# Patient Record
Sex: FEMALE | Race: BLACK OR AFRICAN AMERICAN | Employment: UNEMPLOYED | URBAN - METROPOLITAN AREA
[De-identification: names, ages, dates, MRNs, and addresses within clinical notes are randomized per-mention and may not be internally consistent; named-entity substitution may affect disease eponyms.]

---

## 2017-01-02 DIAGNOSIS — G43.809 OTHER MIGRAINE WITHOUT STATUS MIGRAINOSUS, NOT INTRACTABLE: ICD-10-CM

## 2017-01-03 RX ORDER — BUTALBITAL, ACETAMINOPHEN AND CAFFEINE 50; 325; 40 MG/1; MG/1; MG/1
TABLET ORAL
Qty: 30 TAB | Refills: 1 | OUTPATIENT
Start: 2017-01-03 | End: 2017-03-23 | Stop reason: SDUPTHER

## 2017-01-23 ENCOUNTER — OFFICE VISIT (OUTPATIENT)
Dept: FAMILY MEDICINE CLINIC | Age: 47
End: 2017-01-23

## 2017-01-23 VITALS
BODY MASS INDEX: 38.98 KG/M2 | HEART RATE: 93 BPM | WEIGHT: 220 LBS | DIASTOLIC BLOOD PRESSURE: 82 MMHG | HEIGHT: 63 IN | TEMPERATURE: 97.9 F | OXYGEN SATURATION: 98 % | RESPIRATION RATE: 16 BRPM | SYSTOLIC BLOOD PRESSURE: 110 MMHG

## 2017-01-23 DIAGNOSIS — B96.89 BACTERIAL VAGINAL INFECTION: ICD-10-CM

## 2017-01-23 DIAGNOSIS — R35.0 URINARY FREQUENCY: ICD-10-CM

## 2017-01-23 DIAGNOSIS — R30.9 PAIN WITH URINATION: Primary | ICD-10-CM

## 2017-01-23 DIAGNOSIS — Z87.440 HISTORY OF RECURRENT UTI (URINARY TRACT INFECTION): ICD-10-CM

## 2017-01-23 DIAGNOSIS — N76.0 BACTERIAL VAGINAL INFECTION: ICD-10-CM

## 2017-01-23 LAB
BILIRUB UR QL STRIP: NEGATIVE
GLUCOSE UR-MCNC: NEGATIVE MG/DL
KETONES P FAST UR STRIP-MCNC: NEGATIVE MG/DL
PH UR STRIP: 6 [PH] (ref 4.6–8)
PROT UR QL STRIP: NEGATIVE MG/DL
SP GR UR STRIP: 1.02 (ref 1–1.03)
UA UROBILINOGEN AMB POC: NORMAL (ref 0.2–1)
URINALYSIS CLARITY POC: CLEAR
URINALYSIS COLOR POC: YELLOW
URINE BLOOD POC: NORMAL
URINE LEUKOCYTES POC: NEGATIVE
URINE NITRITES POC: NEGATIVE

## 2017-01-23 RX ORDER — METRONIDAZOLE 500 MG/1
500 TABLET ORAL 2 TIMES DAILY
Qty: 14 TAB | Refills: 0 | Status: SHIPPED | OUTPATIENT
Start: 2017-01-23 | End: 2017-03-06 | Stop reason: SDUPTHER

## 2017-01-23 NOTE — PROGRESS NOTES
1. Have you been to the ER, urgent care clinic since your last visit? Hospitalized since your last visit? No    2. Have you seen or consulted any other health care providers outside of the [address] [place] [address] since your last visit? Include any pap smears or colon screening. No    Chief Complaint   Patient presents with    Vaginal Discharge     & odor x 2 wks    Urinary Pain     & urinary frequency, hx of recurrent uti x 1 wk     Pt states she has vaginal discharge & odor x 2 wks. Pt also reports burning with urination & urinary freqency x 1 wk. She also states she has a history of recurrent uti's and just stopped macrbid 3 wks ago.

## 2017-01-23 NOTE — PROGRESS NOTES
HISTORY OF PRESENT ILLNESS  Antonella Chris is a 42 y.o. female. HPI  Pt states she has vaginal discharge & odor x 2 wks. Pt also reports burning with urination & urinary freqency x 1 wk. She also states she has a history of recurrent uti's and just stopped macrbid 3 wks ago. ROS  A comprehensive review of system was obtained and negative except findings in the HPI    Visit Vitals    /82    Pulse 93    Temp 97.9 °F (36.6 °C) (Oral)    Resp 16    Ht 5' 3\" (1.6 m)    Wt 220 lb (99.8 kg)    SpO2 98%    BMI 38.97 kg/m2     Physical Exam   Constitutional: She is oriented to person, place, and time. She appears well-developed and well-nourished. Neck: No JVD present. Cardiovascular: Normal rate, regular rhythm and intact distal pulses. Exam reveals no gallop and no friction rub. No murmur heard. Pulmonary/Chest: Effort normal and breath sounds normal. No respiratory distress. She has no wheezes. Musculoskeletal: She exhibits no edema. Neurological: She is alert and oriented to person, place, and time. Skin: Skin is warm. Nursing note and vitals reviewed. ASSESSMENT and PLAN  Encounter Diagnoses   Name Primary?  Pain with urination Yes    Urinary frequency     History of recurrent UTI (urinary tract infection)     Bacterial vaginal infection      Orders Placed This Encounter    AMB POC URINALYSIS DIP STICK AUTO W/O MICRO    metroNIDAZOLE (FLAGYL) 500 mg tablet     Urine dip clean  Given flagyl for BV infection  Follow up 4-5 days if not improving    I have discussed the diagnosis with the patient and the intended plan as seen in the above orders. The patient has received an after-visit summary and questions were answered concerning future plans. Patient conveyed understanding of the plan at the time of the visit.     Shanta Rodriguez, JIMENA, ANP  1/23/2017

## 2017-01-23 NOTE — MR AVS SNAPSHOT
Visit Information Date & Time Provider Department Dept. Phone Encounter #  
 1/23/2017 11:30 AM Elvin Desai, TANNA [address] [facility] [phone] [id number] Upcoming Health Maintenance Date Due Pneumococcal 19-64 Medium Risk (1 of 1 - PPSV23) 1/30/1991 INFLUENZA AGE 9 TO ADULT 8/1/2016 PAP AKA CERVICAL CYTOLOGY 9/22/2017 DTaP/Tdap/Td series (2 - Td) 8/18/2021 Allergies as of 1/23/2017  Review Complete On: 1/23/2017 By: Paresh Worthington LPN Severity Noted Reaction Type Reactions Hydromorphone High 01/05/2012   Systemic Itching, Swelling  
 hydromorphone 1mg IV given immediately began itching and complained of scratchy swelling sensation in her throat Aspirin Medium 12/31/2011   Systemic Anaphylaxis Bactrim [Sulfamethoprim Ds] Medium 08/13/2012   Side Effect Hives Darvocet A500 [Propoxyphene N-acetaminophen] Medium 11/04/2010   Intolerance Hives Imitrex [Sumatriptan] Medium 12/31/2011   Systemic Hives Pcn [Penicillins] Medium 11/04/2010   Intolerance Anaphylaxis Percocet [Oxycodone-acetaminophen] Medium 11/04/2010   Intolerance Hives Pt can take lortab/norco  
 Toradol [Ketorolac Tromethamine] Medium 11/04/2010   Intolerance Hives Tramadol Medium 02/24/2012   Intolerance Hives Hydrocodone  04/04/2016    Hives Lisinopril  06/29/2016    Swelling Tongue and mouth felt weird, slight swelling Prednisone  04/16/2014    Rash And pt felt throat closing Normie Marinas [Milnacipran]  02/17/2012    Hives INSOMNIA Vesicare [Solifenacin]  12/31/2011    Other (comments) Nose bleeds Wellbutrin [Bupropion Hcl]  05/09/2014    Other (comments)  
 insomnia Current Immunizations  Reviewed on 6/21/2016 Name Date TDAP Vaccine 8/18/2011 Not reviewed this visit You Were Diagnosed With   
  
 Codes Comments Pain with urination    -  Primary ICD-10-CM: R30.9 ICD-9-CM: 788.1 Urinary frequency     ICD-10-CM: R35.0 ICD-9-CM: 788.41 History of recurrent UTI (urinary tract infection)     ICD-10-CM: Z87.440 ICD-9-CM: V13.02 Bacterial vaginal infection     ICD-10-CM: N76.0, B96.89 
ICD-9-CM: 616.10, 041.9 Vitals BP Pulse Temp Resp Height(growth percentile) Weight(growth percentile) 110/82 93 97.9 °F (36.6 °C) (Oral) 16 5' 3\" (1.6 m) 220 lb (99.8 kg) SpO2 BMI OB Status Smoking Status 98% 38.97 kg/m2 Ablation Current Some Day Smoker Vitals History BMI and BSA Data Body Mass Index Body Surface Area  
 38.97 kg/m 2 2.11 m 2 Preferred Pharmacy Pharmacy Name Phone [id number] [place], [place] - [address] [facility] [phone] Your Updated Medication List  
  
   
This list is accurate as of: 1/23/17 11:54 AM.  Always use your most recent med list.  
  
  
  
  
 ALPRAZolam 0.5 mg tablet Commonly known as:  XANAX  
TAKE 1 TABLET BY MOUTH TWICE A DAY AS NEEDED. Must last 30 days. amitriptyline 50 mg tablet Commonly known as:  ELAVIL TAKE 1 TABLET BY MOUTH EVERY EVENING  
  
 butalbital-acetaminophen-caffeine -40 mg per tablet Commonly known as:  FIORICET, ESGIC  
TAKE 1 TABLET BY MOUTH EVERY 12 HOURS (MAX 2 TABS DAILY) diclofenac EC 50 mg EC tablet Commonly known as:  VOLTAREN  
TAKE 1 TABLET BY MOUTH 3 TIMES A DAY DULoxetine 60 mg capsule Commonly known as:  CYMBALTA TAKE ONE CAPSULE BY MOUTH EVERY DAY  
  
 ergocalciferol 50,000 unit capsule Commonly known as:  ERGOCALCIFEROL  
TAKE ONE CAPSULE BY MOUTH EVERY WEEK  
  
 fluconazole 150 mg tablet Commonly known as:  DIFLUCAN  
TAKE 1 TAB BY MOUTH DAILY FOR 1 DAY. gabapentin 600 mg tablet Commonly known as:  NEURONTIN  
TAKE 1 TABLET BY MOUTH EVERY MORNING AND IN THE AFTERNOON AND TAKE 2 TABLETS AT NIGHT  
  
 hydroCHLOROthiazide 25 mg tablet Commonly known as:  HYDRODIURIL  
 TAKE 1 TABLET BY MOUTH EVERY DAY  
  
 losartan 50 mg tablet Commonly known as:  COZAAR  
TAKE 1 TABLET EVERY DAY  
  
 * methocarbamol 500 mg tablet Commonly known as:  ROBAXIN Take  by mouth four (4) times daily. * methocarbamol 500 mg tablet Commonly known as:  ROBAXIN  
TAKE 1 TABLET BY MOUTH 3 TIMES A DAY  
  
 metroNIDAZOLE 500 mg tablet Commonly known as:  FLAGYL Take 1 Tab by mouth two (2) times a day for 7 days. raNITIdine 75 mg tablet Commonly known as:  ZANTAC TAKE 1 TABLET BY MOUTH TWICE A DAY BEFORE MEALS  
  
 topiramate 100 mg tablet Commonly known as:  TOPAMAX Take 1.5 Tabs by mouth nightly. TAKE 1.5 TABLETs  BY MOUTH EVERY DAY  
  
 triamcinolone 0.5 % topical cream  
Commonly known as:  ARISTOCORT  
APPLY TO AFFECTED AREA TWO (2) TIMES A DAY. USE THIN LAYER  
  
 * Notice: This list has 2 medication(s) that are the same as other medications prescribed for you. Read the directions carefully, and ask your doctor or other care provider to review them with you. Prescriptions Sent to Pharmacy Refills  
 metroNIDAZOLE (FLAGYL) 500 mg tablet 0 Sig: Take 1 Tab by mouth two (2) times a day for 7 days. Class: Normal  
 Pharmacy: [facility]/pharmacy #[id number] #: [phone] Route: Oral  
  
We Performed the Following AMB POC URINALYSIS DIP STICK AUTO W/O MICRO [61519 CPT(R)] Introducing [facility]! Dear Dakota Barrera: 
Thank you for requesting a Amen. account. Our records indicate that you already have an active Amen. account. You can access your account anytime at [email or web address] Did you know that you can access your hospital and ER discharge instructions at any time in Amen.? You can also review all of your test results from your hospital stay or ER visit. Additional Information If you have questions, please visit the Frequently Asked Questions section of the [email or web address] website at [email or web address] Remember, [employer] is NOT to be used for urgent needs. For medical emergencies, dial 911. Now available from your iPhone and Android! Please provide this summary of care documentation to your next provider. Your primary care clinician is listed as NICKY YOU. If you have any questions after today's visit, please call [phone].

## 2017-01-25 RX ORDER — DICLOFENAC SODIUM 50 MG/1
TABLET, DELAYED RELEASE ORAL
Qty: 90 TAB | Refills: 1 | Status: SHIPPED | OUTPATIENT
Start: 2017-01-25 | End: 2017-04-24 | Stop reason: SDUPTHER

## 2017-03-06 RX ORDER — METRONIDAZOLE 500 MG/1
TABLET ORAL
Qty: 14 TAB | Refills: 0 | Status: SHIPPED | OUTPATIENT
Start: 2017-03-06 | End: 2017-03-23 | Stop reason: ALTCHOICE

## 2017-03-23 ENCOUNTER — OFFICE VISIT (OUTPATIENT)
Dept: FAMILY MEDICINE CLINIC | Age: 47
End: 2017-03-23

## 2017-03-23 VITALS
HEART RATE: 78 BPM | RESPIRATION RATE: 18 BRPM | BODY MASS INDEX: 38.88 KG/M2 | HEIGHT: 63 IN | DIASTOLIC BLOOD PRESSURE: 88 MMHG | WEIGHT: 219.4 LBS | OXYGEN SATURATION: 98 % | SYSTOLIC BLOOD PRESSURE: 134 MMHG | TEMPERATURE: 98.2 F

## 2017-03-23 DIAGNOSIS — M79.7 FIBROMYALGIA: ICD-10-CM

## 2017-03-23 DIAGNOSIS — R73.03 PRE-DIABETES: ICD-10-CM

## 2017-03-23 DIAGNOSIS — G43.809 OTHER MIGRAINE WITHOUT STATUS MIGRAINOSUS, NOT INTRACTABLE: Primary | ICD-10-CM

## 2017-03-23 DIAGNOSIS — E78.00 PURE HYPERCHOLESTEROLEMIA: ICD-10-CM

## 2017-03-23 DIAGNOSIS — I10 ESSENTIAL HYPERTENSION, BENIGN: ICD-10-CM

## 2017-03-23 DIAGNOSIS — M51.36 DDD (DEGENERATIVE DISC DISEASE), LUMBAR: ICD-10-CM

## 2017-03-23 RX ORDER — METHOCARBAMOL 500 MG/1
TABLET, FILM COATED ORAL
Qty: 90 TAB | Refills: 5 | Status: SHIPPED | OUTPATIENT
Start: 2017-03-23 | End: 2017-09-26 | Stop reason: ALTCHOICE

## 2017-03-23 RX ORDER — TRIAMCINOLONE ACETONIDE 1 MG/G
CREAM TOPICAL 2 TIMES DAILY
Qty: 60 G | Refills: 0 | Status: SHIPPED | OUTPATIENT
Start: 2017-03-23 | End: 2018-08-21 | Stop reason: SDUPTHER

## 2017-03-23 RX ORDER — BUTALBITAL, ACETAMINOPHEN AND CAFFEINE 50; 325; 40 MG/1; MG/1; MG/1
TABLET ORAL
Qty: 30 TAB | Refills: 1 | Status: SHIPPED | OUTPATIENT
Start: 2017-03-23 | End: 2017-06-14 | Stop reason: SDUPTHER

## 2017-03-23 RX ORDER — TOPIRAMATE 100 MG/1
100 TABLET, FILM COATED ORAL 2 TIMES DAILY
Qty: 60 TAB | Refills: 5 | Status: SHIPPED | OUTPATIENT
Start: 2017-03-23 | End: 2017-09-17 | Stop reason: SDUPTHER

## 2017-03-23 NOTE — MR AVS SNAPSHOT
Visit Information Date & Time Provider Department Dept. Phone Encounter #  
 3/23/2017  9:00 AM Maddi Adams MD [address] [facility] [phone] [id number] Follow-up Instructions Return in about 6 months (around 9/23/2017). Upcoming Health Maintenance Date Due Pneumococcal 19-64 Medium Risk (1 of 1 - PPSV23) 1/30/1991 PAP AKA CERVICAL CYTOLOGY 9/22/2017 DTaP/Tdap/Td series (2 - Td) 8/18/2021 Allergies as of 3/23/2017  Review Complete On: 3/23/2017 By: Maddi Adams MD  
  
 Severity Noted Reaction Type Reactions Hydromorphone High 01/05/2012   Systemic Itching, Swelling  
 hydromorphone 1mg IV given immediately began itching and complained of scratchy swelling sensation in her throat Aspirin Medium 12/31/2011   Systemic Anaphylaxis Bactrim [Sulfamethoprim Ds] Medium 08/13/2012   Side Effect Hives Darvocet A500 [Propoxyphene N-acetaminophen] Medium 11/04/2010   Intolerance Hives Imitrex [Sumatriptan] Medium 12/31/2011   Systemic Hives Pcn [Penicillins] Medium 11/04/2010   Intolerance Anaphylaxis Percocet [Oxycodone-acetaminophen] Medium 11/04/2010   Intolerance Hives Pt can take lortab/norco  
 Toradol [Ketorolac Tromethamine] Medium 11/04/2010   Intolerance Hives Tramadol Medium 02/24/2012   Intolerance Hives Hydrocodone  04/04/2016    Hives Lisinopril  06/29/2016    Swelling Tongue and mouth felt weird, slight swelling Prednisone  04/16/2014    Rash And pt felt throat closing Ferol Darner [Milnacipran]  02/17/2012    Hives INSOMNIA Vesicare [Solifenacin]  12/31/2011    Other (comments) Nose bleeds Wellbutrin [Bupropion Hcl]  05/09/2014    Other (comments)  
 insomnia Current Immunizations  Reviewed on 6/21/2016 Name Date TDAP Vaccine 8/18/2011 Not reviewed this visit You Were Diagnosed With   
  
 Codes Comments  Other migraine without status migrainosus, not intractable    -  Primary ICD-10-CM: I46.809 Essential hypertension, benign     ICD-10-CM: I10 
ICD-9-CM: 401.1 Pre-diabetes     ICD-10-CM: R73.03 
ICD-9-CM: 790.29 Pure hypercholesterolemia     ICD-10-CM: E78.00 ICD-9-CM: 272.0   
 DDD (degenerative disc disease), lumbar     ICD-10-CM: M51.36 
ICD-9-CM: 722.52 Fibromyalgia     ICD-10-CM: M79.7 ICD-9-CM: 729.1 Vitals BP Pulse Temp Resp Height(growth percentile) Weight(growth percentile) 134/88 78 98.2 °F (36.8 °C) (Oral) 18 5' 3\" (1.6 m) 219 lb 6.4 oz (99.5 kg) SpO2 BMI OB Status Smoking Status 98% 38.86 kg/m2 Ablation Current Some Day Smoker Vitals History BMI and BSA Data Body Mass Index Body Surface Area  
 38.86 kg/m 2 2.1 m 2 Preferred Pharmacy Pharmacy Name Phone [id number]- [place], [place] - [address] [facility] [phone] Your Updated Medication List  
  
   
This list is accurate as of: 3/23/17  9:44 AM.  Always use your most recent med list.  
  
  
  
  
 ALPRAZolam 0.5 mg tablet Commonly known as:  XANAX  
TAKE 1 TABLET BY MOUTH TWICE A DAY AS NEEDED. Must last 30 days. amitriptyline 50 mg tablet Commonly known as:  ELAVIL TAKE 1 TABLET BY MOUTH EVERY EVENING  
  
 butalbital-acetaminophen-caffeine -40 mg per tablet Commonly known as:  FIORICET, ESGIC  
TAKE 1 TABLET BY MOUTH EVERY 12 HOURS (MAX 2 TABS DAILY) diclofenac EC 50 mg EC tablet Commonly known as:  VOLTAREN  
TAKE 1 TABLET BY MOUTH 3 TIMES A DAY DULoxetine 60 mg capsule Commonly known as:  CYMBALTA TAKE ONE CAPSULE BY MOUTH EVERY DAY  
  
 ergocalciferol 50,000 unit capsule Commonly known as:  ERGOCALCIFEROL  
TAKE ONE CAPSULE BY MOUTH EVERY WEEK  
  
 fluconazole 150 mg tablet Commonly known as:  DIFLUCAN  
TAKE 1 TAB BY MOUTH DAILY FOR 1 DAY. gabapentin 600 mg tablet Commonly known as:  NEURONTIN  
 TAKE 1 TABLET BY MOUTH EVERY MORNING AND IN THE AFTERNOON AND TAKE 2 TABLETS AT NIGHT  
  
 hydroCHLOROthiazide 25 mg tablet Commonly known as:  HYDRODIURIL  
TAKE 1 TABLET BY MOUTH EVERY DAY  
  
 losartan 50 mg tablet Commonly known as:  COZAAR  
TAKE 1 TABLET EVERY DAY  
  
 * methocarbamol 500 mg tablet Commonly known as:  ROBAXIN Take  by mouth four (4) times daily. * methocarbamol 500 mg tablet Commonly known as:  ROBAXIN  
TAKE 1 TABLET BY MOUTH 3 TIMES A DAY  
  
 raNITIdine 75 mg tablet Commonly known as:  ZANTAC TAKE 1 TABLET BY MOUTH TWICE A DAY BEFORE MEALS  
  
 topiramate 100 mg tablet Commonly known as:  TOPAMAX Take 1 Tab by mouth two (2) times a day. TAKE 1.5 TABLETs  BY MOUTH EVERY DAY  
  
 * triamcinolone 0.5 % topical cream  
Commonly known as:  ARISTOCORT  
APPLY TO AFFECTED AREA TWO (2) TIMES A DAY. USE THIN LAYER  
  
 * triamcinolone acetonide 0.1 % topical cream  
Commonly known as:  KENALOG Apply  to affected area two (2) times a day. * Notice: This list has 4 medication(s) that are the same as other medications prescribed for you. Read the directions carefully, and ask your doctor or other care provider to review them with you. Prescriptions Printed Refills  
 butalbital-acetaminophen-caffeine (FIORICET, ESGIC) -40 mg per tablet 1 Sig: TAKE 1 TABLET BY MOUTH EVERY 12 HOURS (MAX 2 TABS DAILY) Class: Print Prescriptions Sent to Pharmacy Refills  
 methocarbamol (ROBAXIN) 500 mg tablet 5 Sig: TAKE 1 TABLET BY MOUTH 3 TIMES A DAY Class: Normal  
 Pharmacy: [facility]/pharmacy #[id number]- [place], [place] - [address] Ph #: [phone]  
 topiramate (TOPAMAX) 100 mg tablet 5 Sig: Take 1 Tab by mouth two (2) times a day. TAKE 1.5 TABLETs  BY MOUTH EVERY DAY  Class: Normal  
 Pharmacy: [facility]/pharmacy #[id number]- [place], [place] - [address] AT [facility] Ph #: [phone] Route: Oral  
 triamcinolone acetonide (KENALOG) 0.1 % topical cream 0 Sig: Apply  to affected area two (2) times a day. Class: Normal  
 [id number] [address] Ph #: [phone] Route: Topical  
  
We Performed the Following CBC WITH AUTOMATED DIFF [81673 CPT(R)] HEMOGLOBIN A1C WITH EAG [00728 CPT(R)] LIPID PANEL [04636 CPT(R)] METABOLIC PANEL, COMPREHENSIVE [16872 CPT(R)] TSH 3RD GENERATION [69494 CPT(R)] Follow-up Instructions Return in about 6 months (around 9/23/2017). Introducing [facility]! Dear Xiomara Hoffmann: 
Thank you for requesting a [employer] account. Our records indicate that you already have an active [employer] account. You can access your account anytime at [email or web address] Did you know that you can access your hospital and ER discharge instructions at any time in [employer]? You can also review all of your test results from your hospital stay or ER visit. Additional Information If you have questions, please visit the Frequently Asked Questions section of the [employer] website at [email or web address] Remember, [employer] is NOT to be used for urgent needs. For medical emergencies, dial 911. Now available from your iPhone and Android! Please provide this summary of care documentation to your next provider. Your primary care clinician is listed as NICKY YOU. If you have any questions after today's visit, please call [phone].

## 2017-03-23 NOTE — PROGRESS NOTES
Chief Complaint   Patient presents with    Complete Physical    Labs     Fasting    Medication Refill     Pending    Rash     Bilateral Arms and Legs     1. Have you been to the ER, urgent care clinic since your last visit? Hospitalized since your last visit? No    2. Have you seen or consulted any other health care providers outside of the [address] [place] [address] since your last visit? Include any pap smears or colon screening. No      Chief Complaint   Patient presents with    Complete Physical    Labs     Fasting    Medication Refill     Pending    Rash     Bilateral Arms and Legs     she is a 41y.o. year old female who presents for evalution. Reviewed PmHx, RxHx, FmHx, SocHx, AllgHx and updated and dated in the chart. Patient Active Problem List    Diagnosis    Pre-diabetes    Urinary frequency    Essential hypertension, benign    DDD (degenerative disc disease), lumbar    Left axillary pain    IBS (irritable bowel syndrome)    Migraine    Hyperlipidemia    Fibromyalgia    Nipple discharge    Depression       Review of Systems - negative except as listed above in the HPI    Objective:     Vitals:    03/23/17 0931   BP: 134/88   Pulse: 78   Resp: 18   Temp: 98.2 °F (36.8 °C)   TempSrc: Oral   SpO2: 98%   Weight: 219 lb 6.4 oz (99.5 kg)   Height: 5' 3\" (1.6 m)     Physical Examination: General appearance - alert, well appearing, and in no distress  Neck - supple, no significant adenopathy  Chest - clear to auscultation, no wheezes, rales or rhonchi, symmetric air entry  Heart - normal rate, regular rhythm, normal S1, S2, no murmurs, rubs, clicks or gallops  Abdomen - soft, nontender, nondistended, no masses or organomegaly    Assessment/ Plan:   Alla Mathur was seen today for complete physical, labs, medication refill and rash.     Diagnoses and all orders for this visit:    Other migraine without status migrainosus, not intractable  -     butalbital-acetaminophen-caffeine (FIORICET, ESGIC) -40 mg per tablet; TAKE 1 TABLET BY MOUTH EVERY 12 HOURS (MAX 2 TABS DAILY)  -     topiramate (TOPAMAX) 100 mg tablet; Take 1 Tab by mouth two (2) times a day. TAKE 1.5 TABLETs  BY MOUTH EVERY DAY  -inc topamax to 100 bid    Essential hypertension, benign  -     LIPID PANEL  -     METABOLIC PANEL, COMPREHENSIVE  -     CBC WITH AUTOMATED DIFF  -at goal    Pre-diabetes  -     LIPID PANEL  -     METABOLIC PANEL, COMPREHENSIVE  -     HEMOGLOBIN A1C WITH EAG  -     TSH 3RD GENERATION  -     CBC WITH AUTOMATED DIFF    Pure hypercholesterolemia  -     LIPID PANEL  -     METABOLIC PANEL, COMPREHENSIVE  -     TSH 3RD GENERATION    DDD (degenerative disc disease), lumbar  -     methocarbamol (ROBAXIN) 500 mg tablet; TAKE 1 TABLET BY MOUTH 3 TIMES A DAY  -seeing rheum as well    Fibromyalgia  -     methocarbamol (ROBAXIN) 500 mg tablet; TAKE 1 TABLET BY MOUTH 3 TIMES A DAY  -     CBC WITH AUTOMATED DIFF    Other orders  -     triamcinolone acetonide (KENALOG) 0.1 % topical cream; Apply  to affected area two (2) times a day. Follow-up Disposition:  Return in about 6 months (around 9/23/2017). I have discussed the diagnosis with the patient and the intended plan as seen in the above orders. The patient understands and agrees with the plan. The patient has received an after-visit summary and questions were answered concerning future plans. Medication Side Effects and Warnings were discussed with patient  Patient Labs were reviewed and or requested:  Patient Past Records were reviewed and or requested    Mary Gracia M.D. There are no Patient Instructions on file for this visit.

## 2017-03-24 LAB
ALBUMIN SERPL-MCNC: 3.9 G/DL (ref 3.5–5.5)
ALBUMIN/GLOB SERPL: 1.4 {RATIO} (ref 1.2–2.2)
ALP SERPL-CCNC: 59 IU/L (ref 39–117)
ALT SERPL-CCNC: 14 IU/L (ref 0–32)
AST SERPL-CCNC: 9 IU/L (ref 0–40)
BASOPHILS # BLD AUTO: 0 X10E3/UL (ref 0–0.2)
BASOPHILS NFR BLD AUTO: 0 %
BILIRUB SERPL-MCNC: <0.2 MG/DL (ref 0–1.2)
BUN SERPL-MCNC: 7 MG/DL (ref 6–24)
BUN/CREAT SERPL: 9 (ref 9–23)
CALCIUM SERPL-MCNC: 8.9 MG/DL (ref 8.7–10.2)
CHLORIDE SERPL-SCNC: 101 MMOL/L (ref 96–106)
CHOLEST SERPL-MCNC: 186 MG/DL (ref 100–199)
CO2 SERPL-SCNC: 21 MMOL/L (ref 18–29)
CREAT SERPL-MCNC: 0.74 MG/DL (ref 0.57–1)
EOSINOPHIL # BLD AUTO: 0.2 X10E3/UL (ref 0–0.4)
EOSINOPHIL NFR BLD AUTO: 2 %
ERYTHROCYTE [DISTWIDTH] IN BLOOD BY AUTOMATED COUNT: 14 % (ref 12.3–15.4)
EST. AVERAGE GLUCOSE BLD GHB EST-MCNC: 114 MG/DL
GLOBULIN SER CALC-MCNC: 2.7 G/DL (ref 1.5–4.5)
GLUCOSE SERPL-MCNC: 81 MG/DL (ref 65–99)
HBA1C MFR BLD: 5.6 % (ref 4.8–5.6)
HCT VFR BLD AUTO: 38.4 % (ref 34–46.6)
HDLC SERPL-MCNC: 51 MG/DL
HGB BLD-MCNC: 12.8 G/DL (ref 11.1–15.9)
IMM GRANULOCYTES # BLD: 0 X10E3/UL (ref 0–0.1)
IMM GRANULOCYTES NFR BLD: 0 %
INTERPRETATION, 910389: NORMAL
LDLC SERPL CALC-MCNC: 95 MG/DL (ref 0–99)
LYMPHOCYTES # BLD AUTO: 4.5 X10E3/UL (ref 0.7–3.1)
LYMPHOCYTES NFR BLD AUTO: 45 %
MCH RBC QN AUTO: 29.8 PG (ref 26.6–33)
MCHC RBC AUTO-ENTMCNC: 33.3 G/DL (ref 31.5–35.7)
MCV RBC AUTO: 90 FL (ref 79–97)
MONOCYTES # BLD AUTO: 0.6 X10E3/UL (ref 0.1–0.9)
MONOCYTES NFR BLD AUTO: 6 %
NEUTROPHILS # BLD AUTO: 4.6 X10E3/UL (ref 1.4–7)
NEUTROPHILS NFR BLD AUTO: 47 %
PLATELET # BLD AUTO: 312 X10E3/UL (ref 150–379)
POTASSIUM SERPL-SCNC: 4 MMOL/L (ref 3.5–5.2)
PROT SERPL-MCNC: 6.6 G/DL (ref 6–8.5)
RBC # BLD AUTO: 4.29 X10E6/UL (ref 3.77–5.28)
SODIUM SERPL-SCNC: 139 MMOL/L (ref 134–144)
TRIGL SERPL-MCNC: 201 MG/DL (ref 0–149)
TSH SERPL DL<=0.005 MIU/L-ACNC: 3.44 UIU/ML (ref 0.45–4.5)
VLDLC SERPL CALC-MCNC: 40 MG/DL (ref 5–40)
WBC # BLD AUTO: 9.9 X10E3/UL (ref 3.4–10.8)

## 2017-03-26 RX ORDER — AMITRIPTYLINE HYDROCHLORIDE 50 MG/1
TABLET, FILM COATED ORAL
Qty: 30 TAB | Refills: 3 | Status: SHIPPED | OUTPATIENT
Start: 2017-03-26 | End: 2017-08-15 | Stop reason: SDUPTHER

## 2017-04-03 RX ORDER — GABAPENTIN 600 MG/1
TABLET ORAL
Qty: 120 TAB | Refills: 4 | Status: SHIPPED | OUTPATIENT
Start: 2017-04-03 | End: 2017-11-22 | Stop reason: SDUPTHER

## 2017-04-13 ENCOUNTER — TELEPHONE (OUTPATIENT)
Dept: FAMILY MEDICINE CLINIC | Age: 47
End: 2017-04-13

## 2017-04-13 NOTE — TELEPHONE ENCOUNTER
George North is calling on behalf of pt to see if office received records for release of information form. Cb number [phone].

## 2017-04-22 DIAGNOSIS — N39.0 URINARY TRACT INFECTION WITHOUT HEMATURIA, SITE UNSPECIFIED: ICD-10-CM

## 2017-04-22 DIAGNOSIS — R30.9 PAINFUL URINATION: ICD-10-CM

## 2017-04-23 RX ORDER — FLUCONAZOLE 150 MG/1
TABLET ORAL
Qty: 1 TAB | Refills: 1 | Status: SHIPPED | OUTPATIENT
Start: 2017-04-23 | End: 2017-09-08 | Stop reason: SDUPTHER

## 2017-04-24 RX ORDER — DICLOFENAC SODIUM 50 MG/1
TABLET, DELAYED RELEASE ORAL
Qty: 90 TAB | Refills: 1 | Status: SHIPPED | OUTPATIENT
Start: 2017-04-24 | End: 2018-03-14

## 2017-05-17 ENCOUNTER — OFFICE VISIT (OUTPATIENT)
Dept: FAMILY MEDICINE CLINIC | Age: 47
End: 2017-05-17

## 2017-05-17 VITALS
TEMPERATURE: 98.4 F | RESPIRATION RATE: 20 BRPM | BODY MASS INDEX: 38.52 KG/M2 | HEIGHT: 63 IN | DIASTOLIC BLOOD PRESSURE: 87 MMHG | WEIGHT: 217.38 LBS | SYSTOLIC BLOOD PRESSURE: 116 MMHG | HEART RATE: 101 BPM

## 2017-05-17 DIAGNOSIS — N39.0 URINARY TRACT INFECTION WITH HEMATURIA, SITE UNSPECIFIED: Primary | ICD-10-CM

## 2017-05-17 DIAGNOSIS — R31.9 URINARY TRACT INFECTION WITH HEMATURIA, SITE UNSPECIFIED: Primary | ICD-10-CM

## 2017-05-17 DIAGNOSIS — I10 ESSENTIAL HYPERTENSION, BENIGN: ICD-10-CM

## 2017-05-17 LAB
BILIRUB UR QL STRIP: NEGATIVE
GLUCOSE UR-MCNC: NEGATIVE MG/DL
KETONES P FAST UR STRIP-MCNC: NEGATIVE MG/DL
PH UR STRIP: 5.5 [PH] (ref 4.6–8)
PROT UR QL STRIP: NEGATIVE MG/DL
SP GR UR STRIP: 1.02 (ref 1–1.03)
UA UROBILINOGEN AMB POC: NORMAL (ref 0.2–1)
URINALYSIS CLARITY POC: CLEAR
URINALYSIS COLOR POC: YELLOW
URINE BLOOD POC: NORMAL
URINE LEUKOCYTES POC: NORMAL
URINE NITRITES POC: NEGATIVE

## 2017-05-17 NOTE — MR AVS SNAPSHOT
Visit Information Date & Time Provider Department Dept. Phone Encounter #  
 5/17/2017  1:20 PM Brittanie Naranjo MD [facility] [phone] [id number] Follow-up Instructions Return in about 3 months (around 8/17/2017). Your Appointments 9/25/2017  9:30 AM  
ESTABLISHED PATIENT with Brittanie Naranjo MD  
[address] [facility] [address]) Appt Note: [facility] [address] [id number] [phone]  
  
   
 [address] [address] Upcoming Health Maintenance Date Due Pneumococcal 19-64 Medium Risk (1 of 1 - PPSV23) 1/30/1991 INFLUENZA AGE 9 TO ADULT 8/1/2017 PAP AKA CERVICAL CYTOLOGY 9/22/2017 DTaP/Tdap/Td series (2 - Td) 8/18/2021 Allergies as of 5/17/2017  Review Complete On: 5/17/2017 By: Brittanie Naranjo MD  
  
 Severity Noted Reaction Type Reactions Hydromorphone High 01/05/2012   Systemic Itching, Swelling  
 hydromorphone 1mg IV given immediately began itching and complained of scratchy swelling sensation in her throat Aspirin Medium 12/31/2011   Systemic Anaphylaxis Bactrim [Sulfamethoprim Ds] Medium 08/13/2012   Side Effect Hives Darvocet A500 [Propoxyphene N-acetaminophen] Medium 11/04/2010   Intolerance Hives Imitrex [Sumatriptan] Medium 12/31/2011   Systemic Hives Pcn [Penicillins] Medium 11/04/2010   Intolerance Anaphylaxis Percocet [Oxycodone-acetaminophen] Medium 11/04/2010   Intolerance Hives Pt can take lortab/norco  
 Toradol [Ketorolac Tromethamine] Medium 11/04/2010   Intolerance Hives Tramadol Medium 02/24/2012   Intolerance Hives Hydrocodone  04/04/2016    Hives Lisinopril  06/29/2016    Swelling Tongue and mouth felt weird, slight swelling Prednisone  04/16/2014    Rash And pt felt throat closing [employer] [Milnacipran]  02/17/2012    Hives INSOMNIA Vesicare [Solifenacin]  12/31/2011    Other (comments) Nose bleeds Wellbutrin [Bupropion Hcl]  05/09/2014    Other (comments)  
 insomnia Current Immunizations  Reviewed on 6/21/2016 Name Date TDAP Vaccine 8/18/2011 Not reviewed this visit You Were Diagnosed With   
  
 Codes Comments Urinary tract infection with hematuria, site unspecified    -  Primary ICD-10-CM: N39.0, R31.9 ICD-9-CM: 599.0 Essential hypertension, benign     ICD-10-CM: I10 
ICD-9-CM: 401.1 Vitals BP Pulse Temp Resp Height(growth percentile) Weight(growth percentile) 116/87 (BP 1 Location: Left arm, BP Patient Position: Sitting) (!) 101 98.4 °F (36.9 °C) (Oral) 20 5' 3\" (1.6 m) 217 lb 6 oz (98.6 kg) PF BMI OB Status Smoking Status 98 L/min 38.51 kg/m2 Ablation Current Some Day Smoker Vitals History BMI and BSA Data Body Mass Index Body Surface Area 38.51 kg/m 2 2.09 m 2 Preferred Pharmacy Pharmacy Name Phone [facility]Y #[id number]- [place], [place] - [address] [facility] [phone] Your Updated Medication List  
  
   
This list is accurate as of: 5/17/17  2:00 PM.  Always use your most recent med list.  
  
  
  
  
 ALPRAZolam 0.5 mg tablet Commonly known as:  XANAX  
TAKE 1 TABLET BY MOUTH TWICE A DAY AS NEEDED. Must last 30 days. amitriptyline 50 mg tablet Commonly known as:  ELAVIL TAKE 1 TABLET BY MOUTH EVERY EVENING  
  
 butalbital-acetaminophen-caffeine -40 mg per tablet Commonly known as:  FIORICET, ESGIC  
TAKE 1 TABLET BY MOUTH EVERY 12 HOURS (MAX 2 TABS DAILY) diclofenac EC 50 mg EC tablet Commonly known as:  VOLTAREN  
TAKE 1 TABLET BY MOUTH 3 TIMES A DAY DULoxetine 60 mg capsule Commonly known as:  CYMBALTA TAKE ONE CAPSULE BY MOUTH EVERY DAY  
  
 ergocalciferol 50,000 unit capsule Commonly known as:  ERGOCALCIFEROL  
TAKE ONE CAPSULE BY MOUTH EVERY WEEK  
  
 fluconazole 150 mg tablet Commonly known as:  DIFLUCAN  
 TAKE 1 TABLET BY MOUTH ONCE  
  
 gabapentin 600 mg tablet Commonly known as:  NEURONTIN  
TAKE 1 TABLET BY MOUTH EVERY MORNING AND IN THE AFTERNOON AND TAKE 2 TABLETS AT NIGHT  
  
 hydroCHLOROthiazide 25 mg tablet Commonly known as:  HYDRODIURIL  
TAKE 1 TABLET BY MOUTH EVERY DAY  
  
 losartan 50 mg tablet Commonly known as:  COZAAR  
TAKE 1 TABLET EVERY DAY  
  
 * methocarbamol 500 mg tablet Commonly known as:  ROBAXIN Take  by mouth four (4) times daily. * methocarbamol 500 mg tablet Commonly known as:  ROBAXIN  
TAKE 1 TABLET BY MOUTH 3 TIMES A DAY  
  
 raNITIdine 75 mg tablet Commonly known as:  ZANTAC TAKE 1 TABLET BY MOUTH TWICE A DAY BEFORE MEALS  
  
 topiramate 100 mg tablet Commonly known as:  TOPAMAX Take 1 Tab by mouth two (2) times a day. TAKE 1.5 TABLETs  BY MOUTH EVERY DAY  
  
 * triamcinolone 0.5 % topical cream  
Commonly known as:  ARISTOCORT  
APPLY TO AFFECTED AREA TWO (2) TIMES A DAY. USE THIN LAYER  
  
 * triamcinolone acetonide 0.1 % topical cream  
Commonly known as:  KENALOG Apply  to affected area two (2) times a day. * Notice: This list has 4 medication(s) that are the same as other medications prescribed for you. Read the directions carefully, and ask your doctor or other care provider to review them with you. We Performed the Following AMB POC URINALYSIS DIP STICK AUTO W/ MICRO [42350 CPT(R)] CULTURE, URINE [id number] CPT(R)] Follow-up Instructions Return in about 3 months (around 8/17/2017). Introducing [facility]! Dear Lissette Walden: 
Thank you for requesting a [employer] account. Our records indicate that you already have an active Fios account. You can access your account anytime at [email or web address] Did you know that you can access your hospital and ER discharge instructions at any time in Fios? You can also review all of your test results from your hospital stay or ER visit. Additional Information If you have questions, please visit the Frequently Asked Questions section of the [employer] website at [email or web address] Remember, Checkd[employer]In is NOT to be used for urgent needs. For medical emergencies, dial 911. Now available from your iPhone and Android! Please provide this summary of care documentation to your next provider. Your primary care clinician is listed as NICKY YOU. If you have any questions after today's visit, please call [phone].

## 2017-05-17 NOTE — PROGRESS NOTES
1. Have you been to the ER, urgent care clinic since your last visit? Hospitalized since your last visit? No    2. Have you seen or consulted any other health care providers outside of the [address] [place] [address] since your last visit? Include any pap smears or colon screening. No   Chief Complaint   Patient presents with    Form Completion     forms fill out    Urinary Pain     urinary pain         Chief Complaint   Patient presents with    Form Completion     forms fill out    Urinary Pain     urinary pain     she is a 41y.o. year old female who presents for evalution. Reviewed PmHx, RxHx, FmHx, SocHx, AllgHx and updated and dated in the chart. Patient Active Problem List    Diagnosis    Pre-diabetes    Urinary frequency    Essential hypertension, benign    DDD (degenerative disc disease), lumbar    Left axillary pain    IBS (irritable bowel syndrome)    Migraine    Hyperlipidemia    Fibromyalgia    Nipple discharge    Depression       Review of Systems - negative except as listed above in the HPI    Objective:     Vitals:    05/17/17 1324   BP: 116/87   Pulse: (!) 101   Resp: 20   Temp: 98.4 °F (36.9 °C)   TempSrc: Oral   Weight: 217 lb 6 oz (98.6 kg)   Height: 5' 3\" (1.6 m)   PF: 98 L/min     Physical Examination: General appearance - alert, well appearing, and in no distress  Abdomen - soft, nontender, nondistended, no masses or organomegaly    Assessment/ Plan:   Marlin Lin was seen today for form completion and urinary pain. Diagnoses and all orders for this visit:    Urinary tract infection with hematuria, site unspecified  -     AMB POC URINALYSIS DIP STICK AUTO W/ MICRO-trace  -     CULTURE, URINE  -treat based on Cx  -forms filled out for [facility] for DDD    Essential hypertension, benign  -at goal     Follow-up Disposition:  Return in about 3 months (around 8/17/2017). I have discussed the diagnosis with the patient and the intended plan as seen in the above orders.   The patient understands and agrees with the plan. The patient has received an after-visit summary and questions were answered concerning future plans. Medication Side Effects and Warnings were discussed with patient  Patient Labs were reviewed and or requested:  Patient Past Records were reviewed and or requested    Dearcarolina Mccray M.D. There are no Patient Instructions on file for this visit.

## 2017-05-18 LAB — BACTERIA UR CULT: NORMAL

## 2017-06-14 DIAGNOSIS — F41.9 ANXIETY: ICD-10-CM

## 2017-06-15 RX ORDER — BUTALBITAL, ACETAMINOPHEN AND CAFFEINE 50; 325; 40 MG/1; MG/1; MG/1
TABLET ORAL
Qty: 30 TAB | Refills: 1 | OUTPATIENT
Start: 2017-06-15 | End: 2017-08-15

## 2017-06-15 RX ORDER — ALPRAZOLAM 0.5 MG/1
TABLET ORAL
Qty: 60 TAB | Refills: 1 | OUTPATIENT
Start: 2017-06-15 | End: 2017-08-15 | Stop reason: SDUPTHER

## 2017-08-15 ENCOUNTER — OFFICE VISIT (OUTPATIENT)
Dept: FAMILY MEDICINE CLINIC | Age: 47
End: 2017-08-15

## 2017-08-15 VITALS
HEART RATE: 83 BPM | DIASTOLIC BLOOD PRESSURE: 82 MMHG | TEMPERATURE: 98.6 F | OXYGEN SATURATION: 98 % | HEIGHT: 63 IN | WEIGHT: 217 LBS | SYSTOLIC BLOOD PRESSURE: 125 MMHG | BODY MASS INDEX: 38.45 KG/M2 | RESPIRATION RATE: 18 BRPM

## 2017-08-15 DIAGNOSIS — G43.909 MIGRAINE WITHOUT STATUS MIGRAINOSUS, NOT INTRACTABLE, UNSPECIFIED MIGRAINE TYPE: Primary | ICD-10-CM

## 2017-08-15 DIAGNOSIS — I10 ESSENTIAL HYPERTENSION, BENIGN: ICD-10-CM

## 2017-08-15 DIAGNOSIS — F41.9 ANXIETY: ICD-10-CM

## 2017-08-15 RX ORDER — BUTALBITAL, ACETAMINOPHEN AND CAFFEINE 50; 325; 40 MG/1; MG/1; MG/1
TABLET ORAL
Qty: 30 TAB | Refills: 1 | Status: CANCELLED | OUTPATIENT
Start: 2017-08-15

## 2017-08-15 RX ORDER — TIZANIDINE HYDROCHLORIDE 4 MG/1
4 CAPSULE, GELATIN COATED ORAL 3 TIMES DAILY
COMMUNITY
End: 2018-09-26

## 2017-08-15 RX ORDER — ALPRAZOLAM 0.5 MG/1
TABLET ORAL
Qty: 60 TAB | Refills: 1 | Status: SHIPPED | OUTPATIENT
Start: 2017-08-15 | End: 2017-10-24 | Stop reason: SDUPTHER

## 2017-08-15 RX ORDER — AMITRIPTYLINE HYDROCHLORIDE 100 MG/1
TABLET, FILM COATED ORAL
Qty: 30 TAB | Refills: 5 | Status: SHIPPED | OUTPATIENT
Start: 2017-08-15 | End: 2018-03-14 | Stop reason: SDUPTHER

## 2017-08-15 NOTE — MR AVS SNAPSHOT
Visit Information Date & Time Provider Department Dept. Phone Encounter #  
 8/15/2017  2:20 PM Justice Kelly MD [address] [facility] [phone] [id number] Follow-up Instructions Return in about 3 months (around 11/15/2017) for DM. Your Appointments 9/25/2017  9:30 AM  
ESTABLISHED PATIENT with Justice Kelly MD  
[address] [facility] [address]) Appt Note: [facility] [address] [address] [phone]  
  
   
 [address] [address] [address] [address] Upcoming Health Maintenance Date Due Pneumococcal 19-64 Medium Risk (1 of 1 - PPSV23) 1/30/1991 INFLUENZA AGE 9 TO ADULT 8/1/2017 PAP AKA CERVICAL CYTOLOGY 9/22/2017 DTaP/Tdap/Td series (2 - Td) 8/18/2021 Allergies as of 8/15/2017  Review Complete On: 8/15/2017 By: Justice Kelly MD  
  
 Severity Noted Reaction Type Reactions Hydromorphone High 01/05/2012   Systemic Itching, Swelling  
 hydromorphone 1mg IV given immediately began itching and complained of scratchy swelling sensation in her throat Aspirin Medium 12/31/2011   Systemic Anaphylaxis Bactrim [Sulfamethoprim Ds] Medium 08/13/2012   Side Effect Hives Darvocet A500 [Propoxyphene N-acetaminophen] Medium 11/04/2010   Intolerance Hives Imitrex [Sumatriptan] Medium 12/31/2011   Systemic Hives Pcn [Penicillins] Medium 11/04/2010   Intolerance Anaphylaxis Percocet [Oxycodone-acetaminophen] Medium 11/04/2010   Intolerance Hives Pt can take lortab/norco  
 Toradol [Ketorolac Tromethamine] Medium 11/04/2010   Intolerance Hives Tramadol Medium 02/24/2012   Intolerance Hives Hydrocodone  04/04/2016    Hives Lisinopril  06/29/2016    Swelling Tongue and mouth felt weird, slight swelling Prednisone  04/16/2014    Rash And pt felt throat closing Jasbir Fu [Milnacipran]  02/17/2012    Hives INSOMNIA Vesicare [Solifenacin]  12/31/2011    Other (comments) Nose bleeds Wellbutrin [Bupropion Hcl]  05/09/2014    Other (comments)  
 insomnia Current Immunizations  Reviewed on 6/21/2016 Name Date TDAP Vaccine 8/18/2011 Not reviewed this visit You Were Diagnosed With   
  
 Codes Comments Migraine without status migrainosus, not intractable, unspecified migraine type    -  Primary ICD-10-CM: M46.855 ICD-9-CM: 346.90 Anxiety     ICD-10-CM: F41.9 ICD-9-CM: 300.00 Essential hypertension, benign     ICD-10-CM: I10 
ICD-9-CM: 401.1 Vitals BP Pulse Temp Resp Height(growth percentile) Weight(growth percentile) 125/82 83 98.6 °F (37 °C) (Oral) 18 5' 3\" (1.6 m) 217 lb (98.4 kg) SpO2 BMI OB Status Smoking Status 98% 38.44 kg/m2 Ablation Current Some Day Smoker Vitals History BMI and BSA Data Body Mass Index Body Surface Area  
 38.44 kg/m 2 2.09 m 2 Preferred Pharmacy Pharmacy Name Phone [id number]- [place], [place] - [address] [facility] [phone] Your Updated Medication List  
  
   
This list is accurate as of: 8/15/17  2:29 PM.  Always use your most recent med list.  
  
  
  
  
 ALPRAZolam 0.5 mg tablet Commonly known as:  XANAX  
TAKE 1 TABLET BY MOUTH TWICE A DAY AS NEEDED  
  
 amitriptyline 100 mg tablet Commonly known as:  ELAVIL TAKE 1 TABLET BY MOUTH EVERY EVENING  
  
 butalbital-acetaminophen-caffeine -40 mg per tablet Commonly known as:  FIORICET, ESGIC  
TAKE 1 TABLET BY MOUTH EVERY 12 HOURS (MAX 2 TABS DAILY) diclofenac EC 50 mg EC tablet Commonly known as:  VOLTAREN  
TAKE 1 TABLET BY MOUTH 3 TIMES A DAY DULoxetine 60 mg capsule Commonly known as:  CYMBALTA TAKE ONE CAPSULE BY MOUTH EVERY DAY  
  
 ergocalciferol 50,000 unit capsule Commonly known as:  ERGOCALCIFEROL  
TAKE ONE CAPSULE BY MOUTH EVERY WEEK  
  
 fluconazole 150 mg tablet Commonly known as:  DIFLUCAN  
 TAKE 1 TABLET BY MOUTH ONCE  
  
 gabapentin 600 mg tablet Commonly known as:  NEURONTIN  
TAKE 1 TABLET BY MOUTH EVERY MORNING AND IN THE AFTERNOON AND TAKE 2 TABLETS AT NIGHT  
  
 hydroCHLOROthiazide 25 mg tablet Commonly known as:  HYDRODIURIL  
TAKE 1 TABLET BY MOUTH EVERY DAY  
  
 eoonhmv-juoangtlv-ddubwtxswvkv -325 mg capsule Commonly known as:  Kate Dinning Take 1 Cap by mouth four (4) times daily as needed. Max Daily Amount: 4 Caps. Indications: MIGRAINE  
  
 losartan 50 mg tablet Commonly known as:  COZAAR  
TAKE 1 TABLET EVERY DAY  
  
 * methocarbamol 500 mg tablet Commonly known as:  ROBAXIN Take  by mouth four (4) times daily. * methocarbamol 500 mg tablet Commonly known as:  ROBAXIN  
TAKE 1 TABLET BY MOUTH 3 TIMES A DAY  
  
 methylPREDNISolone (PF) 125 mg/2 mL Solr Commonly known as:  SOLU-MEDROL 2 mL by IntraVENous route once for 1 dose. raNITIdine 75 mg tablet Commonly known as:  ZANTAC TAKE 1 TABLET BY MOUTH TWICE A DAY BEFORE MEALS  
  
 tiZANidine 4 mg capsule Commonly known as:  Currie Sayres Take 4 mg by mouth three (3) times daily. topiramate 100 mg tablet Commonly known as:  TOPAMAX Take 1 Tab by mouth two (2) times a day. TAKE 1.5 TABLETs  BY MOUTH EVERY DAY  
  
 * triamcinolone 0.5 % topical cream  
Commonly known as:  ARISTOCORT  
APPLY TO AFFECTED AREA TWO (2) TIMES A DAY. USE THIN LAYER  
  
 * triamcinolone acetonide 0.1 % topical cream  
Commonly known as:  KENALOG Apply  to affected area two (2) times a day. * Notice: This list has 4 medication(s) that are the same as other medications prescribed for you. Read the directions carefully, and ask your doctor or other care provider to review them with you. Prescriptions Printed Refills ALPRAZolam (XANAX) 0.5 mg tablet 1 Sig: TAKE 1 TABLET BY MOUTH TWICE A DAY AS NEEDED  Class: Print  
 sgytoug-orqxewivv-tkrgndcyirhx (MIDRIN) -325 mg capsule 0  
 Sig: Take 1 Cap by mouth four (4) times daily as needed. Max Daily Amount: 4 Caps. Indications: MIGRAINE Class: Print Route: Oral  
  
Prescriptions Sent to Pharmacy Refills  
 amitriptyline (ELAVIL) 100 mg tablet 5 Sig: TAKE 1 TABLET BY MOUTH EVERY EVENING Class: Normal  
 Pharmacy: [facility]/pharmacy #[id number]- [place], [address] [place], [place] [address]th [facility] #: [phone] We Performed the Following METHYLPREDNISOLONE INJECTION [ [facility]] ID THER/PROPH/DIAG INJECTION, SUBCUT/IM [id number] CPT(R)] Follow-up Instructions Return in about 3 months (around 11/15/2017) for DM. Introducing [facility]! Dear Brijesh Pruitt: 
Thank you for requesting a [employer] account. Our records indicate that you already have an active [employer] account. You can access your account anytime at [email or web address] Intact [email or web address]/[employer] Did you know that you can access your hospital and ER discharge instructions at any time in [employer]? You can also review all of your test results from your hospital stay or ER visit. Additional Information If you have questions, please visit the Frequently Asked Questions section of the [employer] website at [email or web address] Remember, [employer] Group is NOT to be used for urgent needs. For medical emergencies, dial 911. Now available from your iPhone and Android! Please provide this summary of care documentation to your next provider. Your primary care clinician is listed as NICKY YOU. If you have any questions after today's visit, please call [phone].

## 2017-08-15 NOTE — PROGRESS NOTES
Chief Complaint   Patient presents with    Migraine     x 5days    Nausea    Vomiting    Bladder Infection    Medication Refill    Diabetes    Chest Pain     Pt presents to the office migraine 5x days, n&v, bladder infection, med refill,DM, CHEST PAIN    1. Have you been to the ER, urgent care clinic since your last visit? Hospitalized since your last visit? No    2. Have you seen or consulted any other health care providers outside of the [facility] since your last visit? Include any pap smears or colon screening. No        Chief Complaint   Patient presents with    Migraine     x 5days    Nausea    Vomiting    Bladder Infection    Medication Refill    Diabetes    Chest Pain     she is a 41y.o. year old female who presents for evalution. Reviewed PmHx, RxHx, FmHx, SocHx, AllgHx and updated and dated in the chart. Patient Active Problem List    Diagnosis    Pre-diabetes    Urinary frequency    Essential hypertension, benign    DDD (degenerative disc disease), lumbar    Left axillary pain    IBS (irritable bowel syndrome)    Migraine    Hyperlipidemia    Fibromyalgia    Nipple discharge    Depression       Review of Systems - negative except as listed above in the HPI    Objective:     Vitals:    08/15/17 1407 08/15/17 1413   BP: (!) 168/120 125/82   Pulse: 78 83   Resp: 18    Temp: 98.6 °F (37 °C)    TempSrc: Oral    SpO2: 98%    Weight: 217 lb (98.4 kg)    Height: 5' 3\" (1.6 m)      Physical Examination: General appearance - laying in dark with HA  Chest - clear to auscultation, no wheezes, rales or rhonchi, symmetric air entry  Heart - normal rate, regular rhythm, normal S1, S2, no murmurs, rubs, clicks or gallops  Abdomen - soft, nontender, nondistended, no masses or organomegaly    Assessment/ Plan:   Diagnoses and all orders for this visit:    1.  Migraine without status migrainosus, not intractable, unspecified migraine type  -     amitriptyline (ELAVIL) 100 mg tablet; TAKE 1 TABLET BY MOUTH EVERY EVENING  -     methylPREDNISolone, PF, (SOLU-MEDROL) 125 mg/2 mL solr; 2 mL by IntraVENous route once for 1 dose. -     METHYLPREDNISOLONE INJECTION (Qty 2)  -     THER/PROPH/DIAG INJECTION, SUBCUT/IM  -     xxnqwdg-qnumlpvhl-bwyeafiskzdt (MIDRIN) -325 mg capsule; Take 1 Cap by mouth four (4) times daily as needed. Max Daily Amount: 4 Caps. Indications: MIGRAINE  -dc Fiorocet due to rebound and add midrin and increase elavil to 100mg    2. Anxiety  -     ALPRAZolam (XANAX) 0.5 mg tablet; TAKE 1 TABLET BY MOUTH TWICE A DAY AS NEEDED    3. Essential hypertension, benign  -at goal       Follow-up Disposition:  Return in about 3 months (around 11/15/2017) for DM. I have discussed the diagnosis with the patient and the intended plan as seen in the above orders. The patient understands and agrees with the plan. The patient has received an after-visit summary and questions were answered concerning future plans. Medication Side Effects and Warnings were discussed with patient  Patient Labs were reviewed and or requested:  Patient Past Records were reviewed and or requested    Merlinda Means, M.D. There are no Patient Instructions on file for this visit.

## 2017-08-28 RX ORDER — HYDROCHLOROTHIAZIDE 25 MG/1
TABLET ORAL
Qty: 90 TAB | Refills: 1 | Status: SHIPPED | OUTPATIENT
Start: 2017-08-28 | End: 2018-02-13 | Stop reason: SDUPTHER

## 2017-08-28 RX ORDER — RANITIDINE HCL 75 MG
TABLET ORAL
Qty: 60 TAB | Refills: 5 | Status: SHIPPED | OUTPATIENT
Start: 2017-08-28 | End: 2018-02-13 | Stop reason: SDUPTHER

## 2017-09-08 DIAGNOSIS — R30.9 PAINFUL URINATION: ICD-10-CM

## 2017-09-08 DIAGNOSIS — N39.0 URINARY TRACT INFECTION WITHOUT HEMATURIA, SITE UNSPECIFIED: ICD-10-CM

## 2017-09-10 RX ORDER — FLUCONAZOLE 150 MG/1
TABLET ORAL
Qty: 1 TAB | Refills: 1 | Status: SHIPPED | OUTPATIENT
Start: 2017-09-10 | End: 2018-02-19 | Stop reason: ALTCHOICE

## 2017-09-18 RX ORDER — TOPIRAMATE 100 MG/1
TABLET, FILM COATED ORAL
Qty: 60 TAB | Refills: 5 | Status: SHIPPED | OUTPATIENT
Start: 2017-09-18 | End: 2018-03-14 | Stop reason: SDUPTHER

## 2017-09-26 ENCOUNTER — OFFICE VISIT (OUTPATIENT)
Dept: FAMILY MEDICINE CLINIC | Age: 47
End: 2017-09-26

## 2017-09-26 VITALS
DIASTOLIC BLOOD PRESSURE: 87 MMHG | RESPIRATION RATE: 20 BRPM | HEIGHT: 63 IN | TEMPERATURE: 98.4 F | HEART RATE: 95 BPM | WEIGHT: 225 LBS | BODY MASS INDEX: 39.87 KG/M2 | OXYGEN SATURATION: 98 % | SYSTOLIC BLOOD PRESSURE: 130 MMHG

## 2017-09-26 DIAGNOSIS — R73.03 PRE-DIABETES: ICD-10-CM

## 2017-09-26 DIAGNOSIS — I10 ESSENTIAL HYPERTENSION, BENIGN: Primary | ICD-10-CM

## 2017-09-26 RX ORDER — FLUCONAZOLE 150 MG/1
150 TABLET ORAL DAILY
Qty: 1 TAB | Refills: 5 | Status: SHIPPED | OUTPATIENT
Start: 2017-09-26 | End: 2018-01-23 | Stop reason: SDUPTHER

## 2017-09-26 NOTE — PROGRESS NOTES
Patient here for 6 month f/u.     1. Have you been to the ER, urgent care clinic since your last visit? Hospitalized since your last visit? No    2. Have you seen or consulted any other health care providers outside of the [address] [place] [address] since your last visit? Include any pap smears or colon screening. No       Chief Complaint   Patient presents with    Follow-up     6 month f/u     She is a 41 y.o. female who presents for evalution. Reviewed PmHx, RxHx, FmHx, SocHx, AllgHx and updated and dated in the chart. Patient Active Problem List    Diagnosis    Pre-diabetes    Urinary frequency    Essential hypertension, benign    DDD (degenerative disc disease), lumbar    Left axillary pain    IBS (irritable bowel syndrome)    Migraine    Hyperlipidemia    Fibromyalgia    Nipple discharge    Depression       Review of Systems - negative except as listed above in the HPI    Objective:     Vitals:    09/26/17 1518   BP: 130/87   Pulse: 95   Resp: 20   Temp: 98.4 °F (36.9 °C)   SpO2: 98%   Weight: 225 lb (102.1 kg)   Height: 5' 3\" (1.6 m)     Physical Examination: General appearance - alert, well appearing, and in no distress  Chest - clear to auscultation, no wheezes, rales or rhonchi, symmetric air entry  Heart - normal rate, regular rhythm, normal S1, S2, no murmurs, rubs, clicks or gallops    Assessment/ Plan:   Diagnoses and all orders for this visit:    1. Essential hypertension, benign  -at goal    2. Pre-diabetes  -labs next OV    Other orders  -     fluconazole (DIFLUCAN) 150 mg tablet; Take 1 Tab by mouth daily for 1 day. Follow-up Disposition:  Return in about 6 months (around 3/26/2018) for labs. I have discussed the diagnosis with the patient and the intended plan as seen in the above orders. The patient understands and agrees with the plan. The patient has received an after-visit summary and questions were answered concerning future plans.      Medication Side Effects and Warnings were discussed with patient  Patient Labs were reviewed and or requested:  Patient Past Records were reviewed and or requested    Rocky Benton M.D. There are no Patient Instructions on file for this visit.

## 2017-09-26 NOTE — MR AVS SNAPSHOT
Visit Information Date & Time Provider Department Dept. Phone Encounter #  
 9/26/2017  2:50 PM Anitha Scruggs MD [address] [facility] [phone] [id number] Follow-up Instructions Return in about 6 months (around 3/26/2018) for labs. Upcoming Health Maintenance Date Due Pneumococcal 19-64 Medium Risk (1 of 1 - PPSV23) 1/30/1991 INFLUENZA AGE 9 TO ADULT 8/1/2017 PAP AKA CERVICAL CYTOLOGY 9/22/2017 DTaP/Tdap/Td series (2 - Td) 8/18/2021 Allergies as of 9/26/2017  Review Complete On: 9/26/2017 By: Anitha Scruggs MD  
  
 Severity Noted Reaction Type Reactions Hydromorphone High 01/05/2012   Systemic Itching, Swelling  
 hydromorphone 1mg IV given immediately began itching and complained of scratchy swelling sensation in her throat Aspirin Medium 12/31/2011   Systemic Anaphylaxis Bactrim [Sulfamethoprim Ds] Medium 08/13/2012   Side Effect Hives Darvocet A500 [Propoxyphene N-acetaminophen] Medium 11/04/2010   Intolerance Hives Imitrex [Sumatriptan] Medium 12/31/2011   Systemic Hives Pcn [Penicillins] Medium 11/04/2010   Intolerance Anaphylaxis Percocet [Oxycodone-acetaminophen] Medium 11/04/2010   Intolerance Hives Pt can take lortab/norco  
 Toradol [Ketorolac Tromethamine] Medium 11/04/2010   Intolerance Hives Tramadol Medium 02/24/2012   Intolerance Hives Hydrocodone  04/04/2016    Hives Lisinopril  06/29/2016    Swelling Tongue and mouth felt weird, slight swelling Prednisone  04/16/2014    Rash And pt felt throat closing Xander Cami [Milnacipran]  02/17/2012    Hives INSOMNIA Vesicare [Solifenacin]  12/31/2011    Other (comments) Nose bleeds Wellbutrin [Bupropion Hcl]  05/09/2014    Other (comments)  
 insomnia Current Immunizations  Reviewed on 6/21/2016 Name Date TDAP Vaccine 8/18/2011 Not reviewed this visit You Were Diagnosed With   
  
 Codes Comments Essential hypertension, benign    -  Primary ICD-10-CM: I10 
ICD-9-CM: 401.1 Pre-diabetes     ICD-10-CM: R73.03 
ICD-9-CM: 790.29 Vitals BP Pulse Temp Resp Height(growth percentile) Weight(growth percentile) 130/87 95 98.4 °F (36.9 °C) 20 5' 3\" (1.6 m) 225 lb (102.1 kg) SpO2 BMI OB Status Smoking Status 98% 39.86 kg/m2 Ablation Current Some Day Smoker Vitals History BMI and BSA Data Body Mass Index Body Surface Area  
 39.86 kg/m 2 2.13 m 2 Preferred Pharmacy Pharmacy Name Phone [id number], [address] [facility] [phone] Your Updated Medication List  
  
   
This list is accurate as of: 9/26/17  3:36 PM.  Always use your most recent med list.  
  
  
  
  
 ALPRAZolam 0.5 mg tablet Commonly known as:  XANAX  
TAKE 1 TABLET BY MOUTH TWICE A DAY AS NEEDED  
  
 amitriptyline 100 mg tablet Commonly known as:  ELAVIL TAKE 1 TABLET BY MOUTH EVERY EVENING  
  
 diclofenac EC 50 mg EC tablet Commonly known as:  VOLTAREN  
TAKE 1 TABLET BY MOUTH 3 TIMES A DAY DULoxetine 60 mg capsule Commonly known as:  CYMBALTA TAKE ONE CAPSULE BY MOUTH EVERY DAY  
  
 * fluconazole 150 mg tablet Commonly known as:  DIFLUCAN  
TAKE 1 TABLET BY MOUTH ONCE  
  
 * fluconazole 150 mg tablet Commonly known as:  DIFLUCAN Take 1 Tab by mouth daily for 1 day.  
  
 gabapentin 600 mg tablet Commonly known as:  NEURONTIN  
TAKE 1 TABLET BY MOUTH EVERY MORNING AND IN THE AFTERNOON AND TAKE 2 TABLETS AT NIGHT  
  
 hydroCHLOROthiazide 25 mg tablet Commonly known as:  HYDRODIURIL  
TAKE 1 TABLET BY MOUTH EVERY DAY  
  
 lekjwzf-dyqnhqezh-gixnbfkmqrin -325 mg capsule Commonly known as:  Luis Diss Take 1 Cap by mouth four (4) times daily as needed. Max Daily Amount: 4 Caps. Indications: MIGRAINE  
  
 losartan 50 mg tablet Commonly known as:  COZAAR  
TAKE 1 TABLET EVERY DAY  
  
 raNITIdine 75 mg tablet Commonly known as:  ZANTAC TAKE 1 TABLET BY MOUTH TWICE A DAY BEFORE MEALS  
  
 tiZANidine 4 mg capsule Commonly known as:  Molinda Delia Take 4 mg by mouth three (3) times daily. topiramate 100 mg tablet Commonly known as:  TOPAMAX TAKE 1 TABLET BY MOUTH 2 TIMES DAILY * triamcinolone 0.5 % topical cream  
Commonly known as:  ARISTOCORT  
APPLY TO AFFECTED AREA TWO (2) TIMES A DAY. USE THIN LAYER  
  
 * triamcinolone acetonide 0.1 % topical cream  
Commonly known as:  KENALOG Apply  to affected area two (2) times a day. * Notice: This list has 4 medication(s) that are the same as other medications prescribed for you. Read the directions carefully, and ask your doctor or other care provider to review them with you. Prescriptions Sent to Pharmacy Refills  
 fluconazole (DIFLUCAN) 150 mg tablet 5 Sig: Take 1 Tab by mouth daily for 1 day. Class: Normal  
 Pharmacy: [id number] - [place], [address] [facility] [address] Ph #: [phone] Route: Oral  
  
Follow-up Instructions Return in about 6 months (around 3/26/2018) for labs. Introducing [facility]! Dear Coco Mcelroy: 
Thank you for requesting a "[employer] account. Our records indicate that you already have an active "[employer] account. You can access your account anytime at [email or web address] Did you know that you can access your hospital and ER discharge instructions at any time in "[employer]? You can also review all of your test results from your hospital stay or ER visit. Additional Information If you have questions, please visit the Frequently Asked Questions section of the "[employer] website at [email or web address] Remember, "[employer] is NOT to be used for urgent needs. For medical emergencies, dial 911. Now available from your iPhone and Android! Please provide this summary of care documentation to your next provider. Your primary care clinician is listed as NICKY YOU. If you have any questions after today's visit, please call [phone].

## 2017-10-24 DIAGNOSIS — F41.9 ANXIETY: ICD-10-CM

## 2017-10-24 RX ORDER — ALPRAZOLAM 0.5 MG/1
TABLET ORAL
Qty: 60 TAB | Refills: 3 | OUTPATIENT
Start: 2017-10-24 | End: 2018-02-19 | Stop reason: SDUPTHER

## 2017-11-22 RX ORDER — GABAPENTIN 600 MG/1
TABLET ORAL
Qty: 120 TAB | Refills: 1 | Status: SHIPPED | OUTPATIENT
Start: 2017-11-22 | End: 2018-01-18 | Stop reason: SDUPTHER

## 2017-12-04 DIAGNOSIS — G43.909 MIGRAINE WITHOUT STATUS MIGRAINOSUS, NOT INTRACTABLE, UNSPECIFIED MIGRAINE TYPE: ICD-10-CM

## 2017-12-05 ENCOUNTER — DOCUMENTATION ONLY (OUTPATIENT)
Dept: FAMILY MEDICINE CLINIC | Age: 47
End: 2017-12-05

## 2017-12-05 NOTE — PROGRESS NOTES
Patient called at the number on file. A voicemail was left with instructions to call back at the patient's earliest convinence. Please have pt leave which pharmacy she would like to use.

## 2017-12-11 ENCOUNTER — DOCUMENTATION ONLY (OUTPATIENT)
Dept: FAMILY MEDICINE CLINIC | Age: 47
End: 2017-12-11

## 2017-12-12 ENCOUNTER — DOCUMENTATION ONLY (OUTPATIENT)
Dept: FAMILY MEDICINE CLINIC | Age: 47
End: 2017-12-12

## 2017-12-12 NOTE — PROGRESS NOTES
According to [facility] Midrin is on a MARYBEL list meaning its not an FDA approved drug so they can't process a PA- need to change med , but unable to give me alternative meds. [facility] @ [phone].

## 2017-12-18 ENCOUNTER — OFFICE VISIT (OUTPATIENT)
Dept: FAMILY MEDICINE CLINIC | Age: 47
End: 2017-12-18

## 2017-12-18 VITALS
DIASTOLIC BLOOD PRESSURE: 91 MMHG | WEIGHT: 230 LBS | BODY MASS INDEX: 40.75 KG/M2 | OXYGEN SATURATION: 98 % | HEIGHT: 63 IN | SYSTOLIC BLOOD PRESSURE: 128 MMHG | TEMPERATURE: 98.6 F | HEART RATE: 71 BPM | RESPIRATION RATE: 18 BRPM

## 2017-12-18 DIAGNOSIS — R73.03 PRE-DIABETES: ICD-10-CM

## 2017-12-18 DIAGNOSIS — G43.909 MIGRAINE WITHOUT STATUS MIGRAINOSUS, NOT INTRACTABLE, UNSPECIFIED MIGRAINE TYPE: Primary | ICD-10-CM

## 2017-12-18 DIAGNOSIS — E78.00 PURE HYPERCHOLESTEROLEMIA: ICD-10-CM

## 2017-12-18 DIAGNOSIS — I10 ESSENTIAL HYPERTENSION, BENIGN: ICD-10-CM

## 2017-12-18 NOTE — MR AVS SNAPSHOT
Visit Information Date & Time Provider Department Dept. Phone Encounter #  
 12/18/2017  9:45 AM Ra Bliss MD [facility] [phone] [id number] Follow-up Instructions Return in about 6 months (around 6/18/2018). Your Appointments 3/27/2018  7:45 AM  
ESTABLISHED PATIENT with Ra Bliss MD  
[facility] [address]) Appt Note: 6 months f/u with bloodwork- fasting  
 [address] [place] [address] [address] [address]  
[phone]  
  
   
 [address] [place] [address] [address] [address] Upcoming Health Maintenance Date Due Pneumococcal 19-64 Medium Risk (1 of 1 - PPSV23) 1/30/1991 PAP AKA CERVICAL CYTOLOGY 9/22/2017 DTaP/Tdap/Td series (2 - Td) 8/18/2021 Allergies as of 12/18/2017  Review Complete On: 12/18/2017 By: Ra Bliss MD  
  
 Severity Noted Reaction Type Reactions Hydromorphone High 01/05/2012   Systemic Itching, Swelling  
 hydromorphone 1mg IV given immediately began itching and complained of scratchy swelling sensation in her throat Aspirin Medium 12/31/2011   Systemic Anaphylaxis Bactrim [Sulfamethoprim Ds] Medium 08/13/2012   Side Effect Hives Darvocet A500 [Propoxyphene N-acetaminophen] Medium 11/04/2010   Intolerance Hives Imitrex [Sumatriptan] Medium 12/31/2011   Systemic Hives Pcn [Penicillins] Medium 11/04/2010   Intolerance Anaphylaxis Percocet [Oxycodone-acetaminophen] Medium 11/04/2010   Intolerance Hives Pt can take lortab/norco  
 Toradol [Ketorolac Tromethamine] Medium 11/04/2010   Intolerance Hives Tramadol Medium 02/24/2012   Intolerance Hives Hydrocodone  04/04/2016    Hives Lisinopril  06/29/2016    Swelling Tongue and mouth felt weird, slight swelling Prednisone  04/16/2014    Rash And pt felt throat closing Raynette Saltness [Milnacipran]  02/17/2012    Hives INSOMNIA Vesicare [Solifenacin]  12/31/2011    Other (comments) Nose bleeds Wellbutrin [Bupropion Hcl]  05/09/2014    Other (comments)  
 insomnia Current Immunizations  Reviewed on 6/21/2016 Name Date TDAP Vaccine 8/18/2011 Not reviewed this visit You Were Diagnosed With   
  
 Codes Comments Migraine without status migrainosus, not intractable, unspecified migraine type    -  Primary ICD-10-CM: E08.242 ICD-9-CM: 346.90 Essential hypertension, benign     ICD-10-CM: I10 
ICD-9-CM: 401.1 Pre-diabetes     ICD-10-CM: R73.03 
ICD-9-CM: 790.29 Pure hypercholesterolemia     ICD-10-CM: E78.00 ICD-9-CM: 272.0 Vitals BP Pulse Temp Resp Height(growth percentile) Weight(growth percentile) (!) 128/91 71 98.6 °F (37 °C) (Oral) 18 5' 3\" (1.6 m) 230 lb (104.3 kg) SpO2 BMI OB Status Smoking Status 98% 40.74 kg/m2 Ablation Current Some Day Smoker BMI and BSA Data Body Mass Index Body Surface Area 40.74 kg/m 2 2.15 m 2 Preferred Pharmacy [facility]acy Name Phone [id number] [place], [place] - [address] [facility] [phone] Your Updated Medication List  
  
   
This list is accurate as of: 12/18/17 10:39 AM.  Always use your most recent med list.  
  
  
  
  
 ALPRAZolam 0.5 mg tablet Commonly known as:  XANAX  
TAKE 1 TABLET BY MOUTH 2 TIMES DAILY AS NEEDED  
  
 amitriptyline 100 mg tablet Commonly known as:  ELAVIL TAKE 1 TABLET BY MOUTH EVERY EVENING  
  
 diclofenac EC 50 mg EC tablet Commonly known as:  VOLTAREN  
TAKE 1 TABLET BY MOUTH 3 TIMES A DAY DULoxetine 60 mg capsule Commonly known as:  CYMBALTA TAKE ONE CAPSULE BY MOUTH EVERY DAY  
  
 fluconazole 150 mg tablet Commonly known as:  DIFLUCAN  
TAKE 1 TABLET BY MOUTH ONCE  
  
 gabapentin 600 mg tablet Commonly known as:  NEURONTIN  
TAKE 1 TABLET BY MOUTH EVERY MORNING AND IN THE AFTERNOON AND TAKE 2 TABLETS AT NIGHT  
  
 hydroCHLOROthiazide 25 mg tablet Commonly known as:  HYDRODIURIL  
TAKE 1 TABLET BY MOUTH EVERY DAY  
  
 vmgdmuf-eniiuzyep-tfmjirvnudge -325 mg capsule Commonly known as:  MIDRIN  
TAKE 1 CAPSULE BY MOUTH 4 TIMES DAILY AS NEEDED FOR MIGRAINE  
  
 losartan 50 mg tablet Commonly known as:  COZAAR  
TAKE 1 TABLET EVERY DAY  
  
 raNITIdine 75 mg tablet Commonly known as:  ZANTAC TAKE 1 TABLET BY MOUTH TWICE A DAY BEFORE MEALS  
  
 tiZANidine 4 mg capsule Commonly known as:  Ashwini Crape Take 4 mg by mouth three (3) times daily. topiramate 100 mg tablet Commonly known as:  TOPAMAX TAKE 1 TABLET BY MOUTH 2 TIMES DAILY * triamcinolone 0.5 % topical cream  
Commonly known as:  ARISTOCORT  
APPLY TO AFFECTED AREA TWO (2) TIMES A DAY. USE THIN LAYER  
  
 * triamcinolone acetonide 0.1 % topical cream  
Commonly known as:  KENALOG Apply  to affected area two (2) times a day. * Notice: This list has 2 medication(s) that are the same as other medications prescribed for you. Read the directions carefully, and ask your doctor or other care provider to review them with you. We Performed the Following HEMOGLOBIN A1C WITH EAG [69487 CPT(R)] LIPID PANEL [82632 CPT(R)] METABOLIC PANEL, COMPREHENSIVE [85467 CPT(R)] REFERRAL TO NEUROLOGY [MVM74 Custom] Follow-up Instructions Return in about 6 months (around 6/18/2018). Referral Information Referral ID Referred By Referred To  
  
 [id number] Carmen YOU MD   
   [place] [address] [address] [facility], [address] [facility] Phone: [phone] Fax: [phone] Visits Status Start Date End Date 1 New Request 12/18/17 12/18/18 If your referral has a status of pending review or denied, additional information will be sent to support the outcome of this decision. Introducing [facility]! Dear Monster Louis: 
Thank you for requesting a [employer] account.   Our records indicate that you already have an active [employer] account. You can access your account anytime at [email or web address] Did you know that you can access your hospital and ER discharge instructions at any time in [employer]? You can also review all of your test results from your hospital stay or ER visit. Additional Information If you have questions, please visit the Frequently Asked Questions section of the [employer] website at [email or web address] Remember, [employer] is NOT to be used for urgent needs. For medical emergencies, dial 911. Now available from your iPhone and Android! Please provide this summary of care documentation to your next provider. Your primary care clinician is listed as NICKY YOU. If you have any questions after today's visit, please call [phone].

## 2017-12-19 LAB
ALBUMIN SERPL-MCNC: 3.6 G/DL (ref 3.5–5.5)
ALBUMIN/GLOB SERPL: 1.3 {RATIO} (ref 1.2–2.2)
ALP SERPL-CCNC: 63 IU/L (ref 39–117)
ALT SERPL-CCNC: 14 IU/L (ref 0–32)
AST SERPL-CCNC: 11 IU/L (ref 0–40)
BILIRUB SERPL-MCNC: <0.2 MG/DL (ref 0–1.2)
BUN SERPL-MCNC: 10 MG/DL (ref 6–24)
BUN/CREAT SERPL: 13 (ref 9–23)
CALCIUM SERPL-MCNC: 8.8 MG/DL (ref 8.7–10.2)
CHLORIDE SERPL-SCNC: 100 MMOL/L (ref 96–106)
CHOLEST SERPL-MCNC: 205 MG/DL (ref 100–199)
CO2 SERPL-SCNC: 25 MMOL/L (ref 18–29)
CREAT SERPL-MCNC: 0.78 MG/DL (ref 0.57–1)
EST. AVERAGE GLUCOSE BLD GHB EST-MCNC: 108 MG/DL
GFR SERPLBLD CREATININE-BSD FMLA CKD-EPI: 106 ML/MIN/1.73
GFR SERPLBLD CREATININE-BSD FMLA CKD-EPI: 92 ML/MIN/1.73
GLOBULIN SER CALC-MCNC: 2.8 G/DL (ref 1.5–4.5)
GLUCOSE SERPL-MCNC: 79 MG/DL (ref 65–99)
HBA1C MFR BLD: 5.4 % (ref 4.8–5.6)
HDLC SERPL-MCNC: 68 MG/DL
INTERPRETATION, 910389: NORMAL
LDLC SERPL CALC-MCNC: 106 MG/DL (ref 0–99)
POTASSIUM SERPL-SCNC: 4.5 MMOL/L (ref 3.5–5.2)
PROT SERPL-MCNC: 6.4 G/DL (ref 6–8.5)
SODIUM SERPL-SCNC: 138 MMOL/L (ref 134–144)
TRIGL SERPL-MCNC: 157 MG/DL (ref 0–149)
VLDLC SERPL CALC-MCNC: 31 MG/DL (ref 5–40)

## 2017-12-20 ENCOUNTER — OFFICE VISIT (OUTPATIENT)
Dept: NEUROLOGY | Age: 47
End: 2017-12-20

## 2017-12-20 VITALS
BODY MASS INDEX: 40.75 KG/M2 | HEIGHT: 63 IN | WEIGHT: 230 LBS | DIASTOLIC BLOOD PRESSURE: 70 MMHG | RESPIRATION RATE: 20 BRPM | SYSTOLIC BLOOD PRESSURE: 130 MMHG

## 2017-12-20 DIAGNOSIS — G43.111 INTRACTABLE MIGRAINE WITH AURA WITH STATUS MIGRAINOSUS: Primary | ICD-10-CM

## 2017-12-20 RX ORDER — IBUPROFEN 200 MG
TABLET ORAL
COMMUNITY
End: 2018-03-14

## 2017-12-20 RX ORDER — RIZATRIPTAN BENZOATE 10 MG/1
10 TABLET ORAL
Qty: 9 TAB | Refills: 3 | Status: SHIPPED | OUTPATIENT
Start: 2017-12-20 | End: 2018-03-26 | Stop reason: RX

## 2017-12-20 NOTE — LETTER
12/20/2017 4:24 PM 
 
Patient:  Leola Olvera YOB: 1970 Date of Visit: 12/20/2017 Dear Loulou Medina MD 
[address] [place] [address] VIA In Basket 
 : Thank you for referring Ms. Veronica Bee to me for evaluation/treatment. Below are the relevant portions of my assessment and plan of care. If you have questions, please do not hesitate to call me. I look forward to following Ms. Carranza along with you. Sincerely, John Cross MD

## 2017-12-20 NOTE — MR AVS SNAPSHOT
Visit Information Date & Time Provider Department Dept. Phone Encounter #  
 12/20/2017  3:40 PM Yair Freitas MD [address] [facility] [phone] [id number] Follow-up Instructions Return in about 2 months (around 2/20/2018). Your Appointments 3/27/2018  7:45 AM  
ESTABLISHED PATIENT with Damon Clark MD  
[address] [facility]) Appt Note: 6 months f/u with bloodwork- fasting  
 [address] [address] [address] [address]  
[phone]  
  
   
 [address] [address] [address] [address] Upcoming Health Maintenance Date Due Pneumococcal 19-64 Medium Risk (1 of 1 - PPSV23) 1/30/1991 PAP AKA CERVICAL CYTOLOGY 9/22/2017 DTaP/Tdap/Td series (2 - Td) 8/18/2021 Allergies as of 12/20/2017  Review Complete On: 12/18/2017 By: Damon Clark MD  
  
 Severity Noted Reaction Type Reactions Hydromorphone High 01/05/2012   Systemic Itching, Swelling  
 hydromorphone 1mg IV given immediately began itching and complained of scratchy swelling sensation in her throat Aspirin Medium 12/31/2011   Systemic Anaphylaxis Bactrim [Sulfamethoprim Ds] Medium 08/13/2012   Side Effect Hives Darvocet A500 [Propoxyphene N-acetaminophen] Medium 11/04/2010   Intolerance Hives Imitrex [Sumatriptan] Medium 12/31/2011   Systemic Hives Pcn [Penicillins] Medium 11/04/2010   Intolerance Anaphylaxis Percocet [Oxycodone-acetaminophen] Medium 11/04/2010   Intolerance Hives Pt can take lortab/norco  
 Toradol [Ketorolac Tromethamine] Medium 11/04/2010   Intolerance Hives Tramadol Medium 02/24/2012   Intolerance Hives Hydrocodone  04/04/2016    Hives Lisinopril  06/29/2016    Swelling Tongue and mouth felt weird, slight swelling Prednisone  04/16/2014    Rash And pt felt throat closing [place] Falling [Milnacipran]  02/17/2012    Hives INSOMNIA Vesicare [Solifenacin]  12/31/2011    Other (comments) Nose bleeds Wellbutrin [Bupropion Hcl]  05/09/2014    Other (comments)  
 insomnia Current Immunizations  Reviewed on 6/21/2016 Name Date TDAP Vaccine 8/18/2011 Not reviewed this visit You Were Diagnosed With   
  
 Codes Comments Intractable migraine with aura with status migrainosus    -  Primary ICD-10-CM: M57.010 ICD-9-CM: 346.03 Vitals BP Resp Height(growth percentile) Weight(growth percentile) BMI OB Status 130/70 20 5' 3\" (1.6 m) 230 lb (104.3 kg) 40.74 kg/m2 Ablation Smoking Status Current Some Day Smoker Vitals History BMI and BSA Data Body Mass Index Body Surface Area 40.74 kg/m 2 2.15 m 2 Preferred Pharmacy Pharmacy Name Phone [id number], [address] [facility] [phone] Your Updated Medication List  
  
   
This list is accurate as of: 12/20/17  4:23 PM.  Always use your most recent med list.  
  
  
  
  
 ALPRAZolam 0.5 mg tablet Commonly known as:  XANAX  
TAKE 1 TABLET BY MOUTH 2 TIMES DAILY AS NEEDED  
  
 amitriptyline 100 mg tablet Commonly known as:  ELAVIL TAKE 1 TABLET BY MOUTH EVERY EVENING  
  
 diclofenac EC 50 mg EC tablet Commonly known as:  VOLTAREN  
TAKE 1 TABLET BY MOUTH 3 TIMES A DAY Diclofenac Potassium 50 mg Pwpk Commonly known as:  CAMBIA As directed DULoxetine 60 mg capsule Commonly known as:  CYMBALTA TAKE ONE CAPSULE BY MOUTH EVERY DAY  
  
 fluconazole 150 mg tablet Commonly known as:  DIFLUCAN  
TAKE 1 TABLET BY MOUTH ONCE  
  
 gabapentin 600 mg tablet Commonly known as:  NEURONTIN  
TAKE 1 TABLET BY MOUTH EVERY MORNING AND IN THE AFTERNOON AND TAKE 2 TABLETS AT NIGHT  
  
 hydroCHLOROthiazide 25 mg tablet Commonly known as:  HYDRODIURIL  
TAKE 1 TABLET BY MOUTH EVERY DAY  
  
 ibuprofen 200 mg tablet Commonly known as:  MOTRIN Take  by mouth.  
  
 qixsxem-nqmzjysyp-vawiwslxupmn -325 mg capsule Commonly known as:  MIDRIN  
TAKE 1 CAPSULE BY MOUTH 4 TIMES DAILY AS NEEDED FOR MIGRAINE  
  
 losartan 50 mg tablet Commonly known as:  COZAAR  
TAKE 1 TABLET EVERY DAY  
  
 raNITIdine 75 mg tablet Commonly known as:  ZANTAC TAKE 1 TABLET BY MOUTH TWICE A DAY BEFORE MEALS  
  
 rizatriptan 10 mg tablet Commonly known as:  Liljose alfredo Vaibhav Take 1 Tab by mouth once as needed for Migraine for up to 1 dose. May repeat in 2 hours if needed  
  
 tiZANidine 4 mg capsule Commonly known as:  Jorge Mins Take 4 mg by mouth three (3) times daily. topiramate 100 mg tablet Commonly known as:  TOPAMAX TAKE 1 TABLET BY MOUTH 2 TIMES DAILY * triamcinolone 0.5 % topical cream  
Commonly known as:  ARISTOCORT  
APPLY TO AFFECTED AREA TWO (2) TIMES A DAY. USE THIN LAYER  
  
 * triamcinolone acetonide 0.1 % topical cream  
Commonly known as:  KENALOG Apply  to affected area two (2) times a day. * Notice: This list has 2 medication(s) that are the same as other medications prescribed for you. Read the directions carefully, and ask your doctor or other care provider to review them with you. Prescriptions Sent to Pharmacy Refills  
 rizatriptan (MAXALT) 10 mg tablet 3 Sig: Take 1 Tab by mouth once as needed for Migraine for up to 1 dose. May repeat in 2 hours if needed Class: Normal  
 Pharmacy: [id number] - [place], [address] [facility] Ph #: [phone] Route: Oral  
  
Follow-up Instructions Return in about 2 months (around 2/20/2018). Patient Instructions Nelda Barrera 1721 What is a living will? A living will is a legal form you use to write down the kind of care you want at the end of your life. It is used by the health professionals who will treat you if you aren't able to decide for yourself.  
If you put your wishes in writing, your loved ones and others will know what kind of care you want. They won't need to guess. This can ease your mind and be helpful to others. A living will is not the same as an estate or property will. An estate will explains what you want to happen with your money and property after you die. Is a living will a legal document? A living will is a legal document. Each state has its own laws about living pinon. If you move to another state, make sure that your living will is legal in the state where you now live. Or you might use a universal form that has been approved by many states. This kind of form can sometimes be completed and stored online. Your electronic copy will then be available wherever you have a connection to the Internet. In most cases, doctors will respect your wishes even if you have a form from a different state. · You don't need an  to complete a living will. But legal advice can be helpful if your state's laws are unclear, your health history is complicated, or your family can't agree on what should be in your living will. · You can change your living will at any time. Some people find that their wishes about end-of-life care change as their health changes. · In addition to making a living will, think about completing a medical power of  form. This form lets you name the person you want to make end-of-life treatment decisions for you (your \"health care agent\") if you're not able to. Many hospitals and nursing homes will give you the forms you need to complete a living will and a medical power of . · Your living will is used only if you can't make or communicate decisions for yourself anymore. If you become able to make decisions again, you can accept or refuse any treatment, no matter what you wrote in your living will. · Your state may offer an online registry. This is a place where you can store your living will online so the doctors and nurses who need to treat you can find it right away. What should you think about when creating a living will? Talk about your end-of-life wishes with your family members and your doctor. Let them know what you want. That way the people making decisions for you won't be surprised by your choices. Think about these questions as you make your living will: · Do you know enough about life support methods that might be used? If not, talk to your doctor so you know what might be done if you can't breathe on your own, your heart stops, or you're unable to swallow. · What things would you still want to be able to do after you receive life-support methods? Would you want to be able to walk? To speak? To eat on your own? To live without the help of machines? · If you have a choice, where do you want to be cared for? In your home? At a hospital or nursing home? · Do you want certain Voodoo practices performed if you become very ill? · If you have a choice at the end of your life, where would you prefer to die? At home? In a hospital or nursing home? Somewhere else? · Would you prefer to be buried or cremated? · Do you want your organs to be donated after you die? What should you do with your living will? · Make sure that your family members and your health care agent have copies of your living will. · Give your doctor a copy of your living will to keep in your medical record. If you have more than one doctor, make sure that each one has a copy. · You may want to put a copy of your living will where it can be easily found. Where can you learn more? Go to http://valdez-lucian.info/. Enter R588 in the search box to learn more about \"Learning About Living Bruce Links. \" Current as of: September 24, 2016 Content Version: 11.4 © 0247-5813 Healthwise, Incorporated. Care instructions adapted under license by [employer] (which disclaims liability or warranty for this information).  If you have questions about a medical condition or this instruction, always ask your healthcare professional. [address] any warranty or liability for your use of this information. Advance Directives: Care Instructions Your Care Instructions An advance directive is a legal way to state your wishes at the end of your life. It tells your family and your doctor what to do if you can no longer say what you want. There are two main types of advance directives. You can change them any time that your wishes change. · A living will tells your family and your doctor your wishes about life support and other treatment. · A durable power of  for health care lets you name a person to make treatment decisions for you when you can't speak for yourself. This person is called a health care agent. If you do not have an advance directive, decisions about your medical care may be made by a doctor or a  who doesn't know you. It may help to think of an advance directive as a gift to the people who care for you. If you have one, they won't have to make tough decisions by themselves. Follow-up care is a key part of your treatment and safety. Be sure to make and go to all appointments, and call your doctor if you are having problems. It's also a good idea to know your test results and keep a list of the medicines you take. How can you care for yourself at home? · Discuss your wishes with your loved ones and your doctor. This way, there are no surprises. · Many states have a unique form. Or you might use a universal form that has been approved by many states. This kind of form can sometimes be completed and stored online. Your electronic copy will then be available wherever you have a connection to the Internet. In most cases, doctors will respect your wishes even if you have a form from a different state. · You don't need a  to do an advance directive. But you may want to get legal advice. · Think about these questions when you prepare an advance directive: ¨ Who do you want to make decisions about your medical care if you are not able to? Many people choose a family member or close friend. ¨ Do you know enough about life support methods that might be used? If not, talk to your doctor so you understand. ¨ What are you most afraid of that might happen? You might be afraid of having pain, losing your independence, or being kept alive by machines. ¨ Where would you prefer to die? Choices include your home, a hospital, or a nursing home. ¨ Would you like to have information about hospice care to support you and your family? ¨ Do you want to donate organs when you die? ¨ Do you want certain Evangelical practices performed before you die? If so, put your wishes in the advance directive. · Read your advance directive every year, and make changes as needed. When should you call for help? Be sure to contact your doctor if you have any questions. Where can you learn more? Go to http://valdez-lucian.info/. Enter R264 in the search box to learn more about \"Advance Directives: Care Instructions. \" Current as of: September 24, 2016 Content Version: 11.4 © 7651-4825 [employer]. Care instructions adapted under license by [employer] (which disclaims liability or warranty for this information). If you have questions about a medical condition or this instruction, always ask your healthcare professional. [place] [address] any warranty or liability for your use of this information. PRESCRIPTION REFILL POLICY [facility] Statement to Patients April 1, 2014 In an effort to ensure the large volume of patient prescription refills is processed in the most efficient and expeditious manner, we are asking our patients to assist us by calling your Pharmacy for all prescription refills, this will include also your  Mail Order Pharmacy. The pharmacy will contact our office electronically to continue the refill process. Please do not wait until the last minute to call your pharmacy. We need at least 48 hours (2days) to fill prescriptions. We also encourage you to call your pharmacy before going to  your prescription to make sure it is ready. With regard to controlled substance prescription refill requests (narcotic refills) that need to be picked up at our office, we ask your cooperation by providing us with at least 72 hours (3days) notice that you will need a refill. We will not refill narcotic prescription refill requests after 4:00pm on any weekday, Monday through Thursday, or after 2:00pm on Fridays, or on the weekends. We encourage everyone to explore another way of getting your prescription refill request processed using Ping4, our patient web portal through our electronic medical record system. Ping4 is an efficient and effective way to communicate your medication request directly to the office and  downloadable as an lee on your smart phone . Ping4 also features a review functionality that allows you to view your medication list as well as leave messages for your physician. Are you ready to get connected? If so please review the attatched instructions or speak to any of our staff to get you set up right away! Thank you so much for your cooperation. Should you have any questions please contact our Practice Administrator. The Physicians and Staff,  [facility] Migraine Headache: Care Instructions Your Care Instructions Migraines are painful, throbbing headaches that often start on one side of the head. They may cause nausea and vomiting and make you sensitive to light, sound, or smell. Without treatment, migraines can last from 4 hours to a few days. Medicines can help prevent migraines or stop them after they have started. Your doctor can help you find which ones work best for you. Follow-up care is a key part of your treatment and safety. Be sure to make and go to all appointments, and call your doctor if you are having problems. It's also a good idea to know your test results and keep a list of the medicines you take. How can you care for yourself at home? · Do not drive if you have taken a prescription pain medicine. · Rest in a quiet, dark room until your headache is gone. Close your eyes, and try to relax or go to sleep. Don't watch TV or read. · Put a cold, moist cloth or cold pack on the painful area for 10 to 20 minutes at a time. Put a thin cloth between the cold pack and your skin. · Use a warm, moist towel or a heating pad set on low to relax tight shoulder and neck muscles. · Have someone gently massage your neck and shoulders. · Take your medicines exactly as prescribed. Call your doctor if you think you are having a problem with your medicine. You will get more details on the specific medicines your doctor prescribes. · Be careful not to take pain medicine more often than the instructions allow. You could get worse or more frequent headaches when the medicine wears off. To prevent migraines · Keep a headache diary so you can figure out what triggers your headaches. Avoiding triggers may help you prevent headaches. Record when each headache began, how long it lasted, and what the pain was like. (Was it throbbing, aching, stabbing, or dull?) Write down any other symptoms you had with the headache, such as nausea, flashing lights or dark spots, or sensitivity to bright light or loud noise. Note if the headache occurred near your period. List anything that might have triggered the headache. Triggers may include certain foods (chocolate, cheese, wine) or odors, smoke, bright light, stress, or lack of sleep.  
· If your doctor has prescribed medicine for your migraines, take it as directed. You may have medicine that you take only when you get a migraine and medicine that you take all the time to help prevent migraines. ¨ If your doctor has prescribed medicine for when you get a headache, take it at the first sign of a migraine, unless your doctor has given you other instructions. ¨ If your doctor has prescribed medicine to prevent migraines, take it exactly as prescribed. Call your doctor if you think you are having a problem with your medicine. · Find healthy ways to deal with stress. Migraines are most common during or right after stressful times. Take time to relax before and after you do something that has caused a migraine in the past. 
· Try to keep your muscles relaxed by keeping good posture. Check your jaw, face, neck, and shoulder muscles for tension. Try to relax them. When you sit at a desk, change positions often. And make sure to stretch for 30 seconds each hour. · Get plenty of sleep and exercise. · Eat meals on a regular schedule. Avoid foods and drinks that often trigger migraines. These include chocolate, alcohol (especially red wine and port), aspartame, monosodium glutamate (MSG), and some additives found in foods (such as hot dogs, rebolledo, cold cuts, aged cheeses, and pickled foods). · Limit caffeine. Don't drink too much coffee, tea, or soda. But don't quit caffeine suddenly. That can also give you migraines. · Do not smoke or allow others to smoke around you. If you need help quitting, talk to your doctor about stop-smoking programs and medicines. These can increase your chances of quitting for good. · If you are taking birth control pills or hormone therapy, talk to your doctor about whether they are triggering your migraines. When should you call for help? Call 911 anytime you think you may need emergency care. For example, call if: 
? · You have signs of a stroke.  These may include: 
¨ Sudden numbness, paralysis, or weakness in your face, arm, or leg, especially on only one side of your body. ¨ Sudden vision changes. ¨ Sudden trouble speaking. ¨ Sudden confusion or trouble understanding simple statements. ¨ Sudden problems with walking or balance. ¨ A sudden, severe headache that is different from past headaches. ?Call your doctor now or seek immediate medical care if: 
? · You have new or worse nausea and vomiting. ? · You have a new or higher fever. ? · Your headache gets much worse. ? Watch closely for changes in your health, and be sure to contact your doctor if: 
? · You are not getting better after 2 days (48 hours). Where can you learn more? Go to http://valdez-lucian.info/. Enter Q939 in the search box to learn more about \"Migraine Headache: Care Instructions. \" Current as of: October 14, 2016 Content Version: 11.4 © 9165-5550 [employer]tel. Care instructions adapted under license by [employer] (which disclaims liability or warranty for this information). If you have questions about a medical condition or this instruction, always ask your healthcare professional. [place] [address] any warranty or liability for your use of this information. Introducing [facility]! Dear Hayden Hawley: 
Thank you for requesting a NERITES account. Our records indicate that you already have an active NERITES account. You can access your account anytime at [email or web address] Did you know that you can access your hospital and ER discharge instructions at any time in NERITES? You can also review all of your test results from your hospital stay or ER visit. Additional Information If you have questions, please visit the Frequently Asked Questions section of the NERITES website at [email or web address] Remember, NERITES is NOT to be used for urgent needs. For medical emergencies, dial 911. Now available from your iPhone and Android! Please provide this summary of care documentation to your next provider. Your primary care clinician is listed as NICKY YOU. If you have any questions after today's visit, please call [phone].

## 2017-12-20 NOTE — PROGRESS NOTES
Neurology Progress Note    Patient ID:  Fadi Mera  [id number]  38 y.o.  1970      Subjective:   History:  Ms. Maryanne Altamirano with HTN, IBS, depression, fibromyalgia, hypercholesterol and prediabetes previously seen by Dr Aubree Canada for headache, treated with Topamax, Maxalt, Midrin and Zomig last seen in 2015. Since she was 17 yo, patient has been complaining of headache, bifrontal, occurring 4-5/ week, lasting the whole day, pounding 7/10, triggered by stress, lack of sleep(+) nausea (+) vomiting (+) photophobia (+) phonophobia (+) visual auras - blurriness and black spots     Currently onTopamax 100 mg BID and Amitriptyline 100 mg QHS with no improvement of headache. Ibuprofen did not help. Allergic to Imitrex. 1 month ago, patient was seen at [facility] where MRI brain was said to be okay.     ROS:  Per HPI-  Otherwise the remainder of ROS was negative  (-) fever  (-) rash    Social Hx  Social History     Social History    Marital status: LEGALLY      Spouse name: N/A    Number of children: N/A    Years of education: N/A     Social History Main Topics    Smoking status: Current Some Day Smoker     Packs/day: 0.50     Years: 20.00     Types: Cigarettes    Smokeless tobacco: Never Used    Alcohol use No    Drug use: No    Sexual activity: No     Other Topics Concern    Not on file     Social History Narrative       Meds:  Current Outpatient Prescriptions on File Prior to Visit   Medication Sig Dispense Refill    gabapentin (NEURONTIN) 600 mg tablet TAKE 1 TABLET BY MOUTH EVERY MORNING AND IN THE AFTERNOON AND TAKE 2 TABLETS AT NIGHT 120 Tab 1    ALPRAZolam (XANAX) 0.5 mg tablet TAKE 1 TABLET BY MOUTH 2 TIMES DAILY AS NEEDED 60 Tab 3    topiramate (TOPAMAX) 100 mg tablet TAKE 1 TABLET BY MOUTH 2 TIMES DAILY 60 Tab 5    fluconazole (DIFLUCAN) 150 mg tablet TAKE 1 TABLET BY MOUTH ONCE 1 Tab 1    raNITIdine (ZANTAC) 75 mg tablet TAKE 1 TABLET BY MOUTH TWICE A DAY BEFORE MEALS 60 Tab 5    tiZANidine (ZANAFLEX) 4 mg capsule Take 4 mg by mouth three (3) times daily.  amitriptyline (ELAVIL) 100 mg tablet TAKE 1 TABLET BY MOUTH EVERY EVENING 30 Tab 5    diclofenac EC (VOLTAREN) 50 mg EC tablet TAKE 1 TABLET BY MOUTH 3 TIMES A DAY 90 Tab 1    triamcinolone acetonide (KENALOG) 0.1 % topical cream Apply  to affected area two (2) times a day. 60 g 0    losartan (COZAAR) 50 mg tablet TAKE 1 TABLET EVERY DAY 90 Tab 1    DULoxetine (CYMBALTA) 60 mg capsule TAKE ONE CAPSULE BY MOUTH EVERY DAY 30 Cap 5    fxmcqna-vsurcjjna-szaabjmadusq (MIDRIN) -325 mg capsule TAKE 1 CAPSULE BY MOUTH 4 TIMES DAILY AS NEEDED FOR MIGRAINE 45 Cap 0    hydroCHLOROthiazide (HYDRODIURIL) 25 mg tablet TAKE 1 TABLET BY MOUTH EVERY DAY 90 Tab 1    triamcinolone (ARISTOCORT) 0.5 % topical cream APPLY TO AFFECTED AREA TWO (2) TIMES A DAY. USE THIN LAYER 15 g 1     No current facility-administered medications on file prior to visit. Imaging:    CT Results (recent):    Results from Hospital Encounter encounter on 07/17/12   CT HEAD WO CONT   Narrative **Final Report**      ICD Codes / Adm. Diagnosis: 52   / Headache    Examination:  CT HEAD WO CON  - [id number] - Jul 17 2012  2:35AM  Accession No:  [id number]  Reason:  headache      REPORT:  Cranial CT without contrast    INDICATION:  Headache. COMPARISON: None. TECHNIQUE: Unenhanced CT of the head was performed using 5 mm images. Brain   and bone windows were generated. FINDINGS:  The ventricles and sulci are normal in size, shape and configuration and   midline. There is no significant white matter disease. There is no   intracranial hemorrhage, extra-axial collection, mass, mass effect or   midline shift. The basilar cisterns are open. No acute infarct is   identified. The bone windows demonstrate no abnormalities. The visualized   portions of the paranasal sinuses and mastoid air cells are clear.        IMPRESSION: No acute process           Signing/Reading Doctor: Marshall Pettit ([id number])    Approved: Marshall Pettit ([id number])  07/17/2012                                      MRI Results (recent):    Results from Abstract encounter on 03/04/16   MRI KNEE RT W  WO CONT    IR Results (recent):  No results found for this or any previous visit. VAS/US Results (recent):  No results found for this or any previous visit. Reviewed records in [employer] and [employer] tab today    Lab Review     Office Visit on 12/18/2017   Component Date Value Ref Range Status    Cholesterol, total 12/18/2017 205* 100 - 199 mg/dL Final    Triglyceride 12/18/2017 157* 0 - 149 mg/dL Final    HDL Cholesterol 12/18/2017 68  >39 mg/dL Final    VLDL, calculated 12/18/2017 31  5 - 40 mg/dL Final    LDL, calculated 12/18/2017 106* 0 - 99 mg/dL Final    Glucose 12/18/2017 79  65 - 99 mg/dL Final    BUN 12/18/2017 10  6 - 24 mg/dL Final    Creatinine 12/18/2017 0.78  0.57 - 1.00 mg/dL Final    GFR est non-AA 12/18/2017 92  >59 mL/min/1.73 Final    GFR est AA 12/18/2017 106  >59 mL/min/1.73 Final    BUN/Creatinine ratio 12/18/2017 13  9 - 23 Final    Sodium 12/18/2017 138  134 - 144 mmol/L Final    Potassium 12/18/2017 4.5  3.5 - 5.2 mmol/L Final    Chloride 12/18/2017 100  96 - 106 mmol/L Final    CO2 12/18/2017 25  18 - 29 mmol/L Final    Calcium 12/18/2017 8.8  8.7 - 10.2 mg/dL Final    Protein, total 12/18/2017 6.4  6.0 - 8.5 g/dL Final    Albumin 12/18/2017 3.6  3.5 - 5.5 g/dL Final    GLOBULIN, TOTAL 12/18/2017 2.8  1.5 - 4.5 g/dL Final    A-G Ratio 12/18/2017 1.3  1.2 - 2.2 Final    Bilirubin, total 12/18/2017 <0.2  0.0 - 1.2 mg/dL Final    Alk.  phosphatase 12/18/2017 63  39 - 117 IU/L Final    AST (SGOT) 12/18/2017 11  0 - 40 IU/L Final    ALT (SGPT) 12/18/2017 14  0 - 32 IU/L Final    Hemoglobin A1c 12/18/2017 5.4  4.8 - 5.6 % Final    Comment:          Pre-diabetes: 5.7 - 6.4           Diabetes: >6.4           Glycemic control for adults with diabetes: <7.0      Estimated average glucose 12/18/2017 108  mg/dL Final    INTERPRETATION 12/18/2017 Note   Final    Supplemental report is available. Objective:       Exam:  Visit Vitals    /70    Resp 20    Ht 5' 3\" (1.6 m)    Wt 104.3 kg (230 lb)    BMI 40.74 kg/m2     Gen: Awake, alert, follows commands, obese  Appropriate appearance, normal speech. Oriented to all spheres. No visual field defect on confrontation exam.  Full eyes movement, with no nystagmus, no diplopia, no ptosis. Normal gag and swallow. All remaining cranial nerves were normal  Motor function: 5/5 in all extremities  Sensory: intact to LT, PP and JPS  DTRs ++ in all extremities, (-) Babinski  Good FTN and HTS   Gait: Normal    Assessment:       ICD-10-CM ICD-9-CM    1. Intractable migraine with aura with status migrainosus G43. 111 346.03 ibuprofen (MOTRIN) 200 mg tablet      Diclofenac Potassium (CAMBIA) 50 mg pwpk      rizatriptan (MAXALT) 10 mg tablet           Plan:   1. Continue Topamax 100 mg BID and Amitriptyline 50 mg QHS as migraine prophylaxis for now  2. Given samples of Cambia to try to abort headaches  3. Discussed the need for headache diary and went through the list that can trigger headache (caffeine, stress, lack of sleep, onion)  4. Will consider botox for chronic daily migraine if no improvement despite above    Follow-up Disposition:  Return in about 2 months (around 2/20/2018).           Ramona Castro MD  Diplomate, American Board of Psychiatry and Neurology  Diplomate, Neuromuscular Medicine  Diplomate, American Board of Electrodiagnostic Medicine

## 2017-12-20 NOTE — PATIENT INSTRUCTIONS
Learning About Living Benjamin Do  What is a living will? A living will is a legal form you use to write down the kind of care you want at the end of your life. It is used by the health professionals who will treat you if you aren't able to decide for yourself. If you put your wishes in writing, your loved ones and others will know what kind of care you want. They won't need to guess. This can ease your mind and be helpful to others. A living will is not the same as an estate or property will. An estate will explains what you want to happen with your money and property after you die. Is a living will a legal document? A living will is a legal document. Each state has its own laws about living pinon. If you move to another state, make sure that your living will is legal in the state where you now live. Or you might use a universal form that has been approved by many states. This kind of form can sometimes be completed and stored online. Your electronic copy will then be available wherever you have a connection to the Internet. In most cases, doctors will respect your wishes even if you have a form from a different state. · You don't need an  to complete a living will. But legal advice can be helpful if your state's laws are unclear, your health history is complicated, or your family can't agree on what should be in your living will. · You can change your living will at any time. Some people find that their wishes about end-of-life care change as their health changes. · In addition to making a living will, think about completing a medical power of  form. This form lets you name the person you want to make end-of-life treatment decisions for you (your \"health care agent\") if you're not able to. Many hospitals and nursing homes will give you the forms you need to complete a living will and a medical power of .   · Your living will is used only if you can't make or communicate decisions for yourself anymore. If you become able to make decisions again, you can accept or refuse any treatment, no matter what you wrote in your living will. · Your state may offer an online registry. This is a place where you can store your living will online so the doctors and nurses who need to treat you can find it right away. What should you think about when creating a living will? Talk about your end-of-life wishes with your family members and your doctor. Let them know what you want. That way the people making decisions for you won't be surprised by your choices. Think about these questions as you make your living will:  · Do you know enough about life support methods that might be used? If not, talk to your doctor so you know what might be done if you can't breathe on your own, your heart stops, or you're unable to swallow. · What things would you still want to be able to do after you receive life-support methods? Would you want to be able to walk? To speak? To eat on your own? To live without the help of machines? · If you have a choice, where do you want to be cared for? In your home? At a hospital or nursing home? · Do you want certain Mu-ism practices performed if you become very ill? · If you have a choice at the end of your life, where would you prefer to die? At home? In a hospital or nursing home? Somewhere else? · Would you prefer to be buried or cremated? · Do you want your organs to be donated after you die? What should you do with your living will? · Make sure that your family members and your health care agent have copies of your living will. · Give your doctor a copy of your living will to keep in your medical record. If you have more than one doctor, make sure that each one has a copy. · You may want to put a copy of your living will where it can be easily found. Where can you learn more? Go to http://valdez-lucian.info/.   Enter R797 in the search box to learn more about \"Learning About Living Carin Velasquez. \"  Current as of: September 24, 2016  Content Version: 11.4  © 5022-4156 [employer]. Care instructions adapted under license by [employer] (which disclaims liability or warranty for this information). If you have questions about a medical condition or this instruction, always ask your healthcare professional. [address] any warranty or liability for your use of this information. Advance Directives: Care Instructions  Your Care Instructions  An advance directive is a legal way to state your wishes at the end of your life. It tells your family and your doctor what to do if you can no longer say what you want. There are two main types of advance directives. You can change them any time that your wishes change. · A living will tells your family and your doctor your wishes about life support and other treatment. · A durable power of  for health care lets you name a person to make treatment decisions for you when you can't speak for yourself. This person is called a health care agent. If you do not have an advance directive, decisions about your medical care may be made by a doctor or a  who doesn't know you. It may help to think of an advance directive as a gift to the people who care for you. If you have one, they won't have to make tough decisions by themselves. Follow-up care is a key part of your treatment and safety. Be sure to make and go to all appointments, and call your doctor if you are having problems. It's also a good idea to know your test results and keep a list of the medicines you take. How can you care for yourself at home? · Discuss your wishes with your loved ones and your doctor. This way, there are no surprises. · Many states have a unique form. Or you might use a universal form that has been approved by many states. This kind of form can sometimes be completed and stored online.  Your electronic copy will then be available wherever you have a connection to the Internet. In most cases, doctors will respect your wishes even if you have a form from a different state. · You don't need a  to do an advance directive. But you may want to get legal advice. · Think about these questions when you prepare an advance directive:  ¨ Who do you want to make decisions about your medical care if you are not able to? Many people choose a family member or close friend. ¨ Do you know enough about life support methods that might be used? If not, talk to your doctor so you understand. ¨ What are you most afraid of that might happen? You might be afraid of having pain, losing your independence, or being kept alive by machines. ¨ Where would you prefer to die? Choices include your home, a hospital, or a nursing home. ¨ Would you like to have information about hospice care to support you and your family? ¨ Do you want to donate organs when you die? ¨ Do you want certain Sikh practices performed before you die? If so, put your wishes in the advance directive. · Read your advance directive every year, and make changes as needed. When should you call for help? Be sure to contact your doctor if you have any questions. Where can you learn more? Go to http://valdez-lucian.info/. Enter R264 in the search box to learn more about \"Advance Directives: Care Instructions. \"  Current as of: September 24, 2016  Content Version: 11.4  © 1471-5241 [employer]. Care instructions adapted under license by [employer] (which disclaims liability or warranty for this information). If you have questions about a medical condition or this instruction, always ask your healthcare professional. [place] [address] any warranty or liability for your use of this information.   [facility]note 3[facility]   Statement to Patients  April 1, 2014      In an effort to ensure the large volume of patient prescription refills is processed in the most efficient and expeditious manner, we are asking our patients to assist us by calling your Pharmacy for all prescription refills, this will include also your  Mail Order Pharmacy. The pharmacy will contact our office electronically to continue the refill process. Please do not wait until the last minute to call your pharmacy. We need at least 48 hours (2days) to fill prescriptions. We also encourage you to call your pharmacy before going to  your prescription to make sure it is ready. With regard to controlled substance prescription refill requests (narcotic refills) that need to be picked up at our office, we ask your cooperation by providing us with at least 72 hours (3days) notice that you will need a refill. We will not refill narcotic prescription refill requests after 4:00pm on any weekday, Monday through Thursday, or after 2:00pm on Fridays, or on the weekends. We encourage everyone to explore another way of getting your prescription refill request processed using Appear, our patient web portal through our electronic medical record system. Appear is an efficient and effective way to communicate your medication request directly to the office and  downloadable as an lee on your smart phone . Appear also features a review functionality that allows you to view your medication list as well as leave messages for your physician. Are you ready to get connected? If so please review the attatched instructions or speak to any of our staff to get you set up right away! Thank you so much for your cooperation. Should you have any questions please contact our Practice Administrator. The Physicians and Staff,  [facility]        Migraine Headache: Care Instructions  Your Care Instructions  Migraines are painful, throbbing headaches that often start on one side of the head. They may cause nausea and vomiting and make you sensitive to light, sound, or smell. Without treatment, migraines can last from 4 hours to a few days. Medicines can help prevent migraines or stop them after they have started. Your doctor can help you find which ones work best for you. Follow-up care is a key part of your treatment and safety. Be sure to make and go to all appointments, and call your doctor if you are having problems. It's also a good idea to know your test results and keep a list of the medicines you take. How can you care for yourself at home? · Do not drive if you have taken a prescription pain medicine. · Rest in a quiet, dark room until your headache is gone. Close your eyes, and try to relax or go to sleep. Don't watch TV or read. · Put a cold, moist cloth or cold pack on the painful area for 10 to 20 minutes at a time. Put a thin cloth between the cold pack and your skin. · Use a warm, moist towel or a heating pad set on low to relax tight shoulder and neck muscles. · Have someone gently massage your neck and shoulders. · Take your medicines exactly as prescribed. Call your doctor if you think you are having a problem with your medicine. You will get more details on the specific medicines your doctor prescribes. · Be careful not to take pain medicine more often than the instructions allow. You could get worse or more frequent headaches when the medicine wears off. To prevent migraines  · Keep a headache diary so you can figure out what triggers your headaches. Avoiding triggers may help you prevent headaches. Record when each headache began, how long it lasted, and what the pain was like. (Was it throbbing, aching, stabbing, or dull?) Write down any other symptoms you had with the headache, such as nausea, flashing lights or dark spots, or sensitivity to bright light or loud noise. Note if the headache occurred near your period. List anything that might have triggered the headache. Triggers may include certain foods (chocolate, cheese, wine) or odors, smoke, bright light, stress, or lack of sleep. · If your doctor has prescribed medicine for your migraines, take it as directed. You may have medicine that you take only when you get a migraine and medicine that you take all the time to help prevent migraines. ¨ If your doctor has prescribed medicine for when you get a headache, take it at the first sign of a migraine, unless your doctor has given you other instructions. ¨ If your doctor has prescribed medicine to prevent migraines, take it exactly as prescribed. Call your doctor if you think you are having a problem with your medicine. · Find healthy ways to deal with stress. Migraines are most common during or right after stressful times. Take time to relax before and after you do something that has caused a migraine in the past.  · Try to keep your muscles relaxed by keeping good posture. Check your jaw, face, neck, and shoulder muscles for tension. Try to relax them. When you sit at a desk, change positions often. And make sure to stretch for 30 seconds each hour. · Get plenty of sleep and exercise. · Eat meals on a regular schedule. Avoid foods and drinks that often trigger migraines. These include chocolate, alcohol (especially red wine and port), aspartame, monosodium glutamate (MSG), and some additives found in foods (such as hot dogs, rebolledo, cold cuts, aged cheeses, and pickled foods). · Limit caffeine. Don't drink too much coffee, tea, or soda. But don't quit caffeine suddenly. That can also give you migraines. · Do not smoke or allow others to smoke around you. If you need help quitting, talk to your doctor about stop-smoking programs and medicines. These can increase your chances of quitting for good. · If you are taking birth control pills or hormone therapy, talk to your doctor about whether they are triggering your migraines. When should you call for help?   Call [phone] anytime you think you may need emergency care. For example, call if:  ? · You have signs of a stroke. These may include:  ¨ Sudden numbness, paralysis, or weakness in your face, arm, or leg, especially on only one side of your body. ¨ Sudden vision changes. ¨ Sudden trouble speaking. ¨ Sudden confusion or trouble understanding simple statements. ¨ Sudden problems with walking or balance. ¨ A sudden, severe headache that is different from past headaches. ?Call your doctor now or seek immediate medical care if:  ? · You have new or worse nausea and vomiting. ? · You have a new or higher fever. ? · Your headache gets much worse. ? Watch closely for changes in your health, and be sure to contact your doctor if:  ? · You are not getting better after 2 days (48 hours). Where can you learn more? Go to http://valdez-lucian.info/. Enter G539 in the search box to learn more about \"Migraine Headache: Care Instructions. \"  Current as of: October 14, 2016  Content Version: 11.4  © 6986-1702 [employer]. Care instructions adapted under license by [employer] (which disclaims liability or warranty for this information). If you have questions about a medical condition or this instruction, always ask your healthcare professional. [address] any warranty or liability for your use of this information.

## 2017-12-24 DIAGNOSIS — I10 ESSENTIAL HYPERTENSION, BENIGN: ICD-10-CM

## 2017-12-26 RX ORDER — LOSARTAN POTASSIUM 50 MG/1
TABLET ORAL
Qty: 90 TAB | Refills: 1 | Status: SHIPPED | OUTPATIENT
Start: 2017-12-26 | End: 2019-01-24 | Stop reason: SDUPTHER

## 2018-01-18 RX ORDER — GABAPENTIN 600 MG/1
TABLET ORAL
Qty: 120 TAB | Refills: 1 | Status: SHIPPED | OUTPATIENT
Start: 2018-01-18 | End: 2018-03-14 | Stop reason: SDUPTHER

## 2018-01-23 RX ORDER — FLUCONAZOLE 150 MG/1
TABLET ORAL
Qty: 1 TAB | Refills: 5 | Status: SHIPPED | OUTPATIENT
Start: 2018-01-23 | End: 2018-02-19 | Stop reason: ALTCHOICE

## 2018-02-19 ENCOUNTER — OFFICE VISIT (OUTPATIENT)
Dept: FAMILY MEDICINE CLINIC | Age: 48
End: 2018-02-19

## 2018-02-19 ENCOUNTER — TELEPHONE (OUTPATIENT)
Dept: NEUROLOGY | Age: 48
End: 2018-02-19

## 2018-02-19 VITALS
TEMPERATURE: 99 F | HEIGHT: 63 IN | RESPIRATION RATE: 16 BRPM | WEIGHT: 239 LBS | HEART RATE: 95 BPM | DIASTOLIC BLOOD PRESSURE: 89 MMHG | SYSTOLIC BLOOD PRESSURE: 139 MMHG | BODY MASS INDEX: 42.35 KG/M2 | OXYGEN SATURATION: 99 %

## 2018-02-19 DIAGNOSIS — F41.9 ANXIETY: ICD-10-CM

## 2018-02-19 DIAGNOSIS — R30.0 DYSURIA: ICD-10-CM

## 2018-02-19 DIAGNOSIS — J40 BRONCHITIS: Primary | ICD-10-CM

## 2018-02-19 DIAGNOSIS — N89.8 VAGINAL DISCHARGE: ICD-10-CM

## 2018-02-19 DIAGNOSIS — R35.0 FREQUENCY OF URINATION: ICD-10-CM

## 2018-02-19 DIAGNOSIS — J45.21 MILD INTERMITTENT REACTIVE AIRWAY DISEASE WITH WHEEZING WITH ACUTE EXACERBATION: ICD-10-CM

## 2018-02-19 PROBLEM — E66.01 OBESITY, MORBID (HCC): Status: ACTIVE | Noted: 2018-02-19

## 2018-02-19 LAB
BILIRUB UR QL STRIP: NEGATIVE
GLUCOSE UR-MCNC: NEGATIVE MG/DL
KETONES P FAST UR STRIP-MCNC: NEGATIVE MG/DL
PH UR STRIP: 7 [PH] (ref 4.6–8)
PROT UR QL STRIP: NEGATIVE
SP GR UR STRIP: 1.01 (ref 1–1.03)
UA UROBILINOGEN AMB POC: NORMAL (ref 0.2–1)
URINALYSIS CLARITY POC: CLEAR
URINALYSIS COLOR POC: YELLOW
URINE BLOOD POC: NORMAL
URINE LEUKOCYTES POC: NORMAL
URINE NITRITES POC: NEGATIVE

## 2018-02-19 RX ORDER — ALPRAZOLAM 0.5 MG/1
TABLET ORAL
Qty: 60 TAB | Refills: 3 | Status: SHIPPED | OUTPATIENT
Start: 2018-02-19 | End: 2018-06-13 | Stop reason: SDUPTHER

## 2018-02-19 RX ORDER — FLUCONAZOLE 150 MG/1
150 TABLET ORAL DAILY
Qty: 2 TAB | Refills: 0 | Status: SHIPPED | OUTPATIENT
Start: 2018-02-19 | End: 2018-03-30 | Stop reason: SDUPTHER

## 2018-02-19 RX ORDER — ALBUTEROL SULFATE 90 UG/1
2 AEROSOL, METERED RESPIRATORY (INHALATION)
Qty: 1 INHALER | Refills: 2 | Status: SHIPPED | OUTPATIENT
Start: 2018-02-19 | End: 2018-04-16 | Stop reason: SDUPTHER

## 2018-02-19 RX ORDER — LEVOFLOXACIN 500 MG/1
500 TABLET, FILM COATED ORAL DAILY
Qty: 7 TAB | Refills: 0 | Status: SHIPPED | OUTPATIENT
Start: 2018-02-19 | End: 2018-02-21 | Stop reason: ALTCHOICE

## 2018-02-19 RX ORDER — CEFDINIR 300 MG/1
300 CAPSULE ORAL 2 TIMES DAILY
Qty: 20 CAP | Refills: 0 | Status: SHIPPED | OUTPATIENT
Start: 2018-02-19 | End: 2018-02-19 | Stop reason: CLARIF

## 2018-02-19 RX ORDER — ALBUTEROL SULFATE 0.83 MG/ML
2.5 SOLUTION RESPIRATORY (INHALATION) ONCE
Qty: 1 EACH | Refills: 0
Start: 2018-02-19 | End: 2018-02-27 | Stop reason: SDUPTHER

## 2018-02-19 NOTE — PROGRESS NOTES
1. Have you been to the ER, urgent care clinic since your last visit? Hospitalized since your last visit? No    2. Have you seen or consulted any other health care providers outside of the [address] [place] [address] since your last visit? Include any pap smears or colon screening.  No     Chief Complaint   Patient presents with    Cold Symptoms     Cough, Running Nose,x2 weeks    Medication Refill     Xanax

## 2018-02-19 NOTE — MR AVS SNAPSHOT
[address] [address] 
[phone] Patient: Ellen Scott MRN: [id number] Visit Information Date & Time Provider Department Dept. Phone Encounter #  
 2/19/2018  1:45 PM Lauren Fontanez NP 5[address] [facility] [phone] [id number] Your Appointments 2/21/2018  3:40 PM  
Follow Up with Vania Tapia MD  
66[facility]) Appt Note: 2 mo f/u  
 [address] [facility] [address] [address]  
[facility] 57 [address] [facility] [address]  
  
    
 3/27/2018  7:45 AM  
ESTABLISHED PATIENT with Jarrod Virgen MD  
5900 [facility]) Appt Note: 6 months f/u with bloodwork- fasting  
 [address]  [address] [address]  
[phone]  
  
   
 [address] [place] [address] Upcoming Health Maintenance Date Due Pneumococcal 19-64 Medium Risk (1 of 1 - PPSV23) 1/30/1991 PAP AKA CERVICAL CYTOLOGY 9/22/2017 DTaP/Tdap/Td series (2 - Td) 8/18/2021 Allergies as of 2/19/2018  Review Complete On: 2/19/2018 By: Samm Torres LPN Severity Noted Reaction Type Reactions Hydromorphone High 01/05/2012   Systemic Itching, Swelling  
 hydromorphone 1mg IV given immediately began itching and complained of scratchy swelling sensation in her throat Aspirin Medium 12/31/2011   Systemic Anaphylaxis Bactrim [Sulfamethoprim Ds] Medium 08/13/2012   Side Effect Hives Darvocet A500 [Propoxyphene N-acetaminophen] Medium 11/04/2010   Intolerance Hives Imitrex [Sumatriptan] Medium 12/31/2011   Systemic Hives Pcn [Penicillins] Medium 11/04/2010   Intolerance Anaphylaxis Percocet [Oxycodone-acetaminophen] Medium 11/04/2010   Intolerance Hives Pt can take lortab/norco  
 Toradol [Ketorolac Tromethamine] Medium 11/04/2010   Intolerance Hives Tramadol Medium 02/24/2012   Intolerance Hives Hydrocodone  04/04/2016    Hives Lisinopril  06/29/2016    Swelling Tongue and mouth felt weird, slight swelling Prednisone  04/16/2014    Rash And pt felt throat closing [facility] [Milnacipran]  02/17/2012    Hives INSOMNIA Vesicare [Solifenacin]  12/31/2011    Other (comments) Nose bleeds Wellbutrin [Bupropion Hcl]  05/09/2014    Other (comments)  
 insomnia Current Immunizations  Reviewed on 6/21/2016 Name Date TDAP Vaccine 8/18/2011 Not reviewed this visit You Were Diagnosed With   
  
 Codes Comments Dysuria    -  Primary ICD-10-CM: R30.0 ICD-9-CM: 788.1 Frequency of urination     ICD-10-CM: R35.0 ICD-9-CM: 788.41 Vaginal discharge     ICD-10-CM: N89.8 ICD-9-CM: 623.5 Bronchitis     ICD-10-CM: J40 ICD-9-CM: 304 Mild intermittent reactive airway disease with wheezing with acute exacerbation     ICD-10-CM: J45.21 ICD-9-CM: 264.67 Anxiety     ICD-10-CM: F41.9 ICD-9-CM: 300.00 Vitals BP Pulse Temp Resp Height(growth percentile) Weight(growth percentile) 139/89 95 99 °F (37.2 °C) (Oral) 16 5' 3\" (1.6 m) 239 lb (108.4 kg) SpO2 BMI OB Status Smoking Status 99% 42.34 kg/m2 Ablation Current Some Day Smoker BMI and BSA Data Body Mass Index Body Surface Area  
 42.34 kg/m 2 2.2 m 2 Preferred Pharmacy Pharmacy Name Phone [id number] [address] [facility] [phone] Your Updated Medication List  
  
   
This list is accurate as of: 2/19/18  2:11 PM.  Always use your most recent med list.  
  
  
  
  
 albuterol 2.5 mg /3 mL (0.083 %) nebulizer solution Commonly known as:  PROVENTIL VENTOLIN  
3 mL by Nebulization route once for 1 dose. ALPRAZolam 0.5 mg tablet Commonly known as:  XANAX  
TAKE 1 TABLET BY MOUTH 2 TIMES DAILY AS NEEDED  
  
 amitriptyline 100 mg tablet Commonly known as:  ELAVIL  
 TAKE 1 TABLET BY MOUTH EVERY EVENING  
  
 diclofenac EC 50 mg EC tablet Commonly known as:  VOLTAREN  
TAKE 1 TABLET BY MOUTH 3 TIMES A DAY Diclofenac Potassium 50 mg Pwpk Commonly known as:  CAMBIA As directed DULoxetine 60 mg capsule Commonly known as:  CYMBALTA TAKE ONE CAPSULE BY MOUTH EVERY DAY  
  
 fluconazole 150 mg tablet Commonly known as:  DIFLUCAN Take 1 Tab by mouth daily for 1 day. Repeat in 3 days if needed  
  
 gabapentin 600 mg tablet Commonly known as:  NEURONTIN  
TAKE 1 TABLET BY MOUTH EVERY MORNING AND IN THE AFTERNOON AND TAKE 2 TABLETS AT NIGHT  
  
 hydroCHLOROthiazide 25 mg tablet Commonly known as:  HYDRODIURIL  
TAKE 1 TABLET BY MOUTH EVERY DAY  
  
 ibuprofen 200 mg tablet Commonly known as:  MOTRIN Take  by mouth.  
  
 vqttrle-bzgelcjpl-pycrpxujfbey -325 mg capsule Commonly known as:  MIDRIN  
TAKE 1 CAPSULE BY MOUTH 4 TIMES DAILY AS NEEDED FOR MIGRAINE  
  
 levoFLOXacin 500 mg tablet Commonly known as:  Floy Gottron Take 1 Tab by mouth daily for 7 days. losartan 50 mg tablet Commonly known as:  COZAAR  
TAKE 1 TABLET BY MOUTH EVERY DAY  
  
 raNITIdine 75 mg tablet Commonly known as:  ZANTAC TAKE 1 TABLET BY MOUTH TWICE A DAY BEFORE MEALS  
  
 rizatriptan 10 mg tablet Commonly known as:  Gerline Ashland Take 1 Tab by mouth once as needed for Migraine for up to 1 dose. May repeat in 2 hours if needed  
  
 tiZANidine 4 mg capsule Commonly known as:  Loise Salen Take 4 mg by mouth three (3) times daily. topiramate 100 mg tablet Commonly known as:  TOPAMAX TAKE 1 TABLET BY MOUTH 2 TIMES DAILY * triamcinolone 0.5 % topical cream  
Commonly known as:  ARISTOCORT  
APPLY TO AFFECTED AREA TWO (2) TIMES A DAY. USE THIN LAYER  
  
 * triamcinolone acetonide 0.1 % topical cream  
Commonly known as:  KENALOG Apply  to affected area two (2) times a day. * Notice:   This list has 2 medication(s) that are the same as other medications prescribed for you. Read the directions carefully, and ask your doctor or other care provider to review them with you. Prescriptions Printed Refills ALPRAZolam (XANAX) 0.5 mg tablet 3 Sig: TAKE 1 TABLET BY MOUTH 2 TIMES DAILY AS NEEDED Class: Print Prescriptions Sent to Pharmacy Refills  
 fluconazole (DIFLUCAN) 150 mg tablet 0 Sig: Take 1 Tab by mouth daily for 1 day. Repeat in 3 days if needed Class: Normal  
 Pharmacy: [facility]/pharmacy #[id number] - [place], [address] [facility] Ph #: [phone] Route: Oral  
 levoFLOXacin (LEVAQUIN) 500 mg tablet 0 Sig: Take 1 Tab by mouth daily for 7 days. Class: Normal  
 Pharmacy: [facility]/pharmacy #[id number] - [place], [address] [facility] Ph #: [phone] Route: Oral  
  
We Performed the Following ALBUTEROL, INHAL. SOL., FDA-APPROVED FINAL, NON-COMPOUND UNIT DOSE, 1 MG [ HCPCS] CULTURE, URINE [id number] CPT(R)] INHAL RX, AIRWAY OBST/DX SPUTUM INDUCT [id number] CPT(R)] [address] [facility] [id number] Custom] Introducing [facility] & [facility]! Dear Edu Child: 
Thank you for requesting a Seanodes account. Our records indicate that you already have an active Seanodes account. You can access your account anytime at [email or web address] Did you know that you can access your hospital and ER discharge instructions at any time in Seanodes? You can also review all of your test results from your hospital stay or ER visit. Additional Information If you have questions, please visit the Frequently Asked Questions section of the Seanodes website at [email or web address]/"[email or web address]/. Remember, Seanodes is NOT to be used for urgent needs. For medical emergencies, dial 911. Now available from your iPhone and Android! Please provide this summary of care documentation to your next provider. Your primary care clinician is listed as NICKY YOU.  If you have any questions after today's visit, please call [phone].

## 2018-02-19 NOTE — PROGRESS NOTES
Chief Complaint   Patient presents with    Cold Symptoms     Cough, Running Nose,x2 weeks    Medication Refill     Xanax     she is a 57y.o. year old female who presents for evalution. Pt states has been having cold symptoms for past 2 weeks. Has tried numerous OTC but not helping per pt. Pt states coughing worse at night time, also has ear pain and pressure. Has been wheezing as well. Taking Mucinex but not helping. Pt states past few days has had 102 fevers, pain in ribs from coughing so much. Also here for Xanax refills. Doing well on current medication. Pt states having dysuria and frequency. Has been having recurrent UTI and yeast infections. Reviewed PmHx, RxHx, FmHx, SocHx, AllgHx and updated and dated in the chart. Review of Systems - negative except as listed above in the HPI    Objective:     Vitals:    02/19/18 1346   BP: 139/89   Pulse: 95   Resp: 16   Temp: 99 °F (37.2 °C)   TempSrc: Oral   SpO2: 99%   Weight: 239 lb (108.4 kg)   Height: 5' 3\" (1.6 m)     Physical Examination: General appearance - alert, well appearing, and in no distress  Ears - bilateral TM's and external ear canals normal  Nose - normal nontender sinuses, mucosal congestion and mucosal erythema  Mouth - mucous membranes moist, pharynx normal without lesions and erythematous  Neck - supple, no significant adenopathy  Chest - clear to auscultation, no wheezes, rales or rhonchi, symmetric air entry, wheezing noted bilateral bases, improved after neb, decreased air entry noted generalized, improved after neb   Heart - normal rate, regular rhythm, normal S1, S2, no murmurs, rubs, clicks or gallops    Assessment/ Plan:   Diagnoses and all orders for this visit:    1. Bronchitis  -     albuterol (PROVENTIL VENTOLIN) 2.5 mg /3 mL (0.083 %) nebulizer solution; 3 mL by Nebulization route once for 1 dose.   -     ALBUTEROL, INHAL. QCF.()  -     INHAL RX, AIRWAY OBST/DX SPUTUM INDUCT ([id number])  -     fluconazole (DIFLUCAN) 150 mg tablet; Take 1 Tab by mouth daily for 1 day. Repeat in 3 days if needed  -     levoFLOXacin (LEVAQUIN) 500 mg tablet; Take 1 Tab by mouth daily for 7 days. New rx. Discussed and encouraged rest and clear fluids, if congestion is thick should avoid dairy. Recommended hot showers with steam and elevating head of bed. May use Vitamin C or Echinacea in addition to current therapy. 2. Mild intermittent reactive airway disease with wheezing with acute exacerbation  -     albuterol (PROVENTIL VENTOLIN) 2.5 mg /3 mL (0.083 %) nebulizer solution; 3 mL by Nebulization route once for 1 dose. -     ALBUTEROL, INHAL. EFK.()  -     INHAL RX, AIRWAY OBST/DX SPUTUM INDUCT ([id number])  -     albuterol (PROVENTIL HFA, VENTOLIN HFA, PROAIR HFA) 90 mcg/actuation inhaler; Take 2 Puffs by inhalation every four (4) hours as needed for Wheezing. New rx.     3. Anxiety  -     ALPRAZolam (XANAX) 0.5 mg tablet; TAKE 1 TABLET BY MOUTH 2 TIMES DAILY AS NEEDED  Reviewed , pt taking appropriately. Refill provided. 4. Dysuria  -     NUSWAB VAGINITIS PLUS  -     CULTURE, URINE  -     levoFLOXacin (LEVAQUIN) 500 mg tablet; Take 1 Tab by mouth daily for 7 days. Pt instructed to take full course of antibiotics and increase water consumption. F/U if no improvement or develops fever/chills/nausea/vomiting. 5. Frequency of urination  -     CULTURE, URINE  -     levoFLOXacin (LEVAQUIN) 500 mg tablet; Take 1 Tab by mouth daily for 7 days.  -     AMB POC URINALYSIS DIP STICK AUTO W/O MICRO    6. Vaginal discharge  -     NUSWAB VAGINITIS PLUS    Pt voiced understanding regarding plan of care. Follow-up Disposition:  Return if symptoms worsen or fail to improve. I have discussed the diagnosis with the patient and the intended plan as seen in the above orders. The patient has received an after-visit summary and questions were answered concerning future plans.      Medication Side Effects and Warnings were discussed with patient    Justine Maradiaga NP

## 2018-02-19 NOTE — TELEPHONE ENCOUNTER
----- Message from Aleida Alert sent at 2/19/2018  2:31 PM EST -----  Regarding: Dr. Bhanu Martínez stated her insurance declined medication(\"Maxalt\" 10 mg tablet) and would like to know if a different Rx could be called to [address] [place] [address]). Best contact number [phone].

## 2018-02-21 ENCOUNTER — TELEPHONE (OUTPATIENT)
Dept: FAMILY MEDICINE CLINIC | Age: 48
End: 2018-02-21

## 2018-02-21 ENCOUNTER — TELEPHONE (OUTPATIENT)
Dept: NEUROLOGY | Age: 48
End: 2018-02-21

## 2018-02-21 LAB — BACTERIA UR CULT: NO GROWTH

## 2018-02-21 RX ORDER — ELETRIPTAN HYDROBROMIDE 40 MG/1
40 TABLET, FILM COATED ORAL
Qty: 12 TAB | Refills: 4 | Status: SHIPPED | OUTPATIENT
Start: 2018-02-21 | End: 2018-03-26 | Stop reason: ALTCHOICE

## 2018-02-21 RX ORDER — AZITHROMYCIN 250 MG/1
TABLET, FILM COATED ORAL
Qty: 6 TAB | Refills: 0 | Status: SHIPPED | OUTPATIENT
Start: 2018-02-21 | End: 2018-02-26

## 2018-02-21 NOTE — TELEPHONE ENCOUNTER
----- Message from Bakari Woodards sent at 2/21/2018  2:19 PM EST -----  Regarding: Dr. Reba Chase  Pt requesting for different Rx due to side affects, pt states face breaking out and tingling throat. [facility] (on file ). Best contact [phone].

## 2018-02-21 NOTE — TELEPHONE ENCOUNTER
Patient calling back, because she further states that  her prescription is not covered, she states it was just called today, but she does not know what is name of medication. Please call back this patient to verify as she do not know what is name of medication that was called in today.   Thank You

## 2018-02-21 NOTE — TELEPHONE ENCOUNTER
Order placed for Relpax 40 mg, # 12 tab, PO, per Verbal Order from Dr. Laurie Mar on 2/21/2018 due to headache.

## 2018-02-22 LAB
A VAGINAE DNA VAG QL NAA+PROBE: ABNORMAL SCORE
BVAB2 DNA VAG QL NAA+PROBE: ABNORMAL SCORE
C ALBICANS DNA VAG QL NAA+PROBE: POSITIVE
C GLABRATA DNA VAG QL NAA+PROBE: NEGATIVE
C TRACH RRNA SPEC QL NAA+PROBE: NEGATIVE
MEGA1 DNA VAG QL NAA+PROBE: ABNORMAL SCORE
N GONORRHOEA RRNA SPEC QL NAA+PROBE: NEGATIVE
T VAGINALIS RRNA SPEC QL NAA+PROBE: NEGATIVE

## 2018-02-27 ENCOUNTER — TELEPHONE (OUTPATIENT)
Dept: FAMILY MEDICINE CLINIC | Age: 48
End: 2018-02-27

## 2018-02-27 DIAGNOSIS — J45.21 MILD INTERMITTENT REACTIVE AIRWAY DISEASE WITH WHEEZING WITH ACUTE EXACERBATION: ICD-10-CM

## 2018-02-27 DIAGNOSIS — J40 BRONCHITIS: ICD-10-CM

## 2018-02-27 NOTE — TELEPHONE ENCOUNTER
----- Message from Nic Roa sent at 2/27/2018 10:15 AM EST -----  Regarding: TANNA Berumen. Phone  Nasrin Vieyra,  calling about pt. They need chart notes from 2/19/18 faxed to them. The order was for nebulizer and meds. They cannot fill the RX but but they can do the nebulizer. RX needs to go to [facility](?), should be on file he said. Oriana's fax [phone] and phone [phone].

## 2018-02-28 ENCOUNTER — PATIENT MESSAGE (OUTPATIENT)
Dept: FAMILY MEDICINE CLINIC | Age: 48
End: 2018-02-28

## 2018-02-28 DIAGNOSIS — J40 BRONCHITIS: ICD-10-CM

## 2018-02-28 DIAGNOSIS — N76.0 BV (BACTERIAL VAGINOSIS): Primary | ICD-10-CM

## 2018-02-28 DIAGNOSIS — B96.89 BV (BACTERIAL VAGINOSIS): Primary | ICD-10-CM

## 2018-02-28 DIAGNOSIS — J45.21 MILD INTERMITTENT REACTIVE AIRWAY DISEASE WITH WHEEZING WITH ACUTE EXACERBATION: ICD-10-CM

## 2018-02-28 RX ORDER — ALBUTEROL SULFATE 0.83 MG/ML
2.5 SOLUTION RESPIRATORY (INHALATION)
Qty: 1 PACKAGE | Refills: 2 | Status: SHIPPED | OUTPATIENT
Start: 2018-02-28 | End: 2018-02-28 | Stop reason: SDUPTHER

## 2018-02-28 RX ORDER — METRONIDAZOLE 500 MG/1
500 TABLET ORAL 2 TIMES DAILY
Qty: 14 TAB | Refills: 0 | Status: SHIPPED | OUTPATIENT
Start: 2018-02-28 | End: 2018-03-07

## 2018-02-28 RX ORDER — ALBUTEROL SULFATE 0.83 MG/ML
2.5 SOLUTION RESPIRATORY (INHALATION)
Qty: 1 PACKAGE | Refills: 2 | Status: SHIPPED | OUTPATIENT
Start: 2018-02-28 | End: 2018-03-01 | Stop reason: SDUPTHER

## 2018-02-28 NOTE — TELEPHONE ENCOUNTER
From: Radha Isiah  To: Sabi Gonzales NP  Sent: 2/27/2018 3:18 PM EST  Subject: Medication Renewal Request    Original authorizing provider: Sabi Gonzales NP    Luke Carranza would like a refill of the following medications:  albuterol (PROVENTIL VENTOLIN) 2.5 mg /3 mL (0.083 %) nebulizer solution Sabi Gonzales NP]    Preferred pharmacy: [facility]/PH[facility]Y #[id number] - [address]    Comment:

## 2018-03-01 RX ORDER — ALBUTEROL SULFATE 0.83 MG/ML
2.5 SOLUTION RESPIRATORY (INHALATION)
Qty: 24 EACH | Refills: 2 | OUTPATIENT
Start: 2018-03-01 | End: 2018-04-16 | Stop reason: SDUPTHER

## 2018-03-01 NOTE — TELEPHONE ENCOUNTER
From: Philip Mchugh  To: Mendoza Owusu NP  Sent: 2/28/2018 4:25 PM EST  Subject: Prescription Question    Bryan, Ms. Berumen, I received my nebulizer but have not gotten my solution to put in it. Can you please send a script to [facility] on [address] in [place] please and thank you.

## 2018-03-14 ENCOUNTER — OFFICE VISIT (OUTPATIENT)
Dept: NEUROLOGY | Age: 48
End: 2018-03-14

## 2018-03-14 VITALS — HEART RATE: 62 BPM | SYSTOLIC BLOOD PRESSURE: 140 MMHG | OXYGEN SATURATION: 99 % | DIASTOLIC BLOOD PRESSURE: 98 MMHG

## 2018-03-14 DIAGNOSIS — G43.111 INTRACTABLE MIGRAINE WITH AURA WITH STATUS MIGRAINOSUS: Primary | ICD-10-CM

## 2018-03-14 DIAGNOSIS — G43.909 MIGRAINE WITHOUT STATUS MIGRAINOSUS, NOT INTRACTABLE, UNSPECIFIED MIGRAINE TYPE: ICD-10-CM

## 2018-03-14 RX ORDER — TOPIRAMATE 100 MG/1
TABLET, FILM COATED ORAL
Qty: 60 TAB | Refills: 5 | Status: SHIPPED | OUTPATIENT
Start: 2018-03-14 | End: 2018-09-02 | Stop reason: SDUPTHER

## 2018-03-14 RX ORDER — AMITRIPTYLINE HYDROCHLORIDE 100 MG/1
TABLET, FILM COATED ORAL
Qty: 30 TAB | Refills: 3 | Status: SHIPPED | OUTPATIENT
Start: 2018-03-14 | End: 2018-07-09 | Stop reason: SDUPTHER

## 2018-03-14 RX ORDER — GABAPENTIN 600 MG/1
TABLET ORAL
Qty: 120 TAB | Refills: 1 | Status: SHIPPED | OUTPATIENT
Start: 2018-03-14 | End: 2018-05-13 | Stop reason: SDUPTHER

## 2018-03-14 RX ORDER — DICLOFENAC POTASSIUM 50 MG/1
TABLET, FILM COATED ORAL
Qty: 30 TAB | Refills: 3 | Status: SHIPPED | OUTPATIENT
Start: 2018-03-14 | End: 2018-09-26

## 2018-03-14 NOTE — MR AVS SNAPSHOT
[address] 207 [address] [address] [address] 
[phone] Patient: Mehul Webster MRN: [id number] HEJ:[birth year] Visit Information Date & Time Provider Department Dept. Phone Encounter #  
 3/14/2018  3:40 PM Diego Red MD 6600 [facility] [phone] [id number] Follow-up Instructions Return in about 3 months (around 6/14/2018). Your Appointments 3/26/2018  8:30 AM  
ESTABLISHED PATIENT with Carline Reid MD  
[address]) Appt Note: 6 months f/u with bloodwork- fasting; r/s same [address] [address] [address] [id number] [phone]  
  
   
 [address] [address] [address] [address] Upcoming Health Maintenance Date Due Pneumococcal 19-64 Medium Risk (1 of 1 - PPSV23) 1/30/1991 PAP AKA CERVICAL CYTOLOGY 9/22/2017 DTaP/Tdap/Td series (2 - Td) 8/18/2021 Allergies as of 3/14/2018  Review Complete On: 3/14/2018 By: Teresa Mendoza LPN Severity Noted Reaction Type Reactions Hydromorphone High 01/05/2012   Systemic Itching, Swelling  
 hydromorphone 1mg IV given immediately began itching and complained of scratchy swelling sensation in her throat Aspirin Medium 12/31/2011   Systemic Anaphylaxis Bactrim [Sulfamethoprim Ds] Medium 08/13/2012   Side Effect Hives Darvocet A500 [Propoxyphene N-acetaminophen] Medium 11/04/2010   Intolerance Hives Imitrex [Sumatriptan] Medium 12/31/2011   Systemic Hives Pcn [Penicillins] Medium 11/04/2010   Intolerance Anaphylaxis Percocet [Oxycodone-acetaminophen] Medium 11/04/2010   Intolerance Hives Pt can take lortab/norco  
 Toradol [Ketorolac Tromethamine] Medium 11/04/2010   Intolerance Hives Tramadol Medium 02/24/2012   Intolerance Hives Hydrocodone  04/04/2016    Hives Lisinopril  06/29/2016    Swelling Tongue and mouth felt weird, slight swelling Prednisone  04/16/2014    Rash And pt felt throat closing Nanci Certain [Milnacipran]  02/17/2012    Hives INSOMNIA Vesicare [Solifenacin]  12/31/2011    Other (comments) Nose bleeds Wellbutrin [Bupropion Hcl]  05/09/2014    Other (comments)  
 insomnia Current Immunizations  Reviewed on 6/21/2016 Name Date TDAP Vaccine 8/18/2011 Not reviewed this visit You Were Diagnosed With   
  
 Codes Comments Intractable migraine with aura with status migrainosus    -  Primary ICD-10-CM: V99.035 ICD-9-CM: 346.03 Vitals BP Pulse SpO2 OB Status Smoking Status (!) 140/98 62 99% Ablation Current Some Day Smoker Preferred Pharmacy [facility] Name Phone [id number] [place], [address] [facility] [phone] Your Updated Medication List  
  
   
This list is accurate as of 3/14/18  4:18 PM.  Always use your most recent med list.  
  
  
  
  
 * albuterol 90 mcg/actuation inhaler Commonly known as:  PROVENTIL HFA, VENTOLIN HFA, PROAIR HFA Take 2 Puffs by inhalation every four (4) hours as needed for Wheezing. * albuterol 2.5 mg /3 mL (0.083 %) nebulizer solution Commonly known as:  PROVENTIL VENTOLIN  
3 mL by Nebulization route every four (4) hours as needed for Wheezing. ALPRAZolam 0.5 mg tablet Commonly known as:  XANAX  
TAKE 1 TABLET BY MOUTH 2 TIMES DAILY AS NEEDED  
  
 amitriptyline 100 mg tablet Commonly known as:  ELAVIL TAKE 1 TABLET BY MOUTH EVERY EVENING  
  
 butalbital-acetaminophen-caff -40 mg per capsule Commonly known as:  [employer] Take 1 cap by mouth at onset of headache as needed. diclofenac potassium 50 mg tablet Commonly known as:  CATAFLAM  
Take 1 tab at onset of headache with meals. May take another 1 tab after 4 hrs if still has headache DULoxetine 60 mg capsule Commonly known as:  CYMBALTA TAKE ONE CAPSULE BY MOUTH EVERY DAY  
  
 eletriptan 40 mg tablet Commonly known as:  RELPAX Take 1 Tab by mouth once as needed for up to 1 dose. may repeat in 2 hours if necessary  
  
 gabapentin 600 mg tablet Commonly known as:  NEURONTIN  
TAKE 1 TABLET BY MOUTH EVERY MORNING AND IN THE AFTERNOON AND TAKE 2 TABLETS AT NIGHT  
  
 hydroCHLOROthiazide 25 mg tablet Commonly known as:  HYDRODIURIL  
TAKE 1 TABLET BY MOUTH EVERY DAY  
  
 lafhzhm-woupkydld-ozqclnoqiadc -325 mg capsule Commonly known as:  MIDRIN  
TAKE 1 CAPSULE BY MOUTH 4 TIMES DAILY AS NEEDED FOR MIGRAINE  
  
 losartan 50 mg tablet Commonly known as:  COZAAR  
TAKE 1 TABLET BY MOUTH EVERY DAY  
  
 raNITIdine 75 mg tablet Commonly known as:  ZANTAC TAKE 1 TABLET BY MOUTH TWICE A DAY BEFORE MEALS  
  
 rizatriptan 10 mg tablet Commonly known as:  Dirk May Take 1 Tab by mouth once as needed for Migraine for up to 1 dose. May repeat in 2 hours if needed  
  
 tiZANidine 4 mg capsule Commonly known as:  Nam Jaime Take 4 mg by mouth three (3) times daily. topiramate 100 mg tablet Commonly known as:  TOPAMAX TAKE 1 TABLET BY MOUTH 2 TIMES DAILY * triamcinolone 0.5 % topical cream  
Commonly known as:  ARISTOCORT  
APPLY TO AFFECTED AREA TWO (2) TIMES A DAY. USE THIN LAYER  
  
 * triamcinolone acetonide 0.1 % topical cream  
Commonly known as:  KENALOG Apply  to affected area two (2) times a day. * Notice: This list has 4 medication(s) that are the same as other medications prescribed for you. Read the directions carefully, and ask your doctor or other care provider to review them with you. Prescriptions Sent to Pharmacy Refills  
 diclofenac potassium (CATAFLAM) 50 mg tablet 3 Sig: Take 1 tab at onset of headache with meals. May take another 1 tab after 4 hrs if still has headache Class: Normal  
 [id number] [place], [address] Ph #: [phone] Follow-up Instructions Return in about 3 months (around 6/14/2018). Introducing [facility] & [facility]! Dear Eren Allen: 
Thank you for requesting a Popbasic account. Our records indicate that you already have an active Popbasic account. You can access your account anytime at [email or web address] Did you know that you can access your hospital and ER discharge instructions at any time in Popbasic? You can also review all of your test results from your hospital stay or ER visit. Additional Information If you have questions, please visit the Frequently Asked Questions section of the Popbasic website at [email or web address] Remember, Popbasic is NOT to be used for urgent needs. For medical emergencies, dial 911. Now available from your iPhone and Android! Please provide this summary of care documentation to your next provider. Your primary care clinician is listed as NICKY YOU. If you have any questions after today's visit, please call [phone].

## 2018-03-14 NOTE — PROGRESS NOTES
Neurology Progress Note    Patient ID:  Mehul Webster  [id number]  79 y.o.  1970      Subjective:   History:  Ms. Yaya Fox with HTN, IBS, depression, fibromyalgia, hypercholesterol and prediabetes previously seen by Dr Teodora Goncalves for headache, treated with Topamax, Maxalt, Midrin and Zomig last seen in 2015. Since she was 17 yo, patient has been complaining of headache, bifrontal, occurring 4-5/ week, lasting the whole day, pounding 7/10, triggered by stress, lack of sleep(+) nausea (+) vomiting (+) photophobia (+) phonophobia (+) visual auras - blurriness and black spots      Still on Topamax 100 mg BID and Amitriptyline 100 mg QHS with headaches occurring 3/ week. Ibuprofen did not help. Allergic to Imitrex. Fioricet helps. Bret Goldsteining works but insurance will not cover it. Maxalt and Relpax not covered by insurance       ROS:  Per HPI-  Otherwise the remainder of ROS was negative    Social Hx  Social History     Social History    Marital status: LEGALLY      Spouse name: N/A    Number of children: N/A    Years of education: N/A     Social History Main Topics    Smoking status: Current Some Day Smoker     Packs/day: 0.50     Years: 20.00     Types: Cigarettes    Smokeless tobacco: Never Used    Alcohol use No    Drug use: No    Sexual activity: No     Other Topics Concern    None     Social History Narrative       Meds:  Current Outpatient Prescriptions on File Prior to Visit   Medication Sig Dispense Refill    albuterol (PROVENTIL VENTOLIN) 2.5 mg /3 mL (0.083 %) nebulizer solution 3 mL by Nebulization route every four (4) hours as needed for Wheezing. 24 Each 2    butalbital-acetaminophen-caff (FIORICET) -40 mg per capsule Take 1 cap by mouth at onset of headache as needed.  30 Cap 0    ALPRAZolam (XANAX) 0.5 mg tablet TAKE 1 TABLET BY MOUTH 2 TIMES DAILY AS NEEDED 60 Tab 3    albuterol (PROVENTIL HFA, VENTOLIN HFA, PROAIR HFA) 90 mcg/actuation inhaler Take 2 Puffs by inhalation every four (4) hours as needed for Wheezing. 1 Inhaler 2    raNITIdine (ZANTAC) 75 mg tablet TAKE 1 TABLET BY MOUTH TWICE A DAY BEFORE MEALS 180 Tab 1    losartan (COZAAR) 50 mg tablet TAKE 1 TABLET BY MOUTH EVERY DAY 90 Tab 1    tiZANidine (ZANAFLEX) 4 mg capsule Take 4 mg by mouth three (3) times daily.  triamcinolone (ARISTOCORT) 0.5 % topical cream APPLY TO AFFECTED AREA TWO (2) TIMES A DAY. USE THIN LAYER 15 g 1    DULoxetine (CYMBALTA) 60 mg capsule TAKE ONE CAPSULE BY MOUTH EVERY DAY 30 Cap 5    eletriptan (RELPAX) 40 mg tablet Take 1 Tab by mouth once as needed for up to 1 dose. may repeat in 2 hours if necessary 12 Tab 4    hydroCHLOROthiazide (HYDRODIURIL) 25 mg tablet TAKE 1 TABLET BY MOUTH EVERY DAY 90 Tab 1    rizatriptan (MAXALT) 10 mg tablet Take 1 Tab by mouth once as needed for Migraine for up to 1 dose. May repeat in 2 hours if needed 9 Tab 3    agtkpyi-ncoosrish-gkqinbkbxciw (MIDRIN) -325 mg capsule TAKE 1 CAPSULE BY MOUTH 4 TIMES DAILY AS NEEDED FOR MIGRAINE 45 Cap 0    triamcinolone acetonide (KENALOG) 0.1 % topical cream Apply  to affected area two (2) times a day. 60 g 0     No current facility-administered medications on file prior to visit. Imaging:    CT Results (recent):    Results from Hospital Encounter encounter on 07/17/12   CT HEAD WO CONT   Narrative **Final Report**      ICD Codes / Adm. Diagnosis: 52   / Headache    Examination:  CT HEAD WO CON  - [id number] - Jul 17 2012  2:35AM  Accession No:  [id number]  Reason:  headache      REPORT:  Cranial CT without contrast    INDICATION:  Headache. COMPARISON: None. TECHNIQUE: Unenhanced CT of the head was performed using 5 mm images. Brain   and bone windows were generated. FINDINGS:  The ventricles and sulci are normal in size, shape and configuration and   midline. There is no significant white matter disease. There is no   intracranial hemorrhage, extra-axial collection, mass, mass effect or   midline shift.   The basilar cisterns are open. No acute infarct is   identified. The bone windows demonstrate no abnormalities. The visualized   portions of the paranasal sinuses and mastoid air cells are clear. IMPRESSION: No acute process           Signing/Reading Doctor: Lisy Dunlap ([id number])    Approved: Lisy Dunlap ([id number])  07/17/2012                                      MRI Results (recent):    Results from Abstract encounter on 03/04/16   MRI KNEE RT W  WO CONT    IR Results (recent):  No results found for this or any previous visit. VAS/US Results (recent):  No results found for this or any previous visit. Reviewed records in [facility] tab today    Lab Review     Office Visit on 02/19/2018   Component Date Value Ref Range Status    Atopobium vaginae 02/19/2018 High - 2* Score Final    BVAB 2 02/19/2018 High - 2* Score Final    Megasphaera 1 02/19/2018 Low - 0  Score Final    Comment: Calculate total score by adding the 3 individual bacterial  vaginosis (BV) marker scores together. Total score is  interpreted as follows: Total score 0-1: Indicates the absence of BV. Total score   2: Indeterminate for BV. Additional clinical                   data should be evaluated to establish a                   diagnosis. Total score 3-6: Indicates the presence of BV. This test was developed and its performance characteristics  determined by [facility].  It has not been cleared or approved  by the Food and Drug Administration. The FDA has determined  that such clearance or approval is not necessary.  C. albicans, JESIKA 02/19/2018 Positive* Negative Final    C. glabrata, JESIKA 02/19/2018 Negative  Negative Final    Comment: This test was developed and its performance characteristics determined  by [facility].  It has not been cleared or approved by the Food and Drug  Administration. The FDA has determined that such clearance or  approval is not necessary.       T. vaginalis, JESIKA 02/19/2018 Negative  Negative Final    C. trachomatis, JESIKA 02/19/2018 Negative  Negative Final    N. gonorrhoeae, JESIKA 02/19/2018 Negative  Negative Final    Urine Culture, Routine 02/19/2018 No growth   Final    Color (UA POC) 02/19/2018 Yellow   Final    Clarity (UA POC) 02/19/2018 Clear   Final    Glucose (UA POC) 02/19/2018 Negative  Negative Final    Bilirubin (UA POC) 02/19/2018 Negative  Negative Final    Ketones (UA POC) 02/19/2018 Negative  Negative Final    Specific gravity (UA POC) 02/19/2018 1.015  1.001 - 1.035 Final    Blood (UA POC) 02/19/2018 1+  Negative Final    pH (UA POC) 02/19/2018 7.0  4.6 - 8.0 Final    Protein (UA POC) 02/19/2018 Negative  Negative Final    Urobilinogen (UA POC) 02/19/2018 0.2 mg/dL  0.2 - 1 Final    Nitrites (UA POC) 02/19/2018 Negative  Negative Final    Leukocyte esterase (UA POC) 02/19/2018 2+  Negative Final   Office Visit on 12/18/2017   Component Date Value Ref Range Status    Cholesterol, total 12/18/2017 205* 100 - 199 mg/dL Final    Triglyceride 12/18/2017 157* 0 - 149 mg/dL Final    HDL Cholesterol 12/18/2017 68  >39 mg/dL Final    VLDL, calculated 12/18/2017 31  5 - 40 mg/dL Final    LDL, calculated 12/18/2017 106* 0 - 99 mg/dL Final    Glucose 12/18/2017 79  65 - 99 mg/dL Final    BUN 12/18/2017 10  6 - 24 mg/dL Final    Creatinine 12/18/2017 0.78  0.57 - 1.00 mg/dL Final    GFR est non-AA 12/18/2017 92  >59 mL/min/1.73 Final    GFR est AA 12/18/2017 106  >59 mL/min/1.73 Final    BUN/Creatinine ratio 12/18/2017 13  9 - 23 Final    Sodium 12/18/2017 138  134 - 144 mmol/L Final    Potassium 12/18/2017 4.5  3.5 - 5.2 mmol/L Final    Chloride 12/18/2017 100  96 - 106 mmol/L Final    CO2 12/18/2017 25  18 - 29 mmol/L Final    Calcium 12/18/2017 8.8  8.7 - 10.2 mg/dL Final    Protein, total 12/18/2017 6.4  6.0 - 8.5 g/dL Final    Albumin 12/18/2017 3.6  3.5 - 5.5 g/dL Final    GLOBULIN, TOTAL 12/18/2017 2.8  1.5 - 4.5 g/dL Final    A-G Ratio 12/18/2017 1.3  1.2 - 2.2 Final    Bilirubin, total 12/18/2017 <0.2  0.0 - 1.2 mg/dL Final    Alk. phosphatase 12/18/2017 63  39 - 117 IU/L Final    AST (SGOT) 12/18/2017 11  0 - 40 IU/L Final    ALT (SGPT) 12/18/2017 14  0 - 32 IU/L Final    Hemoglobin A1c 12/18/2017 5.4  4.8 - 5.6 % Final    Comment:          Pre-diabetes: 5.7 - 6.4           Diabetes: >6.4           Glycemic control for adults with diabetes: <7.0      Estimated average glucose 12/18/2017 108  mg/dL Final    INTERPRETATION 12/18/2017 Note   Final    Supplemental report is available. Objective:       Exam:  Visit Vitals    BP (!) 140/98    Pulse 62    SpO2 99%     Gen: Awake, alert, follows commands  Appropriate appearance, normal speech. Oriented to all spheres. No visual field defect on confrontation exam.  Full eyes movement, with no nystagmus, no diplopia, no ptosis. Normal gag and swallow. All remaining cranial nerves were normal  Motor function: 5/5 in all extremities  Sensory: intact to LT, PP and JPS  Good FTN and HTS   Gait: Normal    Assessment:       ICD-10-CM ICD-9-CM    1. Intractable migraine with aura with status migrainosus G43. 111 346.03 diclofenac potassium (CATAFLAM) 50 mg tablet           Plan:   1. Continue Topamax 100 mg BID and Amitriptyline 50 mg QHS as migraine prophylaxis   2. Since Leah Portal works with no side effects (but insurance will not approve), will try Diclofenac 50 mg instead to abort headache. Advise limit use of Fioricet  3. Continue headache diary and went through the list that can trigger headache (caffeine, stress, lack of sleep, onion)  4. Offered referring for botox for chronic daily migraine, but patient declined because she is afraid of needles    Follow-up Disposition:  Return in about 3 months (around 6/14/2018).           Roel Delvalle MD  Diplomate, American Board of Psychiatry and Neurology  Diplomate, Neuromuscular Medicine  Diplomate, American Board of Electrodiagnostic Medicine

## 2018-03-26 ENCOUNTER — OFFICE VISIT (OUTPATIENT)
Dept: FAMILY MEDICINE CLINIC | Age: 48
End: 2018-03-26

## 2018-03-26 VITALS
DIASTOLIC BLOOD PRESSURE: 97 MMHG | RESPIRATION RATE: 18 BRPM | HEART RATE: 86 BPM | HEIGHT: 63 IN | SYSTOLIC BLOOD PRESSURE: 146 MMHG | TEMPERATURE: 98.2 F | BODY MASS INDEX: 42.88 KG/M2 | WEIGHT: 242 LBS | OXYGEN SATURATION: 97 %

## 2018-03-26 DIAGNOSIS — R73.03 PRE-DIABETES: ICD-10-CM

## 2018-03-26 DIAGNOSIS — I10 ESSENTIAL HYPERTENSION, BENIGN: Primary | ICD-10-CM

## 2018-03-26 DIAGNOSIS — E66.01 OBESITY, MORBID (HCC): ICD-10-CM

## 2018-03-26 DIAGNOSIS — M79.7 FIBROMYALGIA: ICD-10-CM

## 2018-03-26 DIAGNOSIS — E78.00 PURE HYPERCHOLESTEROLEMIA: ICD-10-CM

## 2018-03-26 NOTE — MR AVS SNAPSHOT
[id number] [place] [id number] 
[phone] Patient: Ana Breen MRN: [id number] TDV:[id number]/72/[id number] Visit Information Date & Time Provider Department Dept. Phone Encounter #  
 3/26/2018  8:30 AM Abilio Gaviria MD [address] [facility] [phone] [id number] Follow-up Instructions Return in about 6 months (around 9/26/2018). Your Appointments 6/13/2018  8:40 AM  
New Patient with Abbie Grossman MD  
[address] [facility] [address] [facility]) Appt Note: 3 month f/u migraine; 3 month f/u migraine [address] [facility] [address] [address]  
[facility] [address] [address] [facility] [address] Upcoming Health Maintenance Date Due Pneumococcal 19-64 Medium Risk (1 of 1 - PPSV23) 1/30/1991 PAP AKA CERVICAL CYTOLOGY 9/22/2017 DTaP/Tdap/Td series (2 - Td) 8/18/2021 Allergies as of 3/26/2018  Review Complete On: 3/26/2018 By: Abilio Gaviria MD  
  
 Severity Noted Reaction Type Reactions Hydromorphone High 01/05/2012   Systemic Itching, Swelling  
 hydromorphone 1mg IV given immediately began itching and complained of scratchy swelling sensation in her throat Levofloxacin High 03/26/2018    Hives Aspirin Medium 12/31/2011   Systemic Anaphylaxis Bactrim [Sulfamethoprim Ds] Medium 08/13/2012   Side Effect Hives Darvocet A500 [Propoxyphene N-acetaminophen] Medium 11/04/2010   Intolerance Hives Imitrex [Sumatriptan] Medium 12/31/2011   Systemic Hives Pcn [Penicillins] Medium 11/04/2010   Intolerance Anaphylaxis Percocet [Oxycodone-acetaminophen] Medium 11/04/2010   Intolerance Hives Pt can take lortab/norco  
 Toradol [Ketorolac Tromethamine] Medium 11/04/2010   Intolerance Hives Tramadol Medium 02/24/2012   Intolerance Hives Hydrocodone  04/04/2016    Hives Lisinopril  06/29/2016    Swelling Tongue and mouth felt weird, slight swelling Prednisone  04/16/2014    Rash And pt felt throat closing Yana Lerner [Milnacipran]  02/17/2012    Hives INSOMNIA Vesicare [Solifenacin]  12/31/2011    Other (comments) Nose bleeds Wellbutrin [Bupropion Hcl]  05/09/2014    Other (comments)  
 insomnia Current Immunizations  Reviewed on 6/21/2016 Name Date TDAP Vaccine 8/18/2011 Not reviewed this visit You Were Diagnosed With   
  
 Codes Comments Essential hypertension, benign    -  Primary ICD-10-CM: I10 
ICD-9-CM: 401.1 Pre-diabetes     ICD-10-CM: R73.03 
ICD-9-CM: 790.29 Pure hypercholesterolemia     ICD-10-CM: E78.00 ICD-9-CM: 272.0 Fibromyalgia     ICD-10-CM: M79.7 ICD-9-CM: 729.1 Obesity, morbid ([facility]     ICD-10-CM: E66.01 
ICD-9-CM: 278.01 Vitals BP Pulse Temp Resp Height(growth percentile) Weight(growth percentile) (!) 146/97 86 98.2 °F (36.8 °C) (Oral) 18 5' 3\" (1.6 m) 242 lb (109.8 kg) SpO2 BMI OB Status Smoking Status 97% 42.87 kg/m2 Ablation Current Some Day Smoker Vitals History BMI and BSA Data Body Mass Index Body Surface Area  
 42.87 kg/m 2 2.21 m 2 Preferred Pharmacy Pharmacy Name Phone [id number], [address] [facility] [phone] Your Updated Medication List  
  
   
This list is accurate as of 3/26/18  9:07 AM.  Always use your most recent med list.  
  
  
  
  
 * albuterol 90 mcg/actuation inhaler Commonly known as:  PROVENTIL HFA, VENTOLIN HFA, PROAIR HFA Take 2 Puffs by inhalation every four (4) hours as needed for Wheezing. * albuterol 2.5 mg /3 mL (0.083 %) nebulizer solution Commonly known as:  PROVENTIL VENTOLIN  
3 mL by Nebulization route every four (4) hours as needed for Wheezing. ALPRAZolam 0.5 mg tablet Commonly known as:  XANAX  
TAKE 1 TABLET BY MOUTH 2 TIMES DAILY AS NEEDED  
  
 amitriptyline 100 mg tablet Commonly known as:  ELAVIL TAKE 1 TABLET BY MOUTH EVERY EVENING  
  
 butalbital-acetaminophen-caff -40 mg per capsule Commonly known as:  [employer] Take 1 cap by mouth at onset of headache as needed. diclofenac potassium 50 mg tablet Commonly known as:  CATAFLAM  
Take 1 tab at onset of headache with meals. May take another 1 tab after 4 hrs if still has headache DULoxetine 60 mg capsule Commonly known as:  CYMBALTA TAKE ONE CAPSULE BY MOUTH EVERY DAY  
  
 gabapentin 600 mg tablet Commonly known as:  NEURONTIN  
TAKE 1 TABLET BY MOUTH EVERY MORNING AND IN THE AFTERNOON AND TAKE 2 TABLETS AT NIGHT  
  
 hydroCHLOROthiazide 25 mg tablet Commonly known as:  HYDRODIURIL  
TAKE 1 TABLET BY MOUTH EVERY DAY  
  
 losartan 50 mg tablet Commonly known as:  COZAAR  
TAKE 1 TABLET BY MOUTH EVERY DAY  
  
 raNITIdine 75 mg tablet Commonly known as:  ZANTAC TAKE 1 TABLET BY MOUTH TWICE A DAY BEFORE MEALS  
  
 tiZANidine 4 mg capsule Commonly known as:  Loetta Will Take 4 mg by mouth three (3) times daily. topiramate 100 mg tablet Commonly known as:  TOPAMAX TAKE 1 TABLET BY MOUTH 2 TIMES DAILY * triamcinolone 0.5 % topical cream  
Commonly known as:  ARISTOCORT  
APPLY TO AFFECTED AREA TWO (2) TIMES A DAY. USE THIN LAYER  
  
 * triamcinolone acetonide 0.1 % topical cream  
Commonly known as:  KENALOG Apply  to affected area two (2) times a day. * Notice: This list has 4 medication(s) that are the same as other medications prescribed for you. Read the directions carefully, and ask your doctor or other care provider to review them with you. We Performed the Following HEMOGLOBIN A1C WITH EAG [39767 CPT(R)] LIPID PANEL [65010 CPT(R)] METABOLIC PANEL, COMPREHENSIVE [16406 CPT(R)] TSH 3RD GENERATION [66026 CPT(R)] Follow-up Instructions Return in about 6 months (around 9/26/2018). Patient Instructions Body Mass Index: Care Instructions Your Care Instructions Body mass index (BMI) can help you see if your weight is raising your risk for health problems. It uses a formula to compare how much you weigh with how tall you are. · A BMI lower than 18.5 is considered underweight. · A BMI between 18.5 and 24.9 is considered healthy. · A BMI between 25 and 29.9 is considered overweight. A BMI of 30 or higher is considered obese. If your BMI is in the normal range, it means that you have a lower risk for weight-related health problems. If your BMI is in the overweight or obese range, you may be at increased risk for weight-related health problems, such as high blood pressure, heart disease, stroke, arthritis or joint pain, and diabetes. If your BMI is in the underweight range, you may be at increased risk for health problems such as fatigue, lower protection (immunity) against illness, muscle loss, bone loss, hair loss, and hormone problems. BMI is just one measure of your risk for weight-related health problems. You may be at higher risk for health problems if you are not active, you eat an unhealthy diet, or you drink too much alcohol or use tobacco products. Follow-up care is a key part of your treatment and safety. Be sure to make and go to all appointments, and call your doctor if you are having problems. It's also a good idea to know your test results and keep a list of the medicines you take. How can you care for yourself at home? · Practice healthy eating habits. This includes eating plenty of fruits, vegetables, whole grains, lean protein, and low-fat dairy. · If your doctor recommends it, get more exercise. Walking is a good choice. Bit by bit, increase the amount you walk every day. Try for at least 30 minutes on most days of the week. · Do not smoke. Smoking can increase your risk for health problems.  If you need help quitting, talk to your doctor about stop-smoking programs and medicines. These can increase your chances of quitting for good. · Limit alcohol to 2 drinks a day for men and 1 drink a day for women. Too much alcohol can cause health problems. If you have a BMI higher than 25 · Your doctor may do other tests to check your risk for weight-related health problems. This may include measuring the distance around your waist. A waist measurement of more than 40 inches in men or 35 inches in women can increase the risk of weight-related health problems. · Talk with your doctor about steps you can take to stay healthy or improve your health. You may need to make lifestyle changes to lose weight and stay healthy, such as changing your diet and getting regular exercise. If you have a BMI lower than 18.5 · Your doctor may do other tests to check your risk for health problems. · Talk with your doctor about steps you can take to stay healthy or improve your health. You may need to make lifestyle changes to gain or maintain weight and stay healthy, such as getting more healthy foods in your diet and doing exercises to build muscle. Where can you learn more? Go to http://valdez-lucian.info/. Enter S176 in the search box to learn more about \"Body Mass Index: Care Instructions. \" Current as of: October 13, 2016 Content Version: 11.4 © 1434-0056 Healthwise, Incorporated. Care instructions adapted under license by [employer] (which disclaims liability or warranty for this information). If you have questions about a medical condition or this instruction, always ask your healthcare professional. [place] [address] any warranty or liability for your use of this information. Introducing [facility]! Dear Arturo Randhawa: 
Thank you for requesting a [employer] account. Our records indicate that you already have an active [employer] account. You can access your account anytime at [email or web address] Did you know that you can access your hospital and ER discharge instructions at any time in [employer]? You can also review all of your test results from your hospital stay or ER visit. Additional Information If you have questions, please visit the Frequently Asked Questions section of the [employer] website at [email or web address] Remember, [employer] is NOT to be used for urgent needs. For medical emergencies, dial 911. Now available from your iPhone and Android! Please provide this summary of care documentation to your next provider. Your primary care clinician is listed as NICKY YOU. If you have any questions after today's visit, please call [phone].

## 2018-03-26 NOTE — PROGRESS NOTES
Chief Complaint   Patient presents with    Hypertension    Fibromyalgia    Labs     Fasting today     1. Have you been to the ER, urgent care clinic since your last visit? Hospitalized since your last visit? Yes Where: [facility]: Foot pain/anxiety    2. Have you seen or consulted any other health care providers outside of the [facility] since your last visit? Include any pap smears or colon screening. Yes Where: Podietry : [facility]: [place]      Discussed the patient's BMI with her. The BMI follow up plan is as follows:     dietary management education, guidance, and counseling  encourage exercise  monitor weight  prescribed dietary intake    An After Visit Summary was printed and given to the patient. Chief Complaint   Patient presents with    Hypertension    Fibromyalgia    Labs     Fasting today     She is a 57 y.o. female who presents for evalution. Reviewed PmHx, RxHx, FmHx, SocHx, AllgHx and updated and dated in the chart.     Patient Active Problem List    Diagnosis    Obesity, morbid ([address]    Pre-diabetes    Urinary frequency    Essential hypertension, benign    DDD (degenerative disc disease), lumbar    Left axillary pain    IBS (irritable bowel syndrome)    Migraine    Hyperlipidemia    Fibromyalgia    Nipple discharge    Depression       Review of Systems - negative except as listed above in the HPI    Objective:     Vitals:    03/26/18 0835   BP: (!) 146/97   Pulse: 86   Resp: 18   Temp: 98.2 °F (36.8 °C)   TempSrc: Oral   SpO2: 97%   Weight: 242 lb (109.8 kg)   Height: 5' 3\" (1.6 m)     Physical Examination: General appearance - alert, well appearing, and in no distress  Neck - supple, no significant adenopathy  Chest - clear to auscultation, no wheezes, rales or rhonchi, symmetric air entry  Heart - normal rate, regular rhythm, normal S1, S2, no murmurs, rubs, clicks or gallops  Abdomen - soft, nontender, nondistended, no masses or organomegaly    Assessment/ Plan:   Diagnoses and all orders for this visit:    1. Essential hypertension, benign  -     LIPID PANEL  -     METABOLIC PANEL, COMPREHENSIVE  -     HEMOGLOBIN A1C WITH EAG  -     TSH 3RD GENERATION  -sl inc   -dwp dec salt in diet    2. Pre-diabetes  -     LIPID PANEL  -     METABOLIC PANEL, COMPREHENSIVE    3. Pure hypercholesterolemia  -     LIPID PANEL  -     METABOLIC PANEL, COMPREHENSIVE    4. Fibromyalgia  -stable    5. Obesity, morbid ([address]  -dwp wt loss and counting calories       Follow-up Disposition:  Return in about 6 months (around 9/26/2018). I have discussed the diagnosis with the patient and the intended plan as seen in the above orders. The patient understands and agrees with the plan. The patient has received an after-visit summary and questions were answered concerning future plans. Medication Side Effects and Warnings were discussed with patient  Patient Labs were reviewed and or requested:  Patient Past Records were reviewed and or requested    Wily Garcia M.D. Patient Instructions          Body Mass Index: Care Instructions  Your Care Instructions    Body mass index (BMI) can help you see if your weight is raising your risk for health problems. It uses a formula to compare how much you weigh with how tall you are. · A BMI lower than 18.5 is considered underweight. · A BMI between 18.5 and 24.9 is considered healthy. · A BMI between 25 and 29.9 is considered overweight. A BMI of 30 or higher is considered obese. If your BMI is in the normal range, it means that you have a lower risk for weight-related health problems. If your BMI is in the overweight or obese range, you may be at increased risk for weight-related health problems, such as high blood pressure, heart disease, stroke, arthritis or joint pain, and diabetes.  If your BMI is in the underweight range, you may be at increased risk for health problems such as fatigue, lower protection (immunity) against illness, muscle loss, bone loss, hair loss, and hormone problems. BMI is just one measure of your risk for weight-related health problems. You may be at higher risk for health problems if you are not active, you eat an unhealthy diet, or you drink too much alcohol or use tobacco products. Follow-up care is a key part of your treatment and safety. Be sure to make and go to all appointments, and call your doctor if you are having problems. It's also a good idea to know your test results and keep a list of the medicines you take. How can you care for yourself at home? · Practice healthy eating habits. This includes eating plenty of fruits, vegetables, whole grains, lean protein, and low-fat dairy. · If your doctor recommends it, get more exercise. Walking is a good choice. Bit by bit, increase the amount you walk every day. Try for at least 30 minutes on most days of the week. · Do not smoke. Smoking can increase your risk for health problems. If you need help quitting, talk to your doctor about stop-smoking programs and medicines. These can increase your chances of quitting for good. · Limit alcohol to 2 drinks a day for men and 1 drink a day for women. Too much alcohol can cause health problems. If you have a BMI higher than 25  · Your doctor may do other tests to check your risk for weight-related health problems. This may include measuring the distance around your waist. A waist measurement of more than 40 inches in men or 35 inches in women can increase the risk of weight-related health problems. · Talk with your doctor about steps you can take to stay healthy or improve your health. You may need to make lifestyle changes to lose weight and stay healthy, such as changing your diet and getting regular exercise. If you have a BMI lower than 18.5  · Your doctor may do other tests to check your risk for health problems.   · Talk with your doctor about steps you can take to stay healthy or improve your health. You may need to make lifestyle changes to gain or maintain weight and stay healthy, such as getting more healthy foods in your diet and doing exercises to build muscle. Where can you learn more? Go to http://valdez-lucian.info/. Enter S176 in the search box to learn more about \"Body Mass Index: Care Instructions. \"  Current as of: October 13, 2016  Content Version: 11.4  © 5104-2229 [employer]. Care instructions adapted under license by [employer] (which disclaims liability or warranty for this information). If you have questions about a medical condition or this instruction, always ask your healthcare professional. [address] any warranty or liability for your use of this information.

## 2018-03-26 NOTE — PATIENT INSTRUCTIONS
Body Mass Index: Care Instructions  Your Care Instructions    Body mass index (BMI) can help you see if your weight is raising your risk for health problems. It uses a formula to compare how much you weigh with how tall you are. · A BMI lower than 18.5 is considered underweight. · A BMI between 18.5 and 24.9 is considered healthy. · A BMI between 25 and 29.9 is considered overweight. A BMI of 30 or higher is considered obese. If your BMI is in the normal range, it means that you have a lower risk for weight-related health problems. If your BMI is in the overweight or obese range, you may be at increased risk for weight-related health problems, such as high blood pressure, heart disease, stroke, arthritis or joint pain, and diabetes. If your BMI is in the underweight range, you may be at increased risk for health problems such as fatigue, lower protection (immunity) against illness, muscle loss, bone loss, hair loss, and hormone problems. BMI is just one measure of your risk for weight-related health problems. You may be at higher risk for health problems if you are not active, you eat an unhealthy diet, or you drink too much alcohol or use tobacco products. Follow-up care is a key part of your treatment and safety. Be sure to make and go to all appointments, and call your doctor if you are having problems. It's also a good idea to know your test results and keep a list of the medicines you take. How can you care for yourself at home? · Practice healthy eating habits. This includes eating plenty of fruits, vegetables, whole grains, lean protein, and low-fat dairy. · If your doctor recommends it, get more exercise. Walking is a good choice. Bit by bit, increase the amount you walk every day. Try for at least 30 minutes on most days of the week. · Do not smoke. Smoking can increase your risk for health problems. If you need help quitting, talk to your doctor about stop-smoking programs and medicines. These can increase your chances of quitting for good. · Limit alcohol to 2 drinks a day for men and 1 drink a day for women. Too much alcohol can cause health problems. If you have a BMI higher than 25  · Your doctor may do other tests to check your risk for weight-related health problems. This may include measuring the distance around your waist. A waist measurement of more than 40 inches in men or 35 inches in women can increase the risk of weight-related health problems. · Talk with your doctor about steps you can take to stay healthy or improve your health. You may need to make lifestyle changes to lose weight and stay healthy, such as changing your diet and getting regular exercise. If you have a BMI lower than 18.5  · Your doctor may do other tests to check your risk for health problems. · Talk with your doctor about steps you can take to stay healthy or improve your health. You may need to make lifestyle changes to gain or maintain weight and stay healthy, such as getting more healthy foods in your diet and doing exercises to build muscle. Where can you learn more? Go to http://valdez-lucian.info/. Enter S176 in the search box to learn more about \"Body Mass Index: Care Instructions. \"  Current as of: October 13, 2016  Content Version: 11.4  © 6576-5626 [employer]. Care instructions adapted under license by [employer] (which disclaims liability or warranty for this information). If you have questions about a medical condition or this instruction, always ask your healthcare professional. [address] any warranty or liability for your use of this information.

## 2018-03-27 LAB
ALBUMIN SERPL-MCNC: 3.6 G/DL (ref 3.5–5.5)
ALBUMIN/GLOB SERPL: 1.3 {RATIO} (ref 1.2–2.2)
ALP SERPL-CCNC: 55 IU/L (ref 39–117)
ALT SERPL-CCNC: 9 IU/L (ref 0–32)
AST SERPL-CCNC: 8 IU/L (ref 0–40)
BILIRUB SERPL-MCNC: <0.2 MG/DL (ref 0–1.2)
BUN SERPL-MCNC: 11 MG/DL (ref 6–24)
BUN/CREAT SERPL: 14 (ref 9–23)
CALCIUM SERPL-MCNC: 8.9 MG/DL (ref 8.7–10.2)
CHLORIDE SERPL-SCNC: 102 MMOL/L (ref 96–106)
CHOLEST SERPL-MCNC: 188 MG/DL (ref 100–199)
CO2 SERPL-SCNC: 24 MMOL/L (ref 18–29)
CREAT SERPL-MCNC: 0.79 MG/DL (ref 0.57–1)
EST. AVERAGE GLUCOSE BLD GHB EST-MCNC: 114 MG/DL
GFR SERPLBLD CREATININE-BSD FMLA CKD-EPI: 104 ML/MIN/1.73
GFR SERPLBLD CREATININE-BSD FMLA CKD-EPI: 90 ML/MIN/1.73
GLOBULIN SER CALC-MCNC: 2.7 G/DL (ref 1.5–4.5)
GLUCOSE SERPL-MCNC: 92 MG/DL (ref 65–99)
HBA1C MFR BLD: 5.6 % (ref 4.8–5.6)
HDLC SERPL-MCNC: 56 MG/DL
INTERPRETATION, 910389: NORMAL
LDLC SERPL CALC-MCNC: 93 MG/DL (ref 0–99)
POTASSIUM SERPL-SCNC: 4.3 MMOL/L (ref 3.5–5.2)
PROT SERPL-MCNC: 6.3 G/DL (ref 6–8.5)
SODIUM SERPL-SCNC: 140 MMOL/L (ref 134–144)
TRIGL SERPL-MCNC: 195 MG/DL (ref 0–149)
TSH SERPL DL<=0.005 MIU/L-ACNC: 2.68 UIU/ML (ref 0.45–4.5)
VLDLC SERPL CALC-MCNC: 39 MG/DL (ref 5–40)

## 2018-03-30 DIAGNOSIS — J40 BRONCHITIS: ICD-10-CM

## 2018-03-30 RX ORDER — FLUCONAZOLE 150 MG/1
TABLET ORAL
Qty: 2 TAB | Refills: 0 | Status: SHIPPED | OUTPATIENT
Start: 2018-03-30 | End: 2018-04-02 | Stop reason: SDUPTHER

## 2018-04-02 DIAGNOSIS — J40 BRONCHITIS: ICD-10-CM

## 2018-04-02 RX ORDER — FLUCONAZOLE 150 MG/1
TABLET ORAL
Qty: 2 TAB | Refills: 0 | Status: SHIPPED | OUTPATIENT
Start: 2018-04-02 | End: 2018-04-16 | Stop reason: SDUPTHER

## 2018-04-10 ENCOUNTER — DOCUMENTATION ONLY (OUTPATIENT)
Dept: FAMILY MEDICINE CLINIC | Age: 48
End: 2018-04-10

## 2018-04-11 ENCOUNTER — DOCUMENTATION ONLY (OUTPATIENT)
Dept: FAMILY MEDICINE CLINIC | Age: 48
End: 2018-04-11

## 2018-04-11 NOTE — PROGRESS NOTES
Melony Jade order was signed & faxed to [phone], ok, Copy placed in scan folder to be scanned to chart.

## 2018-04-16 DIAGNOSIS — J45.21 MILD INTERMITTENT REACTIVE AIRWAY DISEASE WITH WHEEZING WITH ACUTE EXACERBATION: ICD-10-CM

## 2018-04-16 DIAGNOSIS — J40 BRONCHITIS: ICD-10-CM

## 2018-04-16 RX ORDER — ALBUTEROL SULFATE 90 UG/1
AEROSOL, METERED RESPIRATORY (INHALATION)
Qty: 18 INHALER | Refills: 2 | Status: SHIPPED | OUTPATIENT
Start: 2018-04-16 | End: 2018-07-21 | Stop reason: SDUPTHER

## 2018-04-16 RX ORDER — BUTALBITAL, ACETAMINOPHEN AND CAFFEINE 300; 40; 50 MG/1; MG/1; MG/1
CAPSULE ORAL
Qty: 30 CAP | Refills: 0 | Status: SHIPPED | OUTPATIENT
Start: 2018-04-16 | End: 2019-09-16

## 2018-04-16 RX ORDER — FLUCONAZOLE 150 MG/1
TABLET ORAL
Qty: 2 TAB | Refills: 0 | Status: SHIPPED | OUTPATIENT
Start: 2018-04-16 | End: 2018-06-23 | Stop reason: SDUPTHER

## 2018-04-16 RX ORDER — ALBUTEROL SULFATE 0.83 MG/ML
SOLUTION RESPIRATORY (INHALATION)
Qty: 25 EACH | Refills: 2 | Status: SHIPPED | OUTPATIENT
Start: 2018-04-16 | End: 2018-07-21 | Stop reason: SDUPTHER

## 2018-05-14 RX ORDER — GABAPENTIN 600 MG/1
TABLET ORAL
Qty: 120 TAB | Refills: 1 | Status: SHIPPED | OUTPATIENT
Start: 2018-05-14 | End: 2018-07-10 | Stop reason: SDUPTHER

## 2018-06-13 ENCOUNTER — OFFICE VISIT (OUTPATIENT)
Dept: FAMILY MEDICINE CLINIC | Age: 48
End: 2018-06-13

## 2018-06-13 VITALS
WEIGHT: 237 LBS | SYSTOLIC BLOOD PRESSURE: 143 MMHG | RESPIRATION RATE: 16 BRPM | DIASTOLIC BLOOD PRESSURE: 87 MMHG | TEMPERATURE: 98.3 F | OXYGEN SATURATION: 99 % | HEIGHT: 63 IN | BODY MASS INDEX: 41.99 KG/M2 | HEART RATE: 100 BPM

## 2018-06-13 DIAGNOSIS — Z12.39 SCREENING FOR BREAST CANCER: ICD-10-CM

## 2018-06-13 DIAGNOSIS — Z01.419 ENCOUNTER FOR GYNECOLOGICAL EXAMINATION: Primary | ICD-10-CM

## 2018-06-13 DIAGNOSIS — F41.9 ANXIETY: ICD-10-CM

## 2018-06-13 DIAGNOSIS — Z11.51 SCREENING FOR HPV (HUMAN PAPILLOMAVIRUS): ICD-10-CM

## 2018-06-13 RX ORDER — ALPRAZOLAM 0.5 MG/1
TABLET ORAL
Qty: 60 TAB | Refills: 3 | Status: SHIPPED | OUTPATIENT
Start: 2018-06-13 | End: 2018-09-27 | Stop reason: SDUPTHER

## 2018-06-13 NOTE — MR AVS SNAPSHOT
[address] [address] 
[phone] Patient: Helen Rojas MRN: [id number] Visit Information Date & Time Provider Department Dept. Phone Encounter #  
 6/13/2018  9:30 AM Annette Jones NP [facility] [phone] [id number] Follow-up Instructions Return if symptoms worsen or fail to improve. Your Appointments 6/13/2018  9:30 AM  
ESTABLISHED PATIENT with Annette Jones NP [facility] (Jael Salines) Appt Note: med refill  
 [facility] [address] [address] [phone]  
  
   
 [facility] [address] [address]  
  
    
 6/27/2018  8:00 AM  
New Patient with Nan Aragon MD  
[facility] Jael Salines) Appt Note: r/s from 6/13/18/ [facility] [address] [address]  
[phone]  
  
   
 [facility] [address] [address]  
  
    
 9/26/2018  8:50 AM  
ESTABLISHED PATIENT with Anthony Sow MD  
[facility] Ajel Salines) Appt Note: 6mo fuv  
 [facility] [address] [address]  
[phone]  
  
   
 [facility] [address] [address] Upcoming Health Maintenance Date Due Pneumococcal 19-64 Medium Risk (1 of 1 - PPSV23) 1/30/1991 PAP AKA CERVICAL CYTOLOGY 9/22/2017 Influenza Age 5 to Adult 8/1/2018 DTaP/Tdap/Td series (2 - Td) 8/18/2021 Allergies as of 6/13/2018  Review Complete On: 6/13/2018 By: Carlos A Cervantes LPN Severity Noted Reaction Type Reactions Hydromorphone High 01/05/2012   Systemic Itching, Swelling  
 hydromorphone 1mg IV given immediately began itching and complained of scratchy swelling sensation in her throat Levofloxacin High 03/26/2018    Hives Aspirin Medium 12/31/2011   Systemic Anaphylaxis Bactrim [Sulfamethoprim Ds] Medium 08/13/2012   Side Effect Hives Darvocet A500 [Propoxyphene N-acetaminophen] Medium 11/04/2010   Intolerance Hives Imitrex [Sumatriptan] Medium 12/31/2011   Systemic Hives Pcn [Penicillins] Medium 11/04/2010   Intolerance Anaphylaxis Percocet [Oxycodone-acetaminophen] Medium 11/04/2010   Intolerance Hives Pt can take lortab/norco  
 Toradol [Ketorolac Tromethamine] Medium 11/04/2010   Intolerance Hives Tramadol Medium 02/24/2012   Intolerance Hives Hydrocodone  04/04/2016    Hives Lisinopril  06/29/2016    Swelling Tongue and mouth felt weird, slight swelling Prednisone  04/16/2014    Rash And pt felt throat closing Martha Burk [Milnacipran]  02/17/2012    Hives INSOMNIA Vesicare [Solifenacin]  12/31/2011    Other (comments) Nose bleeds Wellbutrin [Bupropion Hcl]  05/09/2014    Other (comments)  
 insomnia Current Immunizations  Reviewed on 6/21/2016 Name Date TDAP Vaccine 8/18/2011 Not reviewed this visit You Were Diagnosed With   
  
 Codes Comments Encounter for gynecological examination    -  Primary ICD-10-CM: P09.009 ICD-9-CM: V72.31 Screening for HPV (human papillomavirus)     ICD-10-CM: Z11.51 
ICD-9-CM: V73.81 Screening for breast cancer     ICD-10-CM: Z12.31 
ICD-9-CM: V76.10 Anxiety     ICD-10-CM: F41.9 ICD-9-CM: 300.00 Vitals BP Pulse Temp Resp Height(growth percentile) Weight(growth percentile) 143/87 100 98.3 °F (36.8 °C) (Oral) 16 5' 3\" (1.6 m) 237 lb (107.5 kg) SpO2 BMI OB Status Smoking Status 99% 41.98 kg/m2 Ablation Current Some Day Smoker Vitals History BMI and BSA Data Body Mass Index Body Surface Area 41.98 kg/m 2 2.19 m 2 Preferred Pharmacy Pharm[facility] Name Phone [id number] [address] [phone] Your Updated Medication List  
  
   
This list is accurate as of 6/13/18  8:59 AM.  Always use your most recent med list.  
  
  
  
  
 * albuterol 2.5 mg /3 mL (0.083 %) nebulizer solution Commonly known as:  PROVENTIL VENTOLIN  
INHALE 3 ML BY NEBULIZATION ROUTE EVERY 4 HOURS AS NEEDED FOR WHEEZING  
  
 * VENTOLIN HFA 90 mcg/actuation inhaler Generic drug:  albuterol TAKE 2 PUFFS BY INHALATION EVERY FOUR (4) HOURS AS NEEDED FOR WHEEZING. ALPRAZolam 0.5 mg tablet Commonly known as:  XANAX  
TAKE 1 TABLET BY MOUTH 2 TIMES DAILY AS NEEDED  
  
 amitriptyline 100 mg tablet Commonly known as:  ELAVIL TAKE 1 TABLET BY MOUTH EVERY EVENING  
  
 butalbital-acetaminophen-caff -40 mg per capsule Commonly known as:  [employer] TAKE 1 CAP BY MOUTH AT ONSET OF HEADACHE AS NEEDED. diclofenac potassium 50 mg tablet Commonly known as:  CATAFLAM  
Take 1 tab at onset of headache with meals. May take another 1 tab after 4 hrs if still has headache DULoxetine 60 mg capsule Commonly known as:  CYMBALTA TAKE ONE CAPSULE BY MOUTH EVERY DAY  
  
 fluconazole 150 mg tablet Commonly known as:  DIFLUCAN  
TAKE 1 TABLET BY MOUTH DAILY FOR 1 DAY. REPEAT IN 3 DAYS IF NEEDED  
  
 gabapentin 600 mg tablet Commonly known as:  NEURONTIN  
TAKE 1 TABLET BY MOUTH EVERY MORNING AND IN THE AFTERNOON AND TAKE 2 TABLETS AT NIGHT  
  
 hydroCHLOROthiazide 25 mg tablet Commonly known as:  HYDRODIURIL  
TAKE 1 TABLET BY MOUTH EVERY DAY  
  
 losartan 50 mg tablet Commonly known as:  COZAAR  
TAKE 1 TABLET BY MOUTH EVERY DAY  
  
 raNITIdine 75 mg tablet Commonly known as:  ZANTAC TAKE 1 TABLET BY MOUTH TWICE A DAY BEFORE MEALS  
  
 tiZANidine 4 mg capsule Commonly known as:  Gerardo Shaffer Take 4 mg by mouth three (3) times daily. topiramate 100 mg tablet Commonly known as:  TOPAMAX TAKE 1 TABLET BY MOUTH 2 TIMES DAILY * triamcinolone 0.5 % topical cream  
Commonly known as:  ARISTOCORT  
APPLY TO AFFECTED AREA TWO (2) TIMES A DAY.  USE THIN LAYER  
  
 * triamcinolone acetonide 0.1 % topical cream  
 Commonly known as:  KENALOG Apply  to affected area two (2) times a day. * Notice: This list has 4 medication(s) that are the same as other medications prescribed for you. Read the directions carefully, and ask your doctor or other care provider to review them with you. Prescriptions Printed Refills ALPRAZolam (XANAX) 0.5 mg tablet 3 Sig: TAKE 1 TABLET BY MOUTH 2 TIMES DAILY AS NEEDED Class: Print We Performed the Following PAP (IMAGE GUIDED) + HPV HIGH RISK [[id number] Custom] Follow-up Instructions Return if symptoms worsen or fail to improve. To-Do List   
 06/13/2018 Imaging:  JOBY MAMMO BI SCREENING INCL CAD Introducing [facility]! Dear Lorel Duverney: 
Thank you for requesting a MycooN account. Our records indicate that you already have an active MycooN account. You can access your account anytime at [email or web address] TV Did you know that you can access your hospital and ER discharge instructions at any time in MycooN? You can also review all of your test results from your hospital stay or ER visit. Additional Information If you have questions, please visit the Frequently Asked Questions section of the MycooN website at [email or web address] Remember, MycooN is NOT to be used for urgent needs. For medical emergencies, dial 911. Now available from your iPhone and Android! Please provide this summary of care documentation to your next provider. Your primary care clinician is listed as NICKY YOU. If you have any questions after today's visit, please call [phone].

## 2018-06-13 NOTE — PROGRESS NOTES
1. Have you been to the ER, urgent care clinic since your last visit? Hospitalized since your last visit? No    2. Have you seen or consulted any other health care providers outside of the [address] [place] [address] since your last visit? Include any pap smears or colon screening.  No     Chief Complaint   Patient presents with    Medication Refill     Xanax

## 2018-06-13 NOTE — PROGRESS NOTES
Subjective:   57 y.o. female for annual routine Pap and checkup. No LMP recorded. Patient has had an ablation. Social History: single partner, contraception - tubal ligation. Pertinent past medical hstory: hypertension, current smoker. GYN ROS:  Feeling well. No dyspnea or chest pain on exertion. No abdominal pain, change in bowel habits, black or bloody stools. No urinary tract symptoms. GYN ROS: no breast pain or new or enlarging lumps on self exam, no discharge or pelvic pain, she complains of no periods since D&C back in 2003, every 3-6 months will have cramping and spotting, otherwise no issues. No neurological complaints. Objective:     Visit Vitals    /87    Pulse 100    Temp 98.3 °F (36.8 °C) (Oral)    Resp 16    Ht 5' 3\" (1.6 m)    Wt 237 lb (107.5 kg)    SpO2 99%    BMI 41.98 kg/m2     The patient appears well, alert, oriented x 3, in no distress. ENT normal.  Neck supple. No adenopathy or thyromegaly. DIGNA. Lungs are clear, good air entry, no wheezes, rhonchi or rales. S1 and S2 normal, no murmurs, regular rate and rhythm. Abdomen soft without tenderness, guarding, mass or organomegaly. Extremities show no edema, normal peripheral pulses. Neurological is normal, no focal findings. BREAST EXAM: patient declines to have breast exam    PELVIC EXAM: normal external genitalia, vulva, vagina, cervix, uterus and adnexa    Assessment/Plan:   well woman  mammogram  pap smear  return annually or prn    ICD-10-CM ICD-9-CM    1. Encounter for gynecological examination Z01.419 V72.31 PAP (IMAGE GUIDED) + HPV HIGH RISK   2. Screening for HPV (human papillomavirus) Z11.51 V73.81 PAP (IMAGE GUIDED) + HPV HIGH RISK   3. Screening for breast cancer Z12.31 V76.10 JOBY MAMMO BI SCREENING INCL CAD   4. Anxiety F41.9 300.00 ALPRAZolam (XANAX) 0.5 mg tablet  Stable, refills provided. Reviewed , pt taking appropriately.       Favio Abreu, TANNA

## 2018-06-15 LAB
CYTOLOGIST CVX/VAG CYTO: NORMAL
CYTOLOGY CVX/VAG DOC THIN PREP: NORMAL
DX ICD CODE: NORMAL
HPV I/H RISK 1 DNA CVX QL PROBE+SIG AMP: NEGATIVE
Lab: NORMAL
OTHER STN SPEC: NORMAL
PATH REPORT.FINAL DX SPEC: NORMAL
STAT OF ADQ CVX/VAG CYTO-IMP: NORMAL

## 2018-06-23 DIAGNOSIS — J40 BRONCHITIS: ICD-10-CM

## 2018-06-25 RX ORDER — FLUCONAZOLE 150 MG/1
TABLET ORAL
Qty: 2 TAB | Refills: 0 | Status: SHIPPED | OUTPATIENT
Start: 2018-06-25 | End: 2018-07-11 | Stop reason: SDUPTHER

## 2018-07-09 DIAGNOSIS — G43.909 MIGRAINE WITHOUT STATUS MIGRAINOSUS, NOT INTRACTABLE, UNSPECIFIED MIGRAINE TYPE: ICD-10-CM

## 2018-07-09 RX ORDER — AMITRIPTYLINE HYDROCHLORIDE 100 MG/1
TABLET, FILM COATED ORAL
Qty: 30 TAB | Refills: 3 | Status: SHIPPED | OUTPATIENT
Start: 2018-07-09 | End: 2020-01-14 | Stop reason: SDUPTHER

## 2018-07-10 RX ORDER — GABAPENTIN 600 MG/1
TABLET ORAL
Qty: 120 TAB | Refills: 0 | Status: SHIPPED | OUTPATIENT
Start: 2018-07-10 | End: 2018-08-06 | Stop reason: SDUPTHER

## 2018-07-11 DIAGNOSIS — J40 BRONCHITIS: ICD-10-CM

## 2018-07-11 RX ORDER — FLUCONAZOLE 150 MG/1
TABLET ORAL
Qty: 2 TAB | Refills: 0 | Status: SHIPPED | OUTPATIENT
Start: 2018-07-11 | End: 2018-07-25 | Stop reason: SDUPTHER

## 2018-07-21 DIAGNOSIS — J40 BRONCHITIS: ICD-10-CM

## 2018-07-21 DIAGNOSIS — J45.21 MILD INTERMITTENT REACTIVE AIRWAY DISEASE WITH WHEEZING WITH ACUTE EXACERBATION: ICD-10-CM

## 2018-07-23 RX ORDER — ALBUTEROL SULFATE 90 UG/1
AEROSOL, METERED RESPIRATORY (INHALATION)
Qty: 18 INHALER | Refills: 2 | Status: SHIPPED | OUTPATIENT
Start: 2018-07-23 | End: 2019-01-22 | Stop reason: SDUPTHER

## 2018-07-23 RX ORDER — ALBUTEROL SULFATE 0.83 MG/ML
SOLUTION RESPIRATORY (INHALATION)
Qty: 25 EACH | Refills: 2 | Status: SHIPPED | OUTPATIENT
Start: 2018-07-23 | End: 2019-01-22 | Stop reason: SDUPTHER

## 2018-07-25 ENCOUNTER — OFFICE VISIT (OUTPATIENT)
Dept: FAMILY MEDICINE CLINIC | Age: 48
End: 2018-07-25

## 2018-07-25 VITALS
TEMPERATURE: 98.2 F | DIASTOLIC BLOOD PRESSURE: 71 MMHG | WEIGHT: 243 LBS | HEART RATE: 97 BPM | BODY MASS INDEX: 43.05 KG/M2 | OXYGEN SATURATION: 99 % | SYSTOLIC BLOOD PRESSURE: 122 MMHG | RESPIRATION RATE: 16 BRPM | HEIGHT: 63 IN

## 2018-07-25 DIAGNOSIS — N89.8 VAGINAL ODOR: ICD-10-CM

## 2018-07-25 DIAGNOSIS — R10.2 PELVIC PAIN: ICD-10-CM

## 2018-07-25 DIAGNOSIS — N89.8 VAGINAL DISCHARGE: ICD-10-CM

## 2018-07-25 DIAGNOSIS — R30.0 DYSURIA: Primary | ICD-10-CM

## 2018-07-25 LAB
BILIRUB UR QL STRIP: NEGATIVE
GLUCOSE UR-MCNC: NEGATIVE MG/DL
KETONES P FAST UR STRIP-MCNC: NEGATIVE MG/DL
PH UR STRIP: 7 [PH] (ref 4.6–8)
PROT UR QL STRIP: NEGATIVE
SP GR UR STRIP: 1.01 (ref 1–1.03)
UA UROBILINOGEN AMB POC: NORMAL (ref 0.2–1)
URINALYSIS CLARITY POC: CLEAR
URINALYSIS COLOR POC: YELLOW
URINE BLOOD POC: NORMAL
URINE LEUKOCYTES POC: NORMAL
URINE NITRITES POC: POSITIVE

## 2018-07-25 RX ORDER — FLUCONAZOLE 150 MG/1
150 TABLET ORAL DAILY
Qty: 2 TAB | Refills: 0 | Status: SHIPPED | OUTPATIENT
Start: 2018-07-25 | End: 2018-09-27 | Stop reason: SDUPTHER

## 2018-07-25 RX ORDER — METRONIDAZOLE 500 MG/1
500 TABLET ORAL 2 TIMES DAILY
Qty: 14 TAB | Refills: 0 | Status: SHIPPED | OUTPATIENT
Start: 2018-07-25 | End: 2018-08-01

## 2018-07-25 RX ORDER — CEFDINIR 300 MG/1
300 CAPSULE ORAL 2 TIMES DAILY
Qty: 20 CAP | Refills: 0 | Status: SHIPPED | OUTPATIENT
Start: 2018-07-25 | End: 2018-08-04

## 2018-07-25 NOTE — PATIENT INSTRUCTIONS
Urinary Tract Infection in Women: Care Instructions  Your Care Instructions    A urinary tract infection, or UTI, is a general term for an infection anywhere between the kidneys and the urethra (where urine comes out). Most UTIs are bladder infections. They often cause pain or burning when you urinate. UTIs are caused by bacteria and can be cured with antibiotics. Be sure to complete your treatment so that the infection goes away. Follow-up care is a key part of your treatment and safety. Be sure to make and go to all appointments, and call your doctor if you are having problems. It's also a good idea to know your test results and keep a list of the medicines you take. How can you care for yourself at home? · Take your antibiotics as directed. Do not stop taking them just because you feel better. You need to take the full course of antibiotics. · Drink extra water and other fluids for the next day or two. This may help wash out the bacteria that are causing the infection. (If you have kidney, heart, or liver disease and have to limit fluids, talk with your doctor before you increase your fluid intake.)  · Avoid drinks that are carbonated or have caffeine. They can irritate the bladder. · Urinate often. Try to empty your bladder each time. · To relieve pain, take a hot bath or lay a heating pad set on low over your lower belly or genital area. Never go to sleep with a heating pad in place. To prevent UTIs  · Drink plenty of water each day. This helps you urinate often, which clears bacteria from your system. (If you have kidney, heart, or liver disease and have to limit fluids, talk with your doctor before you increase your fluid intake.)  · Urinate when you need to. · Urinate right after you have sex. · Change sanitary pads often. · Avoid douches, bubble baths, feminine hygiene sprays, and other feminine hygiene products that have deodorants.   · After going to the bathroom, wipe from front to back.  When should you call for help? Call your doctor now or seek immediate medical care if:    · Symptoms such as fever, chills, nausea, or vomiting get worse or appear for the first time.     · You have new pain in your back just below your rib cage. This is called flank pain.     · There is new blood or pus in your urine.     · You have any problems with your antibiotic medicine.    Watch closely for changes in your health, and be sure to contact your doctor if:    · You are not getting better after taking an antibiotic for 2 days.     · Your symptoms go away but then come back. Where can you learn more? Go to http://valdez-lucian.info/. Enter Z331 in the search box to learn more about \"Urinary Tract Infection in Women: Care Instructions. \"  Current as of: May 12, 2017  Content Version: 11.7  © 3389-9970 [employer]. Care instructions adapted under license by [employer] (which disclaims liability or warranty for this information). If you have questions about a medical condition or this instruction, always ask your healthcare professional. [address] any warranty or liability for your use of this information.

## 2018-07-25 NOTE — PROGRESS NOTES
Chief Complaint   Patient presents with    Bladder Infection     x1 week    Vaginal Pain     Smell, x1 week     she is a 57y.o. year old female who presents for evalution. Pt states has been having dysuria and frequency for past week. Also having low back pain. No fever or chills. Has also been having vaginal discharge and odor for past week. Yesterday started having terrible pelvic pain - lots of cramping, stabbing sensation. Has been using muscle relaxer and gabapentin to help with pain. Pt states pain was severe, almost felt like she was in labor. Pt states has been having pelvic pain for past 3 months intermittently, unsure what is causing. Reviewed PmHx, RxHx, FmHx, SocHx, AllgHx and updated and dated in the chart. Review of Systems - negative except as listed above in the HPI    Objective:     Vitals:    07/25/18 0722   BP: 122/71   Pulse: 97   Resp: 16   Temp: 98.2 °F (36.8 °C)   TempSrc: Oral   SpO2: 99%   Weight: 243 lb (110.2 kg)   Height: 5' 3\" (1.6 m)     Physical Examination: General appearance - alert, well appearing, and in no distress  Chest - clear to auscultation, no wheezes, rales or rhonchi, symmetric air entry  Heart - normal rate, regular rhythm, normal S1, S2, no murmurs, rubs, clicks or gallops  Abdomen - soft, nontender, nondistended, no masses or organomegaly  no CVA tenderness  Pelvic - pt declined exam     Assessment/ Plan:   Diagnoses and all orders for this visit:    1. Dysuria  -     CULTURE, URINE  -     cefdinir (OMNICEF) 300 mg capsule; Take 1 Cap by mouth two (2) times a day for 10 days.  -     AMB POC URINALYSIS DIP STICK AUTO W/O MICRO  New rx. Pt instructed to take full course of antibiotics and increase water consumption. F/U if no improvement or develops fever/chills/nausea/vomiting. 2. Pelvic pain  -     NUSWAB VAGINITIS PLUS  -     cefdinir (OMNICEF) 300 mg capsule; Take 1 Cap by mouth two (2) times a day for 10 days.   -     fluconazole (DIFLUCAN) 150 mg tablet; Take 1 Tab by mouth daily for 1 day. May repeat in 3 days if needed  Will decide on F/U after reviewing labs. 3. Vaginal discharge  -     NUSWAB VAGINITIS PLUS  -     fluconazole (DIFLUCAN) 150 mg tablet; Take 1 Tab by mouth daily for 1 day. May repeat in 3 days if needed    4. Vaginal odor  -     NUSWAB VAGINITIS PLUS  -     metroNIDAZOLE (FLAGYL) 500 mg tablet; Take 1 Tab by mouth two (2) times a day for 7 days. New rx. Pt voiced understanding regarding plan of care. Follow-up Disposition:  Return if symptoms worsen or fail to improve. I have discussed the diagnosis with the patient and the intended plan as seen in the above orders. The patient has received an after-visit summary and questions were answered concerning future plans.      Medication Side Effects and Warnings were discussed with patient    Tomás Beck NP

## 2018-07-25 NOTE — MR AVS SNAPSHOT
[address] [id number] [phone] Patient: Luz Marina Valenzuela MRN: [id number]:[id number] Visit Information Date & Time Provider Department Dept. Phone Encounter #  
 7/25/2018  7:15 AM Zoya Correa NP [facility] [phone] [id number] Follow-up Instructions Return if symptoms worsen or fail to improve. Your Appointments 9/26/2018  8:50 AM  
ESTABLISHED PATIENT with Rajesh Mcnair MD  
[address] [facility]) Appt Note: 6mo fuv  
 [address] [id number] [phone]  
  
   
 [address] [address] Upcoming Health Maintenance Date Due Pneumococcal 19-64 Medium Risk (1 of 1 - PPSV23) 1/30/1991 Influenza Age 5 to Adult 8/1/2018 PAP AKA CERVICAL CYTOLOGY 6/13/2021 DTaP/Tdap/Td series (2 - Td) 8/18/2021 Allergies as of 7/25/2018  Review Complete On: 6/13/2018 By: Zoya Correa NP Severity Noted Reaction Type Reactions Hydromorphone High 01/05/2012   Systemic Itching, Swelling  
 hydromorphone 1mg IV given immediately began itching and complained of scratchy swelling sensation in her throat Levofloxacin High 03/26/2018    Hives Aspirin Medium 12/31/2011   Systemic Anaphylaxis Bactrim [Sulfamethoprim Ds] Medium 08/13/2012   Side Effect Hives Darvocet A500 [Propoxyphene N-acetaminophen] Medium 11/04/2010   Intolerance Hives Imitrex [Sumatriptan] Medium 12/31/2011   Systemic Hives Pcn [Penicillins] Medium 11/04/2010   Intolerance Anaphylaxis Percocet [Oxycodone-acetaminophen] Medium 11/04/2010   Intolerance Hives Pt can take lortab/norco  
 Toradol [Ketorolac Tromethamine] Medium 11/04/2010   Intolerance Hives Tramadol Medium 02/24/2012   Intolerance Hives Hydrocodone  04/04/2016    Hives Lisinopril  06/29/2016    Swelling Tongue and mouth felt weird, slight swelling Prednisone  04/16/2014    Rash And pt felt throat closing Stephon Romero [Milnacipran]  02/17/2012    Hives INSOMNIA Vesicare [Solifenacin]  12/31/2011    Other (comments) Nose bleeds Wellbutrin [Bupropion Hcl]  05/09/2014    Other (comments)  
 insomnia Current Immunizations  Reviewed on 6/21/2016 Name Date TDAP Vaccine 8/18/2011 Not reviewed this visit You Were Diagnosed With   
  
 Codes Comments Dysuria    -  Primary ICD-10-CM: R30.0 ICD-9-CM: 316. 1 Pelvic pain     ICD-10-CM: R10.2 ICD-9-CM: [id number] Vaginal discharge     ICD-10-CM: N89.8 ICD-9-CM: 623.5 Vaginal odor     ICD-10-CM: N89.8 ICD-9-CM: 625.8 Vitals BP Pulse Temp Resp Height(growth percentile) Weight(growth percentile) 122/71 97 98.2 °F (36.8 °C) (Oral) 16 5' 3\" (1.6 m) 243 lb (110.2 kg) SpO2 BMI OB Status Smoking Status 99% 43.05 kg/m2 Ablation Current Some Day Smoker BMI and BSA Data Body Mass Index Body Surface Area 43.05 kg/m 2 2.21 m 2 Preferred Pharmacy Pharmacy Name Phone [id number] [address] [facility] [phone] Your Updated Medication List  
  
   
This list is accurate as of 7/25/18  7:43 AM.  Always use your most recent med list.  
  
  
  
  
 * albuterol 2.5 mg /3 mL (0.083 %) nebulizer solution Commonly known as:  PROVENTIL VENTOLIN  
INHALE 3 ML BY NEBULIZATION ROUTE EVERY 4 HOURS AS NEEDED FOR WHEEZING  
  
 * VENTOLIN HFA 90 mcg/actuation inhaler Generic drug:  albuterol TAKE 2 PUFFS BY INHALATION EVERY FOUR (4) HOURS AS NEEDED FOR WHEEZING. ALPRAZolam 0.5 mg tablet Commonly known as:  XANAX  
TAKE 1 TABLET BY MOUTH 2 TIMES DAILY AS NEEDED  
  
 amitriptyline 100 mg tablet Commonly known as:  ELAVIL TAKE 1 TABLET BY MOUTH EVERY EVENING  
  
 butalbital-acetaminophen-caff -40 mg per capsule Commonly known as:  [employer] TAKE 1 CAP BY MOUTH AT ONSET OF HEADACHE AS NEEDED. cefdinir 300 mg capsule Commonly known as:  OMNICEF Take 1 Cap by mouth two (2) times a day for 10 days. diclofenac potassium 50 mg tablet Commonly known as:  CATAFLAM  
Take 1 tab at onset of headache with meals. May take another 1 tab after 4 hrs if still has headache DULoxetine 60 mg capsule Commonly known as:  CYMBALTA TAKE ONE CAPSULE BY MOUTH EVERY DAY  
  
 fluconazole 150 mg tablet Commonly known as:  DIFLUCAN Take 1 Tab by mouth daily for 1 day. May repeat in 3 days if needed  
  
 gabapentin 600 mg tablet Commonly known as:  NEURONTIN  
TAKE 1 TABLET BY MOUTH EVERY MORNING AND IN THE AFTERNOON AND TAKE 2 TABLETS AT NIGHT  
  
 hydroCHLOROthiazide 25 mg tablet Commonly known as:  HYDRODIURIL  
TAKE 1 TABLET BY MOUTH EVERY DAY  
  
 losartan 50 mg tablet Commonly known as:  COZAAR  
TAKE 1 TABLET BY MOUTH EVERY DAY  
  
 metroNIDAZOLE 500 mg tablet Commonly known as:  FLAGYL Take 1 Tab by mouth two (2) times a day for 7 days. raNITIdine 75 mg tablet Commonly known as:  ZANTAC TAKE 1 TABLET BY MOUTH TWICE A DAY BEFORE MEALS  
  
 tiZANidine 4 mg capsule Commonly known as:  Fani Sleek Take 4 mg by mouth three (3) times daily. topiramate 100 mg tablet Commonly known as:  TOPAMAX TAKE 1 TABLET BY MOUTH 2 TIMES DAILY * triamcinolone 0.5 % topical cream  
Commonly known as:  ARISTOCORT  
APPLY TO AFFECTED AREA TWO (2) TIMES A DAY. USE THIN LAYER  
  
 * triamcinolone acetonide 0.1 % topical cream  
Commonly known as:  KENALOG Apply  to affected area two (2) times a day. * Notice: This list has 4 medication(s) that are the same as other medications prescribed for you. Read the directions carefully, and ask your doctor or other care provider to review them with you. Prescriptions Sent to Pharmacy Refills  
 cefdinir (OMNICEF) 300 mg capsule 0 Sig: Take 1 Cap by mouth two (2) times a day for 10 days.   
 Class: Normal  
 Pharmacy: [facility]/pharm[facility] [address], [address] [place] [address] Ph #: [phone] Route: Oral  
 fluconazole (DIFLUCAN) 150 mg tablet 0 Sig: Take 1 Tab by mouth daily for 1 day. May repeat in 3 days if needed Class: Normal  
 Pharmacy: [facility] #[id number] - [place], [address] [place] [address] Ph #: [phone] Route: Oral  
 metroNIDAZOLE (FLAGYL) 500 mg tablet 0 Sig: Take 1 Tab by mouth two (2) times a day for 7 days. Class: Normal  
 [id number] [address] [place] [address] Ph #: [phone] Route: Oral  
  
We Performed the Following CULTURE, URINE [id number] CPT(R)] [address] [id number] Custom] Follow-up Instructions Return if symptoms worsen or fail to improve. Patient Instructions Urinary Tract Infection in Women: Care Instructions Your Care Instructions A urinary tract infection, or UTI, is a general term for an infection anywhere between the kidneys and the urethra (where urine comes out). Most UTIs are bladder infections. They often cause pain or burning when you urinate. UTIs are caused by bacteria and can be cured with antibiotics. Be sure to complete your treatment so that the infection goes away. Follow-up care is a key part of your treatment and safety. Be sure to make and go to all appointments, and call your doctor if you are having problems. It's also a good idea to know your test results and keep a list of the medicines you take. How can you care for yourself at home? · Take your antibiotics as directed. Do not stop taking them just because you feel better. You need to take the full course of antibiotics. · Drink extra water and other fluids for the next day or two. This may help wash out the bacteria that are causing the infection. (If you have kidney, heart, or liver disease and have to limit fluids, talk with your doctor before you increase your fluid intake.) · Avoid drinks that are carbonated or have caffeine. They can irritate the bladder. · Urinate often. Try to empty your bladder each time. · To relieve pain, take a hot bath or lay a heating pad set on low over your lower belly or genital area. Never go to sleep with a heating pad in place. To prevent UTIs · Drink plenty of water each day. This helps you urinate often, which clears bacteria from your system. (If you have kidney, heart, or liver disease and have to limit fluids, talk with your doctor before you increase your fluid intake.) · Urinate when you need to. · Urinate right after you have sex. · Change sanitary pads often. · Avoid douches, bubble baths, feminine hygiene sprays, and other feminine hygiene products that have deodorants. · After going to the bathroom, wipe from front to back. When should you call for help? Call your doctor now or seek immediate medical care if: 
  · Symptoms such as fever, chills, nausea, or vomiting get worse or appear for the first time.  
  · You have new pain in your back just below your rib cage. This is called flank pain.  
  · There is new blood or pus in your urine.  
  · You have any problems with your antibiotic medicine.  
 Watch closely for changes in your health, and be sure to contact your doctor if: 
  · You are not getting better after taking an antibiotic for 2 days.  
  · Your symptoms go away but then come back. Where can you learn more? Go to http://valdez-lucian.info/. Enter Q350 in the search box to learn more about \"Urinary Tract Infection in Women: Care Instructions. \" Current as of: May 12, 2017 Content Version: 11.7 © 3732-9474 [employer]. Care instructions adapted under license by [employer] (which disclaims liability or warranty for this information).  If you have questions about a medical condition or this instruction, always ask your healthcare professional. [employer] disclaims any warranty or liability for your use of this information. Introducing [facility]! Dear Laci Daly: 
Thank you for requesting a ITM Power account. Our records indicate that you already have an active ITM Power account. You can access your account anytime at [email or web address] Did you know that you can access your hospital and ER discharge instructions at any time in ITM Power? You can also review all of your test results from your hospital stay or ER visit. Additional Information If you have questions, please visit the Frequently Asked Questions section of the ITM Power website at [email or web address] Remember, ITM Power is NOT to be used for urgent needs. For medical emergencies, dial 911. Now available from your iPhone and Android! Please provide this summary of care documentation to your next provider. Your primary care clinician is listed as NICKY YOU. If you have any questions after today's visit, please call [phone].

## 2018-07-25 NOTE — PROGRESS NOTES
1. Have you been to the ER, urgent care clinic since your last visit? Hospitalized since your last visit? No    2. Have you seen or consulted any other health care providers outside of the [address] [place] [address] since your last visit? Include any pap smears or colon screening.  No     Chief Complaint   Patient presents with    Bladder Infection     x1 week    Vaginal Pain     Smell, x1 week

## 2018-07-27 LAB — BACTERIA UR CULT: ABNORMAL

## 2018-08-01 LAB
A VAGINAE DNA VAG QL NAA+PROBE: NORMAL SCORE
BVAB2 DNA VAG QL NAA+PROBE: NORMAL SCORE
C ALBICANS DNA VAG QL NAA+PROBE: NEGATIVE
C GLABRATA DNA VAG QL NAA+PROBE: NEGATIVE
C TRACH RRNA SPEC QL NAA+PROBE: NEGATIVE
MEGA1 DNA VAG QL NAA+PROBE: NORMAL SCORE
N GONORRHOEA RRNA SPEC QL NAA+PROBE: NEGATIVE
T VAGINALIS RRNA SPEC QL NAA+PROBE: NEGATIVE

## 2018-08-06 RX ORDER — HYDROCHLOROTHIAZIDE 25 MG/1
TABLET ORAL
Qty: 90 TAB | Refills: 1 | Status: SHIPPED | OUTPATIENT
Start: 2018-08-06 | End: 2018-09-26

## 2018-08-06 RX ORDER — GABAPENTIN 600 MG/1
TABLET ORAL
Qty: 120 TAB | Refills: 0 | Status: SHIPPED | OUTPATIENT
Start: 2018-08-06 | End: 2018-09-02 | Stop reason: SDUPTHER

## 2018-08-06 RX ORDER — RANITIDINE HCL 75 MG
TABLET ORAL
Qty: 180 TAB | Refills: 1 | Status: SHIPPED | OUTPATIENT
Start: 2018-08-06 | End: 2019-01-07 | Stop reason: SDUPTHER

## 2018-08-21 RX ORDER — TRIAMCINOLONE ACETONIDE 1 MG/G
CREAM TOPICAL
Qty: 60 G | Refills: 0 | Status: SHIPPED | OUTPATIENT
Start: 2018-08-21 | End: 2018-12-31

## 2018-09-03 RX ORDER — GABAPENTIN 600 MG/1
TABLET ORAL
Qty: 120 TAB | Refills: 0 | Status: SHIPPED | OUTPATIENT
Start: 2018-09-03 | End: 2018-09-27 | Stop reason: SDUPTHER

## 2018-09-04 RX ORDER — TOPIRAMATE 100 MG/1
TABLET, FILM COATED ORAL
Qty: 60 TAB | Refills: 5 | Status: SHIPPED | OUTPATIENT
Start: 2018-09-04 | End: 2018-09-26 | Stop reason: SDUPTHER

## 2018-09-26 ENCOUNTER — OFFICE VISIT (OUTPATIENT)
Dept: FAMILY MEDICINE CLINIC | Age: 48
End: 2018-09-26

## 2018-09-26 VITALS
SYSTOLIC BLOOD PRESSURE: 139 MMHG | HEART RATE: 100 BPM | WEIGHT: 244 LBS | RESPIRATION RATE: 22 BRPM | DIASTOLIC BLOOD PRESSURE: 94 MMHG | TEMPERATURE: 98.3 F | OXYGEN SATURATION: 96 % | HEIGHT: 63 IN | BODY MASS INDEX: 43.23 KG/M2

## 2018-09-26 DIAGNOSIS — R30.0 BURNING WITH URINATION: Primary | ICD-10-CM

## 2018-09-26 DIAGNOSIS — G43.111 INTRACTABLE MIGRAINE WITH AURA WITH STATUS MIGRAINOSUS: ICD-10-CM

## 2018-09-26 LAB
BILIRUB UR QL STRIP: NEGATIVE
GLUCOSE UR-MCNC: NEGATIVE MG/DL
KETONES P FAST UR STRIP-MCNC: NEGATIVE MG/DL
PH UR STRIP: 6.5 [PH] (ref 4.6–8)
PROT UR QL STRIP: NEGATIVE
SP GR UR STRIP: 1.01 (ref 1–1.03)
UA UROBILINOGEN AMB POC: NORMAL (ref 0.2–1)
URINALYSIS CLARITY POC: NORMAL
URINALYSIS COLOR POC: YELLOW
URINE BLOOD POC: NORMAL
URINE LEUKOCYTES POC: NEGATIVE
URINE NITRITES POC: NEGATIVE

## 2018-09-26 RX ORDER — TOPIRAMATE 200 MG/1
TABLET ORAL
Qty: 60 TAB | Refills: 5 | Status: SHIPPED | OUTPATIENT
Start: 2018-09-26 | End: 2019-03-31 | Stop reason: SDUPTHER

## 2018-09-26 NOTE — MR AVS SNAPSHOT
[address] [address] 
[phone] Patient: Jude Paula MRN: [id number] DXN:[id number] Visit Information Date & Time Provider Department Dept. Phone Encounter #  
 9/26/2018  8:50 AM Reymundo Delgado MD [address] [facility] [phone] [id number] Follow-up Instructions Return if symptoms worsen or fail to improve. Upcoming Health Maintenance Date Due Pneumococcal 19-64 Medium Risk (1 of 1 - PPSV23) 1/30/1991 Influenza Age 5 to Adult 4/30/2019* PAP AKA CERVICAL CYTOLOGY 6/13/2021 DTaP/Tdap/Td series (2 - Td) 8/18/2021 *Topic was postponed. The date shown is not the original due date. Allergies as of 9/26/2018  Review Complete On: 9/26/2018 By: Reymundo Delgado MD  
  
 Severity Noted Reaction Type Reactions Hydromorphone High 01/05/2012   Systemic Itching, Swelling  
 hydromorphone 1mg IV given immediately began itching and complained of scratchy swelling sensation in her throat Levofloxacin High 03/26/2018    Hives Aspirin Medium 12/31/2011   Systemic Anaphylaxis Bactrim [Sulfamethoprim Ds] Medium 08/13/2012   Side Effect Hives Darvocet A500 [Propoxyphene N-acetaminophen] Medium 11/04/2010   Intolerance Hives Imitrex [Sumatriptan] Medium 12/31/2011   Systemic Hives Pcn [Penicillins] Medium 11/04/2010   Intolerance Anaphylaxis Percocet [Oxycodone-acetaminophen] Medium 11/04/2010   Intolerance Hives Pt can take lortab/norco  
 Toradol [Ketorolac Tromethamine] Medium 11/04/2010   Intolerance Hives Tramadol Medium 02/24/2012   Intolerance Hives Hydrocodone  04/04/2016    Hives Lisinopril  06/29/2016    Swelling Tongue and mouth felt weird, slight swelling Prednisone  04/16/2014    Rash And pt felt throat closing Geralynn Para [Milnacipran]  02/17/2012    Hives INSOMNIA Vesicare [Solifenacin]  12/31/2011    Other (comments) Nose bleeds Wellbutrin [Bupropion Hcl]  05/09/2014    Other (comments)  
 insomnia Current Immunizations  Reviewed on 6/21/2016 Name Date TDAP Vaccine 8/18/2011 Not reviewed this visit You Were Diagnosed With   
  
 Codes Comments Burning with urination    -  Primary ICD-10-CM: R30.0 ICD-9-CM: 788.1 Intractable migraine with aura with status migrainosus     ICD-10-CM: G43.111 ICD-9-CM: 346.03 Vitals BP Pulse Temp Resp Height(growth percentile) Weight(growth percentile) (!) 139/94 100 98.3 °F (36.8 °C) 22 5' 3\" (1.6 m) 244 lb (110.7 kg) SpO2 BMI OB Status Smoking Status 96% 43.22 kg/m2 Ablation Current Some Day Smoker Vitals History BMI and BSA Data Body Mass Index Body Surface Area  
 43.22 kg/m 2 2.22 m 2 Preferred Pharmacy Pharmacy Name Phone [id number], [address] [facility] [phone] Your Updated Medication List  
  
   
This list is accurate as of 9/26/18  9:32 AM.  Always use your most recent med list.  
  
  
  
  
 * albuterol 2.5 mg /3 mL (0.083 %) nebulizer solution Commonly known as:  PROVENTIL VENTOLIN  
INHALE 3 ML BY NEBULIZATION ROUTE EVERY 4 HOURS AS NEEDED FOR WHEEZING  
  
 * VENTOLIN HFA 90 mcg/actuation inhaler Generic drug:  albuterol TAKE 2 PUFFS BY INHALATION EVERY FOUR (4) HOURS AS NEEDED FOR WHEEZING. ALPRAZolam 0.5 mg tablet Commonly known as:  XANAX  
TAKE 1 TABLET BY MOUTH 2 TIMES DAILY AS NEEDED  
  
 amitriptyline 100 mg tablet Commonly known as:  ELAVIL TAKE 1 TABLET BY MOUTH EVERY EVENING  
  
 butalbital-acetaminophen-caff -40 mg per capsule Commonly known as:  [employer] TAKE 1 CAP BY MOUTH AT ONSET OF HEADACHE AS NEEDED. DULoxetine 60 mg capsule Commonly known as:  CYMBALTA TAKE ONE CAPSULE BY MOUTH EVERY DAY  
  
 gabapentin 600 mg tablet Commonly known as:  NEURONTIN  
TAKE 1 TABLET BY MOUTH EVERY MORNING AND IN THE AFTERNOON AND TAKE 2 TABLETS AT NIGHT  
  
 losartan 50 mg tablet Commonly known as:  COZAAR  
TAKE 1 TABLET BY MOUTH EVERY DAY  
  
 raNITIdine 75 mg Tab Commonly known as:  ZANTAC TAKE 1 TABLET BY MOUTH TWICE A DAY BEFORE MEALS  
  
 topiramate 200 mg tablet Commonly known as:  TOPAMAX TAKE 1 TABLET BY MOUTH 2 TIMES DAILY  Indications: MIGRAINE PREVENTION  
  
 triamcinolone acetonide 0.1 % topical cream  
Commonly known as:  KENALOG  
APPLY TO AFFECTED AREA TWO (2) TIMES A DAY. * Notice: This list has 2 medication(s) that are the same as other medications prescribed for you. Read the directions carefully, and ask your doctor or other care provider to review them with you. Prescriptions Sent to Pharmacy Refills  
 topiramate (TOPAMAX) 200 mg tablet 5 Sig: TAKE 1 TABLET BY MOUTH 2 TIMES DAILY  Indications: MIGRAINE PREVENTION Class: Normal  
 Pharmacy: [facility] #[id number] - [place], [address] [facility] Ph #: [phone] We Performed the Following AMB POC URINALYSIS DIP STICK AUTO W/O MICRO [02430 CPT(R)] Follow-up Instructions Return if symptoms worsen or fail to improve. Introducing [facility] [place] [facility]! Dear Elia Alexis: 
Thank you for requesting a Analiza account. Our records indicate that you already have an active Analiza account. You can access your account anytime at [email or web address] Did you know that you can access your hospital and ER discharge instructions at any time in Analiza? You can also review all of your test results from your hospital stay or ER visit. Additional Information If you have questions, please visit the Frequently Asked Questions section of the Analiza website at [email or web address] Remember, Analiza is NOT to be used for urgent needs. For medical emergencies, dial 911. Now available from your iPhone and Android! Please provide this summary of care documentation to your next provider. Your primary care clinician is listed as NICKY YOU. If you have any questions after today's visit, please call [phone].

## 2018-09-26 NOTE — PROGRESS NOTES
Patient here for 6 month f/u, fasting labs. She states she has had a terrible headache all weekend. She also has burning with urination since the weekend. Patient also noticed a white patch on her skin above left wrist.  
1. Have you been to the ER, urgent care clinic since your last visit? Hospitalized since your last visit? No 
 
2. Have you seen or consulted any other health care providers outside of the [address] [place] [address] since your last visit? Include any pap smears or colon screening. No  
 
 
 
Chief Complaint Patient presents with  Labs  
  fasting labs  Skin Problem  
  white patch on left wrist  
 Migraine 8/10 all week  Urinary Pain  
  since 2 weeks She is a 57 y.o. female who presents for evalution. Reviewed PmHx, RxHx, FmHx, SocHx, AllgHx and updated and dated in the chart. Patient Active Problem List  
 Diagnosis  Obesity, morbid ([address]  Pre-diabetes  Urinary frequency  Essential hypertension, benign  DDD (degenerative disc disease), lumbar  Left axillary pain  IBS (irritable bowel syndrome)  Migraine  Hyperlipidemia  Fibromyalgia  Nipple discharge  Depression Review of Systems - negative except as listed above in the HPI Objective:  
 
Vitals:  
 09/26/18 4589 BP: (!) 139/94 Pulse: 100 Resp: 22 Temp: 98.3 °F (36.8 °C) SpO2: 96% Weight: 244 lb (110.7 kg) Height: 5' 3\" (1.6 m) Physical Examination: General appearance - alert, well appearing, and in no distress Chest - clear to auscultation, no wheezes, rales or rhonchi, symmetric air entry Heart - normal rate, regular rhythm, normal S1, S2, no murmurs, rubs, clicks or gallops Abdomen - soft, nontender, nondistended, no masses or organomegaly Assessment/ Plan:  
Diagnoses and all orders for this visit: 1. Burning with urination -     AMB POC URINALYSIS DIP STICK AUTO W/O MICRO 
-neg UA 
 
 2. Intractable migraine with aura with status migrainosus -     topiramate (TOPAMAX) 200 mg tablet; TAKE 1 TABLET BY MOUTH 2 TIMES DAILY  Indications: MIGRAINE PREVENTION 
-inc dose to 200 bid  
-allgs to numerous rx Follow-up Disposition: 
Return if symptoms worsen or fail to improve. I have discussed the diagnosis with the patient and the intended plan as seen in the above orders. The patient understands and agrees with the plan. The patient has received an after-visit summary and questions were answered concerning future plans. Medication Side Effects and Warnings were discussed with patient Patient Labs were reviewed and or requested: 
Patient Past Records were reviewed and or requested Alexei Cervantes M.D. There are no Patient Instructions on file for this visit.

## 2018-09-27 DIAGNOSIS — R10.2 PELVIC PAIN: ICD-10-CM

## 2018-09-27 DIAGNOSIS — F41.9 ANXIETY: ICD-10-CM

## 2018-09-27 DIAGNOSIS — N89.8 VAGINAL DISCHARGE: ICD-10-CM

## 2018-09-27 RX ORDER — FLUCONAZOLE 150 MG/1
TABLET ORAL
Qty: 2 TAB | Refills: 0 | Status: SHIPPED | OUTPATIENT
Start: 2018-09-27 | End: 2018-12-13 | Stop reason: SDUPTHER

## 2018-09-27 RX ORDER — GABAPENTIN 600 MG/1
TABLET ORAL
Qty: 120 TAB | Refills: 0 | Status: SHIPPED | OUTPATIENT
Start: 2018-09-27 | End: 2018-12-31

## 2018-09-27 RX ORDER — ALPRAZOLAM 0.5 MG/1
TABLET ORAL
Qty: 60 TAB | Refills: 3 | Status: ON HOLD | OUTPATIENT
Start: 2018-09-27 | End: 2019-01-03 | Stop reason: SDUPTHER

## 2018-09-27 NOTE — TELEPHONE ENCOUNTER
From: Kena Chris  To: Bonita Gonzalez NP  Sent: 9/27/2018 2:20 PM EDT  Subject: Medication Renewal Request    Original authorizing provider: TANNA Marie  Tere would like a refill of the following medications:  ALPRAZolam (XANAX) 0.5 mg tablet Bonita Gonzalez NP]    Preferred pharmacy: [address] [place] [address]:

## 2018-09-28 ENCOUNTER — HOSPITAL ENCOUNTER (OUTPATIENT)
Dept: MAMMOGRAPHY | Age: 48
Discharge: HOME | End: 2018-09-28
Attending: NURSE PRACTITIONER
Payer: MEDICAID

## 2018-09-28 DIAGNOSIS — Z12.39 SCREENING FOR BREAST CANCER: ICD-10-CM

## 2018-09-28 PROCEDURE — 77067 SCR MAMMO BI INCL CAD: CPT

## 2018-12-13 DIAGNOSIS — N89.8 VAGINAL DISCHARGE: ICD-10-CM

## 2018-12-13 DIAGNOSIS — R10.2 PELVIC PAIN: ICD-10-CM

## 2018-12-13 RX ORDER — FLUCONAZOLE 150 MG/1
TABLET ORAL
Qty: 2 TAB | Refills: 0 | Status: SHIPPED | OUTPATIENT
Start: 2018-12-13 | End: 2018-12-31

## 2018-12-31 ENCOUNTER — APPOINTMENT (OUTPATIENT)
Dept: GENERAL RADIOLOGY | Age: 48
DRG: 871 | End: 2018-12-31
Attending: EMERGENCY MEDICINE

## 2018-12-31 ENCOUNTER — HOSPITAL ENCOUNTER (INPATIENT)
Age: 48
LOS: 3 days | Discharge: HOME HEALTH | DRG: 871 | End: 2019-01-03
Attending: EMERGENCY MEDICINE | Admitting: FAMILY MEDICINE

## 2018-12-31 DIAGNOSIS — N17.9 AKI (ACUTE KIDNEY INJURY) (HCC): ICD-10-CM

## 2018-12-31 DIAGNOSIS — F41.9 ANXIETY: ICD-10-CM

## 2018-12-31 DIAGNOSIS — A41.9 SEPSIS, DUE TO UNSPECIFIED ORGANISM: ICD-10-CM

## 2018-12-31 DIAGNOSIS — J18.9 COMMUNITY ACQUIRED PNEUMONIA, UNSPECIFIED LATERALITY: Primary | ICD-10-CM

## 2018-12-31 LAB
ALBUMIN SERPL-MCNC: 2.6 G/DL (ref 3.5–5)
ALBUMIN/GLOB SERPL: 0.5 {RATIO} (ref 1.1–2.2)
ALP SERPL-CCNC: 61 U/L (ref 45–117)
ALT SERPL-CCNC: 15 U/L (ref 12–78)
AMPHET UR QL SCN: NEGATIVE
ANION GAP SERPL CALC-SCNC: 12 MMOL/L (ref 5–15)
APPEARANCE UR: CLEAR
AST SERPL-CCNC: 11 U/L (ref 15–37)
BACTERIA URNS QL MICRO: NEGATIVE /HPF
BARBITURATES UR QL SCN: NEGATIVE
BASOPHILS # BLD: 0 K/UL (ref 0–0.1)
BASOPHILS NFR BLD: 0 % (ref 0–1)
BENZODIAZ UR QL: NEGATIVE
BILIRUB SERPL-MCNC: 0.4 MG/DL (ref 0.2–1)
BILIRUB UR QL CFM: NEGATIVE
BUN SERPL-MCNC: 10 MG/DL (ref 6–20)
BUN/CREAT SERPL: 9 (ref 12–20)
CALCIUM SERPL-MCNC: 8.3 MG/DL (ref 8.5–10.1)
CANNABINOIDS UR QL SCN: POSITIVE
CHLORIDE SERPL-SCNC: 101 MMOL/L (ref 97–108)
CO2 SERPL-SCNC: 20 MMOL/L (ref 21–32)
COCAINE UR QL SCN: NEGATIVE
COLOR UR: ABNORMAL
COMMENT, HOLDF: NORMAL
CREAT SERPL-MCNC: 1.16 MG/DL (ref 0.55–1.02)
DIFFERENTIAL METHOD BLD: ABNORMAL
DRUG SCRN COMMENT,DRGCM: ABNORMAL
EOSINOPHIL # BLD: 0 K/UL (ref 0–0.4)
EOSINOPHIL NFR BLD: 0 % (ref 0–7)
EPITH CASTS URNS QL MICRO: ABNORMAL /LPF
ERYTHROCYTE [DISTWIDTH] IN BLOOD BY AUTOMATED COUNT: 14.5 % (ref 11.5–14.5)
ETHANOL SERPL-MCNC: <10 MG/DL
FLUAV AG NPH QL IA: NEGATIVE
FLUBV AG NOSE QL IA: NEGATIVE
GLOBULIN SER CALC-MCNC: 4.8 G/DL (ref 2–4)
GLUCOSE SERPL-MCNC: 117 MG/DL (ref 65–100)
GLUCOSE UR STRIP.AUTO-MCNC: NEGATIVE MG/DL
HCT VFR BLD AUTO: 38.1 % (ref 35–47)
HGB BLD-MCNC: 12.4 G/DL (ref 11.5–16)
HGB UR QL STRIP: ABNORMAL
HYALINE CASTS URNS QL MICRO: ABNORMAL /LPF (ref 0–5)
IMM GRANULOCYTES # BLD: 0.1 K/UL (ref 0–0.04)
IMM GRANULOCYTES NFR BLD AUTO: 1 % (ref 0–0.5)
KETONES UR QL STRIP.AUTO: ABNORMAL MG/DL
LACTATE BLD-SCNC: 0.76 MMOL/L (ref 0.4–2)
LEUKOCYTE ESTERASE UR QL STRIP.AUTO: NEGATIVE
LYMPHOCYTES # BLD: 1.6 K/UL (ref 0.8–3.5)
LYMPHOCYTES NFR BLD: 15 % (ref 12–49)
MCH RBC QN AUTO: 29.8 PG (ref 26–34)
MCHC RBC AUTO-ENTMCNC: 32.5 G/DL (ref 30–36.5)
MCV RBC AUTO: 91.6 FL (ref 80–99)
METHADONE UR QL: NEGATIVE
MONOCYTES # BLD: 0.6 K/UL (ref 0–1)
MONOCYTES NFR BLD: 6 % (ref 5–13)
NEUTS SEG # BLD: 8.3 K/UL (ref 1.8–8)
NEUTS SEG NFR BLD: 78 % (ref 32–75)
NITRITE UR QL STRIP.AUTO: NEGATIVE
NRBC # BLD: 0 K/UL (ref 0–0.01)
NRBC BLD-RTO: 0 PER 100 WBC
OPIATES UR QL: NEGATIVE
PCP UR QL: NEGATIVE
PH UR STRIP: 6 [PH] (ref 5–8)
PLATELET # BLD AUTO: 248 K/UL (ref 150–400)
PMV BLD AUTO: 10.8 FL (ref 8.9–12.9)
POTASSIUM SERPL-SCNC: 3.5 MMOL/L (ref 3.5–5.1)
PROT SERPL-MCNC: 7.4 G/DL (ref 6.4–8.2)
PROT UR STRIP-MCNC: 100 MG/DL
RBC # BLD AUTO: 4.16 M/UL (ref 3.8–5.2)
RBC #/AREA URNS HPF: ABNORMAL /HPF (ref 0–5)
SAMPLES BEING HELD,HOLD: NORMAL
SODIUM SERPL-SCNC: 133 MMOL/L (ref 136–145)
SP GR UR REFRACTOMETRY: 1.02 (ref 1–1.03)
UA: UC IF INDICATED,UAUC: ABNORMAL
UROBILINOGEN UR QL STRIP.AUTO: 1 EU/DL (ref 0.2–1)
WBC # BLD AUTO: 10.7 K/UL (ref 3.6–11)
WBC URNS QL MICRO: ABNORMAL /HPF (ref 0–4)

## 2018-12-31 PROCEDURE — 71046 X-RAY EXAM CHEST 2 VIEWS: CPT

## 2018-12-31 PROCEDURE — 96365 THER/PROPH/DIAG IV INF INIT: CPT

## 2018-12-31 PROCEDURE — 74011250637 HC RX REV CODE- 250/637: Performed by: EMERGENCY MEDICINE

## 2018-12-31 PROCEDURE — 74011250636 HC RX REV CODE- 250/636: Performed by: STUDENT IN AN ORGANIZED HEALTH CARE EDUCATION/TRAINING PROGRAM

## 2018-12-31 PROCEDURE — 96375 TX/PRO/DX INJ NEW DRUG ADDON: CPT

## 2018-12-31 PROCEDURE — 51798 US URINE CAPACITY MEASURE: CPT

## 2018-12-31 PROCEDURE — 93005 ELECTROCARDIOGRAM TRACING: CPT

## 2018-12-31 PROCEDURE — 77030005563 HC CATH URETH INT MMGH -A

## 2018-12-31 PROCEDURE — 74011250636 HC RX REV CODE- 250/636: Performed by: EMERGENCY MEDICINE

## 2018-12-31 PROCEDURE — 87040 BLOOD CULTURE FOR BACTERIA: CPT

## 2018-12-31 PROCEDURE — 65660000000 HC RM CCU STEPDOWN

## 2018-12-31 PROCEDURE — 85025 COMPLETE CBC W/AUTO DIFF WBC: CPT

## 2018-12-31 PROCEDURE — 81001 URINALYSIS AUTO W/SCOPE: CPT

## 2018-12-31 PROCEDURE — 96367 TX/PROPH/DG ADDL SEQ IV INF: CPT

## 2018-12-31 PROCEDURE — 51701 INSERT BLADDER CATHETER: CPT

## 2018-12-31 PROCEDURE — 74011000258 HC RX REV CODE- 258: Performed by: EMERGENCY MEDICINE

## 2018-12-31 PROCEDURE — 99285 EMERGENCY DEPT VISIT HI MDM: CPT

## 2018-12-31 PROCEDURE — 83605 ASSAY OF LACTIC ACID: CPT

## 2018-12-31 PROCEDURE — 94762 N-INVAS EAR/PLS OXIMTRY CONT: CPT

## 2018-12-31 PROCEDURE — 36415 COLL VENOUS BLD VENIPUNCTURE: CPT

## 2018-12-31 PROCEDURE — 87804 INFLUENZA ASSAY W/OPTIC: CPT

## 2018-12-31 PROCEDURE — 74011250636 HC RX REV CODE- 250/636: Performed by: FAMILY MEDICINE

## 2018-12-31 PROCEDURE — 80053 COMPREHEN METABOLIC PANEL: CPT

## 2018-12-31 PROCEDURE — 80307 DRUG TEST PRSMV CHEM ANLYZR: CPT

## 2018-12-31 PROCEDURE — 87086 URINE CULTURE/COLONY COUNT: CPT

## 2018-12-31 PROCEDURE — 74011250637 HC RX REV CODE- 250/637: Performed by: STUDENT IN AN ORGANIZED HEALTH CARE EDUCATION/TRAINING PROGRAM

## 2018-12-31 PROCEDURE — 96361 HYDRATE IV INFUSION ADD-ON: CPT

## 2018-12-31 RX ORDER — PROCHLORPERAZINE EDISYLATE 5 MG/ML
10 INJECTION INTRAMUSCULAR; INTRAVENOUS
Status: DISCONTINUED | OUTPATIENT
Start: 2018-12-31 | End: 2019-01-03 | Stop reason: HOSPADM

## 2018-12-31 RX ORDER — SODIUM CHLORIDE 0.9 % (FLUSH) 0.9 %
5-10 SYRINGE (ML) INJECTION EVERY 8 HOURS
Status: DISCONTINUED | OUTPATIENT
Start: 2018-12-31 | End: 2019-01-03 | Stop reason: HOSPADM

## 2018-12-31 RX ORDER — SODIUM CHLORIDE 0.9 % (FLUSH) 0.9 %
5-10 SYRINGE (ML) INJECTION AS NEEDED
Status: DISCONTINUED | OUTPATIENT
Start: 2018-12-31 | End: 2019-01-03 | Stop reason: HOSPADM

## 2018-12-31 RX ORDER — GABAPENTIN 600 MG/1
600 TABLET ORAL 2 TIMES DAILY
COMMUNITY
End: 2020-06-04 | Stop reason: SDUPTHER

## 2018-12-31 RX ORDER — GABAPENTIN 300 MG/1
600 CAPSULE ORAL 2 TIMES DAILY
Status: DISCONTINUED | OUTPATIENT
Start: 2018-12-31 | End: 2019-01-03 | Stop reason: HOSPADM

## 2018-12-31 RX ORDER — TOPIRAMATE 100 MG/1
200 TABLET, FILM COATED ORAL 2 TIMES DAILY
Status: DISCONTINUED | OUTPATIENT
Start: 2018-12-31 | End: 2019-01-03 | Stop reason: HOSPADM

## 2018-12-31 RX ORDER — AZTREONAM 2 G/1
2 INJECTION, POWDER, LYOPHILIZED, FOR SOLUTION INTRAMUSCULAR; INTRAVENOUS ONCE
Status: DISCONTINUED | OUTPATIENT
Start: 2018-12-31 | End: 2018-12-31 | Stop reason: SDUPTHER

## 2018-12-31 RX ORDER — ENOXAPARIN SODIUM 100 MG/ML
40 INJECTION SUBCUTANEOUS EVERY 12 HOURS
Status: DISCONTINUED | OUTPATIENT
Start: 2018-12-31 | End: 2019-01-03 | Stop reason: HOSPADM

## 2018-12-31 RX ORDER — AMITRIPTYLINE HYDROCHLORIDE 50 MG/1
100 TABLET, FILM COATED ORAL
Status: DISCONTINUED | OUTPATIENT
Start: 2018-12-31 | End: 2019-01-03 | Stop reason: HOSPADM

## 2018-12-31 RX ORDER — PANTOPRAZOLE SODIUM 40 MG/1
40 TABLET, DELAYED RELEASE ORAL
Status: DISCONTINUED | OUTPATIENT
Start: 2019-01-01 | End: 2019-01-03 | Stop reason: HOSPADM

## 2018-12-31 RX ORDER — ONDANSETRON 2 MG/ML
4 INJECTION INTRAMUSCULAR; INTRAVENOUS
Status: DISCONTINUED | OUTPATIENT
Start: 2018-12-31 | End: 2019-01-03 | Stop reason: HOSPADM

## 2018-12-31 RX ORDER — LOSARTAN POTASSIUM 50 MG/1
50 TABLET ORAL DAILY
Status: DISCONTINUED | OUTPATIENT
Start: 2018-12-31 | End: 2019-01-03 | Stop reason: HOSPADM

## 2018-12-31 RX ORDER — ALBUTEROL SULFATE 0.83 MG/ML
2.5 SOLUTION RESPIRATORY (INHALATION)
Status: DISCONTINUED | OUTPATIENT
Start: 2018-12-31 | End: 2019-01-03 | Stop reason: HOSPADM

## 2018-12-31 RX ORDER — GABAPENTIN 300 MG/1
1200 CAPSULE ORAL
Status: DISCONTINUED | OUTPATIENT
Start: 2018-12-31 | End: 2019-01-03 | Stop reason: HOSPADM

## 2018-12-31 RX ORDER — GABAPENTIN 600 MG/1
1200 TABLET ORAL
COMMUNITY
End: 2019-09-16

## 2018-12-31 RX ORDER — IBUPROFEN 600 MG/1
600 TABLET ORAL
Status: COMPLETED | OUTPATIENT
Start: 2018-12-31 | End: 2018-12-31

## 2018-12-31 RX ORDER — SODIUM CHLORIDE 9 MG/ML
175 INJECTION, SOLUTION INTRAVENOUS CONTINUOUS
Status: DISCONTINUED | OUTPATIENT
Start: 2018-12-31 | End: 2019-01-01

## 2018-12-31 RX ORDER — ACETAMINOPHEN 325 MG/1
650 TABLET ORAL
Status: COMPLETED | OUTPATIENT
Start: 2018-12-31 | End: 2018-12-31

## 2018-12-31 RX ORDER — DULOXETIN HYDROCHLORIDE 30 MG/1
60 CAPSULE, DELAYED RELEASE ORAL DAILY
Status: DISCONTINUED | OUTPATIENT
Start: 2018-12-31 | End: 2019-01-03 | Stop reason: HOSPADM

## 2018-12-31 RX ORDER — ENOXAPARIN SODIUM 100 MG/ML
40 INJECTION SUBCUTANEOUS EVERY 24 HOURS
Status: DISCONTINUED | OUTPATIENT
Start: 2018-12-31 | End: 2018-12-31

## 2018-12-31 RX ORDER — ONDANSETRON 2 MG/ML
4 INJECTION INTRAMUSCULAR; INTRAVENOUS
Status: COMPLETED | OUTPATIENT
Start: 2018-12-31 | End: 2018-12-31

## 2018-12-31 RX ADMIN — DULOXETINE HYDROCHLORIDE 60 MG: 30 CAPSULE, DELAYED RELEASE ORAL at 19:06

## 2018-12-31 RX ADMIN — TOPIRAMATE 200 MG: 100 TABLET, FILM COATED ORAL at 19:08

## 2018-12-31 RX ADMIN — ACETAMINOPHEN 650 MG: 325 TABLET, FILM COATED ORAL at 11:03

## 2018-12-31 RX ADMIN — ENOXAPARIN SODIUM 40 MG: 40 INJECTION SUBCUTANEOUS at 19:06

## 2018-12-31 RX ADMIN — PROCHLORPERAZINE EDISYLATE 10 MG: 5 INJECTION INTRAMUSCULAR; INTRAVENOUS at 21:34

## 2018-12-31 RX ADMIN — Medication 10 ML: at 19:09

## 2018-12-31 RX ADMIN — SODIUM CHLORIDE 1000 ML: 900 INJECTION, SOLUTION INTRAVENOUS at 12:46

## 2018-12-31 RX ADMIN — GABAPENTIN 600 MG: 300 CAPSULE ORAL at 19:06

## 2018-12-31 RX ADMIN — IBUPROFEN 600 MG: 600 TABLET ORAL at 11:03

## 2018-12-31 RX ADMIN — ONDANSETRON 4 MG: 2 INJECTION INTRAMUSCULAR; INTRAVENOUS at 10:53

## 2018-12-31 RX ADMIN — Medication 10 ML: at 20:58

## 2018-12-31 RX ADMIN — AZITHROMYCIN MONOHYDRATE 500 MG: 500 INJECTION, POWDER, LYOPHILIZED, FOR SOLUTION INTRAVENOUS at 12:44

## 2018-12-31 RX ADMIN — AZTREONAM 2 G: 2 INJECTION, POWDER, LYOPHILIZED, FOR SOLUTION INTRAMUSCULAR; INTRAVENOUS at 14:02

## 2018-12-31 RX ADMIN — ONDANSETRON 4 MG: 2 INJECTION INTRAMUSCULAR; INTRAVENOUS at 20:56

## 2018-12-31 RX ADMIN — LOSARTAN POTASSIUM 50 MG: 50 TABLET, FILM COATED ORAL at 19:05

## 2018-12-31 RX ADMIN — SODIUM CHLORIDE 175 ML/HR: 900 INJECTION, SOLUTION INTRAVENOUS at 19:09

## 2018-12-31 RX ADMIN — SODIUM CHLORIDE 1000 ML: 900 INJECTION, SOLUTION INTRAVENOUS at 11:03

## 2018-12-31 NOTE — PROGRESS NOTES
[facility]: MED RECONCILIATION Comments/Recommendations:  
Patient is awake, alert, and knowledgeable about home medications Verifies allergies Medications added:  
 
· None Medications removed: · Fluconazole · Triamcinolone Medications adjusted: 
 
· None Allergies: Hydromorphone; Levofloxacin; Aspirin; Bactrim [sulfamethoprim ds]; Darvocet a500 [propoxyphene n-acetaminophen]; Imitrex [sumatriptan]; Pcn [penicillins]; Percocet [oxycodone-acetaminophen]; Toradol [ketorolac tromethamine]; Tramadol; Hydrocodone; Keflex [cephalexin]; Lisinopril; Prednisone; Helen Alessandra [milnacipran]; Vesicare [solifenacin]; and Wellbutrin [bupropion hcl] Prior to Admission Medications:  
Prior to Admission Medications Prescriptions Last Dose Informant Patient Reported? Taking? ALPRAZolam (XANAX) 0.5 mg tablet  Self No Yes Sig: TAKE 1 TABLET BY MOUTH 2 TIMES DAILY AS NEEDED DULoxetine (CYMBALTA) 60 mg capsule 12/30/2018 at Unknown time Self No Yes Sig: TAKE ONE CAPSULE BY MOUTH EVERY DAY  
VENTOLIN HFA 90 mcg/actuation inhaler  Self No Yes Sig: TAKE 2 PUFFS BY INHALATION EVERY FOUR (4) HOURS AS NEEDED FOR WHEEZING. albuterol (PROVENTIL VENTOLIN) 2.5 mg /3 mL (0.083 %) nebulizer solution  Self No Yes Sig: INHALE 3 ML BY NEBULIZATION ROUTE EVERY 4 HOURS AS NEEDED FOR WHEEZING  
amitriptyline (ELAVIL) 100 mg tablet 12/30/2018 at Unknown time Self No Yes Sig: TAKE 1 TABLET BY MOUTH EVERY EVENING  
butalbital-acetaminophen-caff (FIORICET) -40 mg per capsule 12/30/2018 at Unknown time Self No Yes Sig: TAKE 1 CAP BY MOUTH AT ONSET OF HEADACHE AS NEEDED.  
gabapentin (NEURONTIN) 600 mg tablet 12/30/2018 at Unknown time  Yes Yes Sig: Take 600 mg by mouth two (2) times a day. One pill in the morning and one pill in the afternoon  
gabapentin (NEURONTIN) 600 mg tablet 12/30/2018 at Unknown time  Yes Yes Sig: Take 1,200 mg by mouth nightly. losartan (COZAAR) 50 mg tablet 12/30/2018 at Unknown time Self No Yes Sig: TAKE 1 TABLET BY MOUTH EVERY DAY  
raNITIdine (ZANTAC) 75 mg tablet 12/30/2018 at Unknown time Self No Yes Sig: TAKE 1 TABLET BY MOUTH TWICE A DAY BEFORE MEALS  
topiramate (TOPAMAX) 200 mg tablet 12/30/2018 at Unknown time Self No Yes Sig: TAKE 1 TABLET BY MOUTH 2 TIMES DAILY  Indications: MIGRAINE PREVENTION Facility-Administered Medications: None Thank you, Danielle Platt, PharmD, BCPS

## 2018-12-31 NOTE — ED NOTES
Patient now states she has had diarrhea for two days. She is febrile and tachycardic in triage, denies any respiratory symptoms. States she had mid back pain and has a hx of UTI's.

## 2018-12-31 NOTE — PROGRESS NOTES
12/31/2018  
3:44 PM 
Case management note Met with patient to discuss discharge planning. Patient lives alone in apartment with about 10 steps to enter. She normally drives and performs ADL's independently Patient daughter Emerson Hayward [phone] can transport on discharge. [facility] [address] for [facility] needs Dr. Casandra Urrutia for medical management. NN notified No Advance directive Reason for Admission:   CAP 
                
RRAT Score:        4 Plan for utilizing home health: May benefit on discharge Likelihood of Readmission:  Low/green Transition of Care Plan:          Home with family assistance, out patient follow up and possible home health Care Management Interventions PCP Verified by CM: Yes(dr bj galan nn notified) Mode of Transport at Discharge: Self Transition of Care Consult (CM Consult): Discharge Planning Current Support Network: Lives Alone Confirm Follow Up Transport: Family Discharge Location Discharge Placement: Home with family assistance [place], [place]

## 2018-12-31 NOTE — H&P
[facility]  
Admission H&P Date of admission: 12/31/2018 Patient name: Lynn Brar MRN: [id number] YOB: 1970 Age: 57 y.o. Primary care provider:  Radhames Cash MD  
 
Source of Information: patient, medical records Chief complaint:  Neck/Back/Leg Pain History of Present Illness Lynn Brar is a 57 y.o. female with PMH of Fibromyalgia, Lumbar DDD, Depression, Migraine, HTN, Obesity, and Asthma who presents to the ER complaining of neck, back, and leg pain. Pt has chronic baseline back pain, but pain worsened 4 days ago, back pain is 8/10 intensity. Back pain radiates down R leg to the knee. Pt also with feeling \"warm\", chills, diarrhea \"every time I drink something\" and nausea for 4 days. Pt has had a cough for 1 day and pt reports being short of breath while laying down. Pt reports she is taking her home gabapentin as prescribed along with hydrocodone and a \"muscle relaxer\" and smoked marijuana 2 days ago for the pain which helped. Pt denies trauma, LOC, chest pain, dysuria, hematuria, constipation, or sick contacts. In the ER, vital signs were remarkable for T 102.4, . Labs were remarkable for Na 133, Cr 1.16, UA: Large blood and 10-20 RBCs. -CXR showed Probable lingular patchy airspace disease. -EKG showed  Sinus tachycardia, nonspecific T-wave abnormality in lateral leads. Pt was treated with Zithromax, Aztreonam, ibuprofen, tylenol, zofran, and 2L NS. Home Medications Prior to Admission medications Medication Sig Start Date End Date Taking? Authorizing Provider  
gabapentin (NEURONTIN) 600 mg tablet Take 600 mg by mouth two (2) times a day. One pill in the morning and one pill in the afternoon   Yes Provider, Historical  
gabapentin (NEURONTIN) 600 mg tablet Take 1,200 mg by mouth nightly.    Yes Provider, Historical  
ALPRAZolam (XANAX) 0.5 mg tablet TAKE 1 TABLET BY MOUTH 2 TIMES DAILY AS NEEDED 9/27/18  Yes Antonette Bernardo MD  
topiramate (TOPAMAX) 200 mg tablet TAKE 1 TABLET BY MOUTH 2 TIMES DAILY  Indications: MIGRAINE PREVENTION 9/26/18  Yes Antonette Bernardo MD  
raNITIdine (ZANTAC) 75 mg tablet TAKE 1 TABLET BY MOUTH TWICE A DAY BEFORE MEALS 8/6/18  Yes Darryl Pretty., NP  
albuterol (PROVENTIL VENTOLIN) 2.5 mg /3 mL (0.083 %) nebulizer solution INHALE 3 ML BY NEBULIZATION ROUTE EVERY 4 HOURS AS NEEDED FOR WHEEZING 7/23/18  Yes Darryl Pretty., NP  
VENTOLIN HFA 90 mcg/actuation inhaler TAKE 2 PUFFS BY INHALATION EVERY FOUR (4) HOURS AS NEEDED FOR WHEEZING. 7/23/18  Yes Darryl Pretty., NP  
amitriptyline (ELAVIL) 100 mg tablet TAKE 1 TABLET BY MOUTH EVERY EVENING 7/9/18  Yes Antonette Bernardo MD  
butalbital-acetaminophen-caff (FIORICET) -40 mg per capsule TAKE 1 CAP BY MOUTH AT ONSET OF HEADACHE AS NEEDED. 4/16/18  Yes Christine Jj MD  
losartan (COZAAR) 50 mg tablet TAKE 1 TABLET BY MOUTH EVERY DAY 12/26/17  Yes Gaston Martines MD  
DULoxetine (CYMBALTA) 60 mg capsule TAKE ONE CAPSULE BY MOUTH EVERY DAY 9/13/16  Yes Antonette Bernardo MD  
 
 
 
Allergies Allergies Allergen Reactions  Hydromorphone Itching and Swelling  
  hydromorphone 1mg IV given immediately began itching and complained of scratchy swelling sensation in her throat  Levofloxacin Hives  Aspirin Anaphylaxis  Bactrim [Sulfamethoprim Ds] Hives  Darvocet A500 [Propoxyphene N-Acetaminophen] Hives  Imitrex [Sumatriptan] Hives  Pcn [Penicillins] Anaphylaxis  Percocet [Oxycodone-Acetaminophen] Hives Pt can take lortab/norco  
 Toradol [Ketorolac Tromethamine] Hives  Tramadol Hives  Hydrocodone Hives  Keflex [Cephalexin] Hives  Lisinopril Swelling Tongue and mouth felt weird, slight swelling  Prednisone Rash and Angioedema And pt felt throat closing Pete Neida [Milnacipran] Hives INSOMNIA  Vesicare [Solifenacin] Other (comments) Nose bleeds  Wellbutrin [Bupropion Hcl] Other (comments)  
  insomnia Past Medical History:  
Diagnosis Date  Arthritis  Arthritis of low back ([facility] [address]  Asthma  Chronic pain   
 fibromyalgia  Colon spasm  Depression  Fibromyalgia  Gastrointestinal disorder IBS  GERD (gastroesophageal reflux disease)  High cholesterol  Hypertension  IBS (irritable bowel syndrome)  Left axillary pain 2/22/2013  Migraine LAST HEADACHE 2-16-12  Multiple sclerosis ([facility] [address]  Other ill-defined conditions(799.89)   
 fibromylgia  Other ill-defined conditions(799.89) MS Past Surgical History:  
Procedure Laterality Date  HX APPENDECTOMY  HX BLADDER REPAIR  04/2011  HX CHOLECYSTECTOMY  HX GYN For ectopic pregnancy, hx tubal ligation  HX GYN    
 vaginal ablation.  HX UROLOGICAL    
 bladder tuck, no mesh  HX UROLOGICAL    
 sling removed 1/10/11 Family History Adopted: Yes  
Problem Relation Age of Onset  Other Other   
     family hx is completely unknown  Diabetes Brother  Asthma Daughter  Asthma Son   
 
 
 
Social History Patient resides Independently X  With family care Assisted living SNF Ambulates Independently With cane X  Assisted walker Alcohol history X  None Social  
  Chronic Smoking history None Former smoker X  Current smoker, 1/2 pack per day Social History Tobacco Use Smoking Status Current Some Day Smoker  Packs/day: 0.50  Years: 20.00  Pack years: 10.00  Types: Cigarettes Smokeless Tobacco Never Used Drug history None Former drug user X  Current drug user, marijuana 2 days ago for the first time per pt Code status X  Full code DNR/DNI Partial   
Code status discussed with the patient/caregivers. Review of Systems Review of Systems Constitutional: Positive for chills, fever and malaise/fatigue. HENT: Negative for congestion and sore throat. Eyes: Negative for blurred vision, double vision and photophobia. Respiratory: Positive for cough and shortness of breath. Negative for sputum production. Cardiovascular: Positive for leg swelling. Negative for chest pain and palpitations. Gastrointestinal: Positive for diarrhea, nausea and vomiting. Negative for blood in stool and constipation. Genitourinary: Negative for dysuria and hematuria. Musculoskeletal: Positive for back pain, myalgias and neck pain. Negative for falls. Skin: Negative for rash. Neurological: Negative for dizziness, focal weakness, loss of consciousness and headaches. Psychiatric/Behavioral: Positive for depression. Physical Exam 
Visit Vitals BP 94/47 Pulse 99 Temp 99.4 °F (37.4 °C) Resp 21 Ht 5' 4\" (1.626 m) Wt 296 lb (134.3 kg) SpO2 95% BMI 50.81 kg/m² General: No acute distress. Alert. Cooperative. Head: Normocephalic. Atraumatic. Eyes:  Conjunctiva pink. Sclera white. PERRL. Nose:  Septum midline. Mucosa pink. No drainage. Throat: Dry mucous membranes. No tonsillar exudates or erythema. Palate movement equal bilaterally. Neck: Supple. Normal ROM. No stiffness. Respiratory: CTAB. No w/r/r/c.  
Cardiovascular: RRR. Normal S1,S2. No m/r/g. Pulses 2+ throughout. GI: + bowel sounds. Nontender. No rebound tenderness or guarding. Nondistended. Extremities: No edema. No palpable cord. No tenderness. Musculoskeletal: Full ROM in all extremities. R lateral thigh pain and no back pain with straight leg test. Paraspinal musculature tender to palpation. Neuro: CN II-XII grossly intact. Strength 5/5 in all extremities. Sensation intact in all extremities. Skin: Clear. No rashes. No ulcers. : Deferred Rectal: Deferred Laboratory Data Recent Results (from the past 24 hour(s)) CBC WITH AUTOMATED DIFF  
 Collection Time: 12/31/18 11:00 AM  
Result Value Ref Range WBC 10.7 3.6 - 11.0 K/uL  
 RBC 4.16 3.80 - 5.20 M/uL  
 HGB 12.4 11.5 - 16.0 g/dL HCT 38.1 35.0 - 47.0 % MCV 91.6 80.0 - 99.0 FL  
 MCH 29.8 26.0 - 34.0 PG  
 MCHC 32.5 30.0 - 36.5 g/dL  
 RDW 14.5 11.5 - 14.5 % PLATELET 821 202 - 448 K/uL MPV 10.8 8.9 - 12.9 FL  
 NRBC 0.0 0  WBC ABSOLUTE NRBC 0.00 0.00 - 0.01 K/uL NEUTROPHILS 78 (H) 32 - 75 % LYMPHOCYTES 15 12 - 49 % MONOCYTES 6 5 - 13 % EOSINOPHILS 0 0 - 7 % BASOPHILS 0 0 - 1 % IMMATURE GRANULOCYTES 1 (H) 0.0 - 0.5 % ABS. NEUTROPHILS 8.3 (H) 1.8 - 8.0 K/UL  
 ABS. LYMPHOCYTES 1.6 0.8 - 3.5 K/UL  
 ABS. MONOCYTES 0.6 0.0 - 1.0 K/UL  
 ABS. EOSINOPHILS 0.0 0.0 - 0.4 K/UL  
 ABS. BASOPHILS 0.0 0.0 - 0.1 K/UL  
 ABS. IMM. GRANS. 0.1 (H) 0.00 - 0.04 K/UL  
 DF AUTOMATED METABOLIC PANEL, COMPREHENSIVE Collection Time: 12/31/18 11:00 AM  
Result Value Ref Range Sodium 133 (L) 136 - 145 mmol/L Potassium 3.5 3.5 - 5.1 mmol/L Chloride 101 97 - 108 mmol/L  
 CO2 20 (L) 21 - 32 mmol/L Anion gap 12 5 - 15 mmol/L Glucose 117 (H) 65 - 100 mg/dL BUN 10 6 - 20 MG/DL Creatinine 1.16 (H) 0.55 - 1.02 MG/DL  
 BUN/Creatinine ratio 9 (L) 12 - 20 GFR est AA >60 >60 ml/min/1.73m2 GFR est non-AA 50 (L) >60 ml/min/1.73m2 Calcium 8.3 (L) 8.5 - 10.1 MG/DL Bilirubin, total 0.4 0.2 - 1.0 MG/DL  
 ALT (SGPT) 15 12 - 78 U/L  
 AST (SGOT) 11 (L) 15 - 37 U/L Alk. phosphatase 61 45 - 117 U/L Protein, total 7.4 6.4 - 8.2 g/dL Albumin 2.6 (L) 3.5 - 5.0 g/dL Globulin 4.8 (H) 2.0 - 4.0 g/dL A-G Ratio 0.5 (L) 1.1 - 2.2 POC LACTIC ACID Collection Time: 12/31/18 11:04 AM  
Result Value Ref Range Lactic Acid (POC) 0.76 0.40 - 2.00 mmol/L  
SAMPLES BEING HELD Collection Time: 12/31/18 11:05 AM  
Result Value Ref Range SAMPLES BEING HELD RED,BLUE,SST COMMENT Add-on orders for these samples will be processed based on acceptable specimen integrity and analyte stability, which may vary by analyte. ETHYL ALCOHOL Collection Time: 12/31/18 11:05 AM  
Result Value Ref Range ALCOHOL(ETHYL),SERUM <10 <10 MG/DL  
EKG, 12 LEAD, INITIAL Collection Time: 12/31/18 11:21 AM  
Result Value Ref Range Ventricular Rate 123 BPM  
 Atrial Rate 123 BPM  
 P-R Interval 122 ms QRS Duration 80 ms  
 Q-T Interval 314 ms QTC Calculation (Bezet) 449 ms Calculated P Axis 35 degrees Calculated R Axis 27 degrees Calculated T Axis 11 degrees Diagnosis Sinus tachycardia Nonspecific T wave abnormality Abnormal ECG When compared with ECG of 27-JUN-2012 13:40, 
Nonspecific T wave abnormality now evident in Lateral leads INFLUENZA A & B AG (RAPID TEST) Collection Time: 12/31/18 11:30 AM  
Result Value Ref Range Influenza A Antigen NEGATIVE  NEG Influenza B Antigen NEGATIVE  NEG    
URINALYSIS W/ REFLEX CULTURE Collection Time: 12/31/18 11:30 AM  
Result Value Ref Range Color DARK YELLOW Appearance CLEAR CLEAR Specific gravity 1.022 1.003 - 1.030    
 pH (UA) 6.0 5.0 - 8.0 Protein 100 (A) NEG mg/dL Glucose NEGATIVE  NEG mg/dL Ketone TRACE (A) NEG mg/dL Blood LARGE (A) NEG Urobilinogen 1.0 0.2 - 1.0 EU/dL Nitrites NEGATIVE  NEG Leukocyte Esterase NEGATIVE  NEG    
 WBC 0-4 0 - 4 /hpf  
 RBC 10-20 0 - 5 /hpf Epithelial cells MODERATE (A) FEW /lpf Bacteria NEGATIVE  NEG /hpf  
 UA:UC IF INDICATED CULTURE NOT INDICATED BY UA RESULT CNI Hyaline cast 2-5 0 - 5 /lpf  
BILIRUBIN, CONFIRM Collection Time: 12/31/18 11:30 AM  
Result Value Ref Range Bilirubin UA, confirm NEGATIVE  NEG    
DRUG SCREEN, URINE Collection Time: 12/31/18 11:30 AM  
Result Value Ref Range AMPHETAMINES NEGATIVE  NEG    
 BARBITURATES NEGATIVE  NEG  BENZODIAZEPINES NEGATIVE  NEG    
 COCAINE NEGATIVE  NEG    
 METHADONE NEGATIVE  NEG    
 OPIATES NEGATIVE  NEG    
 PCP(PHENCYCLIDINE) NEGATIVE  NEG    
 THC (TH-CANNABINOL) POSITIVE (A) NEG Drug screen comment (NOTE) Imaging CXR: Probable lingular patchy airspace disease. EKG:  Sinus tachycardia, nonspecific T-wave abnormality in lateral leads Assessment and Plan Tianna Peng is a 57 y.o. female who is admitted for sepsis secondary to community acquired pneumonia. Sepsis - likely 2/2 to CAP, POA 2/4 SIRS T 102.4, . LA 0.76.  
-Continue Zithromax (Abx started 12/31) 
-F/u blood cx, urine cx, rapid flu negative 
-s/p 2L NS in ED 
-MIVF 
-Daily CBC, CMP At Risk for JAYLIN- Bl Cr 0.7-0.8, POA Cr 1.16 
-s/p 2L NS in ED 
-MIVF 
-Caution with nephrotoxic agents 
-Daily CMP 
 
HTN - BP on admission 117/60 
- Continuing home medications of Cozaar 50mg daily. - Will continue to monitor at this time and readjust as BP's trend. Chronic Pain/Fibromyalgia/Lumbar DDD: UDS positive for THC 
-Continue home gabapentin 600mg BID in morning and afternoon 
-Continue home gabapentin 1200mg qHS 
-Holding home Xanex 0.5mg BID Migraine  
-Continue home topamax 200mg BID Obesity- Body mass index is 50.81 kg/m². -Encouraging lifestyle modifications and further follow up outpatient. Depression  
-Continue home Cymbalta 60mg daily and Elavil 100mg nightly Asthma  
-Continue home albuterol q4hr PRN 
 
 
FEN/GI - Regular diet. NS at 175 mL/hr. Activity - Ambulate with assistance DVT prophylaxis - Lovenox GI prophylaxis -  Protonix Disposition - Plan to d/c to home. PT/OT. CODE STATUS:  Full Patient discussed with Dr. Cooper Jaimes, on-call attending physician. Jean Trujillo MD 
Family Medicine Resident

## 2018-12-31 NOTE — PROGRESS NOTES
[facility] Dosing Services:  
 
Enoxaparin Indication:  DVT prophylaxis Previous Dose Serum Creatinine Lab Results Component Value Date/Time Creatinine 1.16 (H) 12/31/2018 11:00 AM  
 Creatinine (POC) 0.9 06/03/2012 11:02 PM  
  
Creatinine Clearance Estimated Creatinine Clearance: 82.8 mL/min (A) (based on SCr of 1.16 mg/dL (H)). Platelets Lab Results Component Value Date/Time PLATELET 126 15/01/5888 11:00 AM  
   
H/H Lab Results Component Value Date/Time HGB 12.4 12/31/2018 11:00 AM  
  
 
Previous dose: Enoxaparin 40mg SQ daily New dose: Enoxaparin 40mg SQ BID Adjustments made for per [facility] dosing protocol for BMI >40; Wt = 134.3kg;  BMI = 50.81 Krzysztof Conley, Pharm. D. [address]

## 2018-12-31 NOTE — ED TRIAGE NOTES
\"I am hurting all over and I feel this numbness that starts in my face and goes down both arms to my hands. \" Symptoms are bilateral. Pt also reports nausea.

## 2018-12-31 NOTE — ED PROVIDER NOTES
Dejah Prasad is a 57 y.o. female who presents ambulatory to the ED with a c/o fever, nausea, diarrhea, generalized body aches onset 2 days ago. Pt states that her sx began on Saturday and she notes a productive cough with clear sputum and states that she had 2 episodes of diarrhea yesterday. She also reports some dark stools, but denies seeing any blood. Pt denies any chest pain, shortness of breath, or sore throat. Pt denies any recent travel, known sick contacts, or recent illness. PCP: Tor Armstrong MD 
PMHx significant for: Asthma, IBS, Migraines, Chronic Pain, GERD, HTN, Arthritis, Fibromyalgia, MS, Depression, HLD, Colon Spasms, PSHx significant for: Appendectomy, Cholecystectomy, Bladder repair, Vaginal Ablation, Bladder Tuck, Tubal Ligation Social Hx: Tobacco: Current Some day smoker EtOH: none Illicit drug use: none There are no further complaints or symptoms at this time. Signed by: Monik Levy scribing for Kenna Raygoza MD  on December 31st, 2018 at 10:06am. 
 
 
 
 
  
 
Past Medical History:  
Diagnosis Date  Arthritis  Arthritis of low back ([address]  Asthma  Chronic pain   
 fibromyalgia  Colon spasm  Depression  Fibromyalgia  Gastrointestinal disorder IBS  GERD (gastroesophageal reflux disease)  High cholesterol  Hypertension  IBS (irritable bowel syndrome)  Left axillary pain 2/22/2013  Migraine LAST HEADACHE 2-16-12  Multiple sclerosis ([address]  Other ill-defined conditions(799.89)   
 fibromylgia  Other ill-defined conditions(799.89) MS Past Surgical History:  
Procedure Laterality Date  HX APPENDECTOMY  HX BLADDER REPAIR  04/2011  HX CHOLECYSTECTOMY  HX GYN For ectopic pregnancy, hx tubal ligation  HX GYN    
 vaginal ablation.  HX UROLOGICAL    
 bladder tuck, no mesh  HX UROLOGICAL    
 sling removed 1/10/11 Family History: Adopted:  Yes  
 Problem Relation Age of Onset  Other Other   
     family hx is completely unknown  Diabetes Brother  Asthma Daughter  Asthma Son   
 
 
Social History Socioeconomic History  Marital status: LEGALLY  Spouse name: Not on file  Number of children: Not on file  Years of education: Not on file  Highest education level: Not on file Social Needs  Financial resource strain: Not on file  Food insecurity - worry: Not on file  Food insecurity - inability: Not on file  Transportation needs - medical: Not on file  Transportation needs - non-medical: Not on file Occupational History  Not on file Tobacco Use  Smoking status: Current Some Day Smoker Packs/day: 0.50 Years: 20.00 Pack years: 10.00 Types: Cigarettes  Smokeless tobacco: Never Used Substance and Sexual Activity  Alcohol use: No  
  Alcohol/week: 0.0 oz  Drug use: No  
 Sexual activity: No  
  Partners: Male Birth control/protection: None Other Topics Concern  Not on file Social History Narrative  Not on file ALLERGIES: Hydromorphone; Levofloxacin; Aspirin; Bactrim [sulfamethoprim ds]; Darvocet a500 [propoxyphene n-acetaminophen]; Imitrex [sumatriptan]; Pcn [penicillins]; Percocet [oxycodone-acetaminophen]; Toradol [ketorolac tromethamine]; Tramadol; Hydrocodone; Lisinopril; Prednisone; Reena Pinna [milnacipran]; Vesicare [solifenacin]; and Wellbutrin [bupropion hcl] Review of Systems Constitutional: Positive for chills and fever. HENT: Negative for rhinorrhea and sore throat. Respiratory: Positive for cough. Negative for shortness of breath. Cardiovascular: Negative for chest pain. Gastrointestinal: Positive for abdominal pain, diarrhea and nausea. Negative for vomiting. Musculoskeletal: Positive for myalgias (diffuse). Neurological: Positive for weakness (generalized) and numbness. All other systems reviewed and are negative. Vitals:  
 12/31/18 0950 BP: 117/60 Pulse: (!) 130 Resp: 16 Temp: (!) 102.4 °F (39.1 °C) SpO2: 96% Weight: 134.3 kg (296 lb) Height: 5' 4\" (1.626 m) Physical Exam  
Constitutional: She is oriented to person, place, and time. She appears well-developed and well-nourished. Ill appearing HENT:  
Head: Normocephalic and atraumatic. Eyes: Conjunctivae and EOM are normal. Pupils are equal, round, and reactive to light. Neck: Normal range of motion. Cardiovascular: Regular rhythm, normal heart sounds and intact distal pulses. No murmur heard. tachcyardic Pulmonary/Chest: Effort normal and breath sounds normal. No stridor. No respiratory distress. She has no wheezes. Abdominal: Soft. Bowel sounds are normal. She exhibits no distension. There is no tenderness. Musculoskeletal: Normal range of motion. Neurological: She is alert and oriented to person, place, and time. No cranial nerve deficit. Coordination normal.  
Skin: Skin is warm. She is diaphoretic. Nursing note and vitals reviewed. MDM Number of Diagnoses or Management Options JAYLIN (acute kidney injury) ([facility] [address]):  
Community acquired pneumonia, unspecified laterality:  
Sepsis, due to unspecified organism [facility]):  
Diagnosis management comments: PNA vs flu vs uti vs viral syndrome Start with fluids, labs, xray and reassess Amount and/or Complexity of Data Reviewed Clinical lab tests: ordered and reviewed Tests in the radiology section of CPT®: ordered and reviewed Discuss the patient with other providers: yes (hospitalist) Independent visualization of images, tracings, or specimens: yes (ekg) Patient Progress Patient progress: stable Procedures 11:20 AM 
Spoke with pt, she states that she is allergic to Keflex. Consulted Pharmacy to find another Antibiotic that pt can tolerate. ED EKG interpretation: 
Rhythm: sinus tachycardia; and regular .  Rate (approx.): 120; Axis: normal; P wave: normal; QRS interval: normal ; ST/T wave: non-specific changes EKG documented by Miriam Steele MD, scribe, as interpreted by Sumit Zhou MD, ED MD. 
 
1:08 PM 
Pt remains tachycardic. Though does not meet severe sepsis, she does have JAYLIN. Also given multiple abx allergies there are not many options to treat PNA. Aztreonam per pharmacy Spoke with family practice who will admit

## 2018-12-31 NOTE — H&P
Nelda Mckee [address]  Office [phone] Fax [phone] Admission H&P Name: Ok Porter MRN: [id number]  Sex: Female YOB: 1970  Age: [address] y.o. PCP: Paul Briceño MD  
 
Source of Information: patient, medical records Chief complaint: Neck/Back/Leg pain History of Present Illness Ok Porter is a [address] y.o. female who presents to the ER complaining of diarrhea. Pt reports***. Pt denies ***. Past medical history significant for Asthma, IBS, Migraines, Chronic Pain, GERD, HTN, Arthritis, Fibromyalgia, MS, Depression, HLD, Colon Spasms. In the ED: - Vitals - T 102.4F, , /60, RR 16, 96% RA 
- Labs - ***  
- Imaging - ***  
- Treatment - *** 
 
EKG: *** Past Medical History:  
Diagnosis Date  Arthritis  Arthritis of low back ([facility] [address]  Asthma  Chronic pain   
 fibromyalgia  Colon spasm  Depression  Fibromyalgia  Gastrointestinal disorder IBS  GERD (gastroesophageal reflux disease)  High cholesterol  Hypertension  IBS (irritable bowel syndrome)  Left axillary pain 2/22/2013  Migraine LAST HEADACHE 2-16-12  Multiple sclerosis ([facility] [address]  Other ill-defined conditions(799.89)   
 fibromylgia  Other ill-defined conditions(799.89) MS Patient Vitals for the past 12 hrs: 
 Temp Pulse Resp BP SpO2  
12/31/18 1330  99 21 94/47 95 % 12/31/18 1315  (!) 103 25 107/76 95 % 12/31/18 1300  (!) 101 21 (!) 133/98 94 % 12/31/18 1245  (!) 108 25 128/77 94 % 12/31/18 1230  (!) 109 24 131/80 96 % 12/31/18 1215 99.4 °F (37.4 °C) (!) 115 28 131/84 94 % 12/31/18 1200  (!) 116 28 129/79 95 % 12/31/18 1145  (!) 119 28 119/78 94 % 12/31/18 1130  (!) 109 29 124/83 95 % 12/31/18 1115  (!) 123 26 112/59 95 % 12/31/18 0950 (!) 102.4 °F (39.1 °C) (!) 130 16 117/60 96 % Home Medications Prior to Admission medications Medication Sig Start Date End Date Taking? Authorizing Provider  
gabapentin (NEURONTIN) 600 mg tablet Take 600 mg by mouth two (2) times a day. One pill in the morning and one pill in the afternoon   Yes Provider, Historical  
gabapentin (NEURONTIN) 600 mg tablet Take 1,200 mg by mouth nightly. Yes Provider, Historical  
ALPRAZolam (XANAX) 0.5 mg tablet TAKE 1 TABLET BY MOUTH 2 TIMES DAILY AS NEEDED 9/27/18  Yes Daren Graves MD  
topiramate (TOPAMAX) 200 mg tablet TAKE 1 TABLET BY MOUTH 2 TIMES DAILY  Indications: MIGRAINE PREVENTION 9/26/18  Yes Daren Graves MD  
raNITIdine (ZANTAC) 75 mg tablet TAKE 1 TABLET BY MOUTH TWICE A DAY BEFORE MEALS 8/6/18  Yes Lisa Means., NP  
albuterol (PROVENTIL VENTOLIN) 2.5 mg /3 mL (0.083 %) nebulizer solution INHALE 3 ML BY NEBULIZATION ROUTE EVERY 4 HOURS AS NEEDED FOR WHEEZING 7/23/18  Yes Lisa Means., NP  
VENTOLIN HFA 90 mcg/actuation inhaler TAKE 2 PUFFS BY INHALATION EVERY FOUR (4) HOURS AS NEEDED FOR WHEEZING. 7/23/18  Yes Nottingham Means., NP  
amitriptyline (ELAVIL) 100 mg tablet TAKE 1 TABLET BY MOUTH EVERY EVENING 7/9/18  Yes Daren Graves MD  
butalbital-acetaminophen-caff (FIORICET) -40 mg per capsule TAKE 1 CAP BY MOUTH AT ONSET OF HEADACHE AS NEEDED. 4/16/18  Yes Nigel Olsen MD  
losartan (COZAAR) 50 mg tablet TAKE 1 TABLET BY MOUTH EVERY DAY 12/26/17  Yes Lovely Martines MD  
DULoxetine (CYMBALTA) 60 mg capsule TAKE ONE CAPSULE BY MOUTH EVERY DAY 9/13/16  Yes Daren Graves MD  
 
 
Allergies Allergies Allergen Reactions  Hydromorphone Itching and Swelling  
  hydromorphone 1mg IV given immediately began itching and complained of scratchy swelling sensation in her throat  Levofloxacin Hives  Aspirin Anaphylaxis  Bactrim [Sulfamethoprim Ds] Hives  Darvocet A500 [Propoxyphene N-Acetaminophen] Hives  Imitrex [Sumatriptan] Hives  Pcn [Penicillins] Anaphylaxis  Percocet [Oxycodone-Acetaminophen] Hives Pt can take lortab/norco  
 Toradol [Ketorolac Tromethamine] Hives  Tramadol Hives  Hydrocodone Hives  Keflex [Cephalexin] Hives  Lisinopril Swelling Tongue and mouth felt weird, slight swelling  Prednisone Rash and Angioedema And pt felt throat closing [place] Maker [Milnacipran] Hives INSOMNIA  Vesicare [Solifenacin] Other (comments) Nose bleeds  Wellbutrin [Bupropion Hcl] Other (comments)  
  insomnia Past Medical History:  
Diagnosis Date  Arthritis  Arthritis of low back ([facility] [address]  Asthma  Chronic pain   
 fibromyalgia  Colon spasm  Depression  Fibromyalgia  Gastrointestinal disorder IBS  GERD (gastroesophageal reflux disease)  High cholesterol  Hypertension  IBS (irritable bowel syndrome)  Left axillary pain 2/22/2013  Migraine LAST HEADACHE 2-16-12  Multiple sclerosis ([facility] [address]  Other ill-defined conditions(799.89)   
 fibromylgia  Other ill-defined conditions(799.89) MS  
 
 
Previous Hospitalization(s) Past Surgical History:  
Procedure Laterality Date  HX APPENDECTOMY  HX BLADDER REPAIR  04/2011  HX CHOLECYSTECTOMY  HX GYN For ectopic pregnancy, hx tubal ligation  HX GYN    
 vaginal ablation.  HX UROLOGICAL    
 bladder tuck, no mesh  HX UROLOGICAL    
 sling removed 1/10/11 Family History Adopted: Yes  
Problem Relation Age of Onset  Other Other   
     family hx is completely unknown  Diabetes Brother  Asthma Daughter  Asthma Son   
 
 
Social History Social History Socioeconomic History  Marital status: LEGALLY  Spouse name: Not on file  Number of children: Not on file  Years of education: Not on file  Highest education level: Not on file Social Needs  Financial resource strain: Not on file  Food insecurity - worry: Not on file  Food insecurity - inability: Not on file  Transportation needs - medical: Not on file  Transportation needs - non-medical: Not on file Occupational History  Not on file Tobacco Use  Smoking status: Current Some Day Smoker Packs/day: 0.50 Years: 20.00 Pack years: 10.00 Types: Cigarettes  Smokeless tobacco: Never Used Substance and Sexual Activity  Alcohol use: No  
  Alcohol/week: 0.0 oz  Drug use: No  
 Sexual activity: No  
  Partners: Male Birth control/protection: None Other Topics Concern  Not on file Social History Narrative  Not on file Alcohol history: {[employer] Template:20061::\"Not at all\",\"Social\",\"Yes, *** on weekdays, *** on weekends of ***\",\"Rarely\"} Smoking history: {[employer] Template:20061::\"Smokes *** ppd x *** years\",\"Former smoker, smoked *** ppd x *** years, quit *** years ago\",\"Non-smoker\"} Illicit drug history: {[employer] Template:20061::\"Distant history of marijuana\",\"Distant history of cocaine\",\"Not at all\"} Living arrangement: patient {lives with:580353::\"lives with their family\"}. Ambulates: {[employer] Template:20061::\"Assisted walker\",\"With cane\",\"Independently \"} Review of Systems Review of Systems Physical Exam 
Visit Vitals BP 94/47 Pulse 99 Temp 99.4 °F (37.4 °C) Resp 21 Ht 5' 4\" (1.626 m) Wt 296 lb (134.3 kg) SpO2 95% BMI 50.81 kg/m² General: *** acute distress. Alert. Cooperative. Head: Normocephalic. Atraumatic. Eyes:              Conjunctiva pink. Sclera white. PERRL. Nose:             Septum midline. Mucosa pink. No drainage. Neck: Supple. Normal ROM. No stiffness. Respiratory: CTAB. No w/r/r/c.  
Cardiovascular: RRR. Normal S1,S2. No m/r/g. GI: + bowel sounds. Nontender. No rebound tenderness or guarding. Nondistended. Extremities: *** Musculoskeletal: Full ROM in all extremities. *** LE edema. Distal pulses intact. ***  
Skin: Warm, dry. No rashes. Neuro: CN II-XII grossly intact. Strength 5/5 in all extremities. *** Laboratory Data Recent Results (from the past 8 hour(s)) CBC WITH AUTOMATED DIFF Collection Time: 12/31/18 11:00 AM  
Result Value Ref Range WBC 10.7 3.6 - 11.0 K/uL  
 RBC 4.16 3.80 - 5.20 M/uL  
 HGB 12.4 11.5 - 16.0 g/dL HCT 38.1 35.0 - 47.0 % MCV 91.6 80.0 - 99.0 FL  
 MCH 29.8 26.0 - 34.0 PG  
 MCHC 32.5 30.0 - 36.5 g/dL  
 RDW 14.5 11.5 - 14.5 % PLATELET 782 588 - 468 K/uL MPV 10.8 8.9 - 12.9 FL  
 NRBC 0.0 0  WBC ABSOLUTE NRBC 0.00 0.00 - 0.01 K/uL NEUTROPHILS 78 (H) 32 - 75 % LYMPHOCYTES 15 12 - 49 % MONOCYTES 6 5 - 13 % EOSINOPHILS 0 0 - 7 % BASOPHILS 0 0 - 1 % IMMATURE GRANULOCYTES 1 (H) 0.0 - 0.5 % ABS. NEUTROPHILS 8.3 (H) 1.8 - 8.0 K/UL  
 ABS. LYMPHOCYTES 1.6 0.8 - 3.5 K/UL  
 ABS. MONOCYTES 0.6 0.0 - 1.0 K/UL  
 ABS. EOSINOPHILS 0.0 0.0 - 0.4 K/UL  
 ABS. BASOPHILS 0.0 0.0 - 0.1 K/UL  
 ABS. IMM. GRANS. 0.1 (H) 0.00 - 0.04 K/UL  
 DF AUTOMATED METABOLIC PANEL, COMPREHENSIVE Collection Time: 12/31/18 11:00 AM  
Result Value Ref Range Sodium 133 (L) 136 - 145 mmol/L Potassium 3.5 3.5 - 5.1 mmol/L Chloride 101 97 - 108 mmol/L  
 CO2 20 (L) 21 - 32 mmol/L Anion gap 12 5 - 15 mmol/L Glucose 117 (H) 65 - 100 mg/dL BUN 10 6 - 20 MG/DL Creatinine 1.16 (H) 0.55 - 1.02 MG/DL  
 BUN/Creatinine ratio 9 (L) 12 - 20 GFR est AA >60 >60 ml/min/1.73m2 GFR est non-AA 50 (L) >60 ml/min/1.73m2 Calcium 8.3 (L) 8.5 - 10.1 MG/DL Bilirubin, total 0.4 0.2 - 1.0 MG/DL  
 ALT (SGPT) 15 12 - 78 U/L  
 AST (SGOT) 11 (L) 15 - 37 U/L Alk. phosphatase 61 45 - 117 U/L Protein, total 7.4 6.4 - 8.2 g/dL Albumin 2.6 (L) 3.5 - 5.0 g/dL Globulin 4.8 (H) 2.0 - 4.0 g/dL A-G Ratio 0.5 (L) 1.1 - 2.2 POC LACTIC ACID Collection Time: 12/31/18 11:04 AM  
Result Value Ref Range  Lactic Acid (POC) 0.76 0.40 - 2.00 mmol/L  
SAMPLES BEING HELD  
 Collection Time: 12/31/18 11:05 AM  
Result Value Ref Range SAMPLES BEING HELD RED,BLUE,SST COMMENT Add-on orders for these samples will be processed based on acceptable specimen integrity and analyte stability, which may vary by analyte. ETHYL ALCOHOL Collection Time: 12/31/18 11:05 AM  
Result Value Ref Range ALCOHOL(ETHYL),SERUM <10 <10 MG/DL INFLUENZA A & B AG (RAPID TEST) Collection Time: 12/31/18 11:30 AM  
Result Value Ref Range Influenza A Antigen NEGATIVE  NEG Influenza B Antigen NEGATIVE  NEG    
URINALYSIS W/ REFLEX CULTURE Collection Time: 12/31/18 11:30 AM  
Result Value Ref Range Color DARK YELLOW Appearance CLEAR CLEAR Specific gravity 1.022 1.003 - 1.030    
 pH (UA) 6.0 5.0 - 8.0 Protein 100 (A) NEG mg/dL Glucose NEGATIVE  NEG mg/dL Ketone TRACE (A) NEG mg/dL Blood LARGE (A) NEG Urobilinogen 1.0 0.2 - 1.0 EU/dL Nitrites NEGATIVE  NEG Leukocyte Esterase NEGATIVE  NEG    
 WBC 0-4 0 - 4 /hpf  
 RBC 10-20 0 - 5 /hpf Epithelial cells MODERATE (A) FEW /lpf Bacteria NEGATIVE  NEG /hpf  
 UA:UC IF INDICATED CULTURE NOT INDICATED BY UA RESULT CNI Hyaline cast 2-5 0 - 5 /lpf  
BILIRUBIN, CONFIRM Collection Time: 12/31/18 11:30 AM  
Result Value Ref Range Bilirubin UA, confirm NEGATIVE  NEG    
DRUG SCREEN, URINE Collection Time: 12/31/18 11:30 AM  
Result Value Ref Range AMPHETAMINES NEGATIVE  NEG    
 BARBITURATES NEGATIVE  NEG BENZODIAZEPINES NEGATIVE  NEG    
 COCAINE NEGATIVE  NEG METHADONE NEGATIVE  NEG    
 OPIATES NEGATIVE  NEG    
 PCP(PHENCYCLIDINE) NEGATIVE  NEG    
 THC (TH-CANNABINOL) POSITIVE (A) NEG Drug screen comment (NOTE) Imaging CXR Results  (Last 48 hours) 12/31/18 1047  XR CHEST PA LAT Final result Impression:  IMPRESSION: Probable lingular patchy airspace disease. Narrative: Indication:  sepsis Exam: AP upright and lateral views of the chest.  
   
Direct comparison is made to prior CXR dated June 2012. Findings: Cardiomediastinal silhouette is within normal limits. Lateral view  
demonstrates patchy airspace disease overlying the cardiac silhouette, likely  
lingular. No pleural fluid is visualized. There is no pneumothorax. CT Results  (Last 48 hours) None Assessment and Plan Terra Joseph is a 57 y.o. female who is admitted for Community Acquired Pneumonia. Community Acquired Pneumonia - At Risk for JAYLIN - Chronic Pain - Hypertension - BP on admission 117/60.  
- Continuing home medications of Cozaar 50mg daily. - Will continue to monitor at this time and readjust as BP's trend. Obesity 
- PT with BMI of Body mass index is 50.81 kg/m². - Encouraging lifestyle modifications and further follow up outpatient. FEN/GI - Regular diet. MIVF at 175 mL/hr. Activity - Ambulate with assistance DVT prophylaxis - Lovenox GI prophylaxis - Protonix Disposition - Admit to Medical. Plan to d/c to Home. Consulting PT/OT Code Status - Full, discussed with patient / caregivers. Patient Terra Joseph will be discussed Dr. Beatrice Ortiz. 1:43 PM, 12/31/18 Lzu Aldrich MD 
Family Medicine Resident For Billing Chief Complaint Patient presents with  Fever  Generalized Body Aches  Nausea  Numbness Hospital Problems  Date Reviewed: 9/26/2018 Codes Class Noted POA  
 CAP (community acquired pneumonia) ICD-10-CM: J18.9 ICD-9-CM: 592  12/31/2018 Unknown

## 2019-01-01 LAB
ALBUMIN SERPL-MCNC: 2.2 G/DL (ref 3.5–5)
ALBUMIN/GLOB SERPL: 0.5 {RATIO} (ref 1.1–2.2)
ALP SERPL-CCNC: 55 U/L (ref 45–117)
ALT SERPL-CCNC: 11 U/L (ref 12–78)
ANION GAP SERPL CALC-SCNC: 12 MMOL/L (ref 5–15)
AST SERPL-CCNC: 17 U/L (ref 15–37)
ATRIAL RATE: 123 BPM
BASOPHILS # BLD: 0 K/UL (ref 0–0.1)
BASOPHILS NFR BLD: 0 % (ref 0–1)
BILIRUB SERPL-MCNC: 0.2 MG/DL (ref 0.2–1)
BUN SERPL-MCNC: 5 MG/DL (ref 6–20)
BUN/CREAT SERPL: 5 (ref 12–20)
CALCIUM SERPL-MCNC: 7.4 MG/DL (ref 8.5–10.1)
CALCULATED P AXIS, ECG09: 35 DEGREES
CALCULATED R AXIS, ECG10: 27 DEGREES
CALCULATED T AXIS, ECG11: 11 DEGREES
CHLORIDE SERPL-SCNC: 103 MMOL/L (ref 97–108)
CO2 SERPL-SCNC: 20 MMOL/L (ref 21–32)
CREAT SERPL-MCNC: 0.92 MG/DL (ref 0.55–1.02)
DIAGNOSIS, 93000: NORMAL
DIFFERENTIAL METHOD BLD: ABNORMAL
EOSINOPHIL # BLD: 0 K/UL (ref 0–0.4)
EOSINOPHIL NFR BLD: 0 % (ref 0–7)
ERYTHROCYTE [DISTWIDTH] IN BLOOD BY AUTOMATED COUNT: 14.6 % (ref 11.5–14.5)
GLOBULIN SER CALC-MCNC: 4.1 G/DL (ref 2–4)
GLUCOSE SERPL-MCNC: 96 MG/DL (ref 65–100)
HCT VFR BLD AUTO: 33.5 % (ref 35–47)
HGB BLD-MCNC: 10.7 G/DL (ref 11.5–16)
IMM GRANULOCYTES # BLD: 0 K/UL (ref 0–0.04)
IMM GRANULOCYTES NFR BLD AUTO: 0 % (ref 0–0.5)
LYMPHOCYTES # BLD: 1.3 K/UL (ref 0.8–3.5)
LYMPHOCYTES NFR BLD: 18 % (ref 12–49)
MAGNESIUM SERPL-MCNC: 1.7 MG/DL (ref 1.6–2.4)
MCH RBC QN AUTO: 29.2 PG (ref 26–34)
MCHC RBC AUTO-ENTMCNC: 31.9 G/DL (ref 30–36.5)
MCV RBC AUTO: 91.3 FL (ref 80–99)
MONOCYTES # BLD: 0.4 K/UL (ref 0–1)
MONOCYTES NFR BLD: 5 % (ref 5–13)
NEUTS SEG # BLD: 5.5 K/UL (ref 1.8–8)
NEUTS SEG NFR BLD: 76 % (ref 32–75)
NRBC # BLD: 0 K/UL (ref 0–0.01)
NRBC BLD-RTO: 0 PER 100 WBC
P-R INTERVAL, ECG05: 122 MS
PHOSPHATE SERPL-MCNC: 1.5 MG/DL (ref 2.6–4.7)
PLATELET # BLD AUTO: 213 K/UL (ref 150–400)
PMV BLD AUTO: 11.1 FL (ref 8.9–12.9)
POTASSIUM SERPL-SCNC: 3.2 MMOL/L (ref 3.5–5.1)
PROT SERPL-MCNC: 6.3 G/DL (ref 6.4–8.2)
Q-T INTERVAL, ECG07: 314 MS
QRS DURATION, ECG06: 80 MS
QTC CALCULATION (BEZET), ECG08: 449 MS
RBC # BLD AUTO: 3.67 M/UL (ref 3.8–5.2)
SODIUM SERPL-SCNC: 135 MMOL/L (ref 136–145)
VENTRICULAR RATE, ECG03: 123 BPM
WBC # BLD AUTO: 7.2 K/UL (ref 3.6–11)

## 2019-01-01 PROCEDURE — 84100 ASSAY OF PHOSPHORUS: CPT

## 2019-01-01 PROCEDURE — 97165 OT EVAL LOW COMPLEX 30 MIN: CPT

## 2019-01-01 PROCEDURE — 89055 LEUKOCYTE ASSESSMENT FECAL: CPT

## 2019-01-01 PROCEDURE — 80053 COMPREHEN METABOLIC PANEL: CPT

## 2019-01-01 PROCEDURE — 65660000000 HC RM CCU STEPDOWN

## 2019-01-01 PROCEDURE — 74011250636 HC RX REV CODE- 250/636: Performed by: STUDENT IN AN ORGANIZED HEALTH CARE EDUCATION/TRAINING PROGRAM

## 2019-01-01 PROCEDURE — 36415 COLL VENOUS BLD VENIPUNCTURE: CPT

## 2019-01-01 PROCEDURE — 74011250637 HC RX REV CODE- 250/637: Performed by: FAMILY MEDICINE

## 2019-01-01 PROCEDURE — 74011250637 HC RX REV CODE- 250/637: Performed by: STUDENT IN AN ORGANIZED HEALTH CARE EDUCATION/TRAINING PROGRAM

## 2019-01-01 PROCEDURE — 87177 OVA AND PARASITES SMEARS: CPT

## 2019-01-01 PROCEDURE — 74011000258 HC RX REV CODE- 258: Performed by: FAMILY MEDICINE

## 2019-01-01 PROCEDURE — 77030027138 HC INCENT SPIROMETER -A

## 2019-01-01 PROCEDURE — 87045 FECES CULTURE AEROBIC BACT: CPT

## 2019-01-01 PROCEDURE — 87449 NOS EACH ORGANISM AG IA: CPT

## 2019-01-01 PROCEDURE — 97161 PT EVAL LOW COMPLEX 20 MIN: CPT

## 2019-01-01 PROCEDURE — 82705 FATS/LIPIDS FECES QUAL: CPT

## 2019-01-01 PROCEDURE — 85025 COMPLETE CBC W/AUTO DIFF WBC: CPT

## 2019-01-01 PROCEDURE — 74011000250 HC RX REV CODE- 250: Performed by: STUDENT IN AN ORGANIZED HEALTH CARE EDUCATION/TRAINING PROGRAM

## 2019-01-01 PROCEDURE — 74011250636 HC RX REV CODE- 250/636: Performed by: FAMILY MEDICINE

## 2019-01-01 PROCEDURE — 83735 ASSAY OF MAGNESIUM: CPT

## 2019-01-01 PROCEDURE — 97116 GAIT TRAINING THERAPY: CPT

## 2019-01-01 PROCEDURE — 94760 N-INVAS EAR/PLS OXIMETRY 1: CPT

## 2019-01-01 RX ORDER — ACETAMINOPHEN 500 MG
500 TABLET ORAL
Status: DISCONTINUED | OUTPATIENT
Start: 2019-01-01 | End: 2019-01-03 | Stop reason: HOSPADM

## 2019-01-01 RX ORDER — ALPRAZOLAM 0.5 MG/1
0.5 TABLET ORAL DAILY
Status: DISCONTINUED | OUTPATIENT
Start: 2019-01-02 | End: 2019-01-03 | Stop reason: HOSPADM

## 2019-01-01 RX ORDER — BUTALBITAL, ACETAMINOPHEN AND CAFFEINE 50; 325; 40 MG/1; MG/1; MG/1
1 TABLET ORAL
Status: DISCONTINUED | OUTPATIENT
Start: 2019-01-01 | End: 2019-01-03 | Stop reason: HOSPADM

## 2019-01-01 RX ORDER — VANCOMYCIN HYDROCHLORIDE 250 MG/5ML
125 POWDER, FOR SOLUTION ORAL EVERY 6 HOURS
Status: DISCONTINUED | OUTPATIENT
Start: 2019-01-01 | End: 2019-01-02

## 2019-01-01 RX ORDER — BUTALBITAL, ACETAMINOPHEN AND CAFFEINE 50; 325; 40 MG/1; MG/1; MG/1
1 TABLET ORAL ONCE
Status: COMPLETED | OUTPATIENT
Start: 2019-01-01 | End: 2019-01-01

## 2019-01-01 RX ORDER — POTASSIUM CHLORIDE 1.5 G/1.77G
40 POWDER, FOR SOLUTION ORAL
Status: COMPLETED | OUTPATIENT
Start: 2019-01-01 | End: 2019-01-01

## 2019-01-01 RX ORDER — DIPHENHYDRAMINE HYDROCHLORIDE 50 MG/ML
25 INJECTION, SOLUTION INTRAMUSCULAR; INTRAVENOUS ONCE
Status: DISCONTINUED | OUTPATIENT
Start: 2019-01-01 | End: 2019-01-01

## 2019-01-01 RX ORDER — PROCHLORPERAZINE EDISYLATE 5 MG/ML
10 INJECTION INTRAMUSCULAR; INTRAVENOUS
Status: COMPLETED | OUTPATIENT
Start: 2019-01-01 | End: 2019-01-01

## 2019-01-01 RX ORDER — LORAZEPAM 0.5 MG/1
0.5 TABLET ORAL DAILY
Status: DISCONTINUED | OUTPATIENT
Start: 2019-01-01 | End: 2019-01-01

## 2019-01-01 RX ADMIN — Medication 10 ML: at 20:43

## 2019-01-01 RX ADMIN — ACETAMINOPHEN 500 MG: 500 TABLET ORAL at 12:43

## 2019-01-01 RX ADMIN — BUTALBITAL, ACETAMINOPHEN AND CAFFEINE 1 TABLET: 50; 325; 40 TABLET ORAL at 12:43

## 2019-01-01 RX ADMIN — GABAPENTIN 600 MG: 300 CAPSULE ORAL at 13:17

## 2019-01-01 RX ADMIN — BUTALBITAL, ACETAMINOPHEN AND CAFFEINE 1 TABLET: 50; 325; 40 TABLET ORAL at 20:43

## 2019-01-01 RX ADMIN — TOPIRAMATE 200 MG: 100 TABLET, FILM COATED ORAL at 18:50

## 2019-01-01 RX ADMIN — DULOXETINE HYDROCHLORIDE 60 MG: 30 CAPSULE, DELAYED RELEASE ORAL at 08:23

## 2019-01-01 RX ADMIN — ENOXAPARIN SODIUM 40 MG: 40 INJECTION SUBCUTANEOUS at 18:50

## 2019-01-01 RX ADMIN — AZITHROMYCIN MONOHYDRATE 500 MG: 500 INJECTION, POWDER, LYOPHILIZED, FOR SOLUTION INTRAVENOUS at 14:29

## 2019-01-01 RX ADMIN — LOSARTAN POTASSIUM 50 MG: 50 TABLET, FILM COATED ORAL at 18:50

## 2019-01-01 RX ADMIN — ONDANSETRON 4 MG: 2 INJECTION INTRAMUSCULAR; INTRAVENOUS at 20:42

## 2019-01-01 RX ADMIN — TOPIRAMATE 200 MG: 100 TABLET, FILM COATED ORAL at 05:45

## 2019-01-01 RX ADMIN — GABAPENTIN 1200 MG: 300 CAPSULE ORAL at 20:43

## 2019-01-01 RX ADMIN — SODIUM CHLORIDE 1000 ML: 900 INJECTION, SOLUTION INTRAVENOUS at 12:57

## 2019-01-01 RX ADMIN — LORAZEPAM 0.5 MG: 0.5 TABLET ORAL at 12:42

## 2019-01-01 RX ADMIN — AMITRIPTYLINE HYDROCHLORIDE 100 MG: 50 TABLET, FILM COATED ORAL at 20:43

## 2019-01-01 RX ADMIN — VANCOMYCIN HYDROCHLORIDE 125 MG: KIT at 13:18

## 2019-01-01 RX ADMIN — PANTOPRAZOLE SODIUM 40 MG: 40 TABLET, DELAYED RELEASE ORAL at 05:45

## 2019-01-01 RX ADMIN — VANCOMYCIN HYDROCHLORIDE 125 MG: KIT at 18:55

## 2019-01-01 RX ADMIN — ENOXAPARIN SODIUM 40 MG: 40 INJECTION SUBCUTANEOUS at 05:45

## 2019-01-01 RX ADMIN — SODIUM CHLORIDE: 900 INJECTION, SOLUTION INTRAVENOUS at 10:18

## 2019-01-01 RX ADMIN — POTASSIUM CHLORIDE 40 MEQ: 1.5 POWDER, FOR SOLUTION ORAL at 08:22

## 2019-01-01 RX ADMIN — BUTALBITAL, ACETAMINOPHEN AND CAFFEINE 1 TABLET: 50; 325; 40 TABLET ORAL at 03:43

## 2019-01-01 RX ADMIN — GABAPENTIN 600 MG: 300 CAPSULE ORAL at 08:23

## 2019-01-01 RX ADMIN — PROCHLORPERAZINE EDISYLATE 10 MG: 5 INJECTION INTRAMUSCULAR; INTRAVENOUS at 12:50

## 2019-01-01 RX ADMIN — AZTREONAM 1 G: 1 INJECTION, POWDER, LYOPHILIZED, FOR SOLUTION INTRAMUSCULAR; INTRAVENOUS at 20:44

## 2019-01-01 RX ADMIN — AZTREONAM 1 G: 1 INJECTION, POWDER, LYOPHILIZED, FOR SOLUTION INTRAMUSCULAR; INTRAVENOUS at 12:58

## 2019-01-01 NOTE — PROGRESS NOTES
Problem: Mobility Impaired (Adult and Pediatric) Goal: *Acute Goals and Plan of Care (Insert Text) Physical Therapy Goals Initiated 1/1/2019 1. Patient will move from supine to sit and sit to supine  in bed with independence within 7 day(s). 2.  Patient will transfer from bed to chair and chair to bed with independence using the least restrictive device within 7 day(s). 3.  Patient will perform sit to stand with independence within 7 day(s). 4.  Patient will ambulate with modified independence for 350 feet with the least restrictive device within 7 day(s). 5.  Patient will ascend/descend 10 stairs with 1 handrail(s) with modified independence within 7 day(s). physical Therapy EVALUATION Patient: Kait Wing (5 y.o. female) Date: 1/1/2019 Primary Diagnosis: CAP (community acquired pneumonia) Precautions:   Fall ASSESSMENT : 
Based on the objective data described below, the patient presents with generalized weakness, joint pain, impaired standing balance and decreased activity tolerance following admission for CAP. Pt report HA at rest. PTA pt was ambulating with a rollator vs no AD and lives in an apartment with 10 steps to enter. Pt has support from her  and daughter. Pt required at most Min A for balance without AD, INDEP with bed mobility and CGA to transfer sit<>stand. Pt's balance improved with quad cane and pt requesting for one to use at home for ease getting up and down the stairs. Pt reports pain and limited mobility from RA in nearly all joints. Pt's RR >30 and HR 118BPM with minimal activity. Given pt's deconditioning would recommend discharge with HHPT follow up to ensure safety and energy conservation in the home environment. Patient will benefit from skilled intervention to address the above impairments. Patients rehabilitation potential is considered to be Fair Factors which may influence rehabilitation potential include:  
[]         None noted [x]         Mental ability/status [x]         Medical condition 
[]         Home/family situation and support systems 
[]         Safety awareness 
[]         Pain tolerance/management 
[]         Other: PLAN : 
Recommendations and Planned Interventions: 
[x]           Bed Mobility Training             [x]    Neuromuscular Re-Education 
[x]           Transfer Training                   []    Orthotic/Prosthetic Training 
[x]           Gait Training                         []    Modalities [x]           Therapeutic Exercises           []    Edema Management/Control 
[x]           Therapeutic Activities            [x]    Patient and Family Training/Education 
[]           Other (comment): Frequency/Duration: Patient will be followed by physical therapy  5 times a week to address goals. Discharge Recommendations: Home Health Further Equipment Recommendations for Discharge: quad cane SUBJECTIVE:  
Patient stated I got the rollator when I broke my ankles.  OBJECTIVE DATA SUMMARY:  
HISTORY:   
Past Medical History:  
Diagnosis Date  Arthritis  Arthritis of low back ([facility] [address]  Asthma  Chronic pain   
 fibromyalgia  Colon spasm  Depression  Fibromyalgia  Gastrointestinal disorder IBS  GERD (gastroesophageal reflux disease)  High cholesterol  Hypertension  IBS (irritable bowel syndrome)  Left axillary pain 2/22/2013  Migraine LAST HEADACHE 2-16-12  Multiple sclerosis ([facility] [address]  Other ill-defined conditions(799.89)   
 fibromylgia  Other ill-defined conditions(799.89) MS Past Surgical History:  
Procedure Laterality Date  HX APPENDECTOMY  HX BLADDER REPAIR  04/2011  HX CHOLECYSTECTOMY  HX GYN For ectopic pregnancy, hx tubal ligation  HX GYN    
 vaginal ablation.  HX UROLOGICAL    
 bladder tuck, no mesh  HX UROLOGICAL    
 sling removed 1/10/11 Prior Level of Function/Home Situation: Lives with family members in apartment. Pt reports use of rollator at most times Personal factors and/or comorbidities impacting plan of care: depression, fibromyalgia, MS Home Situation Home Environment: Private residence # Steps to Enter: 10 One/Two Story Residence: One story Living Alone: No 
Support Systems: Spouse/Significant Other/Partner, Child(lenin) EXAMINATION/PRESENTATION/DECISION MAKING: Critical Behavior: 
Neurologic State: Alert Orientation Level: Oriented X4 Cognition: Appropriate decision making, Appropriate for age attention/concentration, Appropriate safety awareness, Follows commands Hearing: Auditory Auditory Impairment: NoneSkin:   
Edema:  
Range Of Motion: 
AROM: Within functional limits Strength:   
Strength: Generally decreased, functional 
  
  
  
  
  
  
Tone & Sensation:  
Tone: Normal 
  
  
  
  
Sensation: Intact Coordination: 
Coordination: Within functional limits Vision:  
  
Functional Mobility: 
Bed Mobility: 
  
Supine to Sit: Independent Sit to Supine: Independent Scooting: Independent Transfers: 
Sit to Stand: Contact guard assistance Stand to Sit: Contact guard assistance Balance:  
Sitting: Intact Standing: Impaired Standing - Static: Good Standing - Dynamic : FairAmbulation/Gait Training:Distance (ft): 30 Feet (ft)(30ft x 2) Assistive Device: Gait belt;Cane, quad Ambulation - Level of Assistance: Contact guard assistance;Minimal assistance Gait Abnormalities: Decreased step clearance Base of Support: Widened Speed/Brittny: Pace decreased (<100 feet/min); Slow Step Length: Right shortened;Left shortened Stairs: Therapeutic Exercises:  
 
 
Functional Measure: 
Tinetti test: 
 
Sitting Balance: 1 Arises: 1 Attempts to Rise: 1 Immediate Standing Balance: 1 Standing Balance: 1 Nudged: 0 Eyes Closed: 0 
 Turn 360 Degrees - Continuous/Discontinuous: 0 Turn 360 Degrees - Steady/Unsteady: 0 Sitting Down: 1 Balance Score: 6 Indication of Gait: 1 
R Step Length/Height: 1 L Step Length/Height: 1 
R Foot Clearance: 1 L Foot Clearance: 1 Step Symmetry: 1 Step Continuity: 0 Path: 0 Trunk: 0 Walking Time: 0 Gait Score: 6 Total Score: 12 Tinetti Test and G-code impairment scale: 
Percentage of Impairment CH 
 
0% 
 CI 
 
1-19% CJ 
 
20-39% CK 
 
40-59% CL 
 
60-79% CM 
 
80-99% CN  
 
100% Tinetti Score 0-28 28 23-27 17-22 12-16 6-11 1-5 0 Tinetti Tool Score Risk of Falls 
<19 = High Fall Risk 19-24 = Moderate Fall Risk 25-28 = Low Fall Risk Tinetti ME. Performance-Oriented Assessment of Mobility Problems in Elderly Patients. Lozano 66; [id number]. (Scoring Description: PT Bulletin Feb. 10, 1993) Older adults: Sharron Elkins et al, 2009; n = 1601 [facility] elderly evaluated with ABC, HEBER, ADL, and IADL) · Mean HEBER score for males aged 69-68 years = 26.21(3.40) · Mean HEBER score for females age 69-68 years = 25.16(4.30) · Mean HEBER score for males over 80 years = 23.29(6.02) · Mean HEBER score for females over 80 years = 17.20(8.32) G codes: In compliance with CMSs Claims Based Outcome Reporting, the following G-code set was chosen for this patient based on their primary functional limitation being treated: The outcome measure chosen to determine the severity of the functional limitation was the Tinetti with a score of 12/28 which was correlated with the impairment scale. ? Mobility - Walking and Moving Around:  
  - CURRENT STATUS: CK - 40%-59% impaired, limited or restricted  - GOAL STATUS: CJ - 20%-39% impaired, limited or restricted  - D/C STATUS:  ---------------To be determined--------------- Physical Therapy Evaluation Charge Determination History Examination Presentation Decision-Making MEDIUM  Complexity : 1-2 comorbidities / personal factors will impact the outcome/ POC  MEDIUM Complexity : 3 Standardized tests and measures addressing body structure, function, activity limitation and / or participation in recreation  LOW Complexity : Stable, uncomplicated  Other outcome measures Tinetti  LOW Based on the above components, the patient evaluation is determined to be of the following complexity level: LOW Pain: 
Pain Scale 1: Numeric (0 - 10)(resident aware) Pain Intensity 1: 6 Pain Location 1: Head 
Pain Orientation 1: Anterior Pain Description 1: Aching Pain Intervention(s) 1: Medication (see MAR) Activity Tolerance:  
Good Please refer to the flowsheet for vital signs taken during this treatment. After treatment:  
[]         Patient left in no apparent distress sitting up in chair 
[x]         Patient left in no apparent distress in bed 
[x]         Call bell left within reach [x]         Nursing notified 
[]         Caregiver present 
[]         Bed alarm activated COMMUNICATION/EDUCATION:  
The patients plan of care was discussed with: Registered Nurse. [x]         Fall prevention education was provided and the patient/caregiver indicated understanding. [x]         Patient/family have participated as able in goal setting and plan of care. [x]         Patient/family agree to work toward stated goals and plan of care. []         Patient understands intent and goals of therapy, but is neutral about his/her participation. []         Patient is unable to participate in goal setting and plan of care. Thank you for this referral. 
Marimar Berman, PT Time Calculation: 25 mins

## 2019-01-01 NOTE — PROGRESS NOTES
Resident Note in Brief Responded to nurse call regarding T 102.7, patient c/o severe headache. Patient received lying in bed, on her phone when I entered the room. She c/o worsening headache, located on top of head. 10/10. Squeezing sensation. +Blurry vision only when she stands, +photophobia. Had to lower blinds in room. No dizziness at rest.  
 
+N, cannot drink or eat. No emesis since yesterday evening 8:30pm. Water makes her more nauseous. Has had several more episodes of diarrhea, c/o abdominal pain (lower). States she is very hungry but cannot keep it down. Throat is dry. 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 No congestion, has dry cough that started yesterday. Visit Vitals /88 (BP 1 Location: Left arm, BP Patient Position: Sitting) Pulse (!) 108 Temp (!) 102.7 °F (39.3 °C) Resp 20 Ht 5' 4\" (1.626 m) Wt 296 lb (134.3 kg) SpO2 96% BMI 50.81 kg/m² Gen: Obese, resting in bed, appears uncomfortable, closing eyes while speaking, avoiding bright lights HEENT: no palpable lymph nodes (occipital or cervical) Resp: CTAB Cardio: regular rate,  Normal rhythm Abd: +BS. Soft. TTP diffusely with mild intermittent guarding (but location of guarding changes frequently), no rebound or tenderness. Neuro: EOMI though patient c/o pain with extreme eye movements. No neck stiffness or meningeal signs. Scalp is tender to palpation. A/P:  
58 yo F admitted for sepsis due to CAP 1. CAP - received azithromax at noon 12/31 and aztreonam at 2pm on 12/31. Has continued to spike fevers since admission. Has multiple allergies limiting antibiotic selection. Has not been on antibiotics for 24 hours yet. - Will add on aztreonam to currently ordered zithromax - Also added tylenol PRN (also has fioricet PRN, but total tylenol allowance/day < 4g). One dose to be given now. 2. Migraine - IVF were dc'd earlier this morning as patient thought IVF were cause of migraine. - Discussed migraine cocktail but pt states toradol makes her break out in hives. Called [facility]) for further recommendations, only other IV NSAID in-house is indomethacin, not usually used for this indication.  
- Cannot try rectal NSAIDs 2/2 diarrhea - Compazine and fioricet now (both ordered PRN) - If no improvement consider neuro consult - Of note, oxycodone-acetaminophen listed as allergy, but patient received fioricet yesterday without problem, should be able to receive tylenol - Pt also states she takes benzo at home but has not taken in several days, HA may be withdrawal HA. Will add back at lower dose. 3. Diarrhea - C diff and stool culture pending - Will start empiric txt with PO vanc Discussed w Dr. Jose Antonio Diaz (PGY-3) and Dr. Deangelo Woodall (PGY-3) Patient to be discussed w Dr. Viviana Callas Nobie Goldberg, MD 
01/01/19 
11:45 AM

## 2019-01-01 NOTE — ED NOTES
TRANSFER - OUT REPORT: 
 
Verbal report given to Radha(name) on Rashmi Gutierrez  being transferred to 4th floor(unit) for routine progression of care Report consisted of patients Situation, Background, Assessment and  
Recommendations(SBAR). Information from the following report(s) SBAR, ED Summary, MAR, Recent Results and Cardiac Rhythm sinus tach was reviewed with the receiving nurse. Lines:  
Peripheral IV 12/31/18 Right Forearm (Active) Site Assessment Clean, dry, & intact 12/31/2018 11:07 AM  
Phlebitis Assessment 0 12/31/2018 11:07 AM  
Infiltration Assessment 0 12/31/2018 11:07 AM  
Dressing Status Clean, dry, & intact; New 12/31/2018 11:07 AM  
Dressing Type Tape;Transparent 12/31/2018 11:07 AM  
Hub Color/Line Status Pink;Capped;Flushed 12/31/2018 11:07 AM  
Action Taken Blood drawn 12/31/2018 11:07 AM  
   
Peripheral IV 12/31/18 Right Antecubital (Active) Site Assessment Clean, dry, & intact 12/31/2018 11:05 AM  
Phlebitis Assessment 0 12/31/2018 11:05 AM  
Infiltration Assessment 0 12/31/2018 11:05 AM  
Dressing Status Clean, dry, & intact; New 12/31/2018 11:05 AM  
Dressing Type Transparent 12/31/2018 11:05 AM  
Hub Color/Line Status Pink;Capped;Flushed;Patent 12/31/2018 11:05 AM  
Action Taken Blood drawn 12/31/2018 11:05 AM  
  
 
Opportunity for questions and clarification was provided. Patient transported with: 
 Monitor Registered Nurse

## 2019-01-01 NOTE — PROGRESS NOTES
Call placed to Candida Wills re; patient continues to complain of headache and does not have juan prn medications ordered for pain/elevated temp, and that current temp is 102.7 (o). Per Candida Wills -someone will be up to see patient

## 2019-01-01 NOTE — ED NOTES
Called nursing supervisor to notify of change in bed (medical to remote tele). Also notified Rajesh Sherwood of change to remote tele and low-grade fever.

## 2019-01-01 NOTE — PROGRESS NOTES
[facility] FAMILY MEDICINE RESIDENCY PROGRAM  
Daily Progress Note Date: 1/1/2019 PCP: Harriet Hickman MD  
 
Assessment/Plan:  
Roland Santa is a 57 y.o. female who has spent 1 night(s) in the hospital, who is admitted for sepsis 2/2 CAP. 24 hour Events: worsening migraine overnight. Pt spiked fever 101.1 this AM + tachycardia + x6 loose BM (per pt). Sepsis 2/2 CAP: 
2/4 SIRS. CXR suggestive of lingular patchy airspace disease. Rapid flu neg. S/p 1x IV aztreonam in ED 
- Continue Zithromax (Abx started 12/31) - Pending blood cx, urine cx 
- Daily CBC, CMP Diarrhea Pt reports loose BM for the past 2 days, but worse overnight; x6 BM, non-bloody. No hx C. Diff. 
- Pending C. diff toxin, stool culture - Enteric contact precautions - Consider modifying Abx regimen based on results Hypokalemia/ Hypophosphatemia: 
K+ 3.2 today. Phos 1.5. Mg 1.7 
- Repleted - Daily BMP Tachycardia:  
Likely 2/2 migraine - Monitor vitals; response to d/c fluids for migraine management Anemia Hgb 10.7 today (12.4 yesterday) likely dilutional 
- Daily CBC At Risk for JAYLIN: RESOLVED Cr 0.92 today. (POA 1.16; Bl Cr 0.7-0.8) - Caution w/ nephrotoxic agents 
- Daily CMP 
  
HTN - STABLE 
- Continue home Cozaar 50mg daily. - Will continue to monitor at this time and readjust as BP's trend.  
  
Chronic Pain/Fibromyalgia/Lumbar DDD:  
UDS + for THC 
-Continue home gabapentin 600mg BID in morning and afternoon 
-Continue home gabapentin 1200mg qHS 
-Holding home Xanax 0.5mg BID 
  
Migraine:  
- Continue home topamax 200mg BID 
- D/C IVF 
  
Depression:  
- Continue home Cymbalta 60mg daily and Elavil 100mg nightly 
  
Asthma:  
- Continue home albuterol q4hr PRN 
 
Obesity: BMI: 50.81 kg/m². - Encouraging lifestyle modifications and further follow up outpatient.  
  
FEN/GI - Regular diet. Activity - Ambulate with assistance DVT prophylaxis - Lovenox GI prophylaxis -  Protonix Disposition - Plan to d/c to home. PT/OT. 
  
CODE STATUS:  Full Pt was discussed with Dr. Moses Champion, [facility] Medicine Attending Yaw Griffin MD 
Family Medicine Resident CC: Neck/back/leg pain Subjective No acute events overnight. Patient reports severe migraines and loose BMx6 overnight associated w/ mild RLQ pain. Denies chills, chest pain, palpitations, shortness of breath, and LE edema. Inpatient Medications Current Facility-Administered Medications Medication Dose Route Frequency  sodium chloride (NS) flush 5-10 mL  5-10 mL IntraVENous Q8H  
 sodium chloride (NS) flush 5-10 mL  5-10 mL IntraVENous PRN  
 enoxaparin (LOVENOX) injection 40 mg  40 mg SubCUTAneous Q12H  
 0.9% sodium chloride infusion  175 mL/hr IntraVENous CONTINUOUS  
 albuterol (PROVENTIL VENTOLIN) nebulizer solution 2.5 mg  2.5 mg Nebulization Q4H PRN  
 amitriptyline (ELAVIL) tablet 100 mg  100 mg Oral QHS  DULoxetine (CYMBALTA) capsule 60 mg  60 mg Oral DAILY  gabapentin (NEURONTIN) capsule 600 mg  600 mg Oral BID  
 gabapentin (NEURONTIN) capsule 1,200 mg  1,200 mg Oral QHS  losartan (COZAAR) tablet 50 mg  50 mg Oral DAILY  topiramate (TOPAMAX) tablet 200 mg  200 mg Oral BID  pantoprazole (PROTONIX) tablet 40 mg  40 mg Oral ACB  azithromycin (ZITHROMAX) 500 mg in 0.9% sodium chloride (MBP/ADV) 250 mL  500 mg IntraVENous Q24H  
 ondansetron (ZOFRAN) injection 4 mg  4 mg IntraVENous Q4H PRN  prochlorperazine (COMPAZINE) injection 10 mg  10 mg IntraVENous Q6H PRN Allergies Allergies Allergen Reactions  Hydromorphone Itching and Swelling  
  hydromorphone 1mg IV given immediately began itching and complained of scratchy swelling sensation in her throat  Levofloxacin Hives  Aspirin Anaphylaxis  Bactrim [Sulfamethoprim Ds] Hives  Darvocet A500 [Propoxyphene N-Acetaminophen] Hives  Imitrex [Sumatriptan] Hives  Pcn [Penicillins] Anaphylaxis  Percocet [Oxycodone-Acetaminophen] Hives Pt can take lortab/norco  
 Toradol [Ketorolac Tromethamine] Hives  Tramadol Hives  Hydrocodone Hives  Keflex [Cephalexin] Hives  Lisinopril Swelling Tongue and mouth felt weird, slight swelling  Prednisone Rash and Angioedema And pt felt throat closing Judyann Guildarlene [Milnacipran] Hives INSOMNIA  Vesicare [Solifenacin] Other (comments) Nose bleeds  Wellbutrin [Bupropion Hcl] Other (comments)  
  insomnia Objective Vitals: 
Visit Vitals /81 (BP 1 Location: Right arm, BP Patient Position: At rest) Pulse (!) 113 Temp 98.8 °F (37.1 °C) Resp 20 Ht 5' 4\" (1.626 m) Wt 296 lb (134.3 kg) SpO2 96% BMI 50.81 kg/m² Temp (24hrs), Av.4 °F (37.4 °C), Min:98.4 °F (36.9 °C), Max:102.4 °F (39.1 °C) O2 Device: Room air I/O: 
 
Intake/Output Summary (Last 24 hours) at 2019 0225 Last data filed at 2018 1346 Gross per 24 hour Intake 2250 ml Output  Net 2250 ml Last 3 shifts: 
   0701 -  1900 In: 2250 [I.V.:2250] Out: - Physical Exam: 
General: No acute distress. Alert. Cooperative. Head: Normocephalic. Atraumatic. Respiratory: CTAB. No w/r/r/c.  
Cardiovascular: RRR. Normal S1,S2. No m/r/g. Pulses 2+ throughout. GI: + bowel sounds. Nontender. No rebound tenderness or guarding. Nondistended Extremities: Trace pedal edema b/l. No tenderness. Neuro:                          Oriented. No focal deficits Skin:                             Warm and dry. No rashes or lesions Labs and Data: 
 
Recent Labs 19 
04118 
1100 WBC 7.2 10.7 HGB 10.7* 12.4 HCT 33.5* 38.1  248 Recent Labs 19 
04118 
1100 * 133* K 3.2* 3.5  101 CO2 20* 20* GLU 96 117* BUN 5* 10  
CREA 0.92 1.16* CA 7.4* 8.3* ALB 2.2* 2.6* SGOT 17 11* ALT 11* 15 Signed by: Tyler Castelan MD 
 Resident, Family Medicine 01/01/19

## 2019-01-01 NOTE — PROGRESS NOTES
Resident at bedside, aware that patient has elevated temp and pulse rate that is triggering her MEWS score. Also that patient complained of migraine headache last pm that did not improve with one time dose of pain medication. No new order received. Assisted patient to bathroom

## 2019-01-01 NOTE — PROGRESS NOTES
Problem: Pressure Injury - Risk of 
Goal: *Prevention of pressure injury Document Jim Scale and appropriate interventions in the flowsheet. Outcome: Progressing Towards Goal 
Pressure Injury Interventions: 
  
 
Moisture Interventions: Absorbent underpads Activity Interventions: Increase time out of bed, Pressure redistribution bed/mattress(bed type) Mobility Interventions: HOB 30 degrees or less, Pressure redistribution bed/mattress (bed type) Nutrition Interventions: Document food/fluid/supplement intake, Offer support with meals,snacks and hydration Friction and Shear Interventions: Lift sheet

## 2019-01-01 NOTE — PROGRESS NOTES
Problem: Falls - Risk of 
Goal: *Absence of Falls Document Markel Rays Fall Risk and appropriate interventions in the flowsheet. Outcome: Progressing Towards Goal 
Fall Risk Interventions: 
Mobility Interventions: Bed/chair exit alarm, Communicate number of staff needed for ambulation/transfer, Patient to call before getting OOB, Utilize gait belt for transfers/ambulation Medication Interventions: Bed/chair exit alarm, Patient to call before getting OOB, Teach patient to arise slowly, Utilize gait belt for transfers/ambulation Elimination Interventions: Bed/chair exit alarm, Call light in reach, Patient to call for help with toileting needs, Toileting schedule/hourly rounds

## 2019-01-01 NOTE — PROGRESS NOTES
Bedside and Verbal shift change report given to [facility] AT [facility] ,[address] [facility] (oncoming nurse) by Yen Mast (offgoing nurse). Report included the following information SBAR, Kardex, ED Summary, Intake/Output, MAR, Accordion and Recent Results.

## 2019-01-01 NOTE — ED NOTES
Verbal shift change report given to Elvie Raygoza (oncoming nurse) by Theodora Ag (offgoing nurse). Report included the following information SBAR, Kardex, ED Summary, MAR, Recent Results and Cardiac Rhythm Sinus Tach.

## 2019-01-01 NOTE — PROGRESS NOTES
Per Dr. Radha Kelly, administer tylenol and fioricet at this time. -See [facility]

## 2019-01-01 NOTE — ED NOTES
Called FP to request a remote tele bed due to pt's continued tachycardia. Pt will be moved to remote tele per FP.

## 2019-01-01 NOTE — DISCHARGE SUMMARY
[address] [facility] [address] [place] [address]   Office [phone], Fax [phone]    Discharge Summary     Patient: Shawnee Gasca       MRN: [id number]       YOB: 1970       Age: 57 y.o. Date of admission:  12/31/2018    Date of discharge:  1/3/2019    Primary care provider:  Hilary Escamilla MD     Admitting provider:  Philip Virk MD    Discharging provider(s): Rosina Swan MD - Family Medicine Resident  Yaya Crews MD - Family Medicine Attending     Consultations  · None    Procedures  · None    Discharge destination: Home w/ home health. The patient is stable for discharge. Admission diagnosis  CAP (community acquired pneumonia)    MEDICATION CHANGES:  New meds:   - Doxycycline 100mg oral tab BID for 3 days to complete 5 day course  - Probiotic 1 oral tab daily  Changes:  - reduce xanax from BID to once daily dosing      Final discharge diagnoses and brief hospital course: As per admitting provider:     Medina Mcconnell is a 57 y.o. female with PMH of Fibromyalgia, Lumbar DDD, Depression, Migraine, HTN, Obesity, and Asthma who presents to the ER complaining of neck, back, and leg pain. Pt has chronic baseline back pain, but pain worsened 4 days ago, back pain is 8/10 intensity. Back pain radiates down R leg to the knee. Pt also with feeling \"warm\", chills, diarrhea \"every time I drink something\" and nausea for 4 days. Pt has had a cough for 1 day and pt reports being short of breath while laying down. Pt reports she is taking her home gabapentin as prescribed along with hydrocodone and a \"muscle relaxer\" and smoked marijuana 2 days ago for the pain which helped. Pt denies trauma, LOC, chest pain, dysuria, hematuria, constipation, or sick contacts.      In the ER, vital signs were remarkable for T 102.4, . Labs were remarkable for Na 133, Cr 1.16, UA: Large blood and 10-20 RBCs. -CXR showed Probable lingular patchy airspace disease.   -EKG showed  Sinus tachycardia, nonspecific T-wave abnormality in lateral leads. Pt was treated with Zithromax, Aztreonam, ibuprofen, tylenol, zofran, and 2L NS. \"      Conditions treated during this hospitalization:    Sepsis 2/2 CAP:  2/4 SIRS. CXR suggestive of lingular patchy airspace disease. Rapid flu neg. S/p 1x IV aztreonam in ED. Zithromax + aztreonam switched to PO doxycycline. Prelim blood cx NG x3 days. UCx: NG x 2 days. - Continue Doxycycline on discharge for 3 days to complete 5 day course  - Pending blood cx  - PCP follow-up    Sore Throat:  Worsened in last 2 days of hospital course. Managed on lozenges and nasal spray. Centor score 1. Pharyngeal exudate visualized. Doxycycline will cover for pharyngitis. For PCP follow-up.     Diarrhea:  Suspected C. diff after pt c/o several loose BM during hospital course. Received 2 days empiric treatment of C. diff w/ PO vanc. D/c after C. diff neg result. Probiotic given, diarrhea improved. Pending stool studies: fecal fat, O&P. Can be followed up as out-pt     Hypokalemia: RESOLVED  Repleted during hospital course. K+ 3.5 at discharge.     Tachycardia: RESOLVED  Likely 2/2 migraine     Anemia  Hgb 11.3 at discharge. Stable, within baseline.   - Follow-up w/ PCP. Consider further anemia work-up.     At Risk for JAYLIN: RESOLVED  Likely 2/2 sepsis; Cr 0.79 at discharge. (POA 1.16; BL Cr 0.7-0.8).    HTN:  Stable on home Cozaar 50mg daily.      Chronic Pain/Fibromyalgia/Lumbar DDD:   UDS + for THC. Pain managed on home gabapentin. Xanax dose was reduced to once daily dosing as opposed to home BID dosing.     Migraine:   Stable on home topamax 200mg BID. During hospital course, pt had required PRN compazine, tylenol, fioricet to control migraine     Depression:   Stable on home Cymbalta 60mg daily and Elavil 100mg nightly     Asthma:   Stable on home albuterol q4hr PRN     Obesity:   BMI: 50.81 kg/m².   - Encouraging lifestyle modifications and further follow up outpatient.     Labs/Imaging Needed on follow up: none    Pending test results: At the time of your discharge the following test results are still pending: stool culture, stool O&P, fecal fat. Please make sure you review these results with your outpatient follow-up provider(s). Specific symptoms to watch for: chest pain, shortness of breath, fever, chills, nausea, vomiting, diarrhea, change in mentation, falling, weakness, bleeding. DIET/what to eat:  Regular Diet    ACTIVITY:  Activity as tolerated    Wound care: None    Equipment needed:  None    Follow-up Care: Follow-up Information     Follow up With Specialties Details Why Contact Info    Danny Dorantes MD Family Practice Go on 1/7/2019 For post-hospital follow-up on Monday 7th January at [place] [address] [address] [place] [phone]          Physical examination at discharge  Visit Vitals  /64 (BP 1 Location: Right arm, BP Patient Position: At rest;Head of bed elevated (Comment degrees))   Pulse 91   Temp 98.5 °F (36.9 °C)   Resp 16   Ht 5' 4\" (1.626 m)   Wt 296 lb (134.3 kg)   SpO2 95%   BMI 50.81 kg/m²      Physical Examination:   General: No acute distress. Alert. Cooperative. HEENT: Normocephalic. Atraumatic. Pharyngeal exudate. Respiratory: CTAB. No w/r/r/c.   Cardiovascular: RRR. Normal S1,S2. No m/r/g. Pulses 2+ throughout. GI: + bowel sounds. Nontender. No rebound tenderness or guarding. Nondistended   Extremities: No edema. No tenderness. Neuro:                          Oriented. No focal deficits  Skin:                             Warm and dry.  No rashes or lesions    Recent Results (from the past 24 hour(s))   METABOLIC PANEL, COMPREHENSIVE    Collection Time: 01/03/19  4:32 AM   Result Value Ref Range    Sodium 138 136 - 145 mmol/L    Potassium 3.5 3.5 - 5.1 mmol/L Chloride 106 97 - 108 mmol/L    CO2 20 (L) 21 - 32 mmol/L    Anion gap 12 5 - 15 mmol/L    Glucose 86 65 - 100 mg/dL    BUN 6 6 - 20 MG/DL    Creatinine 0.79 0.55 - 1.02 MG/DL    BUN/Creatinine ratio 8 (L) 12 - 20      GFR est AA >60 >60 ml/min/1.73m2    GFR est non-AA >60 >60 ml/min/1.73m2    Calcium 8.4 (L) 8.5 - 10.1 MG/DL    Bilirubin, total 0.2 0.2 - 1.0 MG/DL    ALT (SGPT) 19 12 - 78 U/L    AST (SGOT) 15 15 - 37 U/L    Alk. phosphatase 63 45 - 117 U/L    Protein, total 6.8 6.4 - 8.2 g/dL    Albumin 2.2 (L) 3.5 - 5.0 g/dL    Globulin 4.6 (H) 2.0 - 4.0 g/dL    A-G Ratio 0.5 (L) 1.1 - 2.2     CBC WITH AUTOMATED DIFF    Collection Time: 01/03/19  4:32 AM   Result Value Ref Range    WBC 5.4 3.6 - 11.0 K/uL    RBC 3.91 3.80 - 5.20 M/uL    HGB 11.3 (L) 11.5 - 16.0 g/dL    HCT 36.1 35.0 - 47.0 %    MCV 92.3 80.0 - 99.0 FL    MCH 28.9 26.0 - 34.0 PG    MCHC 31.3 30.0 - 36.5 g/dL    RDW 15.1 (H) 11.5 - 14.5 %    PLATELET 754 202 - 941 K/uL    MPV 11.1 8.9 - 12.9 FL    NRBC 0.0 0  WBC    ABSOLUTE NRBC 0.00 0.00 - 0.01 K/uL    NEUTROPHILS 50 32 - 75 %    LYMPHOCYTES 39 12 - 49 %    MONOCYTES 7 5 - 13 %    EOSINOPHILS 3 0 - 7 %    BASOPHILS 0 0 - 1 %    IMMATURE GRANULOCYTES 0 0.0 - 0.5 %    ABS. NEUTROPHILS 2.7 1.8 - 8.0 K/UL    ABS. LYMPHOCYTES 2.1 0.8 - 3.5 K/UL    ABS. MONOCYTES 0.4 0.0 - 1.0 K/UL    ABS. EOSINOPHILS 0.2 0.0 - 0.4 K/UL    ABS. BASOPHILS 0.0 0.0 - 0.1 K/UL    ABS. IMM. GRANS. 0.0 0.00 - 0.04 K/UL    DF AUTOMATED           Discharge Medication List as of 1/3/2019 10:00 AM      CONTINUE these medications which have CHANGED    Details   L. acidoph & paracasei- S therm- Bifido (SAMUEL-Q/RISAQUAD) 8 billion cell cap cap Take 1 Cap by mouth daily. , Print, Disp-30 Cap, R-0      doxycycline (ADOXA) 100 mg tablet Take 1 Tab by mouth two (2) times a day.  Take first dose on 1/3/19 evening, Print, Disp-7 Tab, R-0      ALPRAZolam (XANAX) 0.5 mg tablet TAKE 1 TABLET BY MOUTH ONCE DAILY AS NEEDED, No Print, Disp-60 Tab, R-3         CONTINUE these medications which have NOT CHANGED    Details   !! gabapentin (NEURONTIN) 600 mg tablet Take 600 mg by mouth two (2) times a day. One pill in the morning and one pill in the afternoon, Historical Med      !! gabapentin (NEURONTIN) 600 mg tablet Take 1,200 mg by mouth nightly., Historical Med      topiramate (TOPAMAX) 200 mg tablet TAKE 1 TABLET BY MOUTH 2 TIMES DAILY  Indications: MIGRAINE PREVENTION, Normal, Disp-60 Tab, R-5      raNITIdine (ZANTAC) 75 mg tablet TAKE 1 TABLET BY MOUTH TWICE A DAY BEFORE MEALS, Normal, Disp-180 Tab, R-1      albuterol (PROVENTIL VENTOLIN) 2.5 mg /3 mL (0.083 %) nebulizer solution INHALE 3 ML BY NEBULIZATION ROUTE EVERY 4 HOURS AS NEEDED FOR WHEEZING, Normal, Disp-25 Each, R-2      VENTOLIN HFA 90 mcg/actuation inhaler TAKE 2 PUFFS BY INHALATION EVERY FOUR (4) HOURS AS NEEDED FOR WHEEZING., Normal, Disp-18 Inhaler, R-2      amitriptyline (ELAVIL) 100 mg tablet TAKE 1 TABLET BY MOUTH EVERY EVENING, Normal, Disp-30 Tab, R-3      butalbital-acetaminophen-caff (FIORICET) -40 mg per capsule TAKE 1 CAP BY MOUTH AT ONSET OF HEADACHE AS NEEDED., Normal, Disp-30 Cap, R-0      losartan (COZAAR) 50 mg tablet TAKE 1 TABLET BY MOUTH EVERY DAY, Normal, Disp-90 Tab, R-1      DULoxetine (CYMBALTA) 60 mg capsule TAKE ONE CAPSULE BY MOUTH EVERY DAY, Normal, Disp-30 Cap, R-5       !! - Potential duplicate medications found. Please discuss with provider. Admission imaging studies:    Results from Hospital Encounter encounter on 12/31/18   XR CHEST PA LAT    Narrative Indication:  sepsis     Exam: AP upright and lateral views of the chest.    Direct comparison is made to prior CXR dated June 2012. Findings: Cardiomediastinal silhouette is within normal limits. Lateral view  demonstrates patchy airspace disease overlying the cardiac silhouette, likely  lingular. No pleural fluid is visualized. There is no pneumothorax.       Impression IMPRESSION: Probable lingular patchy airspace disease. Results from Hospital Encounter encounter on 02/19/13   US AXILLA SOFT TISSUE LEFT    Narrative **Final Report**       ICD Codes / Adm. Diagnosis: 729.5  729.81 / Pain in limb  Swelling of limb  Examination:  US AXILLA SOFT TISSUE LT  - [id number] - Feb 19 2013 12:39PM  Accession No:  [id number]  Reason:  left axilla swelling and pain      REPORT:  Indication: Left axillary swelling    Findings: Ultrasound examination of the left axilla demonstrates a 9 mm   axillary lymph node which demonstrates normal a normal cortex and normal   central fatty hilum. No pathologically enlarged axillary lymph nodes are   seen. No mass lesion or fluid collection is evident. IMPRESSION: Normal appearing subcentimeter left axillary lymph node is seen   at the clinical site of swelling. No pathologically enlarged lymph node or   mass is evident. Clinical followup is recommended. Signing/Reading Doctor: Christi Mendez ([id number])    Approved: Christi Mendez ([id number])  Feb 19 2013  1:33PM                                            Results from Hospital Encounter encounter on 07/17/12   CT HEAD WO CONT    Narrative **Final Report**       ICD Codes / Adm. Diagnosis: 52   / Headache    Examination:  CT HEAD WO CON  - [id number] - Jul 17 2012  2:35AM  Accession No:  [id number]  Reason:  headache      REPORT:  Cranial CT without contrast    INDICATION:  Headache. COMPARISON: None. TECHNIQUE: Unenhanced CT of the head was performed using 5 mm images. Brain   and bone windows were generated. FINDINGS:  The ventricles and sulci are normal in size, shape and configuration and   midline. There is no significant white matter disease. There is no   intracranial hemorrhage, extra-axial collection, mass, mass effect or   midline shift. The basilar cisterns are open. No acute infarct is   identified. The bone windows demonstrate no abnormalities.  The visualized   portions of the paranasal sinuses and mastoid air cells are clear.         IMPRESSION: No acute process             Signing/Reading Doctor: Margarito Alfonso ([id number])    Approved: Margarito Alfonso ([id number])  07/17/2012                                              No procedure found.      -------------------------------------------------------------------------------------------------------------------    Chronic Diagnoses:    Problem List as of 1/3/2019 Date Reviewed: 9/26/2018          Codes Class Noted - Resolved    Diarrhea of presumed infectious origin ICD-10-CM: R19.7  ICD-9-CM: 009.3  1/2/2019 - Present        CAP (community acquired pneumonia) ICD-10-CM: J18.9  ICD-9-CM: 946  12/31/2018 - Present        * (Principal) Sepsis ([facility] ICD-10-CM: A41.9  ICD-9-CM: 038.9, 995.91  12/31/2018 - Present        Obesity, morbid ([facility] ICD-10-CM: E66.01  ICD-9-CM: 278.01  2/19/2018 - Present        Pre-diabetes ICD-10-CM: R73.03  ICD-9-CM: 790.29  8/18/2016 - Present        Urinary frequency ICD-10-CM: R35.0  ICD-9-CM: 788.41  10/21/2015 - Present        Essential hypertension, benign ICD-10-CM: I10  ICD-9-CM: 401.1  8/25/2014 - Present        DDD (degenerative disc disease), lumbar ICD-10-CM: M51.36  ICD-9-CM: 722.52  5/6/2013 - Present        Left axillary pain ICD-10-CM: B55.530  ICD-9-CM: 729.5  2/22/2013 - Present        IBS (irritable bowel syndrome) ICD-10-CM: K58.9  ICD-9-CM: 564.1  5/17/2012 - Present        Migraine ICD-10-CM: T03.426  ICD-9-CM: 346.90  5/17/2012 - Present        Hyperlipidemia ICD-10-CM: E78.5  ICD-9-CM: 272.4  11/10/2011 - Present        Fibromyalgia ICD-10-CM: M79.7  ICD-9-CM: 729.1  10/13/2011 - Present        Nipple discharge ICD-10-CM: N64.52  ICD-9-CM: 611.79  8/18/2011 - Present        Depression ICD-10-CM: F32.9  ICD-9-CM: 871  8/18/2011 - Present                Signed:      Cassandra Pate MD   Family Medicine Resident      1/3/2019

## 2019-01-01 NOTE — PROGRESS NOTES
Occupational Therapy EVALUATION/discharge Patient: Dahiana Courtney (78 y.o. female) Date: 1/1/2019 Primary Diagnosis: CAP (community acquired pneumonia) Precautions: Universal    
 
ASSESSMENT:  
Based on the objective data described below, the patient presents at baseline with self-care. Pt used a quad cane (given by PT for steadiness) to safely access bathroom at mod I. Overall, pt is limited by painful migraine and HR increased to 130 with activity. Feel that once pain is controlled,  pt will be safe to return home. Further skilled acute occupational therapy is not indicated at this time. Discharge Recommendations: None Further Equipment Recommendations for Discharge: none noted SUBJECTIVE:  
Patient stated It hurts so bad.  OBJECTIVE DATA SUMMARY:  
HISTORY:  
Past Medical History:  
Diagnosis Date  Arthritis  Arthritis of low back ([address]  Asthma  Chronic pain   
 fibromyalgia  Colon spasm  Depression  Fibromyalgia  Gastrointestinal disorder IBS  GERD (gastroesophageal reflux disease)  High cholesterol  Hypertension  IBS (irritable bowel syndrome)  Left axillary pain 2/22/2013  Migraine LAST HEADACHE 2-16-12  Multiple sclerosis ([address]  Other ill-defined conditions(799.89)   
 fibromylgia  Other ill-defined conditions(799.89) MS Past Surgical History:  
Procedure Laterality Date  HX APPENDECTOMY  HX BLADDER REPAIR  04/2011  HX CHOLECYSTECTOMY  HX GYN For ectopic pregnancy, hx tubal ligation  HX GYN    
 vaginal ablation.  HX UROLOGICAL    
 bladder tuck, no mesh  HX UROLOGICAL    
 sling removed 1/10/11 Prior Level of Function/Environment/Context: independent Occupations in which the patient is/was successful, what are the barriers preventing that success:  
Performance Patterns (routines, roles, habits, and rituals):  
Personal Interests and/or values: Expanded or extensive additional review of patient history:  
 
Home Situation Home Environment: Private residence Support Systems: Spouse/Significant Other/Partner EXAMINATION OF PERFORMANCE DEFICITS: 
Cognitive/Behavioral Status: 
Neurologic State: Alert Orientation Level: Oriented X4 Cognition: Appropriate decision making; Appropriate for age attention/concentration; Appropriate safety awareness; Follows commands Skin: intact Edema: none noted Hearing: Auditory Auditory Impairment: None Vision/Perceptual:   
    
    
    
  
    
    
  
Range of Motion: 
 
AROM: Within functional limits Strength: 
 
Strength: Within functional limits Coordination: 
Coordination: Within functional limits Fine Motor Skills-Upper: Left Intact; Right Intact Gross Motor Skills-Upper: Left Intact; Right Intact Tone & Sensation: 
 
Tone: Normal 
Sensation: Intact Balance: 
Sitting: Intact Standing: Intact; With support Functional Mobility and Transfers for ADLs:Bed Mobility: 
Supine to Sit: Independent Sit to Supine: Independent Scooting: Independent Transfers: 
Sit to Stand: Modified independent Stand to Sit: Modified independent Bathroom Mobility: Modified independent Toilet Transfer : Modified independent ADL Assessment: 
Feeding: Independent Oral Facial Hygiene/Grooming: Independent Bathing: Independent Upper Body Dressing: Independent Lower Body Dressing: Independent Toileting: Independent ADL Intervention and task modifications: 
  
 
  
 
  
 
  
  
Functional Measure: 
Barthel Index: 
 
Bathin Bladder: 10 Bowels: 10 
Groomin Dressing: 10 Feeding: 10 Mobility: 10 Stairs: 5 Toilet Use: 10 Transfer (Bed to Chair and Back): 15 Total: 90 Barthel and G-code impairment scale: 
Percentage of impairment CH 
0% CI 
1-19% CJ 
20-39% CK 
40-59% CL 
60-79% CM 
80-99% CN 
100% Barthel Score 0-100 100 99-80 79-60 59-40 20-39 1-19 
 0 Barthel Score 0-20 20 17-19 13-16 9-12 5-8 1-4 0 The Barthel ADL Index: Guidelines 1. The index should be used as a record of what a patient does, not as a record of what a patient could do. 2. The main aim is to establish degree of independence from any help, physical or verbal, however minor and for whatever reason. 3. The need for supervision renders the patient not independent. 4. A patient's performance should be established using the best available evidence. Asking the patient, friends/relatives and nurses are the usual sources, but direct observation and common sense are also important. However direct testing is not needed. 5. Usually the patient's performance over the preceding 24-48 hours is important, but occasionally longer periods will be relevant. 6. Middle categories imply that the patient supplies over 50 per cent of the effort. 7. Use of aids to be independent is allowed. [employer] Barthel, DJairW. (4216). Functional evaluation: the Barthel Index. [address] [facility] (14)2. Tess Calixto tyshawn OLIVA TracyF, Susana Sparrow., Dylan Vicente., Ez, [address] (1999). Measuring the change indisability after inpatient rehabilitation; comparison of the responsiveness of the Barthel Index and Functional [place] Measure. Journal of Neurology, Neurosurgery, and Psychiatry, 66(4), 675-757. Alysa Rivera, N.J.A, Kathy Rain  WJairJ.MAYNOR, & Vanessa Schaffer MJairA. (2004.) Assessment of post-stroke quality of life in cost-effectiveness studies: The usefulness of the Barthel Index and the EuroQoL-5D. [facility], 13, 276-80 G codes: In compliance with CMSs Claims Based Outcome Reporting, the following G-code set was chosen for this patient based on their primary functional limitation being treated:  
 
The outcome measure chosen to determine the severity of the functional limitation was the Barthel with a score of 90/100 which was correlated with the impairment scale. ? Self Care:  
  - CURRENT STATUS: CI - 1%-19% impaired, limited or restricted  - GOAL STATUS: CI - 1%-19% impaired, limited or restricted  - D/C STATUS:  CI - 1%-19% impaired, limited or restricted Occupational Therapy Evaluation Charge Determination History Examination Decision-Making LOW Complexity : Brief history review  LOW Complexity : 1-3 performance deficits relating to physical, cognitive , or psychosocial skils that result in activity limitations and / or participation restrictions  LOW Complexity : No comorbidities that affect functional and no verbal or physical assistance needed to complete eval tasks Based on the above components, the patient evaluation is determined to be of the following complexity level: LOW Pain: 
Pain Scale 1: Numeric (0 - 10)(resident aware) Pain Intensity 1: 6 Pain Location 1: Head 
Pain Orientation 1: Anterior Pain Description 1: Aching Pain Intervention(s) 1: Medication (see MAR) Activity Tolerance:  
Good Please refer to the flowsheet for vital signs taken during this treatment. After treatment:  
[]  Patient left in no apparent distress sitting up in chair 
[x]  Patient left in no apparent distress in bed 
[]  Call bell left within reach [x]  Nursing notified 
[x]  Caregiver present 
[]  Bed alarm activated COMMUNICATION/EDUCATION:  
Communication/Collaboration: 
[x]      Home safety education was provided and the patient/caregiver indicated understanding. [x]      Patient/family have participated as able and agree with findings and recommendations. []      Patient is unable to participate in plan of care at this time. Findings and recommendations were discussed with: Registered Nurse Caleb Pineda OTR/TR Time Calculation: 15 mins

## 2019-01-02 PROBLEM — R19.7 DIARRHEA OF PRESUMED INFECTIOUS ORIGIN: Status: ACTIVE | Noted: 2019-01-02

## 2019-01-02 LAB
ALBUMIN SERPL-MCNC: 2.3 G/DL (ref 3.5–5)
ALBUMIN/GLOB SERPL: 0.5 {RATIO} (ref 1.1–2.2)
ALP SERPL-CCNC: 64 U/L (ref 45–117)
ALT SERPL-CCNC: 18 U/L (ref 12–78)
ANION GAP SERPL CALC-SCNC: 12 MMOL/L (ref 5–15)
AST SERPL-CCNC: 22 U/L (ref 15–37)
BACTERIA SPEC CULT: NORMAL
BASOPHILS # BLD: 0 K/UL (ref 0–0.1)
BASOPHILS NFR BLD: 0 % (ref 0–1)
BILIRUB SERPL-MCNC: 0.1 MG/DL (ref 0.2–1)
BUN SERPL-MCNC: 4 MG/DL (ref 6–20)
BUN/CREAT SERPL: 5 (ref 12–20)
C DIFF GDH STL QL: NEGATIVE
C DIFF TOX A+B STL QL IA: NEGATIVE
CALCIUM SERPL-MCNC: 8.2 MG/DL (ref 8.5–10.1)
CC UR VC: NORMAL
CHLORIDE SERPL-SCNC: 107 MMOL/L (ref 97–108)
CO2 SERPL-SCNC: 19 MMOL/L (ref 21–32)
CREAT SERPL-MCNC: 0.84 MG/DL (ref 0.55–1.02)
DIFFERENTIAL METHOD BLD: ABNORMAL
EOSINOPHIL # BLD: 0.1 K/UL (ref 0–0.4)
EOSINOPHIL NFR BLD: 2 % (ref 0–7)
ERYTHROCYTE [DISTWIDTH] IN BLOOD BY AUTOMATED COUNT: 14.7 % (ref 11.5–14.5)
GLOBULIN SER CALC-MCNC: 4.6 G/DL (ref 2–4)
GLUCOSE SERPL-MCNC: 96 MG/DL (ref 65–100)
HCT VFR BLD AUTO: 36.2 % (ref 35–47)
HGB BLD-MCNC: 11.5 G/DL (ref 11.5–16)
IMM GRANULOCYTES # BLD: 0 K/UL (ref 0–0.04)
IMM GRANULOCYTES NFR BLD AUTO: 1 % (ref 0–0.5)
INTERPRETATION: NORMAL
LYMPHOCYTES # BLD: 1.5 K/UL (ref 0.8–3.5)
LYMPHOCYTES NFR BLD: 31 % (ref 12–49)
MCH RBC QN AUTO: 29.3 PG (ref 26–34)
MCHC RBC AUTO-ENTMCNC: 31.8 G/DL (ref 30–36.5)
MCV RBC AUTO: 92.1 FL (ref 80–99)
MONOCYTES # BLD: 0.3 K/UL (ref 0–1)
MONOCYTES NFR BLD: 6 % (ref 5–13)
NEUTS SEG # BLD: 3.1 K/UL (ref 1.8–8)
NEUTS SEG NFR BLD: 60 % (ref 32–75)
NRBC # BLD: 0 K/UL (ref 0–0.01)
NRBC BLD-RTO: 0 PER 100 WBC
PHOSPHATE SERPL-MCNC: 2.3 MG/DL (ref 2.6–4.7)
PLATELET # BLD AUTO: 219 K/UL (ref 150–400)
PMV BLD AUTO: 11 FL (ref 8.9–12.9)
POTASSIUM SERPL-SCNC: 3.6 MMOL/L (ref 3.5–5.1)
PROT SERPL-MCNC: 6.9 G/DL (ref 6.4–8.2)
RBC # BLD AUTO: 3.93 M/UL (ref 3.8–5.2)
SERVICE CMNT-IMP: NORMAL
SODIUM SERPL-SCNC: 138 MMOL/L (ref 136–145)
WBC # BLD AUTO: 5.1 K/UL (ref 3.6–11)
WBC #/AREA STL HPF: NORMAL /HPF (ref 0–4)

## 2019-01-02 PROCEDURE — 74011250637 HC RX REV CODE- 250/637: Performed by: FAMILY MEDICINE

## 2019-01-02 PROCEDURE — 65660000000 HC RM CCU STEPDOWN

## 2019-01-02 PROCEDURE — 74011250637 HC RX REV CODE- 250/637: Performed by: STUDENT IN AN ORGANIZED HEALTH CARE EDUCATION/TRAINING PROGRAM

## 2019-01-02 PROCEDURE — 97116 GAIT TRAINING THERAPY: CPT

## 2019-01-02 PROCEDURE — 84100 ASSAY OF PHOSPHORUS: CPT

## 2019-01-02 PROCEDURE — 74011250636 HC RX REV CODE- 250/636: Performed by: FAMILY MEDICINE

## 2019-01-02 PROCEDURE — 97530 THERAPEUTIC ACTIVITIES: CPT

## 2019-01-02 PROCEDURE — 74011250636 HC RX REV CODE- 250/636: Performed by: STUDENT IN AN ORGANIZED HEALTH CARE EDUCATION/TRAINING PROGRAM

## 2019-01-02 PROCEDURE — 85025 COMPLETE CBC W/AUTO DIFF WBC: CPT

## 2019-01-02 PROCEDURE — 36415 COLL VENOUS BLD VENIPUNCTURE: CPT

## 2019-01-02 PROCEDURE — 74011000250 HC RX REV CODE- 250: Performed by: STUDENT IN AN ORGANIZED HEALTH CARE EDUCATION/TRAINING PROGRAM

## 2019-01-02 PROCEDURE — 80053 COMPREHEN METABOLIC PANEL: CPT

## 2019-01-02 PROCEDURE — 74011000258 HC RX REV CODE- 258: Performed by: FAMILY MEDICINE

## 2019-01-02 RX ORDER — DOXYCYCLINE HYCLATE 100 MG
100 TABLET ORAL EVERY 12 HOURS
Status: DISCONTINUED | OUTPATIENT
Start: 2019-01-02 | End: 2019-01-03 | Stop reason: HOSPADM

## 2019-01-02 RX ADMIN — VANCOMYCIN HYDROCHLORIDE 125 MG: KIT at 11:43

## 2019-01-02 RX ADMIN — AZTREONAM 1 G: 1 INJECTION, POWDER, LYOPHILIZED, FOR SOLUTION INTRAMUSCULAR; INTRAVENOUS at 04:40

## 2019-01-02 RX ADMIN — TOPIRAMATE 200 MG: 100 TABLET, FILM COATED ORAL at 18:07

## 2019-01-02 RX ADMIN — TOPIRAMATE 200 MG: 100 TABLET, FILM COATED ORAL at 05:40

## 2019-01-02 RX ADMIN — GABAPENTIN 600 MG: 300 CAPSULE ORAL at 14:56

## 2019-01-02 RX ADMIN — DOXYCYCLINE HYCLATE 100 MG: 100 TABLET, COATED ORAL at 21:02

## 2019-01-02 RX ADMIN — AZTREONAM 1 G: 1 INJECTION, POWDER, LYOPHILIZED, FOR SOLUTION INTRAMUSCULAR; INTRAVENOUS at 12:12

## 2019-01-02 RX ADMIN — AMITRIPTYLINE HYDROCHLORIDE 100 MG: 50 TABLET, FILM COATED ORAL at 21:02

## 2019-01-02 RX ADMIN — GABAPENTIN 600 MG: 300 CAPSULE ORAL at 07:49

## 2019-01-02 RX ADMIN — Medication 1 CAPSULE: at 11:43

## 2019-01-02 RX ADMIN — ALPRAZOLAM 0.5 MG: 0.5 TABLET ORAL at 08:06

## 2019-01-02 RX ADMIN — AZITHROMYCIN MONOHYDRATE 500 MG: 500 INJECTION, POWDER, LYOPHILIZED, FOR SOLUTION INTRAVENOUS at 14:12

## 2019-01-02 RX ADMIN — LOSARTAN POTASSIUM 50 MG: 50 TABLET, FILM COATED ORAL at 18:07

## 2019-01-02 RX ADMIN — Medication 10 ML: at 21:04

## 2019-01-02 RX ADMIN — GABAPENTIN 1200 MG: 300 CAPSULE ORAL at 21:02

## 2019-01-02 RX ADMIN — VANCOMYCIN HYDROCHLORIDE 125 MG: KIT at 00:08

## 2019-01-02 RX ADMIN — BENZOCAINE, MENTHOL 1 LOZENGE: 15; 3.6 LOZENGE ORAL at 11:46

## 2019-01-02 RX ADMIN — PANTOPRAZOLE SODIUM 40 MG: 40 TABLET, DELAYED RELEASE ORAL at 05:40

## 2019-01-02 RX ADMIN — ENOXAPARIN SODIUM 40 MG: 40 INJECTION SUBCUTANEOUS at 18:07

## 2019-01-02 RX ADMIN — ENOXAPARIN SODIUM 40 MG: 40 INJECTION SUBCUTANEOUS at 05:40

## 2019-01-02 RX ADMIN — VANCOMYCIN HYDROCHLORIDE 125 MG: KIT at 05:41

## 2019-01-02 RX ADMIN — Medication 10 ML: at 14:15

## 2019-01-02 RX ADMIN — SODIUM CHLORIDE: 900 INJECTION, SOLUTION INTRAVENOUS at 07:51

## 2019-01-02 RX ADMIN — DULOXETINE HYDROCHLORIDE 60 MG: 30 CAPSULE, DELAYED RELEASE ORAL at 08:05

## 2019-01-02 NOTE — PROGRESS NOTES
[facility] FAMILY MEDICINE RESIDENCY PROGRAM  
Daily Progress Note Date: 1/2/2019 PCP: Rm Mccain MD  
 
Assessment/Plan:  
Addi Franks is a 57 y.o. female who has spent 2 night(s) in the hospital, who is admitted for sepsis 2/2 CAP. 24 hour Events: Pt reports only 2 soft BM since starting empiric C. diff treatment yesterday. Sepsis 2/2 CAP: 
2/4 SIRS. CXR suggestive of lingular patchy airspace disease. Rapid flu neg. S/p 1x IV aztreonam in ED 
- Continue Zithromax day 3 (Abx started 12/31). Added IV aztreonam day 2 
- Pending blood cx, urine cx 
- Daily CBC, CMP Diarrhea - IMPROVED Pt reports loose BM for the past 2 days, but worsening yesterday (>8 BM), non-bloody. No hx C. Diff. - Empiric C. diff treatment: PO vanc day 2 
- Pending stool studies: C. diff toxin, Cx, WBC, fecal fat, O&P 
- Enteric contact precautions Hypokalemia: RESOLVED 
K+ 3.9.  
- Daily BMP Hypophosphatemia: 
Phos 2.3.  
- Repleted - Recheck level tomorrow Tachycardia:  
Likely 2/2 migraine - Monitor vitals; response to d/c fluids for migraine management Anemia Hgb 10.7 today (12.4 yesterday) likely dilutional 
- Daily CBC At Risk for JAYLIN: RESOLVED Cr 0.84 today. (POA 1.16; BL Cr 0.7-0.8) - Caution w/ nephrotoxic agents 
- Daily CMP 
  
HTN - STABLE 
- Continue home Cozaar 50mg daily. - Will continue to monitor at this time and readjust as BP's trend.  
  
Chronic Pain/Fibromyalgia/Lumbar DDD:  
UDS + for THC 
-Continue home gabapentin 600mg BID in morning and afternoon 
-Continue home gabapentin 1200mg qHS 
-Reduce home Xanax dose to 0.5mg once daily 
  
Migraine: stable - Continue home topamax 200mg BID 
- PRN compazine, tylenol, fioricet 
  
Depression:  
- Continue home Cymbalta 60mg daily and Elavil 100mg nightly 
  
Asthma:  
- Continue home albuterol q4hr PRN 
 
Obesity: BMI: 50.81 kg/m².  
- Encouraging lifestyle modifications and further follow up outpatient.  
  
 FEN/GI - Regular diet. Activity - Ambulate with assistance DVT prophylaxis - Lovenox GI prophylaxis -  Protonix Disposition - Plan to d/c to home. PT/OT. 
  
CODE STATUS:  Full Pt was discussed with Dr. Sanchez Mayers, [facility] Medicine Attending Luz Sommer MD 
Family Medicine Resident CC: Neck/back/leg pain Subjective No acute events overnight. Patient reports improvement in migraines and loose BMx2 overnight. Denies abdominal pain, chills, chest pain, palpitations, shortness of breath, and LE edema. Inpatient Medications Current Facility-Administered Medications Medication Dose Route Frequency  vancomycin (FIRVANQ) 50 mg/mL oral solution 125 mg  125 mg Oral Q6H  
 aztreonam (AZACTAM) 1 g in 0.9% sodium chloride (MBP/ADV) 100 mL  1 g IntraVENous Q8H  
 butalbital-acetaminophen-caffeine (FIORICET, ESGIC) -40 mg per tablet 1 Tab  1 Tab Oral Q6H PRN  
 acetaminophen (TYLENOL) tablet 500 mg  500 mg Oral Q6H PRN  
 ALPRAZolam (XANAX) tablet 0.5 mg  0.5 mg Oral DAILY  sodium chloride (NS) flush 5-10 mL  5-10 mL IntraVENous Q8H  
 sodium chloride (NS) flush 5-10 mL  5-10 mL IntraVENous PRN  
 enoxaparin (LOVENOX) injection 40 mg  40 mg SubCUTAneous Q12H  
 albuterol (PROVENTIL VENTOLIN) nebulizer solution 2.5 mg  2.5 mg Nebulization Q4H PRN  
 amitriptyline (ELAVIL) tablet 100 mg  100 mg Oral QHS  DULoxetine (CYMBALTA) capsule 60 mg  60 mg Oral DAILY  gabapentin (NEURONTIN) capsule 600 mg  600 mg Oral BID  
 gabapentin (NEURONTIN) capsule 1,200 mg  1,200 mg Oral QHS  losartan (COZAAR) tablet 50 mg  50 mg Oral DAILY  topiramate (TOPAMAX) tablet 200 mg  200 mg Oral BID  pantoprazole (PROTONIX) tablet 40 mg  40 mg Oral ACB  azithromycin (ZITHROMAX) 500 mg in 0.9% sodium chloride (MBP/ADV) 250 mL  500 mg IntraVENous Q24H  
 ondansetron (ZOFRAN) injection 4 mg  4 mg IntraVENous Q4H PRN  
  prochlorperazine (COMPAZINE) injection 10 mg  10 mg IntraVENous Q6H PRN Allergies Allergies Allergen Reactions  Hydromorphone Itching and Swelling  
  hydromorphone 1mg IV given immediately began itching and complained of scratchy swelling sensation in her throat  Levofloxacin Hives  Aspirin Anaphylaxis  Bactrim [Sulfamethoprim Ds] Hives  Darvocet A500 [Propoxyphene N-Acetaminophen] Hives  Imitrex [Sumatriptan] Hives  Pcn [Penicillins] Anaphylaxis  Percocet [Oxycodone-Acetaminophen] Hives Pt can take lortab/norco  
 Toradol [Ketorolac Tromethamine] Hives  Tramadol Hives  Hydrocodone Hives  Keflex [Cephalexin] Hives  Lisinopril Swelling Tongue and mouth felt weird, slight swelling  Prednisone Rash and Angioedema And pt felt throat closing Fer Acre [Milnacipran] Hives INSOMNIA  Vesicare [Solifenacin] Other (comments) Nose bleeds  Wellbutrin [Bupropion Hcl] Other (comments)  
  insomnia Objective Vitals: 
Visit Vitals /83 (BP 1 Location: Right arm, BP Patient Position: At rest) Pulse (!) 107 Temp 98.4 °F (36.9 °C) Resp 18 Ht 5' 4\" (1.626 m) Wt 296 lb (134.3 kg) SpO2 99% BMI 50.81 kg/m² Temp (24hrs), Av.7 °F (37.6 °C), Min:98 °F (36.7 °C), Max:102.7 °F (39.3 °C) O2 Device: Room air I/O: 
 
Intake/Output Summary (Last 24 hours) at 2019 0401 Last data filed at 2019 2159 Gross per 24 hour Intake 350 ml Output 1250 ml Net -900 ml Last 3 shifts: 
   0701 -  1900 In: 2600 [I.V.:2600] Out: 8625 [[id number]] Physical Exam: 
General: No acute distress. Alert. Cooperative. Head: Normocephalic. Atraumatic. Respiratory: CTAB. No w/r/r/c.  
Cardiovascular: RRR. Normal S1,S2. No m/r/g. Pulses 2+ throughout. GI: + bowel sounds. Nontender. No rebound tenderness or guarding. Nondistended Extremities: No edema. No tenderness. Neuro: Oriented. No focal deficits Skin:                             Warm and dry. No rashes or lesions Labs and Data: 
 
Recent Labs 01/02/19 
0101 01/01/19 0419 12/31/18 
1100 WBC 5.1 7.2 10.7 HGB 11.5 10.7* 12.4 HCT 36.2 33.5* 38.1  213 248 Recent Labs 01/02/19 
0101 01/01/19 0419 12/31/18 
1100  135* 133* K 3.6 3.2* 3.5  103 101 CO2 19* 20* 20* GLU 96 96 117* BUN 4* 5* 10  
CREA 0.84 0.92 1.16* CA 8.2* 7.4* 8.3*  
MG  --  1.7  --   
PHOS  --  1.5*  --   
ALB 2.3* 2.2* 2.6* SGOT 22 17 11* ALT 18 11* 15 Signed by: Virgie Medina MD 
Resident, Family Medicine 01/02/19

## 2019-01-02 NOTE — PROGRESS NOTES
Bedside and Verbal shift change report given to SHAUN Young (oncoming nurse) by Iona Desir RN (offgoing nurse). Report included the following information SBAR, Kardex, ED Summary, Intake/Output, MAR, Accordion and Recent Results.

## 2019-01-02 NOTE — PROGRESS NOTES
Problem: Falls - Risk of 
Goal: *Absence of Falls Document Mauri Chou Fall Risk and appropriate interventions in the flowsheet. Outcome: Progressing Towards Goal 
Fall Risk Interventions: 
Mobility Interventions: Bed/chair exit alarm, Communicate number of staff needed for ambulation/transfer, Patient to call before getting OOB, Utilize walker, cane, or other assistive device, Utilize gait belt for transfers/ambulation Medication Interventions: Bed/chair exit alarm, Patient to call before getting OOB, Teach patient to arise slowly, Utilize gait belt for transfers/ambulation Elimination Interventions: Bed/chair exit alarm, Call light in reach, Patient to call for help with toileting needs, Toileting schedule/hourly rounds

## 2019-01-02 NOTE — PROGRESS NOTES
Problem: Patient Education: Go to Patient Education Activity Goal: Patient/Family Education 
physical Therapy TREATMENT Patient: Rashmi Gutierrez (89 y.o. female) Date: 1/2/2019 Diagnosis: CAP (community acquired pneumonia) [facility] Precautions: Fall Chart, physical therapy assessment, plan of care and goals were reviewed. ASSESSMENT: 
Pt moves slowly coming to sit with CGA. Pt CGA to stand. Pt ambulated 70ft with RW CGA. Pt with increased sway and is unsteady at times  but with no loss of balance. Pt left sitting Pt progressing slowly. Continue goals. Progression toward goals: 
[]    Improving appropriately and progressing toward goals [x]    Improving slowly and progressing toward goals 
[]    Not making progress toward goals and plan of care will be adjusted PLAN: 
Patient continues to benefit from skilled intervention to address the above impairments. Continue treatment per established plan of care. Discharge Recommendations:  Home Health Further Equipment Recommendations for Discharge:  TBD SUBJECTIVE:  
 
 
OBJECTIVE DATA SUMMARY:  
Critical Behavior: 
Neurologic State: Alert Orientation Level: Oriented X4 Cognition: Appropriate decision making, Appropriate for age attention/concentration, Appropriate safety awareness, Follows commands Functional Mobility Training: 
Bed Mobility: 
  
Supine to Sit: Contact guard assistance Transfers: 
Sit to Stand: Contact guard assistance Stand to Sit: Contact guard assistance Balance: 
Sitting: Intact Standing: With support Standing - Static: Fair Ambulation/Gait Training: 
Distance (ft): 70 Feet (ft) Assistive Device: Gait belt;Cane, quad Ambulation - Level of Assistance: Contact guard assistance Gait Abnormalities: Trunk sway increased;Decreased step clearance; Path deviations Speed/Brittny: Pace decreased (<100 feet/min) Pain: 
Pain Scale 1: Numeric (0 - 10) Pain Intensity 1: 0 Activity Tolerance:  
Pt tolerated treatment well. Please refer to the flowsheet for vital signs taken during this treatment. After treatment:  
[x]    Patient left in no apparent distress sitting up in chair 
[]    Patient left in no apparent distress in bed 
[]    Call bell left within reach 
[]    Nursing notified 
[]    Caregiver present 
[]    Bed alarm activated COMMUNICATION/COLLABORATION:  
The patients plan of care was discussed with: Physical Therapist 
 
Ca Woodall PTA Time Calculation: 23 mins

## 2019-01-02 NOTE — PROGRESS NOTES
Bedside shift change report given to Berna Clifford RN (oncoming nurse) by Jennyfer De Luna RN (offgoing nurse). Report included the following information SBAR, Kardex and MAR.

## 2019-01-02 NOTE — PROGRESS NOTES
1/2/2019 
3:56 PM 
Pt transfer to floor noted. CM reviewed EMR. CM attempted to meet with pt x2 to discuss [facility] consult. Pt not available. CM to reattempt.

## 2019-01-03 VITALS
TEMPERATURE: 98.5 F | OXYGEN SATURATION: 95 % | BODY MASS INDEX: 50.02 KG/M2 | HEART RATE: 91 BPM | HEIGHT: 64 IN | WEIGHT: 293 LBS | SYSTOLIC BLOOD PRESSURE: 107 MMHG | RESPIRATION RATE: 16 BRPM | DIASTOLIC BLOOD PRESSURE: 64 MMHG

## 2019-01-03 LAB
ALBUMIN SERPL-MCNC: 2.2 G/DL (ref 3.5–5)
ALBUMIN/GLOB SERPL: 0.5 {RATIO} (ref 1.1–2.2)
ALP SERPL-CCNC: 63 U/L (ref 45–117)
ALT SERPL-CCNC: 19 U/L (ref 12–78)
ANION GAP SERPL CALC-SCNC: 12 MMOL/L (ref 5–15)
AST SERPL-CCNC: 15 U/L (ref 15–37)
BACTERIA SPEC CULT: NORMAL
BASOPHILS # BLD: 0 K/UL (ref 0–0.1)
BASOPHILS NFR BLD: 0 % (ref 0–1)
BILIRUB SERPL-MCNC: 0.2 MG/DL (ref 0.2–1)
BUN SERPL-MCNC: 6 MG/DL (ref 6–20)
BUN/CREAT SERPL: 8 (ref 12–20)
C JEJUNI+C COLI AG STL QL: NEGATIVE
CALCIUM SERPL-MCNC: 8.4 MG/DL (ref 8.5–10.1)
CHLORIDE SERPL-SCNC: 106 MMOL/L (ref 97–108)
CO2 SERPL-SCNC: 20 MMOL/L (ref 21–32)
CREAT SERPL-MCNC: 0.79 MG/DL (ref 0.55–1.02)
DIFFERENTIAL METHOD BLD: ABNORMAL
E COLI SXT1+2 STL IA: NEGATIVE
EOSINOPHIL # BLD: 0.2 K/UL (ref 0–0.4)
EOSINOPHIL NFR BLD: 3 % (ref 0–7)
ERYTHROCYTE [DISTWIDTH] IN BLOOD BY AUTOMATED COUNT: 15.1 % (ref 11.5–14.5)
FAT STL QL: NORMAL
GLOBULIN SER CALC-MCNC: 4.6 G/DL (ref 2–4)
GLUCOSE SERPL-MCNC: 86 MG/DL (ref 65–100)
HCT VFR BLD AUTO: 36.1 % (ref 35–47)
HGB BLD-MCNC: 11.3 G/DL (ref 11.5–16)
IMM GRANULOCYTES # BLD: 0 K/UL (ref 0–0.04)
IMM GRANULOCYTES NFR BLD AUTO: 0 % (ref 0–0.5)
LYMPHOCYTES # BLD: 2.1 K/UL (ref 0.8–3.5)
LYMPHOCYTES NFR BLD: 39 % (ref 12–49)
MCH RBC QN AUTO: 28.9 PG (ref 26–34)
MCHC RBC AUTO-ENTMCNC: 31.3 G/DL (ref 30–36.5)
MCV RBC AUTO: 92.3 FL (ref 80–99)
MONOCYTES # BLD: 0.4 K/UL (ref 0–1)
MONOCYTES NFR BLD: 7 % (ref 5–13)
NEUTRAL FAT STL QL: NORMAL
NEUTS SEG # BLD: 2.7 K/UL (ref 1.8–8)
NEUTS SEG NFR BLD: 50 % (ref 32–75)
NRBC # BLD: 0 K/UL (ref 0–0.01)
NRBC BLD-RTO: 0 PER 100 WBC
PLATELET # BLD AUTO: 254 K/UL (ref 150–400)
PMV BLD AUTO: 11.1 FL (ref 8.9–12.9)
POTASSIUM SERPL-SCNC: 3.5 MMOL/L (ref 3.5–5.1)
PROT SERPL-MCNC: 6.8 G/DL (ref 6.4–8.2)
RBC # BLD AUTO: 3.91 M/UL (ref 3.8–5.2)
SERVICE CMNT-IMP: NORMAL
SODIUM SERPL-SCNC: 138 MMOL/L (ref 136–145)
WBC # BLD AUTO: 5.4 K/UL (ref 3.6–11)

## 2019-01-03 PROCEDURE — 74011250636 HC RX REV CODE- 250/636: Performed by: STUDENT IN AN ORGANIZED HEALTH CARE EDUCATION/TRAINING PROGRAM

## 2019-01-03 PROCEDURE — 74011250637 HC RX REV CODE- 250/637: Performed by: STUDENT IN AN ORGANIZED HEALTH CARE EDUCATION/TRAINING PROGRAM

## 2019-01-03 PROCEDURE — 74011250637 HC RX REV CODE- 250/637: Performed by: FAMILY MEDICINE

## 2019-01-03 PROCEDURE — 85025 COMPLETE CBC W/AUTO DIFF WBC: CPT

## 2019-01-03 PROCEDURE — 36415 COLL VENOUS BLD VENIPUNCTURE: CPT

## 2019-01-03 PROCEDURE — 80053 COMPREHEN METABOLIC PANEL: CPT

## 2019-01-03 RX ORDER — ALPRAZOLAM 0.5 MG/1
TABLET ORAL
Qty: 60 TAB | Refills: 3 | Status: SHIPPED
Start: 2019-01-03 | End: 2019-01-07 | Stop reason: SDUPTHER

## 2019-01-03 RX ORDER — DOXYCYCLINE 100 MG/1
100 TABLET ORAL 2 TIMES DAILY
Qty: 7 TAB | Refills: 0 | Status: SHIPPED | OUTPATIENT
Start: 2019-01-03 | End: 2019-01-03 | Stop reason: SDUPTHER

## 2019-01-03 RX ORDER — DOXYCYCLINE 100 MG/1
100 TABLET ORAL 2 TIMES DAILY
Qty: 7 TAB | Refills: 0 | Status: SHIPPED | OUTPATIENT
Start: 2019-01-03 | End: 2019-01-04 | Stop reason: SDUPTHER

## 2019-01-03 RX ADMIN — DOXYCYCLINE HYCLATE 100 MG: 100 TABLET, COATED ORAL at 09:23

## 2019-01-03 RX ADMIN — PANTOPRAZOLE SODIUM 40 MG: 40 TABLET, DELAYED RELEASE ORAL at 06:33

## 2019-01-03 RX ADMIN — TOPIRAMATE 200 MG: 100 TABLET, FILM COATED ORAL at 06:33

## 2019-01-03 RX ADMIN — Medication 1 CAPSULE: at 09:22

## 2019-01-03 RX ADMIN — BENZOCAINE, MENTHOL 1 LOZENGE: 15; 3.6 LOZENGE ORAL at 06:58

## 2019-01-03 RX ADMIN — ENOXAPARIN SODIUM 40 MG: 40 INJECTION SUBCUTANEOUS at 06:33

## 2019-01-03 RX ADMIN — GABAPENTIN 600 MG: 300 CAPSULE ORAL at 09:23

## 2019-01-03 RX ADMIN — Medication 10 ML: at 06:33

## 2019-01-03 RX ADMIN — DULOXETINE HYDROCHLORIDE 60 MG: 30 CAPSULE, DELAYED RELEASE ORAL at 09:22

## 2019-01-03 NOTE — PROGRESS NOTES
Problem: Pressure Injury - Risk of 
Goal: *Prevention of pressure injury Document Jim Scale and appropriate interventions in the flowsheet. Outcome: Progressing Towards Goal 
Pressure Injury Interventions: 
  
 
Moisture Interventions: Absorbent underpads Activity Interventions: Increase time out of bed Mobility Interventions: HOB 30 degrees or less, Pressure redistribution bed/mattress (bed type) Nutrition Interventions: Document food/fluid/supplement intake, Offer support with meals,snacks and hydration Friction and Shear Interventions: Lift sheet

## 2019-01-03 NOTE — PROGRESS NOTES
[facility] RESIDENCY PROGRAM  
Daily Progress Note Date: 1/3/2019 PCP: Thad Sanchez MD  
 
Assessment/Plan:  
Rd Valenzuela is a 57 y.o. female who has spent 3 night(s) in the hospital, who is admitted for sepsis 2/2 CAP. 24 hour Events: No acute events overnight Sepsis 2/2 CAP: 
2/4 SIRS. CXR suggestive of lingular patchy airspace disease. Rapid flu neg. S/p 1x IV aztreonam in ED. Zithromax + aztreonam switched to PO doxycycline yesterday. - Continue Doxycycline day 2  
- Pending blood cx, urine cx 
- Daily CBC, CMP Diarrhea - IMPROVED Pt reports loose BM for the past 2 days, but worsening yesterday (>8 BM), non-bloody. No hx C. Diff. C diff neg. PO vanc d/c yesterday. - Pending stool studies: C. diff toxin, Cx, WBC, fecal fat, O&P. Can be followed up as out-pt 
- Probiotic Hypokalemia: RESOLVED 
K+ 3.5.  
- Daily BMP Tachycardia: RESOLVED Likely 2/2 migraine Anemia Hgb 11.3 today 
- Daily CBC At Risk for JAYLIN: RESOLVED Cr 0.79 today. (POA 1.16; BL Cr 0.7-0.8) - Caution w/ nephrotoxic agents 
- Daily CMP 
  
HTN - STABLE 
- Continue home Cozaar 50mg daily. - Will continue to monitor at this time and readjust as BP's trend.  
  
Chronic Pain/Fibromyalgia/Lumbar DDD:  
UDS + for THC 
-Continue home gabapentin 600mg BID in morning and afternoon 
-Continue home gabapentin 1200mg qHS 
-Reduce home Xanax dose to 0.5mg once daily 
  
Migraine: stable - Continue home topamax 200mg BID 
- PRN compazine, tylenol, fioricet 
  
Depression:  
- Continue home Cymbalta 60mg daily and Elavil 100mg nightly 
  
Asthma:  
- Continue home albuterol q4hr PRN 
 
Obesity: BMI: 50.81 kg/m². - Encouraging lifestyle modifications and further follow up outpatient.  
  
FEN/GI - Regular diet. Activity - Ambulate with assistance DVT prophylaxis - Lovenox GI prophylaxis -  Protonix Disposition - Plan to d/c to home. PT/OT. 
  
CODE STATUS:  Full Pt was discussed with Dr. Nickie Humphries, [facility] Medicine Attending Jarrod Coelho MD 
Family Medicine Resident CC: Neck/back/leg pain Subjective No acute events overnight. Patient reports worsening sore throat. No loose BM overnight. Denies abdominal pain, chills, chest pain, palpitations, shortness of breath, and LE edema. Inpatient Medications Current Facility-Administered Medications Medication Dose Route Frequency  sodium chloride (OCEAN) 0.65 % nasal squeeze bottle 2 Spray  2 Spray Both Nostrils PRN  
 benzocaine-menthol (CEPACOL) lozenge 1 Lozenge  1 Lozenge Mucous Membrane PRN  
 lactobac ac& pc-s.therm-b.anim (SAMUEL Q/RISAQUAD)  1 Cap Oral DAILY  doxycycline (VIBRA-TABS) tablet 100 mg  100 mg Oral Q12H  
 butalbital-acetaminophen-caffeine (FIORICET, ESGIC) -40 mg per tablet 1 Tab  1 Tab Oral Q6H PRN  
 acetaminophen (TYLENOL) tablet 500 mg  500 mg Oral Q6H PRN  
 ALPRAZolam (XANAX) tablet 0.5 mg  0.5 mg Oral DAILY  sodium chloride (NS) flush 5-10 mL  5-10 mL IntraVENous Q8H  
 sodium chloride (NS) flush 5-10 mL  5-10 mL IntraVENous PRN  
 enoxaparin (LOVENOX) injection 40 mg  40 mg SubCUTAneous Q12H  
 albuterol (PROVENTIL VENTOLIN) nebulizer solution 2.5 mg  2.5 mg Nebulization Q4H PRN  
 amitriptyline (ELAVIL) tablet 100 mg  100 mg Oral QHS  DULoxetine (CYMBALTA) capsule 60 mg  60 mg Oral DAILY  gabapentin (NEURONTIN) capsule 600 mg  600 mg Oral BID  
 gabapentin (NEURONTIN) capsule 1,200 mg  1,200 mg Oral QHS  losartan (COZAAR) tablet 50 mg  50 mg Oral DAILY  topiramate (TOPAMAX) tablet 200 mg  200 mg Oral BID  pantoprazole (PROTONIX) tablet 40 mg  40 mg Oral ACB  azithromycin (ZITHROMAX) 500 mg in 0.9% sodium chloride (MBP/ADV) 250 mL  500 mg IntraVENous Q24H  
 ondansetron (ZOFRAN) injection 4 mg  4 mg IntraVENous Q4H PRN  prochlorperazine (COMPAZINE) injection 10 mg  10 mg IntraVENous Q6H PRN Allergies Allergies Allergen Reactions  Hydromorphone Itching and Swelling  
  hydromorphone 1mg IV given immediately began itching and complained of scratchy swelling sensation in her throat  Levofloxacin Hives  Aspirin Anaphylaxis  Bactrim [Sulfamethoprim Ds] Hives  Darvocet A500 [Propoxyphene N-Acetaminophen] Hives  Imitrex [Sumatriptan] Hives  Pcn [Penicillins] Anaphylaxis  Percocet [Oxycodone-Acetaminophen] Hives Pt can take lortab/norco  
 Toradol [Ketorolac Tromethamine] Hives  Tramadol Hives  Hydrocodone Hives  Keflex [Cephalexin] Hives  Lisinopril Swelling Tongue and mouth felt weird, slight swelling  Prednisone Rash and Angioedema And pt felt throat closing Chandni Mcardle [Milnacipran] Hives INSOMNIA  Vesicare [Solifenacin] Other (comments) Nose bleeds  Wellbutrin [Bupropion Hcl] Other (comments)  
  insomnia Objective Vitals: 
Visit Vitals /71 (BP 1 Location: Right arm, BP Patient Position: At rest;Head of bed elevated (Comment degrees)) Comment (BP Patient Position): 30 Pulse 93 Temp 98.3 °F (36.8 °C) Resp 16 Ht 5' 4\" (1.626 m) Wt 296 lb (134.3 kg) SpO2 94% BMI 50.81 kg/m² Temp (24hrs), Av.1 °F (36.7 °C), Min:97.3 °F (36.3 °C), Max:98.8 °F (37.1 °C) O2 Device: Room air I/O: 
No intake or output data in the 24 hours ending 19 0752 Last 3 shifts: 
   190 -  0700 In: -  
Out: 8434 [[id number]] Physical Exam: 
General: No acute distress. Alert. Cooperative. HEENT: Normocephalic. Atraumatic. Pharyngeal exudate. Respiratory: CTAB. No w/r/r/c.  
Cardiovascular: RRR. Normal S1,S2. No m/r/g. Pulses 2+ throughout. GI: + bowel sounds. Nontender. No rebound tenderness or guarding. Nondistended Extremities: No edema. No tenderness. Neuro:                          Oriented. No focal deficits Skin:                             Warm and dry. No rashes or lesions Labs and Data: 
 
Recent Labs 01/03/19 
1265 01/02/19 
0101 01/01/19 
2222 WBC 5.4 5.1 7.2 HGB 11.3* 11.5 10.7* HCT 36.1 36.2 33.5*  
 219 213 Recent Labs 01/03/19 
8777 01/02/19 
0101 01/01/19 
1008  138 135* K 3.5 3.6 3.2*  
 107 103 CO2 20* 19* 20* GLU 86 96 96 BUN 6 4* 5*  
CREA 0.79 0.84 0.92  
CA 8.4* 8.2* 7.4* MG  --   --  1.7 PHOS  --  2.3* 1.5* ALB 2.2* 2.3* 2.2*  
SGOT 15 22 17 ALT 19 18 11* Signed by: Rosmery Lopez MD 
Resident, Family Medicine 01/03/19

## 2019-01-03 NOTE — PROGRESS NOTES
Nurse gave patient 2 scripts and a copy of discharge instructions which have been read and explained to her. Verbalized understsnding

## 2019-01-03 NOTE — PROGRESS NOTES
Bedside shift change report given to [address] [place] [address] (oncoming nurse) by Andrew Villalobos (offgoing nurse). Report included the following information SBAR, Kardex, Accordion and Recent Results.

## 2019-01-03 NOTE — PROGRESS NOTES
1/3/2019  
 
1:50 PM 
Quad cane delivered through [employer] [facility]. No additional DC service needs indicated. 9:26 AM 
Pt discharge noted. CM consult for [facility] noted. Pt prefers [facility]. CM completed referral, and they accepted. Pt's wife will provide transport upon discharge. No additional DC service needs indicated. NN notified.

## 2019-01-03 NOTE — DISCHARGE INSTRUCTIONS
HOME DISCHARGE INSTRUCTIONS    Juvenal Griggs / [id number] : 1970    Admission date: 2018 Discharge date: 2019     Please bring this form with you to show your care provider at your follow-up appointment. Primary care provider:  Ilsa Hunt MD    Discharging provider:  Erin Grimaldo MD  - Family Medicine Resident  Juany Ochoa MD - Attending, Family Medicine     You have been admitted to the hospital with the following diagnoses:    ACUTE DIAGNOSES:  CAP (community acquired pneumonia)  . . . . . . . . . . . . . . . . . . . . . . . . . . . . . . . . . . . . . . . . . . . . . . . . . . . . . . . . . . . . . . . . . . . . . . . Gilberto Montoya FOLLOW-UP CARE RECOMMENDATIONS:  Please follow-up with your primary care doctor for post-hospital follow-up on  at 14:30    Appointments  Follow-up Information     Follow up With Specialties Details Why Contact Info    Ilsa Hunt MD Family Practice Go on 2019 For post-hospital follow-up on  at [phone]  [phone]             Please follow up with your PCP regarding:  Resolution of your community acquired pneumonia, sore throat      MEDICATION CHANGES:  CONTINUE:   - Doxycycline, 100mg oral tab, twice daily, for 3.5 days to complete 5 day course of treatment of community acquired pneumonia [TAKE ONE TABLET TONIGHT AT 21:00]  - Probiotic (Lactobac), 1 oral tablet daily for restoration of healthy gut bacteria    CHANGES:  - Please reduce your xanax dose to 0.5mg oral tablet ONCE daily (instead of twice)    Follow-up tests needed: None    Pending test results: At the time of your discharge the following test results are still pending: Stool culture, stool O&P, fecal fat. Please make sure you review these results with your outpatient follow-up provider(s).     Specific symptoms to watch for: chest pain, shortness of breath, fever, chills, nausea, vomiting, diarrhea, change in mentation, falling, weakness, bleeding. DIET/what to eat:  Regular Diet    ACTIVITY:  Activity as tolerated    What to do if new or unexpected symptoms occur? If you experience any of the above symptoms (or should other concerns or questions arise after discharge) please call your primary care physician. Return to the emergency room if you cannot get hold of your doctor. · It is very important that you keep your follow-up appointment(s). · Please bring discharge papers, medication list (and/or medication bottles) to your follow-up appointments for review by your outpatient provider(s). · Please check the list of medications and be sure it includes every medication (even non-prescription medications) that your provider wants you to take. · It is important that you take the medication exactly as they are prescribed. · Keep your medication in the bottles provided by the pharmacist and keep a list of the medication names, dosages, and times to be taken in your wallet. · Do not take other medications without consulting your doctor. · If you have any questions about your medications or other instructions, please talk to your nurse or care provider before you leave the hospital.     Information obtained by:     I understand that if any problems occur once I am at home I am to contact my physician. These instructions were explained to me and I had the opportunity to ask questions. I understand and acknowledge receipt of the instructions indicated above.                                                                                                                                                Physician's or R.N.'s Signature                                                                  Date/Time                                                                                                                                              Patient or Representative Signature Date/Time

## 2019-01-03 NOTE — PROGRESS NOTES
Problem: Falls - Risk of 
Goal: *Absence of Falls Document Teri Carrie Fall Risk and appropriate interventions in the flowsheet. Outcome: Progressing Towards Goal 
Fall Risk Interventions: 
Mobility Interventions: Bed/chair exit alarm, Communicate number of staff needed for ambulation/transfer, Patient to call before getting OOB, Utilize walker, cane, or other assistive device, Utilize gait belt for transfers/ambulation Medication Interventions: Bed/chair exit alarm, Patient to call before getting OOB, Teach patient to arise slowly, Utilize gait belt for transfers/ambulation Elimination Interventions: Bed/chair exit alarm, Call light in reach, Patient to call for help with toileting needs, Toileting schedule/hourly rounds

## 2019-01-04 ENCOUNTER — TELEPHONE (OUTPATIENT)
Dept: FAMILY MEDICINE CLINIC | Age: 49
End: 2019-01-04

## 2019-01-04 RX ORDER — SAME BUTANEDISULFONATE/BETAINE 400-600 MG
250 POWDER IN PACKET (EA) ORAL 2 TIMES DAILY
Qty: 14 CAP | Refills: 0 | Status: SHIPPED | OUTPATIENT
Start: 2019-01-04 | End: 2019-01-11

## 2019-01-04 RX ORDER — DOXYCYCLINE 100 MG/1
100 TABLET ORAL 2 TIMES DAILY
Qty: 6 TAB | Refills: 0 | Status: SHIPPED | OUTPATIENT
Start: 2019-01-04 | End: 2019-03-22

## 2019-01-04 NOTE — TELEPHONE ENCOUNTER
Please inform pt new rx sent to pharmacy for probiotics but I've never had them covered by insurance for patients - usually have to just get OTC. Pt could also get yogurt with live cultures if unable to afford OTC supplement. Rx also resent in for doxycycline.    Thanks,  N

## 2019-01-04 NOTE — TELEPHONE ENCOUNTER
Received call from pt who was recently released from [facility]. Pt states that she was told by the pharmacist that the probiotic prescribed is not available ( I clarified that this is correct with the pharmacist). Pharm needs an alt. Also needs doxycycline resent, pharm wants to clarify that the quantity and directions are correct since it is currently only a 3 day supply.

## 2019-01-05 LAB
O+P SPEC MICRO: NORMAL
O+P STL CONC: NORMAL
SPECIMEN SOURCE: NORMAL

## 2019-01-06 LAB
BACTERIA SPEC CULT: NORMAL
BACTERIA SPEC CULT: NORMAL
SERVICE CMNT-IMP: NORMAL
SERVICE CMNT-IMP: NORMAL

## 2019-01-07 ENCOUNTER — OFFICE VISIT (OUTPATIENT)
Dept: FAMILY MEDICINE CLINIC | Age: 49
End: 2019-01-07

## 2019-01-07 VITALS
BODY MASS INDEX: 39.61 KG/M2 | OXYGEN SATURATION: 96 % | WEIGHT: 232 LBS | HEART RATE: 111 BPM | SYSTOLIC BLOOD PRESSURE: 128 MMHG | RESPIRATION RATE: 18 BRPM | TEMPERATURE: 98.6 F | DIASTOLIC BLOOD PRESSURE: 84 MMHG | HEIGHT: 64 IN

## 2019-01-07 DIAGNOSIS — F41.9 ANXIETY: ICD-10-CM

## 2019-01-07 DIAGNOSIS — J18.9 PNEUMONIA DUE TO INFECTIOUS ORGANISM, UNSPECIFIED LATERALITY, UNSPECIFIED PART OF LUNG: Primary | ICD-10-CM

## 2019-01-07 RX ORDER — RANITIDINE HCL 75 MG
TABLET ORAL
Qty: 180 TAB | Refills: 1 | Status: SHIPPED | OUTPATIENT
Start: 2019-01-07 | End: 2019-10-17

## 2019-01-07 RX ORDER — ALPRAZOLAM 0.5 MG/1
TABLET ORAL
Qty: 30 TAB | Refills: 3 | Status: SHIPPED | OUTPATIENT
Start: 2019-01-07 | End: 2019-05-27 | Stop reason: SDUPTHER

## 2019-01-07 RX ORDER — CEFDINIR 300 MG/1
300 CAPSULE ORAL 2 TIMES DAILY
Qty: 20 CAP | Refills: 0 | Status: SHIPPED | OUTPATIENT
Start: 2019-01-07 | End: 2019-01-17

## 2019-01-07 RX ORDER — FLUCONAZOLE 150 MG/1
150 TABLET ORAL DAILY
Qty: 1 TAB | Refills: 5 | Status: SHIPPED | OUTPATIENT
Start: 2019-01-07 | End: 2019-04-02 | Stop reason: SDUPTHER

## 2019-01-07 NOTE — PROGRESS NOTES
Patient here for hosp f/u. 12/30/2018-1/3/2019 pneumonia, diarrhea. Patient states she is still having diarrhea, rectum sore, burning in mouth, can't eat much. Patient feels like she has a yeast infection. 1. Have you been to the ER, urgent care clinic since your last visit? Hospitalized since your last visit? Yes When: [facility] see notes    2. Have you seen or consulted any other health care providers outside of the [address] [place] [address] since your last visit? Include any pap smears or colon screening. No     Admitted for 4 days for pneumonia, diarrhea since on antibx      Chief Complaint   Patient presents with   [facility] Follow Up     pneumonia discharged 1/2/2019    Medication Refill    Yeast Infection     She is a 57 y.o. female who presents for evalution. Reviewed PmHx, RxHx, FmHx, SocHx, AllgHx and updated and dated in the chart.     Patient Active Problem List    Diagnosis    Diarrhea of presumed infectious origin    CAP (community acquired pneumonia)    [facility] [facility]    Obesity, morbid ([facility] [address]    Pre-diabetes    Urinary frequency    Essential hypertension, benign    DDD (degenerative disc disease), lumbar    Left axillary pain    IBS (irritable bowel syndrome)    Migraine    Hyperlipidemia    Fibromyalgia    Nipple discharge    Depression       Review of Systems - negative except as listed above in the HPI    Objective:     Vitals:    01/07/19 1427   BP: 128/84   Pulse: (!) 111   Resp: 18   Temp: 98.6 °F (37 °C)   SpO2: 96%   Weight: 232 lb (105.2 kg)   Height: 5' 4\" (1.626 m)     Physical Examination: General appearance - alert, well appearing, and in no distress  Neck - supple, no significant adenopathy  Chest - clear to auscultation, no wheezes, rales or rhonchi, symmetric air entry  Heart - normal rate, regular rhythm, normal S1, S2, no murmurs, rubs, clicks or gallops  Abdomen - soft, nontender, nondistended, no masses or organomegaly    Assessment/ Plan:   Diagnoses and all orders for this visit:  1. Pneumonia:    -dc doxy due to severe GI issues  -pt allg to many rx   -will try omicef again with hx of hives--stop rx with any sxs  -pt allg to prednisone    2. Anxiety  -     ALPRAZolam (XANAX) 0.5 mg tablet; TAKE 1 TABLET BY MOUTH ONCE DAILY AS NEEDED    Other orders  -     raNITIdine (ZANTAC) 75 mg tab; TAKE 1 TABLET BY MOUTH TWICE A DAY BEFORE MEALS  -     fluconazole (DIFLUCAN) 150 mg tablet; Take 1 Tab by mouth daily for 1 day. -     cefdinir (OMNICEF) 300 mg capsule; Take 1 Cap by mouth two (2) times a day for 10 days. Follow-up Disposition:  Return if symptoms worsen or fail to improve. I have discussed the diagnosis with the patient and the intended plan as seen in the above orders. The patient understands and agrees with the plan. The patient has received an after-visit summary and questions were answered concerning future plans. Medication Side Effects and Warnings were discussed with patient  Patient Labs were reviewed and or requested:  Patient Past Records were reviewed and or requested    Tahmina Velasquez M.D. There are no Patient Instructions on file for this visit.

## 2019-01-08 ENCOUNTER — TELEPHONE (OUTPATIENT)
Dept: FAMILY MEDICINE CLINIC | Age: 49
End: 2019-01-08

## 2019-01-08 NOTE — TELEPHONE ENCOUNTER
Cherelle with [facility] [place] called and stated that she will be discharging patient from home health next week but PT will remain.   Phone [phone]

## 2019-01-08 NOTE — TELEPHONE ENCOUNTER
Spoke with Teto Moore, no action is needed at this time by nurse and office. Esvin Quevedo states pt does not have a medical necessity that requires a nurse at this time, no [facility] visits are needed.

## 2019-01-11 ENCOUNTER — DOCUMENTATION ONLY (OUTPATIENT)
Dept: FAMILY MEDICINE CLINIC | Age: 49
End: 2019-01-11

## 2019-01-11 NOTE — PROGRESS NOTES
[facility] dropped off medical form to be completed please. Left in Mariana's bin up front. Thanks.

## 2019-01-16 ENCOUNTER — DOCUMENTATION ONLY (OUTPATIENT)
Dept: FAMILY MEDICINE CLINIC | Age: 49
End: 2019-01-16

## 2019-01-16 NOTE — PROGRESS NOTES
[facility] home health medical form were signed & faxed to [phone],ok,Copy placed in scan folder to be scanned to chart.

## 2019-01-17 ENCOUNTER — TELEPHONE (OUTPATIENT)
Dept: FAMILY MEDICINE CLINIC | Age: 49
End: 2019-01-17

## 2019-01-18 ENCOUNTER — OP HISTORICAL/CONVERTED ENCOUNTER (OUTPATIENT)
Dept: OTHER | Age: 49
End: 2019-01-18

## 2019-01-24 DIAGNOSIS — I10 ESSENTIAL HYPERTENSION, BENIGN: ICD-10-CM

## 2019-01-24 RX ORDER — LOSARTAN POTASSIUM 50 MG/1
TABLET ORAL
Qty: 90 TAB | Refills: 0 | Status: SHIPPED | OUTPATIENT
Start: 2019-01-24 | End: 2019-04-17 | Stop reason: SDUPTHER

## 2019-02-21 ENCOUNTER — DOCUMENTATION ONLY (OUTPATIENT)
Dept: FAMILY MEDICINE CLINIC | Age: 49
End: 2019-02-21

## 2019-02-21 NOTE — PROGRESS NOTES
Release Point Request for medical records was faxed to [address] [place] [address] to be processed

## 2019-03-11 ENCOUNTER — OP HISTORICAL/CONVERTED ENCOUNTER (OUTPATIENT)
Dept: OTHER | Age: 49
End: 2019-03-11

## 2019-03-21 DIAGNOSIS — J45.21 MILD INTERMITTENT REACTIVE AIRWAY DISEASE WITH WHEEZING WITH ACUTE EXACERBATION: ICD-10-CM

## 2019-03-21 RX ORDER — ALBUTEROL SULFATE 90 UG/1
AEROSOL, METERED RESPIRATORY (INHALATION)
Qty: 8.5 INHALER | Refills: 1 | Status: SHIPPED | OUTPATIENT
Start: 2019-03-21 | End: 2019-07-13 | Stop reason: SDUPTHER

## 2019-03-22 ENCOUNTER — OFFICE VISIT (OUTPATIENT)
Dept: FAMILY MEDICINE CLINIC | Age: 49
End: 2019-03-22

## 2019-03-22 ENCOUNTER — TELEPHONE (OUTPATIENT)
Dept: FAMILY MEDICINE CLINIC | Age: 49
End: 2019-03-22

## 2019-03-22 VITALS
DIASTOLIC BLOOD PRESSURE: 82 MMHG | HEART RATE: 96 BPM | TEMPERATURE: 97.8 F | RESPIRATION RATE: 16 BRPM | SYSTOLIC BLOOD PRESSURE: 120 MMHG | OXYGEN SATURATION: 95 % | WEIGHT: 233 LBS | BODY MASS INDEX: 39.78 KG/M2 | HEIGHT: 64 IN

## 2019-03-22 DIAGNOSIS — J20.9 ACUTE BRONCHITIS, UNSPECIFIED ORGANISM: Primary | ICD-10-CM

## 2019-03-22 DIAGNOSIS — J01.00 ACUTE NON-RECURRENT MAXILLARY SINUSITIS: ICD-10-CM

## 2019-03-22 RX ORDER — BENZONATATE 200 MG/1
200 CAPSULE ORAL
Qty: 30 CAP | Refills: 0 | Status: SHIPPED | OUTPATIENT
Start: 2019-03-22 | End: 2019-09-16

## 2019-03-22 RX ORDER — DOXYCYCLINE 100 MG/1
100 TABLET ORAL 2 TIMES DAILY
Qty: 20 TAB | Refills: 0 | Status: SHIPPED | OUTPATIENT
Start: 2019-03-22 | End: 2019-03-29

## 2019-03-22 NOTE — PROGRESS NOTES
Chief Complaint   Patient presents with    Cough    Nasal Congestion     Patient presents in office today with c/o cough and congestion for about 3 weeks. Treating with janis seltzer plus, Nyquil, mucinex with temporary relief. No other concerns. 1. Have you been to the ER, urgent care clinic since your last visit? Hospitalized since your last visit? No    2. Have you seen or consulted any other health care providers outside of the [address] [place] [address] since your last visit? Include any pap smears or colon screening.  Yes 2/27/19 Dr. Payton Hutson 1/23/2017   PRIMARY LEARNER Patient   HIGHEST LEVEL OF EDUCATION - PRIMARY LEARNER  SOME COLLEGE   BARRIERS PRIMARY LEARNER -   [facility] NAME -   PRIMARY LANGUAGE ENGLISH   LEARNER PREFERENCE PRIMARY DEMONSTRATION   ANSWERED BY patient   RELATIONSHIP SELF

## 2019-03-22 NOTE — PROGRESS NOTES
Rd Valenzuela is a 54 y.o. female   Chief Complaint   Patient presents with    Cough    Nasal Congestion    pt states she has been fighting off a cold for the past 3 weeks and states she is having coughing non prod. Getting some sob at night.  + chills but no fever. No CP, ribs are sore from coughing. No NVD    Pt has tried multiple OTC's janis seltzer plus, Nyquil, mucinex with temporary relief. she is a 54y.o. year old female who presents for evalution. Reviewed PmHx, RxHx, FmHx, SocHx, AllgHx and updated and dated in the chart. Review of Systems - negative except as listed above in the HPI    Objective:     Vitals:    03/22/19 1109   BP: 120/82   Pulse: 96   Resp: 16   Temp: 97.8 °F (36.6 °C)   TempSrc: Oral   SpO2: 95%   Weight: 233 lb (105.7 kg)   Height: 5' 4\" (1.626 m)       Current Outpatient Medications   Medication Sig    doxycycline (ADOXA) 100 mg tablet Take 1 Tab by mouth two (2) times a day.  benzonatate (TESSALON) 200 mg capsule Take 1 Cap by mouth three (3) times daily as needed for Cough.  PROAIR HFA 90 mcg/actuation inhaler TAKE 2 PUFFS BY INHALATION EVERY FOUR (4) HOURS AS NEEDED FOR WHEEZING.  losartan (COZAAR) 50 mg tablet TAKE 1 TABLET BY MOUTH EVERY DAY    albuterol (PROVENTIL VENTOLIN) 2.5 mg /3 mL (0.083 %) nebulizer solution INHALE 3 ML BY NEBULIZATION ROUTE EVERY 4 HOURS AS NEEDED FOR WHEEZING    ALPRAZolam (XANAX) 0.5 mg tablet TAKE 1 TABLET BY MOUTH ONCE DAILY AS NEEDED    raNITIdine (ZANTAC) 75 mg tab TAKE 1 TABLET BY MOUTH TWICE A DAY BEFORE MEALS    L. acidoph & paracasei- S therm- Bifido (SAMUEL-Q/RISAQUAD) 8 billion cell cap cap Take 1 Cap by mouth daily.  gabapentin (NEURONTIN) 600 mg tablet Take 600 mg by mouth two (2) times a day. One pill in the morning and one pill in the afternoon    gabapentin (NEURONTIN) 600 mg tablet Take 1,200 mg by mouth nightly.     topiramate (TOPAMAX) 200 mg tablet TAKE 1 TABLET BY MOUTH 2 TIMES DAILY  Indications: MIGRAINE PREVENTION    amitriptyline (ELAVIL) 100 mg tablet TAKE 1 TABLET BY MOUTH EVERY EVENING    butalbital-acetaminophen-caff (FIORICET) -40 mg per capsule TAKE 1 CAP BY MOUTH AT ONSET OF HEADACHE AS NEEDED.  DULoxetine (CYMBALTA) 60 mg capsule TAKE ONE CAPSULE BY MOUTH EVERY DAY     No current facility-administered medications for this visit. Physical Examination: General appearance - alert, well appearing, and in no distress  Eyes - pupils equal and reactive, extraocular eye movements intact  Ears - bilateral TM's and external ear canals normal  Nose - mucosal congestion, mucosal erythema and sinus tenderness noted maxilalry b/l  Mouth - mucous membranes moist, pharynx normal without lesions  Neck - supple, no significant adenopathy  Chest - clear to auscultation, no wheezes, rales or rhonchi, symmetric air entry  Heart - normal rate, regular rhythm, normal S1, S2, no murmurs, rubs, clicks or gallops      Assessment/ Plan:   Diagnoses and all orders for this visit:    1. Acute bronchitis, unspecified organism  -     doxycycline (ADOXA) 100 mg tablet; Take 1 Tab by mouth two (2) times a day. -     benzonatate (TESSALON) 200 mg capsule; Take 1 Cap by mouth three (3) times daily as needed for Cough. 2. Acute non-recurrent maxillary sinusitis  -     doxycycline (ADOXA) 100 mg tablet; Take 1 Tab by mouth two (2) times a day. Follow-up and Dispositions    · Return if symptoms worsen or fail to improve. Follow-up and Dispositions    · Return if symptoms worsen or fail to improve. I have discussed the diagnosis with the patient and the intended plan as seen in the above orders. The patient has received an after-visit summary and questions were answered concerning future plans. Pt conveyed understanding of plan.     Medication Side Effects and Warnings were discussed with patient      Dinorah Pierce DO

## 2019-03-22 NOTE — PATIENT INSTRUCTIONS
Sinusitis: Care Instructions  Your Care Instructions    Sinusitis is an infection of the lining of the sinus cavities in your head. Sinusitis often follows a cold. It causes pain and pressure in your head and face. In most cases, sinusitis gets better on its own in 1 to 2 weeks. But some mild symptoms may last for several weeks. Sometimes antibiotics are needed. Follow-up care is a key part of your treatment and safety. Be sure to make and go to all appointments, and call your doctor if you are having problems. It's also a good idea to know your test results and keep a list of the medicines you take. How can you care for yourself at home? · Take an over-the-counter pain medicine, such as acetaminophen (Tylenol), ibuprofen (Advil, Motrin), or naproxen (Aleve). Read and follow all instructions on the label. · If the doctor prescribed antibiotics, take them as directed. Do not stop taking them just because you feel better. You need to take the full course of antibiotics. · Be careful when taking over-the-counter cold or flu medicines and Tylenol at the same time. Many of these medicines have acetaminophen, which is Tylenol. Read the labels to make sure that you are not taking more than the recommended dose. Too much acetaminophen (Tylenol) can be harmful. · Breathe warm, moist air from a steamy shower, a hot bath, or a sink filled with hot water. Avoid cold, dry air. Using a humidifier in your home may help. Follow the directions for cleaning the machine. · Use saline (saltwater) nasal washes to help keep your nasal passages open and wash out mucus and bacteria. You can buy saline nose drops at a grocery store or drugstore. Or you can make your own at home by adding 1 teaspoon of salt and 1 teaspoon of baking soda to 2 cups of distilled water. If you make your own, fill a bulb syringe with the solution, insert the tip into your nostril, and squeeze gently. Lupie Bennett your nose.   · Put a hot, wet towel or a warm gel pack on your face 3 or 4 times a day for 5 to 10 minutes each time. · Try a decongestant nasal spray like oxymetazoline (Afrin). Do not use it for more than 3 days in a row. Using it for more than 3 days can make your congestion worse. When should you call for help? Call your doctor now or seek immediate medical care if:    · You have new or worse swelling or redness in your face or around your eyes.     · You have a new or higher fever.    Watch closely for changes in your health, and be sure to contact your doctor if:    · You have new or worse facial pain.     · The mucus from your nose becomes thicker (like pus) or has new blood in it.     · You are not getting better as expected. Where can you learn more? Go to http://valdez-lucian.info/. Enter H385 in the search box to learn more about \"Sinusitis: Care Instructions. \"  Current as of: March 27, 2018  Content Version: 11.9  © 4422-9559 [employer]. Care instructions adapted under license by [employer] (which disclaims liability or warranty for this information). If you have questions about a medical condition or this instruction, always ask your healthcare professional. [place] [address] any warranty or liability for your use of this information.

## 2019-03-25 NOTE — TELEPHONE ENCOUNTER
Per insuance doxycycline /hyclate 100 mg -no PA needed, but doxycyline/Adoxa requires PA. Let me know which one to use.

## 2019-03-27 NOTE — TELEPHONE ENCOUNTER
Spoke with pharmacy - med does need PA, spoke with pt informed PA started for med- voiced understanding, PA submitted to [employer] via cover my meds,awaiting response.

## 2019-03-27 NOTE — TELEPHONE ENCOUNTER
Pt needs to speak with nurse about the Doxycycline that she was told was auth and the pharmacy told her it is not. Please call her back at [phone].

## 2019-03-29 ENCOUNTER — TELEPHONE (OUTPATIENT)
Dept: FAMILY MEDICINE CLINIC | Age: 49
End: 2019-03-29

## 2019-03-29 RX ORDER — DOXYCYCLINE 100 MG/1
100 CAPSULE ORAL 2 TIMES DAILY
Qty: 20 CAP | Refills: 0 | Status: SHIPPED | OUTPATIENT
Start: 2019-03-29 | End: 2019-04-08

## 2019-03-29 NOTE — TELEPHONE ENCOUNTER
If I was informed at the time I would have changed it. Does she still need the antibiotic? If her symptoms are improving I would say not needed any longer.       She can contact her insurance company regarding this issue and their refusal to cover the med that was started in the hospital and their delay with the PA

## 2019-03-29 NOTE — TELEPHONE ENCOUNTER
Pt calling about prior auth on medication Doxycycline. I explained that nurse has submitted the p/a but no response yet. She wants to know if another medication can be sent in. Please call her back at [phone].

## 2019-03-29 NOTE — TELEPHONE ENCOUNTER
Called and spoke with patient. She states that she is still having severe facial pain, congestion and can't stop coughing. She is not sleeping at night. States she is up all night coughing. She states that she would rather have an alternative sent in.

## 2019-03-29 NOTE — TELEPHONE ENCOUNTER
I changed Rx to capsules and this may be covered and the tablets not. She is allergic to all the other PO options.

## 2019-04-02 RX ORDER — FLUCONAZOLE 150 MG/1
TABLET ORAL
Qty: 2 TAB | Refills: 1 | Status: SHIPPED | OUTPATIENT
Start: 2019-04-02 | End: 2019-04-02 | Stop reason: SDUPTHER

## 2019-04-02 RX ORDER — FLUCONAZOLE 150 MG/1
TABLET ORAL
Qty: 1 TAB | Refills: 5 | Status: SHIPPED | OUTPATIENT
Start: 2019-04-02 | End: 2020-05-11

## 2019-04-17 DIAGNOSIS — I10 ESSENTIAL HYPERTENSION, BENIGN: ICD-10-CM

## 2019-04-17 RX ORDER — LOSARTAN POTASSIUM 50 MG/1
TABLET ORAL
Qty: 90 TAB | Refills: 0 | Status: SHIPPED | OUTPATIENT
Start: 2019-04-17 | End: 2019-07-24 | Stop reason: SDUPTHER

## 2019-05-10 ENCOUNTER — OP HISTORICAL/CONVERTED ENCOUNTER (OUTPATIENT)
Dept: OTHER | Age: 49
End: 2019-05-10

## 2019-05-21 RX ORDER — HYDROCHLOROTHIAZIDE 25 MG/1
TABLET ORAL
Qty: 90 TAB | Refills: 1 | Status: SHIPPED | OUTPATIENT
Start: 2019-05-21 | End: 2019-08-27 | Stop reason: SDUPTHER

## 2019-05-23 ENCOUNTER — OP HISTORICAL/CONVERTED ENCOUNTER (OUTPATIENT)
Dept: OTHER | Age: 49
End: 2019-05-23

## 2019-07-13 DIAGNOSIS — J45.21 MILD INTERMITTENT REACTIVE AIRWAY DISEASE WITH WHEEZING WITH ACUTE EXACERBATION: ICD-10-CM

## 2019-07-16 RX ORDER — ALBUTEROL SULFATE 90 UG/1
AEROSOL, METERED RESPIRATORY (INHALATION)
Qty: 8.5 INHALER | Refills: 1 | Status: SHIPPED | OUTPATIENT
Start: 2019-07-16 | End: 2020-01-14 | Stop reason: SDUPTHER

## 2019-07-24 DIAGNOSIS — I10 ESSENTIAL HYPERTENSION, BENIGN: ICD-10-CM

## 2019-07-24 RX ORDER — LOSARTAN POTASSIUM 50 MG/1
TABLET ORAL
Qty: 30 TAB | Refills: 2 | Status: SHIPPED | OUTPATIENT
Start: 2019-07-24 | End: 2019-12-12 | Stop reason: SDUPTHER

## 2019-09-16 ENCOUNTER — HOSPITAL ENCOUNTER (EMERGENCY)
Age: 49
Discharge: HOME | End: 2019-09-16
Attending: EMERGENCY MEDICINE
Payer: MEDICARE

## 2019-09-16 ENCOUNTER — APPOINTMENT (OUTPATIENT)
Dept: GENERAL RADIOLOGY | Age: 49
End: 2019-09-16
Attending: EMERGENCY MEDICINE
Payer: MEDICARE

## 2019-09-16 ENCOUNTER — APPOINTMENT (OUTPATIENT)
Dept: CT IMAGING | Age: 49
End: 2019-09-16
Attending: EMERGENCY MEDICINE
Payer: MEDICARE

## 2019-09-16 VITALS
BODY MASS INDEX: 39.27 KG/M2 | HEART RATE: 92 BPM | RESPIRATION RATE: 24 BRPM | OXYGEN SATURATION: 100 % | TEMPERATURE: 98.4 F | HEIGHT: 64 IN | SYSTOLIC BLOOD PRESSURE: 121 MMHG | DIASTOLIC BLOOD PRESSURE: 78 MMHG | WEIGHT: 230 LBS

## 2019-09-16 DIAGNOSIS — J20.9 ACUTE BRONCHITIS, UNSPECIFIED ORGANISM: Primary | ICD-10-CM

## 2019-09-16 LAB
ANION GAP BLD CALC-SCNC: 16 MMOL/L (ref 10–20)
ATRIAL RATE: 84 BPM
BUN BLD-MCNC: 6 MG/DL (ref 9–20)
CA-I BLD-MCNC: 1.12 MMOL/L (ref 1.12–1.32)
CALCULATED P AXIS, ECG09: 36 DEGREES
CALCULATED R AXIS, ECG10: 27 DEGREES
CALCULATED T AXIS, ECG11: 17 DEGREES
CHLORIDE BLD-SCNC: 103 MMOL/L (ref 98–107)
CO2 BLD-SCNC: 24 MMOL/L (ref 21–32)
COMMENT, HOLDF: NORMAL
CREAT BLD-MCNC: 0.8 MG/DL (ref 0.6–1.3)
DIAGNOSIS, 93000: NORMAL
GLUCOSE BLD-MCNC: 89 MG/DL (ref 65–100)
HCT VFR BLD CALC: 42 % (ref 35–47)
P-R INTERVAL, ECG05: 128 MS
POTASSIUM BLD-SCNC: 3.7 MMOL/L (ref 3.5–5.1)
Q-T INTERVAL, ECG07: 380 MS
QRS DURATION, ECG06: 84 MS
QTC CALCULATION (BEZET), ECG08: 449 MS
SAMPLES BEING HELD,HOLD: NORMAL
SERVICE CMNT-IMP: ABNORMAL
SODIUM BLD-SCNC: 138 MMOL/L (ref 136–145)
TROPONIN I SERPL-MCNC: <0.05 NG/ML
VENTRICULAR RATE, ECG03: 84 BPM

## 2019-09-16 PROCEDURE — 74011636320 HC RX REV CODE- 636/320

## 2019-09-16 PROCEDURE — 93005 ELECTROCARDIOGRAM TRACING: CPT

## 2019-09-16 PROCEDURE — 71046 X-RAY EXAM CHEST 2 VIEWS: CPT

## 2019-09-16 PROCEDURE — 77030013140 HC MSK NEB VYRM -A

## 2019-09-16 PROCEDURE — 99284 EMERGENCY DEPT VISIT MOD MDM: CPT

## 2019-09-16 PROCEDURE — 71275 CT ANGIOGRAPHY CHEST: CPT

## 2019-09-16 PROCEDURE — 36415 COLL VENOUS BLD VENIPUNCTURE: CPT

## 2019-09-16 PROCEDURE — 80047 BASIC METABLC PNL IONIZED CA: CPT

## 2019-09-16 PROCEDURE — 74011636320 HC RX REV CODE- 636/320: Performed by: EMERGENCY MEDICINE

## 2019-09-16 PROCEDURE — 84484 ASSAY OF TROPONIN QUANT: CPT

## 2019-09-16 PROCEDURE — 74011000250 HC RX REV CODE- 250: Performed by: EMERGENCY MEDICINE

## 2019-09-16 RX ORDER — DOXYCYCLINE HYCLATE 100 MG
100 TABLET ORAL 2 TIMES DAILY
Qty: 14 TAB | Refills: 0 | Status: SHIPPED | OUTPATIENT
Start: 2019-09-16 | End: 2019-09-23

## 2019-09-16 RX ORDER — BENZONATATE 100 MG/1
100 CAPSULE ORAL
Qty: 21 CAP | Refills: 0 | Status: SHIPPED | OUTPATIENT
Start: 2019-09-16 | End: 2019-09-23

## 2019-09-16 RX ORDER — ALBUTEROL SULFATE 90 UG/1
2 AEROSOL, METERED RESPIRATORY (INHALATION)
Qty: 1 INHALER | Refills: 0 | Status: SHIPPED | OUTPATIENT
Start: 2019-09-16 | End: 2020-03-23 | Stop reason: SDUPTHER

## 2019-09-16 RX ADMIN — ALBUTEROL SULFATE 1 DOSE: 2.5 SOLUTION RESPIRATORY (INHALATION) at 17:07

## 2019-09-16 RX ADMIN — IOPAMIDOL 60 ML: 755 INJECTION, SOLUTION INTRAVENOUS at 17:02

## 2019-09-16 RX ADMIN — IOPAMIDOL 100 ML: 755 INJECTION, SOLUTION INTRAVENOUS at 17:01

## 2019-09-16 NOTE — ED TRIAGE NOTES
Pt presents with 2 week hx of productive cough, shortness of breath, hot flashes, back pain and sternal chest pain worse with cough. Pt reports a family member recently had bronchitis.

## 2019-09-16 NOTE — ED PROVIDER NOTES
54 y.o. female with past medical history significant for fibromyalgia, MS, HTN, asthma, GERD, IBS, colon spasm who presents ambulatory to ED with chief complaint of chest pain. Pt reports she developed a cold around 9/14/19, reporting productive cough w/ green phlegm, back pain (feels focused over lungs, exacerbated by cough), chest pain (exacerbated by cough/ambulation), chills, hot flashes, enuresis, and has also had 2 days of diarrhea but is now constipated. Pt notes she has a hx of pneumonia and is worried that she may have it again as her symptoms have a similar quality. Pt also notes a relative in her home has bronchitis right now. Pt reports she had left shoulder surgery in May 2019. Has tried using her inhaler for asthma which does help her. Pt has taken her daily blood pressure medications today PTA. She denies any chance of pregnancy reporting she had an ablation     Note written by Homer Johnson, as dictated by Maxx Montesinos MD 3:18 PM        The history is provided by the patient. No  was used. Past Medical History:   Diagnosis Date    Arthritis     Arthritis of low back (HCC)     Asthma     Chronic pain     fibromyalgia    Colon spasm     Depression     Fibromyalgia     Gastrointestinal disorder     IBS    GERD (gastroesophageal reflux disease)     High cholesterol     Hypertension     IBS (irritable bowel syndrome)     Left axillary pain 2/22/2013    Migraine     LAST HEADACHE 2-16-12    Multiple sclerosis (HCC)     Other ill-defined conditions(799.89)     fibromylgia    Other ill-defined conditions(799.89)     MS       Past Surgical History:   Procedure Laterality Date    HX APPENDECTOMY      HX BLADDER REPAIR  04/2011     HX CHOLECYSTECTOMY      HX GYN      For ectopic pregnancy, hx tubal ligation    HX GYN      vaginal ablation.     HX UROLOGICAL      bladder tuck, no mesh    HX UROLOGICAL      sling removed 1/10/11 Family History:   Adopted: Yes   Problem Relation Age of Onset    Other Other         family hx is completely unknown    Diabetes Brother     Asthma Daughter     Asthma Son        Social History     Socioeconomic History    Marital status: LEGALLY      Spouse name: Not on file    Number of children: Not on file    Years of education: Not on file    Highest education level: Not on file   Occupational History    Not on file   Social Needs    Financial resource strain: Not on file    Food insecurity:     Worry: Not on file     Inability: Not on file    Transportation needs:     Medical: Not on file     Non-medical: Not on file   Tobacco Use    Smoking status: Current Some Day Smoker     Packs/day: 0.50     Years: 20.00     Pack years: 10.00     Types: Cigarettes    Smokeless tobacco: Never Used   Substance and Sexual Activity    Alcohol use: No     Alcohol/week: 0.0 standard drinks    Drug use: No    Sexual activity: Never     Partners: Male     Birth control/protection: None   Lifestyle    Physical activity:     Days per week: Not on file     Minutes per session: Not on file    Stress: Not on file   Relationships    Social connections:     Talks on phone: Not on file     Gets together: Not on file     Attends Church service: Not on file     Active member of club or organization: Not on file     Attends meetings of clubs or organizations: Not on file     Relationship status: Not on file    Intimate partner violence:     Fear of current or ex partner: Not on file     Emotionally abused: Not on file     Physically abused: Not on file     Forced sexual activity: Not on file   Other Topics Concern    Not on file   Social History Narrative    Not on file         ALLERGIES: Hydromorphone; Levofloxacin; Aspirin; Bactrim [sulfamethoprim ds]; Darvocet a500 [propoxyphene n-acetaminophen]; Imitrex [sumatriptan]; Pcn [penicillins]; Percocet [oxycodone-acetaminophen];  Toradol [ketorolac tromethamine]; Tramadol; Hydrocodone; Keflex [cephalexin]; Lisinopril; Prednisone; Marissa Merry [milnacipran]; Vesicare [solifenacin]; and Wellbutrin [bupropion hcl]    Review of Systems   Constitutional: Positive for chills. Respiratory: Positive for cough. Cardiovascular: Positive for chest pain. Gastrointestinal: Positive for constipation and diarrhea. Genitourinary: Positive for enuresis. Musculoskeletal: Positive for back pain. All other systems reviewed and are negative. There were no vitals filed for this visit. Physical Exam   Constitutional: She is oriented to person, place, and time. She appears well-developed and well-nourished. HENT:   Head: Normocephalic and atraumatic. Mouth/Throat: Oropharynx is clear and moist. No oropharyngeal exudate. Eyes: Conjunctivae are normal.   Neck: Normal range of motion. Cardiovascular: Normal rate, regular rhythm, normal heart sounds and intact distal pulses. No murmur heard. Pulmonary/Chest: Effort normal and breath sounds normal. No stridor. No respiratory distress. She has no wheezes. She has no rales. + bronchospastic cough    Abdominal: Soft. Bowel sounds are normal. She exhibits no distension and no mass. There is no tenderness. There is no rebound and no guarding. Musculoskeletal: Normal range of motion. She exhibits no edema. Neurological: She is alert and oriented to person, place, and time. No cranial nerve deficit. Coordination normal.   Skin: Skin is warm and dry. Nursing note and vitals reviewed. MDM  Number of Diagnoses or Management Options  Acute bronchitis, unspecified organism:   Diagnosis management comments: Suspect back pain and cp is from coughing but pt is a smoker with htn.  Check ekg and troponin and eval for dissection given b/l bp's slightly different     Could be pain from PNA or fibrolmyalgia but need to r/o emergent medical conditions        Amount and/or Complexity of Data Reviewed  Clinical lab tests: ordered and reviewed  Tests in the radiology section of CPT®: ordered and reviewed  Independent visualization of images, tracings, or specimens: yes (EKG)    Patient Progress  Patient progress: stable         Procedures    ED EKG interpretation:  Rhythm: normal sinus rhythm; and regular . Rate (approx.): 85; Axis: normal; P wave: normal; QRS interval: normal ; ST/T wave: non-specific changes;  EKG documented by aRcheal Ordonez MD, scribe, as interpreted by Lamar Miguel MD, ED MD.      Patient CTA was negative for dissection I discussed with her she had 2 small ovarian cysts. Her x-ray was also negative for pneumonia. This is likely a bronchitis. Given she is a smoker and has asthma I will prescribe antibiotics. The patient is in agreement with plan. I listen to her after the neb treatment and she is coughing less and still is not wheezing.

## 2019-09-16 NOTE — DISCHARGE INSTRUCTIONS
Patient Education        Bronchitis: Care Instructions  Your Care Instructions    Bronchitis is inflammation of the bronchial tubes, which carry air to the lungs. The tubes swell and produce mucus, or phlegm. The mucus and inflamed bronchial tubes make you cough. You may have trouble breathing. Most cases of bronchitis are caused by viruses like those that cause colds. Antibiotics usually do not help and they may be harmful. Bronchitis usually develops rapidly and lasts about 2 to 3 weeks in otherwise healthy people. Follow-up care is a key part of your treatment and safety. Be sure to make and go to all appointments, and call your doctor if you are having problems. It's also a good idea to know your test results and keep a list of the medicines you take. How can you care for yourself at home? · Take all medicines exactly as prescribed. Call your doctor if you think you are having a problem with your medicine. · Get some extra rest.  · Take an over-the-counter pain medicine, such as acetaminophen (Tylenol), ibuprofen (Advil, Motrin), or naproxen (Aleve) to reduce fever and relieve body aches. Read and follow all instructions on the label. · Do not take two or more pain medicines at the same time unless the doctor told you to. Many pain medicines have acetaminophen, which is Tylenol. Too much acetaminophen (Tylenol) can be harmful. · Take an over-the-counter cough medicine that contains dextromethorphan to help quiet a dry, hacking cough so that you can sleep. Avoid cough medicines that have more than one active ingredient. Read and follow all instructions on the label. · Breathe moist air from a humidifier, hot shower, or sink filled with hot water. The heat and moisture will thin mucus so you can cough it out. · Do not smoke. Smoking can make bronchitis worse. If you need help quitting, talk to your doctor about stop-smoking programs and medicines.  These can increase your chances of quitting for good.  When should you call for help? Call 911 anytime you think you may need emergency care. For example, call if:    · You have severe trouble breathing.    Call your doctor now or seek immediate medical care if:    · You have new or worse trouble breathing.     · You cough up dark brown or bloody mucus (sputum).     · You have a new or higher fever.     · You have a new rash.    Watch closely for changes in your health, and be sure to contact your doctor if:    · You cough more deeply or more often, especially if you notice more mucus or a change in the color of your mucus.     · You are not getting better as expected. Where can you learn more? Go to http://valdez-lucian.info/. Enter H333 in the search box to learn more about \"Bronchitis: Care Instructions. \"  Current as of: September 5, 2018  Content Version: 12.1  © 9716-4049 [employer]. Care instructions adapted under license by [employer] (which disclaims liability or warranty for this information). If you have questions about a medical condition or this instruction, always ask your healthcare professional. [address] any warranty or liability for your use of this information.

## 2019-09-16 NOTE — ED NOTES
Discharge Instructions Reviewed with patient per this nurse. Discharge instructions given to patient per this nurse. Patient able to return verbalize discharge instructions. Paper copy of discharge instructions given. 3 RX given to pateint per this nurse. Patient condition stable, Respiratory status WNL, Neurostatus intact.  Ambulatory out of er, to home with self

## 2019-09-26 ENCOUNTER — OFFICE VISIT (OUTPATIENT)
Dept: FAMILY MEDICINE CLINIC | Age: 49
End: 2019-09-26

## 2019-09-26 VITALS
HEART RATE: 85 BPM | DIASTOLIC BLOOD PRESSURE: 85 MMHG | TEMPERATURE: 98.1 F | OXYGEN SATURATION: 95 % | BODY MASS INDEX: 41.04 KG/M2 | WEIGHT: 231.6 LBS | SYSTOLIC BLOOD PRESSURE: 117 MMHG | HEIGHT: 63 IN | RESPIRATION RATE: 20 BRPM

## 2019-09-26 DIAGNOSIS — F41.9 ANXIETY: Primary | ICD-10-CM

## 2019-09-26 DIAGNOSIS — M25.412 PAIN AND SWELLING OF LEFT SHOULDER: ICD-10-CM

## 2019-09-26 DIAGNOSIS — M25.512 PAIN AND SWELLING OF LEFT SHOULDER: ICD-10-CM

## 2019-09-26 RX ORDER — ALPRAZOLAM 0.5 MG/1
TABLET ORAL
Qty: 30 TAB | Refills: 2 | Status: SHIPPED | OUTPATIENT
Start: 2019-09-26 | End: 2020-01-14 | Stop reason: SDUPTHER

## 2019-09-26 NOTE — PROGRESS NOTES
Chief Complaint   Patient presents with   [facility] Follow Up     pt states she was in hx for bronchitis, doxcycyline bezonatate     Medication Refill     xanax     Wyatt Moreno is a 54 y.o. female who presents for evaluation. Went to ED on 9/16 for chest pain and was diagnosed with bronchitis. Was given 10 days of doxy, feeling better now. Denies fever and SOB, still coughing a little bit but they gave her cough medicine that helps. Also needs refill of Xanax for anxiety. States she has occasionally been taking 2 a day instead of 1 a day as ordered. Tries to only take PRN but has had increased anxiety recently and found that 2 a day has helped. On Cymbalta and Elavil for FM. Shoulder surgery in May, still working with PT. States she has some swelling r/t surgery but surgeon told her to follow up with PCP for this. Reviewed PmHx, RxHx, FmHx, SocHx, AllgHx and updated and dated in the chart.     Patient Active Problem List    Diagnosis    Diarrhea of presumed infectious origin    CAP (community acquired pneumonia)    Sepsis ([address]    Obesity, morbid ([address]    Pre-diabetes    Urinary frequency    Essential hypertension, benign    DDD (degenerative disc disease), lumbar    Left axillary pain    IBS (irritable bowel syndrome)    Migraine    Hyperlipidemia    Fibromyalgia    Nipple discharge    Depression       Review of Systems - negative except as listed above in the HPI    Objective:     Vitals:    09/26/19 1059   BP: 117/85   Pulse: 85   Resp: 20   Temp: 98.1 °F (36.7 °C)   SpO2: 95%   Weight: 231 lb 9.6 oz (105.1 kg)   Height: 5' 3\" (1.6 m)     Physical Examination: General appearance - alert, well appearing, and in no distress  Mental status - alert, oriented to person, place, and time  Eyes - pupils equal and reactive, extraocular eye movements intact  Ears - bilateral TM's and external ear canals normal  Nose - normal and patent, no erythema, discharge or polyps  Mouth - mucous membranes moist, pharynx normal without lesions  Neck - supple, no significant adenopathy  Chest - clear to auscultation, no wheezes, rales or rhonchi, symmetric air entry  Heart - normal rate, regular rhythm, normal S1, S2, no murmurs, rubs, clicks or gallops, normal bilateral carotid upstroke without bruits, no JVD  Musculoskeletal - left shoulder with healed surgical incision, minimal non-pitting swelling of left arm & shoulder compared to right, no palpable deformity, some pain with ROM     Assessment/ Plan:   Diagnoses and all orders for this visit:    1. Anxiety  -     ALPRAZolam (XANAX) 0.5 mg tablet; TAKE 1 TABLET BY MOUTH EVERY DAY AS NEEDED Indications: anxious  - Discussed patient is to only take one daily PRN, discouraged from taking more than prescribed because I will not write for increased dose. -  checked, no early fills. 2. Pain and swelling of left shoulder  - Advised to f/u with surgeon if continued concerns   - Discussed may be inflammation from working with PT     Follow-up and Dispositions    · Return if symptoms worsen or fail to improve. I have discussed the diagnosis with the patient and the intended plan as seen in the above orders. The patient understands and agrees with the plan. The patient has received an after-visit summary and questions were answered concerning future plans. Medication Side Effects and Warnings were discussed with patient  Patient Labs were reviewed and or requested:  Patient Past Records were reviewed and or requested    Beatriz Escobar NP  [address]    There are no Patient Instructions on file for this visit.

## 2019-09-26 NOTE — PROGRESS NOTES
Emily Rees is a 54 y.o. female , id x 2(name and ). Reviewed record, history, and  medications. Chief Complaint   Patient presents with   [facility] Follow Up     pt states she was in hx for bronchitis, doxcycyline bezonatate     Medication Refill     xanax     Vitals:    19 1059   BP: 117/85   Pulse: 85   Resp: 20   Temp: 98.1 °F (36.7 °C)   SpO2: 95%   Weight: 231 lb 9.6 oz (105.1 kg)   Height: 5' 3\" (1.6 m)     Coordination of Care Questionnaire:   1) Have you been to an emergency room, urgent care, or hospitalized since your last visit? Yes     2. Have seen or consulted any other health care provider since your last visit? NO    3) Do you have an Advanced Directive/ Living Will in place? NO  If yes, do we have a copy on file NO  If no, would you like information NO    Patient is accompanied by self I have received verbal consent from Emily Rees to discuss any/all medical information while they are present in the room.

## 2019-09-30 RX ORDER — FLUCONAZOLE 150 MG/1
150 TABLET ORAL DAILY
Qty: 1 TAB | Refills: 5 | Status: SHIPPED | OUTPATIENT
Start: 2019-09-30 | End: 2019-10-01

## 2019-10-17 RX ORDER — FAMOTIDINE 20 MG/1
20 TABLET, FILM COATED ORAL 2 TIMES DAILY
Qty: 180 TAB | Refills: 1 | Status: SHIPPED | OUTPATIENT
Start: 2019-10-17 | End: 2021-02-23

## 2019-12-09 ENCOUNTER — DOCUMENTATION ONLY (OUTPATIENT)
Dept: FAMILY MEDICINE CLINIC | Age: 49
End: 2019-12-09

## 2019-12-09 NOTE — PROGRESS NOTES
Faxed last lab results from 03/26/18 to Wilfredo HARDWICK per [facility] request. Fax #[phone] confirmation & medical release was scanned in chart.

## 2019-12-12 DIAGNOSIS — I10 ESSENTIAL HYPERTENSION, BENIGN: ICD-10-CM

## 2019-12-12 RX ORDER — LOSARTAN POTASSIUM 50 MG/1
TABLET ORAL
Qty: 30 TAB | Refills: 2 | Status: SHIPPED | OUTPATIENT
Start: 2019-12-12 | End: 2020-06-06 | Stop reason: SDUPTHER

## 2020-01-14 DIAGNOSIS — G43.909 MIGRAINE WITHOUT STATUS MIGRAINOSUS, NOT INTRACTABLE, UNSPECIFIED MIGRAINE TYPE: ICD-10-CM

## 2020-01-14 DIAGNOSIS — J40 BRONCHITIS: ICD-10-CM

## 2020-01-14 DIAGNOSIS — J45.21 MILD INTERMITTENT REACTIVE AIRWAY DISEASE WITH WHEEZING WITH ACUTE EXACERBATION: ICD-10-CM

## 2020-01-14 DIAGNOSIS — F41.9 ANXIETY: ICD-10-CM

## 2020-01-14 DIAGNOSIS — G43.111 INTRACTABLE MIGRAINE WITH AURA WITH STATUS MIGRAINOSUS: ICD-10-CM

## 2020-01-14 RX ORDER — ALBUTEROL SULFATE 0.83 MG/ML
2.5 SOLUTION RESPIRATORY (INHALATION)
Qty: 50 EACH | Refills: 2 | Status: SHIPPED | OUTPATIENT
Start: 2020-01-14 | End: 2020-03-23 | Stop reason: SDUPTHER

## 2020-01-14 RX ORDER — ALBUTEROL SULFATE 90 UG/1
2 AEROSOL, METERED RESPIRATORY (INHALATION)
Qty: 8.5 INHALER | Refills: 1 | Status: SHIPPED | OUTPATIENT
Start: 2020-01-14 | End: 2020-03-23 | Stop reason: SDUPTHER

## 2020-01-14 RX ORDER — AMITRIPTYLINE HYDROCHLORIDE 100 MG/1
100 TABLET, FILM COATED ORAL
Qty: 30 TAB | Refills: 5 | Status: SHIPPED | OUTPATIENT
Start: 2020-01-14 | End: 2020-03-23 | Stop reason: SDUPTHER

## 2020-01-14 RX ORDER — ALPRAZOLAM 0.5 MG/1
TABLET ORAL
Qty: 30 TAB | Refills: 2 | Status: SHIPPED | OUTPATIENT
Start: 2020-01-14 | End: 2020-03-23 | Stop reason: SDUPTHER

## 2020-01-14 RX ORDER — TOPIRAMATE 200 MG/1
200 TABLET ORAL 2 TIMES DAILY
Qty: 60 TAB | Refills: 5 | Status: SHIPPED | OUTPATIENT
Start: 2020-01-14 | End: 2020-03-23 | Stop reason: SDUPTHER

## 2020-03-03 ENCOUNTER — DOCUMENTATION ONLY (OUTPATIENT)
Dept: FAMILY MEDICINE CLINIC | Age: 50
End: 2020-03-03

## 2020-03-23 DIAGNOSIS — J40 BRONCHITIS: ICD-10-CM

## 2020-03-23 DIAGNOSIS — G43.909 MIGRAINE WITHOUT STATUS MIGRAINOSUS, NOT INTRACTABLE, UNSPECIFIED MIGRAINE TYPE: ICD-10-CM

## 2020-03-23 DIAGNOSIS — J45.21 MILD INTERMITTENT REACTIVE AIRWAY DISEASE WITH WHEEZING WITH ACUTE EXACERBATION: ICD-10-CM

## 2020-03-23 DIAGNOSIS — F41.9 ANXIETY: ICD-10-CM

## 2020-03-23 DIAGNOSIS — G43.111 INTRACTABLE MIGRAINE WITH AURA WITH STATUS MIGRAINOSUS: ICD-10-CM

## 2020-03-23 RX ORDER — ALPRAZOLAM 0.5 MG/1
TABLET ORAL
Qty: 30 TAB | Refills: 2 | Status: SHIPPED | OUTPATIENT
Start: 2020-03-23 | End: 2020-06-04 | Stop reason: SDUPTHER

## 2020-03-23 RX ORDER — TOPIRAMATE 200 MG/1
200 TABLET ORAL 2 TIMES DAILY
Qty: 60 TAB | Refills: 5 | Status: SHIPPED | OUTPATIENT
Start: 2020-03-23 | End: 2020-06-02

## 2020-03-23 RX ORDER — AMITRIPTYLINE HYDROCHLORIDE 100 MG/1
100 TABLET, FILM COATED ORAL
Qty: 30 TAB | Refills: 5 | Status: SHIPPED | OUTPATIENT
Start: 2020-03-23 | End: 2020-04-08

## 2020-03-23 RX ORDER — ALBUTEROL SULFATE 0.83 MG/ML
2.5 SOLUTION RESPIRATORY (INHALATION)
Qty: 50 EACH | Refills: 2 | Status: SHIPPED | OUTPATIENT
Start: 2020-03-23 | End: 2021-03-08 | Stop reason: SDUPTHER

## 2020-04-08 ENCOUNTER — VIRTUAL VISIT (OUTPATIENT)
Dept: FAMILY MEDICINE CLINIC | Age: 50
End: 2020-04-08

## 2020-04-08 DIAGNOSIS — F41.1 GENERALIZED ANXIETY DISORDER: Primary | ICD-10-CM

## 2020-04-08 DIAGNOSIS — B96.89 BV (BACTERIAL VAGINOSIS): ICD-10-CM

## 2020-04-08 DIAGNOSIS — N76.0 BV (BACTERIAL VAGINOSIS): ICD-10-CM

## 2020-04-08 RX ORDER — METRONIDAZOLE 500 MG/1
500 TABLET ORAL 2 TIMES DAILY
Qty: 14 TAB | Refills: 0 | Status: SHIPPED | OUTPATIENT
Start: 2020-04-08 | End: 2020-04-15

## 2020-04-08 RX ORDER — SERTRALINE HYDROCHLORIDE 50 MG/1
50 TABLET, FILM COATED ORAL DAILY
Qty: 30 TAB | Refills: 2 | Status: SHIPPED | OUTPATIENT
Start: 2020-04-08 | End: 2020-05-01 | Stop reason: SDUPTHER

## 2020-04-08 NOTE — PROGRESS NOTES
Consent: Yordy Eagle, who was seen by synchronous (real-time) audio-video technology, and/or her healthcare decision maker, is aware that this patient-initiated, Telehealth encounter on 4/8/2020 is a billable service, with coverage as determined by her insurance carrier. She is aware that she may receive a bill and has provided verbal consent to proceed: Yes. 712      Subjective:   Yordy Eagle is a 52 y.o. female who was seen for Medication Management  Messaged provider b/c she feels that 0.5mg xanax daily is not enough medication for her. Feels she needs 2 pills daily to treat anxiety. Patient has mentioned this during last visit in September that she occasionally takes 2 daily and advised that she not take more than prescribed because will not write for higher dose. Explained in past that she should only use for severe anxiety and if having to use daily she needs to adjust daily medication or add new daily medication for anxiety. Today patient states she used to take xanax only for panic attacks but increased stressors lately make her require more. Her daughter is pregnant, going through divorce, son is giving her anxiety. States that she feels panic attacks daily. Dr. Stef Landeros is her rheumatologist for FM, previously on 60mg Cymbalta daily but she contacted him d/t increased pain and he advised her to take 2 pills, so she states she is currently taking 120mg daily. She is also taking 600mg gabapentin TID and 200mg gabapentin QHS for FM pain. Pt states she is no longer taking amitriptyline QHS. She does not have a therapist or counselor. Upon further discussion, states she is open to daily medication for anxiety rather than increasing xanax. Would also like treatment for BV. States she has creamy white discharge, with fishy odor. Has had BV in past and states vaginal discharge and odor is the same as it was with prior BV.   States she didn't change soaps or detergent, she is unsure why she has it. No unprotected sex. No other symptoms - no fever, itching, rash, generalized malaise  Last week she started new \"balance complex\" supplement to help prevent BV, she believes it is probiotic, taking once a day. Prior to Admission medications    Medication Sig Start Date End Date Taking? Authorizing Provider   metroNIDAZOLE (FLAGYL) 500 mg tablet Take 1 Tab by mouth two (2) times a day for 7 days. Indications: an infection of the vagina called bacterial vaginosis 4/8/20 4/15/20 Yes Robert MOYER NP   sertraline (ZOLOFT) 50 mg tablet Take 1 Tab by mouth daily. 4/8/20  Yes Veda Cowden, NP   albuterol (ProAir HFA) 90 mcg/actuation inhaler Take 2 Puffs by inhalation every four (4) hours as needed for Wheezing. 3/23/20   Veda Cowden, NP   albuterol (PROVENTIL VENTOLIN) 2.5 mg /3 mL (0.083 %) nebu Take 3 mL by inhalation every four (4) hours as needed for Wheezing. 3/23/20   Raheem Gar MD   topiramate (TOPAMAX) 200 mg tablet Take 1 Tab by mouth two (2) times a day. Indications: migraine prevention 3/23/20   Raheem Gar MD   ALPRAZolam Alvia Carole) 0.5 mg tablet TAKE 1 TABLET BY MOUTH EVERY DAY AS NEEDED  Indications: anxious 3/23/20   Raheem Gar MD   amitriptyline (ELAVIL) 100 mg tablet Take 1 Tab by mouth nightly. 3/23/20 4/8/20  Raheem Gar MD   losartan (COZAAR) 50 mg tablet TAKE 1 TABLET BY MOUTH EVERY DAY 12/12/19   Dyana Gonzalez DO   famotidine (PEPCID) 20 mg tablet Take 1 Tab by mouth two (2) times a day. Indications: gastroesophageal reflux disease 10/17/19   Robert MOYER NP   hydroCHLOROthiazide (HYDRODIURIL) 25 mg tablet TAKE 1 TABLET BY MOUTH EVERY DAY 8/27/19   Raheem Gar MD   fluconazole (DIFLUCAN) 150 mg tablet TAKE 1 TABLET BY MOUTH DAILY FOR 1 DAY. 4/2/19   Zak Paz NP   L. acidoph & paracasei- S therm- Bifido (SAMUEL-Q/RISAQUAD) 8 billion cell cap cap Take 1 Cap by mouth daily.  1/3/19   Rosina Swan MD   gabapentin (NEURONTIN) 600 mg tablet Take 600 mg by mouth two (2) times a day. One pill in the morning and one pill in the afternoon    Provider, Historical   DULoxetine (CYMBALTA) 60 mg capsule TAKE ONE CAPSULE BY MOUTH EVERY DAY 9/13/16   Nav Cobb MD     Allergies   Allergen Reactions    Hydromorphone Itching and Swelling     hydromorphone 1mg IV given immediately began itching and complained of scratchy swelling sensation in her throat    Levofloxacin Hives    Aspirin Anaphylaxis    Bactrim [Sulfamethoprim Ds] Hives    Darvocet A500 [Propoxyphene N-Acetaminophen] Hives    Imitrex [Sumatriptan] Hives    Pcn [Penicillins] Anaphylaxis    Percocet [Oxycodone-Acetaminophen] Hives     Pt can take lortab/norco    Toradol [Ketorolac Tromethamine] Hives    Tramadol Hives    Hydrocodone Hives    Keflex [Cephalexin] Hives    Lisinopril Swelling     Tongue and mouth felt weird, slight swelling     Prednisone Rash and Angioedema     And pt felt throat closing    Savella [Milnacipran] Hives     INSOMNIA    Vesicare [Solifenacin] Other (comments)     Nose bleeds    Wellbutrin [Bupropion Hcl] Other (comments)     insomnia       Patient Active Problem List    Diagnosis Date Noted    Diarrhea of presumed infectious origin 01/02/2019    CAP (community acquired pneumonia) 12/31/2018    S[address] 12/31/2018    Obesity, morbid ([facility] 02/19/2018    Pre-diabetes 08/18/2016    Urinary frequency 10/21/2015    Essential hypertension, benign 08/25/2014    DDD (degenerative disc disease), lumbar 05/06/2013    Left axillary pain 02/22/2013    IBS (irritable bowel syndrome) 05/17/2012    Migraine 05/17/2012    Hyperlipidemia 11/10/2011    Fibromyalgia 10/13/2011    Nipple discharge 08/18/2011    Depression 08/18/2011       ROS - negative except as listed above in the HPI      Objective:   Vital Signs: (As obtained by patient/caregiver at home)  There were no vitals taken for this visit.      Physical Exam:   General: alert, cooperative, no distress   Mental  status: normal mood, behavior, speech, dress, motor activity, and thought processes, able to follow commands   HEENT: normocephalic, atraumatic    Eyes:  extraocular movements intact, sclera normal, no visible discharge   Ears:  external ears normal   Mouth/Throat:  mucous memrates appear moist    Neck: no visualized mass   Resp: respiratory effort is normal, breathing appears non-labored, no visualized signs of respiratory distress   Neuro: no gross deficits   Skin: no discoloration or lesions of concern on visible areas   Psychiatric: normal affect, consistent with stated mood, no evidence of hallucinations      Additional exam findings:      Assessment & Plan:   Diagnoses and all orders for this visit:    1. Generalized anxiety disorder  -     sertraline (ZOLOFT) 50 mg tablet; Take 1 Tab by mouth daily.  - New Rx, advised on use and SE/ADRs  - Explained at length reasoning behind not increasing her current dose of xanax. Advised she NOT use more than prescribed (0.5mg once daily PRN). Discussed that goal is to not have to use this daily and if she is, we need to adjust daily/non-benzo medication for better anxiety control. Patient was just prescribed 3 fills of her usual dose of xanax on 3/24/2020 so she does not need refill today. - Advised if no improvement in anxiety after 4-6 weeks she set up another visit to discuss options. - Encouraged patient seek a therapist or counselor (virtual visit is still an option) to help with coping of anxiety. 2. BV (bacterial vaginosis)  -     metroNIDAZOLE (FLAGYL) 500 mg tablet; Take 1 Tab by mouth two (2) times a day for 7 days.  Indications: an infection of the vagina called bacterial vaginosis  - New Rx, advised on use and SE/ADRs  - F/U if no improvement           I spent at least 25 minutes with this established patient, and >50% of the time was spent counseling and/or coordinating care regarding anxiety, fibromyalgia tx, vaginal discharge/odor. We discussed the expected course, resolution and complications of the diagnosis(es) in detail. Medication risks, benefits, costs, interactions, and alternatives were discussed as indicated. I advised her to contact the office if her condition worsens, changes or fails to improve as anticipated. She expressed understanding with the diagnosis(es) and plan. Guille Hubbard is a 52 y.o. female being evaluated by a video visit encounter for concerns as above. A caregiver was present when appropriate. Due to this being a TeleHealth encounter (During [facility] public health emergency), evaluation of the following organ systems was limited: Vitals/Constitutional/EENT/Resp/CV/GI//MS/Neuro/Skin/Heme-Lymph-Imm. Pursuant to the emergency declaration under the [facility], [facility] waiver authority and the [employer] General Act, this Virtual  Visit was conducted, with patient's (and/or legal guardian's) consent, to reduce the patient's risk of exposure to COVID-19 and provide necessary medical care. Services were provided through a video synchronous discussion virtually to substitute for in-person clinic visit. Patient and provider were located at their individual homes.         Horacio Sheridan NP

## 2020-05-01 DIAGNOSIS — F41.1 GENERALIZED ANXIETY DISORDER: ICD-10-CM

## 2020-05-01 RX ORDER — SERTRALINE HYDROCHLORIDE 50 MG/1
50 TABLET, FILM COATED ORAL DAILY
Qty: 90 TAB | Refills: 3 | Status: SHIPPED | OUTPATIENT
Start: 2020-05-01 | End: 2020-06-05 | Stop reason: SDUPTHER

## 2020-05-11 RX ORDER — FLUCONAZOLE 150 MG/1
TABLET ORAL
Qty: 1 TAB | Refills: 5 | Status: SHIPPED | OUTPATIENT
Start: 2020-05-11 | End: 2020-06-04 | Stop reason: SDUPTHER

## 2020-05-12 ENCOUNTER — HOSPITAL ENCOUNTER (EMERGENCY)
Age: 50
Discharge: HOME | End: 2020-05-12
Attending: EMERGENCY MEDICINE | Admitting: EMERGENCY MEDICINE
Payer: MEDICARE

## 2020-05-12 VITALS
TEMPERATURE: 98.4 F | BODY MASS INDEX: 40.22 KG/M2 | HEART RATE: 79 BPM | RESPIRATION RATE: 16 BRPM | DIASTOLIC BLOOD PRESSURE: 88 MMHG | SYSTOLIC BLOOD PRESSURE: 128 MMHG | OXYGEN SATURATION: 99 % | WEIGHT: 227 LBS | HEIGHT: 63 IN

## 2020-05-12 DIAGNOSIS — G43.009 MIGRAINE WITHOUT AURA AND WITHOUT STATUS MIGRAINOSUS, NOT INTRACTABLE: Primary | ICD-10-CM

## 2020-05-12 LAB
COMMENT, HOLDF: NORMAL
SAMPLES BEING HELD,HOLD: NORMAL

## 2020-05-12 PROCEDURE — 36415 COLL VENOUS BLD VENIPUNCTURE: CPT

## 2020-05-12 PROCEDURE — 99284 EMERGENCY DEPT VISIT MOD MDM: CPT

## 2020-05-12 PROCEDURE — 96374 THER/PROPH/DIAG INJ IV PUSH: CPT

## 2020-05-12 PROCEDURE — 96375 TX/PRO/DX INJ NEW DRUG ADDON: CPT

## 2020-05-12 PROCEDURE — 74011250636 HC RX REV CODE- 250/636: Performed by: EMERGENCY MEDICINE

## 2020-05-12 RX ORDER — DIPHENHYDRAMINE HYDROCHLORIDE 50 MG/ML
50 INJECTION, SOLUTION INTRAMUSCULAR; INTRAVENOUS
Status: COMPLETED | OUTPATIENT
Start: 2020-05-12 | End: 2020-05-12

## 2020-05-12 RX ORDER — DIPHENHYDRAMINE HCL 25 MG
50 CAPSULE ORAL
Qty: 30 CAP | Refills: 0 | Status: SHIPPED | OUTPATIENT
Start: 2020-05-12 | End: 2020-05-22

## 2020-05-12 RX ORDER — METHYLPREDNISOLONE 4 MG/1
TABLET ORAL
Qty: 1 DOSE PACK | Refills: 0 | Status: SHIPPED | OUTPATIENT
Start: 2020-05-12 | End: 2020-06-02

## 2020-05-12 RX ORDER — DEXAMETHASONE SODIUM PHOSPHATE 10 MG/ML
10 INJECTION INTRAMUSCULAR; INTRAVENOUS
Status: COMPLETED | OUTPATIENT
Start: 2020-05-12 | End: 2020-05-12

## 2020-05-12 RX ORDER — PROCHLORPERAZINE EDISYLATE 5 MG/ML
10 INJECTION INTRAMUSCULAR; INTRAVENOUS
Status: COMPLETED | OUTPATIENT
Start: 2020-05-12 | End: 2020-05-12

## 2020-05-12 RX ADMIN — DEXAMETHASONE SODIUM PHOSPHATE 10 MG: 10 INJECTION, SOLUTION INTRAMUSCULAR; INTRAVENOUS at 03:30

## 2020-05-12 RX ADMIN — DIPHENHYDRAMINE HYDROCHLORIDE 50 MG: 50 INJECTION, SOLUTION INTRAMUSCULAR; INTRAVENOUS at 03:30

## 2020-05-12 RX ADMIN — PROCHLORPERAZINE EDISYLATE 10 MG: 5 INJECTION INTRAMUSCULAR; INTRAVENOUS at 03:30

## 2020-05-12 RX ADMIN — SODIUM CHLORIDE 1000 ML: 900 INJECTION, SOLUTION INTRAVENOUS at 03:35

## 2020-05-12 NOTE — ED PROVIDER NOTES
The patient is a 73-year-old female with a past medical history significant for arthritis, low back pain, asthma, chronic pain, fibromyalgia, IBS, GERD, hypercholesterolemia, hypertension, migraine headaches, multiple sclerosis, multiple medication allergies and morbid obesity who presents to the ED and states that she began having a headache this evening as well as a flareup of her fibromyalgia, at which time she took her gabapentin as well as Topamax then began having pain and tingling sensation in both arms and fingers as well as numbing and pain in her face and jaw bilaterally. She called her PCP and was instructed to come to the ER for further evaluation. She is also complaining of persistent headache, severity 9 out of 10, accompanied by nausea, light sensitivity and photophobia. The headache is consistent with a prior history of migraine headache. She denies any fever, sore throat, cough or congestion, neck or back pain, chest pain or shortness of breath, diarrhea, constipation, dysuria, hematuria, dizziness, extremity weakness or numbness, sick contact, skin rash,           Past Medical History:   Diagnosis Date    Arthritis     Arthritis of low back     Asthma     Chronic pain     fibromyalgia    Colon spasm     Depression     Fibromyalgia     Gastrointestinal disorder     IBS    GERD (gastroesophageal reflux disease)     High cholesterol     Hypertension     IBS (irritable bowel syndrome)     Left axillary pain 2/22/2013    Migraine     LAST HEADACHE 2-16-12    Multiple sclerosis ([facility]     Other ill-defined conditions(799.89)     fibromylgia    Other ill-defined conditions(799.89)     MS       Past Surgical History:   Procedure Laterality Date    HX APPENDECTOMY      HX BLADDER REPAIR  04/2011     HX CHOLECYSTECTOMY      HX GYN      For ectopic pregnancy, hx tubal ligation    HX GYN      vaginal ablation.     HX UROLOGICAL      bladder tuck, no mesh    HX UROLOGICAL      sling removed 1/10/11         Family History:   Adopted: Yes   Problem Relation Age of Onset    Other Other         family hx is completely unknown    Diabetes Brother     Asthma Daughter     Asthma Son        Social History     Socioeconomic History    Marital status: LEGALLY      Spouse name: Not on file    Number of children: Not on file    Years of education: Not on file    Highest education level: Not on file   Occupational History    Not on file   Social Needs    Financial resource strain: Not on file    Food insecurity     Worry: Not on file     Inability: Not on file    Transportation needs     Medical: Not on file     Non-medical: Not on file   Tobacco Use    Smoking status: Current Some Day Smoker     Packs/day: 0.50     Years: 20.00     Pack years: 10.00     Types: Cigarettes    Smokeless tobacco: Never Used   Substance and Sexual Activity    Alcohol use: No     Alcohol/week: 0.0 standard drinks    Drug use: No    Sexual activity: Never     Partners: Male     Birth control/protection: None   Lifestyle    Physical activity     Days per week: Not on file     Minutes per session: Not on file    Stress: Not on file   Relationships    Social connections     Talks on phone: Not on file     Gets together: Not on file     Attends Jainism service: Not on file     Active member of club or organization: Not on file     Attends meetings of clubs or organizations: Not on file     Relationship status: Not on file    Intimate partner violence     Fear of current or ex partner: Not on file     Emotionally abused: Not on file     Physically abused: Not on file     Forced sexual activity: Not on file   Other Topics Concern    Not on file   Social History Narrative    Not on file         ALLERGIES: Hydromorphone; Levofloxacin; Aspirin; Bactrim [sulfamethoprim ds]; Darvocet a500 [propoxyphene n-acetaminophen]; Imitrex [sumatriptan]; Pcn [penicillins]; Percocet [oxycodone-acetaminophen];  Toradol [ketorolac tromethamine]; Tramadol; Hydrocodone; Keflex [cephalexin]; Lisinopril; Prednisone; Danney Clamp [milnacipran]; Vesicare [solifenacin]; and Wellbutrin [bupropion hcl]    Review of Systems   All other systems reviewed and are negative. Vitals:    05/12/20 0307   BP: (!) 143/101   Pulse: (!) 103   Resp: 17   Temp: 98.4 °F (36.9 °C)   SpO2: 96%   Weight: 103 kg (227 lb)   Height: 5' 3\" (1.6 m)            Physical Exam  Vitals signs and nursing note reviewed. Exam conducted with a chaperone present. CONSTITUTIONAL: Well-appearing; well-nourished; in no apparent distress  HEAD: Normocephalic; atraumatic  EYES: PERRL; EOM intact; conjunctiva and sclera are clear bilaterally. ENT: No rhinorrhea; normal pharynx with no tonsillar hypertrophy; mucous membranes pink/moist, no erythema, no exudate. NECK: Supple; non-tender; no cervical lymphadenopathy  CARD: Normal S1, S2; no murmurs, rubs, or gallops. Regular rate and rhythm. RESP: Normal respiratory effort; breath sounds clear and equal bilaterally; no wheezes, rhonchi, or rales. ABD: Normal bowel sounds; non-distended; non-tender; no palpable organomegaly, no masses, no bruits. Back Exam: Normal inspection; no vertebral point tenderness, no CVA tenderness. Normal range of motion. EXT: Normal ROM in all four extremities; non-tender to palpation; no swelling or deformity; distal pulses are normal, no edema. SKIN: Warm; dry; no rash. NEURO:Alert and oriented x 3, coherent, MARILYN-XII grossly intact, sensory and motor are non-focal.        MDM  Number of Diagnoses or Management Options  Migraine without aura and without status migrainosus, not intractable:   Diagnosis management comments: Assessment: 60-year-old female who presents to the ED with a complaint of complex migraine headache versus allergic reaction versus panic attack the patient has a nonfocal exam and appears hemodynamically stable.     Plan: Education, reassurance, symptomatic treatment/IV fluid/Decadron/Benadryl/Compazine/serial exam monitor and Reevaluate. Amount and/or Complexity of Data Reviewed  Clinical lab tests: ordered and reviewed  Tests in the radiology section of CPT®: ordered and reviewed  Tests in the medicine section of CPT®: reviewed and ordered  Discussion of test results with the performing providers: yes  Decide to obtain previous medical records or to obtain history from someone other than the patient: yes  Obtain history from someone other than the patient: yes  Review and summarize past medical records: yes  Discuss the patient with other providers: yes  Independent visualization of images, tracings, or specimens: yes    Risk of Complications, Morbidity, and/or Mortality  Presenting problems: moderate  Diagnostic procedures: moderate  Management options: moderate    Patient Progress  Patient progress: stable         Procedures      Progress Note:   Pt has been reexamined by Dada Sharma MD. Pt is feeling much better. Symptoms have improved. All available results have been reviewed with pt and any available family. Pt understands sx, dx, and tx in ED. Care plan has been outlined and questions have been answered. Pt is ready to go home. Will send home on migraine headache instructions. Outpatient referral with PCP as needed. Written by Dada Sharma MD,3:25 AM    .   .

## 2020-05-12 NOTE — ED TRIAGE NOTES
Pt.states started with numbness in bilateral arms and face about 1 hour after taking her meds for migraine, pt. Took topamax and gabapentin around 2130. Pt. Called her doctor and he told her to come up here. Pt. States headache is still severe at this time. States its tingling in both arms, her mouth and face.

## 2020-05-12 NOTE — DISCHARGE INSTRUCTIONS
Patient Education        Migraine Headache: Care Instructions  Your Care Instructions  Migraines are painful, throbbing headaches that often start on one side of the head. They may cause nausea and vomiting and make you sensitive to light, sound, or smell. Without treatment, migraines can last from 4 hours to a few days. Medicines can help prevent migraines or stop them after they have started. Your doctor can help you find which ones work best for you. Follow-up care is a key part of your treatment and safety. Be sure to make and go to all appointments, and call your doctor if you are having problems. It's also a good idea to know your test results and keep a list of the medicines you take. How can you care for yourself at home? · Do not drive if you have taken a prescription pain medicine. · Rest in a quiet, dark room until your headache is gone. Close your eyes, and try to relax or go to sleep. Don't watch TV or read. · Put a cold, moist cloth or cold pack on the painful area for 10 to 20 minutes at a time. Put a thin cloth between the cold pack and your skin. · Use a warm, moist towel or a heating pad set on low to relax tight shoulder and neck muscles. · Have someone gently massage your neck and shoulders. · Take your medicines exactly as prescribed. Call your doctor if you think you are having a problem with your medicine. You will get more details on the specific medicines your doctor prescribes. · Be careful not to take pain medicine more often than the instructions allow. You could get worse or more frequent headaches when the medicine wears off. To prevent migraines  · Keep a headache diary so you can figure out what triggers your headaches. Avoiding triggers may help you prevent headaches. Record when each headache began, how long it lasted, and what the pain was like.  (Was it throbbing, aching, stabbing, or dull?) Write down any other symptoms you had with the headache, such as nausea, flashing lights or dark spots, or sensitivity to bright light or loud noise. Note if the headache occurred near your period. List anything that might have triggered the headache. Triggers may include certain foods (chocolate, cheese, wine) or odors, smoke, bright light, stress, or lack of sleep. · If your doctor has prescribed medicine for your migraines, take it as directed. You may have medicine that you take only when you get a migraine and medicine that you take all the time to help prevent migraines. ? If your doctor has prescribed medicine for when you get a headache, take it at the first sign of a migraine, unless your doctor has given you other instructions. ? If your doctor has prescribed medicine to prevent migraines, take it exactly as prescribed. Call your doctor if you think you are having a problem with your medicine. · Find healthy ways to deal with stress. Migraines are most common during or right after stressful times. Take time to relax before and after you do something that has caused a migraine in the past.  · Try to keep your muscles relaxed by keeping good posture. Check your jaw, face, neck, and shoulder muscles for tension. Try to relax them. When you sit at a desk, change positions often. And make sure to stretch for 30 seconds each hour. · Get plenty of sleep and exercise. · Eat meals on a regular schedule. Avoid foods and drinks that often trigger migraines. These include chocolate, alcohol (especially red wine and port), aspartame, monosodium glutamate (MSG), and some additives found in foods (such as hot dogs, rebolledo, cold cuts, aged cheeses, and pickled foods). · Limit caffeine. Don't drink too much coffee, tea, or soda. But don't quit caffeine suddenly. That can also give you migraines. · Do not smoke or allow others to smoke around you. If you need help quitting, talk to your doctor about stop-smoking programs and medicines.  These can increase your chances of quitting for good.  · If you are taking birth control pills or hormone therapy, talk to your doctor about whether they are triggering your migraines. When should you call for help? Call 911 anytime you think you may need emergency care. For example, call if:    · You have signs of a stroke. These may include:  ? Sudden numbness, paralysis, or weakness in your face, arm, or leg, especially on only one side of your body. ? Sudden vision changes. ? Sudden trouble speaking. ? Sudden confusion or trouble understanding simple statements. ? Sudden problems with walking or balance. ? A sudden, severe headache that is different from past headaches.    Call your doctor now or seek immediate medical care if:    · You have new or worse nausea and vomiting.     · You have a new or higher fever.     · Your headache gets much worse.    Watch closely for changes in your health, and be sure to contact your doctor if:    · You are not getting better after 2 days (48 hours). Where can you learn more? Go to http://valdez-lucian.info/  Enter Y046 in the search box to learn more about \"Migraine Headache: Care Instructions. \"  Current as of: November 19, 2019Content Version: 12.4  © 7985-1104 [employer]. Care instructions adapted under license by [employer] (which disclaims liability or warranty for this information). If you have questions about a medical condition or this instruction, always ask your healthcare professional. [address] any warranty or liability for your use of this information.

## 2020-05-13 ENCOUNTER — PATIENT OUTREACH (OUTPATIENT)
Dept: FAMILY MEDICINE CLINIC | Age: 50
End: 2020-05-13

## 2020-05-13 NOTE — PROGRESS NOTES
Patient contacted regarding recent discharge and COVID-19 risk   Care Transition Nurse/ Ambulatory Care Manager contacted the patient by telephone to perform post discharge assessment. Verified name and  with patient as identifiers. Patient has following risk factors of: asthma. CTN/ACM reviewed discharge instructions, medical action plan and red flags related to discharge diagnosis. Reviewed and educated them on any new and changed medications related to discharge diagnosis. Advised obtaining a 90-day supply of all daily and as-needed medications. Education provided regarding infection prevention, and signs and symptoms of COVID-19 and when to seek medical attention with patient who verbalized understanding. Discussed exposure protocols and quarantine from [address] you at higher risk for severe illness  and given an opportunity for questions and concerns. The patient agrees to contact the COVID-19 hotline [phone] or PCP office for questions related to their healthcare. CTN/ACM provided contact information for future reference. From CDC: Are you at higher risk for severe illness?  Wash your hands often.  Avoid close contact (6 feet, which is about two arm lengths) with people who are sick.  Put distance between yourself and other people if COVID-19 is spreading in your community.  Clean and disinfect frequently touched surfaces.  Avoid all cruise travel and non-essential air travel.  Call your healthcare professional if you have concerns about COVID-19 and your underlying condition or if you are sick. For more information on steps you can take to protect yourself, see CDC's How to Protect Yourself      Patient/family/caregiver given information for Anabell Villeda and agrees to enroll no      Plan for follow-up call in 7-14 days based on severity of symptoms and risk factors.

## 2020-05-14 DIAGNOSIS — J45.21 MILD INTERMITTENT REACTIVE AIRWAY DISEASE WITH WHEEZING WITH ACUTE EXACERBATION: ICD-10-CM

## 2020-05-14 RX ORDER — ALBUTEROL SULFATE 90 UG/1
2 AEROSOL, METERED RESPIRATORY (INHALATION)
Qty: 1 INHALER | Refills: 1 | Status: SHIPPED | OUTPATIENT
Start: 2020-05-14 | End: 2020-06-04 | Stop reason: SDUPTHER

## 2020-05-27 ENCOUNTER — PATIENT OUTREACH (OUTPATIENT)
Dept: INTERNAL MEDICINE CLINIC | Age: 50
End: 2020-05-27

## 2020-05-27 ENCOUNTER — TELEPHONE (OUTPATIENT)
Dept: FAMILY MEDICINE CLINIC | Age: 50
End: 2020-05-27

## 2020-05-27 NOTE — TELEPHONE ENCOUNTER
----- Message from Vale Garcia MD sent at 5/25/2020  4:02 PM EDT -----  Regarding: FW: Non-Urgent Medical Question  Contact: [phone]  VV  ----- Message -----  From: Yobany Salcido  Sent: 5/22/2020  12:52 AM EDT  To: Vale Garcia MD  Subject: RE: Non-Urgent Medical Question                  Yes  ----- Message -----  From: Vale Garcia MD  Sent: 5/21/20, 5:56 PM  To: Yobany Salcido  Subject: RE: Non-Urgent Medical Question    Was the allergic reaction Topamax? Adria Brittle      ----- Message -----       From:Estela Tere       Sent:5/21/2020  9:04 AM EDT         Graylon Heman, MD [address] Dr. Adria Brittle, I went to the hospital on Monday 5/12/2020 and it was for an allergic reaction & migraine but the hospital is stating I when in there for covid19 concern and that's a lie. Anyway I took the prescription they gave me and my allergy meds he gave me we're not filled. And I was wondering if I can something in case I go through another episode and also I need something else for my migraine since I can't take topomax anymore. - - - DISCLAIMER - - -  [email or web address][This message may contain formatting that cannot be shown on this site.]

## 2020-05-27 NOTE — PROGRESS NOTES
Patient resolved from Transition of Care episode on 5/27/2020. ACM contacted patient. Patient declined conversation. Patient/family was provided the following resources and education related to COVID-19 during the initial call:                         Signs, symptoms and red flags related to COVID-19            CDC exposure and quarantine guidelines            [facility] exposure contact - [phone]            Contact for their local Department of Health                 Patient has not had any additional ED or hospital visits. No further outreach scheduled with this CTN/ACM. Episode of Care resolved. Patient has this CTN/ACM contact information if future needs arise.

## 2020-06-02 ENCOUNTER — VIRTUAL VISIT (OUTPATIENT)
Dept: FAMILY MEDICINE CLINIC | Age: 50
End: 2020-06-02

## 2020-06-02 DIAGNOSIS — G43.111 INTRACTABLE MIGRAINE WITH AURA WITH STATUS MIGRAINOSUS: ICD-10-CM

## 2020-06-02 DIAGNOSIS — F32.A DEPRESSION, UNSPECIFIED DEPRESSION TYPE: ICD-10-CM

## 2020-06-02 DIAGNOSIS — I10 ESSENTIAL HYPERTENSION, BENIGN: Primary | ICD-10-CM

## 2020-06-02 DIAGNOSIS — T78.40XS ALLERGIC REACTION TO DRUG, SEQUELA: ICD-10-CM

## 2020-06-02 DIAGNOSIS — G43.909 MIGRAINE WITHOUT STATUS MIGRAINOSUS, NOT INTRACTABLE, UNSPECIFIED MIGRAINE TYPE: ICD-10-CM

## 2020-06-02 RX ORDER — BUTALBITAL, ACETAMINOPHEN AND CAFFEINE 50; 325; 40 MG/1; MG/1; MG/1
1 TABLET ORAL
Qty: 30 TAB | Refills: 0 | Status: SHIPPED | OUTPATIENT
Start: 2020-06-02 | End: 2020-06-03 | Stop reason: SDUPTHER

## 2020-06-02 NOTE — PROGRESS NOTES
Here For VV    Has severe Migraines and has allergic reaction to topamax and mult other    Is on Zoloft and cymbalta from hosp    Consent: Sejal Contreras, who was seen by synchronous (real-time) audio-video technology, and/or her healthcare decision maker, is aware that this patient-initiated, Telehealth encounter on 6/2/2020 is a billable service, with coverage as determined by her insurance carrier. She is aware that she may receive a bill and has provided verbal consent to proceed: Yes. 712  Subjective:   Sejal Contreras is a 52 y.o. female who was seen for No chief complaint on file. Prior to Admission medications    Medication Sig Start Date End Date Taking? Authorizing Provider   butalbital-acetaminophen-caffeine (FIORICET, ESGIC) -40 mg per tablet Take 1 Tab by mouth every six (6) hours as needed for Headache or Migraine. 6/2/20  Yes Jessica Headley MD   sertraline (ZOLOFT) 50 mg tablet Take 1 Tab by mouth daily. 5/1/20  Yes Marimar MOYER NP   losartan (COZAAR) 50 mg tablet TAKE 1 TABLET BY MOUTH EVERY DAY 12/12/19  Yes Dyana Gonzalez,    hydroCHLOROthiazide (HYDRODIURIL) 25 mg tablet TAKE 1 TABLET BY MOUTH EVERY DAY 8/27/19  Yes Jessica Headley MD   gabapentin (NEURONTIN) 600 mg tablet Take 600 mg by mouth two (2) times a day. One pill in the morning and one pill in the afternoon   Yes Provider, Historical   albuterol (ProAir HFA) 90 mcg/actuation inhaler Take 2 Puffs by inhalation every four (4) hours as needed for Wheezing.  5/14/20   Rejiyvette Mancilla, TANNA   methylPREDNISolone (Medrol, Cristian,) 4 mg tablet Use as directed 5/12/20 6/2/20  Tammie Griffin MD   fluconazole (DIFLUCAN) 150 mg tablet TAKE 1 TABLET BY MOUTH DAILY FOR 1 DAY 5/11/20   Jessica Headley MD   albuterol (PROVENTIL VENTOLIN) 2.5 mg /3 mL (0.083 %) nebu Take 3 mL by inhalation every four (4) hours as needed for Wheezing. 3/23/20   Jessica Headley MD   ALPRAZomonalisa Shook) 0.5 mg tablet TAKE 1 TABLET BY MOUTH EVERY DAY AS NEEDED  Indications: anxious 3/23/20   Fortunato Tran MD   topiramate (TOPAMAX) 200 mg tablet Take 1 Tab by mouth two (2) times a day. Indications: migraine prevention 3/23/20 6/2/20  Fortunato Tran MD   famotidine (PEPCID) 20 mg tablet Take 1 Tab by mouth two (2) times a day. Indications: gastroesophageal reflux disease 10/17/19   Veronica Borja NP   L. acidoph & paracasei- S therm- Bifido (SAMUEL-Q/RISAQUAD) 8 billion cell cap cap Take 1 Cap by mouth daily.  1/3/19   Rosina Swan MD   DULoxetine (CYMBALTA) 60 mg capsule TAKE ONE CAPSULE BY MOUTH EVERY DAY 9/13/16 6/2/20  Fortunato Tran MD     Allergies   Allergen Reactions    Hydromorphone Itching and Swelling     hydromorphone 1mg IV given immediately began itching and complained of scratchy swelling sensation in her throat    Levofloxacin Hives    Aspirin Anaphylaxis    Bactrim [Sulfamethoprim Ds] Hives    Darvocet A500 [Propoxyphene N-Acetaminophen] Hives    Imitrex [Sumatriptan] Hives    Pcn [Penicillins] Anaphylaxis    Percocet [Oxycodone-Acetaminophen] Hives     Pt can take lortab/norco    Toradol [Ketorolac Tromethamine] Hives    Tramadol Hives    Hydrocodone Hives    Keflex [Cephalexin] Hives    Lisinopril Swelling     Tongue and mouth felt weird, slight swelling     Prednisone Rash and Angioedema     And pt felt throat closing    Savella [Milnacipran] Hives     INSOMNIA    Topamax [Topiramate] Other (comments)    Vesicare [Solifenacin] Other (comments)     Nose bleeds    Wellbutrin [Bupropion Hcl] Other (comments)     insomnia     Patient Active Problem List    Diagnosis    Diarrhea of presumed infectious origin    CAP (community acquired pneumonia)    Sepsis ([facility]    Obesity, morbid ([address]    Pre-diabetes    Urinary frequency    Essential hypertension, benign    DDD (degenerative disc disease), lumbar    Left axillary pain    IBS (irritable bowel syndrome)    Migraine    Hyperlipidemia    Fibromyalgia    Nipple discharge    Depression     Patient Active Problem List   Diagnosis Code    Nipple discharge N64.52    Depression F32.9    Fibromyalgia M79.7    Hyperlipidemia E78.5    IBS (irritable bowel syndrome) K58.9    Migraine G43.909    Left axillary pain M79.622    DDD (degenerative disc disease), lumbar M51.36    Essential hypertension, benign I10    Urinary frequency R35.0    Pre-diabetes R73.03    Obesity, morbid ([address] E66.01    CAP (community acquired pneumonia) J18.9    Sepsis ([address] A41.9    Diarrhea of presumed infectious origin R19.7     Patient Active Problem List    Diagnosis Date Noted    Diarrhea of presumed infectious origin 01/02/2019    CAP (community acquired pneumonia) 12/31/2018    Sepsis ([address] 12/31/2018    Obesity, morbid ([address] 02/19/2018    Pre-diabetes 08/18/2016    Urinary frequency 10/21/2015    Essential hypertension, benign 08/25/2014    DDD (degenerative disc disease), lumbar 05/06/2013    Left axillary pain 02/22/2013    IBS (irritable bowel syndrome) 05/17/2012    Migraine 05/17/2012    Hyperlipidemia 11/10/2011    Fibromyalgia 10/13/2011    Nipple discharge 08/18/2011    Depression 08/18/2011     Past Surgical History:   Procedure Laterality Date    HX APPENDECTOMY      HX BLADDER REPAIR  04/2011     HX CHOLECYSTECTOMY      HX GYN      For ectopic pregnancy, hx tubal ligation    HX GYN      vaginal ablation.  HX UROLOGICAL      bladder tuck, no mesh    HX UROLOGICAL      sling removed 1/10/11     Social History     Tobacco Use    Smoking status: Current Some Day Smoker     Packs/day: 0.50     Years: 20.00     Pack years: 10.00     Types: Cigarettes    Smokeless tobacco: Never Used   Substance Use Topics    Alcohol use: No     Alcohol/week: 0.0 standard drinks       ROS    Objective:   Vital Signs: (As obtained by patient/caregiver at home)  There were no vitals taken for this visit.      [INSTRUCTIONS:  \"[x]\" Indicates a positive item  \"[]\" Indicates a negative item  -- DELETE ALL ITEMS NOT EXAMINED]    Constitutional: [x] Appears well-developed and well-nourished [x] No apparent distress      [] Abnormal -     Mental status: [x] Alert and awake  [x] Oriented to person/place/time [x] Able to follow commands    [] Abnormal -     Eyes:   EOM    [x]  Normal    [] Abnormal -   Sclera  [x]  Normal    [] Abnormal -          Discharge [x]  None visible   [] Abnormal -     HENT: [x] Normocephalic, atraumatic  [] Abnormal -   [x] Mouth/Throat: Mucous membranes are moist    External Ears [x] Normal  [] Abnormal -    Neck: [x] No visualized mass [] Abnormal -     Pulmonary/Chest: [x] Respiratory effort normal   [x] No visualized signs of difficulty breathing or respiratory distress        [] Abnormal -        Neurological:        [x] No Facial Asymmetry (Cranial nerve 7 motor function) (limited exam due to video visit)          [x] No gaze palsy        [] Abnormal -          Skin:        [x] No significant exanthematous lesions or discoloration noted on facial skin         [] Abnormal -            Psychiatric:       [x] Normal Affect [] Abnormal -        [x] No Hallucinations    Other pertinent observable physical exam findings:-              Assessment & Plan:   Diagnoses and all orders for this visit:    1. Essential hypertension, benign  -at goal    2. Intractable migraine with aura with status migrainosus  -     butalbital-acetaminophen-caffeine (FIORICET, ESGIC) -40 mg per tablet; Take 1 Tab by mouth every six (6) hours as needed for Headache or Migraine.  -add new rx  -mult allgs to mult rx  -refer back to neuro    3. Allergic reaction to drug, sequela  -off topamax    4.  Migraine without status migrainosus, not intractable, unspecified migraine type  -as above    DC cymbalta since on zoloft              I spent at least 15 minutes with this established patient, and >50% of the time was spent counseling and/or coordinating care regarding f    We discussed the expected course, resolution and complications of the diagnosis(es) in detail. Medication risks, benefits, costs, interactions, and alternatives were discussed as indicated. I advised her to contact the office if her condition worsens, changes or fails to improve as anticipated. She expressed understanding with the diagnosis(es) and plan. Mark Garza is a 52 y.o. female being evaluated by a video visit encounter for concerns as above. A caregiver was present when appropriate. Due to this being a TeleHealth encounter (During XWXQE-94 public health emergency), evaluation of the following organ systems was limited: Vitals/Constitutional/EENT/Resp/CV/GI//MS/Neuro/Skin/Heme-Lymph-Imm. Pursuant to the emergency declaration under the [facility], [facility] waiver authority and the [employer] Act, this Virtual  Visit was conducted, with patient's (and/or legal guardian's) consent, to reduce the patient's risk of exposure to COVID-19 and provide necessary medical care. Services were provided through a video synchronous discussion virtually to substitute for in-person clinic visit. Patient and provider were located at their individual homes.         Shalom Esqueda MD

## 2020-06-03 ENCOUNTER — TELEPHONE (OUTPATIENT)
Dept: FAMILY MEDICINE CLINIC | Age: 50
End: 2020-06-03

## 2020-06-03 DIAGNOSIS — G43.111 INTRACTABLE MIGRAINE WITH AURA WITH STATUS MIGRAINOSUS: ICD-10-CM

## 2020-06-03 RX ORDER — BUTALBITAL, ACETAMINOPHEN AND CAFFEINE 50; 325; 40 MG/1; MG/1; MG/1
1 TABLET ORAL
Qty: 30 TAB | Refills: 0 | Status: SHIPPED | OUTPATIENT
Start: 2020-06-03 | End: 2021-02-23

## 2020-06-04 DIAGNOSIS — J45.21 MILD INTERMITTENT REACTIVE AIRWAY DISEASE WITH WHEEZING WITH ACUTE EXACERBATION: ICD-10-CM

## 2020-06-04 DIAGNOSIS — M79.7 FIBROMYALGIA: Primary | ICD-10-CM

## 2020-06-04 DIAGNOSIS — F41.9 ANXIETY: ICD-10-CM

## 2020-06-04 RX ORDER — FLUCONAZOLE 150 MG/1
TABLET ORAL
Qty: 1 TAB | Refills: 5 | Status: SHIPPED | OUTPATIENT
Start: 2020-06-04 | End: 2021-02-03

## 2020-06-04 RX ORDER — GABAPENTIN 600 MG/1
600 TABLET ORAL 2 TIMES DAILY
Qty: 60 TAB | Refills: 5 | Status: SHIPPED | OUTPATIENT
Start: 2020-06-04 | End: 2020-09-23

## 2020-06-04 RX ORDER — ALPRAZOLAM 0.5 MG/1
TABLET ORAL
Qty: 30 TAB | Refills: 2 | Status: SHIPPED | OUTPATIENT
Start: 2020-06-04 | End: 2020-09-29 | Stop reason: SDUPTHER

## 2020-06-04 RX ORDER — ALBUTEROL SULFATE 90 UG/1
2 AEROSOL, METERED RESPIRATORY (INHALATION)
Qty: 1 INHALER | Refills: 1 | Status: SHIPPED | OUTPATIENT
Start: 2020-06-04 | End: 2020-07-16 | Stop reason: SDUPTHER

## 2020-06-04 NOTE — TELEPHONE ENCOUNTER
----- Message from Payton Cabrajal Page sent at 6/2/2020  5:46 PM EDT -----  Regarding: Dr. Hubert Shah  Medication Refill    Caller (if not patient):      Relationship of caller (if not patient):      Best contact number(s):    [phone]  Name of medication and dosage if known:  butalbital-acetaminophen-caffeine (FIORICET, ESGIC) -40 mg per tablet    Is patient out of this medication (yes/no):Yes    Pharmacy name:[facility]    Pharmacy listed in chart? (yes/no):yes  Pharmacy phone number:      Details to clarify the request:  Pharmacy is stating they have not receive prescription    Natalya Le
Called and advised patient that RX was resent yesterday. Patient verbalized understanding.
Called in yesterday
No

## 2020-06-05 DIAGNOSIS — F41.1 GENERALIZED ANXIETY DISORDER: ICD-10-CM

## 2020-06-05 DIAGNOSIS — I10 ESSENTIAL HYPERTENSION, BENIGN: ICD-10-CM

## 2020-06-05 RX ORDER — LOSARTAN POTASSIUM 50 MG/1
TABLET ORAL
Qty: 30 TAB | Refills: 2 | OUTPATIENT
Start: 2020-06-05

## 2020-06-06 DIAGNOSIS — I10 ESSENTIAL HYPERTENSION, BENIGN: ICD-10-CM

## 2020-06-06 RX ORDER — SERTRALINE HYDROCHLORIDE 50 MG/1
50 TABLET, FILM COATED ORAL DAILY
Qty: 90 TAB | Refills: 3 | Status: SHIPPED | OUTPATIENT
Start: 2020-06-06 | End: 2021-04-28

## 2020-06-06 RX ORDER — LOSARTAN POTASSIUM 50 MG/1
TABLET ORAL
Qty: 90 TAB | Refills: 2 | Status: SHIPPED | OUTPATIENT
Start: 2020-06-06 | End: 2021-02-03

## 2020-07-15 DIAGNOSIS — J45.21 MILD INTERMITTENT REACTIVE AIRWAY DISEASE WITH WHEEZING WITH ACUTE EXACERBATION: ICD-10-CM

## 2020-07-16 RX ORDER — ALBUTEROL SULFATE 90 UG/1
AEROSOL, METERED RESPIRATORY (INHALATION)
Qty: 3 INHALER | Refills: 3 | Status: SHIPPED | OUTPATIENT
Start: 2020-07-16 | End: 2021-02-03

## 2020-07-16 RX ORDER — HYDROCHLOROTHIAZIDE 25 MG/1
TABLET ORAL
Qty: 90 TAB | Refills: 3 | Status: SHIPPED | OUTPATIENT
Start: 2020-07-16 | End: 2021-04-08

## 2020-09-09 ENCOUNTER — OFFICE VISIT (OUTPATIENT)
Dept: FAMILY MEDICINE CLINIC | Age: 50
End: 2020-09-09
Payer: MEDICARE

## 2020-09-09 VITALS
DIASTOLIC BLOOD PRESSURE: 81 MMHG | WEIGHT: 226 LBS | SYSTOLIC BLOOD PRESSURE: 124 MMHG | BODY MASS INDEX: 40.04 KG/M2 | HEART RATE: 86 BPM | RESPIRATION RATE: 18 BRPM | HEIGHT: 63 IN | OXYGEN SATURATION: 96 % | TEMPERATURE: 97.8 F

## 2020-09-09 DIAGNOSIS — G89.29 CHRONIC LEFT SHOULDER PAIN: ICD-10-CM

## 2020-09-09 DIAGNOSIS — R73.03 PRE-DIABETES: ICD-10-CM

## 2020-09-09 DIAGNOSIS — M25.512 CHRONIC LEFT SHOULDER PAIN: ICD-10-CM

## 2020-09-09 DIAGNOSIS — E78.00 PURE HYPERCHOLESTEROLEMIA: ICD-10-CM

## 2020-09-09 DIAGNOSIS — I10 ESSENTIAL HYPERTENSION, BENIGN: Primary | ICD-10-CM

## 2020-09-09 PROCEDURE — 99213 OFFICE O/P EST LOW 20 MIN: CPT | Performed by: NURSE PRACTITIONER

## 2020-09-09 RX ORDER — CYCLOBENZAPRINE HCL 10 MG
10 TABLET ORAL
Qty: 30 TAB | Refills: 0 | Status: SHIPPED | OUTPATIENT
Start: 2020-09-09 | End: 2020-09-25 | Stop reason: SDUPTHER

## 2020-09-09 NOTE — PROGRESS NOTES
Chief Complaint   Patient presents with    Labs     patient is here to get a1c checked     Shoulder Pain     patient also has been having left shoulder pain     Re Hayes is a 52 y.o. female who presents for evaluation. Due for recheck of prediabetes, has never been on medication. Is fasting for labs today. Also complain of left shoulder pain  Had surgery on left shoulder approx 3 years ago, was seeing [facility] at [place]   She has been having pain in shoulder for last several months  States that she worked with PT both before and after surgery  She had 2 weeks left of PT that she could not complete d/t COVID  States pain is constant but some days are better than others   On good days pain is 5/10, on bad days it is 10/10 and keeps her up at night  She is on gabapentin 600mg in AM 600mg in afternoon and 1200mg at night  She is allergic to NSAIDS and prednisone   Drinks CBD drink that helps w/ pain, states she thinks it has [facility] is homemade by a friend      Reviewed PmHx, RxHx, FmHx, SocHx, AllgHx and updated and dated in the chart.     Patient Active Problem List    Diagnosis    Diarrhea of presumed infectious origin    CAP (community acquired pneumonia)    Sepsis ([facility] [address]    Obesity, morbid ([address]    Pre-diabetes    Urinary frequency    Essential hypertension, benign    DDD (degenerative disc disease), lumbar    Left axillary pain    IBS (irritable bowel syndrome)    Migraine    Hyperlipidemia    Fibromyalgia    Nipple discharge    Depression       Review of Systems - negative except as listed above in the HPI    Objective:     Vitals:    09/09/20 1510   BP: 124/81   Pulse: 86   Resp: 18   Temp: 97.8 °F (36.6 °C)   TempSrc: Oral   SpO2: 96%   Weight: 226 lb (102.5 kg)   Height: 5' 3\" (1.6 m)     Physical Examination: General appearance - alert, well appearing, and in no distress  Mental status - alert, oriented to person, place, and time  Eyes - pupils equal and reactive, extraocular eye movements intact  Chest - clear to auscultation, no wheezes, rales or rhonchi, symmetric air entry  Heart - normal rate, regular rhythm, normal S1, S2, no murmurs, rubs, clicks or gallops  Abdomen - soft, nontender, nondistended, no masses or organomegaly  Musculoskeletal - tenderness right AC joint, no joint deformity or swelling, full active ROM, tenderness w/ arm raise above 90 degrees    Assessment/ Plan:   Diagnoses and all orders for this visit:    1. Essential hypertension, benign  -     METABOLIC PANEL, COMPREHENSIVE; Future  -     CBC WITH AUTOMATED DIFF; Future    2. Pre-diabetes  -     HEMOGLOBIN A1C WITH EAG; Future    3. Pure hypercholesterolemia  -     LIPID PANEL; Future    4. Chronic left shoulder pain  -     REFERRAL TO PHYSICAL THERAPY  -     REFERRAL TO ORTHOPEDICS  -     cyclobenzaprine (FLEXERIL) 10 mg tablet; Take 1 Tab by mouth three (3) times daily as needed for Muscle Spasm(s) for up to 10 days. - New Rx, advised on use and SE/ADRs. Allergic to NSAIDs and prednisone, advised to take extra strength Tylenol for pain in addition to flexeril.   - Discussed needs to restart PT, if no improvement see the surgeon at [address] [place] [address] that did initial left shoulder surgery        Follow-up and Dispositions    · Return if symptoms worsen or fail to improve. I have discussed the diagnosis with the patient and the intended plan as seen in the above orders. The patient understands and agrees with the plan. The patient has received an after-visit summary and questions were answered concerning future plans. Medication Side Effects and Warnings were discussed with patient  Patient Labs were reviewed and or requested:  Patient Past Records were reviewed and or requested    Solange Kenney NP  [address] [facility]    There are no Patient Instructions on file for this visit.

## 2020-09-09 NOTE — PROGRESS NOTES
Anne-Marie Hackett is a 52 y.o. female , id x 2(name and ). Reviewed record, history, and  medications. Chief Complaint   Patient presents with    Labs     patient is here to get a1c checked     Shoulder Pain     patient also has been having left shoulder pain       Vitals:    20 1510   BP: 124/81   Pulse: 86   Resp: 18   Temp: 97.8 °F (36.6 °C)   TempSrc: Oral   SpO2: 96%   Weight: 226 lb (102.5 kg)   Height: 5' 3\" (1.6 m)   PainSc:   8   PainLoc: Shoulder       Coordination of Care Questionnaire:   1) Have you been to an emergency room, urgent care, or hospitalized since your last visit?   no       2. Have seen or consulted any other health care provider since your last visit? NO      3 most recent PHQ Screens 2020   Little interest or pleasure in doing things Not at all   Feeling down, depressed, irritable, or hopeless Not at all   Total Score PHQ 2 0       Patient is accompanied by self I have received verbal consent from Anne-Marie Hackett to discuss any/all medical information while they are present in the room.

## 2020-09-10 LAB
ALBUMIN SERPL-MCNC: 3.5 G/DL (ref 3.5–5)
ALBUMIN/GLOB SERPL: 0.9 {RATIO} (ref 1.1–2.2)
ALP SERPL-CCNC: 64 U/L (ref 45–117)
ALT SERPL-CCNC: 21 U/L (ref 12–78)
ANION GAP SERPL CALC-SCNC: 12 MMOL/L (ref 5–15)
AST SERPL-CCNC: 18 U/L (ref 15–37)
BASOPHILS # BLD: 0 K/UL (ref 0–0.1)
BASOPHILS NFR BLD: 0 % (ref 0–1)
BILIRUB SERPL-MCNC: 0.4 MG/DL (ref 0.2–1)
BUN SERPL-MCNC: 11 MG/DL (ref 6–20)
BUN/CREAT SERPL: 13 (ref 12–20)
CALCIUM SERPL-MCNC: 9.3 MG/DL (ref 8.5–10.1)
CHLORIDE SERPL-SCNC: 99 MMOL/L (ref 97–108)
CHOLEST SERPL-MCNC: 220 MG/DL
CO2 SERPL-SCNC: 26 MMOL/L (ref 21–32)
CREAT SERPL-MCNC: 0.83 MG/DL (ref 0.55–1.02)
DIFFERENTIAL METHOD BLD: ABNORMAL
EOSINOPHIL # BLD: 0.1 K/UL (ref 0–0.4)
EOSINOPHIL NFR BLD: 1 % (ref 0–7)
ERYTHROCYTE [DISTWIDTH] IN BLOOD BY AUTOMATED COUNT: 15 % (ref 11.5–14.5)
EST. AVERAGE GLUCOSE BLD GHB EST-MCNC: 111 MG/DL
GLOBULIN SER CALC-MCNC: 3.8 G/DL (ref 2–4)
GLUCOSE SERPL-MCNC: 100 MG/DL (ref 65–100)
HBA1C MFR BLD: 5.5 % (ref 4–5.6)
HCT VFR BLD AUTO: 43.8 % (ref 35–47)
HDLC SERPL-MCNC: 62 MG/DL
HDLC SERPL: 3.5 {RATIO} (ref 0–5)
HGB BLD-MCNC: 13.8 G/DL (ref 11.5–16)
IMM GRANULOCYTES # BLD AUTO: 0 K/UL (ref 0–0.04)
IMM GRANULOCYTES NFR BLD AUTO: 0 % (ref 0–0.5)
LDLC SERPL CALC-MCNC: 104.8 MG/DL (ref 0–100)
LIPID PROFILE,FLP: ABNORMAL
LYMPHOCYTES # BLD: 2.7 K/UL (ref 0.8–3.5)
LYMPHOCYTES NFR BLD: 30 % (ref 12–49)
MCH RBC QN AUTO: 30.5 PG (ref 26–34)
MCHC RBC AUTO-ENTMCNC: 31.5 G/DL (ref 30–36.5)
MCV RBC AUTO: 96.9 FL (ref 80–99)
MONOCYTES # BLD: 0.5 K/UL (ref 0–1)
MONOCYTES NFR BLD: 6 % (ref 5–13)
NEUTS SEG # BLD: 5.4 K/UL (ref 1.8–8)
NEUTS SEG NFR BLD: 63 % (ref 32–75)
NRBC # BLD: 0 K/UL (ref 0–0.01)
NRBC BLD-RTO: 0 PER 100 WBC
PLATELET # BLD AUTO: 289 K/UL (ref 150–400)
PMV BLD AUTO: 11.7 FL (ref 8.9–12.9)
POTASSIUM SERPL-SCNC: 3.6 MMOL/L (ref 3.5–5.1)
PROT SERPL-MCNC: 7.3 G/DL (ref 6.4–8.2)
RBC # BLD AUTO: 4.52 M/UL (ref 3.8–5.2)
SODIUM SERPL-SCNC: 137 MMOL/L (ref 136–145)
TRIGL SERPL-MCNC: 266 MG/DL (ref ?–150)
VLDLC SERPL CALC-MCNC: 53.2 MG/DL
WBC # BLD AUTO: 8.8 K/UL (ref 3.6–11)

## 2020-09-10 NOTE — PROGRESS NOTES
Please ensure pt aware/able to see lab results via [employer]:     Hi Ms [address] [place] [address],    Your lab results are as follows:   - Your diabetes test is normal, you are no longer in the pre-diabetic range  - Your cholesterol is stable, the triglycerides are a bit high. You can improve this by incorporating more omega 3 fatty acids into your diet: walnuts, green, leafy vegetables, and 2 servings of fatty fish each week.    - Your metabolic panel and complete blood counts are normal.    Betty Gallardo NP

## 2020-09-10 NOTE — PROGRESS NOTES
Patient was contacted she verified her , I informed her of her labs and things she can do to improve certain levels. Patient verbalized she understood.

## 2020-09-22 DIAGNOSIS — M79.7 FIBROMYALGIA: ICD-10-CM

## 2020-09-23 RX ORDER — GABAPENTIN 600 MG/1
TABLET ORAL
Qty: 60 TAB | Refills: 11 | Status: SHIPPED | OUTPATIENT
Start: 2020-09-23 | End: 2021-01-11

## 2020-09-25 ENCOUNTER — TELEPHONE (OUTPATIENT)
Dept: FAMILY MEDICINE CLINIC | Age: 50
End: 2020-09-25

## 2020-09-25 DIAGNOSIS — M25.512 CHRONIC LEFT SHOULDER PAIN: ICD-10-CM

## 2020-09-25 DIAGNOSIS — G89.29 CHRONIC LEFT SHOULDER PAIN: ICD-10-CM

## 2020-09-25 RX ORDER — CYCLOBENZAPRINE HCL 10 MG
10 TABLET ORAL
Qty: 60 TAB | Refills: 1 | Status: SHIPPED | OUTPATIENT
Start: 2020-09-25 | End: 2021-02-03

## 2020-09-29 DIAGNOSIS — F41.9 ANXIETY: ICD-10-CM

## 2020-09-30 RX ORDER — ALPRAZOLAM 0.5 MG/1
TABLET ORAL
Qty: 30 TAB | Refills: 2 | Status: SHIPPED | OUTPATIENT
Start: 2020-09-30 | End: 2021-02-05 | Stop reason: SDUPTHER

## 2020-10-12 ENCOUNTER — VIRTUAL VISIT (OUTPATIENT)
Dept: SLEEP MEDICINE | Age: 50
End: 2020-10-12
Payer: MEDICARE

## 2020-10-12 DIAGNOSIS — F41.9 ANXIETY: ICD-10-CM

## 2020-10-12 DIAGNOSIS — I10 ESSENTIAL HYPERTENSION: ICD-10-CM

## 2020-10-12 DIAGNOSIS — G47.33 OSA (OBSTRUCTIVE SLEEP APNEA): Primary | ICD-10-CM

## 2020-10-12 PROCEDURE — 99203 OFFICE O/P NEW LOW 30 MIN: CPT | Performed by: INTERNAL MEDICINE

## 2020-10-12 PROCEDURE — G8427 DOCREV CUR MEDS BY ELIG CLIN: HCPCS | Performed by: INTERNAL MEDICINE

## 2020-10-12 PROCEDURE — G8417 CALC BMI ABV UP PARAM F/U: HCPCS | Performed by: INTERNAL MEDICINE

## 2020-10-12 PROCEDURE — G8756 NO BP MEASURE DOC: HCPCS | Performed by: INTERNAL MEDICINE

## 2020-10-12 PROCEDURE — G9717 DOC PT DX DEP/BP F/U NT REQ: HCPCS | Performed by: INTERNAL MEDICINE

## 2020-10-12 NOTE — PROGRESS NOTES
[address] [facility]. [place], [address]  Tel.  [phone]  Fax. [address] [facility] [address]  [place], [address] [facility]  Tel.  [phone]  Fax. [phone] [address]   Tel.  [phone]  Fax. [phone]         Subjective:    Diane Gillis is a 52 y.o. female who was seen by synchronous (real-time) audio-video technology on 10/12/2020. Consent:  She is aware that this patient-initiated Telehealth encounter is a billable service, with coverage as determined by her insurance carrier. She is aware that she may receive a bill and has provided verbal consent to proceed: Yes    I was at home while conducting this encounter. Patient verified with 's license. She complains of snoring associated with periods of not breathing, head rocking / back, becoming / very rigid and / or shaking, twitching of legs and feet. Symptoms began 2 years ago, gradually worsening since that time. She usually gets in bed between 9:00 and 10:00 pm and is unable to fall asleep and views televison. Family or house members note snoring, periods of not breathing. She denies completely or partially paralyzed while falling asleep or waking up. Diane Gillis does wake up frequently at night. She is bothered by waking up too early and left unable to get back to sleep. She actually sleeps about 3 hours at night and wakes up about 1-3 times during the night. She does not work shifts. Barbara Yang indicates she does get too little sleep at night. Her bedtime is midnight. She awakens at 3:30 am. She does not take naps.   She has the following observed behaviors:  ;  .  Other remarks:        [place] Sleepiness Score: 9   and Modified F.O.S.Q. Score Total / 2: (P) 12       Allergies   Allergen Reactions    Hydromorphone Itching and Swelling     hydromorphone 1mg IV given immediately began itching and complained of scratchy swelling sensation in her throat    Levofloxacin Hives    Aspirin Anaphylaxis    Bactrim [Sulfamethoprim Ds] Hives    Darvocet A500 [Propoxyphene N-Acetaminophen] Hives    Imitrex [Sumatriptan] Hives    Pcn [Penicillins] Anaphylaxis    Percocet [Oxycodone-Acetaminophen] Hives     Pt can take lortab/norco    Toradol [Ketorolac Tromethamine] Hives    Tramadol Hives    Hydrocodone Hives    Keflex [Cephalexin] Hives    Lisinopril Swelling     Tongue and mouth felt weird, slight swelling     Prednisone Rash and Angioedema     And pt felt throat closing    Savella [Milnacipran] Hives     INSOMNIA    Topamax [Topiramate] Other (comments)    Vesicare [Solifenacin] Other (comments)     Nose bleeds    Wellbutrin [Bupropion Hcl] Other (comments)     insomnia         Current Outpatient Medications:     ALPRAZolam (XANAX) 0.5 mg tablet, TAKE 1 TABLET BY MOUTH EVERY DAY AS NEEDED  Indications: anxious, Disp: 30 Tab, Rfl: 2    gabapentin (NEURONTIN) 600 mg tablet, TAKE 1 TABLET BY MOUTH  TWICE DAILY, Disp: 60 Tab, Rfl: 11    albuterol (PROVENTIL HFA, VENTOLIN HFA, PROAIR HFA) 90 mcg/actuation inhaler, USE 2 INHALATIONS BY MOUTH  EVERY 4 HOURS AS NEEDED FOR WHEEZING, Disp: 3 Inhaler, Rfl: 3    hydroCHLOROthiazide (HYDRODIURIL) 25 mg tablet, One daily, Disp: 90 Tab, Rfl: 3    sertraline (ZOLOFT) 50 mg tablet, Take 1 Tab by mouth daily. , Disp: 90 Tab, Rfl: 3    losartan (COZAAR) 50 mg tablet, TAKE 1 TABLET BY MOUTH EVERY DAY, Disp: 90 Tab, Rfl: 2    fluconazole (DIFLUCAN) 150 mg tablet, TAKE 1 TABLET BY MOUTH DAILY FOR 1 DAY, Disp: 1 Tab, Rfl: 5    albuterol (PROVENTIL VENTOLIN) 2.5 mg /3 mL (0.083 %) nebu, Take 3 mL by inhalation every four (4) hours as needed for Wheezing., Disp: 50 Each, Rfl: 2    butalbital-acetaminophen-caffeine (FIORICET, ESGIC) -40 mg per tablet, Take 1 Tab by mouth every six (6) hours as needed for Headache or Migraine. , Disp: 30 Tab, Rfl: 0    famotidine (PEPCID) 20 mg tablet, Take 1 Tab by mouth two (2) times a day. Indications: gastroesophageal reflux disease, Disp: 180 Tab, Rfl: 1    L. acidoph & paracasei- S therm- Bifido (SAMUEL-Q/RISAQUAD) 8 billion cell cap cap, Take 1 Cap by mouth daily. , Disp: 30 Cap, Rfl: 0     She  has a past medical history of Arthritis, Arthritis of low back, Asthma, Chronic pain, Colon spasm, Depression, Fibromyalgia, Gastrointestinal disorder, GERD (gastroesophageal reflux disease), High cholesterol, Hypertension, IBS (irritable bowel syndrome), Left axillary pain (2/22/2013), Migraine, Multiple sclerosis ([facility] Other ill-defined conditions(799.89), and Other ill-defined conditions(799.89). She  has a past surgical history that includes hx appendectomy; hx cholecystectomy; hx bladder repair (04/2011 ); hx gyn; hx gyn; hx urological; and hx urological.    She family history includes Asthma in her daughter and son; Diabetes in her brother; Other in an other family member. She was adopted. She  reports that she has been smoking cigarettes. She has a 10.00 pack-year smoking history. She has never used smokeless tobacco. She reports that she does not drink alcohol or use drugs. Review of Systems:  Constitutional:  No significant weight loss or weight gain  Eyes:  No blurred vision  CVS:  No significant chest pain  Pulm:  No significant shortness of breath  GI:  No significant nausea or vomiting  :  No significant nocturia  Musculoskeletal:  No significant joint pain at night  Skin:  No significant rashes  Neuro:  No significant dizziness   Psych:  No active mood issues    Sleep Review of Systems: notable for difficulty falling asleep; frequent awakenings at night;  regular dreaming noted; no nightmares ; early morning headaches; memory problems; no concentration issues; no history of any automobile or occupational accidents due to daytime drowsiness.       Objective:     Patient-Reported Vitals 10/12/2020   Patient-Reported Weight 231   Patient-Reported Height 53   Patient-Reported Pulse 99   Patient-Reported Temperature 97.6   Patient-Reported Systolic  889   Patient-Reported Diastolic 82       Physical Exam completed by visual and auditory observation of patient with verbal input from patient. General:   Alert, oriented, not in acute distress   Eyes:  Anicteric Sclerae; no obvious strabismus   Nose:  No obvious nasal septum deviation    Neck:   Midline trachea, no visible mass   Chest/Lungs:  Respiratory effort normal, no visualized signs of difficulty breathing or respiratory distress   CVS:  No JVD   Extremities:  No obvious rashes noted on face, neck, or hands   Neuro:  No facial asymmetry, no focal deficits; no obvious tremor    Psych:  Normal affect,  normal countenance       Assessment:       ICD-10-CM ICD-9-CM    1. ELLA (obstructive sleep apnea)  G47.33 327.23 POLYSOMNOGRAPHY 1 NIGHT   2. Essential hypertension  I10 401.9    3. Anxiety  F41.9 300.00    4. BMI 40.0-44.9, adult ([facility]  Z68.41 V85.41          Plan:        Sleep testing was ordered for initial evaluation.  She was provided information on sleep apnea including coresponding risk factors and the importance of proper treatment.  Treatment options if indicated were reviewed today. Patient agrees to a trial of PAP therapy if indicated.  Counseling was provided regarding proper sleep hygiene, appropriate sleep schedule, need for sleep environment safety and safe driving.  Recommended a dedicated weight loss program through appropriate diet and exercise regimen as significant weight reduction has been shown to reduce severity of obstructive sleep apnea. Patient's phone number [phone] (mobile)  was reviewed and confirmed for accuracy. She gives permission for messages regarding results and appointments to be left at that number.     Pursuant to the emergency declaration under the [address] waiver authority and the [employer] Appropri[employer] Act, this Virtual Visit was conducted, with patient's consent, to reduce the patient's risk of exposure to COVID-19 and provide continuity of care for an established patient. Services were provided through a video synchronous discussion virtually to substitute for in-person clinic visit. Nina Tejeda MD, [facility]  Electronically signed.  10/12/20

## 2020-10-13 ENCOUNTER — HOSPITAL ENCOUNTER (OUTPATIENT)
Dept: SLEEP MEDICINE | Age: 50
Discharge: HOME | End: 2020-10-13
Payer: MEDICARE

## 2020-10-13 VITALS
WEIGHT: 231 LBS | HEIGHT: 63 IN | DIASTOLIC BLOOD PRESSURE: 85 MMHG | BODY MASS INDEX: 40.93 KG/M2 | OXYGEN SATURATION: 98 % | HEART RATE: 107 BPM | TEMPERATURE: 98.6 F | SYSTOLIC BLOOD PRESSURE: 125 MMHG

## 2020-10-13 DIAGNOSIS — G47.33 OSA (OBSTRUCTIVE SLEEP APNEA): ICD-10-CM

## 2020-10-13 PROCEDURE — 95810 POLYSOM 6/> YRS 4/> PARAM: CPT | Performed by: INTERNAL MEDICINE

## 2020-10-22 ENCOUNTER — TELEPHONE (OUTPATIENT)
Dept: SLEEP MEDICINE | Age: 50
End: 2020-10-22

## 2020-10-22 DIAGNOSIS — G47.33 OSA (OBSTRUCTIVE SLEEP APNEA): Primary | ICD-10-CM

## 2020-10-22 DIAGNOSIS — U07.1 COVID-19: ICD-10-CM

## 2020-10-23 ENCOUNTER — DOCUMENTATION ONLY (OUTPATIENT)
Dept: SLEEP MEDICINE | Age: 50
End: 2020-10-23

## 2020-10-23 NOTE — TELEPHONE ENCOUNTER
Reviewed sleep study results with patient. She expressed understanding and is willing to proceed with a CPAP Titration. Please schedule titration and COVID testing.     Thanks

## 2020-10-30 ENCOUNTER — DOCUMENTATION ONLY (OUTPATIENT)
Dept: FAMILY MEDICINE CLINIC | Age: 50
End: 2020-10-30

## 2020-11-16 ENCOUNTER — OFFICE VISIT (OUTPATIENT)
Dept: SLEEP MEDICINE | Age: 50
End: 2020-11-16

## 2020-11-16 DIAGNOSIS — G47.33 OSA (OBSTRUCTIVE SLEEP APNEA): Primary | ICD-10-CM

## 2020-11-19 ENCOUNTER — HOSPITAL ENCOUNTER (OUTPATIENT)
Dept: SLEEP MEDICINE | Age: 50
Discharge: HOME | End: 2020-11-19
Payer: MEDICARE

## 2020-11-19 DIAGNOSIS — G47.33 OSA (OBSTRUCTIVE SLEEP APNEA): ICD-10-CM

## 2020-11-19 LAB — SARS-COV-2, NAA: NOT DETECTED

## 2020-11-19 PROCEDURE — 95811 POLYSOM 6/>YRS CPAP 4/> PARM: CPT | Performed by: INTERNAL MEDICINE

## 2020-11-24 ENCOUNTER — TELEPHONE (OUTPATIENT)
Dept: SLEEP MEDICINE | Age: 50
End: 2020-11-24

## 2020-11-24 ENCOUNTER — DOCUMENTATION ONLY (OUTPATIENT)
Dept: SLEEP MEDICINE | Age: 50
End: 2020-11-24

## 2020-11-24 DIAGNOSIS — G47.33 OSA (OBSTRUCTIVE SLEEP APNEA): Primary | ICD-10-CM

## 2020-11-24 NOTE — TELEPHONE ENCOUNTER
Reviewed sleep study results with patient. She expressed understanding and is willing to proceed with a trial of CPAP. Fax DME order & Schedule 1st adherence visit in 60 to 90 days. Detail Level: Detailed

## 2020-11-24 NOTE — TELEPHONE ENCOUNTER
Orders Placed This Encounter    AMB SUPPLY ORDER     Primary Encounter Diagnosis: Obstructive Sleep Apnea  (G47.33)    Respironics DreamStation with Heated Humidifer [id number] / [id number]. Positive Airway Pressure Therapy: Duration of need: 99 months. Set Pressure: 15 cmH2O     Nasal Cushion (Replace) 2 per month.  Nasal Interface Mask 1 every 3 months.  Headgear 1 every 6 months.  Filter(s) Disposable 2 per month.  Filter(s) Non-Disposable 1 every 6 months. [address] [facility] for Locked Jamie (Replace) 1 every 6 months.  Tubing with heating element 1 every 3 months. Perform Mask Fitting per patient preference and comfort - replace as above. Clint Martel MD, FAASM; NPI: [id number]  Electronically signed.  11/24/20

## 2020-11-25 ENCOUNTER — DOCUMENTATION ONLY (OUTPATIENT)
Dept: SLEEP MEDICINE | Age: 50
End: 2020-11-25

## 2020-11-25 NOTE — PROGRESS NOTES
Order faxed to different DME as [facility] was not in network with Medicare/[facility]. Patient informed.

## 2021-01-09 DIAGNOSIS — M79.7 FIBROMYALGIA: ICD-10-CM

## 2021-01-11 RX ORDER — GABAPENTIN 600 MG/1
TABLET ORAL
Qty: 60 TAB | Refills: 11 | Status: SHIPPED | OUTPATIENT
Start: 2021-01-11 | End: 2021-04-27

## 2021-01-25 ENCOUNTER — VIRTUAL VISIT (OUTPATIENT)
Dept: SLEEP MEDICINE | Age: 51
End: 2021-01-25
Payer: MEDICARE

## 2021-01-25 DIAGNOSIS — Z11.59 SCREENING FOR VIRAL DISEASE: ICD-10-CM

## 2021-01-25 DIAGNOSIS — G47.33 OSA (OBSTRUCTIVE SLEEP APNEA): Primary | ICD-10-CM

## 2021-01-25 DIAGNOSIS — I10 ESSENTIAL HYPERTENSION: ICD-10-CM

## 2021-01-25 DIAGNOSIS — F41.9 ANXIETY: ICD-10-CM

## 2021-01-25 PROCEDURE — G9717 DOC PT DX DEP/BP F/U NT REQ: HCPCS | Performed by: INTERNAL MEDICINE

## 2021-01-25 PROCEDURE — 99214 OFFICE O/P EST MOD 30 MIN: CPT | Performed by: INTERNAL MEDICINE

## 2021-01-25 PROCEDURE — G8417 CALC BMI ABV UP PARAM F/U: HCPCS | Performed by: INTERNAL MEDICINE

## 2021-01-25 PROCEDURE — G8756 NO BP MEASURE DOC: HCPCS | Performed by: INTERNAL MEDICINE

## 2021-01-25 PROCEDURE — G8427 DOCREV CUR MEDS BY ELIG CLIN: HCPCS | Performed by: INTERNAL MEDICINE

## 2021-01-25 NOTE — PROGRESS NOTES
[address] [facility] [place]. [place], [address]  Tel.  [phone]  Fax. [address]  [place], [address] [facility]  Tel.  [phone]  Fax. [phone] [address] [facility] [address]  Tel.  [phone]  Fax. [phone]       S>    Nova Major is a 52 y.o. female who was seen by synchronous (real-time) audio-video technology on 1/25/2021. Consent:  She and/or her healthcare decision maker is aware that this patient-initiated Telehealth encounter is a billable service, with coverage as determined by her insurance carrier. She is aware that she may receive a bill and has provided verbal consent to proceed: Yes    I was at home while conducting this encounter. Patient verified with date of birth. She reports no problems using the device. She is 50% compliant over the past 30 days. The following problems are identified:    Drowsiness no Problems exhaling no   Snoring no Forget to put on no   Mask Comfortable yes Can't fall asleep no   Dry Mouth no Mask falls off no   Air Leaking no Frequent awakenings no         She admits that her sleep has improved on PAP therapy using FF mask and heated tubing. She reports of waking up after 3-4 hours of sleeping and is unable to return to sleep. She has been taking melatonin to help her sleep. She reports of the pressure of 15 cmH2O being too strong and feels the need to constantly use the RAMP button.       Allergies   Allergen Reactions    Hydromorphone Itching and Swelling     hydromorphone 1mg IV given immediately began itching and complained of scratchy swelling sensation in her throat    Levofloxacin Hives    Aspirin Anaphylaxis    Bactrim [Sulfamethoprim Ds] Hives    Darvocet A500 [Propoxyphene N-Acetaminophen] Hives    Imitrex [Sumatriptan] Hives    Pcn [Penicillins] Anaphylaxis    Percocet [Oxycodone-Acetaminophen] Hives     Pt can take lortab/norco    Toradol [Ketorolac Tromethamine] Hives    Tramadol Hives    Hydrocodone Hives    Keflex [Cephalexin] Hives    Lisinopril Swelling     Tongue and mouth felt weird, slight swelling     Prednisone Rash and Angioedema     And pt felt throat closing    Savella [Milnacipran] Hives     INSOMNIA    Topamax [Topiramate] Other (comments)    Vesicare [Solifenacin] Other (comments)     Nose bleeds    Wellbutrin [Bupropion Hcl] Other (comments)     insomnia       She has a current medication list which includes the following prescription(s): gabapentin, alprazolam, albuterol, hydrochlorothiazide, sertraline, losartan, fluconazole, albuterol, butalbital-acetaminophen-caffeine, famotidine, and l. acidoph & paracasei- s therm- bifido. .      She  has a past medical history of Arthritis, Arthritis of low back, Asthma, Chronic pain, Colon spasm, Depression, Fibromyalgia, Gastrointestinal disorder, GERD (gastroesophageal reflux disease), High cholesterol, Hypertension, IBS (irritable bowel syndrome), Left axillary pain (2/22/2013), Migraine, Multiple sclerosis ([facility] Other ill-defined conditions(799.89), and Other ill-defined conditions(799.89). [place] Sleepiness Score: 10   and Modified F.O.S.Q. Score Total / 2: (P) 13.5   which reflect improved sleep quality over therapy time. O>        Patient-Reported Vitals 1/25/2021   Patient-Reported Weight 220 lbs   Patient-Reported Height -   Patient-Reported Pulse -   Patient-Reported Temperature -   Patient-Reported Systolic  -   Patient-Reported Diastolic -       Physical Exam completed by visual and auditory observation of patient with verbal input from patient.     General:   Alert, oriented, not in acute distress   Eyes:  Anicteric Sclerae; no obvious strabismus   Nose:  No obvious nasal septum deviation    Neck:   Midline trachea, no visible mass   Chest/Lungs:  Respiratory effort normal, no visualized signs of difficulty breathing or respiratory distress   CVS:  No JVD   Extremities:  No obvious rashes noted on face, neck, or hands   Neuro:  No facial asymmetry, no focal deficits; no obvious tremor    Psych:  Normal affect,  normal countenance         A>    ICD-10-CM ICD-9-CM    1. ELLA (obstructive sleep apnea)  G47.33 327.23 SLEEP LAB (PAP TITRATION)   2. Essential hypertension  I10 401.9    3. Anxiety  F41.9 300.00    4. BMI 40.0-44.9, adult ([facility]  Z68.41 V85.41    5. Screening for viral disease  Z11.59 V73.99 NOVEL CORONAVIRUS (COVID-19)     AHI = 12.9 (2020). On Respironics :  15 cmH2O. Compliant:      yes    Therapeutic Response:  Negative    P>    * Patient agrees to return for COVID testing and Bi-Level PAP trial due to CPAP Failure. * She is familiar with the telephone monitoring application, is willing to track therapy and agrees to notify use if AHI is >5 per hour. * She is aware of the relationship between ELLA and HTN which is stable as is her mood (patient is currently on anti-depressant therapy). * We have recommended a dedicated weight loss through appropriate diet and an exercise regiment as significant weight reduction has been shown to reduce severity of obstructive sleep apnea. *   Follow-up and Dispositions    · Return if symptoms worsen or fail to improve. * She was asked to contact our office for any problems regarding PAP therapy. * Counseling was provided regarding the importance of regular PAP use and on proper sleep hygiene and safe driving. * Re-enforced proper and regular cleaning for the device. Thank you for allowing us to participate in your patient's medical care. Office visit exceeded 30 minutes with counseling and direction of care taking up more than 50% of the allotted time.       Pursuant to the emergency declaration under the [facility], [facility] waiver authority and the [employer] Act, this Virtual  Visit was conducted, with patient's consent, to reduce the patient's risk of exposure to COVID-19 and provide continuity of care for an established patient. Services were provided through a video synchronous discussion virtually to substitute for in-person clinic visit. Grey Clark MD, [facility]  Electronically signed.  01/25/21

## 2021-01-25 NOTE — PATIENT INSTRUCTIONS
217 [facility] [place]. 1668 [facility], [address] Tel.  [phone] Fax. 100 [facility] 60 [place], 200 [address] [facility] Tel.  [phone] Fax. [phone] [address] [place] Debra Rivas Tel.  [phone] Fax. [phone] Learning About CPAP for Sleep Apnea What is CPAP? CPAP is a small machine that you use at home every night while you sleep. It increases air pressure in your throat to keep your airway open. When you have sleep apnea, this can help you sleep better so you feel much better. CPAP stands for \"continuous positive airway pressure. \" The CPAP machine will have one of the following: · A mask that covers your nose and mouth · Prongs that fit into your nose · A mask that covers your nose only, the most common type. This type is called NCPAP. The N stands for \"nasal.\" Why is it done? CPAP is usually the best treatment for obstructive sleep apnea. It is the first treatment choice and the most widely used. Your doctor may suggest CPAP if you have: · Moderate to severe sleep apnea. · Sleep apnea and coronary artery disease (CAD) or heart failure. How does it help? · CPAP can help you have more normal sleep, so you feel less sleepy and more alert during the daytime. · CPAP may help keep heart failure or other heart problems from getting worse. · NCPAP may help lower your blood pressure. · If you use CPAP, your bed partner may also sleep better because you are not snoring or restless. What are the side effects? Some people who use CPAP have: · A dry or stuffy nose and a sore throat. · Irritated skin on the face. · Sore eyes. · Bloating. If you have any of these problems, work with your doctor to fix them. Here are some things you can try: · Be sure the mask or nasal prongs fit well. · See if your doctor can adjust the pressure of your CPAP. · If your nose is dry, try a humidifier. · If your nose is runny or stuffy, try decongestant medicine or a steroid nasal spray. If these things do not help, you might try a different type of machine. Some machines have air pressure that adjusts on its own. Others have air pressures that are different when you breathe in than when you breathe out. This may reduce discomfort caused by too much pressure in your nose. Where can you learn more? Go to Cover.be Enter Q603 in the search box to learn more about \"Learning About CPAP for Sleep Apnea. \"  
© 5373-8292 Healthwise, [employer]orporated. Care instructions adapted under license by [address] [place] [address] (which disclaims liability or warranty for this information). This care instruction is for use with your licensed healthcare professional. If you have questions about a medical condition or this instruction, always ask your healthcare professional. [address] any warranty or liability for your use of this information. Content Version: 6.0.89243; Last Revised: January 11, 2010 PROPER SLEEP HYGIENE What to avoid · Do not have drinks with caffeine, such as coffee or black tea, for 8 hours before bed. · Do not smoke or use other types of tobacco near bedtime. Nicotine is a stimulant and can keep you awake. · Avoid drinking alcohol late in the evening, because it can cause you to wake in the middle of the night. · Do not eat a big meal close to bedtime. If you are hungry, eat a light snack. · Do not drink a lot of water close to bedtime, because the need to urinate may wake you up during the night. · Do not read or watch TV in bed. Use the bed only for sleeping and sexual activity. What to try · Go to bed at the same time every night, and wake up at the same time every morning. Do not take naps during the day. · Keep your bedroom quiet, dark, and cool. · Get regular exercise, but not within 3 to 4 hours of your bedtime. Eleuterio Callahan · Sleep on a comfortable pillow and mattress. · If watching the clock makes you anxious, turn it facing away from you so you cannot see the time. · If you worry when you lie down, start a worry book. Well before bedtime, write down your worries, and then set the book and your concerns aside. · Try meditation or other relaxation techniques before you go to bed. · If you cannot fall asleep, get up and go to another room until you feel sleepy. Do something relaxing. Repeat your bedtime routine before you go to bed again. · Make your house quiet and calm about an hour before bedtime. Turn down the lights, turn off the TV, log off the computer, and turn down the volume on music. This can help you relax after a busy day. Drowsy Driving: The [place] [address] cites drowsiness as a causing factor in more than 802,238 police reported crashes annually, resulting in 76,000 injuries and 1,500 deaths. Other surveys suggest 55% of people polled have driven while drowsy in the past year, 23% had fallen asleep but not crashed, 3% crashed, and 2% had and accident due to drowsy driving. Who is at risk? Young Drivers: One study of drowsy driving accidents states that 55% of the drivers were under 25 years. Of those, 75% were male. Shift Workers and Travelers: People who work overnight or travel across time zones frequently are at higher risk of experiencing Circadian Rhythm Disorders. They are trying to work and function when their body is programed to sleep. Sleep Deprived: Lack of sleep has a serious impact on your ability to pay attention or focus on a task. Consistently getting less than the average of 8 hours your body needs creates partial or cumulative sleep deprivation. Untreated Sleep Disorders: Sleep Apnea, Narcolepsy, R.L.S., and other sleep disorders (untreated) prevent a person from getting enough restful sleep. This leads to excessive daytime sleepiness and increases the risk for drowsy driving accidents by up to 7 times. Medications / Alcohol: Even over the counter medications can cause drowsiness. Medications that impair a drivers attention should have a warning label. Alcohol naturally makes you sleepy and on its own can cause accidents. Combined with excessive drowsiness its effects are amplified. Signs of Drowsy Driving: * You don't remember driving the last few miles * You may drift out of your zach * You are unable to focus and your thoughts wander * You may yawn more often than normal 
 * You have difficulty keeping your eyes open / nodding off * Missing traffic signs, speeding, or tailgating Prevention-  
Good sleep hygiene, lifestyle and behavioral choices have the most impact on drowsy driving. There is no substitute for sleep and the average person requires 8 hours nightly. If you find yourself driving drowsy, stop and sleep. Consider the sleep hygiene tips provided during your visit as well. Medication Refill Policy: Refills for all medications require 1 week advance notice. Please have your pharmacy fax a refill request. We are unable to fax, or call in \"controled substance\" medications and you will need to pick these prescriptions up from our office. [employer] Activation Thank you for requesting access to [employer]. Please follow the instructions below to securely access and download your online medical record. [employer] allows you to send messages to your doctor, view your test results, renew your prescriptions, schedule appointments, and more. How Do I Sign Up? 1. In your internet browser, go to [email or web address]. 2. Click on the First Time User? Click Here link in the Sign In box. You will see the New Member Sign Up page. 3. Enter your [employer] Access Code exactly as it appears below. You will not need to use this code after youve completed the sign-up process. If you do not sign up before the expiration date, you must request a new code. MyChart Access Code: Activation code not generated Current [employer] Status: Active (This is the date your MyChart access code will ) 4. Enter the last four digits of your Social Security Number (xxxx) and Date of Birth (mm/dd/yyyy) as indicated and click Submit. You will be taken to the next sign-up page. 5. Create a [employer] ID. This will be your [employer] login ID and cannot be changed, so think of one that is secure and easy to remember. 6. Create a [employer] password. You can change your password at any time. 7. Enter your Password Reset Question and Answer. This can be used at a later time if you forget your password. 8. Enter your e-mail address. You will receive e-mail notification when new information is available in [address] 9. Click Sign Up. You can now view and download portions of your medical record. 10. Click the Download Summary menu link to download a portable copy of your medical information. Additional Information If you have questions, please call [phone]. Remember, [employer] is NOT to be used for urgent needs. For medical emergencies, dial 911.

## 2021-01-31 LAB — SARS-COV-2, NAA: NOT DETECTED

## 2021-02-03 DIAGNOSIS — M25.512 CHRONIC LEFT SHOULDER PAIN: ICD-10-CM

## 2021-02-03 DIAGNOSIS — J45.21 MILD INTERMITTENT REACTIVE AIRWAY DISEASE WITH WHEEZING WITH ACUTE EXACERBATION: ICD-10-CM

## 2021-02-03 DIAGNOSIS — I10 ESSENTIAL HYPERTENSION, BENIGN: ICD-10-CM

## 2021-02-03 DIAGNOSIS — G89.29 CHRONIC LEFT SHOULDER PAIN: ICD-10-CM

## 2021-02-03 RX ORDER — LOSARTAN POTASSIUM 50 MG/1
TABLET ORAL
Qty: 90 TAB | Refills: 3 | Status: SHIPPED | OUTPATIENT
Start: 2021-02-03 | End: 2022-01-04

## 2021-02-03 RX ORDER — CYCLOBENZAPRINE HCL 10 MG
TABLET ORAL
Qty: 60 TAB | Refills: 1 | Status: SHIPPED | OUTPATIENT
Start: 2021-02-03 | End: 2021-04-08

## 2021-02-03 RX ORDER — FLUCONAZOLE 150 MG/1
TABLET ORAL
Qty: 3 TAB | Refills: 2 | Status: SHIPPED | OUTPATIENT
Start: 2021-02-03 | End: 2021-02-23 | Stop reason: ALTCHOICE

## 2021-02-03 RX ORDER — ALBUTEROL SULFATE 90 UG/1
AEROSOL, METERED RESPIRATORY (INHALATION)
Qty: 51 G | Refills: 1 | Status: SHIPPED | OUTPATIENT
Start: 2021-02-03 | End: 2021-03-08 | Stop reason: SDUPTHER

## 2021-02-04 ENCOUNTER — TELEPHONE (OUTPATIENT)
Dept: SLEEP MEDICINE | Age: 51
End: 2021-02-04

## 2021-02-04 ENCOUNTER — HOSPITAL ENCOUNTER (OUTPATIENT)
Dept: SLEEP MEDICINE | Age: 51
Discharge: HOME | End: 2021-02-04
Payer: MEDICARE

## 2021-02-04 VITALS
WEIGHT: 231 LBS | SYSTOLIC BLOOD PRESSURE: 126 MMHG | OXYGEN SATURATION: 97 % | BODY MASS INDEX: 40.93 KG/M2 | HEART RATE: 116 BPM | TEMPERATURE: 96 F | DIASTOLIC BLOOD PRESSURE: 83 MMHG | HEIGHT: 63 IN

## 2021-02-04 DIAGNOSIS — G47.33 OSA (OBSTRUCTIVE SLEEP APNEA): ICD-10-CM

## 2021-02-04 PROCEDURE — 95811 POLYSOM 6/>YRS CPAP 4/> PARM: CPT | Performed by: INTERNAL MEDICINE

## 2021-02-05 ENCOUNTER — DOCUMENTATION ONLY (OUTPATIENT)
Dept: SLEEP MEDICINE | Age: 51
End: 2021-02-05

## 2021-02-05 DIAGNOSIS — F41.9 ANXIETY: ICD-10-CM

## 2021-02-05 RX ORDER — ALPRAZOLAM 0.5 MG/1
TABLET ORAL
Qty: 30 TAB | Refills: 0 | Status: SHIPPED | OUTPATIENT
Start: 2021-02-05 | End: 2021-03-08

## 2021-02-05 NOTE — PROGRESS NOTES
217 [facility] [place]. [place], [address]  Tel.  [phone]  Fax. 100 [facility] 60  [place], 200 [address] [facility]  Tel.  [phone]  Fax. [phone] [address]  Tel.  [phone]  Fax. [phone]     Sleep Study Technical Notes        PRE-Test:  Yuri Larios (: 1970) arrived in the lobby. Patient was greeted, temperature checked (96.0 °) and screening questions asked. The patient was taken to the [facility] Center and taken directly to his/her room. BP (126/83) and SaO2 (97) were taken. Scale currently not available. Procedure explained to the patient and questions were answered. The patient expressed understanding of the procedure. Electrodes were applied without incident. The patient was placed in bed and the study was started. Acquisition Notes:   Lights off: 10:45pm     Respiratory events: couple hypopnea   ECG:  normal sinus rhythm, 78-97bpm   PAP titration: BIPAP TITRATION  Start  7/4 and titrated to 9/5cm   Other comments: PT slept well, no issues    Desensitization Mask(s) Used: RESMED AIRTOUCH F30 SIZE (M)    POST Test:   Patient was awakened. Electrodes were removed. The patient was discharged after answering the Post Questionnaire. Patient stated that he/she was alert and ok to drive.  Equipment and room cleaned per infection control policy.

## 2021-02-08 ENCOUNTER — TELEPHONE (OUTPATIENT)
Dept: SLEEP MEDICINE | Age: 51
End: 2021-02-08

## 2021-02-08 DIAGNOSIS — G47.33 OSA (OBSTRUCTIVE SLEEP APNEA): Primary | ICD-10-CM

## 2021-02-08 NOTE — TELEPHONE ENCOUNTER
Orders Placed This Encounter    AMB SUPPLY ORDER     Diagnosis: Sleep Apnea ICD-10 Code (G47.33)    Positive Airway Pressure Therapy: Duration of need: 99 months. Respironics DreamStation (Auto Bi-Level Device):  IPAP: 15 cmH2O; Minimum EPAP: 5 cmH2O. PS min: 4, PS max: 6, Ramp Time: 20 Minutes; Flex: 2. CPAP mask -  As fitted during titration OR patient preference, headgear, heated tubing, and filtesr;  heated humidifier; wireless modem. Remote monitoring enrollment. Benjamin Pope MD, FAA; NPI: [id number]  Electronically signed.  02/08/21

## 2021-02-09 ENCOUNTER — DOCUMENTATION ONLY (OUTPATIENT)
Dept: SLEEP MEDICINE | Age: 51
End: 2021-02-09

## 2021-02-23 ENCOUNTER — OFFICE VISIT (OUTPATIENT)
Dept: FAMILY MEDICINE CLINIC | Age: 51
End: 2021-02-23
Payer: MEDICARE

## 2021-02-23 VITALS
HEIGHT: 63 IN | BODY MASS INDEX: 42.17 KG/M2 | WEIGHT: 238 LBS | RESPIRATION RATE: 20 BRPM | HEART RATE: 93 BPM | SYSTOLIC BLOOD PRESSURE: 129 MMHG | DIASTOLIC BLOOD PRESSURE: 84 MMHG | OXYGEN SATURATION: 94 % | TEMPERATURE: 97.5 F

## 2021-02-23 DIAGNOSIS — I10 ESSENTIAL HYPERTENSION, BENIGN: ICD-10-CM

## 2021-02-23 DIAGNOSIS — G89.29 CHRONIC PAIN OF RIGHT THUMB: ICD-10-CM

## 2021-02-23 DIAGNOSIS — M79.644 CHRONIC PAIN OF RIGHT THUMB: ICD-10-CM

## 2021-02-23 DIAGNOSIS — Z00.00 ROUTINE GENERAL MEDICAL EXAMINATION AT A HEALTH CARE FACILITY: Primary | ICD-10-CM

## 2021-02-23 DIAGNOSIS — E78.00 PURE HYPERCHOLESTEROLEMIA: ICD-10-CM

## 2021-02-23 DIAGNOSIS — R73.03 PRE-DIABETES: ICD-10-CM

## 2021-02-23 LAB
ALBUMIN SERPL-MCNC: 2.8 G/DL (ref 3.5–5)
ALBUMIN/GLOB SERPL: 0.8 {RATIO} (ref 1.1–2.2)
ALP SERPL-CCNC: 58 U/L (ref 45–117)
ALT SERPL-CCNC: 16 U/L (ref 12–78)
ANION GAP SERPL CALC-SCNC: 7 MMOL/L (ref 5–15)
AST SERPL-CCNC: 6 U/L (ref 15–37)
BASOPHILS # BLD: 0 K/UL (ref 0–0.1)
BASOPHILS NFR BLD: 0 % (ref 0–1)
BILIRUB SERPL-MCNC: 0.2 MG/DL (ref 0.2–1)
BUN SERPL-MCNC: 11 MG/DL (ref 6–20)
BUN/CREAT SERPL: 15 (ref 12–20)
CALCIUM SERPL-MCNC: 8.3 MG/DL (ref 8.5–10.1)
CHLORIDE SERPL-SCNC: 106 MMOL/L (ref 97–108)
CHOLEST SERPL-MCNC: 190 MG/DL
CO2 SERPL-SCNC: 25 MMOL/L (ref 21–32)
CREAT SERPL-MCNC: 0.73 MG/DL (ref 0.55–1.02)
DIFFERENTIAL METHOD BLD: NORMAL
EOSINOPHIL # BLD: 0.1 K/UL (ref 0–0.4)
EOSINOPHIL NFR BLD: 1 % (ref 0–7)
ERYTHROCYTE [DISTWIDTH] IN BLOOD BY AUTOMATED COUNT: 14.5 % (ref 11.5–14.5)
EST. AVERAGE GLUCOSE BLD GHB EST-MCNC: 111 MG/DL
GLOBULIN SER CALC-MCNC: 3.3 G/DL (ref 2–4)
GLUCOSE SERPL-MCNC: 76 MG/DL (ref 65–100)
HBA1C MFR BLD: 5.5 % (ref 4–5.6)
HCT VFR BLD AUTO: 37.1 % (ref 35–47)
HDLC SERPL-MCNC: 74 MG/DL
HDLC SERPL: 2.6 {RATIO} (ref 0–5)
HGB BLD-MCNC: 11.7 G/DL (ref 11.5–16)
IMM GRANULOCYTES # BLD AUTO: 0 K/UL (ref 0–0.04)
IMM GRANULOCYTES NFR BLD AUTO: 0 % (ref 0–0.5)
LDLC SERPL CALC-MCNC: 82 MG/DL (ref 0–100)
LIPID PROFILE,FLP: ABNORMAL
LYMPHOCYTES # BLD: 3.2 K/UL (ref 0.8–3.5)
LYMPHOCYTES NFR BLD: 36 % (ref 12–49)
MCH RBC QN AUTO: 30.8 PG (ref 26–34)
MCHC RBC AUTO-ENTMCNC: 31.5 G/DL (ref 30–36.5)
MCV RBC AUTO: 97.6 FL (ref 80–99)
MONOCYTES # BLD: 0.4 K/UL (ref 0–1)
MONOCYTES NFR BLD: 5 % (ref 5–13)
NEUTS SEG # BLD: 5 K/UL (ref 1.8–8)
NEUTS SEG NFR BLD: 58 % (ref 32–75)
NRBC # BLD: 0 K/UL (ref 0–0.01)
NRBC BLD-RTO: 0 PER 100 WBC
PLATELET # BLD AUTO: 268 K/UL (ref 150–400)
PMV BLD AUTO: 11.5 FL (ref 8.9–12.9)
POTASSIUM SERPL-SCNC: 4 MMOL/L (ref 3.5–5.1)
PROT SERPL-MCNC: 6.1 G/DL (ref 6.4–8.2)
RBC # BLD AUTO: 3.8 M/UL (ref 3.8–5.2)
SODIUM SERPL-SCNC: 138 MMOL/L (ref 136–145)
TRIGL SERPL-MCNC: 170 MG/DL (ref ?–150)
TSH SERPL DL<=0.05 MIU/L-ACNC: 2.81 UIU/ML (ref 0.36–3.74)
VLDLC SERPL CALC-MCNC: 34 MG/DL
WBC # BLD AUTO: 8.7 K/UL (ref 3.6–11)

## 2021-02-23 PROCEDURE — 99214 OFFICE O/P EST MOD 30 MIN: CPT | Performed by: FAMILY MEDICINE

## 2021-02-23 PROCEDURE — G8427 DOCREV CUR MEDS BY ELIG CLIN: HCPCS | Performed by: FAMILY MEDICINE

## 2021-02-23 PROCEDURE — G8754 DIAS BP LESS 90: HCPCS | Performed by: FAMILY MEDICINE

## 2021-02-23 PROCEDURE — G8417 CALC BMI ABV UP PARAM F/U: HCPCS | Performed by: FAMILY MEDICINE

## 2021-02-23 PROCEDURE — G0439 PPPS, SUBSEQ VISIT: HCPCS | Performed by: FAMILY MEDICINE

## 2021-02-23 PROCEDURE — G9717 DOC PT DX DEP/BP F/U NT REQ: HCPCS | Performed by: FAMILY MEDICINE

## 2021-02-23 PROCEDURE — G8752 SYS BP LESS 140: HCPCS | Performed by: FAMILY MEDICINE

## 2021-02-23 NOTE — PROGRESS NOTES
Patient here for fasting labs. She states she is due for right thumb surgery with Dr. Lucinda Woodson this afternoon. She has been fasting since 10:30 pm last night. 1. Have you been to the ER, urgent care clinic since your last visit? Hospitalized since your last visit? No    2. Have you seen or consulted any other health care providers outside of the [address] [place] [address] since your last visit? Include any pap smears or colon screening. No         Medicare Wellness Exam:    Chief Complaint   Patient presents with    Labs     fasting labs     she is a 54y.o. year old female who presents for evaluation for their Medicare Wellness Visit. Aimee Devaughn is completed and assessed=yes  Depression Screen is completed and assessed=yes  Medication list reviewed and adjusted for accuracy=yes  Immunizations reviewed and updated=yes  Health/Preventative Screenings reviewed and updated=yes  ADL Functions reviewed=yes    Patient Active Problem List    Diagnosis    Diarrhea of presumed infectious origin    CAP (community acquired pneumonia)    Sepsis ([facility] [address]    Obesity, morbid ([facility]    Pre-diabetes    Urinary frequency    Essential hypertension, benign    DDD (degenerative disc disease), lumbar    Left axillary pain    IBS (irritable bowel syndrome)    Migraine    Hyperlipidemia    Fibromyalgia    Nipple discharge    Depression       Reviewed PmHx, RxHx, FmHx, SocHx, AllgHx and updated and dated in the chart.     Review of Systems - negative except as listed above in the HPI    Objective:     Vitals:    02/23/21 1019   BP: 129/84   Pulse: 93   Resp: 20   Temp: 97.5 °F (36.4 °C)   SpO2: 94%   Weight: 238 lb (108 kg)   Height: 5' 3\" (1.6 m)     Physical Examination: General appearance - alert, well appearing, and in no distress  Chest - clear to auscultation, no wheezes, rales or rhonchi, symmetric air entry  Heart - normal rate, regular rhythm, normal S1, S2, no murmurs, rubs, clicks or gallops       Assessment/ Plan:   Diagnoses and all orders for this visit:    1. Routine general medical examination at a health care facility  -     LIPID PANEL; Future  -     METABOLIC PANEL, COMPREHENSIVE; Future  -     CBC WITH AUTOMATED DIFF; Future  -     TSH 3RD GENERATION; Future  -     HEMOGLOBIN A1C WITH EAG; Future  -no need for lw    2. Pure hypercholesterolemia  -     LIPID PANEL; Future  -     METABOLIC PANEL, COMPREHENSIVE; Future    3. Essential hypertension, benign  -     LIPID PANEL; Future  -     METABOLIC PANEL, COMPREHENSIVE; Future  -at goal    4. Chronic pain of right thumb  -surgery today    5. Pre-diabetes  -     METABOLIC PANEL, COMPREHENSIVE; Future  -     HEMOGLOBIN A1C WITH EAG; Future  Lab Results   Component Value Date/Time    Hemoglobin A1c 5.5 09/09/2020 03:36 PM            -Pain evaluation performed in office  -Cognitive Screen performed in office  -Depression Screen, Fall risks (by up and go test)  and ADL functionality were addressed  -Medication list updated and reviewed for any changes   -A comprehensive review of medical issues and a plan was formulated  -End of life planning was addressed with pt   -Health Screenings for preventions were addressed and a plan was formulated  -Shingles Vaccine was recommended  -Discussed with patient cancer risk factors and appropriate screenings for age  -Patient evaluated for colonoscopy and referred if needed per screeing criteria  -Labs from previous visits were discussed with patient   -Discussed with patient diet and exercise and formulated a plan as needed  -An Advanced care plan was developed with the patient.  -Alcohol screening performed and was negative    -    I have discussed the diagnosis with the patient and the intended plan as seen in the above orders. The patient understands and agrees with the plan. The patient has received an after-visit summary and questions were answered concerning future plans.      Medication Side Effects and Warnings were discussed with patien  Patient Labs were reviewed and or requested  Patient Past Records were reviewed and or requested    There are no Patient Instructions on file for this visit.       Azeem Rooney M.D.

## 2021-03-08 DIAGNOSIS — J45.21 MILD INTERMITTENT REACTIVE AIRWAY DISEASE WITH WHEEZING WITH ACUTE EXACERBATION: ICD-10-CM

## 2021-03-08 DIAGNOSIS — F41.9 ANXIETY: ICD-10-CM

## 2021-03-08 DIAGNOSIS — J40 BRONCHITIS: ICD-10-CM

## 2021-03-08 RX ORDER — ALBUTEROL SULFATE 0.83 MG/ML
2.5 SOLUTION RESPIRATORY (INHALATION)
Qty: 50 EACH | Refills: 2 | Status: SHIPPED | OUTPATIENT
Start: 2021-03-08 | End: 2021-09-01

## 2021-03-08 RX ORDER — ALBUTEROL SULFATE 90 UG/1
2 AEROSOL, METERED RESPIRATORY (INHALATION)
Qty: 51 G | Refills: 1 | Status: SHIPPED | OUTPATIENT
Start: 2021-03-08 | End: 2021-09-01

## 2021-03-08 RX ORDER — ALPRAZOLAM 0.5 MG/1
TABLET ORAL
Qty: 30 TAB | Refills: 5 | Status: SHIPPED | OUTPATIENT
Start: 2021-03-08 | End: 2021-10-14

## 2021-04-07 ENCOUNTER — DOCUMENTATION ONLY (OUTPATIENT)
Dept: FAMILY MEDICINE CLINIC | Age: 51
End: 2021-04-07

## 2021-04-07 NOTE — PROGRESS NOTES
Faxed 02/23/21 office note/lab results to Della Loera0 per request. Fax #[phone] confirmation was received.

## 2021-04-08 DIAGNOSIS — M25.512 CHRONIC LEFT SHOULDER PAIN: ICD-10-CM

## 2021-04-08 DIAGNOSIS — G89.29 CHRONIC LEFT SHOULDER PAIN: ICD-10-CM

## 2021-04-08 RX ORDER — HYDROCHLOROTHIAZIDE 25 MG/1
TABLET ORAL
Qty: 90 TAB | Refills: 3 | Status: SHIPPED | OUTPATIENT
Start: 2021-04-08 | End: 2022-04-18

## 2021-04-08 RX ORDER — CYCLOBENZAPRINE HCL 10 MG
TABLET ORAL
Qty: 60 TAB | Refills: 1 | Status: SHIPPED | OUTPATIENT
Start: 2021-04-08 | End: 2021-04-19 | Stop reason: SDUPTHER

## 2021-04-12 ENCOUNTER — VIRTUAL VISIT (OUTPATIENT)
Dept: SLEEP MEDICINE | Age: 51
End: 2021-04-12
Payer: MEDICARE

## 2021-04-12 VITALS — HEIGHT: 63 IN | BODY MASS INDEX: 42.17 KG/M2 | WEIGHT: 238 LBS

## 2021-04-12 DIAGNOSIS — I10 ESSENTIAL HYPERTENSION: ICD-10-CM

## 2021-04-12 DIAGNOSIS — F41.9 ANXIETY: ICD-10-CM

## 2021-04-12 DIAGNOSIS — G47.33 OSA (OBSTRUCTIVE SLEEP APNEA): Primary | ICD-10-CM

## 2021-04-12 LAB — SARS-COV-2, NAA: NEGATIVE

## 2021-04-12 PROCEDURE — G9717 DOC PT DX DEP/BP F/U NT REQ: HCPCS | Performed by: INTERNAL MEDICINE

## 2021-04-12 PROCEDURE — 99213 OFFICE O/P EST LOW 20 MIN: CPT | Performed by: INTERNAL MEDICINE

## 2021-04-12 PROCEDURE — G8756 NO BP MEASURE DOC: HCPCS | Performed by: INTERNAL MEDICINE

## 2021-04-12 PROCEDURE — G8427 DOCREV CUR MEDS BY ELIG CLIN: HCPCS | Performed by: INTERNAL MEDICINE

## 2021-04-12 PROCEDURE — G8417 CALC BMI ABV UP PARAM F/U: HCPCS | Performed by: INTERNAL MEDICINE

## 2021-04-12 NOTE — PATIENT INSTRUCTIONS
[address] [place]. [address] [facility], [address] Tel.  [phone] Fax. [address] [facility] [address] [place], [address] [facility] Tel.  [phone] Fax. [phone] [address] Tel.  [phone] Fax. [phone] Learning About CPAP for Sleep Apnea What is CPAP? CPAP is a small machine that you use at home every night while you sleep. It increases air pressure in your throat to keep your airway open. When you have sleep apnea, this can help you sleep better so you feel much better. CPAP stands for \"continuous positive airway pressure. \" The CPAP machine will have one of the following: · A mask that covers your nose and mouth · Prongs that fit into your nose · A mask that covers your nose only, the most common type. This type is called NCPAP. The N stands for \"nasal.\" Why is it done? CPAP is usually the best treatment for obstructive sleep apnea. It is the first treatment choice and the most widely used. Your doctor may suggest CPAP if you have: · Moderate to severe sleep apnea. · Sleep apnea and coronary artery disease (CAD) or heart failure. How does it help? · CPAP can help you have more normal sleep, so you feel less sleepy and more alert during the daytime. · CPAP may help keep heart failure or other heart problems from getting worse. · NCPAP may help lower your blood pressure. · If you use CPAP, your bed partner may also sleep better because you are not snoring or restless. What are the side effects? Some people who use CPAP have: · A dry or stuffy nose and a sore throat. · Irritated skin on the face. · Sore eyes. · Bloating. If you have any of these problems, work with your doctor to fix them. Here are some things you can try: · Be sure the mask or nasal prongs fit well. · See if your doctor can adjust the pressure of your CPAP. · If your nose is dry, try a humidifier.  
· If your nose is runny or stuffy, try decongestant medicine or a steroid nasal spray. If these things do not help, you might try a different type of machine. Some machines have air pressure that adjusts on its own. Others have air pressures that are different when you breathe in than when you breathe out. This may reduce discomfort caused by too much pressure in your nose. Where can you learn more? Go to [employer] Enter V043 in the search box to learn more about \"Learning About CPAP for Sleep Apnea. \"  
© 9367-7666 Healthwise, Incorporated. Care instructions adapted under license by [facility] [employer] (which disclaims liability or warranty for this information). This care instruction is for use with your licensed healthcare professional. If you have questions about a medical condition or this instruction, always ask your healthcare professional. [address] any warranty or liability for your use of this information. Content Version: 4.1.72298; Last Revised: January 11, 2010 PROPER SLEEP HYGIENE What to avoid · Do not have drinks with caffeine, such as coffee or black tea, for 8 hours before bed. · Do not smoke or use other types of tobacco near bedtime. Nicotine is a stimulant and can keep you awake. · Avoid drinking alcohol late in the evening, because it can cause you to wake in the middle of the night. · Do not eat a big meal close to bedtime. If you are hungry, eat a light snack. · Do not drink a lot of water close to bedtime, because the need to urinate may wake you up during the night. · Do not read or watch TV in bed. Use the bed only for sleeping and sexual activity. What to try · Go to bed at the same time every night, and wake up at the same time every morning. Do not take naps during the day. · Keep your bedroom quiet, dark, and cool. · Get regular exercise, but not within 3 to 4 hours of your bedtime. Deyvi Porter · Sleep on a comfortable pillow and mattress.  
· If watching the clock makes you anxious, turn it facing away from you so you cannot see the time. · If you worry when you lie down, start a worry book. Well before bedtime, write down your worries, and then set the book and your concerns aside. · Try meditation or other relaxation techniques before you go to bed. · If you cannot fall asleep, get up and go to another room until you feel sleepy. Do something relaxing. Repeat your bedtime routine before you go to bed again. · Make your house quiet and calm about an hour before bedtime. Turn down the lights, turn off the TV, log off the computer, and turn down the volume on music. This can help you relax after a busy day. Drowsy Driving: The [facility] cites drowsiness as a causing factor in more than 059,843 police reported crashes annually, resulting in 76,000 injuries and 1,500 deaths. Other surveys suggest 55% of people polled have driven while drowsy in the past year, 23% had fallen asleep but not crashed, 3% crashed, and 2% had and accident due to drowsy driving. Who is at risk? Young Drivers: One study of drowsy driving accidents states that 55% of the drivers were under 25 years. Of those, 75% were male. Shift Workers and Travelers: People who work overnight or travel across time zones frequently are at higher risk of experiencing Circadian Rhythm Disorders. They are trying to work and function when their body is programed to sleep. Sleep Deprived: Lack of sleep has a serious impact on your ability to pay attention or focus on a task. Consistently getting less than the average of 8 hours your body needs creates partial or cumulative sleep deprivation. Untreated Sleep Disorders: Sleep Apnea, Narcolepsy, R.L.S., and other sleep disorders (untreated) prevent a person from getting enough restful sleep. This leads to excessive daytime sleepiness and increases the risk for drowsy driving accidents by up to 7 times.  
Medications / Alcohol: Even over the counter medications can cause drowsiness. Medications that impair a drivers attention should have a warning label. Alcohol naturally makes you sleepy and on its own can cause accidents. Combined with excessive drowsiness its effects are amplified. Signs of Drowsy Driving: * You don't remember driving the last few miles * You may drift out of your zach * You are unable to focus and your thoughts wander * You may yawn more often than normal 
 * You have difficulty keeping your eyes open / nodding off * Missing traffic signs, speeding, or tailgating Prevention-  
Good sleep hygiene, lifestyle and behavioral choices have the most impact on drowsy driving. There is no substitute for sleep and the average person requires 8 hours nightly. If you find yourself driving drowsy, stop and sleep. Consider the sleep hygiene tips provided during your visit as well. Medication Refill Policy: Refills for all medications require 1 week advance notice. Please have your pharmacy fax a refill request. We are unable to fax, or call in \"controled substance\" medications and you will need to pick these prescriptions up from our office. 9SLIDES Activation Thank you for requesting access to 9SLIDES. Please follow the instructions below to securely access and download your online medical record. 9SLIDES allows you to send messages to your doctor, view your test results, renew your prescriptions, schedule appointments, and more. How Do I Sign Up? 1. In your internet browser, go to [email or web address]. 2. Click on the First Time User? Click Here link in the Sign In box. You will see the New Member Sign Up page. 3. Enter your 9SLIDES Access Code exactly as it appears below. You will not need to use this code after youve completed the sign-up process. If you do not sign up before the expiration date, you must request a new code. 9SLIDES Access Code:  Activation code not generated Current amSTATZ Status: Active (This is the date your amSTATZ access code will ) 4. Enter the last four digits of your Social Security Number (xxxx) and Date of Birth (mm/dd/yyyy) as indicated and click Submit. You will be taken to the next sign-up page. 5. Create a Zentrickt ID. This will be your amSTATZ login ID and cannot be changed, so think of one that is secure and easy to remember. 6. Create a amSTATZ password. You can change your password at any time. 7. Enter your Password Reset Question and Answer. This can be used at a later time if you forget your password. 8. Enter your e-mail address. You will receive e-mail notification when new information is available in [address] 9. Click Sign Up. You can now view and download portions of your medical record. 10. Click the Download Summary menu link to download a portable copy of your medical information. Additional Information If you have questions, please call [phone]. Remember, amSTATZ is NOT to be used for urgent needs. For medical emergencies, dial 911.

## 2021-04-19 DIAGNOSIS — M25.512 CHRONIC LEFT SHOULDER PAIN: ICD-10-CM

## 2021-04-19 DIAGNOSIS — G89.29 CHRONIC LEFT SHOULDER PAIN: ICD-10-CM

## 2021-04-19 RX ORDER — CYCLOBENZAPRINE HCL 10 MG
TABLET ORAL
Qty: 60 TAB | Refills: 1 | Status: SHIPPED | OUTPATIENT
Start: 2021-04-19 | End: 2021-07-21

## 2021-04-27 DIAGNOSIS — F41.1 GENERALIZED ANXIETY DISORDER: ICD-10-CM

## 2021-04-27 DIAGNOSIS — M79.7 FIBROMYALGIA: ICD-10-CM

## 2021-04-27 RX ORDER — GABAPENTIN 600 MG/1
TABLET ORAL
Qty: 60 TAB | Refills: 11 | Status: SHIPPED | OUTPATIENT
Start: 2021-04-27 | End: 2021-08-16

## 2021-04-28 RX ORDER — SERTRALINE HYDROCHLORIDE 50 MG/1
TABLET, FILM COATED ORAL
Qty: 90 TAB | Refills: 3 | Status: ON HOLD | OUTPATIENT
Start: 2021-04-28 | End: 2021-09-14

## 2021-07-21 DIAGNOSIS — G89.29 CHRONIC LEFT SHOULDER PAIN: ICD-10-CM

## 2021-07-21 DIAGNOSIS — M25.512 CHRONIC LEFT SHOULDER PAIN: ICD-10-CM

## 2021-07-21 RX ORDER — CYCLOBENZAPRINE HCL 10 MG
TABLET ORAL
Qty: 30 TABLET | Refills: 0 | Status: SHIPPED | OUTPATIENT
Start: 2021-07-21 | End: 2021-09-01

## 2021-08-14 DIAGNOSIS — M79.7 FIBROMYALGIA: ICD-10-CM

## 2021-08-16 ENCOUNTER — APPOINTMENT (OUTPATIENT)
Dept: GENERAL RADIOLOGY | Age: 51
End: 2021-08-16
Attending: NURSE PRACTITIONER
Payer: MEDICARE

## 2021-08-16 ENCOUNTER — HOSPITAL ENCOUNTER (EMERGENCY)
Age: 51
Discharge: HOME | End: 2021-08-16
Attending: STUDENT IN AN ORGANIZED HEALTH CARE EDUCATION/TRAINING PROGRAM
Payer: MEDICARE

## 2021-08-16 VITALS
OXYGEN SATURATION: 99 % | TEMPERATURE: 97.3 F | HEART RATE: 111 BPM | RESPIRATION RATE: 18 BRPM | BODY MASS INDEX: 42.34 KG/M2 | WEIGHT: 238.98 LBS | SYSTOLIC BLOOD PRESSURE: 144 MMHG | DIASTOLIC BLOOD PRESSURE: 87 MMHG | HEIGHT: 63 IN

## 2021-08-16 DIAGNOSIS — J20.9 ACUTE BRONCHITIS, UNSPECIFIED ORGANISM: Primary | ICD-10-CM

## 2021-08-16 LAB
ALBUMIN SERPL-MCNC: 3.6 G/DL (ref 3.5–5)
ALBUMIN/GLOB SERPL: 0.9 {RATIO} (ref 1.1–2.2)
ALP SERPL-CCNC: 61 U/L (ref 45–117)
ALT SERPL-CCNC: 29 U/L (ref 12–78)
ANION GAP SERPL CALC-SCNC: 7 MMOL/L (ref 5–15)
APPEARANCE UR: ABNORMAL
AST SERPL-CCNC: 22 U/L (ref 15–37)
BACTERIA URNS QL MICRO: ABNORMAL /HPF
BASOPHILS # BLD: 0 K/UL (ref 0–0.1)
BASOPHILS NFR BLD: 0 % (ref 0–1)
BILIRUB SERPL-MCNC: 0.4 MG/DL (ref 0.2–1)
BILIRUB UR QL: NEGATIVE
BNP SERPL-MCNC: 9 PG/ML
BUN SERPL-MCNC: 11 MG/DL (ref 6–20)
BUN/CREAT SERPL: 14 (ref 12–20)
CALCIUM SERPL-MCNC: 8.6 MG/DL (ref 8.5–10.1)
CHLORIDE SERPL-SCNC: 104 MMOL/L (ref 97–108)
CO2 SERPL-SCNC: 27 MMOL/L (ref 21–32)
COLOR UR: ABNORMAL
CREAT SERPL-MCNC: 0.76 MG/DL (ref 0.55–1.02)
D DIMER PPP FEU-MCNC: 0.19 MG/L FEU (ref 0–0.65)
DIFFERENTIAL METHOD BLD: NORMAL
EOSINOPHIL # BLD: 0.1 K/UL (ref 0–0.4)
EOSINOPHIL NFR BLD: 2 % (ref 0–7)
EPITH CASTS URNS QL MICRO: ABNORMAL /LPF
ERYTHROCYTE [DISTWIDTH] IN BLOOD BY AUTOMATED COUNT: 13.6 % (ref 11.5–14.5)
GLOBULIN SER CALC-MCNC: 4 G/DL (ref 2–4)
GLUCOSE SERPL-MCNC: 87 MG/DL (ref 65–100)
GLUCOSE UR STRIP.AUTO-MCNC: NEGATIVE MG/DL
HCT VFR BLD AUTO: 40.1 % (ref 35–47)
HGB BLD-MCNC: 13.1 G/DL (ref 11.5–16)
HGB UR QL STRIP: NEGATIVE
HYALINE CASTS URNS QL MICRO: ABNORMAL /LPF (ref 0–5)
IMM GRANULOCYTES # BLD AUTO: 0 K/UL (ref 0–0.04)
IMM GRANULOCYTES NFR BLD AUTO: 0 % (ref 0–0.5)
KETONES UR QL STRIP.AUTO: NEGATIVE MG/DL
LACTATE SERPL-SCNC: 1.6 MMOL/L (ref 0.4–2)
LEUKOCYTE ESTERASE UR QL STRIP.AUTO: ABNORMAL
LIPASE SERPL-CCNC: 74 U/L (ref 73–393)
LYMPHOCYTES # BLD: 3.1 K/UL (ref 0.8–3.5)
LYMPHOCYTES NFR BLD: 37 % (ref 12–49)
MCH RBC QN AUTO: 30.8 PG (ref 26–34)
MCHC RBC AUTO-ENTMCNC: 32.7 G/DL (ref 30–36.5)
MCV RBC AUTO: 94.4 FL (ref 80–99)
MONOCYTES # BLD: 0.5 K/UL (ref 0–1)
MONOCYTES NFR BLD: 6 % (ref 5–13)
NEUTS SEG # BLD: 4.7 K/UL (ref 1.8–8)
NEUTS SEG NFR BLD: 55 % (ref 32–75)
NITRITE UR QL STRIP.AUTO: POSITIVE
NRBC # BLD: 0 K/UL (ref 0–0.01)
NRBC BLD-RTO: 0 PER 100 WBC
PH UR STRIP: 6 [PH] (ref 5–8)
PLATELET # BLD AUTO: 293 K/UL (ref 150–400)
PMV BLD AUTO: 10.9 FL (ref 8.9–12.9)
POTASSIUM SERPL-SCNC: 3.6 MMOL/L (ref 3.5–5.1)
PROT SERPL-MCNC: 7.6 G/DL (ref 6.4–8.2)
PROT UR STRIP-MCNC: NEGATIVE MG/DL
RBC # BLD AUTO: 4.25 M/UL (ref 3.8–5.2)
RBC #/AREA URNS HPF: ABNORMAL /HPF (ref 0–5)
SARS-COV-2, COV2: NORMAL
SODIUM SERPL-SCNC: 138 MMOL/L (ref 136–145)
SP GR UR REFRACTOMETRY: 1.01 (ref 1–1.03)
TROPONIN I SERPL-MCNC: <0.05 NG/ML
UR CULT HOLD, URHOLD: NORMAL
UROBILINOGEN UR QL STRIP.AUTO: 0.2 EU/DL (ref 0.2–1)
WBC # BLD AUTO: 8.4 K/UL (ref 3.6–11)
WBC URNS QL MICRO: ABNORMAL /HPF (ref 0–4)

## 2021-08-16 PROCEDURE — 85025 COMPLETE CBC W/AUTO DIFF WBC: CPT

## 2021-08-16 PROCEDURE — 36415 COLL VENOUS BLD VENIPUNCTURE: CPT

## 2021-08-16 PROCEDURE — 83605 ASSAY OF LACTIC ACID: CPT

## 2021-08-16 PROCEDURE — U0005 INFEC AGEN DETEC AMPLI PROBE: HCPCS

## 2021-08-16 PROCEDURE — 83880 ASSAY OF NATRIURETIC PEPTIDE: CPT

## 2021-08-16 PROCEDURE — 84484 ASSAY OF TROPONIN QUANT: CPT

## 2021-08-16 PROCEDURE — 81001 URINALYSIS AUTO W/SCOPE: CPT

## 2021-08-16 PROCEDURE — 99283 EMERGENCY DEPT VISIT LOW MDM: CPT

## 2021-08-16 PROCEDURE — 80053 COMPREHEN METABOLIC PANEL: CPT

## 2021-08-16 PROCEDURE — 83690 ASSAY OF LIPASE: CPT

## 2021-08-16 PROCEDURE — 93005 ELECTROCARDIOGRAM TRACING: CPT

## 2021-08-16 PROCEDURE — 85379 FIBRIN DEGRADATION QUANT: CPT

## 2021-08-16 PROCEDURE — 71045 X-RAY EXAM CHEST 1 VIEW: CPT

## 2021-08-16 RX ORDER — CODEINE PHOSPHATE AND GUAIFENESIN 10; 100 MG/5ML; MG/5ML
5 SOLUTION ORAL
Qty: 60 ML | Refills: 0 | Status: SHIPPED | OUTPATIENT
Start: 2021-08-16 | End: 2021-08-21

## 2021-08-16 RX ORDER — CODEINE PHOSPHATE AND GUAIFENESIN 10; 100 MG/5ML; MG/5ML
5 SOLUTION ORAL
Status: DISCONTINUED | OUTPATIENT
Start: 2021-08-16 | End: 2021-08-17 | Stop reason: HOSPADM

## 2021-08-16 RX ORDER — AZITHROMYCIN 250 MG/1
TABLET, FILM COATED ORAL
Qty: 6 TABLET | Refills: 0 | Status: SHIPPED | OUTPATIENT
Start: 2021-08-16 | End: 2021-08-21

## 2021-08-16 RX ORDER — GABAPENTIN 600 MG/1
TABLET ORAL
Qty: 60 TABLET | Refills: 11 | Status: SHIPPED | OUTPATIENT
Start: 2021-08-16 | End: 2021-12-07

## 2021-08-16 NOTE — ED TRIAGE NOTES
Patient arrives with complaint of SOB, worse with exertion, cough. States cough is productive with white/yellow phlegm. Further reports intermittent back pain and midsternal chest pain with cough, and one episode of vomiting last night. States symptoms present times two weeks. Denies covid vaccination. Denies fever, nausea.

## 2021-08-16 NOTE — ED PROVIDER NOTES
This is a 22-year-old female with past medical history of arthritis, asthma, fibromyalgia, IBS, GERD, hypercholesterolemia, hypertension, migraine headaches, and multiple sclerosis who presents ER today for evaluation of productive cough, fatigue, shortness of breath with exertion, chest pain (substernal only present while coughing), nausea, decreased appetite that has been progressive in nature since 2 weeks ago. She denies any exposure to contacts with similar symptoms. She denies any history of thromboembolism. She has not received any vaccines for COVID-19. Additional ROS negative for syncope, palpitations, leg swelling, fever. Past Medical History:   Diagnosis Date    Arthritis     Arthritis of low back     Asthma     Chronic pain     fibromyalgia    Colon spasm     Depression     Fibromyalgia     Gastrointestinal disorder     IBS    GERD (gastroesophageal reflux disease)     High cholesterol     Hypertension     IBS (irritable bowel syndrome)     Left axillary pain 2/22/2013    Migraine     LAST HEADACHE 2-16-12    Multiple sclerosis (HCC)     Other ill-defined conditions(799.89)     fibromylgia    Other ill-defined conditions(799.89)     MS       Past Surgical History:   Procedure Laterality Date    HX APPENDECTOMY      HX BLADDER REPAIR  04/2011     HX CHOLECYSTECTOMY      HX GYN      For ectopic pregnancy, hx tubal ligation    HX GYN      vaginal ablation.     HX UROLOGICAL      bladder tuck, no mesh    HX UROLOGICAL      sling removed 1/10/11         Family History:   Adopted: Yes   Problem Relation Age of Onset    Other Other         family hx is completely unknown    Diabetes Brother     Asthma Daughter     Asthma Son        Social History     Socioeconomic History    Marital status:      Spouse name: Not on file    Number of children: Not on file    Years of education: Not on file    Highest education level: Not on file   Occupational History    Not on file   Tobacco Use    Smoking status: Current Some Day Smoker     Packs/day: 0.50     Years: 20.00     Pack years: 10.00     Types: Cigarettes    Smokeless tobacco: Never Used   Substance and Sexual Activity    Alcohol use: No     Alcohol/week: 0.0 standard drinks    Drug use: No    Sexual activity: Never     Partners: Male     Birth control/protection: None   Other Topics Concern    Not on file   Social History Narrative    Not on file     Social Determinants of Health     Financial Resource Strain:     Difficulty of Paying Living Expenses:    Food Insecurity:     Worried About Running Out of Food in the Last Year:     Ran Out of Food in the Last Year:    Transportation Needs:     Lack of Transportation (Medical):  Lack of Transportation (Non-Medical):    Physical Activity:     Days of Exercise per Week:     Minutes of Exercise per Session:    Stress:     Feeling of Stress :    Social Connections:     Frequency of Communication with Friends and Family:     Frequency of Social Gatherings with Friends and Family:     Attends [employer] Services:     Active Member of Clubs or Organizations:     Attends Club or Organization Meetings:     Marital Status:    Intimate Partner Violence:     Fear of Current or Ex-Partner:     Emotionally Abused:     Physically Abused:     Sexually Abused: ALLERGIES: Hydromorphone, Levofloxacin, Aspirin, Bactrim [sulfamethoprim ds], Darvocet a500 [propoxyphene n-acetaminophen], Imitrex [sumatriptan], Pcn [penicillins], Percocet [oxycodone-acetaminophen], Toradol [ketorolac tromethamine], Tramadol, Hydrocodone, Keflex [cephalexin], Lisinopril, Prednisone, Savella [milnacipran], Topamax [topiramate], Vesicare [solifenacin], and Wellbutrin [bupropion hcl]    Review of Systems   Constitutional: Positive for appetite change and fatigue. Negative for fever. HENT: Negative for sore throat. Eyes: Negative for visual disturbance.    Respiratory: Positive for cough and shortness of breath. Cardiovascular: Positive for chest pain. Negative for leg swelling. Gastrointestinal: Positive for nausea. Negative for abdominal pain. Genitourinary: Negative for dysuria and flank pain. Musculoskeletal: Negative for myalgias. Skin: Negative for rash. Neurological: Negative for dizziness. Psychiatric/Behavioral: Negative for dysphoric mood. Vitals:    08/16/21 1540   BP: (!) 144/87   Pulse: (!) 111   Resp: 18   Temp: 97.3 °F (36.3 °C)   SpO2: 99%   Weight: 108.4 kg (238 lb 15.7 oz)   Height: 5' 3\" (1.6 m)            Physical Exam  Vitals and nursing note reviewed. Constitutional:       General: She is not in acute distress. Appearance: Normal appearance. She is not ill-appearing. Comments: Appears uncomfortable but not acutely ill   HENT:      Head: Normocephalic and atraumatic. Right Ear: External ear normal.      Left Ear: External ear normal.      Nose: Nose normal.      Mouth/Throat:      Mouth: Mucous membranes are moist.      Pharynx: Oropharynx is clear. Eyes:      General: No scleral icterus. Extraocular Movements: Extraocular movements intact. Conjunctiva/sclera: Conjunctivae normal.      Pupils: Pupils are equal, round, and reactive to light. Cardiovascular:      Rate and Rhythm: Regular rhythm. Tachycardia present. Pulses: Normal pulses. Heart sounds: Normal heart sounds. No friction rub. No gallop. Pulmonary:      Effort: Tachypnea present. No prolonged expiration or respiratory distress. Breath sounds: No wheezing. Comments: Frequent coughing during assessment. No audible wheezing. Attempts to take deep breaths in and out results in increased coughing. Faint bibasilar crackles  Abdominal:      General: Abdomen is flat. Bowel sounds are normal.      Palpations: Abdomen is soft. Tenderness: There is no abdominal tenderness. Musculoskeletal:         General: Normal range of motion. Cervical back: Normal range of motion and neck supple. No rigidity. No muscular tenderness. Right lower leg: No edema. Left lower leg: No edema. Skin:     General: Skin is warm and dry. Coloration: Skin is not pale. Neurological:      General: No focal deficit present. Mental Status: She is alert and oriented to person, place, and time. Psychiatric:         Mood and Affect: Mood normal.         Behavior: Behavior normal.         Thought Content: Thought content normal.         Judgment: Judgment normal.          MDM      VITAL SIGNS:  No data found. LABS:  Recent Results (from the past 6 hour(s))   CBC WITH AUTOMATED DIFF    Collection Time: 08/16/21  6:14 PM   Result Value Ref Range    WBC 8.4 3.6 - 11.0 K/uL    RBC 4.25 3.80 - 5.20 M/uL    HGB 13.1 11.5 - 16.0 g/dL    HCT 40.1 35.0 - 47.0 %    MCV 94.4 80.0 - 99.0 FL    MCH 30.8 26.0 - 34.0 PG    MCHC 32.7 30.0 - 36.5 g/dL    RDW 13.6 11.5 - 14.5 %    PLATELET 645 198 - 447 K/uL    MPV 10.9 8.9 - 12.9 FL    NRBC 0.0 0  WBC    ABSOLUTE NRBC 0.00 0.00 - 0.01 K/uL    NEUTROPHILS 55 32 - 75 %    LYMPHOCYTES 37 12 - 49 %    MONOCYTES 6 5 - 13 %    EOSINOPHILS 2 0 - 7 %    BASOPHILS 0 0 - 1 %    IMMATURE GRANULOCYTES 0 0.0 - 0.5 %    ABS. NEUTROPHILS 4.7 1.8 - 8.0 K/UL    ABS. LYMPHOCYTES 3.1 0.8 - 3.5 K/UL    ABS. MONOCYTES 0.5 0.0 - 1.0 K/UL    ABS. EOSINOPHILS 0.1 0.0 - 0.4 K/UL    ABS. BASOPHILS 0.0 0.0 - 0.1 K/UL    ABS. IMM.  GRANS. 0.0 0.00 - 0.04 K/UL    DF AUTOMATED     METABOLIC PANEL, COMPREHENSIVE    Collection Time: 08/16/21  6:14 PM   Result Value Ref Range    Sodium 138 136 - 145 mmol/L    Potassium 3.6 3.5 - 5.1 mmol/L    Chloride 104 97 - 108 mmol/L    CO2 27 21 - 32 mmol/L    Anion gap 7 5 - 15 mmol/L    Glucose 87 65 - 100 mg/dL    BUN 11 6 - 20 MG/DL    Creatinine 0.76 0.55 - 1.02 MG/DL    BUN/Creatinine ratio 14 12 - 20      GFR est AA >60 >60 ml/min/1.73m2    GFR est non-AA >60 >60 ml/min/1.73m2    Calcium 8.6 8.5 - 10.1 MG/DL    Bilirubin, total 0.4 0.2 - 1.0 MG/DL    ALT (SGPT) 29 12 - 78 U/L    AST (SGOT) 22 15 - 37 U/L    Alk. phosphatase 61 45 - 117 U/L    Protein, total 7.6 6.4 - 8.2 g/dL    Albumin 3.6 3.5 - 5.0 g/dL    Globulin 4.0 2.0 - 4.0 g/dL    A-G Ratio 0.9 (L) 1.1 - 2.2     LACTIC ACID    Collection Time: 08/16/21  6:14 PM   Result Value Ref Range    Lactic acid 1.6 0.4 - 2.0 MMOL/L   URINALYSIS W/MICROSCOPIC    Collection Time: 08/16/21  6:14 PM   Result Value Ref Range    Color YELLOW/STRAW      Appearance CLOUDY (A) CLEAR      Specific gravity 1.008 1.003 - 1.030      pH (UA) 6.0 5.0 - 8.0      Protein Negative NEG mg/dL    Glucose Negative NEG mg/dL    Ketone Negative NEG mg/dL    Bilirubin Negative NEG      Blood Negative NEG      Urobilinogen 0.2 0.2 - 1.0 EU/dL    Nitrites Positive (A) NEG      Leukocyte Esterase TRACE (A) NEG      WBC 0-4 0 - 4 /hpf    RBC 0-5 0 - 5 /hpf    Epithelial cells MODERATE (A) FEW /lpf    Bacteria 1+ (A) NEG /hpf    Hyaline cast 2-5 0 - 5 /lpf   URINE CULTURE HOLD SAMPLE    Collection Time: 08/16/21  6:14 PM    Specimen: Serum; Urine   Result Value Ref Range    Urine culture hold        Urine on hold in Microbiology dept for 2 days. If unpreserved urine is submitted, it cannot be used for addtional testing after 24 hours, recollection will be required.    D DIMER    Collection Time: 08/16/21  6:14 PM   Result Value Ref Range    D-dimer 0.19 0.00 - 0.65 mg/L FEU   TROPONIN I    Collection Time: 08/16/21  6:14 PM   Result Value Ref Range    Troponin-I, Qt. <0.05 <0.05 ng/mL   LIPASE    Collection Time: 08/16/21  6:14 PM   Result Value Ref Range    Lipase 74 73 - 393 U/L   NT-PRO BNP    Collection Time: 08/16/21  6:14 PM   Result Value Ref Range    NT pro-BNP 9 <125 PG/ML        IMAGING:  XR CHEST PORT   Final Result   Normal chest.            Medications During Visit:  Medications   guaiFENesin-codeine (ROBITUSSIN AC) 100-10 mg/5 mL solution 5 mL (has no administration in time range)         DECISION MAKING:  Cassandria Aase is a 54 y.o. female who comes in as above. Cardiopulmonary work-up unremarkable. Suspect bronchitis or possible early pneumonia. Will treat with antitussives and Z-Cristian. PCP follow-up and return for worsening symptoms. The clinical decision making for this encounter included ordering and interpreting the above diagnostic tests with comparison to prior studies that are within our EMR. Past medical and surgical histories were reviewed, as were records from recent outpatient and emergency department visits. The above results discussed and reviewed with the patient. Patient verbalized understanding of the care plan, including any changes to current outpatient medication regimen, discussed disease process, symptom control, and follow-up care. Return precautions reviewed. IMPRESSION:  1. Acute bronchitis, unspecified organism        DISPOSITION:  Discharged      Current Discharge Medication List      START taking these medications    Details   guaiFENesin-codeine (ROBITUSSIN AC) 100-10 mg/5 mL solution Take 5 mL by mouth three (3) times daily as needed for Cough for up to 5 days. Max Daily Amount: 15 mL. Qty: 60 mL, Refills: 0  Start date: 8/16/2021, End date: 8/21/2021    Associated Diagnoses: Acute bronchitis, unspecified organism      azithromycin (ZITHROMAX) 250 mg tablet Take 2 Tablets by mouth daily for 1 day, THEN 1 Tablet daily for 4 days. Qty: 6 Tablet, Refills: 0  Start date: 8/16/2021, End date: 8/21/2021              Follow-up Information     Follow up With Specialties Details Why Contact Info    [place], [address] [facility] [address] [phone]              The patient is asked to follow-up with their primary care provider in the next several days. They are to call tomorrow for an appointment. The patient is asked to return promptly for any increased concerns or worsening of symptoms.   They can return to this emergency department or any other emergency department.       Procedures

## 2021-08-17 ENCOUNTER — PATIENT OUTREACH (OUTPATIENT)
Dept: CASE MANAGEMENT | Age: 51
End: 2021-08-17

## 2021-08-17 LAB
ATRIAL RATE: 106 BPM
CALCULATED P AXIS, ECG09: 56 DEGREES
CALCULATED R AXIS, ECG10: 35 DEGREES
CALCULATED T AXIS, ECG11: 16 DEGREES
DIAGNOSIS, 93000: NORMAL
P-R INTERVAL, ECG05: 130 MS
Q-T INTERVAL, ECG07: 340 MS
QRS DURATION, ECG06: 80 MS
QTC CALCULATION (BEZET), ECG08: 451 MS
SARS-COV-2, XPLCVT: NOT DETECTED
SOURCE, COVRS: NORMAL
VENTRICULAR RATE, ECG03: 106 BPM

## 2021-08-17 NOTE — PROGRESS NOTES
8/17/2021  10:31 AM    Patient contacted regarding COVID-19 risk. Discussed COVID-19 related testing which was pending at this time. Test results were pending. Patient informed of results, if available? Result Pending. Patient outreach attempt by this ACM today to perform initial post-discharge assessment; lvm requesting a return phone call to this ACM.

## 2021-08-18 NOTE — PROGRESS NOTES
8/18/2021  3:48 PM    Patient contacted regarding COVID-19 risk. Discussed COVID-19 related testing which was available at this time. Test results were negative. Patient informed of results, if available? No; unable to reach patient. Second patient outreach attempt by this ACM to perform initial post-discharge assessment; lvm requesting a return phone call to this ACM. Thinkr message routed to patient with COVID-19 resources. COVID Care Transitions episode resolved at this time due to ACM inability to reach patient.

## 2021-08-23 ENCOUNTER — HOSPITAL ENCOUNTER (EMERGENCY)
Age: 51
Discharge: HOME | End: 2021-08-23
Attending: STUDENT IN AN ORGANIZED HEALTH CARE EDUCATION/TRAINING PROGRAM
Payer: MEDICARE

## 2021-08-23 ENCOUNTER — TELEPHONE (OUTPATIENT)
Dept: FAMILY MEDICINE CLINIC | Age: 51
End: 2021-08-23

## 2021-08-23 ENCOUNTER — APPOINTMENT (OUTPATIENT)
Dept: GENERAL RADIOLOGY | Age: 51
End: 2021-08-23
Attending: EMERGENCY MEDICINE
Payer: MEDICARE

## 2021-08-23 VITALS
RESPIRATION RATE: 20 BRPM | TEMPERATURE: 97.1 F | HEIGHT: 63 IN | HEART RATE: 99 BPM | WEIGHT: 250 LBS | DIASTOLIC BLOOD PRESSURE: 77 MMHG | BODY MASS INDEX: 44.3 KG/M2 | SYSTOLIC BLOOD PRESSURE: 135 MMHG | OXYGEN SATURATION: 99 %

## 2021-08-23 DIAGNOSIS — N30.00 ACUTE CYSTITIS WITHOUT HEMATURIA: Primary | ICD-10-CM

## 2021-08-23 DIAGNOSIS — R06.02 SOB (SHORTNESS OF BREATH): ICD-10-CM

## 2021-08-23 LAB
ALBUMIN SERPL-MCNC: 3.2 G/DL (ref 3.5–5)
ALBUMIN/GLOB SERPL: 0.8 {RATIO} (ref 1.1–2.2)
ALP SERPL-CCNC: 68 U/L (ref 45–117)
ALT SERPL-CCNC: 28 U/L (ref 12–78)
ANION GAP SERPL CALC-SCNC: 5 MMOL/L (ref 5–15)
APPEARANCE UR: ABNORMAL
AST SERPL-CCNC: 14 U/L (ref 15–37)
BACTERIA URNS QL MICRO: ABNORMAL /HPF
BASOPHILS # BLD: 0 K/UL (ref 0–0.1)
BASOPHILS NFR BLD: 0 % (ref 0–1)
BILIRUB SERPL-MCNC: 0.3 MG/DL (ref 0.2–1)
BILIRUB UR QL: NEGATIVE
BUN SERPL-MCNC: 14 MG/DL (ref 6–20)
BUN/CREAT SERPL: 17 (ref 12–20)
CALCIUM SERPL-MCNC: 8.4 MG/DL (ref 8.5–10.1)
CHLORIDE SERPL-SCNC: 104 MMOL/L (ref 97–108)
CO2 SERPL-SCNC: 28 MMOL/L (ref 21–32)
COLOR UR: ABNORMAL
CREAT SERPL-MCNC: 0.83 MG/DL (ref 0.55–1.02)
DIFFERENTIAL METHOD BLD: NORMAL
EOSINOPHIL # BLD: 0.2 K/UL (ref 0–0.4)
EOSINOPHIL NFR BLD: 2 % (ref 0–7)
EPITH CASTS URNS QL MICRO: ABNORMAL /LPF
ERYTHROCYTE [DISTWIDTH] IN BLOOD BY AUTOMATED COUNT: 13.4 % (ref 11.5–14.5)
GLOBULIN SER CALC-MCNC: 4.1 G/DL (ref 2–4)
GLUCOSE SERPL-MCNC: 115 MG/DL (ref 65–100)
GLUCOSE UR STRIP.AUTO-MCNC: NEGATIVE MG/DL
HCT VFR BLD AUTO: 38.8 % (ref 35–47)
HGB BLD-MCNC: 12.8 G/DL (ref 11.5–16)
HGB UR QL STRIP: ABNORMAL
IMM GRANULOCYTES # BLD AUTO: 0 K/UL (ref 0–0.04)
IMM GRANULOCYTES NFR BLD AUTO: 0 % (ref 0–0.5)
KETONES UR QL STRIP.AUTO: NEGATIVE MG/DL
LEUKOCYTE ESTERASE UR QL STRIP.AUTO: ABNORMAL
LYMPHOCYTES # BLD: 2.7 K/UL (ref 0.8–3.5)
LYMPHOCYTES NFR BLD: 30 % (ref 12–49)
MCH RBC QN AUTO: 30.7 PG (ref 26–34)
MCHC RBC AUTO-ENTMCNC: 33 G/DL (ref 30–36.5)
MCV RBC AUTO: 93 FL (ref 80–99)
MONOCYTES # BLD: 0.6 K/UL (ref 0–1)
MONOCYTES NFR BLD: 6 % (ref 5–13)
NEUTS SEG # BLD: 5.5 K/UL (ref 1.8–8)
NEUTS SEG NFR BLD: 62 % (ref 32–75)
NITRITE UR QL STRIP.AUTO: POSITIVE
NRBC # BLD: 0 K/UL (ref 0–0.01)
NRBC BLD-RTO: 0 PER 100 WBC
PH UR STRIP: 6 [PH] (ref 5–8)
PLATELET # BLD AUTO: 298 K/UL (ref 150–400)
PMV BLD AUTO: 10.9 FL (ref 8.9–12.9)
POTASSIUM SERPL-SCNC: 3.3 MMOL/L (ref 3.5–5.1)
PROT SERPL-MCNC: 7.3 G/DL (ref 6.4–8.2)
PROT UR STRIP-MCNC: NEGATIVE MG/DL
RBC # BLD AUTO: 4.17 M/UL (ref 3.8–5.2)
RBC #/AREA URNS HPF: ABNORMAL /HPF (ref 0–5)
SODIUM SERPL-SCNC: 137 MMOL/L (ref 136–145)
SP GR UR REFRACTOMETRY: 1.02 (ref 1–1.03)
UR CULT HOLD, URHOLD: NORMAL
UROBILINOGEN UR QL STRIP.AUTO: 0.2 EU/DL (ref 0.2–1)
WBC # BLD AUTO: 9 K/UL (ref 3.6–11)
WBC URNS QL MICRO: ABNORMAL /HPF (ref 0–4)

## 2021-08-23 PROCEDURE — 71046 X-RAY EXAM CHEST 2 VIEWS: CPT

## 2021-08-23 PROCEDURE — 85025 COMPLETE CBC W/AUTO DIFF WBC: CPT

## 2021-08-23 PROCEDURE — 36415 COLL VENOUS BLD VENIPUNCTURE: CPT

## 2021-08-23 PROCEDURE — 99284 EMERGENCY DEPT VISIT MOD MDM: CPT

## 2021-08-23 PROCEDURE — 81001 URINALYSIS AUTO W/SCOPE: CPT

## 2021-08-23 PROCEDURE — 80053 COMPREHEN METABOLIC PANEL: CPT

## 2021-08-23 RX ORDER — NITROFURANTOIN 25; 75 MG/1; MG/1
100 CAPSULE ORAL 2 TIMES DAILY
Qty: 6 CAPSULE | Refills: 0 | Status: SHIPPED | OUTPATIENT
Start: 2021-08-23 | End: 2021-08-26

## 2021-08-23 RX ORDER — DEXAMETHASONE 4 MG/1
4 TABLET ORAL DAILY
Qty: 5 TABLET | Refills: 0 | Status: SHIPPED | OUTPATIENT
Start: 2021-08-23 | End: 2021-08-28

## 2021-08-23 NOTE — TELEPHONE ENCOUNTER
Called patient at [phone] as she wanted an appointment for bronchitis and fibromyalgia. She was admitted to hospital and said she would call office and schedule appointment when she was released.

## 2021-08-23 NOTE — ED NOTES
Patient does not appear to be in any acute distress/shows no evidence of clinical instability at this time. Provider has reviewed discharge instructions with the patient/family. The patient/family verbalized understanding instructions as well as need for follow up for any further symptoms.     Discharge papers given, education provided, and any questions answered. Patient discharged by provider. no chest pain, no cough, and no shortness of breath.

## 2021-08-23 NOTE — ED TRIAGE NOTES
Reports diagnosed for bronchitis last week. Today having migraines with dry cough. The Z pack and coogh mediation did not help with cough. Covid test on Monday was negative.

## 2021-08-24 ENCOUNTER — PATIENT OUTREACH (OUTPATIENT)
Dept: CASE MANAGEMENT | Age: 51
End: 2021-08-24

## 2021-08-24 NOTE — PROGRESS NOTES
2021  2:26 PM    Patient contacted regarding COVID-19 risk. Discussed COVID-19 related testing which was available at this time. Test results were negative. Patient informed of results, if available? yes. Ambulatory Care Manager contacted the patient by telephone to perform post discharge assessment. Verified name and  with patient as identifiers. Provided introduction to self, and explanation of the CTN/ACM role, and reason for call due to risk factors for infection and/or exposure to COVID-19. Subsequent ED visit to [facility] ED on 21 is noted. Patient denies known exposure to COVID-19. Symptoms reviewed with patient who verbalized the following symptoms: pain or aching joints, cough, shortness of breath, fast heart rate, no new symptoms and no worsening symptoms      Due to no new or worsening symptoms encounter was not routed to provider for escalation. Discussed follow-up appointments. If no appointment was previously scheduled, appointment scheduling offered:  No; pt reports that she has contacted her PCP post-discharge and was advised that she would receive a return phone call by the end of today to schedule her follow-up visit. [address] follow up appointment(s):   Future Appointments   Date Time Provider Yusuf Dunn   2022 10:40 AM MD Tracey Wang 39 BS [facility]   2022 10:00 AM MD Tracey Wang 39 BS [facility]     Non-[facility] follow up appointment(s): N/A    Interventions to address risk factors: Obtained and reviewed discharge summary and/or continuity of care documents     Advance Care Planning:   Does patient have an Advance Directive: not on file. Educated patient about risk for severe COVID-19 due to risk factors according to CDC guidelines. ACM reviewed discharge instructions, medical action plan and red flag symptoms with the patient who verbalized understanding. Discussed COVID vaccination status: Yes; pt reports that she is not vaccinated.  Education provided on COVID-19 vaccination as appropriate. Discussed exposure protocols and quarantine with CDC Guidelines. Patient was given an opportunity to verbalize any questions and concerns and agrees to contact ACM or health care provider for questions related to their healthcare. Reviewed and educated patient on any new and changed medications related to discharge diagnosis. Was patient discharged with a pulse oximeter? No. Discussed and confirmed pulse oximeter discharge instructions and when to notify provider or seek emergency care. ACM provided contact information. Plan for follow-up in 7-14 days based on severity of symptoms and risk factors.

## 2021-08-29 NOTE — ED PROVIDER NOTES
Patient is a 16-year-old female present emergency department for cough. Patient states that she has been having a dry cough recently was diagnosed last week with bronchitis was placed on a Z-Cristian says that she has had little to no relief with antibiotic therapy. Patient was recently tested for Covid was negative. Patient also has been experiencing headaches. Past Medical History:   Diagnosis Date    Arthritis     Arthritis of low back     Asthma     Chronic pain     fibromyalgia    Colon spasm     Depression     Fibromyalgia     Gastrointestinal disorder     IBS    GERD (gastroesophageal reflux disease)     High cholesterol     Hypertension     IBS (irritable bowel syndrome)     Left axillary pain 2/22/2013    Migraine     LAST HEADACHE 2-16-12    Multiple sclerosis (HCC)     Other ill-defined conditions(799.89)     fibromylgia    Other ill-defined conditions(799.89)     MS       Past Surgical History:   Procedure Laterality Date    HX APPENDECTOMY      HX BLADDER REPAIR  04/2011     HX CHOLECYSTECTOMY      HX GYN      For ectopic pregnancy, hx tubal ligation    HX GYN      vaginal ablation.     HX UROLOGICAL      bladder tuck, no mesh    HX UROLOGICAL      sling removed 1/10/11         Family History:   Adopted: Yes   Problem Relation Age of Onset    Other Other         family hx is completely unknown    Diabetes Brother     Asthma Daughter     Asthma Son        Social History     Socioeconomic History    Marital status:      Spouse name: Not on file    Number of children: Not on file    Years of education: Not on file    Highest education level: Not on file   Occupational History    Not on file   Tobacco Use    Smoking status: Current Some Day Smoker     Packs/day: 0.50     Years: 20.00     Pack years: 10.00     Types: Cigarettes    Smokeless tobacco: Never Used   Substance and Sexual Activity    Alcohol use: No     Alcohol/week: 0.0 standard drinks    Drug use: No    Sexual activity: Never     Partners: Male     Birth control/protection: None   Other Topics Concern    Not on file   Social History Narrative    Not on file     Social Determinants of Health     Financial Resource Strain:     Difficulty of Paying Living Expenses:    Food Insecurity:     Worried About Running Out of Food in the Last Year:     [address] in the Last Year:    Transportation Needs:     Lack of Transportation (Medical):  Lack of Transportation (Non-Medical):    Physical Activity:     Days of Exercise per Week:     Minutes of Exercise per Session:    Stress:     Feeling of Stress :    Social Connections:     Frequency of Communication with Friends and Family:     Frequency of Social Gatherings with Friends and Family:     Attends [employer] Services:     Active Member of Clubs or Organizations:     Attends Club or Organization Meetings:     Marital Status:    Intimate Partner Violence:     Fear of Current or Ex-Partner:     Emotionally Abused:     Physically Abused:     Sexually Abused: ALLERGIES: Hydromorphone, Levofloxacin, Aspirin, Bactrim [sulfamethoprim ds], Darvocet a500 [propoxyphene n-acetaminophen], Imitrex [sumatriptan], Pcn [penicillins], Percocet [oxycodone-acetaminophen], Toradol [ketorolac tromethamine], Tramadol, Hydrocodone, Keflex [cephalexin], Lisinopril, Prednisone, Savella [milnacipran], Topamax [topiramate], Vesicare [solifenacin], and Wellbutrin [bupropion hcl]    Review of Systems   Constitutional: Positive for fatigue. Respiratory: Positive for cough and chest tightness. Neurological: Positive for headaches. All other systems reviewed and are negative.       Vitals:    08/23/21 1426 08/23/21 1817 08/23/21 1842   BP: 113/78  135/77   Pulse: (!) 115  99   Resp: 20  20   Temp: 96.9 °F (36.1 °C)  97.1 °F (36.2 °C)   SpO2: 95% 99% 99%   Weight: 113.4 kg (250 lb)     Height: 5' 3\" (1.6 m)              Physical Exam  Vitals and nursing note reviewed. Constitutional:       Appearance: She is well-developed. HENT:      Head: Normocephalic and atraumatic. Eyes:      Extraocular Movements: Extraocular movements intact. Pupils: Pupils are equal, round, and reactive to light. Cardiovascular:      Rate and Rhythm: Normal rate and regular rhythm. Pulmonary:      Effort: Pulmonary effort is normal. No accessory muscle usage or respiratory distress. Breath sounds: Normal breath sounds. No stridor. No decreased breath sounds. Comments: Dry nonproductive cough  Musculoskeletal:         General: Normal range of motion. Skin:     General: Skin is warm. Neurological:      General: No focal deficit present. Mental Status: She is alert and oriented to person, place, and time. Psychiatric:         Mood and Affect: Mood normal.         Behavior: Behavior normal.          MDM  Number of Diagnoses or Management Options  Acute cystitis without hematuria  SOB (shortness of breath)  Diagnosis management comments: A/P: Reactive airway disease, airspace disease UTI. 46-year-old female present emergency department with dry nonproductive cough, headaches now. Will evaluate with chest x-ray PA and lateral, labs including UA. Amount and/or Complexity of Data Reviewed  Clinical lab tests: ordered and reviewed  Tests in the radiology section of CPT®: reviewed and ordered           Procedures        UA shows likely UTI will start patient on Macrobid pending cultures.

## 2021-08-30 ENCOUNTER — VIRTUAL VISIT (OUTPATIENT)
Dept: FAMILY MEDICINE CLINIC | Age: 51
End: 2021-08-30
Payer: MEDICARE

## 2021-08-30 ENCOUNTER — DOCUMENTATION ONLY (OUTPATIENT)
Dept: FAMILY MEDICINE CLINIC | Age: 51
End: 2021-08-30

## 2021-08-30 DIAGNOSIS — J40 BRONCHITIS: Primary | ICD-10-CM

## 2021-08-30 DIAGNOSIS — N30.00 ACUTE CYSTITIS WITHOUT HEMATURIA: ICD-10-CM

## 2021-08-30 PROCEDURE — 99213 OFFICE O/P EST LOW 20 MIN: CPT | Performed by: STUDENT IN AN ORGANIZED HEALTH CARE EDUCATION/TRAINING PROGRAM

## 2021-08-30 RX ORDER — MELATONIN
DAILY
Status: ON HOLD | COMMUNITY
End: 2021-09-14

## 2021-08-30 RX ORDER — ASCORBIC ACID 500 MG
TABLET ORAL
Status: ON HOLD | COMMUNITY
End: 2021-09-14

## 2021-08-30 NOTE — PROGRESS NOTES
Progress Note    she is a 54y.o. year old female who presents for evalution. Assessment/ Plan:   Diagnoses and all orders for this visit:    1. Bronchitis - improving, continue supportive care    2. Acute cystitis without hematuria - symptoms improved but not resolved  Follow-up in office this week for UA and culture      I have discussed the diagnosis with the patient and the intended plan as seen in the above orders. The patient has received an after-visit summary and questions were answered concerning future plans. Pt conveyed understanding of plan. Medication Side Effects and Warnings were discussed with patient      Sarah Diaz MD       Subjective:     Chief Complaint   Patient presents with   [facility] Follow Up     f/up from [facility] 8/16 and 8/23     Doing ok, feeling jittery today  ER follow-up, 8/16 diagnosed with bronchitis and 8/23 diagnosed with UTI and continued bronchitis  Cough has improved, it is still there   Using inhaler 2-3 times/day and nebulizer machine when breathing was really bad (not in the last week)   Has finished z-karyn and course of decadron   Non-productive, rare mucus. Previously interfering with sleep, painful to breathe   Hasn't been able to use CPAP due to recall. Stopped using July 2021. Has registered for replacement. Still smoking, currently 7-10 cigarettes per day. UTI reports smell has resolved and dysuria improved   Completed 5 day course of macrobid   Since bladder surgery, notes infections every 3-6 months and occasional severe infections. Reviewed PmHx, RxHx, FmHx, SocHx, AllgHx and updated and dated in the chart. Review of Systems - negative except as listed above in the HPI      Objective: There were no vitals filed for this visit. Current Outpatient Medications   Medication Sig    ascorbic acid, vitamin C, (Vitamin C) 500 mg tablet Take  by mouth.     cholecalciferol (Vitamin D3) (1000 Units /25 mcg) tablet Take  by mouth daily.    gabapentin (NEURONTIN) 600 mg tablet TAKE 1 TABLET BY MOUTH  TWICE DAILY    hydroCHLOROthiazide (HYDRODIURIL) 25 mg tablet TAKE 1 TABLET BY MOUTH  DAILY    ALPRAZolam (XANAX) 0.5 mg tablet TAKE 1 TABLET BY MOUTH  DAILY AS NEEDED FOR ANXIOUS    losartan (COZAAR) 50 mg tablet TAKE 1 TABLET BY MOUTH  DAILY    fosfomycin (MONUROL) 3 gram pack oral packet Take 1 Packet by mouth once for 1 dose. Indications: urinary tract infection due to E. coli bacteria    fluticasone propion-salmeteroL (ADVAIR/WIXELA) 250-50 mcg/dose diskus inhaler Take 1 Puff by inhalation every twelve (12) hours.  albuterol (PROVENTIL VENTOLIN) 2.5 mg /3 mL (0.083 %) nebu USE 1 VIAL VIA NEBULIZER  EVERY 4 HOURS AS NEEDED FOR WHEEZING   RECOMMENDS NOT EXCEEDING 4  VIALS/DAY    albuterol (PROVENTIL HFA, VENTOLIN HFA, PROAIR HFA) 90 mcg/actuation inhaler USE 2 INHALATIONS BY MOUTH  EVERY 4 HOURS AS NEEDED FOR WHEEZING    fluconazole (DIFLUCAN) 150 mg tablet TAKE 1 TABLET BY MOUTH  DAILY FOR 1 DAY    cyclobenzaprine (FLEXERIL) 10 mg tablet TAKE 1 TABLET BY MOUTH 3  TIMES DAILY AS NEEDED FOR  MUSCLE SPASM(S) FOR UP TO  10 DAYS    sertraline (ZOLOFT) 50 mg tablet TAKE 1 TABLET BY MOUTH  DAILY     No current facility-administered medications for this visit. Physical Exam  Vitals and nursing note reviewed. Constitutional:       General: She is not in acute distress. Appearance: Normal appearance. She is not ill-appearing, toxic-appearing or diaphoretic. HENT:      Head: Normocephalic and atraumatic. Eyes:      General: No scleral icterus. Right eye: No discharge. Left eye: No discharge. Conjunctiva/sclera: Conjunctivae normal.   Pulmonary:      Effort: Pulmonary effort is normal. No respiratory distress. Musculoskeletal:      Cervical back: No rigidity. Skin:     Coloration: Skin is not jaundiced or pale. Findings: No rash (Of the visualized areas).    Neurological:      Mental Status: She is alert. Comments: No dysarthria, facial asymmetry, or other gross neurological deficit appreciated within limits of virtual encounter   Psychiatric:         Mood and Affect: Mood normal.         Behavior: Behavior normal.         Thought Content: Thought content normal.         Judgment: Judgment normal.               I was in the office while conducting this encounter. Total Time: minutes: 5-10 minutes. Maryla Barthel is a 54 y.o. female being evaluated by a Virtual Visit (video visit) encounter to address concerns as mentioned above. A caregiver was present when appropriate. Due to this being a TeleHealth encounter (During [facility] public health emergency), evaluation of the following organ systems was limited: Vitals/Constitutional/EENT/Resp/CV/GI//MS/Neuro/Skin/Heme-Lymph-Imm. Pursuant to the emergency declaration under the [address] [place] [address], [address] [place] [address] authority and the [employer] and [employer] Act, this Virtual Visit was conducted with patient's (and/or legal guardian's) consent, to reduce the risk of exposure to COVID-19 and provide necessary medical care. Services were provided through a video synchronous discussion virtually to substitute for in-person encounter. --Racheal Jaramillo MD on 9/6/2021 at 8:38 AM    An electronic signature was used to authenticate this note.

## 2021-08-30 NOTE — PROGRESS NOTES
Flakita Felder is a 54 y.o. female , id x 2(name and ). Reviewed questionnaires, and  medications.     Chief Complaint   Patient presents with   [facility] Follow Up     f/up from [facility]  and        3 most recent PHQ Screens 2020   Little interest or pleasure in doing things Not at all   Feeling down, depressed, irritable, or hopeless Not at all   Total Score PHQ 2 0

## 2021-08-30 NOTE — PROGRESS NOTES
At 0950 today I attempted to reach pt for her VV with Dr. Finn Mccarty pt's phone went straight to . M for pt to [facility] to office. Will re-attempt shortly.

## 2021-09-01 ENCOUNTER — OFFICE VISIT (OUTPATIENT)
Dept: FAMILY MEDICINE CLINIC | Age: 51
End: 2021-09-01
Payer: MEDICARE

## 2021-09-01 DIAGNOSIS — G89.29 CHRONIC LEFT SHOULDER PAIN: ICD-10-CM

## 2021-09-01 DIAGNOSIS — J40 BRONCHITIS: ICD-10-CM

## 2021-09-01 DIAGNOSIS — J45.21 MILD INTERMITTENT REACTIVE AIRWAY DISEASE WITH WHEEZING WITH ACUTE EXACERBATION: ICD-10-CM

## 2021-09-01 DIAGNOSIS — N30.01 ACUTE CYSTITIS WITH HEMATURIA: ICD-10-CM

## 2021-09-01 DIAGNOSIS — M25.512 CHRONIC LEFT SHOULDER PAIN: ICD-10-CM

## 2021-09-01 DIAGNOSIS — R35.0 URINARY FREQUENCY: Primary | ICD-10-CM

## 2021-09-01 LAB
BILIRUB UR QL STRIP: NEGATIVE
GLUCOSE UR-MCNC: NEGATIVE MG/DL
KETONES P FAST UR STRIP-MCNC: NORMAL MG/DL
PH UR STRIP: 6 [PH] (ref 4.6–8)
PROT UR QL STRIP: NEGATIVE
SP GR UR STRIP: 1.02 (ref 1–1.03)
UA UROBILINOGEN AMB POC: NORMAL (ref 0.2–1)
URINALYSIS CLARITY POC: NORMAL
URINALYSIS COLOR POC: YELLOW
URINE BLOOD POC: NORMAL
URINE LEUKOCYTES POC: NORMAL
URINE NITRITES POC: POSITIVE

## 2021-09-01 PROCEDURE — 81003 URINALYSIS AUTO W/O SCOPE: CPT | Performed by: STUDENT IN AN ORGANIZED HEALTH CARE EDUCATION/TRAINING PROGRAM

## 2021-09-01 RX ORDER — FLUCONAZOLE 150 MG/1
TABLET ORAL
Qty: 3 TABLET | Refills: 1 | Status: ON HOLD | OUTPATIENT
Start: 2021-09-01 | End: 2021-09-14

## 2021-09-01 RX ORDER — CYCLOBENZAPRINE HCL 10 MG
TABLET ORAL
Qty: 30 TABLET | Refills: 0 | Status: SHIPPED | OUTPATIENT
Start: 2021-09-01 | End: 2021-11-04

## 2021-09-01 RX ORDER — ALBUTEROL SULFATE 90 UG/1
AEROSOL, METERED RESPIRATORY (INHALATION)
Qty: 51 G | Refills: 3 | Status: SHIPPED | OUTPATIENT
Start: 2021-09-01 | End: 2022-08-01

## 2021-09-01 RX ORDER — FLUTICASONE PROPIONATE AND SALMETEROL 250; 50 UG/1; UG/1
1 POWDER RESPIRATORY (INHALATION) EVERY 12 HOURS
Qty: 1 EACH | Refills: 2 | Status: SHIPPED | OUTPATIENT
Start: 2021-09-01 | End: 2021-10-05

## 2021-09-01 RX ORDER — ALBUTEROL SULFATE 0.83 MG/ML
SOLUTION RESPIRATORY (INHALATION)
Qty: 450 ML | Refills: 8 | Status: SHIPPED | OUTPATIENT
Start: 2021-09-01

## 2021-09-01 NOTE — PROGRESS NOTES
UA consistent with UTI. Discussed with patient that given recent antibiotic use for UTI, will obtain culture results with sensitivities prior to initiating antibiotic. Increase fluid intake and monitor for signs of worsening infection. Discussed with patient that antibiotic prophylaxis may be indicated for recurrent UTIs. Recommended that patient establish with urology. Persistent respiratory issues, cough and shortness of breath. Frequent albuterol use persists. Start Advair.     Reyna Stewart MD   This encounter was an extension of virtual visit from 8/30, see that note for further detail

## 2021-09-01 NOTE — PROGRESS NOTES
Trish William is a 54 y.o. female , id x 2(name and ). Reviewed record, history, and  medications. Chief Complaint   Patient presents with    UTI     here for repeat UA after ABX       Results for orders placed or performed in visit on 21   AMB POC URINALYSIS DIP STICK AUTO W/O MICRO   Result Value Ref Range    Color (UA POC) Yellow     Clarity (UA POC) Cloudy     Glucose (UA POC) Negative Negative    Bilirubin (UA POC) Negative Negative    Ketones (UA POC) Trace Negative    Specific gravity (UA POC) 1.025 1.001 - 1.035    Blood (UA POC) 1+ Negative    pH (UA POC) 6.0 4.6 - 8.0    Protein (UA POC) Negative Negative    Urobilinogen (UA POC) 0.2 mg/dL 0.2 - 1    Nitrites (UA POC) Positive Negative    Leukocyte esterase (UA POC) Trace Negative       Patient is accompanied by self I have received verbal consent from Trish William to discuss any/all medical information while they are present in the room.

## 2021-09-04 LAB
BACTERIA SPEC CULT: ABNORMAL
CC UR VC: ABNORMAL
SERVICE CMNT-IMP: ABNORMAL

## 2021-09-06 RX ORDER — GRANULES FOR ORAL 3 G/1
3 POWDER ORAL ONCE
Qty: 1 PACKET | Refills: 0 | Status: SHIPPED | OUTPATIENT
Start: 2021-09-06 | End: 2021-09-06

## 2021-09-07 NOTE — PROGRESS NOTES
Urine culture shows large growth of e.coli that is sensitive to nitrofurantoin, which she was just treated with. Will do a dose of fosfomycin.

## 2021-09-09 ENCOUNTER — PATIENT OUTREACH (OUTPATIENT)
Dept: CASE MANAGEMENT | Age: 51
End: 2021-09-09

## 2021-09-09 NOTE — PROGRESS NOTES
9/9/2021  3:14 PM    Patient resolved from Transition of Care episode on 9/9/2021. ACM/CTN was unsuccessful at contacting this patient today. Patient/family was provided the following resources and education related to COVID-19 during the initial call:                         Signs, symptoms and red flags related to COVID-19            CDC exposure and quarantine guidelines            [facility] exposure contact - [phone]            Contact for their local Department of Health                 Patient has not had any additional ED or hospital visits. No further outreach scheduled with this CTN/ACM. Episode of Care resolved. Patient has this CTN/ACM contact information if future needs arise.

## 2021-09-13 ENCOUNTER — APPOINTMENT (OUTPATIENT)
Dept: CT IMAGING | Age: 51
DRG: 690 | End: 2021-09-13
Attending: PHYSICIAN ASSISTANT
Payer: MEDICARE

## 2021-09-13 ENCOUNTER — HOSPITAL ENCOUNTER (INPATIENT)
Age: 51
LOS: 4 days | Discharge: HOME | DRG: 690 | End: 2021-09-17
Attending: EMERGENCY MEDICINE | Admitting: FAMILY MEDICINE
Payer: MEDICARE

## 2021-09-13 DIAGNOSIS — R35.0 URINARY FREQUENCY: ICD-10-CM

## 2021-09-13 DIAGNOSIS — E66.01 OBESITY, MORBID (HCC): ICD-10-CM

## 2021-09-13 DIAGNOSIS — N39.0 COMPLICATED UTI (URINARY TRACT INFECTION): ICD-10-CM

## 2021-09-13 DIAGNOSIS — N10 ACUTE PYELONEPHRITIS: Primary | ICD-10-CM

## 2021-09-13 DIAGNOSIS — R30.0 DYSURIA: ICD-10-CM

## 2021-09-13 DIAGNOSIS — R19.7 DIARRHEA OF PRESUMED INFECTIOUS ORIGIN: ICD-10-CM

## 2021-09-13 DIAGNOSIS — Z72.0 TOBACCO USE: ICD-10-CM

## 2021-09-13 DIAGNOSIS — R10.9 FLANK PAIN: ICD-10-CM

## 2021-09-13 DIAGNOSIS — M51.36 DDD (DEGENERATIVE DISC DISEASE), LUMBAR: ICD-10-CM

## 2021-09-13 DIAGNOSIS — E78.00 PURE HYPERCHOLESTEROLEMIA: ICD-10-CM

## 2021-09-13 DIAGNOSIS — I10 ESSENTIAL HYPERTENSION, BENIGN: ICD-10-CM

## 2021-09-13 DIAGNOSIS — Z88.0 PENICILLIN ALLERGY: ICD-10-CM

## 2021-09-13 LAB
ALBUMIN SERPL-MCNC: 3.3 G/DL (ref 3.5–5)
ALBUMIN/GLOB SERPL: 0.8 {RATIO} (ref 1.1–2.2)
ALP SERPL-CCNC: 57 U/L (ref 45–117)
ALT SERPL-CCNC: 38 U/L (ref 12–78)
ANION GAP SERPL CALC-SCNC: 6 MMOL/L (ref 5–15)
APPEARANCE UR: ABNORMAL
AST SERPL-CCNC: 29 U/L (ref 15–37)
BACTERIA URNS QL MICRO: ABNORMAL /HPF
BASOPHILS # BLD: 0 K/UL (ref 0–0.1)
BASOPHILS NFR BLD: 0 % (ref 0–1)
BILIRUB SERPL-MCNC: 0.3 MG/DL (ref 0.2–1)
BILIRUB UR QL: NEGATIVE
BUN SERPL-MCNC: 10 MG/DL (ref 6–20)
BUN/CREAT SERPL: 13 (ref 12–20)
CALCIUM SERPL-MCNC: 8.8 MG/DL (ref 8.5–10.1)
CHLORIDE SERPL-SCNC: 105 MMOL/L (ref 97–108)
CO2 SERPL-SCNC: 28 MMOL/L (ref 21–32)
COLOR UR: ABNORMAL
COMMENT, HOLDF: NORMAL
CREAT SERPL-MCNC: 0.8 MG/DL (ref 0.55–1.02)
DIFFERENTIAL METHOD BLD: NORMAL
EOSINOPHIL # BLD: 0.2 K/UL (ref 0–0.4)
EOSINOPHIL NFR BLD: 2 % (ref 0–7)
EPITH CASTS URNS QL MICRO: ABNORMAL /LPF
ERYTHROCYTE [DISTWIDTH] IN BLOOD BY AUTOMATED COUNT: 14 % (ref 11.5–14.5)
GLOBULIN SER CALC-MCNC: 4 G/DL (ref 2–4)
GLUCOSE SERPL-MCNC: 83 MG/DL (ref 65–100)
GLUCOSE UR STRIP.AUTO-MCNC: NEGATIVE MG/DL
HCG UR QL: NEGATIVE
HCT VFR BLD AUTO: 40.2 % (ref 35–47)
HGB BLD-MCNC: 13 G/DL (ref 11.5–16)
HGB UR QL STRIP: NEGATIVE
IMM GRANULOCYTES # BLD AUTO: 0 K/UL (ref 0–0.04)
IMM GRANULOCYTES NFR BLD AUTO: 0 % (ref 0–0.5)
KETONES UR QL STRIP.AUTO: NEGATIVE MG/DL
LACTATE BLD-SCNC: 1.32 MMOL/L (ref 0.4–2)
LEUKOCYTE ESTERASE UR QL STRIP.AUTO: ABNORMAL
LIPASE SERPL-CCNC: 79 U/L (ref 73–393)
LYMPHOCYTES # BLD: 3 K/UL (ref 0.8–3.5)
LYMPHOCYTES NFR BLD: 38 % (ref 12–49)
MCH RBC QN AUTO: 30.5 PG (ref 26–34)
MCHC RBC AUTO-ENTMCNC: 32.3 G/DL (ref 30–36.5)
MCV RBC AUTO: 94.4 FL (ref 80–99)
MONOCYTES # BLD: 0.5 K/UL (ref 0–1)
MONOCYTES NFR BLD: 6 % (ref 5–13)
NEUTS SEG # BLD: 4.2 K/UL (ref 1.8–8)
NEUTS SEG NFR BLD: 54 % (ref 32–75)
NITRITE UR QL STRIP.AUTO: POSITIVE
NRBC # BLD: 0 K/UL (ref 0–0.01)
NRBC BLD-RTO: 0 PER 100 WBC
PH UR STRIP: 6 [PH] (ref 5–8)
PLATELET # BLD AUTO: 252 K/UL (ref 150–400)
PMV BLD AUTO: 10.8 FL (ref 8.9–12.9)
POTASSIUM SERPL-SCNC: 3.5 MMOL/L (ref 3.5–5.1)
PROT SERPL-MCNC: 7.3 G/DL (ref 6.4–8.2)
PROT UR STRIP-MCNC: NEGATIVE MG/DL
RBC # BLD AUTO: 4.26 M/UL (ref 3.8–5.2)
RBC #/AREA URNS HPF: ABNORMAL /HPF (ref 0–5)
SAMPLES BEING HELD,HOLD: NORMAL
SODIUM SERPL-SCNC: 139 MMOL/L (ref 136–145)
SP GR UR REFRACTOMETRY: 1.01 (ref 1–1.03)
UR CULT HOLD, URHOLD: NORMAL
UROBILINOGEN UR QL STRIP.AUTO: 0.2 EU/DL (ref 0.2–1)
WBC # BLD AUTO: 7.8 K/UL (ref 3.6–11)
WBC URNS QL MICRO: ABNORMAL /HPF (ref 0–4)

## 2021-09-13 PROCEDURE — 81001 URINALYSIS AUTO W/SCOPE: CPT

## 2021-09-13 PROCEDURE — 81025 URINE PREGNANCY TEST: CPT

## 2021-09-13 PROCEDURE — 36415 COLL VENOUS BLD VENIPUNCTURE: CPT

## 2021-09-13 PROCEDURE — 80053 COMPREHEN METABOLIC PANEL: CPT

## 2021-09-13 PROCEDURE — 87077 CULTURE AEROBIC IDENTIFY: CPT

## 2021-09-13 PROCEDURE — 87186 SC STD MICRODIL/AGAR DIL: CPT

## 2021-09-13 PROCEDURE — 94640 AIRWAY INHALATION TREATMENT: CPT

## 2021-09-13 PROCEDURE — 74011250637 HC RX REV CODE- 250/637: Performed by: PHYSICIAN ASSISTANT

## 2021-09-13 PROCEDURE — 99284 EMERGENCY DEPT VISIT MOD MDM: CPT

## 2021-09-13 PROCEDURE — 85025 COMPLETE CBC W/AUTO DIFF WBC: CPT

## 2021-09-13 PROCEDURE — 87086 URINE CULTURE/COLONY COUNT: CPT

## 2021-09-13 PROCEDURE — 65660000000 HC RM CCU STEPDOWN

## 2021-09-13 PROCEDURE — 74176 CT ABD & PELVIS W/O CONTRAST: CPT

## 2021-09-13 PROCEDURE — 83605 ASSAY OF LACTIC ACID: CPT

## 2021-09-13 PROCEDURE — 83690 ASSAY OF LIPASE: CPT

## 2021-09-13 RX ORDER — POLYETHYLENE GLYCOL 3350 17 G/17G
17 POWDER, FOR SOLUTION ORAL DAILY PRN
Status: DISCONTINUED | OUTPATIENT
Start: 2021-09-13 | End: 2021-09-14

## 2021-09-13 RX ORDER — GABAPENTIN 300 MG/1
600 CAPSULE ORAL 2 TIMES DAILY
Status: DISCONTINUED | OUTPATIENT
Start: 2021-09-14 | End: 2021-09-14

## 2021-09-13 RX ORDER — SODIUM CHLORIDE 0.9 % (FLUSH) 0.9 %
5-40 SYRINGE (ML) INJECTION AS NEEDED
Status: DISCONTINUED | OUTPATIENT
Start: 2021-09-13 | End: 2021-09-17 | Stop reason: HOSPADM

## 2021-09-13 RX ORDER — ARFORMOTEROL TARTRATE 15 UG/2ML
15 SOLUTION RESPIRATORY (INHALATION)
Status: DISCONTINUED | OUTPATIENT
Start: 2021-09-13 | End: 2021-09-17 | Stop reason: HOSPADM

## 2021-09-13 RX ORDER — IPRATROPIUM BROMIDE AND ALBUTEROL SULFATE 2.5; .5 MG/3ML; MG/3ML
3 SOLUTION RESPIRATORY (INHALATION)
Status: DISCONTINUED | OUTPATIENT
Start: 2021-09-13 | End: 2021-09-17 | Stop reason: HOSPADM

## 2021-09-13 RX ORDER — HYDROCODONE BITARTRATE AND ACETAMINOPHEN 5; 325 MG/1; MG/1
1 TABLET ORAL
Status: COMPLETED | OUTPATIENT
Start: 2021-09-13 | End: 2021-09-13

## 2021-09-13 RX ORDER — ENOXAPARIN SODIUM 100 MG/ML
40 INJECTION SUBCUTANEOUS EVERY 12 HOURS
Status: DISCONTINUED | OUTPATIENT
Start: 2021-09-13 | End: 2021-09-17 | Stop reason: HOSPADM

## 2021-09-13 RX ORDER — HYDROCHLOROTHIAZIDE 25 MG/1
25 TABLET ORAL DAILY
Status: DISCONTINUED | OUTPATIENT
Start: 2021-09-14 | End: 2021-09-17 | Stop reason: HOSPADM

## 2021-09-13 RX ORDER — IBUPROFEN 200 MG
1 TABLET ORAL DAILY
Status: DISCONTINUED | OUTPATIENT
Start: 2021-09-13 | End: 2021-09-17 | Stop reason: HOSPADM

## 2021-09-13 RX ORDER — BUDESONIDE 0.5 MG/2ML
500 INHALANT ORAL
Status: DISCONTINUED | OUTPATIENT
Start: 2021-09-13 | End: 2021-09-17 | Stop reason: HOSPADM

## 2021-09-13 RX ORDER — SODIUM CHLORIDE 0.9 % (FLUSH) 0.9 %
5-40 SYRINGE (ML) INJECTION EVERY 8 HOURS
Status: DISCONTINUED | OUTPATIENT
Start: 2021-09-13 | End: 2021-09-17 | Stop reason: HOSPADM

## 2021-09-13 RX ORDER — ALPRAZOLAM 0.5 MG/1
0.5 TABLET ORAL DAILY PRN
Status: DISCONTINUED | OUTPATIENT
Start: 2021-09-13 | End: 2021-09-17 | Stop reason: HOSPADM

## 2021-09-13 RX ORDER — LOSARTAN POTASSIUM 50 MG/1
50 TABLET ORAL DAILY
Status: DISCONTINUED | OUTPATIENT
Start: 2021-09-14 | End: 2021-09-17 | Stop reason: HOSPADM

## 2021-09-13 RX ADMIN — HYDROCODONE BITARTRATE AND ACETAMINOPHEN 1 TABLET: 5; 325 TABLET ORAL at 20:09

## 2021-09-13 NOTE — ED PROVIDER NOTES
Lew Matthews is a 54 y.o. female with PMhx of asthma, IBS, migraines, fibromyalgia, GERD, HTN, MS who presents to ED with cc of 9/10 L flank pain x Tuesday. Pt states she was recently dx'd with a UTI, was rx'd Fosfomicin and states she has just had worsening symptoms since then. Endorses continued dysuria, notes some suprapubic abd pain. States she had temperature at home at 99F. Denies nausea, vomiting. Pt denies additional symptoms of cough, congestion, CP, SOB. PMHx: Reviewed, as below. PSHx: Reviewed, as below. PCP: Nayeli Sylvester MD    There are no other complaints verbalized at this time. Past Medical History:   Diagnosis Date    Arthritis     Arthritis of low back     Asthma     Chronic pain     fibromyalgia    Colon spasm     Depression     Fibromyalgia     Gastrointestinal disorder     IBS    GERD (gastroesophageal reflux disease)     High cholesterol     Hypertension     IBS (irritable bowel syndrome)     Left axillary pain 2/22/2013    Migraine     LAST HEADACHE 2-16-12    Multiple sclerosis (HCC)     Other ill-defined conditions(799.89)     fibromylgia    Other ill-defined conditions(799.89)     MS       Past Surgical History:   Procedure Laterality Date    HX APPENDECTOMY      HX BLADDER REPAIR  04/2011     HX CHOLECYSTECTOMY      HX GYN      For ectopic pregnancy, hx tubal ligation    HX GYN      vaginal ablation.     HX UROLOGICAL      bladder tuck, no mesh    HX UROLOGICAL      sling removed 1/10/11         Family History:   Adopted: Yes   Problem Relation Age of Onset    Other Other         family hx is completely unknown    Diabetes Brother     Asthma Daughter     Asthma Son        Social History     Socioeconomic History    Marital status:      Spouse name: Not on file    Number of children: Not on file    Years of education: Not on file    Highest education level: Not on file   Occupational History    Not on file   Tobacco Use  Smoking status: Current Some Day Smoker     Packs/day: 0.50     Years: 20.00     Pack years: 10.00     Types: Cigarettes    Smokeless tobacco: Never Used   Substance and Sexual Activity    Alcohol use: No     Alcohol/week: 0.0 standard drinks    Drug use: No    Sexual activity: Never     Partners: Male     Birth control/protection: None   Other Topics Concern    Not on file   Social History Narrative    Not on file     Social Determinants of Health     Financial Resource Strain:     Difficulty of Paying Living Expenses:    Food Insecurity:     Worried About Running Out of Food in the Last Year:     Ran Out of Food in the Last Year:    Transportation Needs:     Lack of Transportation (Medical):  Lack of Transportation (Non-Medical):    Physical Activity:     Days of Exercise per Week:     Minutes of Exercise per Session:    Stress:     Feeling of Stress :    Social Connections:     Frequency of Communication with Friends and Family:     Frequency of Social Gatherings with Friends and Family:     Attends Muslim Services:     Active Member of Clubs or Organizations:     Attends Club or Organization Meetings:     Marital Status:    Intimate Partner Violence:     Fear of Current or Ex-Partner:     Emotionally Abused:     Physically Abused:     Sexually Abused: ALLERGIES: Hydromorphone, Levofloxacin, Aspirin, Bactrim [sulfamethoprim ds], Darvocet a500 [propoxyphene n-acetaminophen], Imitrex [sumatriptan], Pcn [penicillins], Percocet [oxycodone-acetaminophen], Toradol [ketorolac tromethamine], Tramadol, Hydrocodone, Keflex [cephalexin], Lisinopril, Prednisone, Savella [milnacipran], Topamax [topiramate], Vesicare [solifenacin], and Wellbutrin [bupropion hcl]    Review of Systems   Constitutional: Positive for fever. HENT: Negative for congestion. Respiratory: Negative for cough and shortness of breath. Cardiovascular: Negative for chest pain.    Gastrointestinal: Positive for abdominal pain. Negative for nausea and vomiting. Genitourinary: Positive for dysuria and flank pain. All other systems reviewed and are negative. Vitals:    09/13/21 1828   BP: 133/82   Pulse: (!) 102   Resp: 19   Temp: 97.3 °F (36.3 °C)   SpO2: 98%   Weight: 111.1 kg (245 lb)   Height: 5' 3\" (1.6 m)            Physical Exam  Vitals and nursing note reviewed. Constitutional:       Appearance: Normal appearance. She is not diaphoretic. HENT:      Head: Atraumatic. Right Ear: External ear normal.      Left Ear: External ear normal.   Eyes:      Conjunctiva/sclera: Conjunctivae normal.   Cardiovascular:      Rate and Rhythm: Normal rate and regular rhythm. Heart sounds: Normal heart sounds. No murmur heard. No friction rub. No gallop. Pulmonary:      Effort: No respiratory distress. Breath sounds: Normal breath sounds. No stridor. No wheezing, rhonchi or rales. Abdominal:      General: Bowel sounds are normal. There is no distension. Palpations: Abdomen is soft. Tenderness: There is abdominal tenderness in the suprapubic area. There is left CVA tenderness. There is no right CVA tenderness, guarding or rebound. Hernia: No hernia is present. Musculoskeletal:         General: No swelling or deformity. Cervical back: Normal range of motion. No rigidity. Skin:     General: Skin is warm and dry. Neurological:      Mental Status: She is alert and oriented to person, place, and time. Mental status is at baseline. MDM  Number of Diagnoses or Management Options     Amount and/or Complexity of Data Reviewed  Clinical lab tests: ordered and reviewed  Tests in the radiology section of CPT®: ordered and reviewed  Discuss the patient with other providers: yes (Dr Amie Tom, ED attending)          Procedures        9:49 PM  Pt states it was Macrobid that she was on prior to the fosphomycin.   I have spoken with pharmacist who notes that between patient's allergies and medications that she has tried thus far, few options remain. Will consult hospitalist for admission for IV antibiotics. Perfect Serve Consult for Admission  10:02 PM    ED Room Number: ERG/G  Patient Name and age:  Jake Enciso 54 y.o.  female  Working Diagnosis:   1. Acute pyelonephritis        COVID-19 Suspicion:  no  Sepsis present:  no  Reassessment needed: yes  Code Status:  Full Code  Readmission: no  Isolation Requirements:  no  Recommended Level of Care:  med/surg  Department:[facility] ED - [phone]  Other: 71-year-old female with UTI symptoms for which she was placed on Macrobid and then changed to fosfomycin after no improvement. Presents stating that since changing to fosfomycin she has developed left-sided flank pain, 9/10 and somewhat elevated temperatures at home. Is afebrile here. UA demonstrating nitrite positive and trace leukocytes and she continues with dysuria, suprapubic abdominal pain and flank pain and is tender to palpation. Has allergies to levofloxacin, Keflex, Bactrim, penicillins. Have spoken with pharmacy given culture results on 9/1. Their only recommendation was then IV abx given her allergies and what she's tried so far.       VITAL SIGNS:  Vitals:    09/13/21 1828   BP: 133/82   Pulse: (!) 102   Resp: 19   Temp: 97.3 °F (36.3 °C)   SpO2: 98%   Weight: 111.1 kg (245 lb)   Height: 5' 3\" (1.6 m)         LABS:  Recent Results (from the past 24 hour(s))   CBC WITH AUTOMATED DIFF    Collection Time: 09/13/21  7:57 PM   Result Value Ref Range    WBC 7.8 3.6 - 11.0 K/uL    RBC 4.26 3.80 - 5.20 M/uL    HGB 13.0 11.5 - 16.0 g/dL    HCT 40.2 35.0 - 47.0 %    MCV 94.4 80.0 - 99.0 FL    MCH 30.5 26.0 - 34.0 PG    MCHC 32.3 30.0 - 36.5 g/dL    RDW 14.0 11.5 - 14.5 %    PLATELET 181 195 - 356 K/uL    MPV 10.8 8.9 - 12.9 FL    NRBC 0.0 0  WBC    ABSOLUTE NRBC 0.00 0.00 - 0.01 K/uL    NEUTROPHILS 54 32 - 75 %    LYMPHOCYTES 38 12 - 49 %    MONOCYTES 6 5 - 13 % EOSINOPHILS 2 0 - 7 %    BASOPHILS 0 0 - 1 %    IMMATURE GRANULOCYTES 0 0.0 - 0.5 %    ABS. NEUTROPHILS 4.2 1.8 - 8.0 K/UL    ABS. LYMPHOCYTES 3.0 0.8 - 3.5 K/UL    ABS. MONOCYTES 0.5 0.0 - 1.0 K/UL    ABS. EOSINOPHILS 0.2 0.0 - 0.4 K/UL    ABS. BASOPHILS 0.0 0.0 - 0.1 K/UL    ABS. IMM. GRANS. 0.0 0.00 - 0.04 K/UL    DF AUTOMATED     METABOLIC PANEL, COMPREHENSIVE    Collection Time: 09/13/21  7:57 PM   Result Value Ref Range    Sodium 139 136 - 145 mmol/L    Potassium 3.5 3.5 - 5.1 mmol/L    Chloride 105 97 - 108 mmol/L    CO2 28 21 - 32 mmol/L    Anion gap 6 5 - 15 mmol/L    Glucose 83 65 - 100 mg/dL    BUN 10 6 - 20 MG/DL    Creatinine 0.80 0.55 - 1.02 MG/DL    BUN/Creatinine ratio 13 12 - 20      GFR est AA >60 >60 ml/min/1.73m2    GFR est non-AA >60 >60 ml/min/1.73m2    Calcium 8.8 8.5 - 10.1 MG/DL    Bilirubin, total 0.3 0.2 - 1.0 MG/DL    ALT (SGPT) 38 12 - 78 U/L    AST (SGOT) 29 15 - 37 U/L    Alk.  phosphatase 57 45 - 117 U/L    Protein, total 7.3 6.4 - 8.2 g/dL    Albumin 3.3 (L) 3.5 - 5.0 g/dL    Globulin 4.0 2.0 - 4.0 g/dL    A-G Ratio 0.8 (L) 1.1 - 2.2     LIPASE    Collection Time: 09/13/21  7:57 PM   Result Value Ref Range    Lipase 79 73 - 393 U/L   URINALYSIS W/MICROSCOPIC    Collection Time: 09/13/21  7:57 PM   Result Value Ref Range    Color YELLOW/STRAW      Appearance CLOUDY (A) CLEAR      Specific gravity 1.009 1.003 - 1.030      pH (UA) 6.0 5.0 - 8.0      Protein Negative NEG mg/dL    Glucose Negative NEG mg/dL    Ketone Negative NEG mg/dL    Bilirubin Negative NEG      Blood Negative NEG      Urobilinogen 0.2 0.2 - 1.0 EU/dL    Nitrites Positive (A) NEG      Leukocyte Esterase TRACE (A) NEG      WBC 5-10 0 - 4 /hpf    RBC 0-5 0 - 5 /hpf    Epithelial cells MODERATE (A) FEW /lpf    Bacteria 4+ (A) NEG /hpf   URINE CULTURE HOLD SAMPLE    Collection Time: 09/13/21  7:57 PM    Specimen: Serum; Urine   Result Value Ref Range    Urine culture hold        Urine on hold in Microbiology dept for 2 days.  If unpreserved urine is submitted, it cannot be used for addtional testing after 24 hours, recollection will be required. SAMPLES BEING HELD    Collection Time: 09/13/21  7:57 PM   Result Value Ref Range    SAMPLES BEING HELD RED GOLD     COMMENT        Add-on orders for these samples will be processed based on acceptable specimen integrity and analyte stability, which may vary by analyte. HCG URINE, QL. - POC    Collection Time: 09/13/21  8:04 PM   Result Value Ref Range    Pregnancy test,urine (POC) Negative NEG     POC LACTIC ACID    Collection Time: 09/13/21  8:04 PM   Result Value Ref Range    Lactic Acid (POC) 1.32 0.40 - 2.00 mmol/L         IMAGING:  CT ABD PELV WO CONT   Final Result   No nephrolithiasis or hydronephrosis. No evidence of acute process.             Medications During Visit:  Medications   meropenem (MERREM) 500 mg in sterile water (preservative free) 10 mL IV syringe (has no administration in time range)   sodium chloride (NS) flush 5-40 mL (has no administration in time range)   sodium chloride (NS) flush 5-40 mL (has no administration in time range)   polyethylene glycol (MIRALAX) packet 17 g (has no administration in time range)   enoxaparin (LOVENOX) injection 40 mg (has no administration in time range)   hydroCHLOROthiazide (HYDRODIURIL) tablet 25 mg (has no administration in time range)   losartan (COZAAR) tablet 50 mg (has no administration in time range)   meropenem (MERREM) 500 mg in sterile water (preservative free) 10 mL IV syringe (has no administration in time range)   albuterol-ipratropium (DUO-NEB) 2.5 MG-0.5 MG/3 ML (has no administration in time range)   budesonide (PULMICORT) 500 mcg/2 ml nebulizer suspension (has no administration in time range)   arformoteroL (BROVANA) neb solution 15 mcg (has no administration in time range)   nicotine (NICODERM CQ) 14 mg/24 hr patch 1 Patch (has no administration in time range)   gabapentin (NEURONTIN) capsule 600 mg (has no administration in time range)   ALPRAZolam Nicolasa Shelton) tablet 0.5 mg (has no administration in time range)   HYDROcodone-acetaminophen (NORCO) 5-325 mg per tablet 1 Tablet (1 Tablet Oral Given 9/13/21 2009)         DECISION MAKING:    Yolanda Lopez is a 54 y.o. female who comes in as above. Presents afebrile and hemodynamically stable. Has had recent UTI symptoms for which she has tried Macrobid and fosfomycin without improvement of symptoms. Notes continued urinary symptoms, suprapubic abdominal pain and notes onset of flank pain which brought her to the ED. CBC, CMP, lipase without acute etiology. UA demonstrating nitrite positive, trace leukocytes with 510 WBCs and 4+ bacteriuria. POC negative. Will send off culture for today. CT obtained demonstrating no evidence of acute process. Of discussed with pharmacy given patient's allergies and medication she has tried thus far based on urine culture obtained on 9/1/2021. We will consult hospitalist for IV antibiotics. I have discussed care with ED attending. Opportunity for questions presented. Pt verbalizes their understanding and agreement with care plan. IMPRESSION:  1. Acute pyelonephritis        DISPOSITION:  Admitted      Please note that this dictation was completed with [employer], the computer voice recognition software. Quite often unanticipated grammatical, syntax, homophones, and other interpretive errors are inadvertently transcribed by the computer software. Please disregard these errors. Please excuse any errors that have escaped final proofreading.

## 2021-09-13 NOTE — ED TRIAGE NOTES
Patient reports diagnosed with UTI. Patient was prescribe medication (Fosfomicin)  that made pain in the lower back since Tuesday. Reports painful urination and left side flank pain. Patient denies fever, chill, N/V/D. Patient is ambulatory in triage.

## 2021-09-14 PROBLEM — Z72.0 TOBACCO USE: Status: ACTIVE | Noted: 2021-09-14

## 2021-09-14 LAB
ANION GAP SERPL CALC-SCNC: 5 MMOL/L (ref 5–15)
BASOPHILS # BLD: 0 K/UL (ref 0–0.1)
BASOPHILS NFR BLD: 0 % (ref 0–1)
BUN SERPL-MCNC: 12 MG/DL (ref 6–20)
BUN/CREAT SERPL: 15 (ref 12–20)
CALCIUM SERPL-MCNC: 8.5 MG/DL (ref 8.5–10.1)
CHLORIDE SERPL-SCNC: 105 MMOL/L (ref 97–108)
CO2 SERPL-SCNC: 29 MMOL/L (ref 21–32)
CREAT SERPL-MCNC: 0.79 MG/DL (ref 0.55–1.02)
DIFFERENTIAL METHOD BLD: ABNORMAL
EOSINOPHIL # BLD: 0.1 K/UL (ref 0–0.4)
EOSINOPHIL NFR BLD: 2 % (ref 0–7)
ERYTHROCYTE [DISTWIDTH] IN BLOOD BY AUTOMATED COUNT: 13.8 % (ref 11.5–14.5)
GLUCOSE SERPL-MCNC: 154 MG/DL (ref 65–100)
HCT VFR BLD AUTO: 39.7 % (ref 35–47)
HGB BLD-MCNC: 12.8 G/DL (ref 11.5–16)
IMM GRANULOCYTES # BLD AUTO: 0 K/UL (ref 0–0.04)
IMM GRANULOCYTES NFR BLD AUTO: 1 % (ref 0–0.5)
LYMPHOCYTES # BLD: 2.6 K/UL (ref 0.8–3.5)
LYMPHOCYTES NFR BLD: 33 % (ref 12–49)
MCH RBC QN AUTO: 30.8 PG (ref 26–34)
MCHC RBC AUTO-ENTMCNC: 32.2 G/DL (ref 30–36.5)
MCV RBC AUTO: 95.4 FL (ref 80–99)
MONOCYTES # BLD: 0.4 K/UL (ref 0–1)
MONOCYTES NFR BLD: 5 % (ref 5–13)
NEUTS SEG # BLD: 4.9 K/UL (ref 1.8–8)
NEUTS SEG NFR BLD: 59 % (ref 32–75)
NRBC # BLD: 0 K/UL (ref 0–0.01)
NRBC BLD-RTO: 0 PER 100 WBC
PLATELET # BLD AUTO: 267 K/UL (ref 150–400)
PMV BLD AUTO: 10.9 FL (ref 8.9–12.9)
POTASSIUM SERPL-SCNC: 3.2 MMOL/L (ref 3.5–5.1)
RBC # BLD AUTO: 4.16 M/UL (ref 3.8–5.2)
SODIUM SERPL-SCNC: 139 MMOL/L (ref 136–145)
WBC # BLD AUTO: 8 K/UL (ref 3.6–11)

## 2021-09-14 PROCEDURE — 99222 1ST HOSP IP/OBS MODERATE 55: CPT | Performed by: FAMILY MEDICINE

## 2021-09-14 PROCEDURE — 85025 COMPLETE CBC W/AUTO DIFF WBC: CPT

## 2021-09-14 PROCEDURE — 74011000250 HC RX REV CODE- 250: Performed by: PHYSICIAN ASSISTANT

## 2021-09-14 PROCEDURE — 74011000250 HC RX REV CODE- 250: Performed by: STUDENT IN AN ORGANIZED HEALTH CARE EDUCATION/TRAINING PROGRAM

## 2021-09-14 PROCEDURE — 77030029684 HC NEB SM VOL KT MONA -A

## 2021-09-14 PROCEDURE — 74011250637 HC RX REV CODE- 250/637: Performed by: STUDENT IN AN ORGANIZED HEALTH CARE EDUCATION/TRAINING PROGRAM

## 2021-09-14 PROCEDURE — 74011250636 HC RX REV CODE- 250/636: Performed by: STUDENT IN AN ORGANIZED HEALTH CARE EDUCATION/TRAINING PROGRAM

## 2021-09-14 PROCEDURE — 65270000029 HC RM PRIVATE

## 2021-09-14 PROCEDURE — 36415 COLL VENOUS BLD VENIPUNCTURE: CPT

## 2021-09-14 PROCEDURE — 94640 AIRWAY INHALATION TREATMENT: CPT

## 2021-09-14 PROCEDURE — 74011250636 HC RX REV CODE- 250/636: Performed by: PHYSICIAN ASSISTANT

## 2021-09-14 PROCEDURE — 99223 1ST HOSP IP/OBS HIGH 75: CPT | Performed by: INTERNAL MEDICINE

## 2021-09-14 PROCEDURE — 80048 BASIC METABOLIC PNL TOTAL CA: CPT

## 2021-09-14 PROCEDURE — 87040 BLOOD CULTURE FOR BACTERIA: CPT

## 2021-09-14 RX ORDER — POTASSIUM CHLORIDE 750 MG/1
40 TABLET, FILM COATED, EXTENDED RELEASE ORAL
Status: COMPLETED | OUTPATIENT
Start: 2021-09-14 | End: 2021-09-14

## 2021-09-14 RX ORDER — HYDROCODONE BITARTRATE AND ACETAMINOPHEN 7.5; 325 MG/1; MG/1
1 TABLET ORAL ONCE
Status: COMPLETED | OUTPATIENT
Start: 2021-09-14 | End: 2021-09-14

## 2021-09-14 RX ORDER — GABAPENTIN 300 MG/1
600 CAPSULE ORAL 3 TIMES DAILY
Status: DISCONTINUED | OUTPATIENT
Start: 2021-09-14 | End: 2021-09-17 | Stop reason: HOSPADM

## 2021-09-14 RX ORDER — POLYETHYLENE GLYCOL 3350 17 G/17G
17 POWDER, FOR SOLUTION ORAL
Status: COMPLETED | OUTPATIENT
Start: 2021-09-14 | End: 2021-09-15

## 2021-09-14 RX ORDER — HYDROCODONE BITARTRATE AND ACETAMINOPHEN 5; 325 MG/1; MG/1
2 TABLET ORAL
Status: DISCONTINUED | OUTPATIENT
Start: 2021-09-14 | End: 2021-09-17 | Stop reason: HOSPADM

## 2021-09-14 RX ORDER — DIPHENHYDRAMINE HCL 25 MG
25 CAPSULE ORAL
Status: DISCONTINUED | OUTPATIENT
Start: 2021-09-14 | End: 2021-09-17 | Stop reason: HOSPADM

## 2021-09-14 RX ORDER — HYDROCODONE BITARTRATE AND ACETAMINOPHEN 5; 325 MG/1; MG/1
1 TABLET ORAL
Status: DISCONTINUED | OUTPATIENT
Start: 2021-09-14 | End: 2021-09-17 | Stop reason: HOSPADM

## 2021-09-14 RX ORDER — LIDOCAINE 4 G/100G
1 PATCH TOPICAL EVERY 24 HOURS
Status: DISCONTINUED | OUTPATIENT
Start: 2021-09-14 | End: 2021-09-17 | Stop reason: HOSPADM

## 2021-09-14 RX ORDER — HYDROCODONE BITARTRATE AND ACETAMINOPHEN 5; 325 MG/1; MG/1
1 TABLET ORAL
Status: DISCONTINUED | OUTPATIENT
Start: 2021-09-14 | End: 2021-09-14

## 2021-09-14 RX ORDER — CYCLOBENZAPRINE HCL 10 MG
10 TABLET ORAL ONCE
Status: COMPLETED | OUTPATIENT
Start: 2021-09-14 | End: 2021-09-14

## 2021-09-14 RX ADMIN — Medication 1 CAPSULE: at 12:57

## 2021-09-14 RX ADMIN — ENOXAPARIN SODIUM 40 MG: 40 INJECTION SUBCUTANEOUS at 21:56

## 2021-09-14 RX ADMIN — MEROPENEM 500 MG: 500 INJECTION, POWDER, FOR SOLUTION INTRAVENOUS at 13:08

## 2021-09-14 RX ADMIN — GABAPENTIN 600 MG: 300 CAPSULE ORAL at 08:42

## 2021-09-14 RX ADMIN — ARFORMOTEROL TARTRATE 15 MCG: 15 SOLUTION RESPIRATORY (INHALATION) at 20:26

## 2021-09-14 RX ADMIN — ENOXAPARIN SODIUM 40 MG: 40 INJECTION SUBCUTANEOUS at 13:08

## 2021-09-14 RX ADMIN — DIPHENHYDRAMINE HYDROCHLORIDE 25 MG: 25 CAPSULE ORAL at 12:57

## 2021-09-14 RX ADMIN — BUDESONIDE 500 MCG: 0.5 SUSPENSION RESPIRATORY (INHALATION) at 08:00

## 2021-09-14 RX ADMIN — CYCLOBENZAPRINE 10 MG: 10 TABLET, FILM COATED ORAL at 12:57

## 2021-09-14 RX ADMIN — ENOXAPARIN SODIUM 40 MG: 40 INJECTION SUBCUTANEOUS at 02:03

## 2021-09-14 RX ADMIN — POTASSIUM CHLORIDE 40 MEQ: 750 TABLET, FILM COATED, EXTENDED RELEASE ORAL at 08:42

## 2021-09-14 RX ADMIN — MEROPENEM 500 MG: 500 INJECTION, POWDER, FOR SOLUTION INTRAVENOUS at 08:41

## 2021-09-14 RX ADMIN — HYDROCHLOROTHIAZIDE 25 MG: 25 TABLET ORAL at 08:42

## 2021-09-14 RX ADMIN — HYDROCODONE BITARTRATE AND ACETAMINOPHEN 2 TABLET: 5; 325 TABLET ORAL at 23:41

## 2021-09-14 RX ADMIN — BUDESONIDE 500 MCG: 0.5 SUSPENSION RESPIRATORY (INHALATION) at 02:30

## 2021-09-14 RX ADMIN — HYDROCODONE BITARTRATE AND ACETAMINOPHEN 2 TABLET: 5; 325 TABLET ORAL at 17:49

## 2021-09-14 RX ADMIN — HYDROCODONE BITARTRATE AND ACETAMINOPHEN 1 TABLET: 5; 325 TABLET ORAL at 08:42

## 2021-09-14 RX ADMIN — ARFORMOTEROL TARTRATE 15 MCG: 15 SOLUTION RESPIRATORY (INHALATION) at 08:00

## 2021-09-14 RX ADMIN — MEROPENEM 500 MG: 500 INJECTION, POWDER, FOR SOLUTION INTRAVENOUS at 20:36

## 2021-09-14 RX ADMIN — MEROPENEM 500 MG: 500 INJECTION, POWDER, FOR SOLUTION INTRAVENOUS at 02:05

## 2021-09-14 RX ADMIN — LOSARTAN POTASSIUM 50 MG: 50 TABLET, FILM COATED ORAL at 08:42

## 2021-09-14 RX ADMIN — HYDROCODONE BITARTRATE AND ACETAMINOPHEN 1 TABLET: 7.5; 325 TABLET ORAL at 04:40

## 2021-09-14 RX ADMIN — BUDESONIDE 500 MCG: 0.5 SUSPENSION RESPIRATORY (INHALATION) at 20:26

## 2021-09-14 RX ADMIN — GABAPENTIN 600 MG: 300 CAPSULE ORAL at 20:37

## 2021-09-14 RX ADMIN — Medication 10 ML: at 06:45

## 2021-09-14 RX ADMIN — Medication 10 ML: at 20:38

## 2021-09-14 RX ADMIN — SODIUM CHLORIDE 500 ML: 900 INJECTION, SOLUTION INTRAVENOUS at 05:07

## 2021-09-14 RX ADMIN — ARFORMOTEROL TARTRATE 15 MCG: 15 SOLUTION RESPIRATORY (INHALATION) at 02:30

## 2021-09-14 RX ADMIN — Medication 10 ML: at 20:37

## 2021-09-14 RX ADMIN — GABAPENTIN 600 MG: 300 CAPSULE ORAL at 12:57

## 2021-09-14 NOTE — PROGRESS NOTES
Bedside and Verbal shift change report given to Laura Barrett RN (oncoming nurse) by Kip Silva RN (offgoing nurse). Report included the following information SBAR, Kardex, ED Summary, Procedure Summary, Intake/Output, MAR, Recent Results and Med Rec Status.

## 2021-09-14 NOTE — PROGRESS NOTES
9/14/2021  11:15 AM  Reason for Admission: Emergency - Surgery required      ICD-10-CM ICD-9-CM    1. Acute pyelonephritis  N10 590.10    2. Obesity, morbid ([facility] [address]  E66.01 278.01    3. Tobacco use  Z72.0 305.1    4. Dysuria  R30.0 788.1    5. Flank pain  R10.9 789.09            Assessment:   [x]In person with pt   []Via p/c with pt   []With family member in person. Who/Relation:     []With family member via p/c. Who/Relation:   []Chart Review    RUR: 11%  Risk Level: [x]Low []Moderate []High  Value-based purchasing: [] Yes [x] No  Bundle patient: [] Yes [x] No   Specify:     Advance Directive: Full Code. [x] No AD on file. [] AD on file. [] Current AD not on file. Copy requested. [] Requests AD, and referral submitted to [facility]. Healthcare Decision Maker:  Lorena Ruano (R) - 2 DTRs Dru Homans and [place])        Assessment:    Age:  54    Sex: [] Male [x]Female     Residency: [x]Private residence []Apartment []Assisted Living []LTC []Other:   Exterior Steps: 8  Interior Steps: 0    Lives With: [x]With spouse []Other family members []Underage children []Alone []Care provider []Other:    Prior functioning:  [x]Independent with ADLs and iADLS []Dependent with ADLs and iADLs []Partial dependence, Specify:     Prior DME required:  [x]None []RW []Cane []Crutches []Bedside commode []CPAP []Home O2 (Liter/Provider: ) []Nebulizer   []Shower Chair []Wheelchair []Hospital Bed []Juni []Stair lift []Rollator []Other:    DME available: [x]None []RW []Cane []Crutches []Bedside commode []CPAP []Home O2 (Liter/Provider: ) []Nebulizer   []Shower Chair []Wheelchair []Hospital Bed []Juni []Stair lift []Rollator []Other:    Rehab history: [x]None []Outpatient PT []Home Health (Provider/Date: ) []SNF (Provider/Date: ) []IPR (Provider/Date: ) []LTC (Provider/Date: ) []Hospice (Provider/Date: )  []Other:     Discharge Concerns: []Yes [x]No []Unknown   Describe:    Comments:      Insurer:   Insurance Information [address] [address] Phone: [phone]    Subscriber: Scotty Martel Subscriber#: [id number]    Group#: [id number] Precert#:         [place]ICA[place] MEDICA[place] [facility] Phone:     Subscriber: Scotty Martel Subscriber#: [id number]    Group#:  Precert#:           PCP: Fortunato Tran   Address: [address] [place] [address] / [place] [address]   Phone number: [phone]   Current patient: []Yes []No   Approximate date of last visit: 09/01/2021   Access to virtual PCP visits: [x]Yes []No    Pharmacy:  [facility] Transport: Family       Transition of care plan:    []Unable to determine at this time. Awaiting clinical progress, and disposition recommendations. [x] Home with outpatient follow-up    [] Home with Outpatient PT and outpatient follow-up   Pt aware of OP appt? []Yes, Provider:   []Not scheduled   Transport provider:     [] Home with family assistance as needed and outpatient follow-up   Family able to assist:    Schedule:  Transport provider:      [] Home with Home Health   - Provider:     []SNF/IPR   -[]Preferences given:   []Listing provided and preferences requested   -Status: []Pending []Accepted:    -Auth required: []Yes []No    -Auth initiated date:   -3 midnight stay required: []Yes []No  Date satisfied:     [] Home with Hospice   -Provider:     [] Dispatch [facility] information provided. [] Other:     Ayad Albrecht MA    Care Management Interventions  PCP Verified by CM: Yes (Tanya Sherman)  Mode of Transport at Discharge:  Other (see comment) (Family)  MyChart Signup: No  Discharge Durable Medical Equipment: No  Physical Therapy Consult: Yes  Occupational Therapy Consult: Yes  Speech Therapy Consult: No  Support Systems: Spouse/Significant Other  Confirm Follow Up Transport: Family  Discharge Location  Discharge Placement: Home with family assistance

## 2021-09-14 NOTE — DISCHARGE SUMMARY
[address] [facility] [address] [place] [address]   Office [phone]  Fax [phone]       Discharge / Transfer / Off-Service Note     Name: Mckenzie Whittaker MRN: [id number]  Sex: Female   YOB: 1970  Age: 54 y.o. PCP: Elzbieta Aguirre MD     Date of admission: 9/13/2021  Date of discharge/transfer: 9/16/2021    Attending physician at admission: Dr. Sean New    Attending physician at discharge/transfer: Colonel Shahida MD     Resident physician at discharge/transfer: Judd Casas MD     Consultants during hospitalization  IP CONSULT TO INFECTIOUS DISEASES     Admission diagnoses   Acute pyelonephritis [N10]    Recommended follow-up after discharge  1. PCP: Elzbieta Aguirre MD    Things to follow up on with PCP:  -Further work-up to assess for resolution of infection.  -Bilateral flank pain, given presentation, likely neuromuscular underlying etiology.   -Fibromyalgia and medication dose and regimen optimization. History of Present Illness  Per admitting provider, Vinny Miller MD, \"Estela Amaya is a 54 y.o. female with known GERD, tobacco abuse, HTN, fibromyalgia, chronic pain and asthma who presents to the ER complaining of L flank pain and dysura. Her symptoms began 4 weeks ago after taking an antibiotic for bronchitis. She was treated macrobid twice and then was recently put on Fosfomycin 1 week ago for persistent symptoms. 2 days after starting the most recent antibiotic she deveoped L sided debilitating flank pain prompting her to come into the ED. She denies any fever and notes her highest temp was 99.6F. She also notes loose stool for the last 3 days. She has 4 stool daily and states the stool has a very strong odor. She denies any blood in her stool, nausea, vomiting, or abdominal pain. \"        Surekha Westbrook is a 54 y.o. female with a PMH of HTN, fibromyalgia, IBS, anxiety, and tobacco use who was admitted for complicated UTI.  Originally, it was thought that the patient had pyelonephritis due to the exquisite flank tenderness she was experiencing. ID was consulted and they determined this was unlikely. Because of the patient's many medication allergies and her recently failed trial of Macrobid, she was started on IV meropenem for empiric therapy. Urine and blood cultures were taken prior to starting antibiotics. Blood cultures were negative, but urine cultures were positive for E. Coli that was susceptible to, among other things, Macrobid. Complicated UTI: pt presented with 1/4 SIRS (). LA 1.32. UA notable for nitrites and bacteria. CT abd NAP. Patient also has a hx of multiple antibiotic allergies including keflex, penicillins, levaquin, and bactrim, so she was started on IV meropenem until cultures and sensitivities resulted. - 5 day course of Macrobid  - f/u with PCP     L flank pain: likely MSK in origin or related to patient's fibromyalgia. Pyelonephritis unlikely in the absence of systemic sx; no fever, no leukocytosis. CT scan also negative. Managed in-patient with topical lidocaine, Norco, and cyclobenzaprine, none of which reportedly helped. - f/u with PCP  - recommending increasing gabapentin dosage/frequency for management  - outpt PT/OT     Loose Stools: patient reported 3-4 episodes daily of loose stool with a strong odor w/ hx of recent abx use. No diarrhea while hospitalized. - Cont probiotics daily      Hypertension: (Stable) On admission BP was 133/82    -Continue home Losartan 50mg and HCTZ 25mg  -f/u with PCP     Asthma stable  - Resume home Advair prn     Tobacco Use patent smoked 0.5 packs daily for 20 yeas  - Encouraging cessation     Fibromyalgia patient 'd and appropriate   -f/u with PCP for ongoing management  -Consider duloxetine use  -Continue gabapentin 600mg BID     Anxiety (Stable)  -Continue home Xanax 0.5mg daily prn     Obesity BMI Body mass index is 43.4 kg/m².   - Encouraging lifestyle modifications and further follow up outpatient. Physical exam at discharge:   Constitutional:       General: She is not in acute distress. Appearance: She is not ill-appearing. HENT:      Head: Normocephalic and atraumatic. Cardiovascular:      Rate and Rhythm: Normal rate and regular rhythm. Pulmonary:      Effort: No respiratory distress. Abdominal:      Palpations: Abdomen is soft. Musculoskeletal:      Right lower leg: No edema. Left lower leg: No edema. Comments: Left flank tenderness to light palpation    Skin:     Capillary Refill: Capillary refill takes less than 2 seconds. Neurological:      General: No focal deficit present. Mental Status: She is oriented to person, place, and time. Psychiatric:         Mood and Affect: Mood normal.          Condition at discharge: Stable    Labs  Recent Labs     09/16/21  0241 09/15/21  0617 09/14/21  0211   WBC 6.7 6.1 8.0   HGB 12.6 11.5 12.8   HCT 39.3 36.5 39.7    253 267     Recent Labs     09/16/21  0241 09/15/21  0617 09/14/21  0211    137 139   K 3.8 3.6 3.2*    105 105   CO2 29 27 29   BUN 12 10 12   CREA 0.84 0.64 0.79   * 141* 154*   CA 8.7 8.9 8.5     Recent Labs     09/13/21 1957   ALT 38   AP 57   TBILI 0.3   TP 7.3   ALB 3.3*   GLOB 4.0   LPSE 79     No results for input(s): PH, PCO2, PO2, TNIPOC, TROIQ, INR, PTP, APTT, FE, TIBC, PSAT, FERR, GLUCPOC, INREXT, INREXT in the last 72 hours.     No lab exists for component: [place]    Microbiology  Results     Procedure Component Value Units Date/Time    CULTURE, BLOOD [[id number]] Collected: 09/14/21 0211    Order Status: Completed Specimen: Blood Updated: 09/16/21 0518     Special Requests: NO SPECIAL REQUESTS        Culture result: NO GROWTH 2 DAYS       OVA & PARASITES, STOOL [[id number]]     Order Status: Canceled Specimen: Feces from Stool     CULTURE, STOOL- (Enteric Bacteria Panel, DNA) [[id number]]     Order Status: Canceled Specimen: Stool     C. DIFFICILE AG & TOXIN A/B [[id number]]     Order Status: Canceled Specimen: Stool     CULTURE, URINE [[id number]]     Order Status: Canceled Specimen: Urine from Clean catch     Lucas [[id number]] Collected: 09/13/21 1957    Order Status: Completed Specimen: Urine from Serum Updated: 09/13/21 2016     Urine culture hold       Urine on hold in Microbiology dept for 2 days. If unpreserved urine is submitted, it cannot be used for addtional testing after 24 hours, recollection will be required. CULTURE, URINE [[id number]]  (Abnormal)  (Susceptibility) Collected: 09/13/21 1957    Order Status: Completed Specimen: Urine from Clean catch Updated: 09/16/21 1052     Special Requests: NO SPECIAL REQUESTS        [place] Count --        >100,000  COLONIES/mL       Culture result: ESCHERICHIA COLI       Susceptibility      Escherichia coli      VELIA      Amikacin ($) Susceptible      Ampicillin ($) Susceptible      Ampicillin/sulbactam ($) Susceptible      Cefazolin ($) Susceptible      Cefepime ($$) Susceptible      Cefoxitin Susceptible      Ceftazidime ($) Susceptible      Ceftriaxone ($) Susceptible      Ciprofloxacin ($) Resistant      Gentamicin ($) Susceptible      Levofloxacin ($) Resistant      Meropenem ($$) Susceptible      Nitrofurantoin Susceptible      Piperacillin/Tazobac ($) Susceptible      Tobramycin ($) Susceptible      Trimeth/Sulfa Resistant               Linear View                          Procedures / Diagnostic Studies  Echo Results  (Last 48 hours)    None           Imaging  CT ABD PELV WO CONT    Result Date: 9/13/2021  No nephrolithiasis or hydronephrosis. No evidence of acute process.        Chronic diagnoses   Problem List as of 9/16/2021 Date Reviewed: 8/30/2021        Codes Class Noted - Resolved    Tobacco use ICD-10-CM: Z72.0  ICD-9-CM: 305.1  9/14/2021 - Present        * (Principal) Acute pyelonephritis ICD-10-CM: N10  ICD-9-CM: 590.10  9/13/2021 - Present        Diarrhea of presumed infectious origin ICD-10-CM: R19.7  ICD-9-CM: 009.3  1/2/2019 - Present        CAP (community acquired pneumonia) ICD-10-CM: J18.9  ICD-9-CM: 782  12/31/2018 - Present        Sepsis ([facility] ICD-10-CM: A41.9  ICD-9-CM: 038.9, 995.91  12/31/2018 - Present        Obesity, morbid ([facility] ICD-10-CM: E66.01  ICD-9-CM: 278.01  2/19/2018 - Present        Pre-diabetes ICD-10-CM: R73.03  ICD-9-CM: 790.29  8/18/2016 - Present        Urinary frequency ICD-10-CM: R35.0  ICD-9-CM: 788.41  10/21/2015 - Present        Essential hypertension, benign ICD-10-CM: I10  ICD-9-CM: 401.1  8/25/2014 - Present        DDD (degenerative disc disease), lumbar ICD-10-CM: M51.36  ICD-9-CM: 722.52  5/6/2013 - Present        Left axillary pain ICD-10-CM: C58.912  ICD-9-CM: 729.5  2/22/2013 - Present        IBS (irritable bowel syndrome) ICD-10-CM: K58.9  ICD-9-CM: 564.1  5/17/2012 - Present        Migraine ICD-10-CM: K78.630  ICD-9-CM: 346.90  5/17/2012 - Present        Hyperlipidemia ICD-10-CM: E78.5  ICD-9-CM: 272.4  11/10/2011 - Present        Fibromyalgia ICD-10-CM: M79.7  ICD-9-CM: 729.1  10/13/2011 - Present        Nipple discharge ICD-10-CM: N64.52  ICD-9-CM: 611.79  8/18/2011 - Present        Depression ICD-10-CM: F32.9  ICD-9-CM: 790  8/18/2011 - Present              Discharge/Transfer Medications  Current Discharge Medication List      START taking these medications    Details   nitrofurantoin, macrocrystal-monohydrate, (Macrobid) 100 mg capsule Take 1 Capsule by mouth two (2) times a day for 5 days. Qty: 10 Capsule, Refills: 0  Start date: 9/16/2021, End date: 9/21/2021         CONTINUE these medications which have NOT CHANGED    Details   fluticasone propion-salmeteroL (ADVAIR/WIXELA) 250-50 mcg/dose diskus inhaler Take 1 Puff by inhalation every twelve (12) hours.   Qty: 1 Each, Refills: 2    Associated Diagnoses: Bronchitis; Mild intermittent reactive airway disease with wheezing with acute exacerbation      albuterol (PROVENTIL VENTOLIN) 2.5 mg /3 mL (0.083 %) nebu USE 1 VIAL VIA NEBULIZER  EVERY 4 HOURS AS NEEDED FOR WHEEZING   RECOMMENDS NOT EXCEEDING 4  VIALS/DAY  Qty: 450 mL, Refills: 8    Comments: Requesting 1 year supply  Associated Diagnoses: Bronchitis; Mild intermittent reactive airway disease with wheezing with acute exacerbation      albuterol (PROVENTIL HFA, VENTOLIN HFA, PROAIR HFA) 90 mcg/actuation inhaler USE 2 INHALATIONS BY MOUTH  EVERY 4 HOURS AS NEEDED FOR WHEEZING  Qty: 51 g, Refills: 3    Comments: Requesting 1 year supply  Associated Diagnoses: Mild intermittent reactive airway disease with wheezing with acute exacerbation      cyclobenzaprine (FLEXERIL) 10 mg tablet TAKE 1 TABLET BY MOUTH 3  TIMES DAILY AS NEEDED FOR  MUSCLE SPASM(S) FOR UP TO  10 DAYS  Qty: 30 Tablet, Refills: 0    Associated Diagnoses: Chronic left shoulder pain      gabapentin (NEURONTIN) 600 mg tablet TAKE 1 TABLET BY MOUTH  TWICE DAILY  Qty: 60 Tablet, Refills: 11    Comments: Requesting 1 year supply  Associated Diagnoses: Fibromyalgia      hydroCHLOROthiazide (HYDRODIURIL) 25 mg tablet TAKE 1 TABLET BY MOUTH  DAILY  Qty: 90 Tab, Refills: 3    Comments: Requesting 1 year supply      ALPRAZolam (XANAX) 0.5 mg tablet TAKE 1 TABLET BY MOUTH  DAILY AS NEEDED FOR ANXIOUS  Qty: 30 Tab, Refills: 5    Associated Diagnoses: Anxiety      losartan (COZAAR) 50 mg tablet TAKE 1 TABLET BY MOUTH  DAILY  Qty: 90 Tab, Refills: 3    Comments: Requesting 1 year supply  Associated Diagnoses: Essential hypertension, benign              Diet:  Regular diet.     Activity:  As tolerated    Disposition: to Home    Discharge instructions to patient/family  Please seek medical attention for any new or worsening symptoms particularly fever, chest pain, shortness of breath, abdominal pain, nausea, vomiting    Follow up plans/appointments  Follow-up Information     Follow up With Specialties Details Why Contact Debi Sylvester MD Family Medicine Go on 9/21/2021 Follow up after hospitilization with your PCP at 10:45am [address] [facility]  [phone]             Judd Casas MD  Family Medicine Resident       For Billing    Chief Complaint   Patient presents with   [facility] Problems  Date Reviewed: 8/30/2021        Codes Class Noted POA    Tobacco use ICD-10-CM: Z72.0  ICD-9-CM: 305.1  9/14/2021 Unknown        * (Principal) Acute pyelonephritis ICD-10-CM: N10  ICD-9-CM: 590.10  9/13/2021 Unknown        Essential hypertension, benign ICD-10-CM: I10  ICD-9-CM: 401.1  8/25/2014 Yes        Hyperlipidemia ICD-10-CM: E78.5  ICD-9-CM: 272.4  11/10/2011 Yes        Fibromyalgia ICD-10-CM: M79.7  ICD-9-CM: 729.1  10/13/2011 Yes        Depression ICD-10-CM: F32.9  ICD-9-CM: 736  8/18/2011 Yes

## 2021-09-14 NOTE — PROGRESS NOTES
[facility] Dosing Services: 9/13/21    Pharmacy note for Dr Reji Goodwin regarding Lovenox dose adjustment for increased BMI:    Wt Readings from Last 1 Encounters:   09/13/21 111.1 kg (245 lb)       Ht Readings from Last 1 Encounters:   09/13/21 160 cm (63\")           Previous Dose 40mg sq q24h   Creatinine Clearance Estimated Creatinine Clearance: 101.9 mL/min (based on SCr of 0.8 mg/dL). Creatinine Lab Results   Component Value Date/Time    Creatinine 0.80 09/13/2021 07:57 PM    Creatinine (POC) 0.8 09/16/2019 03:35 PM       Platelet Lab Results   Component Value Date/Time    PLATELET 133 95/53/4364 07:57 PM      H/H Lab Results   Component Value Date/Time    HGB 13.0 09/13/2021 07:57 PM           Pharmacist made change to enoxaparin therapy based on:    [ x ] BMI: dose changed to: 40mg sq q 12h    Pharmacy to automatically make dose adjustment for obesity (BMI greater than 40)  Pharmacy to make dose rounding adjustments per [facility] dose adjustment scale.       Kwasi Masters, PHARMD . Contact information: [phone]

## 2021-09-14 NOTE — CONSULTS
Infectious Disease Consult    Impression/Plan   · Left flank pain. Work-up for UTI/pyelonephritis unrevealing. Patient is afebrile with normal WBC. CT scan negative for pyelonephritis. UA with moderate epithelial cells suggesting contaminated sample and only 5-10 WBCs. Left flank is tender to palpation without percussion which is not consistent with pyelonephritis. Suspect musculoskeletal cause of pain. With reported dysuria and malodorous urine will continue antibiotics pending urine culture   · Multiple antibiotic allergies including penicillin cephalexin sulfa and levofloxacin. Patient states she can take Cipro but it does not work for her  · C. difficile rule out. No bowel movement since admission. WBC within normal limits. Low suspicion  · Obesity  · Tobacco abuse        Anti-infectives:   1. Meropenem    Subjective:   Date of Consultation:  September 14, 2021  Date of Admission: 9/13/2021   Referring Physician:     Jacek Fajardo is a 54 y.o. female with known GERD, tobacco abuse, HTN, fibromyalgia, chronic pain and asthma who presents to the ER complaining of L flank pain and dysura. Her symptoms began 4 weeks ago after taking an antibiotic for bronchitis. She was treated macrobid twice and then was recently put on Fosfomycin 1 week ago for persistent symptoms. 2 days after starting the most recent antibiotic she deveoped L sided debilitating flank pain prompting her to come into the ED. She denies any fever and notes her highest temp was 99.6F. She also notes loose stool for the last 3 days. She has 4 stool daily and states the stool has a very strong odor. She denies any blood in her stool, nausea, vomiting, or abdominal pain. Work-up in the emergency department revealed patient to be afebrile with a normal white count. CT scan without evidence of pyelonephritis. UA bland. Patient has been started on meropenem.   The infectious diseases service has been asked to assist with antibiotic management    Patient Active Problem List   Diagnosis Code    Nipple discharge N64.52    Depression F32.9    Fibromyalgia M79.7    Hyperlipidemia E78.5    IBS (irritable bowel syndrome) K58.9    Migraine G43.909    Left axillary pain M79.622    DDD (degenerative disc disease), lumbar M51.36    Essential hypertension, benign I10    Urinary frequency R35.0    Pre-diabetes R73.03    Obesity, morbid ([facility] [address] E66.01    CAP (community acquired pneumonia) J18.9    Sepsis ([facility] [address] A41.9    Diarrhea of presumed infectious origin R19.7    Acute pyelonephritis N10    Tobacco use Z72.0     Past Medical History:   Diagnosis Date    Arthritis     Arthritis of low back     Asthma     Chronic pain     fibromyalgia    Colon spasm     Depression     Fibromyalgia     Gastrointestinal disorder     IBS    GERD (gastroesophageal reflux disease)     High cholesterol     Hypertension     IBS (irritable bowel syndrome)     Left axillary pain 2/22/2013    Migraine     LAST HEADACHE 2-16-12    Multiple sclerosis ([facility] [address]     Other ill-defined conditions(799.89)     fibromylgia    Other ill-defined conditions(799.89)     MS      Family History   Adopted: Yes   Problem Relation Age of Onset    Other Other         family hx is completely unknown    Diabetes Brother     Asthma Daughter     Asthma Son       Social History     Tobacco Use    Smoking status: Current Some Day Smoker     Packs/day: 0.50     Years: 20.00     Pack years: 10.00     Types: Cigarettes    Smokeless tobacco: Never Used   Substance Use Topics    Alcohol use: No     Alcohol/week: 0.0 standard drinks     Past Surgical History:   Procedure Laterality Date    HX APPENDECTOMY      HX BLADDER REPAIR  04/2011     HX CHOLECYSTECTOMY      HX GYN      For ectopic pregnancy, hx tubal ligation    HX GYN      vaginal ablation.     HX UROLOGICAL      bladder tuck, no mesh    HX UROLOGICAL      sling removed 1/10/11      Prior to Admission medications Medication Sig Start Date End Date Taking? Authorizing Provider   fluticasone propion-salmeteroL (ADVAIR/WIXELA) 250-50 mcg/dose diskus inhaler Take 1 Puff by inhalation every twelve (12) hours. 9/1/21   Jorge Moy MD   albuterol (PROVENTIL VENTOLIN) 2.5 mg /3 mL (0.083 %) nebu USE 1 VIAL VIA NEBULIZER  EVERY 4 HOURS AS NEEDED FOR WHEEZING   RECOMMENDS NOT EXCEEDING 4  VIALS/DAY 9/1/21   Wilbur Ledesma MD   albuterol (PROVENTIL HFA, VENTOLIN HFA, PROAIR HFA) 90 mcg/actuation inhaler USE 2 INHALATIONS BY MOUTH  EVERY 4 HOURS AS NEEDED FOR WHEEZING 9/1/21   Wilbur Ledesma MD   fluconazole (DIFLUCAN) 150 mg tablet TAKE 1 TABLET BY MOUTH  DAILY FOR 1 DAY  Patient not taking: Reported on 9/14/2021 9/1/21   Wilbur Ledesma MD   cyclobenzaprine (FLEXERIL) 10 mg tablet TAKE 1 TABLET BY MOUTH 3  TIMES DAILY AS NEEDED FOR  MUSCLE SPASM(S) FOR UP TO  10 DAYS 9/1/21   Wilbur Ledesma MD   ascorbic acid, vitamin C, (Vitamin C) 500 mg tablet Take  by mouth. Patient not taking: Reported on 9/14/2021    Provider, Historical   cholecalciferol (Vitamin D3) (1000 Units /25 mcg) tablet Take  by mouth daily.   Patient not taking: Reported on 9/14/2021    Provider, Historical   gabapentin (NEURONTIN) 600 mg tablet TAKE 1 TABLET BY MOUTH  TWICE DAILY 8/16/21   Wilbur Ledesma MD   sertraline (ZOLOFT) 50 mg tablet TAKE 1 TABLET BY MOUTH  DAILY  Patient not taking: Reported on 9/14/2021 4/28/21   Wilbur Ledesma MD   hydroCHLOROthiazide (HYDRODIURIL) 25 mg tablet TAKE 1 TABLET BY MOUTH  DAILY 4/8/21   Wilbur Ledesma MD   ALPRAZolam [place]) 0.5 mg tablet TAKE 1 TABLET BY MOUTH  DAILY AS NEEDED FOR ANXIOUS 3/8/21   Wilbur Ledesma MD   losartan (COZAAR) 50 mg tablet TAKE 1 TABLET BY MOUTH  DAILY 2/3/21   Wilbur Ledesma MD     Allergies   Allergen Reactions    Hydromorphone Itching and Swelling     hydromorphone 1mg IV given immediately began itching and complained of scratchy swelling sensation in her throat    Levofloxacin Hives    Aspirin Anaphylaxis    Bactrim [Sulfamethoprim Ds] Hives    Darvocet A500 [Propoxyphene N-Acetaminophen] Hives    Imitrex [Sumatriptan] Hives    Pcn [Penicillins] Anaphylaxis    Percocet [Oxycodone-Acetaminophen] Hives     Pt can take lortab/norco    Toradol [Ketorolac Tromethamine] Hives    Tramadol Hives    Hydrocodone Hives    Keflex [Cephalexin] Hives    Lisinopril Swelling     Tongue and mouth felt weird, slight swelling     Prednisone Rash and Angioedema     And pt felt throat closing    Savella [Milnacipran] Hives     INSOMNIA    Topamax [Topiramate] Other (comments)    Vesicare [Solifenacin] Other (comments)     Nose bleeds    Wellbutrin [Bupropion Hcl] Other (comments)     insomnia        Review of Systems:  A comprehensive review of systems was negative except for that written in the History of Present Illness. Objective:   Blood pressure (!) 142/88, pulse (!) 103, temperature 98.6 °F (37 °C), resp. rate 18, height 5' 3\" (1.6 m), weight 245 lb (111.1 kg), SpO2 98 %. Temp (24hrs), Av.1 °F (36.7 °C), Min:97.3 °F (36.3 °C), Max:98.6 °F (37 °C)       Exam:    General:  Alert, cooperative, well noursished, well developed, appears stated age   Eyes:  Sclera anicteric.     Mouth/Throat: Mucous membranes normal   Neck: Supple   Lungs:    No distress   CV:     Abdomen:   Soft, non-tender, nondistended   Extremities:  No edema   Skin:  No rash   Lymph nodes:    Musculoskeletal:  Pain with palpation over the left flank no induration or erythema noted   Lines/Devices:   Peripheral   Psych: Alert and oriented, normal mood affect given the setting       Data Review:   Recent Results (from the past 24 hour(s))   CBC WITH AUTOMATED DIFF    Collection Time: 21  7:57 PM   Result Value Ref Range    WBC 7.8 3.6 - 11.0 K/uL    RBC 4.26 3.80 - 5.20 M/uL    HGB 13.0 11.5 - 16.0 g/dL    HCT 40.2 35.0 - 47.0 %    MCV 94.4 80.0 - 99.0 FL    MCH 30.5 26.0 - 34.0 PG    MCHC 32.3 30.0 - 36.5 g/dL    RDW 14.0 11.5 - 14.5 %    PLATELET 724 402 - 247 K/uL    MPV 10.8 8.9 - 12.9 FL    NRBC 0.0 0  WBC    ABSOLUTE NRBC 0.00 0.00 - 0.01 K/uL    NEUTROPHILS 54 32 - 75 %    LYMPHOCYTES 38 12 - 49 %    MONOCYTES 6 5 - 13 %    EOSINOPHILS 2 0 - 7 %    BASOPHILS 0 0 - 1 %    IMMATURE GRANULOCYTES 0 0.0 - 0.5 %    ABS. NEUTROPHILS 4.2 1.8 - 8.0 K/UL    ABS. LYMPHOCYTES 3.0 0.8 - 3.5 K/UL    ABS. MONOCYTES 0.5 0.0 - 1.0 K/UL    ABS. EOSINOPHILS 0.2 0.0 - 0.4 K/UL    ABS. BASOPHILS 0.0 0.0 - 0.1 K/UL    ABS. IMM. GRANS. 0.0 0.00 - 0.04 K/UL    DF AUTOMATED     METABOLIC PANEL, COMPREHENSIVE    Collection Time: 09/13/21  7:57 PM   Result Value Ref Range    Sodium 139 136 - 145 mmol/L    Potassium 3.5 3.5 - 5.1 mmol/L    Chloride 105 97 - 108 mmol/L    CO2 28 21 - 32 mmol/L    Anion gap 6 5 - 15 mmol/L    Glucose 83 65 - 100 mg/dL    BUN 10 6 - 20 MG/DL    Creatinine 0.80 0.55 - 1.02 MG/DL    BUN/Creatinine ratio 13 12 - 20      GFR est AA >60 >60 ml/min/1.73m2    GFR est non-AA >60 >60 ml/min/1.73m2    Calcium 8.8 8.5 - 10.1 MG/DL    Bilirubin, total 0.3 0.2 - 1.0 MG/DL    ALT (SGPT) 38 12 - 78 U/L    AST (SGOT) 29 15 - 37 U/L    Alk.  phosphatase 57 45 - 117 U/L    Protein, total 7.3 6.4 - 8.2 g/dL    Albumin 3.3 (L) 3.5 - 5.0 g/dL    Globulin 4.0 2.0 - 4.0 g/dL    A-G Ratio 0.8 (L) 1.1 - 2.2     LIPASE    Collection Time: 09/13/21  7:57 PM   Result Value Ref Range    Lipase 79 73 - 393 U/L   URINALYSIS W/MICROSCOPIC    Collection Time: 09/13/21  7:57 PM   Result Value Ref Range    Color YELLOW/STRAW      Appearance CLOUDY (A) CLEAR      Specific gravity 1.009 1.003 - 1.030      pH (UA) 6.0 5.0 - 8.0      Protein Negative NEG mg/dL    Glucose Negative NEG mg/dL    Ketone Negative NEG mg/dL    Bilirubin Negative NEG      Blood Negative NEG      Urobilinogen 0.2 0.2 - 1.0 EU/dL    Nitrites Positive (A) NEG      Leukocyte Esterase TRACE (A) NEG      WBC 5-10 0 - 4 /hpf    RBC 0-5 0 - 5 /hpf    Epithelial cells MODERATE (A) FEW /lpf    Bacteria 4+ (A) NEG /hpf   URINE CULTURE HOLD SAMPLE    Collection Time: 09/13/21  7:57 PM    Specimen: Serum; Urine   Result Value Ref Range    Urine culture hold        Urine on hold in Microbiology dept for 2 days. If unpreserved urine is submitted, it cannot be used for addtional testing after 24 hours, recollection will be required. SAMPLES BEING HELD    Collection Time: 09/13/21  7:57 PM   Result Value Ref Range    SAMPLES BEING HELD RED GOLD     COMMENT        Add-on orders for these samples will be processed based on acceptable specimen integrity and analyte stability, which may vary by analyte.    HCG URINE, QL. - POC    Collection Time: 09/13/21  8:04 PM   Result Value Ref Range    Pregnancy test,urine (POC) Negative NEG     POC LACTIC ACID    Collection Time: 09/13/21  8:04 PM   Result Value Ref Range    Lactic Acid (POC) 1.32 0.40 - 2.00 mmol/L   CULTURE, BLOOD    Collection Time: 09/14/21  2:11 AM    Specimen: Blood   Result Value Ref Range    Special Requests: NO SPECIAL REQUESTS      Culture result: NO GROWTH AFTER 4 HOURS     METABOLIC PANEL, BASIC    Collection Time: 09/14/21  2:11 AM   Result Value Ref Range    Sodium 139 136 - 145 mmol/L    Potassium 3.2 (L) 3.5 - 5.1 mmol/L    Chloride 105 97 - 108 mmol/L    CO2 29 21 - 32 mmol/L    Anion gap 5 5 - 15 mmol/L    Glucose 154 (H) 65 - 100 mg/dL    BUN 12 6 - 20 MG/DL    Creatinine 0.79 0.55 - 1.02 MG/DL    BUN/Creatinine ratio 15 12 - 20      GFR est AA >60 >60 ml/min/1.73m2    GFR est non-AA >60 >60 ml/min/1.73m2    Calcium 8.5 8.5 - 10.1 MG/DL   CBC WITH AUTOMATED DIFF    Collection Time: 09/14/21  2:11 AM   Result Value Ref Range    WBC 8.0 3.6 - 11.0 K/uL    RBC 4.16 3.80 - 5.20 M/uL    HGB 12.8 11.5 - 16.0 g/dL    HCT 39.7 35.0 - 47.0 %    MCV 95.4 80.0 - 99.0 FL    MCH 30.8 26.0 - 34.0 PG    MCHC 32.2 30.0 - 36.5 g/dL    RDW 13.8 11.5 - 14.5 %    PLATELET 769 832 - 001 K/uL    MPV 10.9 8.9 - 12.9 FL    NRBC 0.0 0  WBC    ABSOLUTE NRBC 0.00 0.00 - 0.01 K/uL    NEUTROPHILS 59 32 - 75 %    LYMPHOCYTES 33 12 - 49 %    MONOCYTES 5 5 - 13 %    EOSINOPHILS 2 0 - 7 %    BASOPHILS 0 0 - 1 %    IMMATURE GRANULOCYTES 1 (H) 0.0 - 0.5 %    ABS. NEUTROPHILS 4.9 1.8 - 8.0 K/UL    ABS. LYMPHOCYTES 2.6 0.8 - 3.5 K/UL    ABS. MONOCYTES 0.4 0.0 - 1.0 K/UL    ABS. EOSINOPHILS 0.1 0.0 - 0.4 K/UL    ABS. BASOPHILS 0.0 0.0 - 0.1 K/UL    ABS. IMM.  GRANS. 0.0 0.00 - 0.04 K/UL    DF AUTOMATED          Microbiology:      Studies:      Signed By: Carlos Manuel Carpio DO     September 14, 2021

## 2021-09-14 NOTE — ROUTINE PROCESS
TRANSFER - OUT REPORT:    Verbal report given to 85 Kevin Rosario RN(name) on Zahida White  being transferred to 4th floor (unit) for routine progression of care       Report consisted of patients Situation, Background, Assessment and   Recommendations(SBAR). Information from the following report(s) SBAR, ED Summary, [facility] and Recent Results was reviewed with the receiving nurse. Lines:   Peripheral IV 09/14/21 Right Forearm (Active)   Site Assessment Clean, dry, & intact 09/14/21 0202   Phlebitis Assessment 0 09/14/21 0202   Infiltration Assessment 0 09/14/21 0202   Dressing Status Clean, dry, & intact 09/14/21 0202   Hub Color/Line Status Pink 09/14/21 0202        Opportunity for questions and clarification was provided.       Patient transported with:   [email or web address]

## 2021-09-14 NOTE — PROGRESS NOTES
[address] [place] [address]   Office [phone]  Fax [phone]          Assessment and Plan     Tianna Saba is a 54 y.o. female with a PMH of HTN, fibromyalgia, IBS, anxiety, and tobacco use admitted for complicated UTI. She has spent 2 night(s) in the hospital.    24 Hour Events: No acute events. Complicated UTI: (Improving) with 1/4 SIRS POA (). LA 1.32. UA notable for nitrites and bacteria. CT abd NAP. Patient has a hx of multiple antibiotic allergies including keflex, penicillins, levaquin, and bactrim. Past urine culture reviewed and is resistant to cipro,levaquin, and bactrim. Patient has also failed treatment with macrobid and fosfomycin  - IV Meropenem  - Ucx pending   - Bcx - no growth at 1 day  - ID consulted; unlikely pyelonephritis, suspect L flank pain to be MSK. Continue abx pending U cx. L flank pain: likely MSK in origin or related to patient's fibromyalgia. Pyelonephritis unlikely in the absence of systemic sx; no fever, no leukocytosis. CT scan neg. For pyelonephritis   - Lidocaine patch  - Flexeril 10mg TID PRN  - Norco q4h PRN     Loose Stools (Resolved) patient reports 3-4 episodes daily of loose stool with a strong odor w/ hx of recent abx use. No diarrhea since admission   - Probiotics daily     Hypertension: (Stable) On admission BP was 133/82    -Continue home Losartan 50mg and HCTZ 25mg  - Will continue to monitor at this time and readjust as BP's trend. Asthma stable  - Brovana/Pulmicort for Home Advair  - Duonebs prn    Tobacco Use patent smoked 0.5 packs daily for 20 yeas  - Nicotine patch  - Encourage Cessation    Fibromyalgia patient 'd and appropriate   -Continue gabapentin 600mg BID    Anxiety (Stable)  - Home Xanax 0.5mg daily prn    Obesity BMI Body mass index is 43.4 kg/m². - Encouraging lifestyle modifications and further follow up outpatient. FEN/GI - Cardiac diet.    Activity - Ambulate as tolerated  DVT prophylaxis - Lovenox  GI prophylaxis - Not indicated at this time  Fall prophylaxis - Not indicated at this time. Disposition - Admit to Remote Telemetry. Plan to d/c to Home. Code Status - Full, discussed with patient / caregivers. Next Adina Bardales Name and Contact Daughter, Digna Zaragoza [phone]      I appreciate the opportunity to participate in the care of this patient,  Maureen Petersen MD  Family Medicine Resident         Subjective / Objective     Subjective: Patient reports continued flank pain, now 6 out of 10 in intensity and improved dysuria. Overall is feeling better, and denies any new complaints. Review of Systems   Constitutional: Negative for chills, fever and weight loss. HENT: Negative for congestion. Respiratory: Negative for shortness of breath. Cardiovascular: Negative for chest pain. Gastrointestinal: Negative for blood in stool. Genitourinary: Positive for dysuria. Musculoskeletal: Positive for myalgias. Temp (24hrs), Av.9 °F (36.6 °C), Min:97.4 °F (36.3 °C), Max:98.5 °F (36.9 °C)     Physical Exam  Constitutional:       General: She is not in acute distress. Appearance: She is not ill-appearing. HENT:      Head: Normocephalic and atraumatic. Cardiovascular:      Rate and Rhythm: Normal rate and regular rhythm. Pulmonary:      Effort: No respiratory distress. Abdominal:      Palpations: Abdomen is soft. Musculoskeletal:      Right lower leg: No edema. Left lower leg: No edema. Comments: Left flank tenderness to light palpation    Skin:     Capillary Refill: Capillary refill takes less than 2 seconds. Neurological:      General: No focal deficit present. Mental Status: She is oriented to person, place, and time.    Psychiatric:         Mood and Affect: Mood normal.        Respiratory:   O2 Device: None (Room air)     I/O:  Date 21 - 09/15/21 0659 09/15/21 0700 - 21 0659   Shift 5310-9504 4161-8602 24 Hour Total 2950-0736 4749-7646 24 Hour Total   INTAKE   P.O. 360 60 420        P. O. 360 60 420      I. V.(mL/kg/hr)  40(0) 40(0)        Volume (meropenem (MERREM) 500 mg in sterile water (preservative free) 10 mL IV syringe)  40 40      Shift Total(mL/kg) 360(3.2) 100(0.9) 460(4.1)      OUTPUT   Urine(mL/kg/hr)           Urine Occurrence(s)  1 x 1 x      Shift Total(mL/kg)          100 460      Weight (kg) 111.1 111.1 111.1 111.1 111.1 111.1        Intake/Output Summary (Last 24 hours) at 9/15/2021 0738  Last data filed at 9/15/2021 0400  Gross per 24 hour   Intake 460 ml   Output    Net 460 ml         Inpatient Medications  Current Facility-Administered Medications   Medication Dose Route Frequency    cyclobenzaprine (FLEXERIL) tablet 10 mg  10 mg Oral TID PRN    L.acidophilus-paracasei-S.thermophil-bifidobacter (RISAQUAD) 8 billion cell capsule  1 Capsule Oral DAILY    diphenhydrAMINE (BENADRYL) capsule 25 mg  25 mg Oral Q6H PRN    gabapentin (NEURONTIN) capsule 600 mg  600 mg Oral TID    polyethylene glycol (MIRALAX) packet 17 g  17 g Oral ONCE PRN    HYDROcodone-acetaminophen (NORCO) 5-325 mg per tablet 1 Tablet  1 Tablet Oral Q4H PRN    Or    HYDROcodone-acetaminophen (NORCO) 5-325 mg per tablet 2 Tablet  2 Tablet Oral Q4H PRN    lidocaine 4 % patch 1 Patch  1 Patch TransDERmal Q24H    sodium chloride (NS) flush 5-40 mL  5-40 mL IntraVENous Q8H    sodium chloride (NS) flush 5-40 mL  5-40 mL IntraVENous PRN    enoxaparin (LOVENOX) injection 40 mg  40 mg SubCUTAneous Q12H    hydroCHLOROthiazide (HYDRODIURIL) tablet 25 mg  25 mg Oral DAILY    losartan (COZAAR) tablet 50 mg  50 mg Oral DAILY    meropenem (MERREM) 500 mg in sterile water (preservative free) 10 mL IV syringe  500 mg IntraVENous Q6H    albuterol-ipratropium (DUO-NEB) 2.5 MG-0.5 MG/3 ML  3 mL Nebulization Q4H PRN    budesonide (PULMICORT) 500 mcg/2 ml nebulizer suspension  500 mcg Nebulization BID RT    arformoteroL (BROVANA) neb solution 15 mcg  15 mcg Nebulization BID RT    nicotine (NICODERM CQ) 14 mg/24 hr patch 1 Patch  1 Patch TransDERmal DAILY    ALPRAZolam (XANAX) tablet 0.5 mg  0.5 mg Oral DAILY PRN         Allergies  Allergies   Allergen Reactions    Hydromorphone Itching and Swelling     hydromorphone 1mg IV given immediately began itching and complained of scratchy swelling sensation in her throat    Levofloxacin Hives    Aspirin Anaphylaxis    Bactrim [Sulfamethoprim Ds] Hives    Darvocet A500 [Propoxyphene N-Acetaminophen] Hives    Imitrex [Sumatriptan] Hives    Pcn [Penicillins] Anaphylaxis    Percocet [Oxycodone-Acetaminophen] Hives     Pt can take lortab/norco    Toradol [Ketorolac Tromethamine] Hives    Tramadol Hives    Hydrocodone Hives    Keflex [Cephalexin] Hives    Lisinopril Swelling     Tongue and mouth felt weird, slight swelling     Prednisone Rash and Angioedema     And pt felt throat closing    Savella [Milnacipran] Hives     INSOMNIA    Topamax [Topiramate] Other (comments)    Vesicare [Solifenacin] Other (comments)     Nose bleeds    Wellbutrin [Bupropion Hcl] Other (comments)     insomnia         CBC:  Recent Labs     09/15/21  0617 09/14/21 0211 09/13/21 1957   WBC 6.1 8.0 7.8   HGB 11.5 12.8 13.0   HCT 36.5 39.7 40.2    267 820       Metabolic Panel:  Recent Labs     09/15/21  0617 09/14/21 0211 09/13/21 1957    139 139   K 3.6 3.2* 3.5    105 105   CO2 27 29 28   BUN 10 12 10   CREA 0.64 0.79 0.80   * 154* 83   CA 8.9 8.5 8.8   ALB  --   --  3.3*   ALT  --   --  38              For Billing    Chief Complaint   Patient presents with   [employer] Flank Pain       Hospital Problems  Date Reviewed: 8/30/2021        Codes Class Noted POA    Tobacco use ICD-10-CM: Z72.0  ICD-9-CM: 305.1  9/14/2021 Unknown        * (Principal) Acute pyelonephritis ICD-10-CM: N10  ICD-9-CM: 590.10  9/13/2021 Unknown        Essential hypertension, benign ICD-10-CM: I10  ICD-9-CM: 401.1  8/25/2014 Yes Hyperlipidemia ICD-10-CM: E78.5  ICD-9-CM: 272.4  11/10/2011 Yes        Fibromyalgia ICD-10-CM: M79.7  ICD-9-CM: 729.1  10/13/2011 Yes        Depression ICD-10-CM: F32.9  ICD-9-CM: 801  8/18/2011 Yes

## 2021-09-14 NOTE — H&P
[address] [facility] [address] [place] [address]   Office [phone]  Fax [phone]       Admission H&P     Name: Ger Linda MRN: [id number]  Sex: Female   YOB: 1970  Age: 54 y.o. PCP: J Carlos Pat MD     Source of Information: patient, medical records    Chief complaint: Back pain, dysuria    History of Present Illness  Ger Linda is a 54 y.o. female with known GERD, tobacco abuse, HTN, fibromyalgia, chronic pain and asthma who presents to the ER complaining of L flank pain and dysura. Her symptoms began 4 weeks ago after taking an antibiotic for bronchitis. She was treated macrobid twice and then was recently put on Fosfomycin 1 week ago for persistent symptoms. 2 days after starting the most recent antibiotic she deveoped L sided debilitating flank pain prompting her to come into the ED. She denies any fever and notes her highest temp was 99.6F. She also notes loose stool for the last 3 days. She has 4 stool daily and states the stool has a very strong odor. She denies any blood in her stool, nausea, vomiting, or abdominal pain. COVID questions: : Is not vaccinated against COVID.     In the ER:    -vitals were remarkable for   -labs were remarkable for UA 4+ bacteria, 5-10 WBC  -Imaging that was done includes CT abd, NAP  -Treatment was norco 5-325mg    EKG: not done    Past Medical History:   Diagnosis Date    Arthritis     Arthritis of low back     Asthma     Chronic pain     fibromyalgia    Colon spasm     Depression     Fibromyalgia     Gastrointestinal disorder     IBS    GERD (gastroesophageal reflux disease)     High cholesterol     Hypertension     IBS (irritable bowel syndrome)     Left axillary pain 2/22/2013    Migraine     LAST HEADACHE 2-16-12    Multiple sclerosis ([facility] [address]     Other ill-defined conditions(799.89)     fibromylgia    Other ill-defined conditions(799.89)     MS      Patient Vitals for the past 12 hrs:   Temp Pulse Resp BP SpO2 09/13/21 1828 97.3 °F (36.3 °C) (!) 102 19 133/82 98 %       Home Medications   Prior to Admission medications    Medication Sig Start Date End Date Taking? Authorizing Provider   fluticasone propion-salmeteroL (ADVAIR/WIXELA) 250-50 mcg/dose diskus inhaler Take 1 Puff by inhalation every twelve (12) hours. 9/1/21   Hua Lin MD   albuterol (PROVENTIL VENTOLIN) 2.5 mg /3 mL (0.083 %) nebu USE 1 VIAL VIA NEBULIZER  EVERY 4 HOURS AS NEEDED FOR WHEEZING   RECOMMENDS NOT EXCEEDING 4  VIALS/DAY 9/1/21   Geraldo Ocasio MD   albuterol (PROVENTIL HFA, VENTOLIN HFA, PROAIR HFA) 90 mcg/actuation inhaler USE 2 INHALATIONS BY MOUTH  EVERY 4 HOURS AS NEEDED FOR WHEEZING 9/1/21   Geraldo Ocasio MD   fluconazole (DIFLUCAN) 150 mg tablet TAKE 1 TABLET BY MOUTH  DAILY FOR 1 DAY 9/1/21   Geraldo Ocasio MD   cyclobenzaprine (FLEXERIL) 10 mg tablet TAKE 1 TABLET BY MOUTH 3  TIMES DAILY AS NEEDED FOR  MUSCLE SPASM(S) FOR UP TO  10 DAYS 9/1/21   Geraldo Ocasio MD   ascorbic acid, vitamin C, (Vitamin C) 500 mg tablet Take  by mouth. Provider, Historical   cholecalciferol (Vitamin D3) (1000 Units /25 mcg) tablet Take  by mouth daily.     Provider, Historical   gabapentin (NEURONTIN) 600 mg tablet TAKE 1 TABLET BY MOUTH  TWICE DAILY 8/16/21   Geraldo Ocasio MD   sertraline (ZOLOFT) 50 mg tablet TAKE 1 TABLET BY MOUTH  DAILY 4/28/21   Geraldo Ocasio MD   hydroCHLOROthiazide (HYDRODIURIL) 25 mg tablet TAKE 1 TABLET BY MOUTH  DAILY 4/8/21   Geraldo Ocasio MD   ALPRAZolam Aldon Nones) 0.5 mg tablet TAKE 1 TABLET BY MOUTH  DAILY AS NEEDED FOR ANXIOUS 3/8/21   Geraldo Ocasio, MD   losartan (COZAAR) 50 mg tablet TAKE 1 TABLET BY MOUTH  DAILY 2/3/21   Geraldo Ocasio MD       Allergies  Allergies   Allergen Reactions    Hydromorphone Itching and Swelling     hydromorphone 1mg IV given immediately began itching and complained of scratchy swelling sensation in her throat    Levofloxacin Hives    Aspirin Anaphylaxis    Bactrim [Sulfamethoprim Ds] Hives    Darvocet A500 [Propoxyphene N-Acetaminophen] Hives    Imitrex [Sumatriptan] Hives    Pcn [Penicillins] Anaphylaxis    Percocet [Oxycodone-Acetaminophen] Hives     Pt can take lortab/norco    Toradol [Ketorolac Tromethamine] Hives    Tramadol Hives    Hydrocodone Hives    Keflex [Cephalexin] Hives    Lisinopril Swelling     Tongue and mouth felt weird, slight swelling     Prednisone Rash and Angioedema     And pt felt throat closing    Savella [Milnacipran] Hives     INSOMNIA    Topamax [Topiramate] Other (comments)    Vesicare [Solifenacin] Other (comments)     Nose bleeds    Wellbutrin [Bupropion Hcl] Other (comments)     insomnia       Past Medical History:   Diagnosis Date    Arthritis     Arthritis of low back     Asthma     Chronic pain     fibromyalgia    Colon spasm     Depression     Fibromyalgia     Gastrointestinal disorder     IBS    GERD (gastroesophageal reflux disease)     High cholesterol     Hypertension     IBS (irritable bowel syndrome)     Left axillary pain 2/22/2013    Migraine     LAST HEADACHE 2-16-12    Multiple sclerosis (HCC)     Other ill-defined conditions(799.89)     fibromylgia    Other ill-defined conditions(799.89)     MS       Past Surgical History:   Procedure Laterality Date    HX APPENDECTOMY      HX BLADDER REPAIR  04/2011     HX CHOLECYSTECTOMY      HX GYN      For ectopic pregnancy, hx tubal ligation    HX GYN      vaginal ablation.     HX UROLOGICAL      bladder tuck, no mesh    HX UROLOGICAL      sling removed 1/10/11       Family History   Adopted: Yes   Problem Relation Age of Onset    Other Other         family hx is completely unknown    Diabetes Brother     Asthma Daughter     Asthma Son        Social History  Social History     Socioeconomic History    Marital status:      Spouse name: Not on file    Number of children: Not on file    Years of education: Not on file   Nikki Parry education level: Not on file   Occupational History    Not on file   Tobacco Use    Smoking status: Current Some Day Smoker     Packs/day: 0.50     Years: 20.00     Pack years: 10.00     Types: Cigarettes    Smokeless tobacco: Never Used   Substance and Sexual Activity    Alcohol use: No     Alcohol/week: 0.0 standard drinks    Drug use: No    Sexual activity: Never     Partners: Male     Birth control/protection: None   Other Topics Concern    Not on file   Social History Narrative    Not on file     Social Determinants of Health     Financial Resource Strain:     Difficulty of Paying Living Expenses:    Food Insecurity:     Worried About Running Out of Food in the Last Year:     Ran Out of Food in the Last Year:    Transportation Needs:     Lack of Transportation (Medical):  Lack of Transportation (Non-Medical):    Physical Activity:     Days of Exercise per Week:     Minutes of Exercise per Session:    Stress:     Feeling of Stress :    Social Connections:     Frequency of Communication with Friends and Family:     Frequency of Social Gatherings with Friends and Family:     Attends Religion Services:     Active Member of Clubs or Organizations:     Attends Club or Organization Meetings:     Marital Status:    Intimate Partner Violence:     Fear of Current or Ex-Partner:     Emotionally Abused:     Physically Abused:     Sexually Abused:        Alcohol history: Social  Smoking history: Smokes 1/2 ppd x 20 years  Illicit drug history: Not at all    Living arrangement: patient lives with their spouse. Ambulates: Independently     Review of Systems   Constitutional: Negative for chills and fever. HENT: Negative for congestion. Eyes: Negative for blurred vision and double vision. Respiratory: Negative for cough and shortness of breath. Cardiovascular: Negative for chest pain. Gastrointestinal: Positive for diarrhea. Negative for abdominal pain, nausea and vomiting. Genitourinary: Positive for dysuria, flank pain, frequency and urgency. Negative for hematuria. Musculoskeletal: Positive for back pain. Skin: Negative for itching and rash. Neurological: Negative for dizziness, tingling and headaches. Psychiatric/Behavioral: Negative for depression, substance abuse and suicidal ideas. Physical Exam      O2 Device: None (Room air)     Physical Exam  Constitutional:       General: She is not in acute distress. Appearance: She is obese. She is not ill-appearing. HENT:      Head: Normocephalic and atraumatic. Cardiovascular:      Rate and Rhythm: Normal rate and regular rhythm. Heart sounds: No murmur heard. Pulmonary:      Effort: Pulmonary effort is normal. No respiratory distress. Breath sounds: Normal breath sounds. Abdominal:      General: There is no distension. Palpations: Abdomen is soft. Tenderness: There is abdominal tenderness. There is left CVA tenderness. There is no right CVA tenderness, guarding or rebound. Comments: Mild suprapubic tendernes   Musculoskeletal:      Right lower leg: No edema. Left lower leg: No edema. Skin:     General: Skin is warm. Capillary Refill: Capillary refill takes 2 to 3 seconds. Coloration: Skin is not jaundiced or pale. Neurological:      General: No focal deficit present. Mental Status: She is oriented to person, place, and time.           Laboratory Data  Recent Results (from the past 8 hour(s))   CBC WITH AUTOMATED DIFF    Collection Time: 09/13/21  7:57 PM   Result Value Ref Range    WBC 7.8 3.6 - 11.0 K/uL    RBC 4.26 3.80 - 5.20 M/uL    HGB 13.0 11.5 - 16.0 g/dL    HCT 40.2 35.0 - 47.0 %    MCV 94.4 80.0 - 99.0 FL    MCH 30.5 26.0 - 34.0 PG    MCHC 32.3 30.0 - 36.5 g/dL    RDW 14.0 11.5 - 14.5 %    PLATELET 245 290 - 434 K/uL    MPV 10.8 8.9 - 12.9 FL    NRBC 0.0 0  WBC    ABSOLUTE NRBC 0.00 0.00 - 0.01 K/uL    NEUTROPHILS 54 32 - 75 %    LYMPHOCYTES 38 12 - 49 %    MONOCYTES 6 5 - 13 %    EOSINOPHILS 2 0 - 7 %    BASOPHILS 0 0 - 1 %    IMMATURE GRANULOCYTES 0 0.0 - 0.5 %    ABS. NEUTROPHILS 4.2 1.8 - 8.0 K/UL    ABS. LYMPHOCYTES 3.0 0.8 - 3.5 K/UL    ABS. MONOCYTES 0.5 0.0 - 1.0 K/UL    ABS. EOSINOPHILS 0.2 0.0 - 0.4 K/UL    ABS. BASOPHILS 0.0 0.0 - 0.1 K/UL    ABS. IMM. GRANS. 0.0 0.00 - 0.04 K/UL    DF AUTOMATED     METABOLIC PANEL, COMPREHENSIVE    Collection Time: 09/13/21  7:57 PM   Result Value Ref Range    Sodium 139 136 - 145 mmol/L    Potassium 3.5 3.5 - 5.1 mmol/L    Chloride 105 97 - 108 mmol/L    CO2 28 21 - 32 mmol/L    Anion gap 6 5 - 15 mmol/L    Glucose 83 65 - 100 mg/dL    BUN 10 6 - 20 MG/DL    Creatinine 0.80 0.55 - 1.02 MG/DL    BUN/Creatinine ratio 13 12 - 20      GFR est AA >60 >60 ml/min/1.73m2    GFR est non-AA >60 >60 ml/min/1.73m2    Calcium 8.8 8.5 - 10.1 MG/DL    Bilirubin, total 0.3 0.2 - 1.0 MG/DL    ALT (SGPT) 38 12 - 78 U/L    AST (SGOT) 29 15 - 37 U/L    Alk.  phosphatase 57 45 - 117 U/L    Protein, total 7.3 6.4 - 8.2 g/dL    Albumin 3.3 (L) 3.5 - 5.0 g/dL    Globulin 4.0 2.0 - 4.0 g/dL    A-G Ratio 0.8 (L) 1.1 - 2.2     LIPASE    Collection Time: 09/13/21  7:57 PM   Result Value Ref Range    Lipase 79 73 - 393 U/L   URINALYSIS W/MICROSCOPIC    Collection Time: 09/13/21  7:57 PM   Result Value Ref Range    Color YELLOW/STRAW      Appearance CLOUDY (A) CLEAR      Specific gravity 1.009 1.003 - 1.030      pH (UA) 6.0 5.0 - 8.0      Protein Negative NEG mg/dL    Glucose Negative NEG mg/dL    Ketone Negative NEG mg/dL    Bilirubin Negative NEG      Blood Negative NEG      Urobilinogen 0.2 0.2 - 1.0 EU/dL    Nitrites Positive (A) NEG      Leukocyte Esterase TRACE (A) NEG      WBC 5-10 0 - 4 /hpf    RBC 0-5 0 - 5 /hpf    Epithelial cells MODERATE (A) FEW /lpf    Bacteria 4+ (A) NEG /hpf   URINE CULTURE HOLD SAMPLE    Collection Time: 09/13/21  7:57 PM    Specimen: Serum; Urine   Result Value Ref Range    Urine culture hold Urine on hold in Microbiology dept for 2 days. If unpreserved urine is submitted, it cannot be used for addtional testing after 24 hours, recollection will be required. SAMPLES BEING HELD    Collection Time: 09/13/21  7:57 PM   Result Value Ref Range    SAMPLES BEING HELD RED GOLD     COMMENT        Add-on orders for these samples will be processed based on acceptable specimen integrity and analyte stability, which may vary by analyte. HCG URINE, QL. - POC    Collection Time: 09/13/21  8:04 PM   Result Value Ref Range    Pregnancy test,urine (POC) Negative NEG     POC LACTIC ACID    Collection Time: 09/13/21  8:04 PM   Result Value Ref Range    Lactic Acid (POC) 1.32 0.40 - 2.00 mmol/L       Imaging  CXR Results  (Last 48 hours)    None        CT Results  (Last 48 hours)               09/13/21 1921  CT ABD PELV WO CONT Final result    Impression:  No nephrolithiasis or hydronephrosis. No evidence of acute process. Narrative:  EXAM: CT ABD PELV WO CONT       INDICATION: recent UTI treatment, L flank pain       COMPARISON: 9/16/2019       CONTRAST:  None. TECHNIQUE:    Thin axial images were obtained through the abdomen and pelvis. Coronal and   sagittal reformats were generated. Oral contrast was not administered. CT dose   reduction was achieved through use of a standardized protocol tailored for this   examination and automatic exposure control for dose modulation. The absence of intravenous contrast material reduces the sensitivity for   evaluation of the vasculature and solid organs. FINDINGS:    LOWER THORAX: No significant abnormality in the incidentally imaged lower chest.   LIVER: No mass. The liver is hypodense related to hepatic steatosis. BILIARY TREE: Status post cholecystectomy. CBD is not dilated. SPLEEN: within normal limits. PANCREAS: No focal abnormality. ADRENALS: Unremarkable. KIDNEYS/URETERS: No calculus or hydronephrosis. STOMACH: Unremarkable.    SMALL BOWEL: No dilatation or wall thickening. COLON: No dilatation or wall thickening. APPENDIX: Surgically absent   PERITONEUM: No ascites or pneumoperitoneum. RETROPERITONEUM: No lymphadenopathy or aortic aneurysm. REPRODUCTIVE ORGANS: Unremarkable   URINARY BLADDER: No mass or calculus. BONES: No destructive bone lesion. ABDOMINAL WALL: No mass or hernia. ADDITIONAL COMMENTS: N/A                     Assessment and Plan     Ignacia Ardon is a 54 y.o. female with a PMH of HTN, fibromyalgia, IBS, anxiety, asthma, and tobacco use who is admitted for acute pyelonephritis. Acute Pyelonephritis with 1/4 SIRS POA (). LA 1.32. UA notable for nitrites and bacteria. CT abd NAP. Patient has a hx of multiple antibiotic allergies including keflex, penicillins, levaquin, and bactrim. Past urine culture reviewed and is resistant to cipro,levaquin, and bactrim. Patient has also failed treatment with macrobid and fosfomycin  - Admit to remote telemetry  - Will treat with IV Meropenem  - Ucx, Bcx  - Consider ID c/s for guidance ongoing therapy    Loose Stools patient reports 3-4 episodes daily of loose stool with a strong odor for the last 3 day. Has a hx of recent abx use  - Will obtain stool studies  - Enteric precautions    Hypertension: On admission BP was 133/82    -Continue home Losartan 50mg and HCTZ 25mg  - Will continue to monitor at this time and readjust as BP's trend. Asthma stable  - Brovana/Pulmicort for Home Advair  - Duonebs prn    Tobacco Use patent smoked 0.5 packs daily for 20 yeas  -Nicotine patch  - Encourage Cessation    Fibromyalgia patient 'd and appropriate   -Continue gabapentin 600mg BID    Anxiety  - Xanax 0.5mg daily prn  - Consider adding an SSRI for treatment    Obesity BMI Body mass index is 43.4 kg/m². - Encouraging lifestyle modifications and further follow up outpatient. FEN/GI - Cardiac diet.    Activity - Ambulate as tolerated  DVT prophylaxis - Lovenox  GI prophylaxis - Not indicated at this time  Fall prophylaxis - Not indicated at this time. Disposition - Admit to Remote Telemetry. Plan to d/c to Home. Code Status - Full, discussed with patient / caregivers.   Next of Kin Name and Contact Daughter, Taylor Cardenas [phone]      10:17 PM, 09/13/21  Tiki Tang DO  Family Medicine Resident       For Billing    Chief Complaint   Patient presents with   [facility] Problems  Date Reviewed: 8/30/2021        Codes Class Noted POA    Acute pyelonephritis ICD-10-CM: N10  ICD-9-CM: 590.10  9/13/2021 Unknown

## 2021-09-14 NOTE — PROGRESS NOTES
Met with patient during 6057 nursing rounds, upon patient's arrival to 406 from ED. HOB up, bed low position, bed brakes on. HOB siderails up x2. Name bracelet seen on patient's wrist. Allergies: several, reviewed with patient; affirmed by patient. Patient denied having dizziness, nausea, or trouble breathing. C/o abd pain 9/10. Stated last BM 9/13/2021, but that patient has no trouble urinating. Patient notified of need for stool specimen; stated understanding.

## 2021-09-14 NOTE — PROGRESS NOTES
[address] [facility] [address] [address]   Office [phone]  Fax [phone]          Assessment and Plan     Yordy Eagle is a 54 y.o. female with a PMH of HTN, fibromyalgia, IBS, anxiety, and tobacco use admitted for Acute pyelonephritis. She has spent 0 night(s) in the hospital.    24 Hour Events: No acute events. Acute Pyelonephritis with 1/4 SIRS POA (). LA 1.32. UA notable for nitrites and bacteria. CT abd NAP. Patient has a hx of multiple antibiotic allergies including keflex, penicillins, levaquin, and bactrim. Past urine culture reviewed and is resistant to cipro,levaquin, and bactrim. Patient has also failed treatment with macrobid and fosfomycin  - IV Meropenem  - Ucx, Bcx  - Consider ID c/s for guidance on ongoing therapy    Loose Stools patient reports 3-4 episodes daily of loose stool with a strong odor for the last 3 day. Has a hx of recent abx use  - Stool studies  - Enteric precautions    Hypertension: On admission BP was 133/82    -Continue home Losartan 50mg and HCTZ 25mg  - Will continue to monitor at this time and readjust as BP's trend. Asthma stable  - Brovana/Pulmicort for Home Advair  - Duonebs prn    Tobacco Use patent smoked 0.5 packs daily for 20 yeas  -Nicotine patch  - Encourage Cessation    Fibromyalgia patient 'd and appropriate   -Continue gabapentin 600mg BID    Anxiety  - Xanax 0.5mg daily prn  - Consider adding an SSRI for treatment    Obesity BMI Body mass index is 43.4 kg/m². - Encouraging lifestyle modifications and further follow up outpatient. FEN/GI - Cardiac diet. Activity - Ambulate as tolerated  DVT prophylaxis - Lovenox  GI prophylaxis - Not indicated at this time  Fall prophylaxis - Not indicated at this time. Disposition - Admit to Remote Telemetry. Plan to d/c to Home. Code Status - Full, discussed with patient / caregivers.   Next of Kin Name and Contact Daughter, Baron Marr [phone]      I appreciate the opportunity to participate in the care of this patient,  Ketty Prince DO  Family Medicine Resident         Subjective / Objective     Subjective: Patient reports continued flank pain and dysuria. Overall is feeling the same from initial presentation    Temp (24hrs), Av.3 °F (36.3 °C), Min:97.3 °F (36.3 °C), Max:97.3 °F (36.3 °C)     Physical Exam  Constitutional:       General: She is not in acute distress. Appearance: She is not ill-appearing. HENT:      Head: Normocephalic and atraumatic. Cardiovascular:      Rate and Rhythm: Normal rate and regular rhythm. Pulmonary:      Effort: No respiratory distress. Abdominal:      Palpations: Abdomen is soft. Tenderness: There is abdominal tenderness. There is left CVA tenderness. Musculoskeletal:      Right lower leg: No edema. Left lower leg: No edema. Skin:     Capillary Refill: Capillary refill takes less than 2 seconds. Neurological:      General: No focal deficit present. Mental Status: She is oriented to person, place, and time.    Psychiatric:         Mood and Affect: Mood normal.        Respiratory:   O2 Device: None (Room air)     I/O:     No intake or output data in the 24 hours ending 21 0527      Inpatient Medications  Current Facility-Administered Medications   Medication Dose Route Frequency    meropenem (MERREM) 500 mg in sterile water (preservative free) 10 mL IV syringe  500 mg IntraVENous NOW    sodium chloride (NS) flush 5-40 mL  5-40 mL IntraVENous Q8H    sodium chloride (NS) flush 5-40 mL  5-40 mL IntraVENous PRN    polyethylene glycol (MIRALAX) packet 17 g  17 g Oral DAILY PRN    enoxaparin (LOVENOX) injection 40 mg  40 mg SubCUTAneous Q12H    [START ON 2021] hydroCHLOROthiazide (HYDRODIURIL) tablet 25 mg  25 mg Oral DAILY    [START ON 2021] losartan (COZAAR) tablet 50 mg  50 mg Oral DAILY    [START ON 2021] meropenem (MERREM) 500 mg in sterile water (preservative free) 10 mL IV syringe  500 mg IntraVENous Q6H    albuterol-ipratropium (DUO-NEB) 2.5 MG-0.5 MG/3 ML  3 mL Nebulization Q4H PRN    budesonide (PULMICORT) 500 mcg/2 ml nebulizer suspension  500 mcg Nebulization BID RT    arformoteroL (BROVANA) neb solution 15 mcg  15 mcg Nebulization BID RT    nicotine (NICODERM CQ) 14 mg/24 hr patch 1 Patch  1 Patch TransDERmal DAILY    [START ON 9/14/2021] gabapentin (NEURONTIN) capsule 600 mg  600 mg Oral BID    ALPRAZolam (XANAX) tablet 0.5 mg  0.5 mg Oral DAILY PRN     Current Outpatient Medications   Medication Sig    fluticasone propion-salmeteroL (ADVAIR/WIXELA) 250-50 mcg/dose diskus inhaler Take 1 Puff by inhalation every twelve (12) hours.  albuterol (PROVENTIL VENTOLIN) 2.5 mg /3 mL (0.083 %) nebu USE 1 VIAL VIA NEBULIZER  EVERY 4 HOURS AS NEEDED FOR WHEEZING   RECOMMENDS NOT EXCEEDING 4  VIALS/DAY    albuterol (PROVENTIL HFA, VENTOLIN HFA, PROAIR HFA) 90 mcg/actuation inhaler USE 2 INHALATIONS BY MOUTH  EVERY 4 HOURS AS NEEDED FOR WHEEZING    fluconazole (DIFLUCAN) 150 mg tablet TAKE 1 TABLET BY MOUTH  DAILY FOR 1 DAY    cyclobenzaprine (FLEXERIL) 10 mg tablet TAKE 1 TABLET BY MOUTH 3  TIMES DAILY AS NEEDED FOR  MUSCLE SPASM(S) FOR UP TO  10 DAYS    ascorbic acid, vitamin C, (Vitamin C) 500 mg tablet Take  by mouth.  cholecalciferol (Vitamin D3) (1000 Units /25 mcg) tablet Take  by mouth daily.     gabapentin (NEURONTIN) 600 mg tablet TAKE 1 TABLET BY MOUTH  TWICE DAILY    sertraline (ZOLOFT) 50 mg tablet TAKE 1 TABLET BY MOUTH  DAILY    hydroCHLOROthiazide (HYDRODIURIL) 25 mg tablet TAKE 1 TABLET BY MOUTH  DAILY    ALPRAZolam (XANAX) 0.5 mg tablet TAKE 1 TABLET BY MOUTH  DAILY AS NEEDED FOR ANXIOUS    losartan (COZAAR) 50 mg tablet TAKE 1 TABLET BY MOUTH  DAILY         Allergies  Allergies   Allergen Reactions    Hydromorphone Itching and Swelling     hydromorphone 1mg IV given immediately began itching and complained of scratchy swelling sensation in her throat    Levofloxacin Hives    Aspirin Anaphylaxis    Bactrim [Sulfamethoprim Ds] Hives    Darvocet A500 [Propoxyphene N-Acetaminophen] Hives    Imitrex [Sumatriptan] Hives    Pcn [Penicillins] Anaphylaxis    Percocet [Oxycodone-Acetaminophen] Hives     Pt can take lortab/norco    Toradol [Ketorolac Tromethamine] Hives    Tramadol Hives    Hydrocodone Hives    Keflex [Cephalexin] Hives    Lisinopril Swelling     Tongue and mouth felt weird, slight swelling     Prednisone Rash and Angioedema     And pt felt throat closing    Savella [Milnacipran] Hives     INSOMNIA    Topamax [Topiramate] Other (comments)    Vesicare [Solifenacin] Other (comments)     Nose bleeds    Wellbutrin [Bupropion Hcl] Other (comments)     insomnia         CBC:  Recent Labs     09/13/21 1957   WBC 7.8   HGB 13.0   HCT 40.2          Metabolic Panel:  Recent Labs     09/13/21 1957      K 3.5      CO2 28   BUN 10   CREA 0.80   GLU 83   CA 8.8   ALB 3.3*   ALT 38              For Billing    Chief Complaint   Patient presents with   [employer] Pain       Hospital Problems  Date Reviewed: 8/30/2021        Codes Class Noted POA    Acute pyelonephritis ICD-10-CM: N10  ICD-9-CM: 590.10  9/13/2021 Unknown

## 2021-09-14 NOTE — PROGRESS NOTES
Dr. Rayo Llanos requested pharmacy dose meropenem for UTI. CrCl is 102 ml/min. 500 mg IV q6H ordered, per protocol. Will follow daily.

## 2021-09-15 LAB
ANION GAP SERPL CALC-SCNC: 5 MMOL/L (ref 5–15)
BASOPHILS # BLD: 0 K/UL (ref 0–0.1)
BASOPHILS NFR BLD: 1 % (ref 0–1)
BUN SERPL-MCNC: 10 MG/DL (ref 6–20)
BUN/CREAT SERPL: 16 (ref 12–20)
CALCIUM SERPL-MCNC: 8.9 MG/DL (ref 8.5–10.1)
CHLORIDE SERPL-SCNC: 105 MMOL/L (ref 97–108)
CO2 SERPL-SCNC: 27 MMOL/L (ref 21–32)
CREAT SERPL-MCNC: 0.64 MG/DL (ref 0.55–1.02)
DIFFERENTIAL METHOD BLD: NORMAL
EOSINOPHIL # BLD: 0.2 K/UL (ref 0–0.4)
EOSINOPHIL NFR BLD: 3 % (ref 0–7)
ERYTHROCYTE [DISTWIDTH] IN BLOOD BY AUTOMATED COUNT: 13.7 % (ref 11.5–14.5)
GLUCOSE SERPL-MCNC: 141 MG/DL (ref 65–100)
HCT VFR BLD AUTO: 36.5 % (ref 35–47)
HGB BLD-MCNC: 11.5 G/DL (ref 11.5–16)
IMM GRANULOCYTES # BLD AUTO: 0 K/UL (ref 0–0.04)
IMM GRANULOCYTES NFR BLD AUTO: 0 % (ref 0–0.5)
LYMPHOCYTES # BLD: 2.7 K/UL (ref 0.8–3.5)
LYMPHOCYTES NFR BLD: 44 % (ref 12–49)
MCH RBC QN AUTO: 29.9 PG (ref 26–34)
MCHC RBC AUTO-ENTMCNC: 31.5 G/DL (ref 30–36.5)
MCV RBC AUTO: 95.1 FL (ref 80–99)
MONOCYTES # BLD: 0.4 K/UL (ref 0–1)
MONOCYTES NFR BLD: 6 % (ref 5–13)
NEUTS SEG # BLD: 2.8 K/UL (ref 1.8–8)
NEUTS SEG NFR BLD: 46 % (ref 32–75)
NRBC # BLD: 0 K/UL (ref 0–0.01)
NRBC BLD-RTO: 0 PER 100 WBC
PLATELET # BLD AUTO: 253 K/UL (ref 150–400)
PMV BLD AUTO: 11.2 FL (ref 8.9–12.9)
POTASSIUM SERPL-SCNC: 3.6 MMOL/L (ref 3.5–5.1)
RBC # BLD AUTO: 3.84 M/UL (ref 3.8–5.2)
SODIUM SERPL-SCNC: 137 MMOL/L (ref 136–145)
WBC # BLD AUTO: 6.1 K/UL (ref 3.6–11)

## 2021-09-15 PROCEDURE — 99232 SBSQ HOSP IP/OBS MODERATE 35: CPT | Performed by: FAMILY MEDICINE

## 2021-09-15 PROCEDURE — 36415 COLL VENOUS BLD VENIPUNCTURE: CPT

## 2021-09-15 PROCEDURE — 94640 AIRWAY INHALATION TREATMENT: CPT

## 2021-09-15 PROCEDURE — 74011000250 HC RX REV CODE- 250: Performed by: STUDENT IN AN ORGANIZED HEALTH CARE EDUCATION/TRAINING PROGRAM

## 2021-09-15 PROCEDURE — 74011250637 HC RX REV CODE- 250/637: Performed by: STUDENT IN AN ORGANIZED HEALTH CARE EDUCATION/TRAINING PROGRAM

## 2021-09-15 PROCEDURE — 99232 SBSQ HOSP IP/OBS MODERATE 35: CPT | Performed by: INTERNAL MEDICINE

## 2021-09-15 PROCEDURE — 80048 BASIC METABOLIC PNL TOTAL CA: CPT

## 2021-09-15 PROCEDURE — 85025 COMPLETE CBC W/AUTO DIFF WBC: CPT

## 2021-09-15 PROCEDURE — 97161 PT EVAL LOW COMPLEX 20 MIN: CPT

## 2021-09-15 PROCEDURE — 74011250636 HC RX REV CODE- 250/636: Performed by: STUDENT IN AN ORGANIZED HEALTH CARE EDUCATION/TRAINING PROGRAM

## 2021-09-15 PROCEDURE — 97116 GAIT TRAINING THERAPY: CPT

## 2021-09-15 PROCEDURE — 65270000029 HC RM PRIVATE

## 2021-09-15 RX ORDER — CYCLOBENZAPRINE HCL 10 MG
10 TABLET ORAL
Status: DISCONTINUED | OUTPATIENT
Start: 2021-09-15 | End: 2021-09-17 | Stop reason: HOSPADM

## 2021-09-15 RX ORDER — FAMOTIDINE 20 MG/1
20 TABLET, FILM COATED ORAL DAILY
Status: DISCONTINUED | OUTPATIENT
Start: 2021-09-16 | End: 2021-09-17 | Stop reason: HOSPADM

## 2021-09-15 RX ADMIN — HYDROCODONE BITARTRATE AND ACETAMINOPHEN 2 TABLET: 5; 325 TABLET ORAL at 08:46

## 2021-09-15 RX ADMIN — MEROPENEM 500 MG: 500 INJECTION, POWDER, FOR SOLUTION INTRAVENOUS at 19:51

## 2021-09-15 RX ADMIN — ARFORMOTEROL TARTRATE 15 MCG: 15 SOLUTION RESPIRATORY (INHALATION) at 21:04

## 2021-09-15 RX ADMIN — FAMOTIDINE 20 MG: 20 TABLET, FILM COATED ORAL at 23:11

## 2021-09-15 RX ADMIN — HYDROCHLOROTHIAZIDE 25 MG: 25 TABLET ORAL at 08:46

## 2021-09-15 RX ADMIN — Medication 1 CAPSULE: at 08:46

## 2021-09-15 RX ADMIN — GABAPENTIN 600 MG: 300 CAPSULE ORAL at 12:07

## 2021-09-15 RX ADMIN — MEROPENEM 500 MG: 500 INJECTION, POWDER, FOR SOLUTION INTRAVENOUS at 14:01

## 2021-09-15 RX ADMIN — MEROPENEM 500 MG: 500 INJECTION, POWDER, FOR SOLUTION INTRAVENOUS at 02:00

## 2021-09-15 RX ADMIN — GABAPENTIN 600 MG: 300 CAPSULE ORAL at 19:51

## 2021-09-15 RX ADMIN — Medication 10 ML: at 14:31

## 2021-09-15 RX ADMIN — CYCLOBENZAPRINE 10 MG: 10 TABLET, FILM COATED ORAL at 08:46

## 2021-09-15 RX ADMIN — ENOXAPARIN SODIUM 40 MG: 40 INJECTION SUBCUTANEOUS at 12:07

## 2021-09-15 RX ADMIN — ENOXAPARIN SODIUM 40 MG: 40 INJECTION SUBCUTANEOUS at 22:54

## 2021-09-15 RX ADMIN — DIPHENHYDRAMINE HYDROCHLORIDE 25 MG: 25 CAPSULE ORAL at 14:56

## 2021-09-15 RX ADMIN — HYDROCODONE BITARTRATE AND ACETAMINOPHEN 2 TABLET: 5; 325 TABLET ORAL at 16:22

## 2021-09-15 RX ADMIN — MEROPENEM 500 MG: 500 INJECTION, POWDER, FOR SOLUTION INTRAVENOUS at 08:46

## 2021-09-15 RX ADMIN — HYDROCODONE BITARTRATE AND ACETAMINOPHEN 1 TABLET: 5; 325 TABLET ORAL at 22:54

## 2021-09-15 RX ADMIN — BUDESONIDE 500 MCG: 0.5 SUSPENSION RESPIRATORY (INHALATION) at 21:04

## 2021-09-15 RX ADMIN — GABAPENTIN 600 MG: 300 CAPSULE ORAL at 06:15

## 2021-09-15 RX ADMIN — LOSARTAN POTASSIUM 50 MG: 50 TABLET, FILM COATED ORAL at 08:46

## 2021-09-15 RX ADMIN — Medication 10 ML: at 19:52

## 2021-09-15 RX ADMIN — ARFORMOTEROL TARTRATE 15 MCG: 15 SOLUTION RESPIRATORY (INHALATION) at 08:01

## 2021-09-15 RX ADMIN — BUDESONIDE 500 MCG: 0.5 SUSPENSION RESPIRATORY (INHALATION) at 08:01

## 2021-09-15 RX ADMIN — DIPHENHYDRAMINE HYDROCHLORIDE 25 MG: 25 CAPSULE ORAL at 10:41

## 2021-09-15 RX ADMIN — DIPHENHYDRAMINE HYDROCHLORIDE 25 MG: 25 CAPSULE ORAL at 02:56

## 2021-09-15 RX ADMIN — POLYETHYLENE GLYCOL 3350 17 G: 17 POWDER, FOR SOLUTION ORAL at 08:59

## 2021-09-15 RX ADMIN — DIPHENHYDRAMINE HYDROCHLORIDE 25 MG: 25 CAPSULE ORAL at 22:54

## 2021-09-15 RX ADMIN — Medication 10 ML: at 06:16

## 2021-09-15 RX ADMIN — CYCLOBENZAPRINE 10 MG: 10 TABLET, FILM COATED ORAL at 14:56

## 2021-09-15 NOTE — PROGRESS NOTES
[facility] Infectious Disease Specialists Progress Note           Jennifer Cadet DO    [phone] Office  [phone]  Fax    9/15/2021      Assessment & Plan:   · Left flank pain. Work-up for UTI/pyelonephritis unrevealing. Patient is afebrile with normal WBC. CT scan negative for pyelonephritis. UA with moderate epithelial cells suggesting contaminated sample and only 5-10 WBCs. Left flank is tender to palpation without percussion which is not consistent with pyelonephritis. Suspect musculoskeletal cause of pain. With reported dysuria and malodorous urine will continue antibiotics pending urine culture   · Multiple antibiotic allergies including penicillin cephalexin sulfa and levofloxacin. Patient states she can take Cipro but it does not work for her  · C. difficile rule out. No bowel movement since admission. WBC within normal limits. Low suspicion  · Obesity  · Tobacco abuse          Subjective:     Flank pain slightly better. Dysuria improved    Objective:     Vitals:   Visit Vitals  /68 (BP 1 Location: Left upper arm, BP Patient Position: At rest)   Pulse (!) 111   Temp 98.1 °F (36.7 °C)   Resp 20   Ht 5' 3\" (1.6 m)   Wt 245 lb (111.1 kg)   SpO2 99%   BMI 43.40 kg/m²        Tmax:  Temp (24hrs), Av.9 °F (36.6 °C), Min:97.4 °F (36.3 °C), Max:98.5 °F (36.9 °C)      Exam:   Patient is intubated:  no    Physical Examination:   General:  Alert, cooperative, no distress   Head:  Normocephalic, atraumatic. Eyes:  Conjunctivae clear   Neck: Supple       Lungs:   No distress. Chest wall:   Left flank tenderness to palpation. No erythema or fluctuance noted   Heart:     Abdomen:   non-distended   Extremities: Moves all. Skin:  No rash   Neurologic: CNII-XII intact. Normal strength     Labs:        No lab exists for component: ITNL   No results for input(s): CPK, CKMB, TROIQ in the last 72 hours.   Recent Labs     09/15/21  0617 21  0211 21    139 139   K 3.6 3.2* 3.5    105 105   CO2 27 29 28   BUN 10 12 10   CREA 0.64 0.79 0.80   * 154* 83   ALB  --   --  3.3*   WBC 6.1 8.0 7.8   HGB 11.5 12.8 13.0   HCT 36.5 39.7 40.2    267 252     No results for input(s): INR, PTP, APTT, INREXT in the last 72 hours.   Needs: urine analysis, urine sodium, protein and creatinine  No results found for: REBECCA, CREAU      Cultures:     Lab Results   Component Value Date/Time    Specimen Description: URINE 06/27/2012 02:15 PM    Specimen Description: URINE 05/14/2012 01:31 AM    Specimen Description: URINE 01/12/2012 05:30 PM     Lab Results   Component Value Date/Time    Culture result: NO GROWTH 1 DAY 09/14/2021 02:11 AM    Culture result: ESCHERICHIA COLI (A) 09/01/2021 03:54 PM    Culture result:  01/01/2019 09:40 AM     NO ROUTINE ENTERIC PATHOGENS ISOLATED INCLUDING SALMONELLA, SHIGELLA, YERSINIA, VIBRIO OR SHIGA TOXIN PRODUCING E. COLI       Radiology:     Medications       Current Facility-Administered Medications   Medication Dose Route Frequency Last Admin    cyclobenzaprine (FLEXERIL) tablet 10 mg  10 mg Oral TID PRN 10 mg at 09/15/21 0846    L.acidophilus-paracasei-S.thermophil-bifidobacter (RISAQUAD) 8 billion cell capsule  1 Capsule Oral DAILY 1 Capsule at 09/15/21 0846    diphenhydrAMINE (BENADRYL) capsule 25 mg  25 mg Oral Q6H PRN 25 mg at 09/15/21 0256    gabapentin (NEURONTIN) capsule 600 mg  600 mg Oral  mg at 09/15/21 0615    polyethylene glycol (MIRALAX) packet 17 g  17 g Oral ONCE PRN      HYDROcodone-acetaminophen (NORCO) 5-325 mg per tablet 1 Tablet  1 Tablet Oral Q4H PRN      Or    HYDROcodone-acetaminophen (NORCO) 5-325 mg per tablet 2 Tablet  2 Tablet Oral Q4H PRN 2 Tablet at 09/15/21 0846    lidocaine 4 % patch 1 Patch  1 Patch TransDERmal Q24H 1 Patch at 09/14/21 1746    sodium chloride (NS) flush 5-40 mL  5-40 mL IntraVENous Q8H 10 mL at 09/15/21 0616    sodium chloride (NS) flush 5-40 mL  5-40 mL IntraVENous PRN      enoxaparin (LOVENOX) injection 40 mg  40 mg SubCUTAneous Q12H 40 mg at 09/14/21 2156    hydroCHLOROthiazide (HYDRODIURIL) tablet 25 mg  25 mg Oral DAILY 25 mg at 09/15/21 0846    losartan (COZAAR) tablet 50 mg  50 mg Oral DAILY 50 mg at 09/15/21 0846    meropenem (MERREM) 500 mg in sterile water (preservative free) 10 mL IV syringe  500 mg IntraVENous Q6H 500 mg at 09/15/21 0846    albuterol-ipratropium (DUO-NEB) 2.5 MG-0.5 MG/3 ML  3 mL Nebulization Q4H PRN      budesonide (PULMICORT) 500 mcg/2 ml nebulizer suspension  500 mcg Nebulization BID  mcg at 09/15/21 0801    arformoteroL (BROVANA) neb solution 15 mcg  15 mcg Nebulization BID RT 15 mcg at 09/15/21 0801    nicotine (NICODERM CQ) 14 mg/24 hr patch 1 Patch  1 Patch TransDERmal DAILY 1 Patch at 09/14/21 2036    ALPRAZolam (XANAX) tablet 0.5 mg  0.5 mg Oral DAILY PRN             Case discussed with:      Matt Mccord DO

## 2021-09-15 NOTE — PROGRESS NOTES
9/15/2021  2:33 PM  RUR:  10%  Risk Level: [x]Low []Moderate []High  Value-based purchasing: [] Yes [x] No  Bundle patient: [] Yes [x] No   Specify:     Transition of care plan:  1. Awaiting medical clearance and DC order. ID following. PT treating. 2. Home with family assistance as needed. 3. Outpatient follow-up. 4. Pt's family to transport.

## 2021-09-15 NOTE — PROGRESS NOTES
2002- Bedside report given to Ramu Stein RN (oncoming nurse) by Jeff Martin RN (offgoing nurse) to include SBAR, Kardex, and MAR. Pt. In bed talking to daughter. Left room with bed in lowest position and locked, and call bell/belongings with in reach. Bed alarm turned off per pt. And daughter request; pt. Daughter will assist pt. To bathroom. 80- pt. C/o itching after receiving norco pain medication; unsure if it is related to antibiotics or pain meds. benedryl given    9/15/22 at [id number]- Bedside report given to Sandra Bee RN (oncoming nurse) by Charlotte [place], RN (offgoing nurse) to include SBAR, Kardex, and MAR. Pt. In bed resting with daughter at bedside. Left room with bed locked and call bell/belongings with in reach.

## 2021-09-15 NOTE — PROGRESS NOTES
Problem: Falls - Risk of  Goal: *Absence of Falls  Description: Document Anthony Thornton Fall Risk and appropriate interventions in the flowsheet.   Outcome: Progressing Towards Goal  Note: Fall Risk Interventions:  Mobility Interventions: Patient to call before getting OOB         Medication Interventions: Teach patient to arise slowly

## 2021-09-15 NOTE — PROGRESS NOTES
Problem: Mobility Impaired (Adult and Pediatric)  Goal: *Acute Goals and Plan of Care (Insert Text)  Description: FUNCTIONAL STATUS PRIOR TO ADMISSION: Patient was modified independent using a quad cane for functional mobility. HOME SUPPORT PRIOR TO ADMISSION: The patient lived with her fiance but did not require assist.    Physical Therapy Goals  Initiated 9/15/2021  1. Patient will transfer from bed to chair and chair to bed with modified independence using the least restrictive device within 7 day(s). 2.  Patient will perform sit to stand with independence within 7 day(s). 3.  Patient will ambulate with modified independence for 100 feet with the least restrictive device within 7 day(s). 4.  Patient will ascend/descend 5 stairs with bimal handrail(s) with minimal assistance/contact guard assist within 7 day(s). Outcome: Not Met   PHYSICAL THERAPY EVALUATION  Patient: Tianna Saba (7 y.o. female)  Date: 9/15/2021  Primary Diagnosis: Acute pyelonephritis [N10]        Precautions:   Fall      ASSESSMENT  Based on the objective data described below, the patient presents with L sided low back pain, R hip pain, mildly reduced but symmetrical LE strength, poor endurance, orthostatic hypotension and functional mobility that is mildly below her baseline. Per chart, patient admitted for complicated UTI and pyelonephritis. PTA patient was modified independent with use of quad cane, however reports mobility limited by her endurance and pain. Today patient ambulates 60ft with 1 standing rest break in which she reports mild lightheadedness. Vitals reveal orthostatic drop below. Initially patient uses RW, CGA but demonstrates good balance and PT progresses to ambulation without AD CGA. Upon turning around patient reaching for doorway to steady self. Final 15 ft of ambulation patient utilizes [facility] with good results. Recommend continuation of gait training with/without SPC/quad cane as tolerated.  Patient would benefit from OP PT to address her low endurance, L LBP and R hip pain. Vitals:    09/15/21 1044 09/15/21 1045 09/15/21 1055   BP: 128/79 101/69 116/68   BP 1 Location: Right upper arm Right upper arm Right upper arm   BP Patient Position:  Semi fowlers Semi fowlers   Pulse: (!) 108 (!) 111 (!) 106   Temp:      Resp:      Height:      Weight:      SpO2: 99% 94% 92%       Current Level of Function Impacting Discharge (mobility/balance): CGA ambulation with and without SPC; poor endurance; orthostatic hypotension    Functional Outcome Measure: The patient scored Total Score: 21/28 on the Tinetti outcome measure which is indicative of moderate fall risk. Other factors to consider for discharge: none additional     Patient will benefit from skilled therapy intervention to address the above noted impairments. PLAN :  Recommendations and Planned Interventions: transfer training, gait training, therapeutic exercises, edema management/control, patient and family training/education, and therapeutic activities      Frequency/Duration: Patient will be followed by physical therapy:  5 times a week to address goals. Recommendation for discharge: (in order for the patient to meet his/her long term goals)  Outpatient physical therapy follow up recommended for endurance training, LBP and R hip pain    This discharge recommendation:  Has not yet been discussed the attending provider and/or case management    IF patient discharges home will need the following DME: patient owns DME required for discharge         SUBJECTIVE:   Patient stated Loye Frees I feel a little lightheaded.     OBJECTIVE DATA SUMMARY:   HISTORY:    Past Medical History:   Diagnosis Date    Arthritis     Arthritis of low back     Asthma     Chronic pain     fibromyalgia    Colon spasm     Depression     Fibromyalgia     Gastrointestinal disorder     IBS    GERD (gastroesophageal reflux disease)     High cholesterol     Hypertension     IBS (irritable bowel syndrome)     Left axillary pain 2/22/2013    Migraine     LAST HEADACHE 2-16-12    Multiple sclerosis (HCC)     Other ill-defined conditions(799.89)     fibromylgia    Other ill-defined conditions(799.89)     MS     Past Surgical History:   Procedure Laterality Date    HX APPENDECTOMY      HX BLADDER REPAIR  04/2011     HX CHOLECYSTECTOMY      HX GYN      For ectopic pregnancy, hx tubal ligation    HX GYN      vaginal ablation. HX UROLOGICAL      bladder tuck, no mesh    HX UROLOGICAL      sling removed 1/10/11       Personal factors and/or comorbidities impacting plan of care: Patient reports DDD    Home Situation  Home Environment: Private residence  # Steps to Enter: 6  Rails to Enter: Yes  Hand Rails : Bilateral (reach one at a time)  One/Two Story Residence: One story  Living Alone: No  Support Systems: Spouse/Significant Other (lives with fiance)  Patient Expects to be Discharged to[de-identified] House  Current DME Used/Available at Home: Cane, straight    EXAMINATION/PRESENTATION/DECISION MAKING:   Critical Behavior:  Neurologic State: Alert  Orientation Level: Oriented X4  Cognition: Appropriate decision making, Follows commands     Hearing:     Skin:  all observed intact  Edema: none apparent, however patient reports intermittent bimal ankle swelling at home when she is on her feet for periods of time   Range Of Motion:  AROM: Generally decreased, functional                       Strength:    Strength: Generally decreased, functional                    Tone & Sensation:   Tone: Normal                              Coordination:  Coordination: Within functional limits  Vision:      Functional Mobility:  Bed Mobility:     Supine to Sit: Independent  Sit to Supine: Independent     Transfers:  Sit to Stand: Stand-by assistance  Stand to Sit: Stand-by assistance                       Balance:   Sitting: Intact; Without support  Standing: Impaired; Without support  Standing - Static: Good  Standing - Dynamic : Fair  Ambulation/Gait Training:  Distance (ft): 60 Feet (ft)  Assistive Device: Gait belt;Walker, rolling;Cane, straight  Ambulation - Level of Assistance: Contact guard assistance        Gait Abnormalities: Decreased step clearance              Speed/Brittny: Pace decreased (<100 feet/min)  Step Length: Right shortened;Left shortened                 Manual:   R hip distraction for joint relief 2x30s - patient reporting no alleviation or reduction in R hip pain   Functional Measure:  Tinetti test:    Sitting Balance: 1  Arises: 1  Attempts to Rise: 2  Immediate Standing Balance: 2  Standing Balance: 1  Nudged: 1  Eyes Closed: 1  Turn 360 Degrees - Continuous/Discontinuous: 1  Turn 360 Degrees - Steady/Unsteady: 0  Sitting Down: 1  Balance Score: 11 Balance total score  Indication of Gait: 1  R Step Length/Height: 1  L Step Length/Height: 1  R Foot Clearance: 1  L Foot Clearance: 1  Step Symmetry: 1  Step Continuity: 1  Path: 2  Trunk: 1  Walking Time: 0  Gait Score: 10 Gait total score  Total Score: 21/28 Overall total score         Tinetti Tool Score Risk of Falls  <19 = High Fall Risk  19-24 = Moderate Fall Risk  25-28 = Low Fall Risk  Tinetti ME. Performance-Oriented Assessment of Mobility Problems in Elderly Patients. Lozano 66; [id number].  (Scoring Description: PT Bulletin Feb. 10, 1993)    Older adults: Evita Rushing et al, 2009; n = 1000 [facility] elderly evaluated with ABC, HEBER, ADL, and IADL)  · Mean HEBER score for males aged 69-68 years = 26.21(3.40)  · Mean HEBER score for females age 69-68 years = 25.16(4.30)  · Mean HEBER score for males over 80 years = 23.29(6.02)  · Mean HEBER score for females over 80 years = 17.20(8.32)        Physical Therapy Evaluation Charge Determination   History Examination Presentation Decision-Making   MEDIUM  Complexity : 1-2 comorbidities / personal factors will impact the outcome/ POC  LOW Complexity : 1-2 Standardized tests and measures addressing body structure, function, activity limitation and / or participation in recreation  LOW Complexity : Stable, uncomplicated  LOW Complexity : FOTO score of       Based on the above components, the patient evaluation is determined to be of the following complexity level: LOW     Pain Rating:  Patient reporting increased pain with further ambulation to R hip    Activity Tolerance:   Fair, requires rest breaks, and signs and symptoms of orthostatic hypotension      After treatment patient left in no apparent distress:   Supine in bed, Call bell within reach, Caregiver / family present, and Side rails x 3    COMMUNICATION/EDUCATION:   The patients plan of care was discussed with: Registered nurse. Fall prevention education was provided and the patient/caregiver indicated understanding. and Patient/family have participated as able in goal setting and plan of care.     Thank you for this referral.  Bin Bell PT, DPT   Time Calculation: 28 mins

## 2021-09-16 DIAGNOSIS — M79.7 FIBROMYALGIA: Primary | ICD-10-CM

## 2021-09-16 LAB
ANION GAP SERPL CALC-SCNC: 4 MMOL/L (ref 5–15)
BACTERIA SPEC CULT: ABNORMAL
BASOPHILS # BLD: 0 K/UL (ref 0–0.1)
BASOPHILS NFR BLD: 1 % (ref 0–1)
BUN SERPL-MCNC: 12 MG/DL (ref 6–20)
BUN/CREAT SERPL: 14 (ref 12–20)
CALCIUM SERPL-MCNC: 8.7 MG/DL (ref 8.5–10.1)
CC UR VC: ABNORMAL
CHLORIDE SERPL-SCNC: 103 MMOL/L (ref 97–108)
CO2 SERPL-SCNC: 29 MMOL/L (ref 21–32)
CREAT SERPL-MCNC: 0.84 MG/DL (ref 0.55–1.02)
DIFFERENTIAL METHOD BLD: NORMAL
EOSINOPHIL # BLD: 0.2 K/UL (ref 0–0.4)
EOSINOPHIL NFR BLD: 2 % (ref 0–7)
ERYTHROCYTE [DISTWIDTH] IN BLOOD BY AUTOMATED COUNT: 13.5 % (ref 11.5–14.5)
GLUCOSE SERPL-MCNC: 170 MG/DL (ref 65–100)
HCT VFR BLD AUTO: 39.3 % (ref 35–47)
HGB BLD-MCNC: 12.6 G/DL (ref 11.5–16)
IMM GRANULOCYTES # BLD AUTO: 0 K/UL (ref 0–0.04)
IMM GRANULOCYTES NFR BLD AUTO: 0 % (ref 0–0.5)
LYMPHOCYTES # BLD: 3 K/UL (ref 0.8–3.5)
LYMPHOCYTES NFR BLD: 46 % (ref 12–49)
MCH RBC QN AUTO: 30.1 PG (ref 26–34)
MCHC RBC AUTO-ENTMCNC: 32.1 G/DL (ref 30–36.5)
MCV RBC AUTO: 93.8 FL (ref 80–99)
MONOCYTES # BLD: 0.6 K/UL (ref 0–1)
MONOCYTES NFR BLD: 8 % (ref 5–13)
NEUTS SEG # BLD: 2.9 K/UL (ref 1.8–8)
NEUTS SEG NFR BLD: 43 % (ref 32–75)
NRBC # BLD: 0 K/UL (ref 0–0.01)
NRBC BLD-RTO: 0 PER 100 WBC
PLATELET # BLD AUTO: 263 K/UL (ref 150–400)
PMV BLD AUTO: 11.5 FL (ref 8.9–12.9)
POTASSIUM SERPL-SCNC: 3.8 MMOL/L (ref 3.5–5.1)
RBC # BLD AUTO: 4.19 M/UL (ref 3.8–5.2)
SERVICE CMNT-IMP: ABNORMAL
SODIUM SERPL-SCNC: 136 MMOL/L (ref 136–145)
WBC # BLD AUTO: 6.7 K/UL (ref 3.6–11)

## 2021-09-16 PROCEDURE — 36415 COLL VENOUS BLD VENIPUNCTURE: CPT

## 2021-09-16 PROCEDURE — 99232 SBSQ HOSP IP/OBS MODERATE 35: CPT | Performed by: INTERNAL MEDICINE

## 2021-09-16 PROCEDURE — 74011000250 HC RX REV CODE- 250: Performed by: STUDENT IN AN ORGANIZED HEALTH CARE EDUCATION/TRAINING PROGRAM

## 2021-09-16 PROCEDURE — 97116 GAIT TRAINING THERAPY: CPT

## 2021-09-16 PROCEDURE — 74011250636 HC RX REV CODE- 250/636: Performed by: STUDENT IN AN ORGANIZED HEALTH CARE EDUCATION/TRAINING PROGRAM

## 2021-09-16 PROCEDURE — 85025 COMPLETE CBC W/AUTO DIFF WBC: CPT

## 2021-09-16 PROCEDURE — 74011250637 HC RX REV CODE- 250/637: Performed by: INTERNAL MEDICINE

## 2021-09-16 PROCEDURE — 99238 HOSP IP/OBS DSCHRG MGMT 30/<: CPT | Performed by: FAMILY MEDICINE

## 2021-09-16 PROCEDURE — 65270000029 HC RM PRIVATE

## 2021-09-16 PROCEDURE — 94760 N-INVAS EAR/PLS OXIMETRY 1: CPT

## 2021-09-16 PROCEDURE — 74011250637 HC RX REV CODE- 250/637: Performed by: STUDENT IN AN ORGANIZED HEALTH CARE EDUCATION/TRAINING PROGRAM

## 2021-09-16 PROCEDURE — 94640 AIRWAY INHALATION TREATMENT: CPT

## 2021-09-16 PROCEDURE — 80048 BASIC METABOLIC PNL TOTAL CA: CPT

## 2021-09-16 RX ORDER — NITROFURANTOIN 25; 75 MG/1; MG/1
100 CAPSULE ORAL 2 TIMES DAILY
Qty: 10 CAPSULE | Refills: 0 | Status: SHIPPED
Start: 2021-09-16 | End: 2021-09-16 | Stop reason: SDUPTHER

## 2021-09-16 RX ORDER — NITROFURANTOIN 25; 75 MG/1; MG/1
100 CAPSULE ORAL 2 TIMES DAILY
Qty: 10 CAPSULE | Refills: 0 | Status: SHIPPED | OUTPATIENT
Start: 2021-09-16 | End: 2021-09-21

## 2021-09-16 RX ORDER — NITROFURANTOIN 25; 75 MG/1; MG/1
100 CAPSULE ORAL 2 TIMES DAILY
Status: DISCONTINUED | OUTPATIENT
Start: 2021-09-16 | End: 2021-09-17 | Stop reason: HOSPADM

## 2021-09-16 RX ADMIN — ENOXAPARIN SODIUM 40 MG: 40 INJECTION SUBCUTANEOUS at 11:48

## 2021-09-16 RX ADMIN — NITROFURANTOIN MONOHYDRATE/MACROCRYSTALLINE 100 MG: 25; 75 CAPSULE ORAL at 18:24

## 2021-09-16 RX ADMIN — Medication 10 ML: at 05:27

## 2021-09-16 RX ADMIN — BUDESONIDE 500 MCG: 0.5 SUSPENSION RESPIRATORY (INHALATION) at 07:21

## 2021-09-16 RX ADMIN — HYDROCODONE BITARTRATE AND ACETAMINOPHEN 1 TABLET: 5; 325 TABLET ORAL at 13:51

## 2021-09-16 RX ADMIN — Medication 10 ML: at 22:08

## 2021-09-16 RX ADMIN — DIPHENHYDRAMINE HYDROCHLORIDE 25 MG: 25 CAPSULE ORAL at 13:51

## 2021-09-16 RX ADMIN — ARFORMOTEROL TARTRATE 15 MCG: 15 SOLUTION RESPIRATORY (INHALATION) at 20:45

## 2021-09-16 RX ADMIN — MEROPENEM 500 MG: 500 INJECTION, POWDER, FOR SOLUTION INTRAVENOUS at 09:46

## 2021-09-16 RX ADMIN — HYDROCHLOROTHIAZIDE 25 MG: 25 TABLET ORAL at 09:07

## 2021-09-16 RX ADMIN — DIPHENHYDRAMINE HYDROCHLORIDE AND LIDOCAINE HYDROCHLORIDE AND ALUMINUM HYDROXIDE AND MAGNESIUM HYDRO 5 ML: KIT at 11:49

## 2021-09-16 RX ADMIN — BUDESONIDE 500 MCG: 0.5 SUSPENSION RESPIRATORY (INHALATION) at 20:45

## 2021-09-16 RX ADMIN — DIPHENHYDRAMINE HYDROCHLORIDE AND LIDOCAINE HYDROCHLORIDE AND ALUMINUM HYDROXIDE AND MAGNESIUM HYDRO 5 ML: KIT at 15:48

## 2021-09-16 RX ADMIN — ENOXAPARIN SODIUM 40 MG: 40 INJECTION SUBCUTANEOUS at 22:22

## 2021-09-16 RX ADMIN — HYDROCODONE BITARTRATE AND ACETAMINOPHEN 1 TABLET: 5; 325 TABLET ORAL at 09:14

## 2021-09-16 RX ADMIN — Medication 10 ML: at 09:10

## 2021-09-16 RX ADMIN — HYDROCODONE BITARTRATE AND ACETAMINOPHEN 1 TABLET: 5; 325 TABLET ORAL at 05:26

## 2021-09-16 RX ADMIN — HYDROCODONE BITARTRATE AND ACETAMINOPHEN 1 TABLET: 5; 325 TABLET ORAL at 18:27

## 2021-09-16 RX ADMIN — CYCLOBENZAPRINE 10 MG: 10 TABLET, FILM COATED ORAL at 15:42

## 2021-09-16 RX ADMIN — GABAPENTIN 600 MG: 300 CAPSULE ORAL at 05:26

## 2021-09-16 RX ADMIN — CYCLOBENZAPRINE 10 MG: 10 TABLET, FILM COATED ORAL at 11:57

## 2021-09-16 RX ADMIN — DIPHENHYDRAMINE HYDROCHLORIDE 25 MG: 25 CAPSULE ORAL at 09:07

## 2021-09-16 RX ADMIN — SODIUM CHLORIDE 500 ML: 9 INJECTION, SOLUTION INTRAVENOUS at 06:02

## 2021-09-16 RX ADMIN — ARFORMOTEROL TARTRATE 15 MCG: 15 SOLUTION RESPIRATORY (INHALATION) at 07:21

## 2021-09-16 RX ADMIN — HYDROCODONE BITARTRATE AND ACETAMINOPHEN 1 TABLET: 5; 325 TABLET ORAL at 22:08

## 2021-09-16 RX ADMIN — LOSARTAN POTASSIUM 50 MG: 50 TABLET, FILM COATED ORAL at 09:07

## 2021-09-16 RX ADMIN — Medication 1 CAPSULE: at 09:07

## 2021-09-16 RX ADMIN — GABAPENTIN 600 MG: 300 CAPSULE ORAL at 22:07

## 2021-09-16 RX ADMIN — GABAPENTIN 600 MG: 300 CAPSULE ORAL at 11:48

## 2021-09-16 NOTE — PROGRESS NOTES
[facility] Infectious Disease Specialists Progress Note           Jennifer Cadet DO    [phone] Office  [phone]  Fax    2021      Assessment & Plan:   · Left flank pain. Work-up for UTI/pyelonephritis unrevealing. Patient is afebrile with normal WBC. CT scan negative for pyelonephritis. UA with moderate epithelial cells suggesting contaminated sample and only 5-10 WBCs. Left flank is tender to palpation without percussion which is not consistent with pyelonephritis. Suspect musculoskeletal cause of pain. · E. coli isolated from urine culture. UA bland but with dysuria and malodorous urine will complete a short antibiotic course with nitrofurantoin 100 mg p.o. twice daily for another 5 days  · Multiple antibiotic allergies including penicillin cephalexin sulfa and levofloxacin. Patient states she can take Cipro but it does not work for her  · C. difficile rule out. No bowel movement since admission. WBC within normal limits. Low suspicion  · Obesity  · Tobacco abuse          Subjective:      continues to feel poorly    Objective:     Vitals:   Visit Vitals  /85 (BP 1 Location: Right upper arm, BP Patient Position: Semi fowlers)   Pulse (!) 105   Temp 98.2 °F (36.8 °C)   Resp 22   Ht 5' 3\" (1.6 m)   Wt 245 lb (111.1 kg)   SpO2 98%   BMI 43.40 kg/m²        Tmax:  Temp (24hrs), Av.1 °F (36.7 °C), Min:97.4 °F (36.3 °C), Max:98.7 °F (37.1 °C)      Exam:   Patient is intubated:  no    Physical Examination:   General:  Alert, cooperative, no distress   Head:  Normocephalic, atraumatic. Eyes:  Conjunctivae clear   Neck: Supple       Lungs:   No distress. Chest wall:     Heart:     Abdomen:   non-distended   Extremities: Moves all. Skin:  No rash   Neurologic: CNII-XII intact. Normal strength     Labs:        No lab exists for component: ITNL   No results for input(s): CPK, CKMB, TROIQ in the last 72 hours.   Recent Labs     21  0241 09/15/21  0617 21  0211 21 09/13/21 1957    137 139   < > 139   K 3.8 3.6 3.2*   < > 3.5    105 105   < > 105   CO2 29 27 29   < > 28   BUN 12 10 12   < > 10   CREA 0.84 0.64 0.79   < > 0.80   * 141* 154*   < > 83   ALB  --   --   --   --  3.3*   WBC 6.7 6.1 8.0   < > 7.8   HGB 12.6 11.5 12.8   < > 13.0   HCT 39.3 36.5 39.7   < > 40.2    253 267   < > 252    < > = values in this interval not displayed. No results for input(s): INR, PTP, APTT, INREXT, INREXT in the last 72 hours.   Needs: urine analysis, urine sodium, protein and creatinine  No results found for: REBECCA, CREAU      Cultures:     Lab Results   Component Value Date/Time    Specimen Description: URINE 06/27/2012 02:15 PM    Specimen Description: URINE 05/14/2012 01:31 AM    Specimen Description: URINE 01/12/2012 05:30 PM     Lab Results   Component Value Date/Time    Culture result: NO GROWTH 2 DAYS 09/14/2021 02:11 AM    Culture result: ESCHERICHIA COLI (A) 09/13/2021 07:57 PM    Culture result: ESCHERICHIA COLI (A) 09/01/2021 03:54 PM       Radiology:     Medications       Current Facility-Administered Medications   Medication Dose Route Frequency Last Admin    magic mouthwash (FIRST-MOUTHWASH BLM) oral suspension 5 mL  5 mL Oral TIDAC 5 mL at 09/16/21 1149    cyclobenzaprine (FLEXERIL) tablet 10 mg  10 mg Oral TID PRN 10 mg at 09/16/21 1157    famotidine (PEPCID) tablet 20 mg  20 mg Oral DAILY 20 mg at 09/15/21 2311    L.acidophilus-paracasei-S.thermophil-bifidobacter (RISAQUAD) 8 billion cell capsule  1 Capsule Oral DAILY 1 Capsule at 09/16/21 0907    diphenhydrAMINE (BENADRYL) capsule 25 mg  25 mg Oral Q6H PRN 25 mg at 09/16/21 1351    gabapentin (NEURONTIN) capsule 600 mg  600 mg Oral  mg at 09/16/21 1148    HYDROcodone-acetaminophen (NORCO) 5-325 mg per tablet 1 Tablet  1 Tablet Oral Q4H PRN 1 Tablet at 09/16/21 1351    Or    HYDROcodone-acetaminophen (NORCO) 5-325 mg per tablet 2 Tablet  2 Tablet Oral Q4H PRN 2 Tablet at 09/15/21 1622    lidocaine 4 % patch 1 Patch  1 Patch TransDERmal Q24H 1 Patch at 09/15/21 1622    sodium chloride (NS) flush 5-40 mL  5-40 mL IntraVENous Q8H 10 mL at 09/16/21 0910    sodium chloride (NS) flush 5-40 mL  5-40 mL IntraVENous PRN      enoxaparin (LOVENOX) injection 40 mg  40 mg SubCUTAneous Q12H 40 mg at 09/16/21 1148    hydroCHLOROthiazide (HYDRODIURIL) tablet 25 mg  25 mg Oral DAILY 25 mg at 09/16/21 0907    losartan (COZAAR) tablet 50 mg  50 mg Oral DAILY 50 mg at 09/16/21 0907    meropenem (MERREM) 500 mg in sterile water (preservative free) 10 mL IV syringe  500 mg IntraVENous Q6H 500 mg at 09/16/21 0946    albuterol-ipratropium (DUO-NEB) 2.5 MG-0.5 MG/3 ML  3 mL Nebulization Q4H PRN      budesonide (PULMICORT) 500 mcg/2 ml nebulizer suspension  500 mcg Nebulization BID  mcg at 09/16/21 0721    arformoteroL (BROVANA) neb solution 15 mcg  15 mcg Nebulization BID RT 15 mcg at 09/16/21 0721    nicotine (NICODERM CQ) 14 mg/24 hr patch 1 Patch  1 Patch TransDERmal DAILY 1 Patch at 09/15/21 1951    ALPRAZolam (XANAX) tablet 0.5 mg  0.5 mg Oral DAILY PRN             Case discussed with:      Laura Wang DO

## 2021-09-16 NOTE — DISCHARGE INSTRUCTIONS
HOME DISCHARGE INSTRUCTIONS    Meliton Chin / [id number] : 1970    Admission date: 2021 Discharge date: 2021 3:20 PM     Please bring this form with you to show your care provider at your follow-up appointment. Primary care provider:  Praful Zamora MD    Discharging provider:  Abhinav Earl MD  - Family Medicine Resident  Gio Andersen MD - Family Medicine Attending      You have been admitted to the hospital with the following diagnoses:    ACUTE DIAGNOSES:  Acute pyelonephritis [N10]    . . . . . . . . . . . . . . . . . . . . . . . . . . . . . . . . . . . . . . . . . . . . . . . . . . . . . . . . . . . . . . . . . . . . . . . .   You are well enough to be discharged from the hospital. However, because you were inpatient in a hospital, you are at greater risk of having been exposed to the coronavirus. PLEASE stay inside and self-quarantine for 14 days to prevent further spread of the coronavirus. . . . . . . . . . . . . . . . . . . . . . . . . . . . . . . . . . . . . . . . . . . . . . . . . . . . . . . . . . . . . . . . . . . . . . . . [facility] FOLLOW-UP CARE RECOMMENDATIONS:    Appointments  Follow-up Information     Follow up With Specialties Details Why Contact Info    Praful Zamora MD Family Medicine Go on 2021 Follow up after hospitilization with your PCP at 10:45am [address] [place] [address]  [phone]               Follow-up tests needed: Urinalysis, to confirm resolution of UTI. Pending test results: At the time of your discharge the following test results are still pending: None. Please make sure you review these results with your outpatient follow-up provider(s). DIET/what to eat:  Regular Diet    ACTIVITY:  Activity as tolerated    Wound care: None    Equipment needed:  None    Specific symptoms to watch for: chest pain, shortness of breath, fever, chills, nausea, vomiting, diarrhea, change in mentation, falling, weakness, bleeding. What to do if new or unexpected symptoms occur? If you experience any of the above symptoms (or should other concerns or questions arise after discharge) please call your primary care physician. Return to the emergency room if you cannot get hold of your doctor. · It is very important that you keep your follow-up appointment(s). · Please bring discharge papers, medication list (and/or medication bottles) to your follow-up appointments for review by your outpatient provider(s). · Please check the list of medications and be sure it includes every medication (even non-prescription medications) that your provider wants you to take. · It is important that you take the medication exactly as they are prescribed. · Keep your medication in the bottles provided by the pharmacist and keep a list of the medication names, dosages, and times to be taken in your wallet. · Do not take other medications without consulting your doctor. · If you have any questions about your medications or other instructions, please talk to your nurse or care provider before you leave the hospital.     Information obtained by:     I understand that if any problems occur once I am at home I am to contact my physician. These instructions were explained to me and I had the opportunity to ask questions. I understand and acknowledge receipt of the instructions indicated above.                                                                                                                                                Physician's or R.N.'s Signature                                                                  Date/Time                                                                                                                                              Patient or Representative Signature                                                          Date/Time

## 2021-09-16 NOTE — PROGRESS NOTES
FABI:   1) Home with PO   2) Pt's family to drive pt home at time of discharge  3) Risk of readmission- 11% LOW     Hospital Day 3:   2:44 PM- Pt to go home with PO ABX and outpatient f.u appointments. AVS updated with all info- family to drive pt home at time of discharge and as needed. No further CM needs identified, RN informed. 2:27 PM- [facility] accepted pt if needed but did not inform CM of pricing- CM requested pricing again. 12:00 PM- Pt remains on Ortho- pending cultures and ID rec. CM spoke with primary team- pt may need IV ABX- CM spoke with pt and primary team- all agreeable for CM to send pt's information off to [facility] to review and run insurance to see what co-payment would be if IV ABX were needed. Medicare pt has received, reviewed, and signed 2nd IM letter informing them of their right to appeal the discharge. Signed copy has been placed on pt bedside chart.      CHARLY Haines, [address]

## 2021-09-16 NOTE — PROGRESS NOTES
0730- Bedside report given to [facility], RN (oncoming nurse) by SHAUN Grimes (offgoing nurse) to include SBAR, Kardex, and MAR. Pt. In bed resting. Left room with bed in lowest position and locked, and call bell/belongings with in reach, and \"daughter\" at bedside.

## 2021-09-16 NOTE — PROGRESS NOTES
[address] [facility] [address] [address]   Office [phone]  Fax [phone]          Assessment and Plan     Violet Mar is a 54 y.o. female with a PMH of HTN, fibromyalgia, IBS, anxiety, and tobacco use admitted for complicated UTI. She has spent 3 night(s) in the hospital.    24 Hour Events: No acute events. Complicated UTI: (Improving) with 1/4 SIRS POA (). LA 1.32. UA notable for nitrites and bacteria. CT abd NAP. Patient has a hx of multiple antibiotic allergies including keflex, penicillins, levaquin, and bactrim. Past urine culture reviewed and is resistant to cipro,levaquin, and bactrim. Patient has also failed treatment with macrobid and fosfomycin  - IV Meropenem  - Ucx GNR; sensitivities pending  - Bcx - no growth at 1 day  - ID consulted; unlikely pyelonephritis, suspect L flank pain to be MSK. Continue abx pending U cx. L flank pain: likely MSK in origin or related to patient's fibromyalgia. Pyelonephritis unlikely in the absence of systemic sx; no fever, no leukocytosis. CT scan neg. For pyelonephritis   - Lidocaine patch  - Flexeril 10mg TID PRN  - Norco q4h PRN   - Consider increasing home gabapentin dosage on discharge     Loose Stools (Resolved) patient reports 3-4 episodes daily of loose stool with a strong odor w/ hx of recent abx use. No diarrhea since admission   - Probiotics daily     Hypertension: (Stable) On admission BP was 133/82    -Continue home Losartan 50mg and HCTZ 25mg  - Will continue to monitor at this time and readjust as BP's trend. Asthma stable  - Brovana/Pulmicort for Home Advair  - Duonebs prn    Tobacco Use patent smoked 0.5 packs daily for 20 yeas  - Nicotine patch  - Encourage Cessation    Fibromyalgia patient 'd and appropriate   -Continue gabapentin 600mg BID; consider increasing dose on discharge     Anxiety (Stable)  - Home Xanax 0.5mg daily prn    Obesity BMI Body mass index is 43.4 kg/m².   - Encouraging lifestyle modifications and further follow up outpatient. FEN/GI - Cardiac diet. Activity - Ambulate as tolerated  DVT prophylaxis - Lovenox  GI prophylaxis - Not indicated at this time  Fall prophylaxis - Not indicated at this time. Disposition - Admit to Remote Telemetry. Plan to d/c to Home. Code Status - Full, discussed with patient / caregivers. Next of Nano Bardales Name and Contact Daughter, Martínez Arcos [phone]      I appreciate the opportunity to participate in the care of this patient,  Lexii Alcocer MD  [facility] Medicine Resident         Subjective / Objective     Subjective: Patient reports bilateral flank pain, now 8-9 out of 10 in intensity but denies dysuria dysuria. Overall is feeling better, but flank pain has started to radiate around her hips. Review of Systems   Constitutional: Negative for chills, fever and weight loss. HENT: Negative for congestion. Respiratory: Negative for shortness of breath. Cardiovascular: Negative for chest pain. Gastrointestinal: Negative for blood in stool. Genitourinary: Positive for flank pain. Negative for dysuria. Musculoskeletal: Positive for myalgias. Temp (24hrs), Av °F (36.7 °C), Min:97.4 °F (36.3 °C), Max:98.7 °F (37.1 °C)     Physical Exam  Constitutional:       General: She is not in acute distress. Appearance: She is not ill-appearing. HENT:      Head: Normocephalic and atraumatic. Cardiovascular:      Rate and Rhythm: Normal rate and regular rhythm. Pulmonary:      Effort: No respiratory distress. Abdominal:      Palpations: Abdomen is soft. Musculoskeletal:      Right lower leg: No edema. Left lower leg: No edema. Comments: Bilateral, severe flank pain to light palpation. Skin:     Capillary Refill: Capillary refill takes less than 2 seconds. Neurological:      General: No focal deficit present. Mental Status: She is oriented to person, place, and time.    Psychiatric:         Mood and Affect: Mood normal. Respiratory:   O2 Device: None (Room air)     I/O:  Date 09/15/21 0700 - 09/16/21 0659 09/16/21 0700 - 09/17/21 0659   Shift [id number] 1900-0659 24 Hour Total 9751-3396 6195-4025 24 Hour Total   INTAKE   P.O.  300 300        P. O.  300 300      I. V.(mL/kg/hr)  30(0) 30(0)        Volume (meropenem (MERREM) 500 mg in sterile water (preservative free) 10 mL IV syringe)  30 30      Shift Total(mL/kg)  330(3) 330(3)      OUTPUT   Urine(mL/kg/hr)           Urine Occurrence(s)  3 x 3 x      Stool           Stool Occurrence(s)  3 x 3 x      Shift Total(mL/kg)         NET  330 330      Weight (kg) 111.1 111.1 111.1 111.1 111.1 111.1        Intake/Output Summary (Last 24 hours) at 9/16/2021 0909  Last data filed at 9/16/2021 0329  Gross per 24 hour   Intake 330 ml   Output    Net 330 ml         Inpatient Medications  Current Facility-Administered Medications   Medication Dose Route Frequency    cyclobenzaprine (FLEXERIL) tablet 10 mg  10 mg Oral TID PRN    famotidine (PEPCID) tablet 20 mg  20 mg Oral DAILY    L.acidophilus-paracasei-S.thermophil-bifidobacter (RISAQUAD) 8 billion cell capsule  1 Capsule Oral DAILY    diphenhydrAMINE (BENADRYL) capsule 25 mg  25 mg Oral Q6H PRN    gabapentin (NEURONTIN) capsule 600 mg  600 mg Oral TID    HYDROcodone-acetaminophen (NORCO) 5-325 mg per tablet 1 Tablet  1 Tablet Oral Q4H PRN    Or    HYDROcodone-acetaminophen (NORCO) 5-325 mg per tablet 2 Tablet  2 Tablet Oral Q4H PRN    lidocaine 4 % patch 1 Patch  1 Patch TransDERmal Q24H    sodium chloride (NS) flush 5-40 mL  5-40 mL IntraVENous Q8H    sodium chloride (NS) flush 5-40 mL  5-40 mL IntraVENous PRN    enoxaparin (LOVENOX) injection 40 mg  40 mg SubCUTAneous Q12H    hydroCHLOROthiazide (HYDRODIURIL) tablet 25 mg  25 mg Oral DAILY    losartan (COZAAR) tablet 50 mg  50 mg Oral DAILY    meropenem (MERREM) 500 mg in sterile water (preservative free) 10 mL IV syringe  500 mg IntraVENous Q6H    albuterol-ipratropium (DUO-NEB) 2.5 MG-0.5 MG/3 ML  3 mL Nebulization Q4H PRN    budesonide (PULMICORT) 500 mcg/2 ml nebulizer suspension  500 mcg Nebulization BID RT    arformoteroL (BROVANA) neb solution 15 mcg  15 mcg Nebulization BID RT    nicotine (NICODERM CQ) 14 mg/24 hr patch 1 Patch  1 Patch TransDERmal DAILY    ALPRAZolam (XANAX) tablet 0.5 mg  0.5 mg Oral DAILY PRN         Allergies  Allergies   Allergen Reactions    Hydromorphone Itching and Swelling     hydromorphone 1mg IV given immediately began itching and complained of scratchy swelling sensation in her throat    Levofloxacin Hives    Aspirin Anaphylaxis    Bactrim [Sulfamethoprim Ds] Hives    Darvocet A500 [Propoxyphene N-Acetaminophen] Hives    Imitrex [Sumatriptan] Hives    Pcn [Penicillins] Anaphylaxis    Percocet [Oxycodone-Acetaminophen] Hives     Pt can take lortab/norco    Toradol [Ketorolac Tromethamine] Hives    Tramadol Hives    Hydrocodone Hives    Keflex [Cephalexin] Hives    Lisinopril Swelling     Tongue and mouth felt weird, slight swelling     Prednisone Rash and Angioedema     And pt felt throat closing    Savella [Milnacipran] Hives     INSOMNIA    Topamax [Topiramate] Other (comments)    Vesicare [Solifenacin] Other (comments)     Nose bleeds    Wellbutrin [Bupropion Hcl] Other (comments)     insomnia         CBC:  Recent Labs     09/16/21  0241 09/15/21  0617 09/14/21 0211   WBC 6.7 6.1 8.0   HGB 12.6 11.5 12.8   HCT 39.3 36.5 39.7    253 846       Metabolic Panel:  Recent Labs     09/16/21  0241 09/15/21  0617 09/14/21  0211 09/13/21 1957 09/13/21 1957    137 139   < > 139   K 3.8 3.6 3.2*   < > 3.5    105 105   < > 105   CO2 29 27 29   < > 28   BUN 12 10 12   < > 10   CREA 0.84 0.64 0.79   < > 0.80   * 141* 154*   < > 83   CA 8.7 8.9 8.5   < > 8.8   ALB  --   --   --   --  3.3*   ALT  --   --   --   --  38    < > = values in this interval not displayed.               For Billing    Chief Complaint Patient presents with   Kathy Moritz Flank Pain       Hospital Problems  Date Reviewed: 8/30/2021        Codes Class Noted POA    Tobacco use ICD-10-CM: Z72.0  ICD-9-CM: 305.1  9/14/2021 Unknown        * (Principal) Acute pyelonephritis ICD-10-CM: N10  ICD-9-CM: 590.10  9/13/2021 Unknown        Essential hypertension, benign ICD-10-CM: I10  ICD-9-CM: 401.1  8/25/2014 Yes        Hyperlipidemia ICD-10-CM: E78.5  ICD-9-CM: 272.4  11/10/2011 Yes        Fibromyalgia ICD-10-CM: M79.7  ICD-9-CM: 729.1  10/13/2011 Yes        Depression ICD-10-CM: F32.9  ICD-9-CM: 624  8/18/2011 Yes

## 2021-09-16 NOTE — PROGRESS NOTES
Pt c/o of being \"too dry\" from benedryl and presents with brown dry tongue; would like IVF. Family Practice MD ordered 500ml bolus x1 dose now. Began infusion 0603. Will continue to monitor.

## 2021-09-16 NOTE — PROGRESS NOTES
Problem: Mobility Impaired (Adult and Pediatric)  Goal: *Acute Goals and Plan of Care (Insert Text)  Description: FUNCTIONAL STATUS PRIOR TO ADMISSION: Patient was modified independent using a quad cane for functional mobility. HOME SUPPORT PRIOR TO ADMISSION: The patient lived with her fiance but did not require assist.    Physical Therapy Goals  Initiated 9/15/2021  1. Patient will transfer from bed to chair and chair to bed with modified independence using the least restrictive device within 7 day(s). 2.  Patient will perform sit to stand with independence within 7 day(s). 3.  Patient will ambulate with modified independence for 100 feet with the least restrictive device within 7 day(s). 4.  Patient will ascend/descend 5 stairs with bimal handrail(s) with minimal assistance/contact guard assist within 7 day(s). Outcome: Progressing Towards Goal  PHYSICAL THERAPY TREATMENT/DISCHARGE  Patient: Glenn Hendricks (74 y.o. female)  Date: 9/16/2021  Diagnosis: Acute pyelonephritis [N10] Acute pyelonephritis       Precautions: Fall  Chart, physical therapy assessment, plan of care and goals were reviewed. ASSESSMENT  Patient continues with skilled PT services and has progressed towards goals. Patient continues with skilled PT services and is progressing towards goals. Patient this morning with fair tolerance to physical therapy. There was no lightheadedness/dizziness reported with mobility and vitals remained stable. Gait training progressed to 80ft with CGA and quad cane with no instability or LOB. Patient's gait remains antalgic however per patient this has been her baseline for the past couple of years. Patient takes one standing rest break lasting 20s. At this time patient is at her functional baseline and demonstrates safe mobility with quad cane. No further equipment required for d/c.  Encouraged patient to maintain and challenge endurance by continuing to shop at [employer] department stores with cart. Recommend OP PT for LBP, R hip pain and further endurance training. No further acute care PT needs. Other factors to consider for discharge: none additional         PLAN :  Patient will be discharged from acute skilled physical therapy at this time. Rationale for discharge:  Goals achieved    Recommendation for discharge: (in order for the patient to meet his/her long term goals)  Outpatient physical therapy follow up recommended for LBP, R hip pain and endurance training    This discharge recommendation:  Has not yet been discussed the attending provider and/or case management    IF patient discharges home will need the following DME: patient owns DME required for discharge       SUBJECTIVE:   Patient stated Samantha David I feel good.     OBJECTIVE DATA SUMMARY:   Patient received supine in bed and was agreeable to participate in PT session. Patient was cleared by nursing to participate in PT session. Critical Behavior:  Neurologic State: Alert  Orientation Level: Oriented X4  Cognition: Appropriate decision making, Appropriate for age attention/concentration, Appropriate safety awareness, Follows commands     Functional Mobility Training:  Bed Mobility:     Supine to Sit: Independent  Sit to Supine: Independent           Transfers:  Sit to Stand: Stand-by assistance  Stand to Sit: Independent                             Balance:  Sitting: Intact; Without support  Standing: Impaired; Without support  Standing - Static: Good  Standing - Dynamic : Fair  Ambulation/Gait Training:  Distance (ft): 80 Feet (ft)  Assistive Device: Gait belt;Cane, quad  Ambulation - Level of Assistance: Contact guard assistance        Gait Abnormalities: Antalgic              Speed/Brittny: Pace decreased (<100 feet/min)  Step Length: Right shortened;Left shortened                  Pain Rating:  Patient with reports of L sided LBP with ambulation > 50 ft    Activity Tolerance:   Fair and requires rest breaks    After treatment patient left in no apparent distress:   Supine in bed, Call bell within reach, Caregiver / family present, and Side rails x 3    COMMUNICATION/COLLABORATION:   The patients plan of care was discussed with: Registered nurse.      Raeann Loredo PT, DPT   Time Calculation: 19 mins

## 2021-09-17 VITALS
BODY MASS INDEX: 43.41 KG/M2 | DIASTOLIC BLOOD PRESSURE: 88 MMHG | TEMPERATURE: 97.7 F | HEART RATE: 114 BPM | RESPIRATION RATE: 18 BRPM | SYSTOLIC BLOOD PRESSURE: 126 MMHG | OXYGEN SATURATION: 90 % | WEIGHT: 245 LBS | HEIGHT: 63 IN

## 2021-09-17 LAB
ANION GAP SERPL CALC-SCNC: 5 MMOL/L (ref 5–15)
BASOPHILS # BLD: 0 K/UL (ref 0–0.1)
BASOPHILS NFR BLD: 0 % (ref 0–1)
BUN SERPL-MCNC: 13 MG/DL (ref 6–20)
BUN/CREAT SERPL: 19 (ref 12–20)
CALCIUM SERPL-MCNC: 9.3 MG/DL (ref 8.5–10.1)
CHLORIDE SERPL-SCNC: 101 MMOL/L (ref 97–108)
CO2 SERPL-SCNC: 29 MMOL/L (ref 21–32)
CREAT SERPL-MCNC: 0.67 MG/DL (ref 0.55–1.02)
DIFFERENTIAL METHOD BLD: ABNORMAL
EOSINOPHIL # BLD: 0.2 K/UL (ref 0–0.4)
EOSINOPHIL NFR BLD: 2 % (ref 0–7)
ERYTHROCYTE [DISTWIDTH] IN BLOOD BY AUTOMATED COUNT: 13.4 % (ref 11.5–14.5)
GLUCOSE SERPL-MCNC: 137 MG/DL (ref 65–100)
HCT VFR BLD AUTO: 40.1 % (ref 35–47)
HGB BLD-MCNC: 13.1 G/DL (ref 11.5–16)
IMM GRANULOCYTES # BLD AUTO: 0 K/UL (ref 0–0.04)
IMM GRANULOCYTES NFR BLD AUTO: 1 % (ref 0–0.5)
LYMPHOCYTES # BLD: 3.1 K/UL (ref 0.8–3.5)
LYMPHOCYTES NFR BLD: 41 % (ref 12–49)
MCH RBC QN AUTO: 30.9 PG (ref 26–34)
MCHC RBC AUTO-ENTMCNC: 32.7 G/DL (ref 30–36.5)
MCV RBC AUTO: 94.6 FL (ref 80–99)
MONOCYTES # BLD: 0.6 K/UL (ref 0–1)
MONOCYTES NFR BLD: 8 % (ref 5–13)
NEUTS SEG # BLD: 3.6 K/UL (ref 1.8–8)
NEUTS SEG NFR BLD: 48 % (ref 32–75)
NRBC # BLD: 0 K/UL (ref 0–0.01)
NRBC BLD-RTO: 0 PER 100 WBC
PLATELET # BLD AUTO: 300 K/UL (ref 150–400)
PMV BLD AUTO: 11.4 FL (ref 8.9–12.9)
POTASSIUM SERPL-SCNC: 3.6 MMOL/L (ref 3.5–5.1)
RBC # BLD AUTO: 4.24 M/UL (ref 3.8–5.2)
SODIUM SERPL-SCNC: 135 MMOL/L (ref 136–145)
WBC # BLD AUTO: 7.5 K/UL (ref 3.6–11)

## 2021-09-17 PROCEDURE — 74011250637 HC RX REV CODE- 250/637: Performed by: STUDENT IN AN ORGANIZED HEALTH CARE EDUCATION/TRAINING PROGRAM

## 2021-09-17 PROCEDURE — 85025 COMPLETE CBC W/AUTO DIFF WBC: CPT

## 2021-09-17 PROCEDURE — 74011000250 HC RX REV CODE- 250: Performed by: STUDENT IN AN ORGANIZED HEALTH CARE EDUCATION/TRAINING PROGRAM

## 2021-09-17 PROCEDURE — 80048 BASIC METABOLIC PNL TOTAL CA: CPT

## 2021-09-17 PROCEDURE — 94664 DEMO&/EVAL PT USE INHALER: CPT

## 2021-09-17 PROCEDURE — 36415 COLL VENOUS BLD VENIPUNCTURE: CPT

## 2021-09-17 PROCEDURE — 74011250636 HC RX REV CODE- 250/636: Performed by: STUDENT IN AN ORGANIZED HEALTH CARE EDUCATION/TRAINING PROGRAM

## 2021-09-17 PROCEDURE — 99238 HOSP IP/OBS DSCHRG MGMT 30/<: CPT | Performed by: FAMILY MEDICINE

## 2021-09-17 PROCEDURE — 74011250637 HC RX REV CODE- 250/637: Performed by: INTERNAL MEDICINE

## 2021-09-17 PROCEDURE — 94640 AIRWAY INHALATION TREATMENT: CPT

## 2021-09-17 RX ADMIN — ARFORMOTEROL TARTRATE 15 MCG: 15 SOLUTION RESPIRATORY (INHALATION) at 08:47

## 2021-09-17 RX ADMIN — DIPHENHYDRAMINE HYDROCHLORIDE AND LIDOCAINE HYDROCHLORIDE AND ALUMINUM HYDROXIDE AND MAGNESIUM HYDRO 5 ML: KIT at 09:48

## 2021-09-17 RX ADMIN — HYDROCHLOROTHIAZIDE 25 MG: 25 TABLET ORAL at 09:35

## 2021-09-17 RX ADMIN — DIPHENHYDRAMINE HYDROCHLORIDE AND LIDOCAINE HYDROCHLORIDE AND ALUMINUM HYDROXIDE AND MAGNESIUM HYDRO 5 ML: KIT at 11:15

## 2021-09-17 RX ADMIN — GABAPENTIN 600 MG: 300 CAPSULE ORAL at 11:15

## 2021-09-17 RX ADMIN — BUDESONIDE 500 MCG: 0.5 SUSPENSION RESPIRATORY (INHALATION) at 08:47

## 2021-09-17 RX ADMIN — Medication 1 CAPSULE: at 09:35

## 2021-09-17 RX ADMIN — Medication 10 ML: at 05:24

## 2021-09-17 RX ADMIN — NITROFURANTOIN MONOHYDRATE/MACROCRYSTALLINE 100 MG: 25; 75 CAPSULE ORAL at 09:48

## 2021-09-17 RX ADMIN — LOSARTAN POTASSIUM 50 MG: 50 TABLET, FILM COATED ORAL at 09:48

## 2021-09-17 RX ADMIN — GABAPENTIN 600 MG: 300 CAPSULE ORAL at 05:24

## 2021-09-17 RX ADMIN — FAMOTIDINE 20 MG: 20 TABLET, FILM COATED ORAL at 09:35

## 2021-09-17 RX ADMIN — ENOXAPARIN SODIUM 40 MG: 40 INJECTION SUBCUTANEOUS at 11:15

## 2021-09-17 NOTE — DISCHARGE SUMMARY
[address] [facility] [address] [place] [address]   Office [phone]  Fax [phone]       Discharge / Transfer / Off-Service Note     Name: Silver Rodriguez MRN: [id number]  Sex: Female   YOB: 1970  Age: 54 y.o. PCP: Matthieu Romero MD     Date of admission: 9/13/2021  Date of discharge/transfer: 9/17/2021    Attending physician at admission: Dr. Lory Alcantara    Attending physician at discharge/transfer: Dr. Rey Paul     Resident physician at discharge/transfer: Bianca Gar MD     Consultants during hospitalization  IP CONSULT TO INFECTIOUS DISEASES     Admission diagnoses   Acute pyelonephritis [N10]    Recommended follow-up after discharge  1. PCP: Matthieu Romero MD    Things to follow up on with PCP:  -Further work-up to assess for resolution of infection.  -Bilateral flank pain, given presentation, likely neuromuscular underlying etiology.   -Fibromyalgia and medication dose and regimen optimization. History of Present Illness  Per admitting provider, Ketty Prince MD, \"Estela Martin is a 54 y.o. female with known GERD, tobacco abuse, HTN, fibromyalgia, chronic pain and asthma who presents to the ER complaining of L flank pain and dysura. Her symptoms began 4 weeks ago after taking an antibiotic for bronchitis. She was treated macrobid twice and then was recently put on Fosfomycin 1 week ago for persistent symptoms. 2 days after starting the most recent antibiotic she deveoped L sided debilitating flank pain prompting her to come into the ED. She denies any fever and notes her highest temp was 99.6F. She also notes loose stool for the last 3 days. She has 4 stool daily and states the stool has a very strong odor. She denies any blood in her stool, nausea, vomiting, or abdominal pain. \"        Surekha Westbrook is a 54 y.o. female with a PMH of HTN, fibromyalgia, IBS, anxiety, and tobacco use who was admitted for complicated UTI.  Originally, it was thought that the patient had pyelonephritis due to the exquisite flank tenderness she was experiencing. ID was consulted and they determined this was unlikely. Because of the patient's many medication allergies and her recently failed trial of Macrobid, she was started on IV meropenem for empiric therapy. Urine and blood cultures were taken prior to starting antibiotics. Blood cultures were negative, but urine cultures were positive for E. Coli that was susceptible to, among other things, Macrobid. Pt had trouble getting a ride on 9/16, so had to wait until 9/17 to be discharged. Complicated UTI: pt presented with 1/4 SIRS (). LA 1.32. UA notable for nitrites and bacteria. CT abd NAP. Patient also has a hx of multiple antibiotic allergies including keflex, penicillins, levaquin, and bactrim, so she was started on IV meropenem until cultures and sensitivities resulted. - 5 day course of Macrobid  - f/u with PCP     L flank pain: likely MSK in origin or related to patient's fibromyalgia. Pyelonephritis unlikely in the absence of systemic sx; no fever, no leukocytosis. CT scan also negative. Managed in-patient with topical lidocaine, Norco, and cyclobenzaprine, none of which reportedly helped. - f/u with PCP  - recommending increasing gabapentin dosage/frequency for management  - outpt PT/OT     Loose Stools: patient reported 3-4 episodes daily of loose stool with a strong odor w/ hx of recent abx use. No diarrhea while hospitalized.    - Cont probiotics daily      Hypertension: (Stable) On admission BP was 133/82    -Continue home Losartan 50mg and HCTZ 25mg  -f/u with PCP     Asthma stable  - Resume home Advair prn     Tobacco Use patent smoked 0.5 packs daily for 20 yeas  - Encouraging cessation     Fibromyalgia patient 'd and appropriate   -f/u with PCP for ongoing management  -Consider duloxetine use  -Continue gabapentin 600mg BID     Anxiety (Stable)  -Continue home Xanax 0.5mg daily prn     Obesity BMI Body mass index is 43.4 kg/m². - Encouraging lifestyle modifications and further follow up outpatient. Physical exam at discharge:   Constitutional:       General: She is not in acute distress. Appearance: She is not ill-appearing. HENT:      Head: Normocephalic and atraumatic. Cardiovascular:      Rate and Rhythm: Normal rate and regular rhythm. Pulmonary:      Effort: No respiratory distress. Abdominal:      Palpations: Abdomen is soft. Musculoskeletal:      Right lower leg: No edema. Left lower leg: No edema. Comments: Left flank tenderness to light palpation    Skin:     Capillary Refill: Capillary refill takes less than 2 seconds. Neurological:      General: No focal deficit present. Mental Status: She is oriented to person, place, and time. Psychiatric:         Mood and Affect: Mood normal.          Condition at discharge: Stable    Labs  Recent Labs     09/17/21  0304 09/16/21  0241 09/15/21  0617   WBC 7.5 6.7 6.1   HGB 13.1 12.6 11.5   HCT 40.1 39.3 36.5    263 253     Recent Labs     09/17/21  0304 09/16/21  0241 09/15/21  0617   * 136 137   K 3.6 3.8 3.6    103 105   CO2 29 29 27   BUN 13 12 10   CREA 0.67 0.84 0.64   * 170* 141*   CA 9.3 8.7 8.9     No results for input(s): ALT, AP, TBIL, TBILI, TP, ALB, GLOB, GGT, AML, LPSE in the last 72 hours. No lab exists for component: SGOT, GPT, AMYP, HLPSE  No results for input(s): PH, PCO2, PO2, TNIPOC, TROIQ, INR, PTP, APTT, FE, TIBC, PSAT, FERR, GLUCPOC, INREXT, INREXT in the last 72 hours.     No lab exists for component: [place]    Microbiology  Results       Procedure Component Value Units Date/Time    CULTURE, BLOOD [[id number]] Collected: 09/14/21 0211    Order Status: Completed Specimen: Blood Updated: 09/17/21 0641     Special Requests: NO SPECIAL REQUESTS        Culture result: NO GROWTH 3 DAYS       OVA & PARASITES, STOOL [[id number]]     Order Status: Canceled Specimen: Feces from Stool     CULTURE, STOOL- (Enteric Bacteria Panel, DNA) [[id number]]     Order Status: Canceled Specimen: Stool     C. DIFFICILE AG & TOXIN A/B [[id number]]     Order Status: Canceled Specimen: Stool     CULTURE, URINE [[id number]]     Order Status: Canceled Specimen: Urine from Clean catch     URINE CULTURE HOLD SAMPLE [[id number]] Collected: 09/13/21 1957    Order Status: Completed Specimen: Urine from Serum Updated: 09/13/21 2016     Urine culture hold       Urine on hold in Microbiology dept for 2 days. If unpreserved urine is submitted, it cannot be used for addtional testing after 24 hours, recollection will be required. CULTURE, URINE [[id number]]  (Abnormal)  (Susceptibility) Collected: 09/13/21 1957    Order Status: Completed Specimen: Urine from Clean catch Updated: 09/16/21 1052     Special Requests: NO SPECIAL REQUESTS        [place] Count --        >100,000  COLONIES/mL       Culture result: ESCHERICHIA COLI       Susceptibility        Escherichia coli      VELIA      Amikacin ($) Susceptible      Ampicillin ($) Susceptible      Ampicillin/sulbactam ($) Susceptible      Cefazolin ($) Susceptible      Cefepime ($$) Susceptible      Cefoxitin Susceptible      Ceftazidime ($) Susceptible      Ceftriaxone ($) Susceptible      Ciprofloxacin ($) Resistant      Gentamicin ($) Susceptible      Levofloxacin ($) Resistant      Meropenem ($$) Susceptible      Nitrofurantoin Susceptible      Piperacillin/Tazobac ($) Susceptible      Tobramycin ($) Susceptible      Trimeth/Sulfa Resistant                 Linear View                                Procedures / Diagnostic Studies  Echo Results  (Last 48 hours)      None             Imaging  CT ABD PELV WO CONT    Result Date: 9/13/2021  No nephrolithiasis or hydronephrosis. No evidence of acute process.        Chronic diagnoses   Problem List as of 9/17/2021 Date Reviewed: 8/30/2021          Codes Class Noted - Resolved    Tobacco use ICD-10-CM: Z72.0  ICD-9-CM: 305.1  9/14/2021 - Present        * (Principal) Acute pyelonephritis ICD-10-CM: N10  ICD-9-CM: 590.10  9/13/2021 - Present        Diarrhea of presumed infectious origin ICD-10-CM: R19.7  ICD-9-CM: 009.3  1/2/2019 - Present        CAP (community acquired pneumonia) ICD-10-CM: J18.9  ICD-9-CM: 914  12/31/2018 - Present        Sepsis ([facility] ICD-10-CM: A41.9  ICD-9-CM: 038.9, 995.91  12/31/2018 - Present        Obesity, morbid ([facility] ICD-10-CM: E66.01  ICD-9-CM: 278.01  2/19/2018 - Present        Pre-diabetes ICD-10-CM: R73.03  ICD-9-CM: 790.29  8/18/2016 - Present        Urinary frequency ICD-10-CM: R35.0  ICD-9-CM: 788.41  10/21/2015 - Present        Essential hypertension, benign ICD-10-CM: I10  ICD-9-CM: 401.1  8/25/2014 - Present        DDD (degenerative disc disease), lumbar ICD-10-CM: M51.36  ICD-9-CM: 722.52  5/6/2013 - Present        Left axillary pain ICD-10-CM: L53.916  ICD-9-CM: 729.5  2/22/2013 - Present        IBS (irritable bowel syndrome) ICD-10-CM: K58.9  ICD-9-CM: 564.1  5/17/2012 - Present        Migraine ICD-10-CM: D10.032  ICD-9-CM: 346.90  5/17/2012 - Present        Hyperlipidemia ICD-10-CM: E78.5  ICD-9-CM: 272.4  11/10/2011 - Present        Fibromyalgia ICD-10-CM: M79.7  ICD-9-CM: 729.1  10/13/2011 - Present        Nipple discharge ICD-10-CM: N64.52  ICD-9-CM: 611.79  8/18/2011 - Present        Depression ICD-10-CM: F32.9  ICD-9-CM: 834  8/18/2011 - Present                Discharge/Transfer Medications  Current Discharge Medication List        START taking these medications    Details   nitrofurantoin, macrocrystal-monohydrate, (Macrobid) 100 mg capsule Take 1 Capsule by mouth two (2) times a day for 5 days. Qty: 10 Capsule, Refills: 0  Start date: 9/16/2021, End date: 9/21/2021           CONTINUE these medications which have NOT CHANGED    Details   fluticasone propion-salmeteroL (ADVAIR/WIXELA) 250-50 mcg/dose diskus inhaler Take 1 Puff by inhalation every twelve (12) hours.   Qty: 1 Each, Refills: 2    Associated Diagnoses: Bronchitis; Mild intermittent reactive airway disease with wheezing with acute exacerbation      albuterol (PROVENTIL VENTOLIN) 2.5 mg /3 mL (0.083 %) nebu USE 1 VIAL VIA NEBULIZER  EVERY 4 HOURS AS NEEDED FOR WHEEZING   RECOMMENDS NOT EXCEEDING 4  VIALS/DAY  Qty: 450 mL, Refills: 8    Comments: Requesting 1 year supply  Associated Diagnoses: Bronchitis; Mild intermittent reactive airway disease with wheezing with acute exacerbation      albuterol (PROVENTIL HFA, VENTOLIN HFA, PROAIR HFA) 90 mcg/actuation inhaler USE 2 INHALATIONS BY MOUTH  EVERY 4 HOURS AS NEEDED FOR WHEEZING  Qty: 51 g, Refills: 3    Comments: Requesting 1 year supply  Associated Diagnoses: Mild intermittent reactive airway disease with wheezing with acute exacerbation      cyclobenzaprine (FLEXERIL) 10 mg tablet TAKE 1 TABLET BY MOUTH 3  TIMES DAILY AS NEEDED FOR  MUSCLE SPASM(S) FOR UP TO  10 DAYS  Qty: 30 Tablet, Refills: 0    Associated Diagnoses: Chronic left shoulder pain      gabapentin (NEURONTIN) 600 mg tablet TAKE 1 TABLET BY MOUTH  TWICE DAILY  Qty: 60 Tablet, Refills: 11    Comments: Requesting 1 year supply  Associated Diagnoses: Fibromyalgia      hydroCHLOROthiazide (HYDRODIURIL) 25 mg tablet TAKE 1 TABLET BY MOUTH  DAILY  Qty: 90 Tab, Refills: 3    Comments: Requesting 1 year supply      ALPRAZolam (XANAX) 0.5 mg tablet TAKE 1 TABLET BY MOUTH  DAILY AS NEEDED FOR ANXIOUS  Qty: 30 Tab, Refills: 5    Associated Diagnoses: Anxiety      losartan (COZAAR) 50 mg tablet TAKE 1 TABLET BY MOUTH  DAILY  Qty: 90 Tab, Refills: 3    Comments: Requesting 1 year supply  Associated Diagnoses: Essential hypertension, benign              Diet:  Regular diet.     Activity:  As tolerated    Disposition: to Home    Discharge instructions to patient/family  Please seek medical attention for any new or worsening symptoms particularly fever, chest pain, shortness of breath, abdominal pain, nausea, vomiting    Follow up plans/appointments  Follow-up Information       Follow up With Specialties Details Why Contact Info    Karen Duran MD Family Medicine Go on 9/21/2021 Follow up after hospitilization with your PCP at 10:45am [address] [facility]  [phone]               Karel Terrazas MD  Family Medicine Resident       For Billing    Chief Complaint   Patient presents with    Flank Pain       Hospital Problems  Date Reviewed: 8/30/2021          Codes Class Noted POA    Tobacco use ICD-10-CM: Z72.0  ICD-9-CM: 305.1  9/14/2021 Unknown        * (Principal) Acute pyelonephritis ICD-10-CM: N10  ICD-9-CM: 590.10  9/13/2021 Unknown        Essential hypertension, benign ICD-10-CM: I10  ICD-9-CM: 401.1  8/25/2014 Yes        Hyperlipidemia ICD-10-CM: E78.5  ICD-9-CM: 272.4  11/10/2011 Yes        Fibromyalgia ICD-10-CM: M79.7  ICD-9-CM: 729.1  10/13/2011 Yes        Depression ICD-10-CM: F32.9  ICD-9-CM: 227  8/18/2011 Yes                 [address] [facility] Medicine Residency Attending Addendum:  I saw and evaluated the patient on the day of the encounter with Dr. Veronica Leone, performing the key elements of the service. I discussed the findings, assessment and plan with the resident and agree with the resident's findings and plan as documented in the resident's note.       Mireya Jeffrey MD, FAAFP, CAQSM, RMSK

## 2021-09-17 NOTE — PROGRESS NOTES
Patient cared for under disaster charting.      1941- Bedside report given to Tuan Chris RN (oncoming nurse) Debby Lamar RN (offgoing nurse) to include SBAR, Kardex, and MAR. Pt. In bed watching iPad. Left room with bed in lowest position and locked, and call bell/belongings with in reach. Patient does not wish to have any company tonight.

## 2021-09-17 NOTE — PROGRESS NOTES
9/17/2021  9:45 AM    RUR:  11%  Risk Level: [x]Low []Moderate []High  Value-based purchasing: [] Yes [x] No  Bundle patient: [] Yes [x] No   Specify:     Transition of care plan:  1. Medically stable with discharge order; plan for PO antbx  2. Home with family assistance and outpatient services  3. Outpatient follow-up - PCP appt 9/21  4. Pt's family to transport - pt states ride will be at hospital around 11:30am  5. No further CM needs identified    Care Management Interventions  PCP Verified by CM: Yes (Sigrid Marr)  Mode of Transport at Discharge:  Other (see comment) (Family)  MyChart Signup: No  Discharge Durable Medical Equipment: No  Physical Therapy Consult: Yes  Occupational Therapy Consult: Yes  Speech Therapy Consult: No  Support Systems: Spouse/Significant Other (lives with fiance)  Confirm Follow Up Transport: Family  Discharge Location  Discharge Placement: Home with family assistance    Carlos Rossi RN

## 2021-09-17 NOTE — PROGRESS NOTES
1424- Bedside report given to Memo Glynn RN (oncoming nurse) by SHAUN Grimes (offgoing nurse) to include SBAR, Kardex, and MAR. Pt. In bed resting. Left room with bed in lowest position and locked, and call bell/belongings with in reach. Pt. States she is \"ready to go home\".

## 2021-09-20 LAB
BACTERIA SPEC CULT: NORMAL
SERVICE CMNT-IMP: NORMAL

## 2021-09-21 ENCOUNTER — OFFICE VISIT (OUTPATIENT)
Dept: FAMILY MEDICINE CLINIC | Age: 51
End: 2021-09-21
Payer: MEDICARE

## 2021-09-21 VITALS
DIASTOLIC BLOOD PRESSURE: 81 MMHG | HEIGHT: 63 IN | HEART RATE: 100 BPM | SYSTOLIC BLOOD PRESSURE: 137 MMHG | TEMPERATURE: 98.4 F | OXYGEN SATURATION: 97 % | BODY MASS INDEX: 43.59 KG/M2 | RESPIRATION RATE: 20 BRPM | WEIGHT: 246 LBS

## 2021-09-21 DIAGNOSIS — Z09 HOSPITAL DISCHARGE FOLLOW-UP: ICD-10-CM

## 2021-09-21 DIAGNOSIS — N39.0 URINARY TRACT INFECTION WITHOUT HEMATURIA, SITE UNSPECIFIED: Primary | ICD-10-CM

## 2021-09-21 DIAGNOSIS — I10 ESSENTIAL HYPERTENSION, BENIGN: ICD-10-CM

## 2021-09-21 DIAGNOSIS — M54.42 LEFT-SIDED LOW BACK PAIN WITH LEFT-SIDED SCIATICA, UNSPECIFIED CHRONICITY: ICD-10-CM

## 2021-09-21 PROCEDURE — G8417 CALC BMI ABV UP PARAM F/U: HCPCS | Performed by: FAMILY MEDICINE

## 2021-09-21 PROCEDURE — G8427 DOCREV CUR MEDS BY ELIG CLIN: HCPCS | Performed by: FAMILY MEDICINE

## 2021-09-21 PROCEDURE — G8754 DIAS BP LESS 90: HCPCS | Performed by: FAMILY MEDICINE

## 2021-09-21 PROCEDURE — 99214 OFFICE O/P EST MOD 30 MIN: CPT | Performed by: FAMILY MEDICINE

## 2021-09-21 PROCEDURE — 1111F DSCHRG MED/CURRENT MED MERGE: CPT | Performed by: FAMILY MEDICINE

## 2021-09-21 PROCEDURE — G9717 DOC PT DX DEP/BP F/U NT REQ: HCPCS | Performed by: FAMILY MEDICINE

## 2021-09-21 PROCEDURE — G8752 SYS BP LESS 140: HCPCS | Performed by: FAMILY MEDICINE

## 2021-09-21 RX ORDER — HYDROCODONE BITARTRATE AND ACETAMINOPHEN 5; 325 MG/1; MG/1
1 TABLET ORAL
Qty: 20 TABLET | Refills: 0 | Status: SHIPPED | OUTPATIENT
Start: 2021-09-21 | End: 2021-09-24

## 2021-09-21 RX ORDER — NYSTATIN 100000 [USP'U]/ML
1 SUSPENSION ORAL 4 TIMES DAILY
Qty: 60 ML | Refills: 0 | Status: SHIPPED | OUTPATIENT
Start: 2021-09-21 | End: 2021-10-05

## 2021-09-21 RX ORDER — DICLOFENAC SODIUM 75 MG/1
75 TABLET, DELAYED RELEASE ORAL 2 TIMES DAILY
Qty: 60 TABLET | Refills: 2 | Status: SHIPPED | OUTPATIENT
Start: 2021-09-21 | End: 2021-10-05

## 2021-09-21 NOTE — PROGRESS NOTES
Patient here for hosp f/u acute poly nephritis, [facility]  [facility] .1. Have you been to the ER, urgent care clinic since your last visit? Hospitalized since your last visit? Yes Where: [facility]    2. Have you seen or consulted any other health care providers outside of the [address] [place] [address] since your last visit? Include any pap smears or colon screening. Yes h            Chief Complaint   Patient presents with   [facility] Follow Up     [facility] 9/13-9/17  poly nephritis     She is a 54 y.o. female who presents for evalution. Reviewed PmHx, RxHx, FmHx, SocHx, AllgHx and updated and dated in the chart. Patient Active Problem List    Diagnosis    Tobacco use    Acute pyelonephritis    Diarrhea of presumed infectious origin    CAP (community acquired pneumonia)    Sepsis ([address]    Obesity, morbid ([address]    Pre-diabetes    Urinary frequency    Essential hypertension, benign    DDD (degenerative disc disease), lumbar    Left axillary pain    IBS (irritable bowel syndrome)    Migraine    Hyperlipidemia    Fibromyalgia    Nipple discharge    Depression       Review of Systems - negative except as listed above in the HPI    Objective:     Vitals:    09/21/21 1112   BP: 137/81   Pulse: 100   Resp: 20   Temp: 98.4 °F (36.9 °C)   SpO2: 97%   Weight: 246 lb (111.6 kg)   Height: 5' 3\" (1.6 m)         Assessment/ Plan:   Diagnoses and all orders for this visit:    1. Urinary tract infection without hematuria, site unspecified  -complete care, overall better    2. Essential hypertension, benign  -at goal    3. Hospital discharge follow-up  -     IA DISCHARGE MEDS RECONCILED W/ CURRENT OUTPATIENT MED LIST    4. Left-sided low back pain with left-sided sciatica, unspecified chronicity  -     diclofenac EC (VOLTAREN) 75 mg EC tablet; Take 1 Tablet by mouth two (2) times a day for 14 days.  -     HYDROcodone-acetaminophen (NORCO) 5-325 mg per tablet;  Take 1 Tablet by mouth every eight (8) hours as needed for Pain for up to 3 days. Max Daily Amount: 3 Tablets. - reviewed    Other orders  -     nystatin (MYCOSTATIN) 100,000 unit/mL suspension; Take 5 mL by mouth four (4) times daily. swish and spit             I have discussed the diagnosis with the patient and the intended plan as seen in the above orders. The patient understands and agrees with the plan. The patient has received an after-visit summary and questions were answered concerning future plans. Medication Side Effects and Warnings were discussed with patient  Patient Labs were reviewed and or requested:  Patient Past Records were reviewed and or requested    Ronney Sicard, M.D. There are no Patient Instructions on file for this visit.

## 2021-10-04 ENCOUNTER — NURSE TRIAGE (OUTPATIENT)
Dept: OTHER | Facility: CLINIC | Age: 51
End: 2021-10-04

## 2021-10-04 NOTE — TELEPHONE ENCOUNTER
Reason for Disposition   MODERATE difficulty breathing (e.g., speaks in phrases, SOB even at rest, pulse 100-120) of new onset or worse than normal    Answer Assessment - Initial Assessment Questions  1. RESPIRATORY STATUS: \"Describe your breathing? \" (e.g., wheezing, shortness of breath, unable to speak, severe coughing)       States she is using her nebulizer every night. She wake sup in the middle of the night with coughing and difficulty breathing. 2. ONSET: \"When did this breathing problem begin? \"       A few months, stated she went to the hospital last month. Diagnosed with bronchitis. 3. PATTERN \"Does the difficult breathing come and go, or has it been constant since it started? \"       Comes and goes, worse in the evening. 4. SEVERITY: \"How bad is your breathing? \" (e.g., mild, moderate, severe)     - MILD: No SOB at rest, mild SOB with walking, speaks normally in sentences, can lay down, no retractions, pulse < 100.     - MODERATE: SOB at rest, SOB with minimal exertion and prefers to sit, cannot lie down flat, speaks in phrases, mild retractions, audible wheezing, pulse 100-120.     - SEVERE: Very SOB at rest, speaks in single words, struggling to breathe, sitting hunched forward, retractions, pulse > 120   Moderate         States mainly at night/when she goes to bed. States it is hard to breath during the day at times due to the coughing but nighttime it is worse. 5. RECURRENT SYMPTOM: \"Have you had difficulty breathing before? \" If so, ask: \"When was the last time? \" and \"What happened that time? \"       Yes, for months now. 6. CARDIAC HISTORY: \"Do you have any history of heart disease? \" (e.g., heart attack, angina, bypass surgery, angioplasty)       No     7. LUNG HISTORY: \"Do you have any history of lung disease? \"  (e.g., pulmonary embolus, asthma, emphysema)      Asthma    8. CAUSE: \"What do you think is causing the breathing problem? \"       Unsure.      9. OTHER SYMPTOMS: \"Do you have any other symptoms? (e.g., dizziness, runny nose, cough, chest pain, fever)      Cough,     10. PREGNANCY: \"Is there any chance you are pregnant? \" \"When was your last menstrual period? \"        N/a     11. TRAVEL: \"Have you traveled out of the country in the last month? \" (e.g., travel history, exposures)        N/a    Protocols used: BREATHING DIFFICULTY-ADULT-OH    Received call from Bret Frances at [facility] with Red Flag Complaint. Brief description of triage: moderate/severe SOB and cough. Triage indicates for patient to go to ED. Caller messaged with MD and he stated to make an appointment. I reached out to the office to see if he wanted her to see in office. The  stated Dr. Avel Suazo is completely booked. I will refer patient to ED. Care advice provided, patient verbalizes understanding; denies any other questions or concerns; instructed to call back for any new or worsening symptoms. Attention Provider: Thank you for allowing me to participate in the care of your patient. The patient was connected to triage in response to information provided to the [facility]. Please do not respond through this encounter as the response is not directed to a shared pool.

## 2021-10-05 ENCOUNTER — OFFICE VISIT (OUTPATIENT)
Dept: FAMILY MEDICINE CLINIC | Age: 51
End: 2021-10-05
Payer: MEDICARE

## 2021-10-05 ENCOUNTER — HOSPITAL ENCOUNTER (OUTPATIENT)
Dept: GENERAL RADIOLOGY | Age: 51
Discharge: HOME | End: 2021-10-05
Payer: MEDICARE

## 2021-10-05 VITALS
BODY MASS INDEX: 42.52 KG/M2 | RESPIRATION RATE: 18 BRPM | HEIGHT: 63 IN | HEART RATE: 109 BPM | TEMPERATURE: 98.9 F | DIASTOLIC BLOOD PRESSURE: 83 MMHG | WEIGHT: 240 LBS | SYSTOLIC BLOOD PRESSURE: 135 MMHG | OXYGEN SATURATION: 92 %

## 2021-10-05 DIAGNOSIS — M79.7 FIBROMYALGIA: ICD-10-CM

## 2021-10-05 DIAGNOSIS — J45.909 PERSISTENT ASTHMA WITHOUT COMPLICATION, UNSPECIFIED ASTHMA SEVERITY: ICD-10-CM

## 2021-10-05 DIAGNOSIS — I10 ESSENTIAL HYPERTENSION, BENIGN: ICD-10-CM

## 2021-10-05 DIAGNOSIS — R06.02 SOB (SHORTNESS OF BREATH): ICD-10-CM

## 2021-10-05 DIAGNOSIS — J44.9 CHRONIC OBSTRUCTIVE PULMONARY DISEASE, UNSPECIFIED COPD TYPE (HCC): ICD-10-CM

## 2021-10-05 DIAGNOSIS — R06.02 SOB (SHORTNESS OF BREATH): Primary | ICD-10-CM

## 2021-10-05 PROCEDURE — G8752 SYS BP LESS 140: HCPCS | Performed by: FAMILY MEDICINE

## 2021-10-05 PROCEDURE — G9717 DOC PT DX DEP/BP F/U NT REQ: HCPCS | Performed by: FAMILY MEDICINE

## 2021-10-05 PROCEDURE — G8427 DOCREV CUR MEDS BY ELIG CLIN: HCPCS | Performed by: FAMILY MEDICINE

## 2021-10-05 PROCEDURE — 99214 OFFICE O/P EST MOD 30 MIN: CPT | Performed by: FAMILY MEDICINE

## 2021-10-05 PROCEDURE — G8754 DIAS BP LESS 90: HCPCS | Performed by: FAMILY MEDICINE

## 2021-10-05 PROCEDURE — G8417 CALC BMI ABV UP PARAM F/U: HCPCS | Performed by: FAMILY MEDICINE

## 2021-10-05 PROCEDURE — 1111F DSCHRG MED/CURRENT MED MERGE: CPT | Performed by: FAMILY MEDICINE

## 2021-10-05 PROCEDURE — 71046 X-RAY EXAM CHEST 2 VIEWS: CPT

## 2021-10-05 RX ORDER — BUDESONIDE, GLYCOPYRROLATE, AND FORMOTEROL FUMARATE 160; 9; 4.8 UG/1; UG/1; UG/1
2 AEROSOL, METERED RESPIRATORY (INHALATION) 2 TIMES DAILY
Qty: 1 EACH | Refills: 5 | Status: SHIPPED | OUTPATIENT
Start: 2021-10-05

## 2021-10-05 NOTE — PROGRESS NOTES
Chief Complaint   Patient presents with    Hypertension    Extremity Weakness     Face to face for  Rollator/walker    Breathing Problem     1. Have you been to the ER, urgent care clinic since your last visit? Hospitalized since your last visit? Yes Where: [facility]  ED 9/13/21 UTI    2. Have you seen or consulted any other health care providers outside of the [address] [place] [address] since your last visit? Include any pap smears or colon screening. No      Patient would benefit from a rollator walker due to degenerative disc disease and lower extremity weakness. Patient would like to be more independent going grocery shopping, medical appointments and outside to walk in neighborhood or park to increase her health, but gets tired easily and has no way to stop and sit to rest.   A rollator walker with a seat would provide the health benefits she requires, keeping her safe and independent. Chief Complaint   Patient presents with    Hypertension    Extremity Weakness     Face to face for  Rollator/walker    Breathing Problem     She is a 54 y.o. female who presents for evalution. Reviewed PmHx, RxHx, FmHx, SocHx, AllgHx and updated and dated in the chart.     Patient Active Problem List    Diagnosis    Tobacco use    Acute pyelonephritis    Diarrhea of presumed infectious origin    CAP (community acquired pneumonia)    Sepsis ([address]    Obesity, morbid ([address]    Pre-diabetes    Urinary frequency    Essential hypertension, benign    DDD (degenerative disc disease), lumbar    Left axillary pain    IBS (irritable bowel syndrome)    Migraine    Hyperlipidemia    Fibromyalgia    Nipple discharge    Depression       Review of Systems - negative except as listed above in the HPI    Objective:     Vitals:    10/05/21 1327   BP: 135/83   Pulse: (!) 109   Resp: 18   Temp: 98.9 °F (37.2 °C)   TempSrc: Oral   SpO2: 92%   Weight: 240 lb (108.9 kg)   Height: 5' 3\" (1.6 m)         Assessment/ Plan:   Diagnoses and all orders for this visit:    1. SOB (shortness of breath)  -     XR CHEST PA LAT; Future  -     budesonide-glycopyr-formoterol (Breztri Aerosphere) 160-9-4.8 mcg/actuation HFAA; Take 2 Puffs by inhalation two (2) times a day. Patient complains of progressive shortness of breath over the last couple of months. Patient does continue to smoke. O2 sats noted to be 92% today. Patient does not like the Advair and thinks not helping her. Patient does have ProAir at home. We will send for x-ray. Discussed patient that she does have emphysema with low oxygen saturation was contributing to her symptoms. On exam she has decreased perfusion over the bases of bilateral lungs. There is no wheezing rhonchi or rales. Start patient on Breo history as above discussed with patient the importance of discontinuing smoking and may refer to pulmonary if not better. 2. Persistent asthma without complication, unspecified asthma severity  -     XR CHEST PA LAT; Future  -     budesonide-glycopyr-formoterol (Breztri Aerosphere) 160-9-4.8 mcg/actuation HFAA; Take 2 Puffs by inhalation two (2) times a day. As above  3. Essential hypertension, benign  At goal  4. Fibromyalgia  Seeing rheumatology  Patient has diffuse joint pains in upper lower extremities is requesting walker as seen in subjective. 5. Chronic obstructive pulmonary disease, unspecified COPD type ([facility]  -     budesonide-glycopyr-formoterol (Breztri Aerosphere) 160-9-4.8 mcg/actuation HFAA; Take 2 Puffs by inhalation two (2) times a day. I have discussed the diagnosis with the patient and the intended plan as seen in the above orders. The patient understands and agrees with the plan. The patient has received an after-visit summary and questions were answered concerning future plans.      Medication Side Effects and Warnings were discussed with patient  Patient Labs were reviewed and or requested:  Patient Past Records were reviewed and or requested    River Crowe M.D. There are no Patient Instructions on file for this visit.

## 2021-10-14 DIAGNOSIS — F41.9 ANXIETY: ICD-10-CM

## 2021-10-14 RX ORDER — ALPRAZOLAM 0.5 MG/1
TABLET ORAL
Qty: 30 TABLET | Refills: 1 | Status: SHIPPED | OUTPATIENT
Start: 2021-10-14 | End: 2021-11-08

## 2021-10-19 ENCOUNTER — PATIENT MESSAGE (OUTPATIENT)
Dept: FAMILY MEDICINE CLINIC | Age: 51
End: 2021-10-19

## 2021-10-21 NOTE — TELEPHONE ENCOUNTER
[place] from [employer]. Rollator denied due to out of network with insurance. Order sent to [facility].  Awaiting approval.

## 2021-10-25 ENCOUNTER — DOCUMENTATION ONLY (OUTPATIENT)
Dept: FAMILY MEDICINE CLINIC | Age: 51
End: 2021-10-25

## 2021-10-26 ENCOUNTER — DOCUMENTATION ONLY (OUTPATIENT)
Dept: FAMILY MEDICINE CLINIC | Age: 51
End: 2021-10-26

## 2021-10-26 NOTE — PROGRESS NOTES
[facility] confirmation of order was signed & faxed to [phone],ok,Copy placed in scan folder to be scanned to chart.

## 2021-11-03 DIAGNOSIS — G89.29 CHRONIC LEFT SHOULDER PAIN: ICD-10-CM

## 2021-11-03 DIAGNOSIS — M25.512 CHRONIC LEFT SHOULDER PAIN: ICD-10-CM

## 2021-11-04 RX ORDER — CYCLOBENZAPRINE HCL 10 MG
TABLET ORAL
Qty: 30 TABLET | Refills: 0 | Status: SHIPPED | OUTPATIENT
Start: 2021-11-04 | End: 2021-11-08

## 2021-11-07 DIAGNOSIS — G89.29 CHRONIC LEFT SHOULDER PAIN: ICD-10-CM

## 2021-11-07 DIAGNOSIS — M25.512 CHRONIC LEFT SHOULDER PAIN: ICD-10-CM

## 2021-11-08 DIAGNOSIS — F41.9 ANXIETY: ICD-10-CM

## 2021-11-08 RX ORDER — CYCLOBENZAPRINE HCL 10 MG
TABLET ORAL
Qty: 30 TABLET | Refills: 0 | Status: SHIPPED | OUTPATIENT
Start: 2021-11-08 | End: 2022-01-16 | Stop reason: SDUPTHER

## 2021-11-08 RX ORDER — ALPRAZOLAM 0.5 MG/1
TABLET ORAL
Qty: 90 TABLET | Refills: 0 | Status: SHIPPED | OUTPATIENT
Start: 2021-11-08 | End: 2021-12-07

## 2021-12-06 DIAGNOSIS — F41.9 ANXIETY: ICD-10-CM

## 2021-12-07 RX ORDER — ALPRAZOLAM 0.5 MG/1
TABLET ORAL
Qty: 90 TABLET | Refills: 1 | Status: SHIPPED | OUTPATIENT
Start: 2021-12-07 | End: 2022-07-21

## 2022-01-10 ENCOUNTER — DOCUMENTATION ONLY (OUTPATIENT)
Dept: SLEEP MEDICINE | Age: 52
End: 2022-01-10

## 2022-01-10 NOTE — PROGRESS NOTES
Returned patient's voicemail requesting an appointment to discuss [employer] recalled device. Message was left for patient to call office.

## 2022-01-16 DIAGNOSIS — M25.512 CHRONIC LEFT SHOULDER PAIN: ICD-10-CM

## 2022-01-16 DIAGNOSIS — G89.29 CHRONIC LEFT SHOULDER PAIN: ICD-10-CM

## 2022-01-17 RX ORDER — CYCLOBENZAPRINE HCL 10 MG
TABLET ORAL
Qty: 30 TABLET | Refills: 0 | Status: SHIPPED | OUTPATIENT
Start: 2022-01-17 | End: 2022-02-07

## 2022-02-05 DIAGNOSIS — M25.512 CHRONIC LEFT SHOULDER PAIN: ICD-10-CM

## 2022-02-05 DIAGNOSIS — G89.29 CHRONIC LEFT SHOULDER PAIN: ICD-10-CM

## 2022-02-07 ENCOUNTER — OFFICE VISIT (OUTPATIENT)
Dept: SLEEP MEDICINE | Age: 52
End: 2022-02-07
Payer: COMMERCIAL

## 2022-02-07 VITALS
BODY MASS INDEX: 44.89 KG/M2 | DIASTOLIC BLOOD PRESSURE: 81 MMHG | OXYGEN SATURATION: 96 % | WEIGHT: 253.4 LBS | SYSTOLIC BLOOD PRESSURE: 125 MMHG | TEMPERATURE: 97.3 F | HEART RATE: 117 BPM

## 2022-02-07 DIAGNOSIS — E66.2 HYPOVENTILATION ASSOCIATED WITH OBESITY SYNDROME (HCC): ICD-10-CM

## 2022-02-07 DIAGNOSIS — I10 ESSENTIAL HYPERTENSION: ICD-10-CM

## 2022-02-07 DIAGNOSIS — G47.33 OSA (OBSTRUCTIVE SLEEP APNEA): Primary | ICD-10-CM

## 2022-02-07 PROCEDURE — 99213 OFFICE O/P EST LOW 20 MIN: CPT | Performed by: INTERNAL MEDICINE

## 2022-02-07 RX ORDER — CYCLOBENZAPRINE HCL 10 MG
TABLET ORAL
Qty: 30 TABLET | Refills: 0 | Status: SHIPPED | OUTPATIENT
Start: 2022-02-07 | End: 2022-05-30 | Stop reason: SDUPTHER

## 2022-02-07 NOTE — PATIENT INSTRUCTIONS
[address] [facility] [place]. [place], [address]  Tel.  [phone]  Fax. [address] [facility] 60  [place], [address] [facility]  Tel.  [phone]  Fax. [phone] [address] [place]  Tel.  [phone]  Fax. [phone]     Sleep Apnea: After Your Visit  Your Care Instructions  Sleep apnea occurs when you frequently stop breathing for 10 seconds or longer during sleep. It can be mild to severe, based on the number of times per hour that you stop breathing or have slowed breathing. Blocked or narrowed airways in your nose, mouth, or throat can cause sleep apnea. Your airway can become blocked when your throat muscles and tongue relax during sleep. Sleep apnea is common, occurring in 1 out of 20 individuals. Individuals having any of the following characteristics should be evaluated and treated right away due to high risk and detrimental consequences from untreated sleep apnea:  1. Obesity  2. Congestive Heart failure  3. Atrial Fibrillation  4. Uncontrolled Hypertension  5. Type II Diabetes  6. Night-time Arrhythmias  7. Stroke  8. Pulmonary Hypertension  9. High-risk Driving Populations (pilots, truck drivers, etc.)  10. Patients Considering Weight-loss Surgery    How do you know you have sleep apnea? You probably have sleep apnea if you answer 'yes' to 3 or more of the following questions:  S - Have you been told that you Snore? T - Are you often Tired during the day? O - Has anyone Observed you stop breathing while sleeping? P- Do you have (or are being treated for) high blood Pressure? B - Are you obese (Body Mass Index > 35)? A - Is your Age 48years old or older? N - Is your Neck size greater than 16 inches? G - Are you male Gender? A sleep physician can prescribe a breathing device that prevents tissues in the throat from blocking your airway.  Or your doctor may recommend using a dental device (oral breathing device) to help keep your airway open. In some cases, surgery may be needed to remove enlarged tissues in the throat. Follow-up care is a key part of your treatment and safety. Be sure to make and go to all appointments, and call your doctor if you are having problems. It's also a good idea to know your test results and keep a list of the medicines you take. How can you care for yourself at home? · Lose weight, if needed. It may reduce the number of times you stop breathing or have slowed breathing. · Go to bed at the same time every night. · Sleep on your side. It may stop mild apnea. If you tend to roll onto your back, sew a pocket in the back of your pajama top. Put a tennis ball into the pocket, and stitch the pocket shut. This will help keep you from sleeping on your back. · Avoid alcohol and medicines such as sleeping pills and sedatives before bed. · Do not smoke. Smoking can make sleep apnea worse. If you need help quitting, talk to your doctor about stop-smoking programs and medicines. These can increase your chances of quitting for good. · Prop up the head of your bed 4 to 6 inches by putting bricks under the legs of the bed. · Treat breathing problems, such as a stuffy nose, caused by a cold or allergies. · Use a continuous positive airway pressure (CPAP) breathing machine if lifestyle changes do not help your apnea and your doctor recommends it. The machine keeps your airway from closing when you sleep. · If CPAP does not help you, ask your doctor whether you should try other breathing machines. A bilevel positive airway pressure machine has two types of air pressureâone for breathing in and one for breathing out. Another device raises or lowers air pressure as needed while you breathe. · If your nose feels dry or bleeds when using one of these machines, talk with your doctor about increasing moisture in the air. A humidifier may help.   · If your nose is runny or stuffy from using a breathing machine, talk with your doctor about using decongestants or a corticosteroid nasal spray. When should you call for help? Watch closely for changes in your health, and be sure to contact your doctor if:  · You still have sleep apnea even though you have made lifestyle changes. · You are thinking of trying a device such as CPAP. · You are having problems using a CPAP or similar machine. Where can you learn more? Go to [employer]. Enter F964 in the search box to learn more about \"Sleep Apnea: After Your Visit. \"   © 8896-1150 Healthwise, Incorporated. Care instructions adapted under license by [facility] [employer] (which disclaims liability or warranty for this information). This care instruction is for use with your licensed healthcare professional. If you have questions about a medical condition or this instruction, always ask your healthcare professional. Ken Hence any warranty or liability for your use of this information. PROPER SLEEP HYGIENE    What to avoid  · Do not have drinks with caffeine, such as coffee or black tea, for 8 hours before bed. · Do not smoke or use other types of tobacco near bedtime. Nicotine is a stimulant and can keep you awake. · Avoid drinking alcohol late in the evening, because it can cause you to wake in the middle of the night. · Do not eat a big meal close to bedtime. If you are hungry, eat a light snack. · Do not drink a lot of water close to bedtime, because the need to urinate may wake you up during the night. · Do not read or watch TV in bed. Use the bed only for sleeping and sexual activity. What to try  · Go to bed at the same time every night, and wake up at the same time every morning. Do not take naps during the day. · Keep your bedroom quiet, dark, and cool. · Get regular exercise, but not within 3 to 4 hours of your bedtime. .  · Sleep on a comfortable pillow and mattress.   · If watching the clock makes you anxious, turn it facing away from you so you cannot see the time. · If you worry when you lie down, start a worry book. Well before bedtime, write down your worries, and then set the book and your concerns aside. · Try meditation or other relaxation techniques before you go to bed. · If you cannot fall asleep, get up and go to another room until you feel sleepy. Do something relaxing. Repeat your bedtime routine before you go to bed again. · Make your house quiet and calm about an hour before bedtime. Turn down the lights, turn off the TV, log off the computer, and turn down the volume on music. This can help you relax after a busy day. Drowsy Driving  The [address] [place] [address] Road Traffic Safety Administration cites drowsiness as a causing factor in more than 769,688 police reported crashes annually, resulting in 76,000 injuries and 1,500 deaths. Other surveys suggest 55% of people polled have driven while drowsy in the past year, 23% had fallen asleep but not crashed, 3% crashed, and 2% had and accident due to drowsy driving. Who is at risk? Young Drivers: One study of drowsy driving accidents states that 55% of the drivers were under 25 years. Of those, 75% were male. Shift Workers and Travelers: People who work overnight or travel across time zones frequently are at higher risk of experiencing Circadian Rhythm Disorders. They are trying to work and function when their body is programed to sleep. Sleep Deprived: Lack of sleep has a serious impact on your ability to pay attention or focus on a task. Consistently getting less than the average of 8 hours your body needs creates partial or cumulative sleep deprivation. Untreated Sleep Disorders: Sleep Apnea, Narcolepsy, R.L.S., and other sleep disorders (untreated) prevent a person from getting enough restful sleep. This leads to excessive daytime sleepiness and increases the risk for drowsy driving accidents by up to 7 times.   Medications / Alcohol: Even over the counter medications can cause drowsiness. Medications that impair a drivers attention should have a warning label. Alcohol naturally makes you sleepy and on its own can cause accidents. Combined with excessive drowsiness its effects are amplified. Signs of Drowsy Driving:   * You don't remember driving the last few miles   * You may drift out of your zach   * You are unable to focus and your thoughts wander   * You may yawn more often than normal   * You have difficulty keeping your eyes open / nodding off   * Missing traffic signs, speeding, or tailgating  Prevention-   Good sleep hygiene, lifestyle and behavioral choices have the most impact on drowsy driving. There is no substitute for sleep and the average person requires 8 hours nightly. If you find yourself driving drowsy, stop and sleep. Consider the sleep hygiene tips provided during your visit as well. Medication Refill Policy: Refills for all medications require 1 week advance notice. Please have your pharmacy fax a refill request. We are unable to fax, or call in \"controled substance\" medications and you will need to pick these prescriptions up from our office. [employer] Activation    Thank you for requesting access to [employer]. Please follow the instructions below to securely access and download your online medical record. [employer] allows you to send messages to your doctor, view your test results, renew your prescriptions, schedule appointments, and more. How Do I Sign Up? 1. In your internet browser, go to [email or web address]. 2. Click on the First Time User? Click Here link in the Sign In box. You will see the New Member Sign Up page. 3. Enter your [employer] Access Code exactly as it appears below. You will not need to use this code after youve completed the sign-up process. If you do not sign up before the expiration date, you must request a new code. [employer] Access Code:  Activation code not generated  Current [employer] Status: Active (This is the date your [employer] access code will )    4. Enter the last four digits of your Social Security Number (xxxx) and Date of Birth (mm/dd/yyyy) as indicated and click Submit. You will be taken to the next sign-up page. 5. Create a [employer] ID. This will be your [employer] login ID and cannot be changed, so think of one that is secure and easy to remember. 6. Create a [employer] password. You can change your password at any time. 7. Enter your Password Reset Question and Answer. This can be used at a later time if you forget your password. 8. Enter your e-mail address. You will receive e-mail notification when new information is available in [address] 9. Click Sign Up. You can now view and download portions of your medical record. 10. Click the Download Summary menu link to download a portable copy of your medical information. Additional Information    If you have questions, please call [phone]. Remember, [employer] is NOT to be used for urgent needs. For medical emergencies, dial 911.

## 2022-02-07 NOTE — PROGRESS NOTES
[address] [facility] [place]. [place], [address]  Tel.  [phone]  Fax. 100 [facility] 60  [place], [address] [facility]  Tel.  [phone]  Fax. [phone] [address]  Tel.  [phone]  Fax. [phone]       Cristina Chisholm is a 50y.o. year old female seen for evaluation of a sleep disorder. ASSESSMENT/PLAN:      ICD-10-CM ICD-9-CM    1. ELLA (obstructive sleep apnea)  G47.33 327.23 POLYSOMNOGRAPHY 1 NIGHT   2. Hypoventilation associated with obesity syndrome (HCC)  E66.2 278.03 POLYSOMNOGRAPHY 1 NIGHT      BLOOD GAS, ARTERIAL   3. Essential hypertension  I10 401.9    4. BMI 40.0-44.9, adult [facility])  Z68.41 V85.41        Patient has a history and examination consistent with the diagnosis of sleep apnea. Follow-up and Dispositions    · Return for telephone follow-up after testing is completed. * The patient currently has a High Risk for having sleep apnea. STOP-BANG score 6.    * Sleep testing was ordered for initial evaluation. Orders Placed This Encounter    BLOOD GAS, ARTERIAL     Standing Status:   Future     Standing Expiration Date:   8/6/2022     Scheduling Instructions:      Patient is to call [phone] to schedule testing.  POLYSOMNOGRAPHY 1 NIGHT     Standing Status:   Future     Standing Expiration Date:   8/8/2022     Scheduling Instructions:      Perform ETCO2 monitoring during Polysomnography     Order Specific Question:   Reason for Exam     Answer:   ELLA       * She was provided information on sleep apnea including corresponding risk factors and the importance of proper treatment. * Treatment options were reviewed in detail. she would like to proceed with PAP therapy. Patient will be seen in follow-up in 6-8 weeks after PAP setup to gauge treatment response and adherence to therapy.      * The patient was counseled regarding proper sleep hygiene, with emphasis on ensuring sufficient total sleep time; safe driving and the benefits of exercise and weight loss. * All of her questions were addressed. 2. Recommended a dedicated weight loss program through appropriate diet and exercise regimen as significant weight reduction has been shown to reduce severity of obstructive sleep apnea. SUBJECTIVE/OBJECTIVE:    Ranjith Farfan is an 50 y.o. female referred for evaluation for a sleep disorder. She complains of snoring associated with periods of not breathing, excessive daytime sleepiness. Symptoms began several years ago, She was diagnosed with with ELLA and was initially prescribed a CPAP Device which she did not tolerated and has been on a Bi-Level PAP device since that time. This device was recalled and she has not been on therapy since July 2021. She reports of persistent snoring and sleeping difficulties without PAP therapy. She usually gets in bed at 10:00 pm and view television until sleep onset at 2;00 am.  Family or house members note snoring, periods of not breathing. The patient has undergone diagnostic testing for the current problems. Polysomnogram (PSG) performed on 10-13-20 showed an AHI of 12.9/hr with a lowest SpO2 of 83%, duration of SpO2 < 88% 5.10 minutes. Review of Systems:  Constitutional:  significant weight gain  Eyes:  No blurred vision  CVS:  No significant chest pain  Pulm:  No significant shortness of breath  GI:  No significant nausea or vomiting  :  No significant nocturia  Musculoskeletal:  No significant joint pain at night  Skin:  No significant rashes  Neuro:  No significant dizziness   Psych:  No active mood issues    Sleep Review of Systems: notable for Positive difficulty falling asleep; Positive awakenings at night; Positive perceived regular dreaming; Negative nightmares; Negative  early morning headaches; Negative  memory problems; Negative  concentration issues;  Negative caffeine;  Negative alcohol;   Negative history of any automobile or occupational accidents due to daytime drowsiness. [place] Sleepiness Score: 10   and Modified F.O.S.Q. Score Total / 2: 14.5    Visit Vitals  /81   Pulse (!) 117   Temp 97.3 °F (36.3 °C)   Wt 253 lb 6.4 oz (114.9 kg)   SpO2 96%   BMI 44.89 kg/m²           General:   Alert, oriented, not in acute distress   Eyes:  Anicteric Sclerae; intact EOM's   Nose:  No obvious nasal septum deviation    Oropharynx:   Mallampati score 4, thick tongue base, uvula not seen due to low-lying soft palate, narrow tonsilo-pharyngeal pilars, tongue scalloped   Neck:   midline trachea,  no JVD   Chest/Lungs:  symmetrical lung expansion ,clear lung fields on auscultation    CVS:  Normal rate, regular rhythm    Extremities:  No obvious rashes, mild edema    Neuro:  No focal deficits; No obvious tremor    Psych:  Normal eye contact; normal  affect, normal countenance        Candie Lisa MD, FAASM  Diplomate American Board of Sleep Medicine  Diplomate in Sleep Medicine - ABP    Electronically signed.  02/07/22

## 2022-02-15 ENCOUNTER — OFFICE VISIT (OUTPATIENT)
Dept: FAMILY MEDICINE CLINIC | Age: 52
End: 2022-02-15
Payer: COMMERCIAL

## 2022-02-15 VITALS
OXYGEN SATURATION: 97 % | BODY MASS INDEX: 45 KG/M2 | SYSTOLIC BLOOD PRESSURE: 124 MMHG | RESPIRATION RATE: 15 BRPM | WEIGHT: 254 LBS | TEMPERATURE: 98.2 F | HEIGHT: 63 IN | HEART RATE: 109 BPM | DIASTOLIC BLOOD PRESSURE: 84 MMHG

## 2022-02-15 DIAGNOSIS — R73.03 PRE-DIABETES: ICD-10-CM

## 2022-02-15 DIAGNOSIS — M79.672 INTRACTABLE LEFT HEEL PAIN: ICD-10-CM

## 2022-02-15 DIAGNOSIS — Z00.00 ROUTINE GENERAL MEDICAL EXAMINATION AT A HEALTH CARE FACILITY: Primary | ICD-10-CM

## 2022-02-15 DIAGNOSIS — I10 ESSENTIAL HYPERTENSION, BENIGN: ICD-10-CM

## 2022-02-15 DIAGNOSIS — M51.36 DDD (DEGENERATIVE DISC DISEASE), LUMBAR: ICD-10-CM

## 2022-02-15 DIAGNOSIS — E78.00 PURE HYPERCHOLESTEROLEMIA: ICD-10-CM

## 2022-02-15 DIAGNOSIS — Z12.11 COLON CANCER SCREENING: ICD-10-CM

## 2022-02-15 PROCEDURE — 99396 PREV VISIT EST AGE 40-64: CPT | Performed by: FAMILY MEDICINE

## 2022-02-15 NOTE — PROGRESS NOTES
Chief Complaint   Patient presents with    Annual Wellness Visit     Due 2/24/22    Labs     Fasting today    Leg Pain     Right leg    Foot Pain     Bottom of left heel    Anxiety     1. Have you been to the ER, urgent care clinic since your last visit? Hospitalized since your last visit? No    2. Have you seen or consulted any other health care providers outside of the [address] [place] [address] since your last visit? Include any pap smears or colon screening. Yes Where: [facility]      Chief Complaint   Patient presents with   Annette Delgado Annual Wellness Visit     Due 2/24/22    Labs     Fasting today    Leg Pain     Right leg    Foot Pain     Bottom of left heel    Anxiety     she is a 50y.o. year old female who presents for evalution. Reviewed PmHx, RxHx, FmHx, SocHx, AllgHx and updated and dated in the chart. Patient Active Problem List    Diagnosis    Tobacco use    Acute pyelonephritis    Diarrhea of presumed infectious origin    CAP (community acquired pneumonia)    Sepsis ([address]    Obesity, morbid ([address]    Pre-diabetes    Urinary frequency    Essential hypertension, benign    DDD (degenerative disc disease), lumbar    Left axillary pain    IBS (irritable bowel syndrome)    Migraine    Hyperlipidemia    Fibromyalgia    Nipple discharge    Depression       Review of Systems - negative except as listed above in the HPI    Objective:     Vitals:    02/15/22 1438   BP: 124/84   Pulse: (!) 109   Resp: 15   Temp: 98.2 °F (36.8 °C)   TempSrc: Oral   SpO2: 97%   Weight: 254 lb (115.2 kg)   Height: 5' 3\" (1.6 m)     Physical Examination: General appearance - alert, well appearing, and in no distress  Chest - clear to auscultation, no wheezes, rales or rhonchi, symmetric air entry  Heart - normal rate, regular rhythm, normal S1, S2, no murmurs, rubs, clicks or gallops    Assessment/ Plan:   Diagnoses and all orders for this visit:    1.  Routine general medical examination at a [facility] care facility  -     LIPID PANEL; Future  -     CBC WITH AUTOMATED DIFF; Future  -     TSH 3RD GENERATION; Future  -     METABOLIC PANEL, COMPREHENSIVE; Future  -     HEMOGLOBIN A1C WITH EAG; Future  See below  2. DDD (degenerative disc disease), lumbar  Stable  3. Intractable left heel pain  Refer to podiatry secondary to plantars wart  4. Pre-diabetes  -     METABOLIC PANEL, COMPREHENSIVE; Future  -     HEMOGLOBIN A1C WITH EAG; Future    5. Pure hypercholesterolemia  -     LIPID PANEL; Future  -     METABOLIC PANEL, COMPREHENSIVE; Future    6. Essential hypertension, benign  -     LIPID PANEL; Future  -     METABOLIC PANEL, COMPREHENSIVE; Future  At goal  7. Colon cancer screening  -     COLOGUARD TEST (FECAL DNA COLORECTAL CANCER SCREENING)       -Patient is in good health  -Discussed with patient cancer risk factors and screens needed  -Colonoscopy was recommended based on current guidelines for screening.  -Labs from previous visits were discussed with patient yes  -Discussed with patient diet and exercise  -Immunizations appropriate for age were discussed with pt and updated  -    I have discussed the diagnosis with the patient and the intended plan as seen in the above orders. The patient understands and agrees with the plan. The patient has received an after-visit summary and questions were answered concerning future plans. Medication Side Effects and Warnings were discussed with patient  Patient Labs were reviewed and or requested  Patient Past Records were reviewed and or requested     There are no Patient Instructions on file for this visit.         Marcelo Lopez M.D.

## 2022-02-16 PROBLEM — E11.9 CONTROLLED TYPE 2 DIABETES MELLITUS WITHOUT COMPLICATION, WITHOUT LONG-TERM CURRENT USE OF INSULIN (HCC): Chronic | Status: ACTIVE | Noted: 2022-02-16

## 2022-02-16 LAB
ALBUMIN SERPL-MCNC: 4.1 G/DL (ref 3.8–4.8)
ALBUMIN/GLOB SERPL: 1.6 {RATIO} (ref 1.2–2.2)
ALP SERPL-CCNC: 69 IU/L (ref 44–121)
ALT SERPL-CCNC: 28 IU/L (ref 0–32)
AST SERPL-CCNC: 36 IU/L (ref 0–40)
BASOPHILS # BLD AUTO: 0 X10E3/UL (ref 0–0.2)
BASOPHILS NFR BLD AUTO: 0 %
BILIRUB SERPL-MCNC: 0.2 MG/DL (ref 0–1.2)
BUN SERPL-MCNC: 12 MG/DL (ref 6–24)
BUN/CREAT SERPL: 17 (ref 9–23)
CALCIUM SERPL-MCNC: 8.9 MG/DL (ref 8.7–10.2)
CHLORIDE SERPL-SCNC: 96 MMOL/L (ref 96–106)
CHOLEST SERPL-MCNC: 182 MG/DL (ref 100–199)
CO2 SERPL-SCNC: 18 MMOL/L (ref 20–29)
CREAT SERPL-MCNC: 0.71 MG/DL (ref 0.57–1)
EOSINOPHIL # BLD AUTO: 0.1 X10E3/UL (ref 0–0.4)
EOSINOPHIL NFR BLD AUTO: 1 %
ERYTHROCYTE [DISTWIDTH] IN BLOOD BY AUTOMATED COUNT: 13.6 % (ref 11.7–15.4)
EST. AVERAGE GLUCOSE BLD GHB EST-MCNC: 131 MG/DL
GLOBULIN SER CALC-MCNC: 2.5 G/DL (ref 1.5–4.5)
GLUCOSE SERPL-MCNC: 115 MG/DL (ref 65–99)
HBA1C MFR BLD: 6.2 % (ref 4.8–5.6)
HCT VFR BLD AUTO: 39.9 % (ref 34–46.6)
HDLC SERPL-MCNC: 52 MG/DL
HGB BLD-MCNC: 13.3 G/DL (ref 11.1–15.9)
IMM GRANULOCYTES # BLD AUTO: 0 X10E3/UL (ref 0–0.1)
IMM GRANULOCYTES NFR BLD AUTO: 0 %
IMP & REVIEW OF LAB RESULTS: NORMAL
LDLC SERPL CALC-MCNC: 90 MG/DL (ref 0–99)
LYMPHOCYTES # BLD AUTO: 2.9 X10E3/UL (ref 0.7–3.1)
LYMPHOCYTES NFR BLD AUTO: 34 %
MCH RBC QN AUTO: 30.2 PG (ref 26.6–33)
MCHC RBC AUTO-ENTMCNC: 33.3 G/DL (ref 31.5–35.7)
MCV RBC AUTO: 91 FL (ref 79–97)
MONOCYTES # BLD AUTO: 0.5 X10E3/UL (ref 0.1–0.9)
MONOCYTES NFR BLD AUTO: 6 %
NEUTROPHILS # BLD AUTO: 4.9 X10E3/UL (ref 1.4–7)
NEUTROPHILS NFR BLD AUTO: 59 %
PLATELET # BLD AUTO: 302 X10E3/UL (ref 150–450)
POTASSIUM SERPL-SCNC: 4.2 MMOL/L (ref 3.5–5.2)
PROT SERPL-MCNC: 6.6 G/DL (ref 6–8.5)
RBC # BLD AUTO: 4.41 X10E6/UL (ref 3.77–5.28)
SODIUM SERPL-SCNC: 136 MMOL/L (ref 134–144)
TRIGL SERPL-MCNC: 240 MG/DL (ref 0–149)
TSH SERPL DL<=0.005 MIU/L-ACNC: 1.76 UIU/ML (ref 0.45–4.5)
VLDLC SERPL CALC-MCNC: 40 MG/DL (ref 5–40)
WBC # BLD AUTO: 8.5 X10E3/UL (ref 3.4–10.8)

## 2022-02-22 ENCOUNTER — HOSPITAL ENCOUNTER (OUTPATIENT)
Dept: PULMONOLOGY | Age: 52
Discharge: HOME | End: 2022-02-22
Attending: INTERNAL MEDICINE
Payer: COMMERCIAL

## 2022-02-22 ENCOUNTER — HOSPITAL ENCOUNTER (OUTPATIENT)
Dept: SLEEP MEDICINE | Age: 52
Discharge: HOME | End: 2022-02-22
Payer: COMMERCIAL

## 2022-02-22 VITALS
SYSTOLIC BLOOD PRESSURE: 120 MMHG | TEMPERATURE: 97.6 F | DIASTOLIC BLOOD PRESSURE: 80 MMHG | OXYGEN SATURATION: 96 % | HEART RATE: 100 BPM

## 2022-02-22 DIAGNOSIS — E66.2 HYPOVENTILATION ASSOCIATED WITH OBESITY SYNDROME (HCC): ICD-10-CM

## 2022-02-22 DIAGNOSIS — G47.33 OSA (OBSTRUCTIVE SLEEP APNEA): ICD-10-CM

## 2022-02-22 LAB
ARTERIAL PATENCY WRIST A: YES
BASE EXCESS BLDA CALC-SCNC: 0.4 MMOL/L
BDY SITE: NORMAL
BREATHS.SPONTANEOUS ON VENT: 17
FIO2 ON VENT: 21 %
HCO3 BLDA-SCNC: 25 MMOL/L (ref 22–26)
PCO2 BLDA: 39 MMHG (ref 35–45)
PH BLDA: 7.42 [PH] (ref 7.35–7.45)
PO2 BLDA: 81 MMHG (ref 80–100)
SAO2 % BLD: 96 % (ref 92–97)
SAO2% DEVICE SAO2% SENSOR NAME: NORMAL
SPECIMEN SITE: NORMAL

## 2022-02-22 PROCEDURE — 36600 WITHDRAWAL OF ARTERIAL BLOOD: CPT

## 2022-02-22 PROCEDURE — 82803 BLOOD GASES ANY COMBINATION: CPT

## 2022-02-22 PROCEDURE — 95810 POLYSOM 6/> YRS 4/> PARAM: CPT | Performed by: INTERNAL MEDICINE

## 2022-02-23 ENCOUNTER — DOCUMENTATION ONLY (OUTPATIENT)
Dept: SLEEP MEDICINE | Age: 52
End: 2022-02-23

## 2022-02-23 ENCOUNTER — TELEPHONE (OUTPATIENT)
Dept: SLEEP MEDICINE | Age: 52
End: 2022-02-23

## 2022-02-23 DIAGNOSIS — Z11.52 ENCOUNTER FOR SCREENING FOR COVID-19: ICD-10-CM

## 2022-02-23 DIAGNOSIS — G47.33 OSA (OBSTRUCTIVE SLEEP APNEA): Primary | ICD-10-CM

## 2022-02-23 NOTE — PROGRESS NOTES
[address] [place]. [place], [address]  Tel.  [phone]  Fax. [address] [facility] [address]  [place], [address] [facility]  Tel.  [phone]  Fax. [phone] [address] [facility] [address]  Tel.  [phone]  Fax. [phone]     Sleep Study Technical Notes        PRE-Test:  · Chun Pinon (: 1970) arrived in the lobby. Patient is scheduled for a diagnostic study. Patient was greeted, temperature checked (97.6 ) and screening questions asked. The patient was taken directly to the assigned room. Procedure was explained to the patient and questions were answered. Pt expressed understanding. Electrodes were applied without incident. The patient was placed in bed and the study was started. Patient mentioned during hook-up that she is interested in learning more about Kaitlyn Mathew because of her tolerance issues with PAP. Acquisition Notes:  · Lights off: 10:43 PM  · Sleep onset: 11:37PM  · Respiratory events: UAR and obstructive events, more severe in REM sleep  · Snore: Loud  · ECG:  Tachycardia  · Limb movements: No  · PAP titration: N/A  · Mask(s) Used: N/A  Other comments: Patient had a snack after lights off. Patient entered all stages of sleep. She slept in all positions but prone. Oxygen levels dropped into the 70th percentile. Patient c/o back pain around 4:00 AM.  - Patient used the restroom 2 times  - Patient had caregiver in attendance: No   - Patient watched TV/ used phone after lights out for around one hour  - Patient slept with positional therapy:  No  - Patient slept with head of bed elevated:  No  · Patient wore an oral appliance:  No    POST Test:  Patient was awakened. Electrodes were removed. The patient was discharged after completing the Post Questionnaire. Patient stated that they were alert and ok to drive. Equipment and room cleaned per infection control policy.

## 2022-02-24 ENCOUNTER — DOCUMENTATION ONLY (OUTPATIENT)
Dept: SLEEP MEDICINE | Age: 52
End: 2022-02-24

## 2022-02-24 NOTE — TELEPHONE ENCOUNTER
Results have been sent to  [facility]. Please schedule covid 19 testing and titration study.     Thanks

## 2022-02-24 NOTE — PROGRESS NOTES
Please contact patient regarding their mychart resulted labs. They have not reviewed them to date.       Dr. Jackson Morrison

## 2022-02-24 NOTE — TELEPHONE ENCOUNTER
Jared Brandt is to be contacted by lead sleep technologist regarding results of Sleep Testing which was indicative of an average AHI of 16.3 per hour with an SpO2 nura of 73% . * A second polysomnogram was ordered for mask fitting, Bi-Level PAP desensitizing and Bi-Level PAP titration protocol. Patient should be educated on the risks versus benefits of this elective sleep testing procedure being done during the pandemic and their consent be obtained. * Follow-up appointment will be scheduled 8-12 weeks following PAP initiation to gauge treatment response and compliance. Encounter Diagnoses   Name Primary?  ELLA (obstructive sleep apnea) Yes    Encounter for screening for COVID-19        Orders Placed This Encounter    NOVEL CORONAVIRUS (COVID-19)     Standing Status:   Future     Standing Expiration Date:   5/24/2022     Scheduling Instructions:      1) Due to current limited availability of the COVID-19 PCR test, tests will be prioritized and may not be completed.              2) Order only if the test result will change clinical management or necessary for a return to mission-critical employment decision.              3) Print and instruct patient to adhere to [facility] home isolation program. (Link Above)              4) Set up or refer patient for a monitoring program.              5) Have patient sign up for and leverage [employer] (if not previously done). Order Specific Question:   Status     Answer:   Asymptomatic/Surveillance(e.g. pre-op/pre-procedure/pre-delivery/transfer)     Order Specific Question:   Reason for Test     Answer:   Upcoming elective surgery/procedure/delivery, return to work, or discharge to another facility    SLEEP LAB (PAP TITRATION)     Standing Status:   Future     Standing Expiration Date:   5/24/2022     Scheduling Instructions:      Perform Bi-level Titration.      Order Specific Question:   Reason for Exam     Answer:   ELLA

## 2022-02-25 ENCOUNTER — TELEPHONE (OUTPATIENT)
Dept: SLEEP MEDICINE | Age: 52
End: 2022-02-25

## 2022-02-25 NOTE — PROGRESS NOTES
Your labs show an elevated hemoglobin A1c.  This is a test the test for diabetes.  You have now entered the range where you are considered to be diabetic. Izabella Landeros, you do not need medications or need to go buy a meter.  Which you need to do is to decrease sugars in your diet and increase exercise and recommend recheck in 6 months.     Dr. Natalia Villa  Written by Norris Dooley MD on 2/16/2022  4:32 PM EST  Seen by patient Mariana Palacios on 2/23/2022  8:05 AM

## 2022-02-25 NOTE — TELEPHONE ENCOUNTER
Patient states she has issues with CPAP.     Please make appt for pt to discuss with Dr. Silva Garduno or NP

## 2022-03-02 ENCOUNTER — TELEPHONE (OUTPATIENT)
Dept: SLEEP MEDICINE | Age: 52
End: 2022-03-02

## 2022-03-09 DIAGNOSIS — M79.7 FIBROMYALGIA: ICD-10-CM

## 2022-03-10 RX ORDER — GABAPENTIN 600 MG/1
600 TABLET ORAL 2 TIMES DAILY
Qty: 180 TABLET | Refills: 1 | Status: SHIPPED | OUTPATIENT
Start: 2022-03-10 | End: 2022-07-25

## 2022-03-18 PROBLEM — J18.9 CAP (COMMUNITY ACQUIRED PNEUMONIA): Status: ACTIVE | Noted: 2018-12-31

## 2022-03-18 PROBLEM — N10 ACUTE PYELONEPHRITIS: Status: ACTIVE | Noted: 2021-09-13

## 2022-03-18 PROBLEM — E66.01 OBESITY, MORBID (HCC): Status: ACTIVE | Noted: 2018-02-19

## 2022-03-18 PROBLEM — A41.9 SEPSIS (HCC): Status: ACTIVE | Noted: 2018-12-31

## 2022-03-19 PROBLEM — E11.9 CONTROLLED TYPE 2 DIABETES MELLITUS WITHOUT COMPLICATION, WITHOUT LONG-TERM CURRENT USE OF INSULIN (HCC): Status: ACTIVE | Noted: 2022-02-16

## 2022-03-19 PROBLEM — R19.7 DIARRHEA OF PRESUMED INFECTIOUS ORIGIN: Status: ACTIVE | Noted: 2019-01-02

## 2022-03-19 PROBLEM — Z72.0 TOBACCO USE: Status: ACTIVE | Noted: 2021-09-14

## 2022-03-21 ENCOUNTER — OFFICE VISIT (OUTPATIENT)
Dept: SLEEP MEDICINE | Age: 52
End: 2022-03-21
Payer: COMMERCIAL

## 2022-03-21 VITALS
SYSTOLIC BLOOD PRESSURE: 132 MMHG | DIASTOLIC BLOOD PRESSURE: 94 MMHG | BODY MASS INDEX: 44.61 KG/M2 | TEMPERATURE: 98.6 F | WEIGHT: 251.8 LBS | HEIGHT: 63 IN | HEART RATE: 90 BPM | OXYGEN SATURATION: 96 %

## 2022-03-21 DIAGNOSIS — F32.A ANXIETY AND DEPRESSION: ICD-10-CM

## 2022-03-21 DIAGNOSIS — I10 ESSENTIAL HYPERTENSION, BENIGN: ICD-10-CM

## 2022-03-21 DIAGNOSIS — G47.33 OSA (OBSTRUCTIVE SLEEP APNEA): Primary | ICD-10-CM

## 2022-03-21 DIAGNOSIS — F41.9 ANXIETY AND DEPRESSION: ICD-10-CM

## 2022-03-21 PROCEDURE — 99213 OFFICE O/P EST LOW 20 MIN: CPT | Performed by: INTERNAL MEDICINE

## 2022-03-21 NOTE — PATIENT INSTRUCTIONS
[address] [place]. [place], [address]  Tel.  [phone]  Fax. 100 [facility] 60  [place], [address] [facility]  Tel.  [phone]  Fax. [phone] [address]  Tel.  [phone]  Fax. [phone]     Sleep Apnea: After Your Visit  Your Care Instructions  Sleep apnea occurs when you frequently stop breathing for 10 seconds or longer during sleep. It can be mild to severe, based on the number of times per hour that you stop breathing or have slowed breathing. Blocked or narrowed airways in your nose, mouth, or throat can cause sleep apnea. Your airway can become blocked when your throat muscles and tongue relax during sleep. Sleep apnea is common, occurring in 1 out of 20 individuals. Individuals having any of the following characteristics should be evaluated and treated right away due to high risk and detrimental consequences from untreated sleep apnea:  1. Obesity  2. Congestive Heart failure  3. Atrial Fibrillation  4. Uncontrolled Hypertension  5. Type II Diabetes  6. Night-time Arrhythmias  7. Stroke  8. Pulmonary Hypertension  9. High-risk Driving Populations (pilots, truck drivers, etc.)  10. Patients Considering Weight-loss Surgery    How do you know you have sleep apnea? You probably have sleep apnea if you answer 'yes' to 3 or more of the following questions:  S - Have you been told that you Snore? T - Are you often Tired during the day? O - Has anyone Observed you stop breathing while sleeping? P- Do you have (or are being treated for) high blood Pressure? B - Are you obese (Body Mass Index > 35)? A - Is your Age 48years old or older? N - Is your Neck size greater than 16 inches? G - Are you male Gender? A sleep physician can prescribe a breathing device that prevents tissues in the throat from blocking your airway.  Or your doctor may recommend using a dental device (oral breathing device) to help keep your airway open. In some cases, surgery may be needed to remove enlarged tissues in the throat. Follow-up care is a key part of your treatment and safety. Be sure to make and go to all appointments, and call your doctor if you are having problems. It's also a good idea to know your test results and keep a list of the medicines you take. How can you care for yourself at home? · Lose weight, if needed. It may reduce the number of times you stop breathing or have slowed breathing. · Go to bed at the same time every night. · Sleep on your side. It may stop mild apnea. If you tend to roll onto your back, sew a pocket in the back of your pajama top. Put a tennis ball into the pocket, and stitch the pocket shut. This will help keep you from sleeping on your back. · Avoid alcohol and medicines such as sleeping pills and sedatives before bed. · Do not smoke. Smoking can make sleep apnea worse. If you need help quitting, talk to your doctor about stop-smoking programs and medicines. These can increase your chances of quitting for good. · Prop up the head of your bed 4 to 6 inches by putting bricks under the legs of the bed. · Treat breathing problems, such as a stuffy nose, caused by a cold or allergies. · Use a continuous positive airway pressure (CPAP) breathing machine if lifestyle changes do not help your apnea and your doctor recommends it. The machine keeps your airway from closing when you sleep. · If CPAP does not help you, ask your doctor whether you should try other breathing machines. A bilevel positive airway pressure machine has two types of air pressureâone for breathing in and one for breathing out. Another device raises or lowers air pressure as needed while you breathe. · If your nose feels dry or bleeds when using one of these machines, talk with your doctor about increasing moisture in the air. A humidifier may help.   · If your nose is runny or stuffy from using a breathing machine, talk with your doctor about using decongestants or a corticosteroid nasal spray. When should you call for help? Watch closely for changes in your health, and be sure to contact your doctor if:  · You still have sleep apnea even though you have made lifestyle changes. · You are thinking of trying a device such as CPAP. · You are having problems using a CPAP or similar machine. Where can you learn more? Go to [employer]. Enter V168 in the search box to learn more about \"Sleep Apnea: After Your Visit. \"   © 5693-9020 Healthwise, Incorporated. Care instructions adapted under license by [address] [place] [address] [employer] (which disclaims liability or warranty for this information). This care instruction is for use with your licensed healthcare professional. If you have questions about a medical condition or this instruction, always ask your healthcare professional. Michelle Ratliff any warranty or liability for your use of this information. PROPER SLEEP HYGIENE    What to avoid  · Do not have drinks with caffeine, such as coffee or black tea, for 8 hours before bed. · Do not smoke or use other types of tobacco near bedtime. Nicotine is a stimulant and can keep you awake. · Avoid drinking alcohol late in the evening, because it can cause you to wake in the middle of the night. · Do not eat a big meal close to bedtime. If you are hungry, eat a light snack. · Do not drink a lot of water close to bedtime, because the need to urinate may wake you up during the night. · Do not read or watch TV in bed. Use the bed only for sleeping and sexual activity. What to try  · Go to bed at the same time every night, and wake up at the same time every morning. Do not take naps during the day. · Keep your bedroom quiet, dark, and cool. · Get regular exercise, but not within 3 to 4 hours of your bedtime. .  · Sleep on a comfortable pillow and mattress.   · If watching the clock makes you anxious, turn it facing away from you so you cannot see the time. · If you worry when you lie down, start a worry book. Well before bedtime, write down your worries, and then set the book and your concerns aside. · Try meditation or other relaxation techniques before you go to bed. · If you cannot fall asleep, get up and go to another room until you feel sleepy. Do something relaxing. Repeat your bedtime routine before you go to bed again. · Make your house quiet and calm about an hour before bedtime. Turn down the lights, turn off the TV, log off the computer, and turn down the volume on music. This can help you relax after a busy day. Drowsy Driving  The [address] [place] [address] Road Traffic Safety Administration cites drowsiness as a causing factor in more than 967,728 police reported crashes annually, resulting in 76,000 injuries and 1,500 deaths. Other surveys suggest 55% of people polled have driven while drowsy in the past year, 23% had fallen asleep but not crashed, 3% crashed, and 2% had and accident due to drowsy driving. Who is at risk? Young Drivers: One study of drowsy driving accidents states that 55% of the drivers were under 25 years. Of those, 75% were male. Shift Workers and Travelers: People who work overnight or travel across time zones frequently are at higher risk of experiencing Circadian Rhythm Disorders. They are trying to work and function when their body is programed to sleep. Sleep Deprived: Lack of sleep has a serious impact on your ability to pay attention or focus on a task. Consistently getting less than the average of 8 hours your body needs creates partial or cumulative sleep deprivation. Untreated Sleep Disorders: Sleep Apnea, Narcolepsy, R.L.S., and other sleep disorders (untreated) prevent a person from getting enough restful sleep. This leads to excessive daytime sleepiness and increases the risk for drowsy driving accidents by up to 7 times.   Medications / Alcohol: Even over the counter medications can cause drowsiness. Medications that impair a drivers attention should have a warning label. Alcohol naturally makes you sleepy and on its own can cause accidents. Combined with excessive drowsiness its effects are amplified. Signs of Drowsy Driving:   * You don't remember driving the last few miles   * You may drift out of your zach   * You are unable to focus and your thoughts wander   * You may yawn more often than normal   * You have difficulty keeping your eyes open / nodding off   * Missing traffic signs, speeding, or tailgating  Prevention-   Good sleep hygiene, lifestyle and behavioral choices have the most impact on drowsy driving. There is no substitute for sleep and the average person requires 8 hours nightly. If you find yourself driving drowsy, stop and sleep. Consider the sleep hygiene tips provided during your visit as well. Medication Refill Policy: Refills for all medications require 1 week advance notice. Please have your pharmacy fax a refill request. We are unable to fax, or call in \"controled substance\" medications and you will need to pick these prescriptions up from our office. Suite[employer] Activation    Thank you for requesting access to [employer]. Please follow the instructions below to securely access and download your online medical record. Suitey allows you to send messages to your doctor, view your test results, renew your prescriptions, schedule appointments, and more. How Do I Sign Up? 1. In your internet browser, go to [email or web address]. 2. Click on the First Time User? Click Here link in the Sign In box. You will see the New Member Sign Up page. 3. Enter your Suitey Access Code exactly as it appears below. You will not need to use this code after youve completed the sign-up process. If you do not sign up before the expiration date, you must request a new code. Suitey Access Code:  Activation code not generated  Current Suitey Status: Active (This is the date your "[employer] access code will )    4. Enter the last four digits of your Social Security Number (xxxx) and Date of Birth (mm/dd/yyyy) as indicated and click Submit. You will be taken to the next sign-up page. 5. Create a "[employer] ID. This will be your "[employer] login ID and cannot be changed, so think of one that is secure and easy to remember. 6. Create a "[employer] password. You can change your password at any time. 7. Enter your Password Reset Question and Answer. This can be used at a later time if you forget your password. 8. Enter your e-mail address. You will receive e-mail notification when new information is available in [address] 9. Click Sign Up. You can now view and download portions of your medical record. 10. Click the Download Summary menu link to download a portable copy of your medical information. Additional Information    If you have questions, please call [phone]. Remember, "[employer] is NOT to be used for urgent needs. For medical emergencies, dial 911.

## 2022-03-21 NOTE — PROGRESS NOTES
[address] [facility] [place]. [place], [address]  Tel.  [phone]  Fax. 100 [facility] 60  [address] [facility] [address], [address]  Tel.  [phone]  Fax. [phone] [address]  Tel.  [phone]  Fax. [phone]       John Li is a 50y.o. year old female seen for evaluation of a sleep disorder. ASSESSMENT/PLAN:      ICD-10-CM ICD-9-CM    1. ELLA (obstructive sleep apnea)  G47.33 327.23 SLEEP STUDY UNATTENDED, 4 CHANNEL   2. Essential hypertension, benign  I10 401.1    3. Anxiety and depression  F41.9 300.00     F32. A 311    4. BMI 40.0-44.9, adult [facility])  Z68.41 V85.41        Patient has a history and examination consistent with the diagnosis of sleep apnea. Follow-up and Dispositions    · Return for telephone follow-up after testing is completed. * Sleep testing was ordered for evaluation of positional therapy, patient agrees to wait for replacement device, hold off on testing until device has been received by patient. Orders Placed This Encounter    SLEEP STUDY UNATTENDED, 4 CHANNEL     Order Specific Question:   Reason for Exam     Answer:   ELLA       * She was provided information on sleep apnea including corresponding risk factors and the importance of proper treatment. * Treatment options were reviewed in detail. she would like to proceed with PAP therapy. Patient will be seen in follow-up in 6-8 weeks after PAP setup to gauge treatment response and adherence to therapy. * The patient was counseled regarding proper sleep hygiene, with emphasis on ensuring sufficient total sleep time; safe driving and the benefits of exercise and weight loss. * All of her questions were addressed. 2. Hypertension -  continue on current regimen, she will continue to monitor her BP and follow up with her primary care provider for reevaluation/adjustment of medications if warranted.   I have reviewed the relationship between hypertension as it relates to sleep-disordered breathing. 3. Anxiety and Depression-  continue on current regimen, she will continue to monitor her mood and follow up with her care provider for reevaluation/adjustment of medications if warranted. I have reviewed the relationship between mood as it relates to sleep-disordered breathing. 4. Recommended a dedicated weight loss program through appropriate diet and exercise regimen as significant weight reduction has been shown to reduce severity of obstructive sleep apnea. SUBJECTIVE/OBJECTIVE:    Della Don is an 50 y.o. female referred for evaluation for a sleep disorder. She complains of snoring associated with periods of not breathing, excessive daytime sleepiness. Symptoms began several years ago, She was diagnosed with with ELLA and was initially prescribed a CPAP Device which she did not tolerated and has been on a Bi-Level PAP device since that time. This device was recalled and she has not been on therapy since July 2021. She reports of persistent snoring and sleeping difficulties without PAP therapy. She usually gets in bed at 10:00 pm and view television until sleep onset at 2;00 am.  Family or house members note snoring, periods of not breathing.      The patient has undergone diagnostic testing for the current problems. Initial Polysomnogram (PSG) performed on 10-13-20 showed an AHI of 12.9/hr with a lowest SpO2 of 83%, duration of SpO2 < 88% 5.10 minutes. Repeat Polysomnogram (PSG) performed on 02-22-22 showed an AHI of 16.3/hr with a lowest SpO2 of 74%, duration of SpO2 < 88% 20.3 minutes.        Visit Vitals  BP (!) 132/94   Pulse 90   Temp 98.6 °F (37 °C)   Ht 5' 3\" (1.6 m)   Wt 251 lb 12.8 oz (114.2 kg)   SpO2 96%   BMI 44.60 kg/m²           General:   Alert, oriented, not in acute distress   Eyes:  Anicteric Sclerae; intact EOM's   Nose:  No obvious nasal septum deviation    Oropharynx:   Mallampati score 4, thick tongue base, uvula not seen due to low-lying soft palate, narrow tonsilo-pharyngeal pilars, tongue scalloped   Neck:   midline trachea,  no JVD   Chest/Lungs:  symmetrical lung expansion ,clear lung fields on auscultation    CVS:  Normal rate, regular rhythm    Extremities:  No obvious rashes, absent edema    Neuro:  No focal deficits; No obvious tremor    Psych:  Normal eye contact; normal  affect, normal countenance      Office visit exceeded 20 minutes with counseling and direction of care taking up more than 50% of the allotted time. Farrah Perez MD, FAASM  Diplomate American Board of Sleep Medicine  Diplomate in Sleep Medicine - ABP    Electronically signed.  03/21/22

## 2022-04-01 ENCOUNTER — OFFICE VISIT (OUTPATIENT)
Dept: SLEEP MEDICINE | Age: 52
End: 2022-04-01

## 2022-04-01 DIAGNOSIS — G47.33 OSA (OBSTRUCTIVE SLEEP APNEA): Primary | ICD-10-CM

## 2022-04-01 NOTE — PROGRESS NOTES
[address] [facility]. [place], [address]  Tel.  [phone]  Fax. [address] [facility], [address] [facility]  Tel.  [phone]  Fax. [phone] [address]  Tel.  [phone]  Fax. [phone]       S>Estela Juarze is a 50 y.o. female seen today to receive a home sleep testing unit (HST). · Patient was educated on proper hookup and operation of the HST. · Instruction forms and documentation were reviewed and signed. · The patient demonstrated good understanding of the HST.    O>    There were no vitals taken for this visit. A>  1. ELLA (obstructive sleep apnea)          P>  · General information regarding operations and maintenance of the device was provided. · She was provided information on sleep apnea including coresponding risk factors and the importance of proper treatment. · Follow-up appointment was made to return the HST. She will be contacted once the results have been reviewed. · She was asked to contact our office for any problems regarding her home sleep test study.

## 2022-04-02 ENCOUNTER — HOSPITAL ENCOUNTER (OUTPATIENT)
Dept: SLEEP MEDICINE | Age: 52
Discharge: HOME | End: 2022-04-02
Payer: COMMERCIAL

## 2022-04-02 PROCEDURE — 95806 SLEEP STUDY UNATT&RESP EFFT: CPT | Performed by: INTERNAL MEDICINE

## 2022-04-10 ENCOUNTER — TELEPHONE (OUTPATIENT)
Dept: SLEEP MEDICINE | Age: 52
End: 2022-04-10

## 2022-04-10 DIAGNOSIS — G47.33 OSA (OBSTRUCTIVE SLEEP APNEA): Primary | ICD-10-CM

## 2022-04-11 NOTE — TELEPHONE ENCOUNTER
Mainor Yessyamanda is to be contacted by lead sleep technologist regarding results of Sleep Testing which was indicative of an average AHI of 3.0 per hour with an SpO2 nura of 83% and SpO2 of < 88% being 2.5 minutes. Patient has had a negative or inconclusive home sleep apnea test,  she should return for repeat testing. Encounter Diagnosis   Name Primary?     ELLA (obstructive sleep apnea) Yes       Orders Placed This Encounter    POLYSOMNOGRAPHY 1 NIGHT     Standing Status:   Future     Standing Expiration Date:   7/11/2022     Order Specific Question:   Reason for Exam     Answer:   ELLA

## 2022-04-18 DIAGNOSIS — I10 ESSENTIAL HYPERTENSION, BENIGN: ICD-10-CM

## 2022-04-18 RX ORDER — HYDROCHLOROTHIAZIDE 25 MG/1
TABLET ORAL
Qty: 90 TABLET | Refills: 3 | Status: SHIPPED | OUTPATIENT
Start: 2022-04-18

## 2022-04-19 RX ORDER — LOSARTAN POTASSIUM 50 MG/1
TABLET ORAL
Qty: 90 TABLET | Refills: 3 | Status: SHIPPED | OUTPATIENT
Start: 2022-04-19

## 2022-04-22 NOTE — TELEPHONE ENCOUNTER
Reviewed sleep study results with patient. She expressed understanding and is willing to proceed with in lab PSG.

## 2022-04-27 ENCOUNTER — APPOINTMENT (OUTPATIENT)
Dept: CT IMAGING | Age: 52
End: 2022-04-27
Attending: EMERGENCY MEDICINE
Payer: COMMERCIAL

## 2022-04-27 ENCOUNTER — HOSPITAL ENCOUNTER (EMERGENCY)
Age: 52
Discharge: HOME | End: 2022-04-27
Attending: EMERGENCY MEDICINE
Payer: COMMERCIAL

## 2022-04-27 VITALS
HEART RATE: 90 BPM | OXYGEN SATURATION: 99 % | WEIGHT: 250 LBS | DIASTOLIC BLOOD PRESSURE: 86 MMHG | SYSTOLIC BLOOD PRESSURE: 142 MMHG | RESPIRATION RATE: 14 BRPM | BODY MASS INDEX: 44.3 KG/M2 | TEMPERATURE: 98.9 F | HEIGHT: 63 IN

## 2022-04-27 DIAGNOSIS — K59.00 CONSTIPATION, UNSPECIFIED CONSTIPATION TYPE: ICD-10-CM

## 2022-04-27 DIAGNOSIS — R10.11 ABDOMINAL PAIN, RIGHT UPPER QUADRANT: Primary | ICD-10-CM

## 2022-04-27 LAB
ALBUMIN SERPL-MCNC: 3.3 G/DL (ref 3.5–5)
ALBUMIN/GLOB SERPL: 0.9 {RATIO} (ref 1.1–2.2)
ALP SERPL-CCNC: 68 U/L (ref 45–117)
ALT SERPL-CCNC: 45 U/L (ref 12–78)
AMPHET UR QL SCN: NEGATIVE
ANION GAP SERPL CALC-SCNC: 3 MMOL/L (ref 5–15)
APPEARANCE UR: ABNORMAL
AST SERPL W P-5'-P-CCNC: 22 U/L (ref 15–37)
BACTERIA URNS QL MICRO: ABNORMAL /HPF
BARBITURATES UR QL SCN: NEGATIVE
BASOPHILS # BLD: 0 K/UL (ref 0–0.1)
BASOPHILS NFR BLD: 0 % (ref 0–1)
BENZODIAZ UR QL: NEGATIVE
BILIRUB SERPL-MCNC: 0.3 MG/DL (ref 0.2–1)
BILIRUB UR QL: NEGATIVE
BUN SERPL-MCNC: 14 MG/DL (ref 6–20)
BUN/CREAT SERPL: 16 (ref 12–20)
CA-I BLD-MCNC: 8.4 MG/DL (ref 8.5–10.1)
CANNABINOIDS UR QL SCN: NEGATIVE
CHLORIDE SERPL-SCNC: 105 MMOL/L (ref 97–108)
CO2 SERPL-SCNC: 31 MMOL/L (ref 21–32)
COCAINE UR QL SCN: NEGATIVE
COLOR UR: ABNORMAL
CREAT SERPL-MCNC: 0.9 MG/DL (ref 0.55–1.02)
DIFFERENTIAL METHOD BLD: ABNORMAL
DRUG SCRN COMMENT,DRGCM: NORMAL
EOSINOPHIL # BLD: 0.1 K/UL (ref 0–0.4)
EOSINOPHIL NFR BLD: 1 % (ref 0–7)
ERYTHROCYTE [DISTWIDTH] IN BLOOD BY AUTOMATED COUNT: 14 % (ref 11.5–14.5)
GLOBULIN SER CALC-MCNC: 3.5 G/DL (ref 2–4)
GLUCOSE SERPL-MCNC: 115 MG/DL (ref 65–100)
GLUCOSE UR STRIP.AUTO-MCNC: NEGATIVE MG/DL
HCT VFR BLD AUTO: 42.3 % (ref 35–47)
HGB BLD-MCNC: 13.4 G/DL (ref 11.5–16)
HGB UR QL STRIP: NEGATIVE
IMM GRANULOCYTES # BLD AUTO: 0 K/UL
IMM GRANULOCYTES NFR BLD AUTO: 0 %
KETONES UR QL STRIP.AUTO: NEGATIVE MG/DL
LEUKOCYTE ESTERASE UR QL STRIP.AUTO: NEGATIVE
LIPASE SERPL-CCNC: 133 U/L (ref 73–393)
LYMPHOCYTES # BLD: 3.5 K/UL (ref 0.8–3.5)
LYMPHOCYTES NFR BLD: 32 % (ref 12–49)
MCH RBC QN AUTO: 30 PG (ref 26–34)
MCHC RBC AUTO-ENTMCNC: 31.7 G/DL (ref 30–36.5)
MCV RBC AUTO: 94.8 FL (ref 80–99)
METHADONE UR QL: NEGATIVE
MONOCYTES # BLD: 0.4 K/UL (ref 0–1)
MONOCYTES NFR BLD: 4 % (ref 5–13)
MUCOUS THREADS URNS QL MICRO: ABNORMAL /LPF
NEUTS BAND NFR BLD MANUAL: 1 % (ref 0–6)
NEUTS SEG # BLD: 6.3 K/UL (ref 1.8–8)
NEUTS SEG NFR BLD: 57 % (ref 32–75)
NITRITE UR QL STRIP.AUTO: NEGATIVE
NRBC # BLD: 0 K/UL (ref 0–0.01)
NRBC BLD-RTO: 0 PER 100 WBC
OPIATES UR QL: NEGATIVE
OTHER CELLS NFR BLD MANUAL: 5 %
PCP UR QL: NEGATIVE
PH UR STRIP: 7 [PH] (ref 5–8)
PLATELET # BLD AUTO: 320 K/UL (ref 150–400)
PMV BLD AUTO: 10.7 FL (ref 8.9–12.9)
POTASSIUM SERPL-SCNC: 4.1 MMOL/L (ref 3.5–5.1)
PROT SERPL-MCNC: 6.8 G/DL (ref 6.4–8.2)
PROT UR STRIP-MCNC: NEGATIVE MG/DL
RBC # BLD AUTO: 4.46 M/UL (ref 3.8–5.2)
RBC #/AREA URNS HPF: ABNORMAL /HPF (ref 0–5)
RBC MORPH BLD: ABNORMAL
SODIUM SERPL-SCNC: 139 MMOL/L (ref 136–145)
SP GR UR REFRACTOMETRY: 1.02 (ref 1–1.03)
UA: UC IF INDICATED,UAUC: ABNORMAL
UROBILINOGEN UR QL STRIP.AUTO: 0.1 EU/DL (ref 0.1–1)
WBC # BLD AUTO: 10.9 K/UL (ref 3.6–11)
WBC URNS QL MICRO: ABNORMAL /HPF (ref 0–4)

## 2022-04-27 PROCEDURE — 99284 EMERGENCY DEPT VISIT MOD MDM: CPT

## 2022-04-27 PROCEDURE — 74176 CT ABD & PELVIS W/O CONTRAST: CPT

## 2022-04-27 PROCEDURE — 87086 URINE CULTURE/COLONY COUNT: CPT

## 2022-04-27 PROCEDURE — 80307 DRUG TEST PRSMV CHEM ANLYZR: CPT

## 2022-04-27 PROCEDURE — 85025 COMPLETE CBC W/AUTO DIFF WBC: CPT

## 2022-04-27 PROCEDURE — 36415 COLL VENOUS BLD VENIPUNCTURE: CPT

## 2022-04-27 PROCEDURE — 74011000250 HC RX REV CODE- 250: Performed by: EMERGENCY MEDICINE

## 2022-04-27 PROCEDURE — 96374 THER/PROPH/DIAG INJ IV PUSH: CPT

## 2022-04-27 PROCEDURE — 83690 ASSAY OF LIPASE: CPT

## 2022-04-27 PROCEDURE — 81001 URINALYSIS AUTO W/SCOPE: CPT

## 2022-04-27 PROCEDURE — 87077 CULTURE AEROBIC IDENTIFY: CPT

## 2022-04-27 PROCEDURE — 80053 COMPREHEN METABOLIC PANEL: CPT

## 2022-04-27 RX ORDER — MAGNESIUM CITRATE
148 SOLUTION, ORAL ORAL
Qty: 296 ML | Refills: 0 | Status: SHIPPED | OUTPATIENT
Start: 2022-04-27 | End: 2022-04-27

## 2022-04-27 RX ORDER — KETAMINE HYDROCHLORIDE 50 MG/ML
0.31 INJECTION, SOLUTION INTRAMUSCULAR; INTRAVENOUS
Status: COMPLETED | OUTPATIENT
Start: 2022-04-27 | End: 2022-04-27

## 2022-04-27 RX ORDER — KETAMINE HYDROCHLORIDE 10 MG/ML
INJECTION, SOLUTION INTRAMUSCULAR; INTRAVENOUS
Status: DISCONTINUED
Start: 2022-04-27 | End: 2022-04-27 | Stop reason: WASHOUT

## 2022-04-27 RX ORDER — POLYETHYLENE GLYCOL 3350 17 G/17G
17 POWDER, FOR SOLUTION ORAL DAILY
Qty: 507 G | Refills: 0 | Status: SHIPPED | OUTPATIENT
Start: 2022-04-27 | End: 2022-05-27

## 2022-04-27 RX ADMIN — KETAMINE HYDROCHLORIDE 35 MG: 50 INJECTION INTRAMUSCULAR; INTRAVENOUS at 19:25

## 2022-04-27 NOTE — Clinical Note
[address] [facility] EMERGENCY DEPT  [address] [place] [address] [place] [address] [phone]    Work/School Note    Date: 4/27/2022    To Whom It May concern:    Clyde Edward was seen and treated today in the emergency room by the following provider(s):  Attending Provider: Artem Avila MD.      Clyde Edward is excused from work/school on 04/27/22 and 04/28/22. She is medically clear to return to work/school on 4/29/2022.        Sincerely,          Leeanne Calle MD

## 2022-04-27 NOTE — ED PROVIDER NOTES
EMERGENCY DEPARTMENT HISTORY AND PHYSICAL EXAM      Date: 4/27/2022  Patient Name: Clark Merino    History of Presenting Illness     Chief Complaint   Patient presents with    Flank Pain       History Provided By: Patient    HPI: Clark Merino, 50 y.o. female with a past medical history significant GERD, hypertension, fibromyalgia presents to the ED with cc of right upper quadrant abdominal pain. Patient reports the pain started prior to arrival.  Patient reports the pain is sharp, without radiation, without noted aggravating or alleviating factors. Patient reports prior cholecystectomy. Patient denies fevers or chills. There are no other complaints, changes, or physical findings at this time. PCP: Luke Altamirano MD    No current facility-administered medications on file prior to encounter. Current Outpatient Medications on File Prior to Encounter   Medication Sig Dispense Refill    losartan (COZAAR) 50 mg tablet TAKE 1 TABLET BY MOUTH  DAILY 90 Tablet 3    hydroCHLOROthiazide (HYDRODIURIL) 25 mg tablet TAKE 1 TABLET BY MOUTH  DAILY 90 Tablet 3    gabapentin (NEURONTIN) 600 mg tablet Take 1 Tablet by mouth two (2) times a day. 180 Tablet 1    cyclobenzaprine (FLEXERIL) 10 mg tablet TAKE 1 TABLET BY MOUTH 3  TIMES DAILY AS NEEDED FOR  MUSCLE SPASM FOR UP TO 10  DAYS 30 Tablet 0    ALPRAZolam (XANAX) 0.5 mg tablet TAKE 1 TABLET BY MOUTH  DAILY AS NEEDED FOR ANXIETY 90 Tablet 1    budesonide-glycopyr-formoterol (Breztri Aerosphere) 160-9-4.8 mcg/actuation HFAA Take 2 Puffs by inhalation two (2) times a day.  1 Each 5    albuterol (PROVENTIL VENTOLIN) 2.5 mg /3 mL (0.083 %) nebu USE 1 VIAL VIA NEBULIZER  EVERY 4 HOURS AS NEEDED FOR WHEEZING   RECOMMENDS NOT EXCEEDING 4  VIALS/ mL 8    albuterol (PROVENTIL HFA, VENTOLIN HFA, PROAIR HFA) 90 mcg/actuation inhaler USE 2 INHALATIONS BY MOUTH  EVERY 4 HOURS AS NEEDED FOR WHEEZING 51 g 3       Past History     Past Medical History:  Past Medical History:   Diagnosis Date    Arthritis     Arthritis of low back     Asthma     Chronic pain     fibromyalgia    Colon spasm     Controlled type 2 diabetes mellitus without complication, without long-term current use of insulin ([facility]) 2/16/2022    Depression     Fibromyalgia     Gastrointestinal disorder     IBS    GERD (gastroesophageal reflux disease)     High cholesterol     Hypertension     IBS (irritable bowel syndrome)     Left axillary pain 2/22/2013    Migraine     LAST HEADACHE 2-16-12    Multiple sclerosis ([facility] [address]     Other ill-defined conditions(799.89)     fibromylgia    Other ill-defined conditions(799.89)     MS       Past Surgical History:  Past Surgical History:   Procedure Laterality Date    HX APPENDECTOMY      HX BLADDER REPAIR  04/2011     HX CHOLECYSTECTOMY      HX GYN      For ectopic pregnancy, hx tubal ligation    HX GYN      vaginal ablation.  HX UROLOGICAL      bladder tuck, no mesh    HX UROLOGICAL      sling removed 1/10/11       Family History:  Family History   Adopted: Yes   Problem Relation Age of Onset    Other Other         family hx is completely unknown    Diabetes Brother     Asthma Daughter     Asthma Son        Social History:  Social History     Tobacco Use    Smoking status: Current Some Day Smoker     Packs/day: 0.50     Years: 20.00     Pack years: 10.00     Types: Cigarettes    Smokeless tobacco: Never Used   Substance Use Topics    Alcohol use: No     Alcohol/week: 0.0 standard drinks    Drug use: No       Allergies:   Allergies   Allergen Reactions    Hydromorphone Itching and Swelling     hydromorphone 1mg IV given immediately began itching and complained of scratchy swelling sensation in her throat    Levofloxacin Hives    Aspirin Anaphylaxis    Bactrim [Sulfamethoprim Ds] Hives    Darvocet A500 [Propoxyphene N-Acetaminophen] Hives    Imitrex [Sumatriptan] Hives    Pcn [Penicillins] Anaphylaxis    Percocet [Oxycodone-Acetaminophen] Hives     Pt can take lortab/norco    Toradol [Ketorolac Tromethamine] Hives    Tramadol Hives    Hydrocodone Hives    Keflex [Cephalexin] Hives    Lisinopril Swelling     Tongue and mouth felt weird, slight swelling     Prednisone Rash and Angioedema     And pt felt throat closing    Savella [Milnacipran] Hives     INSOMNIA    Topamax [Topiramate] Other (comments)    Vesicare [Solifenacin] Other (comments)     Nose bleeds    Wellbutrin [Bupropion Hcl] Other (comments)     insomnia         Review of Systems   Review of Systems   Constitutional: Negative for chills and fever. HENT: Negative for sinus pressure and sinus pain. Eyes: Negative for photophobia and redness. Respiratory: Negative for shortness of breath and wheezing. Cardiovascular: Negative for chest pain and palpitations. Gastrointestinal: Positive for abdominal pain and negative for nausea. Genitourinary: Negative for flank pain and hematuria. Musculoskeletal: Negative for arthralgias and gait problem. Skin: Negative for color change and pallor. Neurological: Negative for dizziness and weakness. Review of Systems    Physical Exam   Physical Exam  Constitutional:       General: No acute distress. Appearance: Normal appearance. Not toxic-appearing. HENT:      Head: Normocephalic and atraumatic. Nose: Nose normal.      Mouth/Throat:      Mouth: Mucous membranes are moist.   Eyes:      Extraocular Movements: Extraocular movements intact. Pupils: Pupils are equal, round, and reactive to light. Cardiovascular:      Rate and Rhythm: Normal rate. Pulses: Normal pulses. Pulmonary:      Effort: Pulmonary effort is normal.      Breath sounds: No stridor. Abdominal:      General: Abdomen is flat. There is no distension. Tenderness to palpation of the right upper quadrant  Musculoskeletal:         General: Normal range of motion.       Cervical back: Normal range of motion and neck supple. Skin:     General: Skin is warm and dry. Capillary Refill: Capillary refill takes less than 2 seconds. Neurological:      General: No focal deficit present. Mental Status: Aert and oriented to person, place, and time. Psychiatric:         Mood and Affect: Anxious appearing. Behavior: Behavior normal.       Physical Exam    Lab and Diagnostic Study Results     Labs -     Recent Results (from the past 12 hour(s))   CBC WITH AUTOMATED DIFF    Collection Time: 04/27/22  7:07 PM   Result Value Ref Range    WBC 10.9 3.6 - 11.0 K/uL    RBC 4.46 3.80 - 5.20 M/uL    HGB 13.4 11.5 - 16.0 g/dL    HCT 42.3 35.0 - 47.0 %    MCV 94.8 80.0 - 99.0 FL    MCH 30.0 26.0 - 34.0 PG    MCHC 31.7 30.0 - 36.5 g/dL    RDW 14.0 11.5 - 14.5 %    PLATELET 363 688 - 114 K/uL    MPV 10.7 8.9 - 12.9 FL    NRBC 0.0 0.0  WBC    ABSOLUTE NRBC 0.00 0.00 - 0.01 K/uL    NEUTROPHILS 57 32 - 75 %    BAND NEUTROPHILS 1 0 - 6 %    LYMPHOCYTES 32 12 - 49 %    MONOCYTES 4 (L) 5 - 13 %    EOSINOPHILS 1 0 - 7 %    BASOPHILS 0 0 - 1 %    OTHER CELL 5 %    IMMATURE GRANULOCYTES 0 %    ABS. NEUTROPHILS 6.3 1.8 - 8.0 K/UL    ABS. LYMPHOCYTES 3.5 0.8 - 3.5 K/UL    ABS. MONOCYTES 0.4 0.0 - 1.0 K/UL    ABS. EOSINOPHILS 0.1 0.0 - 0.4 K/UL    ABS. BASOPHILS 0.0 0.0 - 0.1 K/UL    ABS. IMM.  GRANS. 0.0 K/UL    DF Manual      RBC COMMENTS Normocytic, Normochromic     METABOLIC PANEL, COMPREHENSIVE    Collection Time: 04/27/22  7:07 PM   Result Value Ref Range    Sodium 139 136 - 145 mmol/L    Potassium 4.1 3.5 - 5.1 mmol/L    Chloride 105 97 - 108 mmol/L    CO2 31 21 - 32 mmol/L    Anion gap 3 (L) 5 - 15 mmol/L    Glucose 115 (H) 65 - 100 mg/dL    BUN 14 6 - 20 mg/dL    Creatinine 0.90 0.55 - 1.02 mg/dL    BUN/Creatinine ratio 16 12 - 20      GFR est AA >60 >60 ml/min/1.73m2    GFR est non-AA >60 >60 ml/min/1.73m2    Calcium 8.4 (L) 8.5 - 10.1 mg/dL    Bilirubin, total 0.3 0.2 - 1.0 mg/dL    AST (SGOT) 22 15 - 37 U/L    ALT (SGPT) 45 12 - 78 U/L    Alk. phosphatase 68 45 - 117 U/L    Protein, total 6.8 6.4 - 8.2 g/dL    Albumin 3.3 (L) 3.5 - 5.0 g/dL    Globulin 3.5 2.0 - 4.0 g/dL    A-G Ratio 0.9 (L) 1.1 - 2.2         Radiologic Studies -   @lastxrresult@  CT Results  (Last 48 hours)               04/27/22 2004  CT ABD PELV WO CONT Final result    Impression:  Mild diverticulosis and constipation. No evidence to explain right   upper quadrant pain otherwise. CT dose reduction was achieved through use of a standardized protocol tailored   for this examination and automatic exposure control for dose modulation. Narrative:  Noncontrast study shows asymmetric dependent edema right greater than left lung   bases       Liver, spleen, pancreas are normal. Gallbladder is out. No fluid or other   abnormality gallbladder fossa       Normal adrenals and kidneys. No small bowel or vascular abnormality,   lymphadenopathy or free fluid       Normal uterus. No adnexal mass or free fluid. Normal bladder. Mild distal   diverticulosis without wall thickening or fat stranding. Moderate constipation   and gaseous distention above. Appendix is out. Small fat-containing umbilical   hernia       Advanced degenerative changes at the lumbosacral junction               CXR Results  (Last 48 hours)    None            Medical Decision Making   - I am the first provider for this patient. - I reviewed the vital signs, available nursing notes, past medical history, past surgical history, family history and social history. - Initial assessment performed. The patients presenting problems have been discussed, and they are in agreement with the care plan formulated and outlined with them. I have encouraged them to ask questions as they arise throughout their visit. Vital Signs-Reviewed the patient's vital signs.   Patient Vitals for the past 12 hrs:   Temp Pulse Resp BP SpO2   04/27/22 1930  100 14 (!) 171/108 98 %   04/27/22 1902 98.9 °F (37.2 °C) 94 22 (!) 134/101 100 %       Records Reviewed: Old Medical Records      Provider Notes (Medical Decision Making):   Patient improved, no pain upon my reevaluation. MDM         Disposition   Disposition: DC- Adult Discharges: All of the diagnostic tests were reviewed and questions answered. Diagnosis, care plan and treatment options were discussed. The patient understands the instructions and will follow up as directed. The patients results have been reviewed with them. They have been counseled regarding their diagnosis. The patient verbally convey understanding and agreement of the signs, symptoms, diagnosis, treatment and prognosis and additionally agrees to follow up as recommended with their PCP in 24 - 48 hours. They also agree with the care-plan and convey that all of their questions have been answered. I have also put together some discharge instructions for them that include: 1) educational information regarding their diagnosis, 2) how to care for their diagnosis at home, as well a 3) list of reasons why they would want to return to the ED prior to their follow-up appointment, should their condition change. DISCHARGE PLAN:  1. Current Discharge Medication List      CONTINUE these medications which have NOT CHANGED    Details   losartan (COZAAR) 50 mg tablet TAKE 1 TABLET BY MOUTH  DAILY  Qty: 90 Tablet, Refills: 3    Comments: Requesting 1 year supply  Associated Diagnoses: Essential hypertension, benign      hydroCHLOROthiazide (HYDRODIURIL) 25 mg tablet TAKE 1 TABLET BY MOUTH  DAILY  Qty: 90 Tablet, Refills: 3    Comments: Requesting 1 year supply      gabapentin (NEURONTIN) 600 mg tablet Take 1 Tablet by mouth two (2) times a day.   Qty: 180 Tablet, Refills: 1    Comments: Requesting 1 year supply  Associated Diagnoses: Fibromyalgia      cyclobenzaprine (FLEXERIL) 10 mg tablet TAKE 1 TABLET BY MOUTH 3  TIMES DAILY AS NEEDED FOR  MUSCLE SPASM FOR UP TO 10  DAYS  Qty: 30 Tablet, Refills: 0 Associated Diagnoses: Chronic left shoulder pain      ALPRAZolam (XANAX) 0.5 mg tablet TAKE 1 TABLET BY MOUTH  DAILY AS NEEDED FOR ANXIETY  Qty: 90 Tablet, Refills: 1    Associated Diagnoses: Anxiety      budesonide-glycopyr-formoterol (Breztri Aerosphere) 160-9-4.8 mcg/actuation HFAA Take 2 Puffs by inhalation two (2) times a day. Qty: 1 Each, Refills: 5    Associated Diagnoses: SOB (shortness of breath); Persistent asthma without complication, unspecified asthma severity; Chronic obstructive pulmonary disease, unspecified COPD type ([facility])      albuterol (PROVENTIL VENTOLIN) 2.5 mg /3 mL (0.083 %) nebu USE 1 VIAL VIA NEBULIZER  EVERY 4 HOURS AS NEEDED FOR WHEEZING   RECOMMENDS NOT EXCEEDING 4  VIALS/DAY  Qty: 450 mL, Refills: 8    Comments: Requesting 1 year supply  Associated Diagnoses: Bronchitis; Mild intermittent reactive airway disease with wheezing with acute exacerbation      albuterol (PROVENTIL HFA, VENTOLIN HFA, PROAIR HFA) 90 mcg/actuation inhaler USE 2 INHALATIONS BY MOUTH  EVERY 4 HOURS AS NEEDED FOR WHEEZING  Qty: 51 g, Refills: 3    Comments: Requesting 1 year supply  Associated Diagnoses: Mild intermittent reactive airway disease with wheezing with acute exacerbation           2. Follow-up Information     Follow up With Specialties Details Why Contact Info    Kwaku London MD Gastroenterology, Internal Medicine Schedule an appointment as soon as possible for a visit   [address] [place] [address]  [phone]          3. Return to ED if worse   4. Current Discharge Medication List      START taking these medications    Details   magnesium citrate solution Take 148 mL by mouth now for 1 dose. If no good results within 30 minutes, drink the other half of the bottle. Qty: 296 mL, Refills: 0  Start date: 4/27/2022, End date: 4/27/2022      polyethylene glycol (Miralax) 17 gram/dose powder Take 17 g by mouth daily for 30 days.   Qty: 507 g, Refills: 0  Start date: 4/27/2022, End date: 5/27/2022               Diagnosis     Clinical Impression:   1. Abdominal pain, right upper quadrant    2. Constipation, unspecified constipation type        Attestations:    Barbara Ferguson MD    Please note that this dictation was completed with [employer], the computer voice recognition software. Quite often unanticipated grammatical, syntax, homophones, and other interpretive errors are inadvertently transcribed by the computer software. Please disregard these errors. Please excuse any errors that have escaped final proofreading. Thank you.

## 2022-04-27 NOTE — Clinical Note
Andreia Awan was seen and treated in our emergency department on 4/27/2022. Please call the Emergency Department as soon as possible to discuss test results that we received back. Please ask to speak with a provider regarding your test results.     Thank you,    ED Provider  [phone]    Keri Arana MD

## 2022-04-27 NOTE — ED TRIAGE NOTES
Pt arrived by EMS from home for severe right flank pain. Pt reports pain started while sleeping. Pt denies NVD. IV established by EMS. No medications en route. Vitals stable.

## 2022-04-28 NOTE — DISCHARGE INSTRUCTIONS
Thank you! Thank you for allowing me to care for you in the emergency department. I sincerely hope that you are satisfied with your visit today. It is my goal to provide you with excellent care. Below you will find a list of your labs and imaging from your visit today. Should you have any questions regarding these results please do not hesitate to call the emergency department. Labs -     Recent Results (from the past 12 hour(s))   CBC WITH AUTOMATED DIFF    Collection Time: 04/27/22  7:07 PM   Result Value Ref Range    WBC 10.9 3.6 - 11.0 K/uL    RBC 4.46 3.80 - 5.20 M/uL    HGB 13.4 11.5 - 16.0 g/dL    HCT 42.3 35.0 - 47.0 %    MCV 94.8 80.0 - 99.0 FL    MCH 30.0 26.0 - 34.0 PG    MCHC 31.7 30.0 - 36.5 g/dL    RDW 14.0 11.5 - 14.5 %    PLATELET 685 015 - 512 K/uL    MPV 10.7 8.9 - 12.9 FL    NRBC 0.0 0.0  WBC    ABSOLUTE NRBC 0.00 0.00 - 0.01 K/uL    NEUTROPHILS 57 32 - 75 %    BAND NEUTROPHILS 1 0 - 6 %    LYMPHOCYTES 32 12 - 49 %    MONOCYTES 4 (L) 5 - 13 %    EOSINOPHILS 1 0 - 7 %    BASOPHILS 0 0 - 1 %    OTHER CELL 5 %    IMMATURE GRANULOCYTES 0 %    ABS. NEUTROPHILS 6.3 1.8 - 8.0 K/UL    ABS. LYMPHOCYTES 3.5 0.8 - 3.5 K/UL    ABS. MONOCYTES 0.4 0.0 - 1.0 K/UL    ABS. EOSINOPHILS 0.1 0.0 - 0.4 K/UL    ABS. BASOPHILS 0.0 0.0 - 0.1 K/UL    ABS. IMM.  GRANS. 0.0 K/UL    DF Manual      RBC COMMENTS Normocytic, Normochromic     METABOLIC PANEL, COMPREHENSIVE    Collection Time: 04/27/22  7:07 PM   Result Value Ref Range    Sodium 139 136 - 145 mmol/L    Potassium 4.1 3.5 - 5.1 mmol/L    Chloride 105 97 - 108 mmol/L    CO2 31 21 - 32 mmol/L    Anion gap 3 (L) 5 - 15 mmol/L    Glucose 115 (H) 65 - 100 mg/dL    BUN 14 6 - 20 mg/dL    Creatinine 0.90 0.55 - 1.02 mg/dL    BUN/Creatinine ratio 16 12 - 20      GFR est AA >60 >60 ml/min/1.73m2    GFR est non-AA >60 >60 ml/min/1.73m2    Calcium 8.4 (L) 8.5 - 10.1 mg/dL    Bilirubin, total 0.3 0.2 - 1.0 mg/dL    AST (SGOT) 22 15 - 37 U/L    ALT (SGPT) 45 12 - 78 U/L    Alk. phosphatase 68 45 - 117 U/L    Protein, total 6.8 6.4 - 8.2 g/dL    Albumin 3.3 (L) 3.5 - 5.0 g/dL    Globulin 3.5 2.0 - 4.0 g/dL    A-G Ratio 0.9 (L) 1.1 - 2.2         Radiologic Studies -   CT ABD PELV WO CONT   Final Result   Mild diverticulosis and constipation. No evidence to explain right   upper quadrant pain otherwise. CT dose reduction was achieved through use of a standardized protocol tailored   for this examination and automatic exposure control for dose modulation. CT Results  (Last 48 hours)                 04/27/22 2004  CT ABD PELV WO CONT Final result    Impression:  Mild diverticulosis and constipation. No evidence to explain right   upper quadrant pain otherwise. CT dose reduction was achieved through use of a standardized protocol tailored   for this examination and automatic exposure control for dose modulation. Narrative:  Noncontrast study shows asymmetric dependent edema right greater than left lung   bases       Liver, spleen, pancreas are normal. Gallbladder is out. No fluid or other   abnormality gallbladder fossa       Normal adrenals and kidneys. No small bowel or vascular abnormality,   lymphadenopathy or free fluid       Normal uterus. No adnexal mass or free fluid. Normal bladder. Mild distal   diverticulosis without wall thickening or fat stranding. Moderate constipation   and gaseous distention above. Appendix is out. Small fat-containing umbilical   hernia       Advanced degenerative changes at the lumbosacral junction                 CXR Results  (Last 48 hours)      None               If you feel that you have not received excellent quality care or timely care, please ask to speak to the nurse manager. Please choose us in the future for your continued health care needs.    ------------------------------------------------------------------------------------------------------------  The exam and treatment you received in the Emergency Department were for an urgent problem and are not intended as complete care. It is important that you follow-up with a doctor, nurse practitioner, or physician assistant to:  (1) confirm your diagnosis,  (2) re-evaluation of changes in your illness and treatment, and  (3) for ongoing care. If your symptoms become worse or you do not improve as expected and you are unable to reach your usual health care provider, you should return to the Emergency Department. We are available 24 hours a day. Please take your discharge instructions with you when you go to your follow-up appointment. If you have any problem arranging a follow-up appointment, contact the Emergency Department immediately. If a prescription has been provided, please have it filled as soon as possible to prevent a delay in treatment. Read the entire medication instruction sheet provided to you by the pharmacy. If you have any questions or reservations about taking the medication due to side effects or interactions with other medications, please call your primary care physician or contact the ER to speak with the charge nurse. Make an appointment with your family doctor or the physician you were referred to for follow-up of this visit as instructed on your discharge paperwork, as this is a mandatory follow-up. Return to the ER if you are unable to be seen or if you are unable to be seen in a timely manner. If you have any problem arranging the follow-up visit, contact the Emergency Department immediately.

## 2022-04-28 NOTE — ED NOTES
Pt was medicated for pain as ordered. Pt resting and stable at this time. No acute distress. Pt was transported to CT and just returned. Updated on plan of care. Pt needs UA and was asked to void when able. Call light in reach. Side rails up x 2. Pt on cardiopulmonary monitoring.

## 2022-04-29 LAB
BACTERIA SPEC CULT: ABNORMAL
COLONY COUNT,CNT: ABNORMAL
SPECIAL REQUESTS,SREQ: ABNORMAL

## 2022-05-05 DIAGNOSIS — F41.1 GENERALIZED ANXIETY DISORDER: ICD-10-CM

## 2022-05-06 RX ORDER — SERTRALINE HYDROCHLORIDE 50 MG/1
TABLET, FILM COATED ORAL
Qty: 90 TABLET | Refills: 3 | Status: SHIPPED | OUTPATIENT
Start: 2022-05-06

## 2022-05-11 NOTE — PROGRESS NOTES
Phone number listed for patient does not accept incoming calls. Patient was not discharged on any antibiotics. She likely had a follow-up appointment with urology however will send letter to home address to call us back regarding urine culture results.

## 2022-05-24 ENCOUNTER — HOSPITAL ENCOUNTER (OUTPATIENT)
Dept: SLEEP MEDICINE | Age: 52
Discharge: HOME | End: 2022-05-24
Payer: COMMERCIAL

## 2022-05-24 PROCEDURE — 95811 POLYSOM 6/>YRS CPAP 4/> PARM: CPT | Performed by: INTERNAL MEDICINE

## 2022-05-25 ENCOUNTER — TELEPHONE (OUTPATIENT)
Dept: SLEEP MEDICINE | Age: 52
End: 2022-05-25

## 2022-05-25 ENCOUNTER — DOCUMENTATION ONLY (OUTPATIENT)
Dept: SLEEP MEDICINE | Age: 52
End: 2022-05-25

## 2022-05-25 VITALS
BODY MASS INDEX: 44.3 KG/M2 | HEART RATE: 125 BPM | HEIGHT: 63 IN | OXYGEN SATURATION: 99 % | WEIGHT: 250 LBS | TEMPERATURE: 96.8 F

## 2022-05-25 DIAGNOSIS — G47.33 OSA (OBSTRUCTIVE SLEEP APNEA): Primary | ICD-10-CM

## 2022-05-25 NOTE — PROGRESS NOTES
217 [facility]. [place], [address]  Tel.  [phone]  Fax. 100 [facility] 60  [place], [address] [facility]  Tel.  [phone]  Fax. [phone] [address] [facility] [address] Ha   Tel.  [phone]  Fax. [phone]     Sleep Study Technical Notes        PRE-Test:  · Gurwinder Rodrigues (: 1970) arrived on time. Vitals taken: temperature (96.8 ) SpO2 (99%) HR (125). She was shown directly to her room. She is here for a PSG study. Procedure was explained to the patient and questions were answered. She expressed understanding. Electrodes were applied without incident. Once settled in bed, the study was started. Acquisition Notes:  · Lights off: 10:24pm  · Sleep onset: 11:06pm  · Split Night Began: 1:34am  · Sleep Onset on PAP: 1:34am  · Respiratory events: hypopnea, obstructive  · ECG:  NSR  · PAP titration: CPAP  · Mask(s) Used: Res Med F30i full face mask - small headgear/medium cushion  · Other comments:   · A heated blanket was provided from the blanket warmer. · The patient met the criteria to split the study (>40/hr AHI)  · CPAP was employed starting at 5cm. Patient wanted a full face mask. · Pressures were titrated for REM events and supine events/desaturations. · Final Settings:  CPAP 12cm, No EPR, No Humidity    POST Test:  Patient awakened. Mask and electrodes were removed. Post Sleep Questionnaire completed. Patient stated that they were alert and ok to drive. Equipment and room cleaned per infection control policy.

## 2022-05-26 ENCOUNTER — DOCUMENTATION ONLY (OUTPATIENT)
Dept: SLEEP MEDICINE | Age: 52
End: 2022-05-26

## 2022-05-30 DIAGNOSIS — G89.29 CHRONIC LEFT SHOULDER PAIN: ICD-10-CM

## 2022-05-30 DIAGNOSIS — M25.512 CHRONIC LEFT SHOULDER PAIN: ICD-10-CM

## 2022-05-31 RX ORDER — CYCLOBENZAPRINE HCL 10 MG
TABLET ORAL
Qty: 30 TABLET | Refills: 3 | Status: SHIPPED | OUTPATIENT
Start: 2022-05-31 | End: 2022-07-20 | Stop reason: SDUPTHER

## 2022-06-03 NOTE — TELEPHONE ENCOUNTER
Left message for patient to call back and schedule a VV to discuss her concern regarding type of device CPAP vs Bi-Level PAP. A CPAP device has been prescribed base on testing results. Orders Placed This Encounter    AMB SUPPLY ORDER     Diagnosis: Obstructive Sleep Apnea ICD-10 Code (G47.33)    Positive Airway Pressure Therapy: Duration of need: 99 months. APAP Device with Heated Humidifer [id number] / [id number]. Minimum Pressure: 06 cmH2O, Maximum Pressure: 12 cmH2O.     Nasal Pillows Combo Mask (Replace) 2 per month.  Nasal Pillows (Replace) 2 per month.  Full Face Mask 1 every 3 months.  Full Face Mask Cushion 1 per month.  Nasal Cushion (Replace) 2 per month.  Nasal Interface Mask 1 every 3 months.  Headgear 1 every 6 months.  Chinstrap 1 every 6 months.  Tubing 1 every 3 months.  Filter(s) Disposable 2 per month.  Filter(s) Non-Disposable 1 every 6 months. .   [address] [facility] for Humidifier (Replace) 1 every 6 months. Perform Mask Fitting per patient preference and comfort - replace as above. Darylene Inks, MD, [facility]; NPI: [id number]  Electronically signed. 06/03/22

## 2022-06-08 ENCOUNTER — VIRTUAL VISIT (OUTPATIENT)
Dept: SLEEP MEDICINE | Age: 52
End: 2022-06-08
Payer: COMMERCIAL

## 2022-06-08 DIAGNOSIS — F41.9 ANXIETY AND DEPRESSION: ICD-10-CM

## 2022-06-08 DIAGNOSIS — I10 ESSENTIAL HYPERTENSION, BENIGN: ICD-10-CM

## 2022-06-08 DIAGNOSIS — F32.A ANXIETY AND DEPRESSION: ICD-10-CM

## 2022-06-08 DIAGNOSIS — G47.33 OSA (OBSTRUCTIVE SLEEP APNEA): Primary | ICD-10-CM

## 2022-06-08 PROCEDURE — 99213 OFFICE O/P EST LOW 20 MIN: CPT | Performed by: INTERNAL MEDICINE

## 2022-06-08 NOTE — PATIENT INSTRUCTIONS
217 [facility] [place]. [place], [address]  Tel.  [phone]  Fax. 100 [facility] 60  [place], [address] [facility]  Tel.  [phone]  Fax. [phone] [address]  Tel.  [phone]  Fax. [phone]     Learning About CPAP for Sleep Apnea  What is CPAP? CPAP is a small machine that you use at home every night while you sleep. It increases air pressure in your throat to keep your airway open. When you have sleep apnea, this can help you sleep better so you feel much better. CPAP stands for \"continuous positive airway pressure. \"  The CPAP machine will have one of the following:  · A mask that covers your nose and mouth  · Prongs that fit into your nose  · A mask that covers your nose only, the most common type. This type is called NCPAP. The N stands for \"nasal.\"  Why is it done? CPAP is usually the best treatment for obstructive sleep apnea. It is the first treatment choice and the most widely used. Your doctor may suggest CPAP if you have:  · Moderate to severe sleep apnea. · Sleep apnea and coronary artery disease (CAD) or heart failure. How does it help? · CPAP can help you have more normal sleep, so you feel less sleepy and more alert during the daytime. · CPAP may help keep heart failure or other heart problems from getting worse. · NCPAP may help lower your blood pressure. · If you use CPAP, your bed partner may also sleep better because you are not snoring or restless. What are the side effects? Some people who use CPAP have:  · A dry or stuffy nose and a sore throat. · Irritated skin on the face. · Sore eyes. · Bloating. If you have any of these problems, work with your doctor to fix them. Here are some things you can try:  · Be sure the mask or nasal prongs fit well. · See if your doctor can adjust the pressure of your CPAP. · If your nose is dry, try a humidifier.   · If your nose is runny or stuffy, try decongestant medicine or a steroid nasal spray. If these things do not help, you might try a different type of machine. Some machines have air pressure that adjusts on its own. Others have air pressures that are different when you breathe in than when you breathe out. This may reduce discomfort caused by too much pressure in your nose. Where can you learn more? Go to Wepa.be  Enter Myron bernarda in the search box to learn more about \"Learning About CPAP for Sleep Apnea. \"   © 7056-3599 Healthwise, Incorporated. Care instructions adapted under license by [facility] (which disclaims liability or warranty for this information). This care instruction is for use with your licensed healthcare professional. If you have questions about a medical condition or this instruction, always ask your healthcare professional. [address] any warranty or liability for your use of this information. Content Version: 8.8.43318; Last Revised: January 11, 2010  PROPER SLEEP HYGIENE    What to avoid  · Do not have drinks with caffeine, such as coffee or black tea, for 8 hours before bed. · Do not smoke or use other types of tobacco near bedtime. Nicotine is a stimulant and can keep you awake. · Avoid drinking alcohol late in the evening, because it can cause you to wake in the middle of the night. · Do not eat a big meal close to bedtime. If you are hungry, eat a light snack. · Do not drink a lot of water close to bedtime, because the need to urinate may wake you up during the night. · Do not read or watch TV in bed. Use the bed only for sleeping and sexual activity. What to try  · Go to bed at the same time every night, and wake up at the same time every morning. Do not take naps during the day. · Keep your bedroom quiet, dark, and cool. · Get regular exercise, but not within 3 to 4 hours of your bedtime. .  · Sleep on a comfortable pillow and mattress.   · If watching the clock makes you anxious, turn it facing away from you so you cannot see the time. · If you worry when you lie down, start a worry book. Well before bedtime, write down your worries, and then set the book and your concerns aside. · Try meditation or other relaxation techniques before you go to bed. · If you cannot fall asleep, get up and go to another room until you feel sleepy. Do something relaxing. Repeat your bedtime routine before you go to bed again. · Make your house quiet and calm about an hour before bedtime. Turn down the lights, turn off the TV, log off the computer, and turn down the volume on music. This can help you relax after a busy day. Drowsy Driving: The [employer] cites drowsiness as a causing factor in more than 489,651 police reported crashes annually, resulting in 76,000 injuries and 1,500 deaths. Other surveys suggest 55% of people polled have driven while drowsy in the past year, 23% had fallen asleep but not crashed, 3% crashed, and 2% had and accident due to drowsy driving. Who is at risk? Young Drivers: One study of drowsy driving accidents states that 55% of the drivers were under 25 years. Of those, 75% were male. Shift Workers and Travelers: People who work overnight or travel across time zones frequently are at higher risk of experiencing Circadian Rhythm Disorders. They are trying to work and function when their body is programed to sleep. Sleep Deprived: Lack of sleep has a serious impact on your ability to pay attention or focus on a task. Consistently getting less than the average of 8 hours your body needs creates partial or cumulative sleep deprivation. Untreated Sleep Disorders: Sleep Apnea, Narcolepsy, R.L.S., and other sleep disorders (untreated) prevent a person from getting enough restful sleep. This leads to excessive daytime sleepiness and increases the risk for drowsy driving accidents by up to 7 times.   Medications / Alcohol: Even over the counter medications can cause drowsiness. Medications that impair a drivers attention should have a warning label. Alcohol naturally makes you sleepy and on its own can cause accidents. Combined with excessive drowsiness its effects are amplified. Signs of Drowsy Driving:   * You don't remember driving the last few miles   * You may drift out of your zach   * You are unable to focus and your thoughts wander   * You may yawn more often than normal   * You have difficulty keeping your eyes open / nodding off   * Missing traffic signs, speeding, or tailgating  Prevention-   Good sleep hygiene, lifestyle and behavioral choices have the most impact on drowsy driving. There is no substitute for sleep and the average person requires 8 hours nightly. If you find yourself driving drowsy, stop and sleep. Consider the sleep hygiene tips provided during your visit as well. Medication Refill Policy: Refills for all medications require 1 week advance notice. Please have your pharmacy fax a refill request. We are unable to fax, or call in \"controled substance\" medications and you will need to pick these prescriptions up from our office. [employer] Activation    Thank you for requesting access to [employer]. Please follow the instructions below to securely access and download your online medical record. [employer] allows you to send messages to your doctor, view your test results, renew your prescriptions, schedule appointments, and more. How Do I Sign Up? 1. In your internet browser, go to [email or web address] (Formerly [email or web address]). [employer]. 2. Click on the First Time User? Click Here link in the Sign In box. You will see the New Member Sign Up page. 3. Enter your [employer] Access Code exactly as it appears below. You will not need to use this code after youve completed the sign-up process. If you do not sign up before the expiration date, you must request a new code. [employer] Access Code:  Activation code not generated  Current Misohoni Status: Active (This is the date your Mis[employer] access code will )    4. Enter the last four digits of your Social Security Number (xxxx) and Date of Birth (mm/dd/yyyy) as indicated and click Submit. You will be taken to the next sign-up page. 5. Create a VenueBookt ID. This will be your [employer] login ID and cannot be changed, so think of one that is secure and easy to remember. 6. Create a Misohoni password. You can change your password at any time. 7. Enter your Password Reset Question and Answer. This can be used at a later time if you forget your password. 8. Enter your e-mail address. You will receive e-mail notification when new information is available in [facility]. 9. Click Sign Up. You can now view and download portions of your medical record. 10. Click the Download Summary menu link to download a portable copy of your medical information. Additional Information    If you have questions, please call [phone]. Remember, [employer] is NOT to be used for urgent needs. For medical emergencies, dial 911.

## 2022-06-08 NOTE — PROGRESS NOTES
217 [facility] [place]. [place], [address]  Tel.  [phone]  Fax. [address] [facility]  [place], [address] [facility]  Tel.  [phone]  Fax. [phone] [address]   Tel.  [phone]  Fax. [phone]       Shy Mclaughlin is a 50y.o. year old female seen for evaluation of a sleep disorder. ASSESSMENT/PLAN:      ICD-10-CM ICD-9-CM    1. ELLA (obstructive sleep apnea)  G47.33 327.23 AMB SUPPLY ORDER   2. Essential hypertension, benign  I10 401.1    3. Anxiety and depression  F41.9 300.00     F32. A 311    4. BMI 40.0-44.9, adult [facility])  Z68.41 V85.41        Patient has a history and examination consistent with the diagnosis of sleep apnea. Follow-up and Dispositions    · Return for with sleep physician in 3 months. * The patient agrees to a trial of CPAP again as she has a severe disorder and her current Bi-Level device is a health hazard and is awaiting repair / replacement by the . She will contact us when she gets a replacement device. Orders Placed This Encounter    AMB SUPPLY ORDER     Diagnosis: Obstructive Sleep Apnea ICD-10 Code (G47.33)    Positive Airway Pressure Therapy: Duration of need: 99 months. APAP Device with Heated Humidifer [id number] / [id number]. Minimum Pressure: 12 cmH2O, Maximum Pressure: 20 cmH2O.     Nasal Pillows Combo Mask (Replace) 2 per month.  Nasal Pillows (Replace) 2 per month.  Full Face Mask 1 every 3 months.  Full Face Mask Cushion 1 per month.  Nasal Cushion (Replace) 2 per month.  Nasal Interface Mask 1 every 3 months.  Headgear 1 every 6 months.  Chinstrap 1 every 6 months.  Tubing 1 every 3 months.  Filter(s) Disposable 2 per month.  Filter(s) Non-Disposable 1 every 6 months. .   [facility] for Humidifier (Replace) 1 every 6 months.       Perform Mask Fitting per patient preference and comfort - replace as above. Ton Johnson MD, FAA; NPI: [id number]  Electronically signed. 06/08/22       * The patient was counseled regarding proper sleep hygiene, with emphasis on ensuring sufficient total sleep time; safe driving and the benefits of exercise and weight loss. * All of her questions were addressed. 2. Recommended a dedicated weight loss program through appropriate diet and exercise regimen as significant weight reduction has been shown to reduce severity of obstructive sleep apnea. SUBJECTIVE/OBJECTIVE:    Saad Lopez is an 50 y.o. female referred for evaluation for a sleep disorder. She was diagnosed with ELLA. Initial Polysomnogram (PSG) performed on 10-13-20 showed an AHI of 12.9/hr with a lowest SpO2 of 83%, duration of SpO2 <88% 5.10 minutes. Repeat Polysomnogram (PSG) performed on 02-22-22 showed an AHI of 16.3/hr with a lowest SpO2 of 74%, duration of SpO2 < 88% 20.3 minutes. She has been prescribed a CPAP device in the past but this was switched to a Bi-Level device as the CPAP was making her feel suffocated. Her Bi-Level device has been recalled, she stopped using the device about February 2022. She reports snoring and difficulty falling asleep without PAP therapy. She has registered her device and is waiting for replacement. Attended Split-Night polysomnography repeated on 05-24-22 showed an AHI of 48/hr with a lowest SpO2 of 79%, duration of SpO2 < 88% 6.60 minutes. Patient met criteria for a PAP titration which was performed successfully.       [place] Sleepiness Score: 8   and Modified F.O.S.Q. Score Total / 2: 16    Patient-Reported Vitals 6/8/2022   Patient-Reported Weight 232lb   Patient-Reported Height -   Patient-Reported Pulse 80   Patient-Reported Temperature 97.6   Patient-Reported SpO2 99   Patient-Reported Systolic  245   Patient-Reported Diastolic 70       Physical Exam completed by visual and auditory observation of patient with verbal input from patient. General:   Alert, oriented, not in acute distress   Eyes:  Anicteric Sclerae; no obvious strabismus   Nose:  No obvious nasal septum deviation    Neck:   Midline trachea, no visible mass   Chest/Lungs:  Respiratory effort normal, no visualized signs of difficulty breathing or respiratory distress   CVS:  No JVD   Extremities:  No obvious rashes noted on face, neck, or hands   Neuro:  No facial asymmetry, no focal deficits; no obvious tremor    Psych:  Normal affect,  normal countenance     Shy Mclaughlin is being evaluated by a Virtual Visit (video visit) encounter to address concerns as mentioned above. A caregiver was present when appropriate. Due to this being a TeleHealth encounter (During BEZKB-89 public health emergency), evaluation of the following organ systems was limited: Vitals/Constitutional/EENT/Resp/CV/GI//MS/Neuro/Skin/Heme-Lymph-Imm. Pursuant to the emergency declaration under the [address] [place] [address], [address] [place] [address] and the [employer] and [employer] Act, this Virtual Visit was conducted with patient's (and/or legal guardian's) consent, to reduce the patient's risk of exposure to COVID-19 and provide necessary medical care. Patient identification was verified at the start of the visit: YES using name and date of birth. Services were provided through a video synchronous discussion virtually to substitute for in-person clinic visit. I was in the office while conducting this encounter, patient located at their home or alternate location of their choice. On this date 06/08/2022 I have spent 20 minutes reviewing previous notes, test results and face to face with the patient discussing the diagnosis and importance of compliance with the treatment plan as well as documenting on the day of the visit. An electronic signature was used to authenticate this note.       Leonel Matos MD, Letty Lynch  Diplomate American Board of Sleep Medicine  Diplomate in Sleep Medicine - ABP    Electronically signed.  06/08/22

## 2022-06-15 ENCOUNTER — DOCUMENTATION ONLY (OUTPATIENT)
Dept: SLEEP MEDICINE | Age: 52
End: 2022-06-15

## 2022-07-20 DIAGNOSIS — M25.512 CHRONIC LEFT SHOULDER PAIN: ICD-10-CM

## 2022-07-20 DIAGNOSIS — G89.29 CHRONIC LEFT SHOULDER PAIN: ICD-10-CM

## 2022-07-20 DIAGNOSIS — F41.9 ANXIETY: ICD-10-CM

## 2022-07-21 RX ORDER — CYCLOBENZAPRINE HCL 10 MG
TABLET ORAL
Qty: 30 TABLET | Refills: 3 | Status: SHIPPED | OUTPATIENT
Start: 2022-07-21

## 2022-07-21 RX ORDER — ALPRAZOLAM 0.5 MG/1
TABLET ORAL
Qty: 90 TABLET | Refills: 1 | OUTPATIENT
Start: 2022-07-21

## 2022-07-21 RX ORDER — ALPRAZOLAM 0.5 MG/1
TABLET ORAL
Qty: 90 TABLET | Refills: 1 | Status: SHIPPED | OUTPATIENT
Start: 2022-07-21 | End: 2022-07-25

## 2022-07-22 DIAGNOSIS — F41.9 ANXIETY: ICD-10-CM

## 2022-07-24 DIAGNOSIS — M79.7 FIBROMYALGIA: ICD-10-CM

## 2022-07-25 RX ORDER — ALPRAZOLAM 0.5 MG/1
TABLET ORAL
Qty: 90 TABLET | Refills: 0 | Status: SHIPPED | OUTPATIENT
Start: 2022-07-25 | End: 2022-10-31

## 2022-07-25 RX ORDER — GABAPENTIN 600 MG/1
TABLET ORAL
Qty: 180 TABLET | Refills: 3 | Status: SHIPPED | OUTPATIENT
Start: 2022-07-25

## 2022-07-29 DIAGNOSIS — J45.21 MILD INTERMITTENT REACTIVE AIRWAY DISEASE WITH WHEEZING WITH ACUTE EXACERBATION: ICD-10-CM

## 2022-08-01 RX ORDER — ALBUTEROL SULFATE 90 UG/1
AEROSOL, METERED RESPIRATORY (INHALATION)
Qty: 51 G | Refills: 3 | Status: SHIPPED | OUTPATIENT
Start: 2022-08-01

## 2022-10-26 ENCOUNTER — DOCUMENTATION ONLY (OUTPATIENT)
Dept: FAMILY MEDICINE CLINIC | Age: 52
End: 2022-10-26

## 2022-10-26 NOTE — PROGRESS NOTES
Patients daughter Johnathan Quintana dropped off [employer] paperwork to be filled out on 10/26/2022. Put in provider bin.

## 2022-10-27 NOTE — PROGRESS NOTES
[facility] completed and sent to [email or web address]. Patient's daughter(Ariel) informed.

## 2022-10-28 DIAGNOSIS — F41.9 ANXIETY: ICD-10-CM

## 2022-10-31 RX ORDER — ALPRAZOLAM 0.5 MG/1
TABLET ORAL
Qty: 90 TABLET | Refills: 1 | Status: SHIPPED | OUTPATIENT
Start: 2022-10-31

## 2022-10-31 RX ORDER — FLUCONAZOLE 150 MG/1
TABLET ORAL
Qty: 3 TABLET | Refills: 1 | Status: SHIPPED | OUTPATIENT
Start: 2022-10-31

## 2022-11-02 RX ORDER — LIDOCAINE 50 MG/G
PATCH TOPICAL
Qty: 39 EACH | Refills: 2 | Status: SHIPPED | OUTPATIENT
Start: 2022-11-02

## 2022-11-28 ENCOUNTER — HOSPITAL ENCOUNTER (EMERGENCY)
Age: 52
Discharge: HOME | End: 2022-11-28
Attending: EMERGENCY MEDICINE
Payer: COMMERCIAL

## 2022-11-28 ENCOUNTER — APPOINTMENT (OUTPATIENT)
Dept: GENERAL RADIOLOGY | Age: 52
End: 2022-11-28
Attending: PHYSICIAN ASSISTANT
Payer: COMMERCIAL

## 2022-11-28 VITALS
WEIGHT: 250 LBS | SYSTOLIC BLOOD PRESSURE: 149 MMHG | HEIGHT: 63 IN | OXYGEN SATURATION: 100 % | DIASTOLIC BLOOD PRESSURE: 96 MMHG | HEART RATE: 100 BPM | BODY MASS INDEX: 44.3 KG/M2 | RESPIRATION RATE: 12 BRPM | TEMPERATURE: 98.3 F

## 2022-11-28 DIAGNOSIS — S62.525A CLOSED NONDISPLACED FRACTURE OF DISTAL PHALANX OF LEFT THUMB, INITIAL ENCOUNTER: Primary | ICD-10-CM

## 2022-11-28 DIAGNOSIS — S63.642A SPRAIN OF METACARPOPHALANGEAL (MCP) JOINT OF LEFT THUMB, INITIAL ENCOUNTER: ICD-10-CM

## 2022-11-28 PROCEDURE — 73140 X-RAY EXAM OF FINGER(S): CPT

## 2022-11-28 PROCEDURE — 99283 EMERGENCY DEPT VISIT LOW MDM: CPT

## 2022-11-28 NOTE — DISCHARGE INSTRUCTIONS
It was a pleasure taking care of you at [facility] Emergency Department today. We know that when you come to [facility], you are entrusting us with your health, comfort, and safety. Our physicians and nurses honor that trust, and we truly appreciate the opportunity to care for you and your loved ones. We also value our feedback. If you receive a survey about your Emergency Department experience today, please fill it out. We care about our patients' feedback, and we listen to what you have to say. Thank you!

## 2022-11-28 NOTE — Clinical Note
[address] EMERGENCY DEPT  [address] [facility] [id number] [phone]    Work/School Note    Date: 11/28/2022    To Whom It May concern:    Natalya Avery was seen and treated today in the emergency room by the following provider(s):  Attending Provider: Maxine Carey MD  Physician Assistant: Tony Harden PA-C. Natalya Avery is excused from work/school on 11/28/22 and 11/29/22. She is medically clear to return to work/school on 11/30/2022.        Sincerely,          Fide Law PA-C

## 2022-11-28 NOTE — ED TRIAGE NOTES
Pt reports left thumb pain x Thursday after her hand gave out while she was holding a dish. Pt reports she is able to move finger a little bit, no obvious deformity noted.

## 2022-11-28 NOTE — ED NOTES
Emergency Department Nursing Plan of Care       The Nursing Plan of Care is developed from the Nursing assessment and Emergency Department Attending provider initial evaluation. The plan of care may be reviewed in the ED Provider note.     The Plan of Care was developed with the following considerations:   Patient / Family readiness to learn indicated by:verbalized understanding  Persons(s) to be included in education: patient  Barriers to Learning/Limitations:No    Signed     Jose Tim RN    11/28/2022   4:31 PM

## 2022-11-28 NOTE — ED PROVIDER NOTES
EMERGENCY DEPARTMENT HISTORY AND PHYSICAL EXAM      Date: 11/28/2022  Patient Name: Davi Granados    History of Presenting Illness     Chief Complaint   Patient presents with    Finger Pain     History Provided By: Patient    HPI: Davi Granados, 50 y.o. right-hand-dominant female with medical history significant for asthma, IBS, migraine headaches, chronic pain, fibromyalgia, GERD, hypertension, MS, diabetes who presents via self to the ED with cc of acute moderate aching left thumb pain X 5 days secondary to injury. Endorses that she was holding a pot when it fell and when she tried to grab it her thumb twisted with a pot. No medications or alleviating factors for symptoms. Pain exacerbated with movement palpation. No fever, chills, nausea, vomiting, numbness, tingling, focal weakness, redness, rash, wound, swelling, discoloration. PCP: Crow Rayo MD    There are no other complaints, changes, or physical findings at this time. No current facility-administered medications on file prior to encounter. Current Outpatient Medications on File Prior to Encounter   Medication Sig Dispense Refill    lidocaine (LIDODERM) 5 % Apply patch to the affected area for 12 hours a day and remove for 12 hours a day.  39 Each 2    ALPRAZolam (XANAX) 0.5 mg tablet TAKE 1 TABLET BY MOUTH  DAILY AS NEEDED FOR ANXIETY 90 Tablet 1    fluconazole (DIFLUCAN) 150 mg tablet TAKE 1 TABLET BY MOUTH  DAILY FOR 1 DAY 3 Tablet 1    albuterol (PROVENTIL HFA, VENTOLIN HFA, PROAIR HFA) 90 mcg/actuation inhaler USE 2 INHALATIONS BY MOUTH  EVERY 4 HOURS AS NEEDED FOR WHEEZING 51 g 3    gabapentin (NEURONTIN) 600 mg tablet TAKE 1 TABLET BY MOUTH  TWICE DAILY 180 Tablet 3    cyclobenzaprine (FLEXERIL) 10 mg tablet TAKE 1 TABLET BY MOUTH 3  TIMES DAILY AS NEEDED FOR  MUSCLE SPASM FOR UP TO 10  DAYS 30 Tablet 3    sertraline (ZOLOFT) 50 mg tablet TAKE 1 TABLET BY MOUTH  DAILY 90 Tablet 3    losartan (COZAAR) 50 mg tablet TAKE 1 TABLET BY MOUTH  DAILY 90 Tablet 3    hydroCHLOROthiazide (HYDRODIURIL) 25 mg tablet TAKE 1 TABLET BY MOUTH  DAILY 90 Tablet 3    budesonide-glycopyr-formoterol (Breztri Aerosphere) 160-9-4.8 mcg/actuation HFAA Take 2 Puffs by inhalation two (2) times a day. 1 Each 5    albuterol (PROVENTIL VENTOLIN) 2.5 mg /3 mL (0.083 %) nebu USE 1 VIAL VIA NEBULIZER  EVERY 4 HOURS AS NEEDED FOR WHEEZING   RECOMMENDS NOT EXCEEDING 4  VIALS/ mL 8     Past History     Past Medical History:  Past Medical History:   Diagnosis Date    Arthritis     Arthritis of low back     Asthma     Chronic pain     fibromyalgia    Colon spasm     Controlled type 2 diabetes mellitus without complication, without long-term current use of insulin ([facility] [address] [address] 2/16/2022    Depression     Fibromyalgia     Gastrointestinal disorder     IBS    GERD (gastroesophageal reflux disease)     High cholesterol     Hypertension     IBS (irritable bowel syndrome)     Left axillary pain 2/22/2013    Migraine     LAST HEADACHE 2-16-12    Multiple sclerosis ([address]     Other ill-defined conditions(799.89)     fibromylgia    Other ill-defined conditions(799.89)     MS     Past Surgical History:  Past Surgical History:   Procedure Laterality Date    HX APPENDECTOMY      HX BLADDER REPAIR  04/2011     HX CHOLECYSTECTOMY      HX GYN      For ectopic pregnancy, hx tubal ligation    HX GYN      vaginal ablation. HX UROLOGICAL      bladder tuck, no mesh    HX UROLOGICAL      sling removed 1/10/11     Family History:  Family History   Adopted: Yes   Problem Relation Age of Onset    Other Other         family hx is completely unknown    Diabetes Brother     Asthma Daughter     Asthma Son      Social History:  Social History     Tobacco Use    Smoking status: Some Days     Packs/day: 0.50     Years: 20.00     Pack years: 10.00     Types: Cigarettes    Smokeless tobacco: Never   Substance Use Topics    Alcohol use: No     Alcohol/week: 0.0 standard drinks    Drug use:  No Allergies: Allergies   Allergen Reactions    Hydromorphone Itching and Swelling     hydromorphone 1mg IV given immediately began itching and complained of scratchy swelling sensation in her throat    Levofloxacin Hives    Aspirin Anaphylaxis    Bactrim [Sulfamethoprim Ds] Hives    Darvocet A500 [Propoxyphene N-Acetaminophen] Hives    Imitrex [Sumatriptan] Hives    Pcn [Penicillins] Anaphylaxis    Percocet [Oxycodone-Acetaminophen] Hives     Pt can take lortab/norco    Toradol [Ketorolac Tromethamine] Hives    Tramadol Hives    Hydrocodone Hives    Keflex [Cephalexin] Hives    Lisinopril Swelling     Tongue and mouth felt weird, slight swelling     Prednisone Rash and Angioedema     And pt felt throat closing    Savella [Milnacipran] Hives     INSOMNIA    Topamax [Topiramate] Other (comments)    Vesicare [Solifenacin] Other (comments)     Nose bleeds    Wellbutrin [Bupropion Hcl] Other (comments)     insomnia     Review of Systems   Review of Systems   Constitutional:  Negative for activity change, appetite change, chills, diaphoresis, fatigue and fever. HENT: Negative. Eyes: Negative. Respiratory: Negative. Negative for cough and shortness of breath. Cardiovascular: Negative. Negative for chest pain and leg swelling. Gastrointestinal: Negative. Negative for abdominal pain, diarrhea, nausea and vomiting. Genitourinary: Negative. Musculoskeletal:  Positive for arthralgias. Negative for back pain, gait problem, joint swelling, myalgias, neck pain and neck stiffness. Skin: Negative. Negative for color change, pallor and wound. Neurological: Negative. Negative for weakness and numbness. Psychiatric/Behavioral: Negative. Physical Exam   Physical Exam  Vitals and nursing note reviewed. Constitutional:       General: She is not in acute distress. Appearance: Normal appearance. She is well-developed. She is not ill-appearing, toxic-appearing or diaphoretic.    HENT:      Head: Normocephalic and atraumatic. Right Ear: Hearing and external ear normal.      Left Ear: Hearing and external ear normal.      Nose: Nose normal.   Eyes:      Conjunctiva/sclera: Conjunctivae normal.      Pupils: Pupils are equal, round, and reactive to light. Cardiovascular:      Pulses:           Radial pulses are 2+ on the right side and 2+ on the left side. Pulmonary:      Effort: Pulmonary effort is normal. No respiratory distress. Musculoskeletal:         General: Normal range of motion. Left elbow: Normal.      Left forearm: Normal.      Left wrist: No swelling, deformity, effusion, lacerations, tenderness, bony tenderness, snuff box tenderness or crepitus. Normal range of motion. Normal pulse. Right hand: Normal.      Left hand: Tenderness and bony tenderness (Left thumb) present. No swelling, deformity or lacerations. Normal range of motion. Normal strength. Normal sensation. There is no disruption of two-point discrimination. Normal capillary refill. Normal pulse. Cervical back: Normal range of motion. Skin:     General: Skin is warm and dry. Capillary Refill: Capillary refill takes less than 2 seconds. Findings: No abrasion, abscess, bruising, ecchymosis, erythema, laceration, rash or wound. Neurological:      Mental Status: She is alert and oriented to person, place, and time. Psychiatric:         Behavior: Behavior normal.         Thought Content: Thought content normal.         Judgment: Judgment normal.     Diagnostic Study Results   Labs -   No results found for this or any previous visit (from the past 12 hour(s)). Radiologic Studies -   XR THUMB LT MIN 2 V   Final Result   Deformity at the base of the first metacarpal as above. Deformity at   the base of the distal phalanx. XR THUMB LT MIN 2 V    Result Date: 11/28/2022  Deformity at the base of the first metacarpal as above. Deformity at the base of the distal phalanx.    Medical Decision Making I am the first provider for this patient. I reviewed the vital signs, available nursing notes, past medical history, past surgical history, family history and social history. Vital Signs-Reviewed the patient's vital signs. Patient Vitals for the past 24 hrs:   Temp Pulse Resp BP SpO2   11/28/22 1545 98.3 °F (36.8 °C) 100 12 (!) 149/96 100 %     Pulse Oximetry Analysis - 100% on RA (normal)      Records Reviewed: Nursing Notes, Old Medical Records, Previous Radiology Studies, and Previous Laboratory Studies    Provider Notes (Medical Decision Making):   53-year-old right-hand-dominant female with medical history significant for diabetes, MS, depression, hyperlipidemia, fibromyalgia, hypertension, GERD, arthritis, migraine headaches presents with acute left thumb pain secondary to injury 5 days prior to arrival.  Relatively unremarkable exam with tenderness localized to left thumb and pain reproduced with flexion and extension. Neurovascular intact. Differential includes sprain, strain, fracture, contusion, dislocation. Will obtain imaging and treat symptoms. I have reviewed the patients xray results with them and have completed the first phase of their fracture care including prefabricated thumb spica splint placement. I have also conveyed that they will be following up with an orthopedist who will continue with the next phase of their care. I have explained how very important it is for the patient to follow-up with this next phase as it may include further casting and/or surgery. ED Course:   Initial assessment performed. The patients presenting problems have been discussed, and they are in agreement with the care plan formulated and outlined with them. I have encouraged them to ask questions as they arise throughout their visit. Progress Note:   Updated pt on all returned results and findings. Discussed the importance of proper follow up as referred below along with return precautions. Pt in agreement with the care plan and expresses agreement with and understanding of all items discussed. Disposition:  5:09 PM  I have discussed with patient their diagnosis, treatment, and follow up plan. The patient agrees to follow up as outlined in discharge paperwork and also to return to the ED with any worsening. Kenna Luther PA-C        PLAN:  1. Current Discharge Medication List        2. Follow-up Information       Follow up With Specialties Details Why Marleen Brady MD Hand Surgery Physician, General Surgery Schedule an appointment as soon as possible for a visit in 2 days As needed [address] [facility]  [phone]      Yesenia Correia MD Family Medicine Schedule an appointment as soon as possible for a visit in 2 days As needed [id number] [facility] [address]      [facility] EMERGENCY DEPT Emergency Medicine Go to  As needed, If symptoms worsen [address] [facility]  [phone]          Return to ED if worse     Diagnosis     Clinical Impression:   1. Closed nondisplaced fracture of distal phalanx of left thumb, initial encounter    2. Sprain of metacarpophalangeal (MCP) joint of left thumb, initial encounter            Please note that this dictation was completed with Dragon, computer voice recognition software. Quite often unanticipated grammatical, syntax, homophones, and other interpretive errors are inadvertently transcribed by the computer software. Please disregard these errors. Additionally, please excuse any errors that have escaped final proofreading.

## 2022-11-28 NOTE — Clinical Note
[facility] EMERGENCY DEPT  [address] [facility] [id number] [phone]    Work/School Note    Date: 11/28/2022    To Whom It May concern:    Angelica Smith was seen and treated today in the emergency room by the following provider(s):  Attending Provider: Isaías Valentin MD  Physician Assistant: River Bolton PA-C. Angelica Smith is excused from work/school on 11/28/22 and 11/29/22. She is medically clear to return to work/school on 11/30/2022.        Sincerely,          Ashley Yuan RN

## 2022-12-21 RX ORDER — LIDOCAINE 50 MG/G
PATCH TOPICAL
Qty: 39 EACH | Refills: 2 | Status: SHIPPED | OUTPATIENT
Start: 2022-12-21

## 2023-01-04 DIAGNOSIS — I10 ESSENTIAL HYPERTENSION, BENIGN: ICD-10-CM

## 2023-01-05 RX ORDER — LOSARTAN POTASSIUM 50 MG/1
TABLET ORAL
Qty: 90 TABLET | Refills: 3 | Status: SHIPPED | OUTPATIENT
Start: 2023-01-05

## 2023-01-05 RX ORDER — HYDROCHLOROTHIAZIDE 25 MG/1
TABLET ORAL
Qty: 90 TABLET | Refills: 3 | Status: SHIPPED | OUTPATIENT
Start: 2023-01-05

## 2023-01-13 ENCOUNTER — HOSPITAL ENCOUNTER (OUTPATIENT)
Dept: GENERAL RADIOLOGY | Age: 53
Discharge: HOME | End: 2023-01-13
Payer: COMMERCIAL

## 2023-01-13 ENCOUNTER — TRANSCRIBE ORDER (OUTPATIENT)
Dept: REGISTRATION | Age: 53
End: 2023-01-13

## 2023-01-13 DIAGNOSIS — M54.9 BACK PAIN: ICD-10-CM

## 2023-01-13 DIAGNOSIS — M54.9 BACK PAIN: Primary | ICD-10-CM

## 2023-01-13 PROCEDURE — 72100 X-RAY EXAM L-S SPINE 2/3 VWS: CPT

## 2023-01-15 DIAGNOSIS — M79.7 FIBROMYALGIA: ICD-10-CM

## 2023-01-16 RX ORDER — GABAPENTIN 600 MG/1
TABLET ORAL
Qty: 180 TABLET | Refills: 3 | Status: SHIPPED | OUTPATIENT
Start: 2023-01-16

## 2023-01-16 RX ORDER — FLUCONAZOLE 150 MG/1
TABLET ORAL
Qty: 3 TABLET | Refills: 1 | Status: SHIPPED | OUTPATIENT
Start: 2023-01-16

## 2023-02-23 RX ORDER — NITROFURANTOIN 25; 75 MG/1; MG/1
100 CAPSULE ORAL 2 TIMES DAILY
Qty: 14 CAPSULE | Refills: 0 | Status: SHIPPED | OUTPATIENT
Start: 2023-02-23 | End: 2023-03-02

## 2023-03-18 DIAGNOSIS — F41.9 ANXIETY: ICD-10-CM

## 2023-03-20 RX ORDER — ALPRAZOLAM 0.5 MG/1
TABLET ORAL
Qty: 90 TABLET | Refills: 2 | Status: SHIPPED | OUTPATIENT
Start: 2023-03-20

## 2023-03-23 ENCOUNTER — DOCUMENTATION ONLY (OUTPATIENT)
Dept: FAMILY MEDICINE CLINIC | Age: 53
End: 2023-03-23

## 2023-03-27 ENCOUNTER — DOCUMENTATION ONLY (OUTPATIENT)
Dept: FAMILY MEDICINE CLINIC | Age: 53
End: 2023-03-27

## 2023-03-27 NOTE — PROGRESS NOTES
[facility] Pt & sports rehab POC was signed & faxed to [phone],ok,Copy placed in scan folder to be scanned to chart.

## 2023-04-24 ENCOUNTER — OFFICE VISIT (OUTPATIENT)
Dept: FAMILY MEDICINE CLINIC | Age: 53
End: 2023-04-24
Payer: COMMERCIAL

## 2023-04-24 VITALS
SYSTOLIC BLOOD PRESSURE: 122 MMHG | HEART RATE: 105 BPM | BODY MASS INDEX: 46.95 KG/M2 | TEMPERATURE: 98.4 F | RESPIRATION RATE: 16 BRPM | WEIGHT: 265 LBS | OXYGEN SATURATION: 96 % | HEIGHT: 63 IN | DIASTOLIC BLOOD PRESSURE: 78 MMHG

## 2023-04-24 DIAGNOSIS — E11.9 CONTROLLED TYPE 2 DIABETES MELLITUS WITHOUT COMPLICATION, WITHOUT LONG-TERM CURRENT USE OF INSULIN (HCC): ICD-10-CM

## 2023-04-24 DIAGNOSIS — E66.01 MORBID OBESITY WITH BMI OF 45.0-49.9, ADULT (HCC): ICD-10-CM

## 2023-04-24 DIAGNOSIS — M79.672 LEFT FOOT PAIN: ICD-10-CM

## 2023-04-24 DIAGNOSIS — R60.9 PERIPHERAL EDEMA: Primary | ICD-10-CM

## 2023-04-24 DIAGNOSIS — J44.9 CHRONIC OBSTRUCTIVE PULMONARY DISEASE, UNSPECIFIED COPD TYPE (HCC): ICD-10-CM

## 2023-04-24 PROCEDURE — 3074F SYST BP LT 130 MM HG: CPT | Performed by: PHYSICIAN ASSISTANT

## 2023-04-24 PROCEDURE — 3078F DIAST BP <80 MM HG: CPT | Performed by: PHYSICIAN ASSISTANT

## 2023-04-24 PROCEDURE — 99213 OFFICE O/P EST LOW 20 MIN: CPT | Performed by: PHYSICIAN ASSISTANT

## 2023-04-24 RX ORDER — HYDROCODONE BITARTRATE AND ACETAMINOPHEN 7.5; 325 MG/1; MG/1
TABLET ORAL
COMMUNITY

## 2023-04-24 NOTE — PROGRESS NOTES
Chief Complaint   Patient presents with    Foot Swelling     Pt being seen for bilateral feet swelling and ankle swelling  -pt states that she has pain on a 8/10 scale   -pt reports that it ws the worse this weekend and states that she had to wear slippers   -pt states that she also has spots that showed up 1 week ago  -pt denies itching    1. Have you been to the ER, urgent care clinic since your last visit? Hospitalized since your last visit? No    2. Have you seen or consulted any other health care providers outside of the [address] [place] [address] since your last visit? Include any pap smears or colon screening. No    Pt has no other concerns   Chief Complaint   Patient presents with    Foot Swelling     she is a 48y.o. year old female who presents for evaluation. Reviewed and agree with Nurse Note and duplicated in this note. Reviewed PmHx, RxHx, FmHx, SocHx, AllgHx and updated and dated in the chart. Review of Systems - negative except as listed above    Objective:     Vitals:    04/24/23 1332   BP: 122/78   Pulse: (!) 105   Resp: 16   Temp: 98.4 °F (36.9 °C)   TempSrc: Oral   SpO2: 96%   Weight: 265 lb (120.2 kg)   Height: 5' 3\" (1.6 m)     Physical Examination: General appearance - alert, well appearing, and in no distress  Chest - clear to auscultation, no wheezes, rales or rhonchi, symmetric air entry  Heart - normal rate, regular rhythm, normal S1, S2, no murmurs, rubs, clicks or gallops  Extremities - pedal edema 1 +, intact peripheral pulses, left foot-hard thickened skin noted along the lateral aspect of the heel. Patient reports tenderness with palpation over this area. Left ankle-fat deposit near the lateral malleolus dorsal left foot very small petechiae appearing rash. Assessment/ Plan:   Diagnoses and all orders for this visit:    1. Peripheral edema  -     METABOLIC PANEL, COMPREHENSIVE; Future  Patient to get lab work done.   I explained the patient that one of the medicine she is on does call peripheral edema in some people. Patient states that she has been on gabapentin for a while. She plans to talk with Dr. Reza Aragon about weaning off this medication because she does not feel it works well. We also spoke about her diet. Advised the patient to make sure she is limiting the amount of sodium in her diet. Also have suggested patient that she go back to using her compression stockings. 2. Left foot pain  -     REFERRAL TO PODIATRY  Patient appears to have a callus developing on the lateral aspect of her heel. A referral has been given for the patient to see the podiatrist in reference to removing the callus. I explained to patient that exactly sure as to why she has a rash on the top of her left foot. This will be further evaluated by the podiatrist once she was seen. 3. Chronic obstructive pulmonary disease, unspecified COPD type ([address]  Stable  4. Controlled type 2 diabetes mellitus without complication, without long-term current use of insulin (HCC)  Stable  5. Morbid obesity with BMI of 45.0-49.9, adult [facility])  Patient continues to work on her weight diet. She will continue to discuss this with her PCP. I have discussed the diagnosis with the patient and the intended plan as seen in the above orders. The patient has received an after-visit summary and questions were answered concerning future plans.      Medication Side Effects and Warnings were discussed with patient: yes  Patient Labs were reviewed and or requested: yes  Patient Past Records were reviewed and or requested  yes    Airam Purvis PA-C

## 2023-04-24 NOTE — PROGRESS NOTES
Chief Complaint   Patient presents with    Foot Swelling     Pt being seen for bilateral feet swelling and ankle swelling  -pt states that she has pain on a 8/10 scale   -pt reports that it ws the worse this weekend and states that she had to wear slippers   -pt states that she also has spots that showed up 1 week ago  -pt denies itching    1. Have you been to the ER, urgent care clinic since your last visit? Hospitalized since your last visit? No    2. Have you seen or consulted any other health care providers outside of the [address] [place] [address] since your last visit? Include any pap smears or colon screening.  No    Pt has no other concerns

## 2023-04-25 LAB
ALBUMIN SERPL-MCNC: 3.5 G/DL (ref 3.5–5)
ALBUMIN/GLOB SERPL: 0.9 (ref 1.1–2.2)
ALP SERPL-CCNC: 94 U/L (ref 45–117)
ALT SERPL-CCNC: 77 U/L (ref 12–78)
ANION GAP SERPL CALC-SCNC: 3 MMOL/L (ref 5–15)
AST SERPL-CCNC: 106 U/L (ref 15–37)
BILIRUB SERPL-MCNC: 0.3 MG/DL (ref 0.2–1)
BUN SERPL-MCNC: 9 MG/DL (ref 6–20)
BUN/CREAT SERPL: 11 (ref 12–20)
CALCIUM SERPL-MCNC: 9.3 MG/DL (ref 8.5–10.1)
CHLORIDE SERPL-SCNC: 102 MMOL/L (ref 97–108)
CO2 SERPL-SCNC: 30 MMOL/L (ref 21–32)
CREAT SERPL-MCNC: 0.79 MG/DL (ref 0.55–1.02)
GLOBULIN SER CALC-MCNC: 3.9 G/DL (ref 2–4)
GLUCOSE SERPL-MCNC: 129 MG/DL (ref 65–100)
POTASSIUM SERPL-SCNC: 4 MMOL/L (ref 3.5–5.1)
PROT SERPL-MCNC: 7.4 G/DL (ref 6.4–8.2)
SODIUM SERPL-SCNC: 135 MMOL/L (ref 136–145)

## 2023-05-03 ENCOUNTER — HOSPITAL ENCOUNTER (EMERGENCY)
Age: 53
Discharge: HOME | End: 2023-05-04
Attending: EMERGENCY MEDICINE
Payer: COMMERCIAL

## 2023-05-03 DIAGNOSIS — M79.10 MYALGIA: Primary | ICD-10-CM

## 2023-05-03 LAB
ALBUMIN SERPL-MCNC: 3.2 G/DL (ref 3.5–5)
ALBUMIN/GLOB SERPL: 0.8 (ref 1.1–2.2)
ALP SERPL-CCNC: 96 U/L (ref 45–117)
ALT SERPL-CCNC: 66 U/L (ref 12–78)
ANION GAP SERPL CALC-SCNC: 8 MMOL/L (ref 5–15)
APPEARANCE UR: ABNORMAL
AST SERPL-CCNC: 62 U/L (ref 15–37)
BASOPHILS # BLD: 0 K/UL (ref 0–0.1)
BASOPHILS NFR BLD: 0 % (ref 0–1)
BILIRUB SERPL-MCNC: 0.3 MG/DL (ref 0.2–1)
BILIRUB UR QL: NEGATIVE
BUN SERPL-MCNC: 7 MG/DL (ref 6–20)
BUN/CREAT SERPL: 8 (ref 12–20)
CALCIUM SERPL-MCNC: 8.8 MG/DL (ref 8.5–10.1)
CHLORIDE SERPL-SCNC: 100 MMOL/L (ref 97–108)
CK SERPL-CCNC: 108 U/L (ref 26–192)
CO2 SERPL-SCNC: 30 MMOL/L (ref 21–32)
COLOR UR: ABNORMAL
CREAT SERPL-MCNC: 0.93 MG/DL (ref 0.55–1.02)
DIFFERENTIAL METHOD BLD: ABNORMAL
EOSINOPHIL # BLD: 0.2 K/UL (ref 0–0.4)
EOSINOPHIL NFR BLD: 2 % (ref 0–7)
ERYTHROCYTE [DISTWIDTH] IN BLOOD BY AUTOMATED COUNT: 13.7 % (ref 11.5–14.5)
FLUAV RNA SPEC QL NAA+PROBE: NOT DETECTED
FLUBV RNA SPEC QL NAA+PROBE: NOT DETECTED
GLOBULIN SER CALC-MCNC: 4.1 G/DL (ref 2–4)
GLUCOSE SERPL-MCNC: 147 MG/DL (ref 65–100)
GLUCOSE UR STRIP.AUTO-MCNC: 100 MG/DL
HCT VFR BLD AUTO: 41.2 % (ref 35–47)
HGB BLD-MCNC: 13.2 G/DL (ref 11.5–16)
HGB UR QL STRIP: NEGATIVE
IMM GRANULOCYTES # BLD AUTO: 0 K/UL (ref 0–0.04)
IMM GRANULOCYTES NFR BLD AUTO: 0 % (ref 0–0.5)
KETONES UR QL STRIP.AUTO: NEGATIVE MG/DL
LACTATE SERPL-SCNC: 1.3 MMOL/L (ref 0.4–2)
LEUKOCYTE ESTERASE UR QL STRIP.AUTO: NEGATIVE
LYMPHOCYTES # BLD: 3.6 K/UL (ref 0.8–3.5)
LYMPHOCYTES NFR BLD: 35 % (ref 12–49)
MAGNESIUM SERPL-MCNC: 1.8 MG/DL (ref 1.6–2.4)
MCH RBC QN AUTO: 29.7 PG (ref 26–34)
MCHC RBC AUTO-ENTMCNC: 32 G/DL (ref 30–36.5)
MCV RBC AUTO: 92.8 FL (ref 80–99)
MONOCYTES # BLD: 0.6 K/UL (ref 0–1)
MONOCYTES NFR BLD: 5 % (ref 5–13)
NEUTS SEG # BLD: 5.8 K/UL (ref 1.8–8)
NEUTS SEG NFR BLD: 58 % (ref 32–75)
NITRITE UR QL STRIP.AUTO: NEGATIVE
NRBC # BLD: 0 K/UL (ref 0–0.01)
NRBC BLD-RTO: 0 PER 100 WBC
PH UR STRIP: 7 (ref 5–8)
PLATELET # BLD AUTO: 301 K/UL (ref 150–400)
PMV BLD AUTO: 11.2 FL (ref 8.9–12.9)
POTASSIUM SERPL-SCNC: 3.7 MMOL/L (ref 3.5–5.1)
PROT SERPL-MCNC: 7.3 G/DL (ref 6.4–8.2)
PROT UR STRIP-MCNC: NEGATIVE MG/DL
RBC # BLD AUTO: 4.44 M/UL (ref 3.8–5.2)
SARS-COV-2 RNA RESP QL NAA+PROBE: NOT DETECTED
SODIUM SERPL-SCNC: 138 MMOL/L (ref 136–145)
SP GR UR REFRACTOMETRY: 1.02
UROBILINOGEN UR QL STRIP.AUTO: 1 EU/DL (ref 0.2–1)
WBC # BLD AUTO: 10.2 K/UL (ref 3.6–11)

## 2023-05-03 PROCEDURE — 85025 COMPLETE CBC W/AUTO DIFF WBC: CPT

## 2023-05-03 PROCEDURE — 36415 COLL VENOUS BLD VENIPUNCTURE: CPT

## 2023-05-03 PROCEDURE — 87040 BLOOD CULTURE FOR BACTERIA: CPT

## 2023-05-03 PROCEDURE — 81003 URINALYSIS AUTO W/O SCOPE: CPT

## 2023-05-03 PROCEDURE — 96375 TX/PRO/DX INJ NEW DRUG ADDON: CPT

## 2023-05-03 PROCEDURE — 74011250637 HC RX REV CODE- 250/637: Performed by: EMERGENCY MEDICINE

## 2023-05-03 PROCEDURE — 83605 ASSAY OF LACTIC ACID: CPT

## 2023-05-03 PROCEDURE — 85652 RBC SED RATE AUTOMATED: CPT

## 2023-05-03 PROCEDURE — 87636 SARSCOV2 & INF A&B AMP PRB: CPT

## 2023-05-03 PROCEDURE — 80053 COMPREHEN METABOLIC PANEL: CPT

## 2023-05-03 PROCEDURE — 99284 EMERGENCY DEPT VISIT MOD MDM: CPT

## 2023-05-03 PROCEDURE — 82550 ASSAY OF CK (CPK): CPT

## 2023-05-03 PROCEDURE — 96374 THER/PROPH/DIAG INJ IV PUSH: CPT

## 2023-05-03 PROCEDURE — 74011250636 HC RX REV CODE- 250/636: Performed by: EMERGENCY MEDICINE

## 2023-05-03 PROCEDURE — 83735 ASSAY OF MAGNESIUM: CPT

## 2023-05-03 RX ORDER — ACETAMINOPHEN 500 MG
1000 TABLET ORAL ONCE
Status: COMPLETED | OUTPATIENT
Start: 2023-05-03 | End: 2023-05-03

## 2023-05-03 RX ORDER — ONDANSETRON 2 MG/ML
4 INJECTION INTRAMUSCULAR; INTRAVENOUS
Status: COMPLETED | OUTPATIENT
Start: 2023-05-03 | End: 2023-05-03

## 2023-05-03 RX ORDER — MORPHINE SULFATE 4 MG/ML
4 INJECTION INTRAVENOUS
Status: COMPLETED | OUTPATIENT
Start: 2023-05-03 | End: 2023-05-03

## 2023-05-03 RX ADMIN — ONDANSETRON 4 MG: 2 INJECTION INTRAMUSCULAR; INTRAVENOUS at 23:40

## 2023-05-03 RX ADMIN — SODIUM CHLORIDE 1000 ML: 9 INJECTION, SOLUTION INTRAVENOUS at 23:28

## 2023-05-03 RX ADMIN — ACETAMINOPHEN 1000 MG: 500 TABLET ORAL at 23:28

## 2023-05-03 RX ADMIN — MORPHINE SULFATE 4 MG: 4 INJECTION INTRAVENOUS at 23:27

## 2023-05-04 ENCOUNTER — APPOINTMENT (OUTPATIENT)
Dept: GENERAL RADIOLOGY | Age: 53
End: 2023-05-04
Attending: EMERGENCY MEDICINE
Payer: COMMERCIAL

## 2023-05-04 VITALS
TEMPERATURE: 99.3 F | WEIGHT: 265 LBS | RESPIRATION RATE: 22 BRPM | DIASTOLIC BLOOD PRESSURE: 81 MMHG | HEART RATE: 105 BPM | OXYGEN SATURATION: 95 % | BODY MASS INDEX: 46.95 KG/M2 | SYSTOLIC BLOOD PRESSURE: 119 MMHG | HEIGHT: 63 IN

## 2023-05-04 LAB — ERYTHROCYTE [SEDIMENTATION RATE] IN BLOOD: 42 MM/HR (ref 0–30)

## 2023-05-04 PROCEDURE — 71045 X-RAY EXAM CHEST 1 VIEW: CPT

## 2023-05-04 PROCEDURE — 74011250636 HC RX REV CODE- 250/636: Performed by: EMERGENCY MEDICINE

## 2023-05-04 PROCEDURE — 86757 RICKETTSIA ANTIBODY: CPT

## 2023-05-04 RX ORDER — ACETAMINOPHEN AND CODEINE PHOSPHATE 300; 30 MG/1; MG/1
1 TABLET ORAL
Qty: 18 TABLET | Refills: 0 | Status: SHIPPED | OUTPATIENT
Start: 2023-05-04 | End: 2023-05-07

## 2023-05-04 RX ORDER — FENTANYL CITRATE 50 UG/ML
100 INJECTION, SOLUTION INTRAMUSCULAR; INTRAVENOUS
Status: COMPLETED | OUTPATIENT
Start: 2023-05-04 | End: 2023-05-04

## 2023-05-04 RX ORDER — DOXYCYCLINE HYCLATE 100 MG
100 TABLET ORAL 2 TIMES DAILY
Qty: 14 TABLET | Refills: 0 | Status: SHIPPED | OUTPATIENT
Start: 2023-05-04 | End: 2023-05-11

## 2023-05-04 RX ADMIN — FENTANYL CITRATE 100 MCG: 50 INJECTION, SOLUTION INTRAMUSCULAR; INTRAVENOUS at 00:31

## 2023-05-05 LAB
R RICKETTSI IGG SER QL IA: NEGATIVE
R RICKETTSI IGM SER-ACNC: 0.97 INDEX (ref 0–0.89)

## 2023-05-07 LAB
BACTERIA SPEC CULT: NORMAL
SERVICE CMNT-IMP: NORMAL

## 2023-05-09 LAB
BACTERIA SPEC CULT: NORMAL
SERVICE CMNT-IMP: NORMAL

## 2023-05-15 RX ORDER — FLUCONAZOLE 150 MG/1
TABLET ORAL
Qty: 4 TABLET | Refills: 2 | Status: SHIPPED | OUTPATIENT
Start: 2023-05-15

## 2023-05-15 RX ORDER — ALBUTEROL SULFATE 2.5 MG/3ML
SOLUTION RESPIRATORY (INHALATION)
Qty: 100 EACH | Refills: 3 | Status: SHIPPED | OUTPATIENT
Start: 2023-05-15

## 2023-05-23 RX ORDER — HYDROCODONE BITARTRATE AND ACETAMINOPHEN 7.5; 325 MG/1; MG/1
TABLET ORAL
COMMUNITY
End: 2023-07-14 | Stop reason: ALTCHOICE

## 2023-05-23 RX ORDER — HYDROCHLOROTHIAZIDE 25 MG/1
1 TABLET ORAL DAILY
COMMUNITY
Start: 2023-01-05

## 2023-05-23 RX ORDER — LOSARTAN POTASSIUM 50 MG/1
1 TABLET ORAL DAILY
COMMUNITY
Start: 2023-01-05

## 2023-05-23 RX ORDER — CYCLOBENZAPRINE HCL 10 MG
TABLET ORAL
COMMUNITY
Start: 2022-07-21

## 2023-05-23 RX ORDER — LIDOCAINE 50 MG/G
PATCH TOPICAL
COMMUNITY
Start: 2022-12-21 | End: 2023-07-14 | Stop reason: ALTCHOICE

## 2023-05-23 RX ORDER — ALBUTEROL SULFATE 90 UG/1
AEROSOL, METERED RESPIRATORY (INHALATION)
COMMUNITY
Start: 2022-08-01

## 2023-05-23 RX ORDER — ALPRAZOLAM 0.5 MG/1
1 TABLET ORAL DAILY PRN
COMMUNITY
Start: 2023-03-20

## 2023-05-23 RX ORDER — BUDESONIDE, GLYCOPYRROLATE, AND FORMOTEROL FUMARATE 160; 9; 4.8 UG/1; UG/1; UG/1
2 AEROSOL, METERED RESPIRATORY (INHALATION) 2 TIMES DAILY
COMMUNITY
Start: 2021-10-05

## 2023-05-23 RX ORDER — GABAPENTIN 600 MG/1
1 TABLET ORAL 2 TIMES DAILY
COMMUNITY
Start: 2023-01-16 | End: 2023-07-05

## 2023-05-23 RX ORDER — ALBUTEROL SULFATE 2.5 MG/3ML
SOLUTION RESPIRATORY (INHALATION)
COMMUNITY
Start: 2021-09-01

## 2023-06-01 ENCOUNTER — HOSPITAL ENCOUNTER (EMERGENCY)
Facility: HOSPITAL | Age: 53
Discharge: HOME | End: 2023-06-02
Attending: STUDENT IN AN ORGANIZED HEALTH CARE EDUCATION/TRAINING PROGRAM
Payer: MEDICARE

## 2023-06-01 DIAGNOSIS — S86.912A STRAIN OF LEFT KNEE, INITIAL ENCOUNTER: Primary | ICD-10-CM

## 2023-06-01 PROCEDURE — 6370000000 HC RX 637 (ALT 250 FOR IP): Performed by: STUDENT IN AN ORGANIZED HEALTH CARE EDUCATION/TRAINING PROGRAM

## 2023-06-01 PROCEDURE — 99283 EMERGENCY DEPT VISIT LOW MDM: CPT

## 2023-06-01 RX ORDER — HYDROCODONE BITARTRATE AND ACETAMINOPHEN 5; 325 MG/1; MG/1
1 TABLET ORAL
Status: COMPLETED | OUTPATIENT
Start: 2023-06-01 | End: 2023-06-01

## 2023-06-01 RX ADMIN — HYDROCODONE BITARTRATE AND ACETAMINOPHEN 1 TABLET: 5; 325 TABLET ORAL at 23:58

## 2023-06-01 ASSESSMENT — PAIN DESCRIPTION - DESCRIPTORS: DESCRIPTORS: ACHING;SHARP;BURNING

## 2023-06-01 ASSESSMENT — PAIN SCALES - GENERAL: PAINLEVEL_OUTOF10: 10

## 2023-06-01 ASSESSMENT — PAIN - FUNCTIONAL ASSESSMENT
PAIN_FUNCTIONAL_ASSESSMENT: 0-10
PAIN_FUNCTIONAL_ASSESSMENT: PREVENTS OR INTERFERES SOME ACTIVE ACTIVITIES AND ADLS

## 2023-06-01 ASSESSMENT — PAIN DESCRIPTION - ORIENTATION: ORIENTATION: LOWER;LEFT

## 2023-06-01 ASSESSMENT — PAIN DESCRIPTION - PAIN TYPE: TYPE: CHRONIC PAIN

## 2023-06-01 ASSESSMENT — PAIN DESCRIPTION - LOCATION: LOCATION: LEG;KNEE

## 2023-06-02 ENCOUNTER — APPOINTMENT (OUTPATIENT)
Facility: HOSPITAL | Age: 53
End: 2023-06-02
Payer: MEDICARE

## 2023-06-02 VITALS
OXYGEN SATURATION: 99 % | DIASTOLIC BLOOD PRESSURE: 88 MMHG | BODY MASS INDEX: 46.51 KG/M2 | TEMPERATURE: 98.3 F | WEIGHT: 262.5 LBS | SYSTOLIC BLOOD PRESSURE: 144 MMHG | HEART RATE: 105 BPM | HEIGHT: 63 IN | RESPIRATION RATE: 17 BRPM

## 2023-06-02 PROCEDURE — 73562 X-RAY EXAM OF KNEE 3: CPT

## 2023-06-02 PROCEDURE — 6370000000 HC RX 637 (ALT 250 FOR IP): Performed by: STUDENT IN AN ORGANIZED HEALTH CARE EDUCATION/TRAINING PROGRAM

## 2023-06-02 RX ORDER — DIPHENHYDRAMINE HCL 25 MG
25 CAPSULE ORAL
Status: COMPLETED | OUTPATIENT
Start: 2023-06-02 | End: 2023-06-02

## 2023-06-02 RX ADMIN — DIPHENHYDRAMINE HYDROCHLORIDE 25 MG: 25 CAPSULE ORAL at 00:03

## 2023-06-02 NOTE — ED NOTES
Pt presents ambulatory to ED complaining of Left knee pain, chronic, legt leg pain. Emergency Department Nursing Plan of Care The Nursing Plan of Care is developed from the Nursing assessment and Emergency Department Attending provider initial evaluation. The plan of care may be reviewed in the ED Provider note.  The Plan of Care was developed with the following considerations: Patient / Family readiness to learn indicated by:Refer to Medical chart in Epic Persons(s) to be included in education: Refer to Medical chart in [facility] Barriers to Learning/Limitations:Normal       Cindy Morgan RN  06/01/23 8908

## 2023-06-02 NOTE — ED PROVIDER NOTES
[facility] EMERGENCY DEPT  EMERGENCY DEPARTMENT ENCOUNTER       Pt Name: Ozell Dakins  MRN: [id number]  [place] 1970  Date of evaluation: 6/1/2023  Provider: Jose Graham DO   PCP: Tricia Ayala MD  Note Started: 5:32 AM 6/2/23     CHIEF COMPLAINT       Chief Complaint   Patient presents with    Knee Pain    Chronic Pain        HISTORY OF PRESENT ILLNESS: 1 or more elements      History From: Patient and Patient's Daughter  None     Ozell Dakins is a 48 y.o. female who presents with chief complaint of left knee pain. She has history of fibromyalgia and states that she feels like this ihas been exacerbated with the recent rain. She states she did not want to come to the ER but today while she was cooking her left knee almost \"gave out\" due to pain. She states that she has pain with bending her knee when a straight and then straightening her knee when it is bent. She denies any falls or any injuries. She denies any fever, chills, swelling, redness. No systemic symptoms. Nursing Notes were all reviewed and agreed with or any disagreements were addressed in the HPI. REVIEW OF SYSTEMS      Review of Systems     Positives and Pertinent negatives as per HPI.     PAST HISTORY     Past Medical History:  Past Medical History:   Diagnosis Date    Arthritis     Arthritis of low back     Asthma     Chronic pain     fibromyalgia    Colon spasm     Controlled type 2 diabetes mellitus without complication, without long-term current use of insulin ([address] 02/16/2022    Depression     Fibromyalgia     Gastrointestinal disorder     IBS    GERD (gastroesophageal reflux disease)     H/O: hysterectomy     High cholesterol     Hypertension     IBS (irritable bowel syndrome)     Left axillary pain 02/22/2013    Migraine     LAST HEADACHE 2-16-12    Multiple sclerosis ([address]     Other ill-defined conditions(799.89)     MS    Other ill-defined conditions(799.89)     fibromylgia         Past Surgical History:  Past Surgical

## 2023-06-02 NOTE — ED TRIAGE NOTES
Pt presents with daughter to the ED c/o left knee and left leg pain. Pt reports chronic pain, Fibromyalgia. Pt reports today pain increased when \"I was standing at the stove and my knee just went backwards. \"

## 2023-06-19 ENCOUNTER — APPOINTMENT (OUTPATIENT)
Facility: HOSPITAL | Age: 53
End: 2023-06-19
Payer: MEDICARE

## 2023-06-19 ENCOUNTER — HOSPITAL ENCOUNTER (EMERGENCY)
Facility: HOSPITAL | Age: 53
Discharge: HOME HEALTH | End: 2023-06-19
Attending: EMERGENCY MEDICINE
Payer: MEDICARE

## 2023-06-19 VITALS
HEIGHT: 63 IN | RESPIRATION RATE: 16 BRPM | DIASTOLIC BLOOD PRESSURE: 91 MMHG | SYSTOLIC BLOOD PRESSURE: 159 MMHG | TEMPERATURE: 98.6 F | WEIGHT: 265 LBS | HEART RATE: 137 BPM | OXYGEN SATURATION: 95 % | BODY MASS INDEX: 46.95 KG/M2

## 2023-06-19 DIAGNOSIS — M17.10 ARTHRITIS OF KNEE: ICD-10-CM

## 2023-06-19 DIAGNOSIS — S80.211A ABRASION OF RIGHT KNEE, INITIAL ENCOUNTER: ICD-10-CM

## 2023-06-19 DIAGNOSIS — S82.131A CLOSED FRACTURE OF MEDIAL PORTION OF RIGHT TIBIAL PLATEAU, INITIAL ENCOUNTER: Primary | ICD-10-CM

## 2023-06-19 PROCEDURE — 6370000000 HC RX 637 (ALT 250 FOR IP): Performed by: EMERGENCY MEDICINE

## 2023-06-19 PROCEDURE — 99283 EMERGENCY DEPT VISIT LOW MDM: CPT

## 2023-06-19 PROCEDURE — 73562 X-RAY EXAM OF KNEE 3: CPT

## 2023-06-19 RX ORDER — GINSENG 100 MG
CAPSULE ORAL
Status: DISCONTINUED | OUTPATIENT
Start: 2023-06-19 | End: 2023-06-19 | Stop reason: HOSPADM

## 2023-06-19 RX ORDER — HYDROCODONE BITARTRATE AND ACETAMINOPHEN 5; 325 MG/1; MG/1
1 TABLET ORAL
Status: DISCONTINUED | OUTPATIENT
Start: 2023-06-19 | End: 2023-06-19 | Stop reason: HOSPADM
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
## 2023-06-19 NOTE — ED NOTES
Pt given discharge instructions per provider and verbalized understanding, pt wheeled from the ED to home with family as  of vehicle/      Bertha Hale RN  06/19/23 1703

## 2023-06-20 ENCOUNTER — HOSPITAL ENCOUNTER (OUTPATIENT)
Facility: HOSPITAL | Age: 53
Discharge: HOME | End: 2023-06-23
Payer: MEDICARE

## 2023-06-20 ENCOUNTER — TRANSCRIBE ORDERS (OUTPATIENT)
Facility: HOSPITAL | Age: 53
End: 2023-06-20

## 2023-06-20 DIAGNOSIS — M17.11 OSTEOARTHRITIS OF RIGHT KNEE, UNSPECIFIED OSTEOARTHRITIS TYPE: ICD-10-CM

## 2023-06-20 DIAGNOSIS — S89.91XA INJURY OF RIGHT KNEE, INITIAL ENCOUNTER: ICD-10-CM

## 2023-06-20 DIAGNOSIS — S89.91XA INJURY OF RIGHT KNEE, INITIAL ENCOUNTER: Primary | ICD-10-CM

## 2023-06-20 PROCEDURE — 73700 CT LOWER EXTREMITY W/O DYE: CPT

## 2023-07-04 DIAGNOSIS — M51.36 DDD (DEGENERATIVE DISC DISEASE), LUMBAR: Primary | ICD-10-CM

## 2023-07-05 RX ORDER — GABAPENTIN 600 MG/1
TABLET ORAL
Qty: 180 TABLET | Refills: 3 | Status: SHIPPED | OUTPATIENT
Start: 2023-07-05 | End: 2024-07-04

## 2023-07-06 ENCOUNTER — TELEPHONE (OUTPATIENT)
Age: 53
End: 2023-07-06

## 2023-07-06 NOTE — TELEPHONE ENCOUNTER
Patient stated both legs are swollen. She stated she went to the ER and they stated she needed to see a specialist for her knee.  Please call patient: [phone]

## 2023-07-10 ENCOUNTER — HOSPITAL ENCOUNTER (EMERGENCY)
Facility: HOSPITAL | Age: 53
Discharge: HOME | End: 2023-07-10
Payer: MEDICARE

## 2023-07-10 ENCOUNTER — APPOINTMENT (OUTPATIENT)
Facility: HOSPITAL | Age: 53
End: 2023-07-10
Payer: MEDICARE

## 2023-07-10 VITALS
SYSTOLIC BLOOD PRESSURE: 144 MMHG | HEART RATE: 111 BPM | TEMPERATURE: 98.6 F | WEIGHT: 264 LBS | BODY MASS INDEX: 46.78 KG/M2 | RESPIRATION RATE: 18 BRPM | DIASTOLIC BLOOD PRESSURE: 96 MMHG | OXYGEN SATURATION: 94 % | HEIGHT: 63 IN

## 2023-07-10 DIAGNOSIS — M25.562 ACUTE PAIN OF LEFT KNEE: ICD-10-CM

## 2023-07-10 DIAGNOSIS — M79.604 BILATERAL LOWER EXTREMITY PAIN: Primary | ICD-10-CM

## 2023-07-10 DIAGNOSIS — M79.605 BILATERAL LOWER EXTREMITY PAIN: Primary | ICD-10-CM

## 2023-07-10 LAB
ALBUMIN SERPL-MCNC: 3 G/DL (ref 3.5–5)
ALBUMIN/GLOB SERPL: 0.7 (ref 1.1–2.2)
ALP SERPL-CCNC: 122 U/L (ref 45–117)
ALT SERPL-CCNC: 92 U/L (ref 12–78)
ANION GAP SERPL CALC-SCNC: 8 MMOL/L (ref 5–15)
AST SERPL-CCNC: 103 U/L (ref 15–37)
BASOPHILS # BLD: 0 K/UL (ref 0–0.1)
BASOPHILS NFR BLD: 1 % (ref 0–1)
BILIRUB SERPL-MCNC: 0.3 MG/DL (ref 0.2–1)
BUN SERPL-MCNC: 8 MG/DL (ref 6–20)
BUN/CREAT SERPL: 9 (ref 12–20)
CALCIUM SERPL-MCNC: 8.7 MG/DL (ref 8.5–10.1)
CHLORIDE SERPL-SCNC: 99 MMOL/L (ref 97–108)
CO2 SERPL-SCNC: 30 MMOL/L (ref 21–32)
CREAT SERPL-MCNC: 0.87 MG/DL (ref 0.55–1.02)
D DIMER PPP FEU-MCNC: 0.64 MG/L FEU (ref 0–0.65)
DIFFERENTIAL METHOD BLD: NORMAL
EOSINOPHIL # BLD: 0.2 K/UL (ref 0–0.4)
EOSINOPHIL NFR BLD: 2 % (ref 0–7)
ERYTHROCYTE [DISTWIDTH] IN BLOOD BY AUTOMATED COUNT: 13.9 % (ref 11.5–14.5)
GLOBULIN SER CALC-MCNC: 4.2 G/DL (ref 2–4)
GLUCOSE SERPL-MCNC: 282 MG/DL (ref 65–100)
HCT VFR BLD AUTO: 40.1 % (ref 35–47)
HGB BLD-MCNC: 12.9 G/DL (ref 11.5–16)
IMM GRANULOCYTES # BLD AUTO: 0 K/UL (ref 0–0.04)
IMM GRANULOCYTES NFR BLD AUTO: 0 % (ref 0–0.5)
LYMPHOCYTES # BLD: 2.5 K/UL (ref 0.8–3.5)
LYMPHOCYTES NFR BLD: 29 % (ref 12–49)
MCH RBC QN AUTO: 29.7 PG (ref 26–34)
MCHC RBC AUTO-ENTMCNC: 32.2 G/DL (ref 30–36.5)
MCV RBC AUTO: 92.2 FL (ref 80–99)
MONOCYTES # BLD: 0.4 K/UL (ref 0–1)
MONOCYTES NFR BLD: 5 % (ref 5–13)
NEUTS SEG # BLD: 5.5 K/UL (ref 1.8–8)
NEUTS SEG NFR BLD: 63 % (ref 32–75)
NRBC # BLD: 0 K/UL (ref 0–0.01)
NRBC BLD-RTO: 0 PER 100 WBC
NT PRO BNP: 14 PG/ML (ref 0–125)
PLATELET # BLD AUTO: 273 K/UL (ref 150–400)
PMV BLD AUTO: 11.1 FL (ref 8.9–12.9)
POTASSIUM SERPL-SCNC: 4.3 MMOL/L (ref 3.5–5.1)
PROT SERPL-MCNC: 7.2 G/DL (ref 6.4–8.2)
RBC # BLD AUTO: 4.35 M/UL (ref 3.8–5.2)
SODIUM SERPL-SCNC: 137 MMOL/L (ref 136–145)
WBC # BLD AUTO: 8.7 K/UL (ref 3.6–11)

## 2023-07-10 PROCEDURE — 99285 EMERGENCY DEPT VISIT HI MDM: CPT

## 2023-07-10 PROCEDURE — 83880 ASSAY OF NATRIURETIC PEPTIDE: CPT

## 2023-07-10 PROCEDURE — 80053 COMPREHEN METABOLIC PANEL: CPT

## 2023-07-10 PROCEDURE — 93005 ELECTROCARDIOGRAM TRACING: CPT | Performed by: PHYSICIAN ASSISTANT

## 2023-07-10 PROCEDURE — 85025 COMPLETE CBC W/AUTO DIFF WBC: CPT

## 2023-07-10 PROCEDURE — 96374 THER/PROPH/DIAG INJ IV PUSH: CPT

## 2023-07-10 PROCEDURE — 36415 COLL VENOUS BLD VENIPUNCTURE: CPT

## 2023-07-10 PROCEDURE — 71045 X-RAY EXAM CHEST 1 VIEW: CPT

## 2023-07-10 PROCEDURE — 85379 FIBRIN DEGRADATION QUANT: CPT

## 2023-07-10 PROCEDURE — 96375 TX/PRO/DX INJ NEW DRUG ADDON: CPT

## 2023-07-10 PROCEDURE — 6360000002 HC RX W HCPCS: Performed by: PHYSICIAN ASSISTANT

## 2023-07-10 RX ORDER — NAPROXEN 500 MG/1
500 TABLET ORAL EVERY 12 HOURS PRN
Qty: 20 TABLET | Refills: 0 | Status: SHIPPED | OUTPATIENT
Start: 2023-07-10

## 2023-07-10 RX ORDER — FENTANYL CITRATE 50 UG/ML
100 INJECTION, SOLUTION INTRAMUSCULAR; INTRAVENOUS
Status: COMPLETED | OUTPATIENT
Start: 2023-07-10 | End: 2023-07-10

## 2023-07-10 RX ORDER — ACETAMINOPHEN 500 MG
1000 TABLET ORAL EVERY 6 HOURS PRN
Qty: 30 TABLET | Refills: 0 | Status: SHIPPED | OUTPATIENT
Start: 2023-07-10

## 2023-07-10 RX ORDER — MORPHINE SULFATE 4 MG/ML
4 INJECTION, SOLUTION INTRAMUSCULAR; INTRAVENOUS
Status: COMPLETED | OUTPATIENT
Start: 2023-07-10 | End: 2023-07-10

## 2023-07-10 RX ADMIN — MORPHINE SULFATE 4 MG: 4 INJECTION, SOLUTION INTRAMUSCULAR; INTRAVENOUS at 20:10

## 2023-07-10 RX ADMIN — FENTANYL CITRATE 100 MCG: 50 INJECTION, SOLUTION INTRAMUSCULAR; INTRAVENOUS at 21:48

## 2023-07-10 ASSESSMENT — PAIN DESCRIPTION - LOCATION
LOCATION: BACK;LEG
LOCATION: ANKLE;KNEE
LOCATION: BACK;LEG

## 2023-07-10 ASSESSMENT — PAIN - FUNCTIONAL ASSESSMENT: PAIN_FUNCTIONAL_ASSESSMENT: 0-10

## 2023-07-10 ASSESSMENT — PAIN SCALES - GENERAL
PAINLEVEL_OUTOF10: 10

## 2023-07-10 ASSESSMENT — PAIN DESCRIPTION - ORIENTATION
ORIENTATION: LEFT;RIGHT
ORIENTATION: RIGHT;LEFT;LOWER
ORIENTATION: LEFT;LOWER;RIGHT

## 2023-07-10 ASSESSMENT — PAIN DESCRIPTION - DESCRIPTORS: DESCRIPTORS: ACHING

## 2023-07-10 NOTE — ED TRIAGE NOTES
Pt presents to the ED with c/o pain in bilateral thighs radiating down from the bottom of her feet. Pts daughter stated \"im going to tell you  just like I told the other lady. Do not put her in fast track. Weve been through this several times with yall and do not put a xray in either. It doesn't show anything\"     Pt stated her PCP has been out of town and has an appointment with them on Friday. Stated the pain is unbearable and cannot wait till then. Stated her PCP told her to come get a MRI. Told patient we do not do MRI at night and pts daughter stated \"then do a CT scan. We need to know whats wrong. \"

## 2023-07-10 NOTE — TELEPHONE ENCOUNTER
Called and spoke to patient. Terry Joyner) Patient states she is having severe leg swelling bilat X 12 days. Left foot especially swollen with walking difficulties. Patient confirms that she is taking HCTZ and Losartan daily. OV with  [facility] 7/18/23 confirmed. Low salt diet and elevation of extremities discussed. Requesting RX for the swelling in the meantime.   [facility] Angela Sensing

## 2023-07-11 LAB
EKG ATRIAL RATE: 112 BPM
EKG DIAGNOSIS: NORMAL
EKG P AXIS: 48 DEGREES
EKG P-R INTERVAL: 128 MS
EKG Q-T INTERVAL: 340 MS
EKG QRS DURATION: 76 MS
EKG QTC CALCULATION (BAZETT): 464 MS
EKG R AXIS: 40 DEGREES
EKG T AXIS: 23 DEGREES
EKG VENTRICULAR RATE: 112 BPM

## 2023-07-11 PROCEDURE — 93010 ELECTROCARDIOGRAM REPORT: CPT | Performed by: SPECIALIST

## 2023-07-11 NOTE — DISCHARGE INSTRUCTIONS
Be sure to follow-up with your PCP this week as scheduled. Try to see if you can get in with your rheumatologist and her pain management sooner rather than later.

## 2023-07-11 NOTE — ED PROVIDER NOTES
[facility] EMERGENCY DEPT  EMERGENCY DEPARTMENT ENCOUNTER         Pt Name: Aaron Finley  MRN: [id number]  [address] [facility] 1970  Date of evaluation: 7/10/2023  Provider: Octavio Munoz PA-C   PCP: Francisco Echeverria MD  Note Started: 9:54 PM EDT 7/10/23     CHIEF COMPLAINT       Chief Complaint   Patient presents with    Knee Pain    Ankle Pain        HISTORY OF PRESENT ILLNESS: 1 or more elements      History From: Patient, Patient's Daughter, and Patient's Son  HPI Limitations: None     Aaron Finley is a 48 y.o. female who presents bilateral lower extremity swelling with pain greatest to the left knee. Patient denies any injury or trauma but states that she has been dealing with this pain for the last month. She has been seen by orthopedic after her initial visit here which advised her that she had a Baker's cyst.  She states that her pain has progressed to the point where she is unable to ambulate without assistance of a person, cane or wheelchair. She has fibromyalgia also has chronic back pain but she denies any bowel or bladder incontinence. She does report paresthesias down the leg. She has multiple allergies and states that she was given a morphine patch which she had to take off today and her pharmacy does not have any more in stock until tomorrow or Wednesday. She does have an appointment with her PCP but not until Friday. Nursing Notes were all reviewed and agreed with or any disagreements were addressed in the HPI. REVIEW OF SYSTEMS      Review of Systems     Positives and Pertinent negatives as per HPI.     PAST HISTORY     Past Medical History:  Past Medical History:   Diagnosis Date    Arthritis     Arthritis of low back     Asthma     Chronic pain     fibromyalgia    Colon spasm     Controlled type 2 diabetes mellitus without complication, without long-term current use of insulin ([address]) 02/16/2022    Depression     Fibromyalgia     Gastrointestinal disorder     IBS    GERD (gastroesophageal

## 2023-07-11 NOTE — ED NOTES
Patient (s)  given copy of dc instructions and 2 script(s). Patient (s)  verbalized understanding of instructions and script (s). Patient given a current medication reconciliation form and verbalized understanding of their medications. Patient (s) verbalized understanding of the importance of discussing medications with his or her physician or clinic they will be following up with. Patient alert and oriented and in no acute distress. Patient discharged home, patient offered wheelchair, patient accepted wheelchair.         Liz Pineda RN  07/10/23 0568

## 2023-07-12 NOTE — ED NOTES
Patient has been instructed that they have been given Morphine* which contains opioids, benzodiazepines, or other sedating drugs. Patient is aware that they will need to refrain from driving or operating heavy machinery after taking this medication. Patient also instructed that they need to avoid drinking alcohol and using other products containing opioids, benzodiazepines, or other sedating drugs. Patient verbalized understanding.       Mario Reyna, BLAKE  07/11/23 0538

## 2023-07-14 ENCOUNTER — OFFICE VISIT (OUTPATIENT)
Age: 53
End: 2023-07-14
Payer: MEDICARE

## 2023-07-14 VITALS
HEIGHT: 63 IN | WEIGHT: 269.2 LBS | DIASTOLIC BLOOD PRESSURE: 85 MMHG | TEMPERATURE: 97.8 F | SYSTOLIC BLOOD PRESSURE: 132 MMHG | HEART RATE: 101 BPM | OXYGEN SATURATION: 98 % | BODY MASS INDEX: 47.7 KG/M2

## 2023-07-14 DIAGNOSIS — M25.562 CHRONIC PAIN OF LEFT KNEE: ICD-10-CM

## 2023-07-14 DIAGNOSIS — R60.9 EDEMA, UNSPECIFIED TYPE: Primary | ICD-10-CM

## 2023-07-14 DIAGNOSIS — I10 ESSENTIAL HYPERTENSION, BENIGN: ICD-10-CM

## 2023-07-14 DIAGNOSIS — G89.29 CHRONIC PAIN OF LEFT KNEE: ICD-10-CM

## 2023-07-14 DIAGNOSIS — R79.89 LFT ELEVATION: ICD-10-CM

## 2023-07-14 DIAGNOSIS — L91.8 SKIN TAG: ICD-10-CM

## 2023-07-14 DIAGNOSIS — E11.9 CONTROLLED TYPE 2 DIABETES MELLITUS WITHOUT COMPLICATION, WITHOUT LONG-TERM CURRENT USE OF INSULIN (HCC): ICD-10-CM

## 2023-07-14 DIAGNOSIS — M25.561 ACUTE PAIN OF RIGHT KNEE: ICD-10-CM

## 2023-07-14 LAB
ALBUMIN SERPL-MCNC: 3.7 G/DL (ref 3.5–5)
ALBUMIN/GLOB SERPL: 0.9 (ref 1.1–2.2)
ALP SERPL-CCNC: 114 U/L (ref 45–117)
ALT SERPL-CCNC: 85 U/L (ref 12–78)
ANION GAP SERPL CALC-SCNC: 5 MMOL/L (ref 5–15)
AST SERPL-CCNC: 92 U/L (ref 15–37)
BASOPHILS # BLD: 0 K/UL (ref 0–0.1)
BASOPHILS NFR BLD: 0 % (ref 0–1)
BILIRUB SERPL-MCNC: 0.4 MG/DL (ref 0.2–1)
BUN SERPL-MCNC: 14 MG/DL (ref 6–20)
BUN/CREAT SERPL: 18 (ref 12–20)
CALCIUM SERPL-MCNC: 9.9 MG/DL (ref 8.5–10.1)
CHLORIDE SERPL-SCNC: 100 MMOL/L (ref 97–108)
CHOLEST SERPL-MCNC: 176 MG/DL
CO2 SERPL-SCNC: 28 MMOL/L (ref 21–32)
CREAT SERPL-MCNC: 0.79 MG/DL (ref 0.55–1.02)
CREAT UR-MCNC: 85.2 MG/DL
DIFFERENTIAL METHOD BLD: NORMAL
EOSINOPHIL # BLD: 0.2 K/UL (ref 0–0.4)
EOSINOPHIL NFR BLD: 2 % (ref 0–7)
ERYTHROCYTE [DISTWIDTH] IN BLOOD BY AUTOMATED COUNT: 14.3 % (ref 11.5–14.5)
EST. AVERAGE GLUCOSE BLD GHB EST-MCNC: 189 MG/DL
GLOBULIN SER CALC-MCNC: 3.9 G/DL (ref 2–4)
GLUCOSE SERPL-MCNC: 154 MG/DL (ref 65–100)
HBA1C MFR BLD: 8.2 % (ref 4–5.6)
HCT VFR BLD AUTO: 42.8 % (ref 35–47)
HDLC SERPL-MCNC: 40 MG/DL
HDLC SERPL: 4.4 (ref 0–5)
HGB BLD-MCNC: 13 G/DL (ref 11.5–16)
IMM GRANULOCYTES # BLD AUTO: 0 K/UL (ref 0–0.04)
IMM GRANULOCYTES NFR BLD AUTO: 0 % (ref 0–0.5)
LDLC SERPL CALC-MCNC: 81.6 MG/DL (ref 0–100)
LYMPHOCYTES # BLD: 3.2 K/UL (ref 0.8–3.5)
LYMPHOCYTES NFR BLD: 33 % (ref 12–49)
MCH RBC QN AUTO: 29.1 PG (ref 26–34)
MCHC RBC AUTO-ENTMCNC: 30.4 G/DL (ref 30–36.5)
MCV RBC AUTO: 96 FL (ref 80–99)
MICROALBUMIN UR-MCNC: 0.81 MG/DL
MICROALBUMIN/CREAT UR-RTO: 10 MG/G (ref 0–30)
MONOCYTES # BLD: 0.5 K/UL (ref 0–1)
MONOCYTES NFR BLD: 5 % (ref 5–13)
NEUTS SEG # BLD: 5.7 K/UL (ref 1.8–8)
NEUTS SEG NFR BLD: 60 % (ref 32–75)
NRBC # BLD: 0 K/UL (ref 0–0.01)
NRBC BLD-RTO: 0 PER 100 WBC
PLATELET # BLD AUTO: 293 K/UL (ref 150–400)
PMV BLD AUTO: 12 FL (ref 8.9–12.9)
POTASSIUM SERPL-SCNC: 4.5 MMOL/L (ref 3.5–5.1)
PROT SERPL-MCNC: 7.6 G/DL (ref 6.4–8.2)
RBC # BLD AUTO: 4.46 M/UL (ref 3.8–5.2)
SODIUM SERPL-SCNC: 133 MMOL/L (ref 136–145)
TRIGL SERPL-MCNC: 272 MG/DL
TSH SERPL DL<=0.05 MIU/L-ACNC: 2.32 UIU/ML (ref 0.36–3.74)
VLDLC SERPL CALC-MCNC: 54.4 MG/DL
WBC # BLD AUTO: 9.7 K/UL (ref 3.6–11)

## 2023-07-14 PROCEDURE — G8427 DOCREV CUR MEDS BY ELIG CLIN: HCPCS | Performed by: FAMILY MEDICINE

## 2023-07-14 PROCEDURE — 99215 OFFICE O/P EST HI 40 MIN: CPT | Performed by: FAMILY MEDICINE

## 2023-07-14 PROCEDURE — 3046F HEMOGLOBIN A1C LEVEL >9.0%: CPT | Performed by: FAMILY MEDICINE

## 2023-07-14 PROCEDURE — G8417 CALC BMI ABV UP PARAM F/U: HCPCS | Performed by: FAMILY MEDICINE

## 2023-07-14 PROCEDURE — 3079F DIAST BP 80-89 MM HG: CPT | Performed by: FAMILY MEDICINE

## 2023-07-14 PROCEDURE — 3017F COLORECTAL CA SCREEN DOC REV: CPT | Performed by: FAMILY MEDICINE

## 2023-07-14 PROCEDURE — 2022F DILAT RTA XM EVC RTNOPTHY: CPT | Performed by: FAMILY MEDICINE

## 2023-07-14 PROCEDURE — 3075F SYST BP GE 130 - 139MM HG: CPT | Performed by: FAMILY MEDICINE

## 2023-07-14 PROCEDURE — 4004F PT TOBACCO SCREEN RCVD TLK: CPT | Performed by: FAMILY MEDICINE

## 2023-07-14 RX ORDER — BUMETANIDE 2 MG/1
2 TABLET ORAL DAILY
Qty: 30 TABLET | Refills: 3 | Status: SHIPPED | OUTPATIENT
Start: 2023-07-14

## 2023-07-14 RX ORDER — BUPRENORPHINE 5 UG/H
PATCH TRANSDERMAL
COMMUNITY
Start: 2023-06-26

## 2023-07-14 SDOH — ECONOMIC STABILITY: INCOME INSECURITY: HOW HARD IS IT FOR YOU TO PAY FOR THE VERY BASICS LIKE FOOD, HOUSING, MEDICAL CARE, AND HEATING?: NOT HARD AT ALL

## 2023-07-14 SDOH — ECONOMIC STABILITY: FOOD INSECURITY: WITHIN THE PAST 12 MONTHS, THE FOOD YOU BOUGHT JUST DIDN'T LAST AND YOU DIDN'T HAVE MONEY TO GET MORE.: NEVER TRUE

## 2023-07-14 SDOH — ECONOMIC STABILITY: HOUSING INSECURITY
IN THE LAST 12 MONTHS, WAS THERE A TIME WHEN YOU DID NOT HAVE A STEADY PLACE TO SLEEP OR SLEPT IN A SHELTER (INCLUDING NOW)?: NO

## 2023-07-14 SDOH — ECONOMIC STABILITY: FOOD INSECURITY: WITHIN THE PAST 12 MONTHS, YOU WORRIED THAT YOUR FOOD WOULD RUN OUT BEFORE YOU GOT MONEY TO BUY MORE.: NEVER TRUE

## 2023-07-15 LAB
-: NORMAL
HAV IGM SER QL: NONREACTIVE
HBV CORE IGM SER QL: NONREACTIVE
HBV SURFACE AG SER QL: <0.1 INDEX
HBV SURFACE AG SER QL: NEGATIVE
HCV AB SERPL QL IA: NONREACTIVE

## 2023-07-18 ENCOUNTER — PATIENT MESSAGE (OUTPATIENT)
Age: 53
End: 2023-07-18

## 2023-07-18 DIAGNOSIS — R79.89 ELEVATED LIVER FUNCTION TESTS: Primary | ICD-10-CM

## 2023-07-18 RX ORDER — TIRZEPATIDE 2.5 MG/.5ML
INJECTION, SOLUTION SUBCUTANEOUS
Qty: 2 ML | Refills: 0 | Status: SHIPPED | OUTPATIENT
Start: 2023-07-18

## 2023-07-18 RX ORDER — TIRZEPATIDE 2.5 MG/.5ML
2.5 INJECTION, SOLUTION SUBCUTANEOUS WEEKLY
Qty: 4 ADJUSTABLE DOSE PRE-FILLED PEN SYRINGE | Refills: 0 | Status: SHIPPED | OUTPATIENT
Start: 2023-07-18 | End: 2023-07-18

## 2023-07-18 NOTE — TELEPHONE ENCOUNTER
Maryleeian Hancocks, LPN 5/02/6055 8:66 AM EDT      ----- Message -----  From: Elliott Roberts\"  Sent: 7/18/2023 1:42 AM EDT  To: Silvia Rock Clinical Staff  Subject: Question regarding HEPATITIS PANEL, ACUTE     Yes I do agree to the injection & the ultra sound just let me when & where I have to be. I have been trying to lose the weight, it has been very difficult, is there anything or anyone I should speak with about my weight?

## 2023-07-24 ENCOUNTER — HOSPITAL ENCOUNTER (OUTPATIENT)
Facility: HOSPITAL | Age: 53
Discharge: HOME | End: 2023-07-27

## 2023-07-24 DIAGNOSIS — Z12.31 VISIT FOR SCREENING MAMMOGRAM: ICD-10-CM

## 2023-07-24 DIAGNOSIS — F41.9 ANXIETY: Primary | ICD-10-CM

## 2023-07-26 ENCOUNTER — TELEPHONE (OUTPATIENT)
Age: 53
End: 2023-07-26

## 2023-07-26 DIAGNOSIS — N63.0 MASS OF BREAST, UNSPECIFIED LATERALITY: Primary | ICD-10-CM

## 2023-07-26 DIAGNOSIS — N64.59 OTHER SIGNS AND SYMPTOMS IN BREAST: ICD-10-CM

## 2023-07-26 NOTE — TELEPHONE ENCOUNTER
Alverto Donis from [facility] states that patient felt a lump in rt breast. Can you send in order for diagnostic mammogram? Let Alverto Donis know @[phone]

## 2023-07-27 ENCOUNTER — HOSPITAL ENCOUNTER (OUTPATIENT)
Facility: HOSPITAL | Age: 53
End: 2023-07-27
Attending: FAMILY MEDICINE
Payer: MEDICARE

## 2023-07-27 DIAGNOSIS — R79.89 ELEVATED LIVER FUNCTION TESTS: ICD-10-CM

## 2023-07-27 PROCEDURE — 76705 ECHO EXAM OF ABDOMEN: CPT

## 2023-07-27 RX ORDER — ALBUTEROL SULFATE 90 UG/1
AEROSOL, METERED RESPIRATORY (INHALATION)
Qty: 51 G | Refills: 2 | Status: SHIPPED | OUTPATIENT
Start: 2023-07-27

## 2023-07-27 RX ORDER — ALPRAZOLAM 0.5 MG/1
TABLET ORAL
Qty: 90 TABLET | Refills: 0 | Status: SHIPPED | OUTPATIENT
Start: 2023-07-27 | End: 2024-07-26

## 2023-07-28 ENCOUNTER — PATIENT MESSAGE (OUTPATIENT)
Age: 53
End: 2023-07-28

## 2023-07-31 NOTE — TELEPHONE ENCOUNTER
From: Aaron Finley  To: Dr. Snow Brizuela: 7/28/2023 5:06 PM EDT  Subject: Question regarding US Abdomen Limited    Hello, I was looking at my result on the liver ultrasound and was wondering what it all means

## 2023-08-02 DIAGNOSIS — R60.9 EDEMA, UNSPECIFIED TYPE: ICD-10-CM

## 2023-08-03 RX ORDER — BUMETANIDE 2 MG/1
TABLET ORAL
Qty: 90 TABLET | Refills: 1 | Status: SHIPPED | OUTPATIENT
Start: 2023-08-03

## 2023-08-09 RX ORDER — ALBUTEROL SULFATE 2.5 MG/3ML
SOLUTION RESPIRATORY (INHALATION)
Qty: 100 ML | Refills: 2 | Status: SHIPPED | OUTPATIENT
Start: 2023-08-09

## 2023-08-09 RX ORDER — TIRZEPATIDE 2.5 MG/.5ML
INJECTION, SOLUTION SUBCUTANEOUS
Qty: 2 ML | Refills: 0 | Status: SHIPPED | OUTPATIENT
Start: 2023-08-09

## 2023-08-15 ENCOUNTER — OFFICE VISIT (OUTPATIENT)
Age: 53
End: 2023-08-15
Payer: MEDICARE

## 2023-08-15 VITALS
RESPIRATION RATE: 16 BRPM | HEIGHT: 63 IN | SYSTOLIC BLOOD PRESSURE: 129 MMHG | OXYGEN SATURATION: 94 % | HEART RATE: 103 BPM | TEMPERATURE: 98.3 F | BODY MASS INDEX: 45 KG/M2 | DIASTOLIC BLOOD PRESSURE: 80 MMHG | WEIGHT: 254 LBS

## 2023-08-15 DIAGNOSIS — E66.01 OBESITY, MORBID (HCC): ICD-10-CM

## 2023-08-15 DIAGNOSIS — I10 ESSENTIAL HYPERTENSION, BENIGN: ICD-10-CM

## 2023-08-15 DIAGNOSIS — E11.65 TYPE 2 DIABETES MELLITUS WITH HYPERGLYCEMIA, UNSPECIFIED WHETHER LONG TERM INSULIN USE (HCC): ICD-10-CM

## 2023-08-15 DIAGNOSIS — E11.9 CONTROLLED TYPE 2 DIABETES MELLITUS WITHOUT COMPLICATION, WITHOUT LONG-TERM CURRENT USE OF INSULIN (HCC): Primary | ICD-10-CM

## 2023-08-15 PROCEDURE — 99214 OFFICE O/P EST MOD 30 MIN: CPT | Performed by: FAMILY MEDICINE

## 2023-08-15 PROCEDURE — G8417 CALC BMI ABV UP PARAM F/U: HCPCS | Performed by: FAMILY MEDICINE

## 2023-08-15 PROCEDURE — 3052F HG A1C>EQUAL 8.0%<EQUAL 9.0%: CPT | Performed by: FAMILY MEDICINE

## 2023-08-15 PROCEDURE — G8427 DOCREV CUR MEDS BY ELIG CLIN: HCPCS | Performed by: FAMILY MEDICINE

## 2023-08-15 PROCEDURE — 2022F DILAT RTA XM EVC RTNOPTHY: CPT | Performed by: FAMILY MEDICINE

## 2023-08-15 PROCEDURE — 3074F SYST BP LT 130 MM HG: CPT | Performed by: FAMILY MEDICINE

## 2023-08-15 PROCEDURE — 4004F PT TOBACCO SCREEN RCVD TLK: CPT | Performed by: FAMILY MEDICINE

## 2023-08-15 PROCEDURE — 3079F DIAST BP 80-89 MM HG: CPT | Performed by: FAMILY MEDICINE

## 2023-08-15 PROCEDURE — 3017F COLORECTAL CA SCREEN DOC REV: CPT | Performed by: FAMILY MEDICINE

## 2023-08-15 RX ORDER — TIRZEPATIDE 5 MG/.5ML
5 INJECTION, SOLUTION SUBCUTANEOUS WEEKLY
Qty: 4 ADJUSTABLE DOSE PRE-FILLED PEN SYRINGE | Refills: 0 | Status: SHIPPED | OUTPATIENT
Start: 2023-08-15

## 2023-08-15 ASSESSMENT — LIFESTYLE VARIABLES
HOW MANY STANDARD DRINKS CONTAINING ALCOHOL DO YOU HAVE ON A TYPICAL DAY: 1 OR 2
HOW OFTEN DO YOU HAVE A DRINK CONTAINING ALCOHOL: MONTHLY OR LESS

## 2023-08-18 ENCOUNTER — HOSPITAL ENCOUNTER (OUTPATIENT)
Facility: HOSPITAL | Age: 53
End: 2023-08-18
Attending: FAMILY MEDICINE
Payer: MEDICARE

## 2023-08-18 ENCOUNTER — HOSPITAL ENCOUNTER (OUTPATIENT)
Facility: HOSPITAL | Age: 53
Discharge: HOME | End: 2023-08-18
Attending: FAMILY MEDICINE
Payer: MEDICARE

## 2023-08-18 VITALS — WEIGHT: 257 LBS | BODY MASS INDEX: 45.54 KG/M2 | HEIGHT: 63 IN

## 2023-08-18 DIAGNOSIS — N64.59 OTHER SIGNS AND SYMPTOMS IN BREAST: ICD-10-CM

## 2023-08-18 DIAGNOSIS — N63.0 MASS OF BREAST, UNSPECIFIED LATERALITY: ICD-10-CM

## 2023-08-18 PROCEDURE — 76882 US LMTD JT/FCL EVL NVASC XTR: CPT

## 2023-08-18 PROCEDURE — G0279 TOMOSYNTHESIS, MAMMO: HCPCS

## 2023-08-30 DIAGNOSIS — E11.9 CONTROLLED TYPE 2 DIABETES MELLITUS WITHOUT COMPLICATION, WITHOUT LONG-TERM CURRENT USE OF INSULIN (HCC): ICD-10-CM

## 2023-08-30 RX ORDER — TIRZEPATIDE 5 MG/.5ML
INJECTION, SOLUTION SUBCUTANEOUS
Qty: 2 ML | Refills: 0 | Status: SHIPPED | OUTPATIENT
Start: 2023-08-30

## 2023-09-19 NOTE — TELEPHONE ENCOUNTER
[facility]Aer[facility] Jansen  9/13 ? 8:14AM  [facility] Jansen will attempt to contact the patient.

## 2023-09-21 DIAGNOSIS — E11.9 CONTROLLED TYPE 2 DIABETES MELLITUS WITHOUT COMPLICATION, WITHOUT LONG-TERM CURRENT USE OF INSULIN (HCC): ICD-10-CM

## 2023-09-21 RX ORDER — TIRZEPATIDE 5 MG/.5ML
INJECTION, SOLUTION SUBCUTANEOUS
Qty: 2 ML | Refills: 0 | Status: SHIPPED | OUTPATIENT
Start: 2023-09-21

## 2023-09-25 ENCOUNTER — PATIENT MESSAGE (OUTPATIENT)
Age: 53
End: 2023-09-25

## 2023-09-25 RX ORDER — TIRZEPATIDE 7.5 MG/.5ML
7.5 INJECTION, SOLUTION SUBCUTANEOUS WEEKLY
Qty: 4 ADJUSTABLE DOSE PRE-FILLED PEN SYRINGE | Refills: 0 | Status: SHIPPED | OUTPATIENT
Start: 2023-09-25

## 2023-10-11 DIAGNOSIS — M51.36 DDD (DEGENERATIVE DISC DISEASE), LUMBAR: ICD-10-CM

## 2023-10-11 RX ORDER — GABAPENTIN 600 MG/1
600 TABLET ORAL 2 TIMES DAILY
Qty: 180 TABLET | Refills: 3 | Status: SHIPPED | OUTPATIENT
Start: 2023-10-11 | End: 2024-10-10

## 2023-11-13 ENCOUNTER — TELEPHONE (OUTPATIENT)
Age: 53
End: 2023-11-13

## 2023-11-13 NOTE — TELEPHONE ENCOUNTER
Patient called requesting information about setting an appt up for bariatric surgery. I emailed the Seminar information to her email.

## 2023-11-16 DIAGNOSIS — F41.9 ANXIETY: ICD-10-CM

## 2023-11-20 ENCOUNTER — OFFICE VISIT (OUTPATIENT)
Age: 53
End: 2023-11-20
Payer: MEDICARE

## 2023-11-20 VITALS
HEART RATE: 88 BPM | WEIGHT: 234.4 LBS | OXYGEN SATURATION: 99 % | SYSTOLIC BLOOD PRESSURE: 136 MMHG | TEMPERATURE: 97.9 F | DIASTOLIC BLOOD PRESSURE: 86 MMHG | BODY MASS INDEX: 41.53 KG/M2 | RESPIRATION RATE: 16 BRPM | HEIGHT: 63 IN

## 2023-11-20 DIAGNOSIS — I10 ESSENTIAL HYPERTENSION, BENIGN: ICD-10-CM

## 2023-11-20 DIAGNOSIS — Z00.01 ENCOUNTER FOR MEDICARE ANNUAL EXAMINATION WITH ABNORMAL FINDINGS: Primary | ICD-10-CM

## 2023-11-20 DIAGNOSIS — R30.0 DYSURIA: ICD-10-CM

## 2023-11-20 DIAGNOSIS — E11.65 TYPE 2 DIABETES MELLITUS WITH HYPERGLYCEMIA, UNSPECIFIED WHETHER LONG TERM INSULIN USE (HCC): ICD-10-CM

## 2023-11-20 DIAGNOSIS — E78.2 MIXED HYPERLIPIDEMIA: ICD-10-CM

## 2023-11-20 DIAGNOSIS — E66.01 OBESITY, MORBID (HCC): ICD-10-CM

## 2023-11-20 LAB
BILIRUBIN, URINE, POC: NEGATIVE
BLOOD URINE, POC: NEGATIVE
GLUCOSE URINE, POC: NEGATIVE
KETONES, URINE, POC: NEGATIVE
LEUKOCYTE ESTERASE, URINE, POC: NEGATIVE
NITRITE, URINE, POC: NEGATIVE
PH, URINE, POC: 7 (ref 4.6–8)
PROTEIN,URINE, POC: NEGATIVE
SPECIFIC GRAVITY, URINE, POC: 1.02 (ref 1–1.03)
URINALYSIS CLARITY, POC: CLEAR
URINALYSIS COLOR, POC: YELLOW
UROBILINOGEN, POC: NORMAL

## 2023-11-20 PROCEDURE — G8484 FLU IMMUNIZE NO ADMIN: HCPCS | Performed by: FAMILY MEDICINE

## 2023-11-20 PROCEDURE — 3079F DIAST BP 80-89 MM HG: CPT | Performed by: FAMILY MEDICINE

## 2023-11-20 PROCEDURE — 99214 OFFICE O/P EST MOD 30 MIN: CPT | Performed by: FAMILY MEDICINE

## 2023-11-20 PROCEDURE — G8417 CALC BMI ABV UP PARAM F/U: HCPCS | Performed by: FAMILY MEDICINE

## 2023-11-20 PROCEDURE — 3017F COLORECTAL CA SCREEN DOC REV: CPT | Performed by: FAMILY MEDICINE

## 2023-11-20 PROCEDURE — 81003 URINALYSIS AUTO W/O SCOPE: CPT | Performed by: FAMILY MEDICINE

## 2023-11-20 PROCEDURE — 2022F DILAT RTA XM EVC RTNOPTHY: CPT | Performed by: FAMILY MEDICINE

## 2023-11-20 PROCEDURE — 3052F HG A1C>EQUAL 8.0%<EQUAL 9.0%: CPT | Performed by: FAMILY MEDICINE

## 2023-11-20 PROCEDURE — G8427 DOCREV CUR MEDS BY ELIG CLIN: HCPCS | Performed by: FAMILY MEDICINE

## 2023-11-20 PROCEDURE — 4004F PT TOBACCO SCREEN RCVD TLK: CPT | Performed by: FAMILY MEDICINE

## 2023-11-20 PROCEDURE — G0439 PPPS, SUBSEQ VISIT: HCPCS | Performed by: FAMILY MEDICINE

## 2023-11-20 PROCEDURE — PBSHW AMB POC URINALYSIS DIP STICK AUTO W/O MICRO: Performed by: FAMILY MEDICINE

## 2023-11-20 PROCEDURE — 3075F SYST BP GE 130 - 139MM HG: CPT | Performed by: FAMILY MEDICINE

## 2023-11-20 RX ORDER — ALPRAZOLAM 0.5 MG/1
TABLET ORAL
Qty: 90 TABLET | Refills: 1 | Status: SHIPPED | OUTPATIENT
Start: 2023-11-20 | End: 2024-11-19

## 2023-11-20 RX ORDER — TIRZEPATIDE 10 MG/.5ML
10 INJECTION, SOLUTION SUBCUTANEOUS WEEKLY
Qty: 4 ADJUSTABLE DOSE PRE-FILLED PEN SYRINGE | Refills: 3 | Status: SHIPPED | OUTPATIENT
Start: 2023-11-20

## 2023-11-20 ASSESSMENT — PATIENT HEALTH QUESTIONNAIRE - PHQ9
6. FEELING BAD ABOUT YOURSELF - OR THAT YOU ARE A FAILURE OR HAVE LET YOURSELF OR YOUR FAMILY DOWN: 0
7. TROUBLE CONCENTRATING ON THINGS, SUCH AS READING THE NEWSPAPER OR WATCHING TELEVISION: 0
8. MOVING OR SPEAKING SO SLOWLY THAT OTHER PEOPLE COULD HAVE NOTICED. OR THE OPPOSITE, BEING SO FIGETY OR RESTLESS THAT YOU HAVE BEEN MOVING AROUND A LOT MORE THAN USUAL: 0
SUM OF ALL RESPONSES TO PHQ QUESTIONS 1-9: 0
3. TROUBLE FALLING OR STAYING ASLEEP: 0
2. FEELING DOWN, DEPRESSED OR HOPELESS: 0
4. FEELING TIRED OR HAVING LITTLE ENERGY: 0
9. THOUGHTS THAT YOU WOULD BE BETTER OFF DEAD, OR OF HURTING YOURSELF: 0
SUM OF ALL RESPONSES TO PHQ QUESTIONS 1-9: 0
5. POOR APPETITE OR OVEREATING: 0
SUM OF ALL RESPONSES TO PHQ QUESTIONS 1-9: 0
SUM OF ALL RESPONSES TO PHQ QUESTIONS 1-9: 0
SUM OF ALL RESPONSES TO PHQ9 QUESTIONS 1 & 2: 0
10. IF YOU CHECKED OFF ANY PROBLEMS, HOW DIFFICULT HAVE THESE PROBLEMS MADE IT FOR YOU TO DO YOUR WORK, TAKE CARE OF THINGS AT HOME, OR GET ALONG WITH OTHER PEOPLE: 0
1. LITTLE INTEREST OR PLEASURE IN DOING THINGS: 0

## 2023-11-20 ASSESSMENT — LIFESTYLE VARIABLES
HOW MANY STANDARD DRINKS CONTAINING ALCOHOL DO YOU HAVE ON A TYPICAL DAY: PATIENT DOES NOT DRINK
HOW OFTEN DO YOU HAVE A DRINK CONTAINING ALCOHOL: NEVER

## 2023-11-20 ASSESSMENT — COLUMBIA-SUICIDE SEVERITY RATING SCALE - C-SSRS
3. HAVE YOU BEEN THINKING ABOUT HOW YOU MIGHT KILL YOURSELF?: NO
5. HAVE YOU STARTED TO WORK OUT OR WORKED OUT THE DETAILS OF HOW TO KILL YOURSELF? DO YOU INTEND TO CARRY OUT THIS PLAN?: NO
7. DID THIS OCCUR IN THE LAST THREE MONTHS: NO
4. HAVE YOU HAD THESE THOUGHTS AND HAD SOME INTENTION OF ACTING ON THEM?: NO

## 2023-11-20 NOTE — PROGRESS NOTES
Medicare Annual Wellness Visit    Samm Guzmán is here for Medicare AWV, Follow-up (REFILL BUMEX), and Labs Only (A1C/)        ICD-10-CM    1. Encounter for Medicare annual examination with abnormal findings  Z00.01    See below   2. Dysuria  R30.0 AMB POC URINALYSIS DIP STICK AUTO W/O MICRO   Negative UA   3. Type 2 diabetes mellitus with hyperglycemia, unspecified whether long term insulin use (HCC)  E11.65 Tirzepatide (MOUNJARO) 10 MG/0.5ML SOPN SC injection     Hemoglobin A1C     Comprehensive Metabolic Panel     Lipid Panel   Doing much better with Mounjaro. 4. Essential hypertension, benign  I10 Comprehensive Metabolic Panel     Lipid Panel      5. Mixed hyperlipidemia  E78.2 Comprehensive Metabolic Panel     Lipid Panel      6. Obesity, morbid ([address])  E66.01    Good weight loss       Time was used for level of billing: no     Time listed above was time used outside of routine AWV workup and care. Encounter for Medicare annual examination with abnormal findings  Dysuria  -     AMB POC URINALYSIS DIP STICK AUTO W/O MICRO  Type 2 diabetes mellitus with hyperglycemia, unspecified whether long term insulin use (HCC)  -     Tirzepatide (MOUNJARO) 10 MG/0.5ML SOPN SC injection; Inject 0.5 mLs into the skin once a week, Disp-4 Adjustable Dose Pre-filled Pen Syringe, R-3Normal  -     Hemoglobin A1C; Future  -     Comprehensive Metabolic Panel; Future  -     Lipid Panel; Future  Essential hypertension, benign  -     Comprehensive Metabolic Panel; Future  -     Lipid Panel; Future  Mixed hyperlipidemia  -     Comprehensive Metabolic Panel; Future  -     Lipid Panel; Future  Obesity, morbid ([address])    Recommendations for Preventive Services Due: see orders and patient instructions/AVS.    Recommended screening schedule for the next 5-10 years is provided to the patient in written form: see Patient Instructions/AVS.    Return in about 3 months (around 2/20/2024) for Diabetes.      Subjective   The following acute

## 2023-11-21 ENCOUNTER — PREP FOR PROCEDURE (OUTPATIENT)
Age: 53
End: 2023-11-21

## 2023-11-21 ENCOUNTER — TELEPHONE (OUTPATIENT)
Age: 53
End: 2023-11-21

## 2023-11-21 ENCOUNTER — OFFICE VISIT (OUTPATIENT)
Age: 53
End: 2023-11-21
Payer: MEDICARE

## 2023-11-21 VITALS
BODY MASS INDEX: 41.29 KG/M2 | SYSTOLIC BLOOD PRESSURE: 137 MMHG | RESPIRATION RATE: 20 BRPM | DIASTOLIC BLOOD PRESSURE: 88 MMHG | TEMPERATURE: 97.8 F | WEIGHT: 233 LBS | HEART RATE: 92 BPM | OXYGEN SATURATION: 95 % | HEIGHT: 63 IN

## 2023-11-21 DIAGNOSIS — K90.89 OTHER INTESTINAL MALABSORPTION: ICD-10-CM

## 2023-11-21 DIAGNOSIS — E66.01 MORBID OBESITY (HCC): Primary | ICD-10-CM

## 2023-11-21 PROBLEM — K21.9 ESOPHAGEAL REFLUX: Status: ACTIVE | Noted: 2023-11-21

## 2023-11-21 LAB
ALBUMIN SERPL-MCNC: 3.4 G/DL (ref 3.5–5)
ALBUMIN/GLOB SERPL: 1 (ref 1.1–2.2)
ALP SERPL-CCNC: 93 U/L (ref 45–117)
ALT SERPL-CCNC: 32 U/L (ref 12–78)
ANION GAP SERPL CALC-SCNC: 5 MMOL/L (ref 5–15)
AST SERPL-CCNC: 18 U/L (ref 15–37)
BILIRUB SERPL-MCNC: 0.4 MG/DL (ref 0.2–1)
BUN SERPL-MCNC: 14 MG/DL (ref 6–20)
BUN/CREAT SERPL: 17 (ref 12–20)
CALCIUM SERPL-MCNC: 8.9 MG/DL (ref 8.5–10.1)
CHLORIDE SERPL-SCNC: 107 MMOL/L (ref 97–108)
CHOLEST SERPL-MCNC: 194 MG/DL
CO2 SERPL-SCNC: 28 MMOL/L (ref 21–32)
CREAT SERPL-MCNC: 0.82 MG/DL (ref 0.55–1.02)
EST. AVERAGE GLUCOSE BLD GHB EST-MCNC: 126 MG/DL
GLOBULIN SER CALC-MCNC: 3.5 G/DL (ref 2–4)
GLUCOSE SERPL-MCNC: 88 MG/DL (ref 65–100)
HBA1C MFR BLD: 6 % (ref 4–5.6)
HDLC SERPL-MCNC: 56 MG/DL
HDLC SERPL: 3.5 (ref 0–5)
LDLC SERPL CALC-MCNC: 115.6 MG/DL (ref 0–100)
POTASSIUM SERPL-SCNC: 4.2 MMOL/L (ref 3.5–5.1)
PROT SERPL-MCNC: 6.9 G/DL (ref 6.4–8.2)
SODIUM SERPL-SCNC: 140 MMOL/L (ref 136–145)
TRIGL SERPL-MCNC: 112 MG/DL
VLDLC SERPL CALC-MCNC: 22.4 MG/DL

## 2023-11-21 PROCEDURE — 4004F PT TOBACCO SCREEN RCVD TLK: CPT | Performed by: SURGERY

## 2023-11-21 PROCEDURE — G8427 DOCREV CUR MEDS BY ELIG CLIN: HCPCS | Performed by: SURGERY

## 2023-11-21 PROCEDURE — 3079F DIAST BP 80-89 MM HG: CPT | Performed by: SURGERY

## 2023-11-21 PROCEDURE — G8484 FLU IMMUNIZE NO ADMIN: HCPCS | Performed by: SURGERY

## 2023-11-21 PROCEDURE — 99204 OFFICE O/P NEW MOD 45 MIN: CPT | Performed by: SURGERY

## 2023-11-21 PROCEDURE — 3075F SYST BP GE 130 - 139MM HG: CPT | Performed by: SURGERY

## 2023-11-21 PROCEDURE — 3017F COLORECTAL CA SCREEN DOC REV: CPT | Performed by: SURGERY

## 2023-11-21 PROCEDURE — G8417 CALC BMI ABV UP PARAM F/U: HCPCS | Performed by: SURGERY

## 2023-11-21 ASSESSMENT — PATIENT HEALTH QUESTIONNAIRE - PHQ9
1. LITTLE INTEREST OR PLEASURE IN DOING THINGS: 0
SUM OF ALL RESPONSES TO PHQ QUESTIONS 1-9: 0
2. FEELING DOWN, DEPRESSED OR HOPELESS: 0
SUM OF ALL RESPONSES TO PHQ QUESTIONS 1-9: 0
SUM OF ALL RESPONSES TO PHQ9 QUESTIONS 1 & 2: 0
SUM OF ALL RESPONSES TO PHQ QUESTIONS 1-9: 0
SUM OF ALL RESPONSES TO PHQ QUESTIONS 1-9: 0

## 2023-11-21 NOTE — TELEPHONE ENCOUNTER
Spoke to patient to schedule her EGD. I let her know that this procedure is done at [facility]. I offered  1/15/24 @ 10:30AM with arrival time of 9:00AM.  She acknowledged: that would work     I informed her with the procedure she is required to have someone come in with her at registration and stay on the property during the duration of the procedure. Bring photo ID and insurance card. She acknowledged ok    I told her once this is scheduled I will put all this information we discussed in a letter that will post to her my chart and go out in the mail.   She acknowledged ok and thank you

## 2023-11-30 ENCOUNTER — OFFICE VISIT (OUTPATIENT)
Age: 53
End: 2023-11-30

## 2023-11-30 VITALS — BODY MASS INDEX: 40.21 KG/M2 | WEIGHT: 227 LBS

## 2023-11-30 DIAGNOSIS — E66.01 MORBID OBESITY (HCC): Primary | ICD-10-CM

## 2023-11-30 NOTE — PROGRESS NOTES
Pre-operative Bariatric Nutrition Evaluation - Office Visit ()     Date: 2023   Eric Villegas M.D. Name: Conor Davis  :  1970  Age:  48  Gender: Female   Type of Surgery: [x]           Gastric Bypass   []           Sleeve Gastrectomy    ASSESSMENT:    Past Medical History:HTN, Type II DM, sleep apnea, anxiety, arthritis, asthma, COPD, acid reflux      Medications/Supplements:   Prior to Admission medications    Medication Sig Start Date End Date Taking? Authorizing Provider   ALPRAZolam Genrafael Moses) 0.5 MG tablet TAKE 1 TABLET BY MOUTH DAILY AS  NEEDED FOR ANXIETY 23  Earl Sepulveda MD   Tirzepatide [facility]) 10 MG/0.5ML SOPN SC injection Inject 0.5 mLs into the skin once a week 23   Earl Sepulveda MD   gabapentin (NEURONTIN) 600 MG tablet Take 1 tablet by mouth 2 times daily.  10/11/23 10/10/24  Earl Sepulveda MD   ibuprofen (ADVIL;MOTRIN) 800 MG tablet Take 1 tablet by mouth every 6 hours as needed for Pain    Provider, MD Samson   albuterol (PROVENTIL) (2.5 MG/3ML) 0.083% nebulizer solution USE 1 VIAL VIA NEBULIZER EVERY 4 HOURS AS NEEDED FOR WHEEZING  ( RECOMMENDS NOT  EXCEEDING 4 VIALS/DAY) 23   Earl Sepulveda MD   bumetanide (BUMEX) 2 MG tablet TAKE 1 TABLET BY MOUTH EVERY DAY 8/3/23   Earl Sepulveda MD   albuterol sulfate HFA (PROVENTIL;VENTOLIN;PROAIR) 108 (90 Base) MCG/ACT inhaler INHALE 2 INHALATIONS BY  MOUTH EVERY 4 HOURS AS  NEEDED FOR WHEEZING 23   Earl Sepulveda MD   cyclobenzaprine (FLEXERIL) 10 MG tablet TAKE 1 TABLET BY MOUTH 3  TIMES DAILY AS NEEDED FOR  MUSCLE SPASM FOR UP TO 10  DAYS 22   Automatic Reconciliation, Ar   losartan (COZAAR) 50 MG tablet Take 1 tablet by mouth daily 23   Automatic Reconciliation, Ar       Food Allergies/Intolerances:allergic to cat fish    Anthropometrics:    Ht:63\"   Today's Office Wt: 227#    IBW: 115#    %IBW: 197%     BMI:40    Category: obesity III     Reported wt Spoke with [facility] and had them refax paper due to unable to locate first one.

## 2023-12-01 LAB
25(OH)D3+25(OH)D2 SERPL-MCNC: 10.1 NG/ML (ref 30–100)
FOLATE SERPL-MCNC: 8.3 NG/ML
UREA BREATH TEST QL: NEGATIVE
VIT B12 SERPL-MCNC: 296 PG/ML (ref 232–1245)

## 2023-12-04 LAB — VIT B1 BLD-SCNC: 88.1 NMOL/L (ref 66.5–200)

## 2023-12-06 ENCOUNTER — TELEPHONE (OUTPATIENT)
Age: 53
End: 2023-12-06

## 2023-12-06 RX ORDER — LOSARTAN POTASSIUM 50 MG/1
TABLET ORAL DAILY
Qty: 100 TABLET | Refills: 2 | Status: SHIPPED | OUTPATIENT
Start: 2023-12-06

## 2023-12-06 RX ORDER — HYDROCHLOROTHIAZIDE 25 MG/1
25 TABLET ORAL DAILY
Qty: 100 TABLET | Refills: 2 | Status: SHIPPED | OUTPATIENT
Start: 2023-12-06

## 2023-12-06 NOTE — TELEPHONE ENCOUNTER
Called pt to schedule her Psych Eval with Krystal. Appt can be virtual or in person. No answer, left vm to return call.

## 2023-12-21 ENCOUNTER — TELEPHONE (OUTPATIENT)
Age: 53
End: 2023-12-21

## 2023-12-21 NOTE — TELEPHONE ENCOUNTER
Pt came into the office and dropped off the from for Bariatric Surgery that she would like to have completed and signed.    Please fax form to [phone] niles Kwok once completed.    Form has been placed on provider's desk

## 2024-01-06 ENCOUNTER — HOSPITAL ENCOUNTER (INPATIENT)
Facility: HOSPITAL | Age: 54
LOS: 2 days | Discharge: HOME | DRG: 194 | End: 2024-01-09
Attending: EMERGENCY MEDICINE | Admitting: INTERNAL MEDICINE
Payer: MEDICARE

## 2024-01-06 DIAGNOSIS — E87.6 ACUTE HYPOKALEMIA: ICD-10-CM

## 2024-01-06 DIAGNOSIS — E86.0 ACUTE DEHYDRATION: ICD-10-CM

## 2024-01-06 DIAGNOSIS — J10.1 INFLUENZA A: ICD-10-CM

## 2024-01-06 DIAGNOSIS — R60.9 EDEMA, UNSPECIFIED TYPE: ICD-10-CM

## 2024-01-06 DIAGNOSIS — R55 POSTURAL DIZZINESS WITH NEAR SYNCOPE: ICD-10-CM

## 2024-01-06 DIAGNOSIS — R42 POSTURAL DIZZINESS WITH NEAR SYNCOPE: ICD-10-CM

## 2024-01-06 DIAGNOSIS — A41.9 SEPTICEMIA (HCC): Primary | ICD-10-CM

## 2024-01-06 DIAGNOSIS — I95.9 HYPOTENSIVE EPISODE: ICD-10-CM

## 2024-01-06 DIAGNOSIS — E87.3 METABOLIC ALKALOSIS: ICD-10-CM

## 2024-01-06 DIAGNOSIS — N17.9 ACUTE KIDNEY INJURY (HCC): ICD-10-CM

## 2024-01-06 LAB
COMMENT:: NORMAL
SPECIMEN HOLD: NORMAL

## 2024-01-06 PROCEDURE — 6360000002 HC RX W HCPCS: Performed by: EMERGENCY MEDICINE

## 2024-01-06 PROCEDURE — 2580000003 HC RX 258: Performed by: EMERGENCY MEDICINE

## 2024-01-06 PROCEDURE — 83735 ASSAY OF MAGNESIUM: CPT

## 2024-01-06 PROCEDURE — 96376 TX/PRO/DX INJ SAME DRUG ADON: CPT

## 2024-01-06 PROCEDURE — 84145 PROCALCITONIN (PCT): CPT

## 2024-01-06 PROCEDURE — 87040 BLOOD CULTURE FOR BACTERIA: CPT

## 2024-01-06 PROCEDURE — 96365 THER/PROPH/DIAG IV INF INIT: CPT

## 2024-01-06 PROCEDURE — 87636 SARSCOV2 & INF A&B AMP PRB: CPT

## 2024-01-06 PROCEDURE — 93005 ELECTROCARDIOGRAM TRACING: CPT | Performed by: EMERGENCY MEDICINE

## 2024-01-06 PROCEDURE — 83036 HEMOGLOBIN GLYCOSYLATED A1C: CPT

## 2024-01-06 PROCEDURE — 85025 COMPLETE CBC W/AUTO DIFF WBC: CPT

## 2024-01-06 PROCEDURE — 84484 ASSAY OF TROPONIN QUANT: CPT

## 2024-01-06 PROCEDURE — 80047 BASIC METABLC PNL IONIZED CA: CPT

## 2024-01-06 PROCEDURE — 80053 COMPREHEN METABOLIC PANEL: CPT

## 2024-01-06 PROCEDURE — 83605 ASSAY OF LACTIC ACID: CPT

## 2024-01-06 PROCEDURE — 81001 URINALYSIS AUTO W/SCOPE: CPT

## 2024-01-06 PROCEDURE — 96375 TX/PRO/DX INJ NEW DRUG ADDON: CPT

## 2024-01-06 PROCEDURE — 99285 EMERGENCY DEPT VISIT HI MDM: CPT

## 2024-01-06 RX ORDER — FENTANYL CITRATE 50 UG/ML
50 INJECTION, SOLUTION INTRAMUSCULAR; INTRAVENOUS
Status: COMPLETED | OUTPATIENT
Start: 2024-01-06 | End: 2024-01-06

## 2024-01-06 RX ORDER — 0.9 % SODIUM CHLORIDE 0.9 %
1000 INTRAVENOUS SOLUTION INTRAVENOUS ONCE
Status: DISCONTINUED | OUTPATIENT
Start: 2024-01-06 | End: 2024-01-06

## 2024-01-06 RX ORDER — ACETAMINOPHEN 500 MG
1000 TABLET ORAL
Status: COMPLETED | OUTPATIENT
Start: 2024-01-06 | End: 2024-01-07

## 2024-01-06 RX ORDER — 0.9 % SODIUM CHLORIDE 0.9 %
30 INTRAVENOUS SOLUTION INTRAVENOUS ONCE
Status: COMPLETED | OUTPATIENT
Start: 2024-01-06 | End: 2024-01-07

## 2024-01-06 RX ORDER — ONDANSETRON 2 MG/ML
4 INJECTION INTRAMUSCULAR; INTRAVENOUS
Status: COMPLETED | OUTPATIENT
Start: 2024-01-06 | End: 2024-01-06

## 2024-01-06 RX ADMIN — FENTANYL CITRATE 50 MCG: 50 INJECTION, SOLUTION INTRAMUSCULAR; INTRAVENOUS at 23:58

## 2024-01-06 RX ADMIN — ONDANSETRON 4 MG: 2 INJECTION INTRAMUSCULAR; INTRAVENOUS at 23:59

## 2024-01-06 RX ADMIN — SODIUM CHLORIDE 3006 ML: 9 INJECTION, SOLUTION INTRAVENOUS at 23:54

## 2024-01-06 ASSESSMENT — ENCOUNTER SYMPTOMS
RHINORRHEA: 0
SORE THROAT: 0
NAUSEA: 0
SHORTNESS OF BREATH: 0
DIARRHEA: 0
BACK PAIN: 1
VOMITING: 0
EYE PAIN: 0
ABDOMINAL PAIN: 1
COUGH: 0

## 2024-01-06 ASSESSMENT — PAIN SCALES - GENERAL
PAINLEVEL_OUTOF10: 10
PAINLEVEL_OUTOF10: 10

## 2024-01-06 ASSESSMENT — PAIN DESCRIPTION - LOCATION: LOCATION: BACK;SHOULDER

## 2024-01-06 ASSESSMENT — PAIN - FUNCTIONAL ASSESSMENT
PAIN_FUNCTIONAL_ASSESSMENT: PREVENTS OR INTERFERES SOME ACTIVE ACTIVITIES AND ADLS
PAIN_FUNCTIONAL_ASSESSMENT: 0-10

## 2024-01-06 ASSESSMENT — PAIN DESCRIPTION - DESCRIPTORS: DESCRIPTORS: ACHING;NAGGING

## 2024-01-06 ASSESSMENT — PAIN DESCRIPTION - ORIENTATION: ORIENTATION: RIGHT;POSTERIOR

## 2024-01-07 ENCOUNTER — APPOINTMENT (OUTPATIENT)
Facility: HOSPITAL | Age: 54
DRG: 194 | End: 2024-01-07
Payer: MEDICARE

## 2024-01-07 PROBLEM — J10.1 INFLUENZA A: Status: ACTIVE | Noted: 2024-01-07

## 2024-01-07 LAB
ALBUMIN SERPL-MCNC: 3 G/DL (ref 3.5–5)
ALBUMIN/GLOB SERPL: 0.8 (ref 1.1–2.2)
ALP SERPL-CCNC: 75 U/L (ref 45–117)
ALT SERPL-CCNC: 28 U/L (ref 12–78)
ANION GAP BLD CALC-SCNC: 14 (ref 10–20)
ANION GAP SERPL CALC-SCNC: 4 MMOL/L (ref 5–15)
ANION GAP SERPL CALC-SCNC: 9 MMOL/L (ref 5–15)
APPEARANCE UR: CLEAR
AST SERPL-CCNC: 29 U/L (ref 15–37)
BACTERIA URNS QL MICRO: NEGATIVE /HPF
BASOPHILS # BLD: 0 K/UL (ref 0–0.1)
BASOPHILS # BLD: 0 K/UL (ref 0–0.1)
BASOPHILS NFR BLD: 1 % (ref 0–1)
BASOPHILS NFR BLD: 1 % (ref 0–1)
BILIRUB SERPL-MCNC: 0.2 MG/DL (ref 0.2–1)
BILIRUB UR QL: NEGATIVE
BUN SERPL-MCNC: 13 MG/DL (ref 6–20)
BUN SERPL-MCNC: 9 MG/DL (ref 6–20)
BUN/CREAT SERPL: 10 (ref 12–20)
BUN/CREAT SERPL: 8 (ref 12–20)
CA-I BLD-MCNC: 1.04 MMOL/L (ref 1.12–1.32)
CALCIUM SERPL-MCNC: 7.6 MG/DL (ref 8.5–10.1)
CALCIUM SERPL-MCNC: 8.3 MG/DL (ref 8.5–10.1)
CHLORIDE BLD-SCNC: 95 MMOL/L (ref 100–108)
CHLORIDE SERPL-SCNC: 103 MMOL/L (ref 97–108)
CHLORIDE SERPL-SCNC: 98 MMOL/L (ref 97–108)
CO2 BLD-SCNC: 31 MMOL/L (ref 19–24)
CO2 SERPL-SCNC: 30 MMOL/L (ref 21–32)
CO2 SERPL-SCNC: 34 MMOL/L (ref 21–32)
COLOR UR: ABNORMAL
CREAT SERPL-MCNC: 1.18 MG/DL (ref 0.55–1.02)
CREAT SERPL-MCNC: 1.28 MG/DL (ref 0.55–1.02)
CREAT UR-MCNC: 1.2 MG/DL (ref 0.6–1.3)
DIFFERENTIAL METHOD BLD: ABNORMAL
DIFFERENTIAL METHOD BLD: NORMAL
EOSINOPHIL # BLD: 0 K/UL (ref 0–0.4)
EOSINOPHIL # BLD: 0 K/UL (ref 0–0.4)
EOSINOPHIL NFR BLD: 0 % (ref 0–7)
EOSINOPHIL NFR BLD: 0 % (ref 0–7)
EPITH CASTS URNS QL MICRO: ABNORMAL /LPF
ERYTHROCYTE [DISTWIDTH] IN BLOOD BY AUTOMATED COUNT: 13.8 % (ref 11.5–14.5)
ERYTHROCYTE [DISTWIDTH] IN BLOOD BY AUTOMATED COUNT: 14.2 % (ref 11.5–14.5)
EST. AVERAGE GLUCOSE BLD GHB EST-MCNC: 108 MG/DL
EST. AVERAGE GLUCOSE BLD GHB EST-MCNC: 111 MG/DL
FLUAV RNA SPEC QL NAA+PROBE: DETECTED
FLUBV RNA SPEC QL NAA+PROBE: NOT DETECTED
GLOBULIN SER CALC-MCNC: 3.7 G/DL (ref 2–4)
GLUCOSE BLD STRIP.AUTO-MCNC: 105 MG/DL (ref 65–117)
GLUCOSE BLD STRIP.AUTO-MCNC: 105 MG/DL (ref 65–117)
GLUCOSE BLD STRIP.AUTO-MCNC: 114 MG/DL (ref 65–117)
GLUCOSE BLD STRIP.AUTO-MCNC: 87 MG/DL (ref 74–106)
GLUCOSE SERPL-MCNC: 108 MG/DL (ref 65–100)
GLUCOSE SERPL-MCNC: 91 MG/DL (ref 65–100)
GLUCOSE UR STRIP.AUTO-MCNC: NEGATIVE MG/DL
HBA1C MFR BLD: 5.4 % (ref 4–5.6)
HBA1C MFR BLD: 5.5 % (ref 4–5.6)
HCT VFR BLD AUTO: 33.6 % (ref 35–47)
HCT VFR BLD AUTO: 36.8 % (ref 35–47)
HGB BLD-MCNC: 11 G/DL (ref 11.5–16)
HGB BLD-MCNC: 12.6 G/DL (ref 11.5–16)
HGB UR QL STRIP: NEGATIVE
IMM GRANULOCYTES # BLD AUTO: 0 K/UL (ref 0–0.04)
IMM GRANULOCYTES # BLD AUTO: 0 K/UL (ref 0–0.04)
IMM GRANULOCYTES NFR BLD AUTO: 0 % (ref 0–0.5)
IMM GRANULOCYTES NFR BLD AUTO: 0 % (ref 0–0.5)
KETONES UR QL STRIP.AUTO: ABNORMAL MG/DL
LACTATE BLD-SCNC: 1.61 MMOL/L (ref 0.4–2)
LEUKOCYTE ESTERASE UR QL STRIP.AUTO: NEGATIVE
LYMPHOCYTES # BLD: 2.5 K/UL (ref 0.8–3.5)
LYMPHOCYTES # BLD: 2.6 K/UL (ref 0.8–3.5)
LYMPHOCYTES NFR BLD: 31 % (ref 12–49)
LYMPHOCYTES NFR BLD: 48 % (ref 12–49)
MAGNESIUM SERPL-MCNC: 1.5 MG/DL (ref 1.6–2.4)
MAGNESIUM SERPL-MCNC: 1.6 MG/DL (ref 1.6–2.4)
MCH RBC QN AUTO: 29.6 PG (ref 26–34)
MCH RBC QN AUTO: 30 PG (ref 26–34)
MCHC RBC AUTO-ENTMCNC: 32.7 G/DL (ref 30–36.5)
MCHC RBC AUTO-ENTMCNC: 34.2 G/DL (ref 30–36.5)
MCV RBC AUTO: 86.6 FL (ref 80–99)
MCV RBC AUTO: 91.6 FL (ref 80–99)
MONOCYTES # BLD: 0.4 K/UL (ref 0–1)
MONOCYTES # BLD: 0.8 K/UL (ref 0–1)
MONOCYTES NFR BLD: 7 % (ref 5–13)
MONOCYTES NFR BLD: 9 % (ref 5–13)
NEUTS SEG # BLD: 2.3 K/UL (ref 1.8–8)
NEUTS SEG # BLD: 5.2 K/UL (ref 1.8–8)
NEUTS SEG NFR BLD: 44 % (ref 32–75)
NEUTS SEG NFR BLD: 59 % (ref 32–75)
NITRITE UR QL STRIP.AUTO: NEGATIVE
NRBC # BLD: 0 K/UL (ref 0–0.01)
NRBC # BLD: 0 K/UL (ref 0–0.01)
NRBC BLD-RTO: 0 PER 100 WBC
NRBC BLD-RTO: 0 PER 100 WBC
PH UR STRIP: 6.5 (ref 5–8)
PLATELET # BLD AUTO: 201 K/UL (ref 150–400)
PLATELET # BLD AUTO: 244 K/UL (ref 150–400)
PMV BLD AUTO: 11.7 FL (ref 8.9–12.9)
PMV BLD AUTO: 12.1 FL (ref 8.9–12.9)
POTASSIUM BLD-SCNC: 2.7 MMOL/L (ref 3.5–5.5)
POTASSIUM SERPL-SCNC: 2.5 MMOL/L (ref 3.5–5.1)
POTASSIUM SERPL-SCNC: 2.9 MMOL/L (ref 3.5–5.1)
PROCALCITONIN SERPL-MCNC: <0.05 NG/ML
PROT SERPL-MCNC: 6.7 G/DL (ref 6.4–8.2)
PROT UR STRIP-MCNC: NEGATIVE MG/DL
RBC # BLD AUTO: 3.67 M/UL (ref 3.8–5.2)
RBC # BLD AUTO: 4.25 M/UL (ref 3.8–5.2)
RBC #/AREA URNS HPF: ABNORMAL /HPF (ref 0–5)
SARS-COV-2 RNA RESP QL NAA+PROBE: NOT DETECTED
SERVICE CMNT-IMP: ABNORMAL
SERVICE CMNT-IMP: NORMAL
SODIUM BLD-SCNC: 140 MMOL/L (ref 136–145)
SODIUM SERPL-SCNC: 136 MMOL/L (ref 136–145)
SODIUM SERPL-SCNC: 142 MMOL/L (ref 136–145)
SP GR UR REFRACTOMETRY: 1.01
SPECIMEN SITE: ABNORMAL
TROPONIN I SERPL HS-MCNC: 6 NG/L (ref 0–51)
TROPONIN I SERPL HS-MCNC: 6 NG/L (ref 0–51)
URINE CULTURE IF INDICATED: ABNORMAL
UROBILINOGEN UR QL STRIP.AUTO: 1 EU/DL (ref 0.2–1)
WBC # BLD AUTO: 5.1 K/UL (ref 3.6–11)
WBC # BLD AUTO: 8.6 K/UL (ref 3.6–11)
WBC URNS QL MICRO: ABNORMAL /HPF (ref 0–4)

## 2024-01-07 PROCEDURE — 36415 COLL VENOUS BLD VENIPUNCTURE: CPT

## 2024-01-07 PROCEDURE — P9045 ALBUMIN (HUMAN), 5%, 250 ML: HCPCS | Performed by: HOSPITALIST

## 2024-01-07 PROCEDURE — 85025 COMPLETE CBC W/AUTO DIFF WBC: CPT

## 2024-01-07 PROCEDURE — 2580000003 HC RX 258: Performed by: HOSPITALIST

## 2024-01-07 PROCEDURE — 83036 HEMOGLOBIN GLYCOSYLATED A1C: CPT

## 2024-01-07 PROCEDURE — 6360000004 HC RX CONTRAST MEDICATION: Performed by: EMERGENCY MEDICINE

## 2024-01-07 PROCEDURE — 6370000000 HC RX 637 (ALT 250 FOR IP): Performed by: EMERGENCY MEDICINE

## 2024-01-07 PROCEDURE — 6360000002 HC RX W HCPCS: Performed by: HOSPITALIST

## 2024-01-07 PROCEDURE — 71045 X-RAY EXAM CHEST 1 VIEW: CPT

## 2024-01-07 PROCEDURE — 1200000000 HC SEMI PRIVATE

## 2024-01-07 PROCEDURE — 6360000002 HC RX W HCPCS: Performed by: EMERGENCY MEDICINE

## 2024-01-07 PROCEDURE — 6370000000 HC RX 637 (ALT 250 FOR IP): Performed by: INTERNAL MEDICINE

## 2024-01-07 PROCEDURE — 6370000000 HC RX 637 (ALT 250 FOR IP): Performed by: HOSPITALIST

## 2024-01-07 PROCEDURE — 84484 ASSAY OF TROPONIN QUANT: CPT

## 2024-01-07 PROCEDURE — 83735 ASSAY OF MAGNESIUM: CPT

## 2024-01-07 PROCEDURE — 74174 CTA ABD&PLVS W/CONTRAST: CPT

## 2024-01-07 PROCEDURE — 70450 CT HEAD/BRAIN W/O DYE: CPT

## 2024-01-07 PROCEDURE — 2580000003 HC RX 258: Performed by: EMERGENCY MEDICINE

## 2024-01-07 PROCEDURE — 80048 BASIC METABOLIC PNL TOTAL CA: CPT

## 2024-01-07 PROCEDURE — 82962 GLUCOSE BLOOD TEST: CPT

## 2024-01-07 RX ORDER — BUPRENORPHINE HYDROCHLORIDE 750 UG/1
1 FILM, SOLUBLE BUCCAL 2 TIMES DAILY PRN
COMMUNITY

## 2024-01-07 RX ORDER — SODIUM CHLORIDE 0.9 % (FLUSH) 0.9 %
5-40 SYRINGE (ML) INJECTION EVERY 12 HOURS SCHEDULED
Status: DISCONTINUED | OUTPATIENT
Start: 2024-01-07 | End: 2024-01-09 | Stop reason: HOSPADM

## 2024-01-07 RX ORDER — INSULIN LISPRO 100 [IU]/ML
0-4 INJECTION, SOLUTION INTRAVENOUS; SUBCUTANEOUS NIGHTLY
Status: DISCONTINUED | OUTPATIENT
Start: 2024-01-07 | End: 2024-01-09 | Stop reason: HOSPADM

## 2024-01-07 RX ORDER — POTASSIUM CHLORIDE 7.45 MG/ML
10 INJECTION INTRAVENOUS PRN
Status: DISCONTINUED | OUTPATIENT
Start: 2024-01-07 | End: 2024-01-09 | Stop reason: HOSPADM

## 2024-01-07 RX ORDER — ACETAMINOPHEN 325 MG/1
650 TABLET ORAL EVERY 6 HOURS PRN
Status: DISCONTINUED | OUTPATIENT
Start: 2024-01-07 | End: 2024-01-09 | Stop reason: HOSPADM

## 2024-01-07 RX ORDER — ALPRAZOLAM 0.25 MG/1
0.5 TABLET ORAL DAILY PRN
Status: DISCONTINUED | OUTPATIENT
Start: 2024-01-07 | End: 2024-01-09 | Stop reason: HOSPADM

## 2024-01-07 RX ORDER — POTASSIUM CHLORIDE 750 MG/1
40 TABLET, FILM COATED, EXTENDED RELEASE ORAL PRN
Status: DISCONTINUED | OUTPATIENT
Start: 2024-01-07 | End: 2024-01-09 | Stop reason: HOSPADM

## 2024-01-07 RX ORDER — FENTANYL CITRATE 50 UG/ML
25 INJECTION, SOLUTION INTRAMUSCULAR; INTRAVENOUS
Status: COMPLETED | OUTPATIENT
Start: 2024-01-07 | End: 2024-01-07

## 2024-01-07 RX ORDER — GABAPENTIN 300 MG/1
600 CAPSULE ORAL ONCE
Status: COMPLETED | OUTPATIENT
Start: 2024-01-07 | End: 2024-01-07

## 2024-01-07 RX ORDER — POTASSIUM CHLORIDE 750 MG/1
40 TABLET, FILM COATED, EXTENDED RELEASE ORAL ONCE
Status: COMPLETED | OUTPATIENT
Start: 2024-01-07 | End: 2024-01-07

## 2024-01-07 RX ORDER — GABAPENTIN 300 MG/1
600 CAPSULE ORAL 2 TIMES DAILY
Status: DISCONTINUED | OUTPATIENT
Start: 2024-01-07 | End: 2024-01-07

## 2024-01-07 RX ORDER — OSELTAMIVIR PHOSPHATE 75 MG/1
75 CAPSULE ORAL ONCE
Status: COMPLETED | OUTPATIENT
Start: 2024-01-07 | End: 2024-01-07

## 2024-01-07 RX ORDER — INSULIN LISPRO 100 [IU]/ML
0-4 INJECTION, SOLUTION INTRAVENOUS; SUBCUTANEOUS
Status: DISCONTINUED | OUTPATIENT
Start: 2024-01-07 | End: 2024-01-09 | Stop reason: HOSPADM

## 2024-01-07 RX ORDER — NICOTINE 21 MG/24HR
1 PATCH, TRANSDERMAL 24 HOURS TRANSDERMAL DAILY
Status: DISCONTINUED | OUTPATIENT
Start: 2024-01-07 | End: 2024-01-09 | Stop reason: HOSPADM

## 2024-01-07 RX ORDER — IPRATROPIUM BROMIDE AND ALBUTEROL SULFATE 2.5; .5 MG/3ML; MG/3ML
1 SOLUTION RESPIRATORY (INHALATION) EVERY 4 HOURS PRN
Status: DISCONTINUED | OUTPATIENT
Start: 2024-01-07 | End: 2024-01-09 | Stop reason: HOSPADM

## 2024-01-07 RX ORDER — DEXTROSE MONOHYDRATE 100 MG/ML
INJECTION, SOLUTION INTRAVENOUS CONTINUOUS PRN
Status: DISCONTINUED | OUTPATIENT
Start: 2024-01-07 | End: 2024-01-09 | Stop reason: HOSPADM

## 2024-01-07 RX ORDER — ACETAMINOPHEN 650 MG/1
650 SUPPOSITORY RECTAL EVERY 6 HOURS PRN
Status: DISCONTINUED | OUTPATIENT
Start: 2024-01-07 | End: 2024-01-09 | Stop reason: HOSPADM

## 2024-01-07 RX ORDER — ENOXAPARIN SODIUM 100 MG/ML
40 INJECTION SUBCUTANEOUS DAILY
Status: DISCONTINUED | OUTPATIENT
Start: 2024-01-07 | End: 2024-01-08

## 2024-01-07 RX ORDER — MAGNESIUM SULFATE IN WATER 40 MG/ML
2000 INJECTION, SOLUTION INTRAVENOUS PRN
Status: DISCONTINUED | OUTPATIENT
Start: 2024-01-07 | End: 2024-01-09 | Stop reason: HOSPADM

## 2024-01-07 RX ORDER — GABAPENTIN 300 MG/1
600 CAPSULE ORAL 3 TIMES DAILY
Status: DISCONTINUED | OUTPATIENT
Start: 2024-01-07 | End: 2024-01-09 | Stop reason: HOSPADM

## 2024-01-07 RX ORDER — SODIUM CHLORIDE 9 MG/ML
INJECTION, SOLUTION INTRAVENOUS CONTINUOUS
Status: DISCONTINUED | OUTPATIENT
Start: 2024-01-07 | End: 2024-01-08

## 2024-01-07 RX ORDER — OSELTAMIVIR PHOSPHATE 75 MG/1
75 CAPSULE ORAL ONCE
Status: DISCONTINUED | OUTPATIENT
Start: 2024-01-07 | End: 2024-01-07

## 2024-01-07 RX ORDER — ALBUMIN, HUMAN INJ 5% 5 %
25 SOLUTION INTRAVENOUS ONCE
Status: COMPLETED | OUTPATIENT
Start: 2024-01-07 | End: 2024-01-07

## 2024-01-07 RX ORDER — SODIUM CHLORIDE 9 MG/ML
INJECTION, SOLUTION INTRAVENOUS CONTINUOUS
Status: DISCONTINUED | OUTPATIENT
Start: 2024-01-07 | End: 2024-01-07

## 2024-01-07 RX ORDER — LANOLIN ALCOHOL/MO/W.PET/CERES
3 CREAM (GRAM) TOPICAL NIGHTLY
Status: DISCONTINUED | OUTPATIENT
Start: 2024-01-07 | End: 2024-01-09 | Stop reason: HOSPADM

## 2024-01-07 RX ORDER — POLYETHYLENE GLYCOL 3350 17 G/17G
17 POWDER, FOR SOLUTION ORAL DAILY PRN
Status: DISCONTINUED | OUTPATIENT
Start: 2024-01-07 | End: 2024-01-09 | Stop reason: HOSPADM

## 2024-01-07 RX ORDER — BISACODYL 10 MG
10 SUPPOSITORY, RECTAL RECTAL DAILY PRN
Status: DISCONTINUED | OUTPATIENT
Start: 2024-01-07 | End: 2024-01-09 | Stop reason: HOSPADM

## 2024-01-07 RX ORDER — ONDANSETRON 2 MG/ML
4 INJECTION INTRAMUSCULAR; INTRAVENOUS EVERY 6 HOURS PRN
Status: DISCONTINUED | OUTPATIENT
Start: 2024-01-07 | End: 2024-01-09 | Stop reason: HOSPADM

## 2024-01-07 RX ORDER — SODIUM CHLORIDE 0.9 % (FLUSH) 0.9 %
5-40 SYRINGE (ML) INJECTION PRN
Status: DISCONTINUED | OUTPATIENT
Start: 2024-01-07 | End: 2024-01-09 | Stop reason: HOSPADM

## 2024-01-07 RX ORDER — ONDANSETRON 4 MG/1
4 TABLET, ORALLY DISINTEGRATING ORAL EVERY 8 HOURS PRN
Status: DISCONTINUED | OUTPATIENT
Start: 2024-01-07 | End: 2024-01-09 | Stop reason: HOSPADM

## 2024-01-07 RX ORDER — MAGNESIUM SULFATE IN WATER 40 MG/ML
2000 INJECTION, SOLUTION INTRAVENOUS
Status: DISCONTINUED | OUTPATIENT
Start: 2024-01-07 | End: 2024-01-07

## 2024-01-07 RX ORDER — SENNA AND DOCUSATE SODIUM 50; 8.6 MG/1; MG/1
2 TABLET, FILM COATED ORAL DAILY PRN
Status: DISCONTINUED | OUTPATIENT
Start: 2024-01-07 | End: 2024-01-09 | Stop reason: HOSPADM

## 2024-01-07 RX ORDER — SODIUM CHLORIDE 9 MG/ML
INJECTION, SOLUTION INTRAVENOUS PRN
Status: DISCONTINUED | OUTPATIENT
Start: 2024-01-07 | End: 2024-01-09 | Stop reason: HOSPADM

## 2024-01-07 RX ORDER — OSELTAMIVIR PHOSPHATE 75 MG/1
75 CAPSULE ORAL 2 TIMES DAILY
Status: DISCONTINUED | OUTPATIENT
Start: 2024-01-07 | End: 2024-01-09 | Stop reason: HOSPADM

## 2024-01-07 RX ORDER — AMITRIPTYLINE HYDROCHLORIDE 50 MG/1
50 TABLET, FILM COATED ORAL NIGHTLY
Status: DISCONTINUED | OUTPATIENT
Start: 2024-01-07 | End: 2024-01-09 | Stop reason: HOSPADM

## 2024-01-07 RX ORDER — INSULIN GLARGINE 100 [IU]/ML
10 INJECTION, SOLUTION SUBCUTANEOUS DAILY
Status: DISCONTINUED | OUTPATIENT
Start: 2024-01-07 | End: 2024-01-07

## 2024-01-07 RX ORDER — AMITRIPTYLINE HYDROCHLORIDE 50 MG/1
50 TABLET, FILM COATED ORAL NIGHTLY
Status: ON HOLD | COMMUNITY
End: 2024-01-09

## 2024-01-07 RX ORDER — CYCLOBENZAPRINE HCL 10 MG
5 TABLET ORAL 3 TIMES DAILY PRN
Status: DISCONTINUED | OUTPATIENT
Start: 2024-01-07 | End: 2024-01-09 | Stop reason: HOSPADM

## 2024-01-07 RX ADMIN — OSELTAMIVIR 75 MG: 75 CAPSULE ORAL at 02:00

## 2024-01-07 RX ADMIN — SODIUM CHLORIDE, PRESERVATIVE FREE 10 ML: 5 INJECTION INTRAVENOUS at 08:00

## 2024-01-07 RX ADMIN — SODIUM CHLORIDE: 9 INJECTION, SOLUTION INTRAVENOUS at 04:08

## 2024-01-07 RX ADMIN — CYCLOBENZAPRINE 5 MG: 10 TABLET, FILM COATED ORAL at 19:41

## 2024-01-07 RX ADMIN — GABAPENTIN 600 MG: 300 CAPSULE ORAL at 14:49

## 2024-01-07 RX ADMIN — GABAPENTIN 600 MG: 300 CAPSULE ORAL at 21:43

## 2024-01-07 RX ADMIN — ALBUMIN (HUMAN) 25 G: 12.5 INJECTION, SOLUTION INTRAVENOUS at 03:17

## 2024-01-07 RX ADMIN — IOPAMIDOL 100 ML: 755 INJECTION, SOLUTION INTRAVENOUS at 01:20

## 2024-01-07 RX ADMIN — OSELTAMIVIR 75 MG: 75 CAPSULE ORAL at 08:00

## 2024-01-07 RX ADMIN — SODIUM CHLORIDE, PRESERVATIVE FREE 20 ML: 5 INJECTION INTRAVENOUS at 21:43

## 2024-01-07 RX ADMIN — POTASSIUM CHLORIDE 40 MEQ: 750 TABLET, EXTENDED RELEASE ORAL at 00:06

## 2024-01-07 RX ADMIN — CYCLOBENZAPRINE 5 MG: 10 TABLET, FILM COATED ORAL at 04:12

## 2024-01-07 RX ADMIN — ENOXAPARIN SODIUM 40 MG: 100 INJECTION SUBCUTANEOUS at 08:00

## 2024-01-07 RX ADMIN — FENTANYL CITRATE 25 MCG: 50 INJECTION, SOLUTION INTRAMUSCULAR; INTRAVENOUS at 02:00

## 2024-01-07 RX ADMIN — VANCOMYCIN HYDROCHLORIDE 1250 MG: 1.25 INJECTION, POWDER, LYOPHILIZED, FOR SOLUTION INTRAVENOUS at 00:07

## 2024-01-07 RX ADMIN — POTASSIUM CHLORIDE 10 MEQ: 7.46 INJECTION, SOLUTION INTRAVENOUS at 07:54

## 2024-01-07 RX ADMIN — VANCOMYCIN HYDROCHLORIDE 1250 MG: 1.25 INJECTION, POWDER, LYOPHILIZED, FOR SOLUTION INTRAVENOUS at 00:09

## 2024-01-07 RX ADMIN — Medication 3 MG: at 21:43

## 2024-01-07 RX ADMIN — POTASSIUM CHLORIDE 10 MEQ: 7.46 INJECTION, SOLUTION INTRAVENOUS at 11:53

## 2024-01-07 RX ADMIN — OSELTAMIVIR 75 MG: 75 CAPSULE ORAL at 21:43

## 2024-01-07 RX ADMIN — GABAPENTIN 600 MG: 300 CAPSULE ORAL at 02:00

## 2024-01-07 RX ADMIN — CYCLOBENZAPRINE 5 MG: 10 TABLET, FILM COATED ORAL at 11:52

## 2024-01-07 RX ADMIN — POLYETHYLENE GLYCOL 3350 17 G: 17 POWDER, FOR SOLUTION ORAL at 11:51

## 2024-01-07 RX ADMIN — DOCUSATE SODIUM AND SENNOSIDES 2 TABLET: 8.6; 5 TABLET, FILM COATED ORAL at 08:58

## 2024-01-07 RX ADMIN — VANCOMYCIN HYDROCHLORIDE 1000 MG: 1 INJECTION, POWDER, LYOPHILIZED, FOR SOLUTION INTRAVENOUS at 17:11

## 2024-01-07 RX ADMIN — MAGNESIUM SULFATE HEPTAHYDRATE 2000 MG: 40 INJECTION, SOLUTION INTRAVENOUS at 08:20

## 2024-01-07 RX ADMIN — GABAPENTIN 600 MG: 300 CAPSULE ORAL at 08:00

## 2024-01-07 RX ADMIN — AMITRIPTYLINE HYDROCHLORIDE 50 MG: 50 TABLET, FILM COATED ORAL at 21:43

## 2024-01-07 RX ADMIN — POTASSIUM CHLORIDE 10 MEQ: 7.46 INJECTION, SOLUTION INTRAVENOUS at 09:24

## 2024-01-07 RX ADMIN — ACETAMINOPHEN 1000 MG: 500 TABLET ORAL at 00:00

## 2024-01-07 RX ADMIN — POTASSIUM BICARBONATE 50 MEQ: 977.5 TABLET, EFFERVESCENT ORAL at 07:57

## 2024-01-07 RX ADMIN — POTASSIUM CHLORIDE 10 MEQ: 7.46 INJECTION, SOLUTION INTRAVENOUS at 14:49

## 2024-01-07 RX ADMIN — POTASSIUM CHLORIDE 10 MEQ: 7.46 INJECTION, SOLUTION INTRAVENOUS at 10:39

## 2024-01-07 RX ADMIN — POTASSIUM CHLORIDE 10 MEQ: 7.46 INJECTION, SOLUTION INTRAVENOUS at 13:05

## 2024-01-07 ASSESSMENT — PAIN DESCRIPTION - DESCRIPTORS
DESCRIPTORS: ACHING;CRAMPING
DESCRIPTORS: ACHING
DESCRIPTORS: ACHING
DESCRIPTORS: ACHING;GNAWING;CRAMPING
DESCRIPTORS: ACHING

## 2024-01-07 ASSESSMENT — PAIN SCALES - GENERAL
PAINLEVEL_OUTOF10: 10
PAINLEVEL_OUTOF10: 0
PAINLEVEL_OUTOF10: 5
PAINLEVEL_OUTOF10: 10
PAINLEVEL_OUTOF10: 8
PAINLEVEL_OUTOF10: 9

## 2024-01-07 ASSESSMENT — PAIN DESCRIPTION - LOCATION
LOCATION: GENERALIZED;HEAD
LOCATION: GENERALIZED
LOCATION: GENERALIZED
LOCATION: BACK;SHOULDER;ANKLE
LOCATION: GENERALIZED

## 2024-01-07 ASSESSMENT — PAIN DESCRIPTION - ORIENTATION
ORIENTATION: RIGHT;LEFT;ANTERIOR
ORIENTATION: RIGHT;LEFT;UPPER;LOWER

## 2024-01-07 ASSESSMENT — PAIN - FUNCTIONAL ASSESSMENT
PAIN_FUNCTIONAL_ASSESSMENT: ACTIVITIES ARE NOT PREVENTED
PAIN_FUNCTIONAL_ASSESSMENT: ACTIVITIES ARE NOT PREVENTED
PAIN_FUNCTIONAL_ASSESSMENT: PREVENTS OR INTERFERES SOME ACTIVE ACTIVITIES AND ADLS
PAIN_FUNCTIONAL_ASSESSMENT: ACTIVITIES ARE NOT PREVENTED
PAIN_FUNCTIONAL_ASSESSMENT: ACTIVITIES ARE NOT PREVENTED

## 2024-01-07 NOTE — ED TRIAGE NOTES
Pt presents to ED in wheelchair for complaints of low bp x today.  Per friend pt takes HTN meds and her BP dropped to 87/57 when taking it at home.  Pt called Dr and they advised to come in to be seen.  Pt sates periods of dizziness and congestion.      Pt complains of abdominal and back pain.    Pt tachy, febrile and hypotensive during triage

## 2024-01-07 NOTE — ED PROVIDER NOTES
Determinants of Health Affecting Dx or Tx:  None  Social Determinants of Health with Concerns     Tobacco Use: High Risk (1/7/2024)    Patient History     Smoking Tobacco Use: Every Day     Smokeless Tobacco Use: Never     Passive Exposure: Not on file   Financial Resource Strain: Medium Risk (9/5/2023)    Overall Financial Resource Strain (CARDIA)     Difficulty of Paying Living Expenses: Somewhat hard   Food Insecurity: Not on file (9/5/2023)   Physical Activity: Inactive (9/5/2023)    Exercise Vital Sign     Days of Exercise per Week: 0 days     Minutes of Exercise per Session: 0 min   Stress: Stress Concern Present (9/5/2023)    [employer] - Occupational Stress Questionnaire     Feeling of Stress : To some extent   Social Connections: Moderately Isolated (9/5/2023)    Social Connection and Isolation Panel [NHANES]     Frequency of Communication with Friends and Family: More than three times a week     Frequency of Social Gatherings with Friends and Family: More than three times a week     Attends Congregational Services: Never     Active Member of Clubs or Organizations: No     Attends Club or Organization Meetings: Never     Marital Status: Living with partner   Utilities: Not on file     Social History     Tobacco Use    Smoking status: Every Day     Current packs/day: 0.50     Types: Cigarettes    Smokeless tobacco: Never   Substance Use Topics    Alcohol use: No     Alcohol/week: 0.0 standard drinks of alcohol    Drug use: No     TOBACCO COUNSELING:  Upon evaluation, pt expressed that they are a current tobacco user. For approximately 5 mins, pt has been counseled on the dangers of smoking and was encouraged to quit as soon as possible in order to decrease further risks to their health. Pt has conveyed their understanding of the risks involved should they continue to use tobacco products.    ED Course: Progress Notes, Reevaluation, and Consults:  Initial assessment performed. I

## 2024-01-07 NOTE — PROGRESS NOTES
TRANSFER - IN REPORT:    Verbal report received from ChaloRN(name) on Ce Roberts  being received from BLAKE Rossi(unit) for routine progression of patient care      Report consisted of patient’s Situation, Background, Assessment and   Recommendations(SBAR).     Information from the following report(s) Nurse Handoff Report, ED SBAR, Intake/Output, MAR, Recent Results, and Cardiac Rhythm SR,ST

## 2024-01-07 NOTE — PROGRESS NOTES
0332-Message to on call provider, Dr. Daniel:    Patient has arrived from ED and tele robot at bedside.    0630-Obtained blood work for am labs and sent to lab.

## 2024-01-07 NOTE — PROGRESS NOTES
[facility] Dosing Services: Vancomycin Dosing Note    Consult for dosing of vancomycin by SINA Hope  Indication: sepsis of unknown etiology  Day of Therapy: 1    Ht Readings from Last 1 Encounters:   01/07/24 1.613 m (5' 3.5\")     Wt Readings from Last 1 Encounters:   01/07/24 105.7 kg (233 lb)       Vancomycin:  Plan:   Start with loading dose of vancomycin 2500 mg IV - given in ED 1/7/24 @ 00:07   Follow with maintenance dose of vancomycin 1000 mg IV every 18 hours. Daily BMP ordered. Random level ordered in AM 1/8/24.   Dose calculated to approximate AUC of 526 [target AUC/VADIM of 400-600]      Date Dose & Interval Measured level AUC/VADIM                         Other Antimicrobial   (not dosed by pharmacist) none   Cultures 1/6 blood cx: pending  1/6 blood cx: pending   Serum Creatinine Lab Results   Component Value Date/Time    BUN 9 01/07/2024 06:10 AM    CREATININE 1.18 01/07/2024 06:10 AM      Creatinine Clearance Estimated Creatinine Clearance: 66 mL/min (A) (based on SCr of 1.18 mg/dL (H)).   Temp Temp: 98.2 °F (36.8 °C) (Oral)     WBC Lab Results   Component Value Date/Time    WBC 5.1 01/07/2024 06:10 AM      Procalcitonin  No results found for: \"PROCAL\"     Pharmacy will follow the patient on a daily basis and make adjustments based on patient's clinical status.    Thank you,   MICHAEL RODRIGUEZ, [facility]  [phone]

## 2024-01-07 NOTE — PROGRESS NOTES
[facility] Admission Pharmacy Medication Reconciliation    Information obtained from:  Patient interview via phone, Rx Query, [place]   RxQuery data available1: yes    Comments/recommendations:    1) The patient was interviewed regarding current PTA medication list, use and drug allergies.    2) Medication changes to PTA list:    Added  Amitriptyline 50 mg po QHS  Removed  HCTZ 25 mg  Adjusted  Mounjaro sc injection 10 mg every week on Sundays (patient will have fiance bring supply from home)    3) The [place] Prescription Monitoring Program () was accessed to determine fill history of any controlled medications:  Recurring Alprazolam 0.5 mg - last filled 12/21/23 #90 (90-day supply) Note: Rx = daily PRN but patient stated she takes BID PRN  Recurring gabapentin 600 mg - last filled 10/30/23 #180 (90-day supply) Note: Rx = BID but patient stated she takes TID  Recurring Belbuca (buprenorphine) 750 mcg film #60 (30-day supply) Note: patient only takes this PRN (last dose 4 days ago)     1RxQuery pharmacy benefit data reflects medications filled and processed through the patient's insurance, however                this data does NOT capture whether the medication was picked up or is currently being taken by the patient.       Past Medical History/Disease States:  Past Medical History:   Diagnosis Date    Arthritis     Arthritis of low back     Asthma     Chronic pain     fibromyalgia    Colon spasm     Controlled type 2 diabetes mellitus without complication, without long-term current use of insulin ([facility]) 02/16/2022    Depression     Fibromyalgia     Gastrointestinal disorder     IBS    GERD (gastroesophageal reflux disease)     H/O: hysterectomy     High cholesterol     Hypertension     IBS (irritable bowel syndrome)     Left axillary pain 02/22/2013    Migraine     LAST HEADACHE 2-16-12    Multiple sclerosis (HCC)     Other ill-defined conditions(799.89)     MS    Other ill-defined conditions(799.89)     fibromylgia

## 2024-01-07 NOTE — CARE COORDINATION
RUR: TBD    53 y.o. female with past medical history as documented below presents to the ED with c/o of 1 day history of low blood pressure readings and elevated heart rate.  Patient's daughter states that patient has been taking her home antihypertensive as prescribed.  Patient notes having complaints of dizziness, cough and congestion, lower abdominal discomfort and back pain.     Assessment on going    Dimitris CARTER

## 2024-01-07 NOTE — PROGRESS NOTES
Patient requesting for home Mounjuro. MD aware. She is refusing any form of Insulin at this time. Wants to have family bring in her Mounjuro. MD aware. Reports 10/10 pain dt fibromyalgia.

## 2024-01-07 NOTE — PROGRESS NOTES
[facility]                                                                                          Hospitalist Progress Note  Marco Oneal MD  Office Phone: [phone]        Date of Service:  2024  NAME:  Ce Roberts  :  1970  MRN:  [id number]       Admission Summary:   Rate 53-year-old female past medical history of fibromyalgia and hypertension presents to the ED due to complaints of dizziness, upper respiratory congestion, and abdominal pain.  Patient endorses taking antihypertensive medications, Xanax, Suboxone, and gabapentin for her fibromyalgia. Patient arrived to the ED tachycardic with a heart rate of 1 1 1 bpm and hypertensive with blood pressure of 99/59. Pertinent physical exam was positive for toxic appearing, dizzy ,diaphoretic, dry mucous membranes.  Significant labs include potassium 2.9, creatinine 1.28, troponin 6, WBC 8.6, and Lactic acid was ordered but not drawn.  Rapid influenza A positive, negative COVID and rapid influenza B.  UA was negative for infection.  ECG shows sinus tachycardia. Due to patient's low blood pressure and tachycardia sepsis protocol was initiated and patient received 3 L of normal saline, vancomycin and Tamiflu.  CT head and CTA chest, abdomen, pelvis showed no acute pathology.        Interval history / Subjective:   -patient upset her food included pork  -uses suboxone not for drug withdrawal but fibromyalgia. States uses it prn when the pain gets unbearable, last used 5 days prior to admission  -confused how her blood pressure could be low when she is known to be hypertensive.     Assessment & Plan:         SEP- CORE MEASURE DATA      Sepsis Criteria   Severe Sepsis Criteria   Septic Shock Criteria       Must meet 2:    []Temp >100.9 F (38.3 C) or < 96.8 F (36 C)  [x]HR > 90  [x]RR > 20  []WBC > 12 or < 4 or 10% bands    AND:    [x] Infection Confirmed or Suspected.     Must meet 1:    []Lactate > 2

## 2024-01-07 NOTE — PROGRESS NOTES
Bedside and Verbal shift change report given to BLAKE Helton (oncoming nurse) by BLAKE Rossi (offgoing nurse). Report included the following information Nurse Handoff Report, Intake/Output, MAR, Recent Results, and Cardiac Rhythm SR,ST .

## 2024-01-07 NOTE — H&P
Hospitalist Admission Note    NAME: Ce Roberts   :  1970   MRN:  [id number]     Date/Time:  2024 1:46 AM    Patient PCP: Ulisses Joy MD  _____________________________________________________________________  Given the patient's current clinical presentation, I have a high level of concern for decompensation if discharged from the emergency department.  Complex decision making was performed, which includes reviewing the patient's available past medical records, laboratory results, and x-ray films.       My assessment of this patient's clinical condition and my plan of care is as follows.    Assessment / Plan:      Sepsis    Influenza  A positive  Acute kidney injury  likely prerenal  Acute hypokalemia   Hypotensive episode   resolved with IVF   in ED  Metabolic alkalosis   Acute dehydration   Dizziness with near syncope --Postural  likely 2/2 dehydration      Admit to tele  Resp isolation  Tamiflu  IVF  Check UA  Lactic acid  Monitor BMP CBC  Mag  Cont iv vanco ( which was given in ed  empirically)  for  now  Pt has  allergic to many  antibiotic  Hold BP meds  including duretics  Adjust meds to GFR  Avoid Nephrotoxic meds  Follow Cx  Watch for fluid overload  while getting IVF and Hold diuretics        Other Past Medical History :    HTN  Asthma  Chronic pain  Depression  Fibromylagia  IBS  Migraine  MS  Obesity BMI Body mass index is 39.15 kg/m².   - Encouraging lifestyle modifications and further follow up outpatient.     Tobacco Use patent smoked 0.5 packs daily for 20 yeas    Pharmacy  Consulted  &   Nurses Communication  Ordered for Home  Medication  Reconciliation.Primary hospitalitis Physician team to Follow up with that in am and reconcile home medication       Code Status: Full  DVT Prophylaxis: sq lovenox  GI Prophylaxis: not indicated  Baseline: independent        Subjective:   CHIEF COMPLAINT:  Hypotension  Dizziness Cough  abd pain    Pt presents to ED in wheelchair for

## 2024-01-07 NOTE — ED NOTES
Pt presents ED with CC of 1 day history of low blood pressure readings and elevated heart rate.  Patient's daughter states that patient has been taking her home antihypertensive as prescribed.  Patient notes having complaints of dizziness, cough and congestion, lower abdominal discomfort and back pain.  Pt reports that she was exposed to the flu from her granddaughter recently and expresses concern for +flu.  Patient does have a history of fibromyalgia and takes morphine and gabapentin at home.  Upon EMS arrival, patient tachycardic, febrile and hypotensive.  Sepsis alert initiated in triage.  Pt denies any other exacerbating or ameliorating factors. There are no other complaints, changes or physical findings pertinent to the HPI at this time.       Emergency Department Nursing Plan of Care       The Nursing Plan of Care is developed from the Nursing assessment and Emergency Department Attending provider initial evaluation.  The plan of care may be reviewed in the “ED Provider note”.    The Plan of Care was developed with the following considerations:   Patient / Family readiness to learn indicated by:Refer to Medical chart in [facility]  Persons(s) to be included in education: Refer to Medical chart in [facility]  Barriers to Learning/Limitations:Normal    Signed     Chalo Orosco RN    1/7/2024   1:47 AM     2021/B positive

## 2024-01-07 NOTE — ED NOTES
TRANSFER - OUT REPORT:    Verbal report given to  BLAKE Rossi(name) on Ce Roberts  being transferred to Med/Surge/Tele(unit) for routine progression of patient care       Report consisted of patient’s Situation, Background, Assessment and   Recommendations(SBAR).     Information from the following report(s) Nurse Handoff Report, ED Encounter Summary, ED SBAR, MAR, Recent Results, Med Rec Status, Cardiac Rhythm NSR to ST, Neuro Assessment, and Event Log was reviewed with the receiving nurse.    Opportunity for questions and clarification was provided.      Patient transported with:   Monitor    Lines:    #20g RAC   #20g L Hand

## 2024-01-08 LAB
ANION GAP SERPL CALC-SCNC: 8 MMOL/L (ref 5–15)
APPEARANCE UR: CLEAR
BACTERIA URNS QL MICRO: NEGATIVE /HPF
BILIRUB UR QL: NEGATIVE
BUN SERPL-MCNC: 6 MG/DL (ref 6–20)
BUN/CREAT SERPL: 6 (ref 12–20)
CALCIUM SERPL-MCNC: 8 MG/DL (ref 8.5–10.1)
CHLORIDE SERPL-SCNC: 104 MMOL/L (ref 97–108)
CO2 SERPL-SCNC: 27 MMOL/L (ref 21–32)
COLOR UR: NORMAL
CREAT SERPL-MCNC: 0.96 MG/DL (ref 0.55–1.02)
EKG ATRIAL RATE: 106 BPM
EKG DIAGNOSIS: NORMAL
EKG P AXIS: 17 DEGREES
EKG P-R INTERVAL: 120 MS
EKG Q-T INTERVAL: 330 MS
EKG QRS DURATION: 72 MS
EKG QTC CALCULATION (BAZETT): 438 MS
EKG R AXIS: 27 DEGREES
EKG T AXIS: 18 DEGREES
EKG VENTRICULAR RATE: 106 BPM
EPITH CASTS URNS QL MICRO: NORMAL /LPF
GLUCOSE BLD STRIP.AUTO-MCNC: 107 MG/DL (ref 65–117)
GLUCOSE BLD STRIP.AUTO-MCNC: 110 MG/DL (ref 65–117)
GLUCOSE BLD STRIP.AUTO-MCNC: 117 MG/DL (ref 65–117)
GLUCOSE BLD STRIP.AUTO-MCNC: 94 MG/DL (ref 65–117)
GLUCOSE SERPL-MCNC: 100 MG/DL (ref 65–100)
GLUCOSE UR STRIP.AUTO-MCNC: NEGATIVE MG/DL
HGB UR QL STRIP: NEGATIVE
KETONES UR QL STRIP.AUTO: NEGATIVE MG/DL
LEUKOCYTE ESTERASE UR QL STRIP.AUTO: NEGATIVE
MAGNESIUM SERPL-MCNC: 1.9 MG/DL (ref 1.6–2.4)
NITRITE UR QL STRIP.AUTO: NEGATIVE
PH UR STRIP: 7.5 (ref 5–8)
POTASSIUM SERPL-SCNC: 3.5 MMOL/L (ref 3.5–5.1)
PROT UR STRIP-MCNC: NEGATIVE MG/DL
RBC #/AREA URNS HPF: NORMAL /HPF (ref 0–5)
SERVICE CMNT-IMP: NORMAL
SODIUM SERPL-SCNC: 139 MMOL/L (ref 136–145)
SP GR UR REFRACTOMETRY: <1.005
URINE CULTURE IF INDICATED: NORMAL
UROBILINOGEN UR QL STRIP.AUTO: 1 EU/DL (ref 0.2–1)
VANCOMYCIN SERPL-MCNC: 15.8 UG/ML
WBC URNS QL MICRO: NORMAL /HPF (ref 0–4)

## 2024-01-08 PROCEDURE — 6370000000 HC RX 637 (ALT 250 FOR IP): Performed by: INTERNAL MEDICINE

## 2024-01-08 PROCEDURE — 80048 BASIC METABOLIC PNL TOTAL CA: CPT

## 2024-01-08 PROCEDURE — 80202 ASSAY OF VANCOMYCIN: CPT

## 2024-01-08 PROCEDURE — 6360000002 HC RX W HCPCS: Performed by: HOSPITALIST

## 2024-01-08 PROCEDURE — 36415 COLL VENOUS BLD VENIPUNCTURE: CPT

## 2024-01-08 PROCEDURE — 83735 ASSAY OF MAGNESIUM: CPT

## 2024-01-08 PROCEDURE — 81001 URINALYSIS AUTO W/SCOPE: CPT

## 2024-01-08 PROCEDURE — 6360000002 HC RX W HCPCS: Performed by: INTERNAL MEDICINE

## 2024-01-08 PROCEDURE — 6370000000 HC RX 637 (ALT 250 FOR IP): Performed by: HOSPITALIST

## 2024-01-08 PROCEDURE — 93010 ELECTROCARDIOGRAM REPORT: CPT | Performed by: SPECIALIST

## 2024-01-08 PROCEDURE — 82962 GLUCOSE BLOOD TEST: CPT

## 2024-01-08 PROCEDURE — 1200000000 HC SEMI PRIVATE

## 2024-01-08 PROCEDURE — 2580000003 HC RX 258: Performed by: HOSPITALIST

## 2024-01-08 RX ORDER — ENOXAPARIN SODIUM 100 MG/ML
30 INJECTION SUBCUTANEOUS 2 TIMES DAILY
Status: DISCONTINUED | OUTPATIENT
Start: 2024-01-08 | End: 2024-01-09 | Stop reason: HOSPADM

## 2024-01-08 RX ORDER — BUPRENORPHINE HYDROCHLORIDE AND NALOXONE HYDROCHLORIDE DIHYDRATE 2; .5 MG/1; MG/1
1 TABLET SUBLINGUAL ONCE
Status: COMPLETED | OUTPATIENT
Start: 2024-01-08 | End: 2024-01-08

## 2024-01-08 RX ADMIN — OSELTAMIVIR 75 MG: 75 CAPSULE ORAL at 09:27

## 2024-01-08 RX ADMIN — CYCLOBENZAPRINE 5 MG: 10 TABLET, FILM COATED ORAL at 09:27

## 2024-01-08 RX ADMIN — BUPRENORPHINE AND NALOXONE 1 TABLET: 2; .5 TABLET SUBLINGUAL at 14:53

## 2024-01-08 RX ADMIN — ENOXAPARIN SODIUM 30 MG: 100 INJECTION SUBCUTANEOUS at 21:46

## 2024-01-08 RX ADMIN — GABAPENTIN 600 MG: 300 CAPSULE ORAL at 14:01

## 2024-01-08 RX ADMIN — DOCUSATE SODIUM AND SENNOSIDES 2 TABLET: 8.6; 5 TABLET, FILM COATED ORAL at 21:56

## 2024-01-08 RX ADMIN — ACETAMINOPHEN 650 MG: 325 TABLET ORAL at 00:58

## 2024-01-08 RX ADMIN — ONDANSETRON 4 MG: 2 INJECTION INTRAMUSCULAR; INTRAVENOUS at 21:51

## 2024-01-08 RX ADMIN — AMITRIPTYLINE HYDROCHLORIDE 50 MG: 50 TABLET, FILM COATED ORAL at 21:44

## 2024-01-08 RX ADMIN — GABAPENTIN 600 MG: 300 CAPSULE ORAL at 21:45

## 2024-01-08 RX ADMIN — POTASSIUM BICARBONATE 40 MEQ: 391 TABLET, EFFERVESCENT ORAL at 09:27

## 2024-01-08 RX ADMIN — OSELTAMIVIR 75 MG: 75 CAPSULE ORAL at 21:44

## 2024-01-08 RX ADMIN — ENOXAPARIN SODIUM 40 MG: 100 INJECTION SUBCUTANEOUS at 09:29

## 2024-01-08 RX ADMIN — GABAPENTIN 600 MG: 300 CAPSULE ORAL at 09:27

## 2024-01-08 RX ADMIN — Medication 3 MG: at 21:44

## 2024-01-08 RX ADMIN — SODIUM CHLORIDE, PRESERVATIVE FREE 10 ML: 5 INJECTION INTRAVENOUS at 09:29

## 2024-01-08 ASSESSMENT — PAIN DESCRIPTION - ONSET: ONSET: ON-GOING

## 2024-01-08 ASSESSMENT — PAIN DESCRIPTION - ORIENTATION
ORIENTATION: RIGHT;LEFT
ORIENTATION: RIGHT;LEFT

## 2024-01-08 ASSESSMENT — PAIN DESCRIPTION - DESCRIPTORS
DESCRIPTORS: ACHING
DESCRIPTORS: ACHING

## 2024-01-08 ASSESSMENT — PAIN - FUNCTIONAL ASSESSMENT
PAIN_FUNCTIONAL_ASSESSMENT: ACTIVITIES ARE NOT PREVENTED
PAIN_FUNCTIONAL_ASSESSMENT: ACTIVITIES ARE NOT PREVENTED

## 2024-01-08 ASSESSMENT — PAIN SCALES - GENERAL
PAINLEVEL_OUTOF10: 5
PAINLEVEL_OUTOF10: 7
PAINLEVEL_OUTOF10: 10
PAINLEVEL_OUTOF10: 0

## 2024-01-08 ASSESSMENT — PAIN DESCRIPTION - LOCATION
LOCATION: GENERALIZED

## 2024-01-08 ASSESSMENT — PAIN DESCRIPTION - PAIN TYPE: TYPE: CHRONIC PAIN

## 2024-01-08 ASSESSMENT — PAIN DESCRIPTION - FREQUENCY: FREQUENCY: CONTINUOUS

## 2024-01-08 NOTE — PROGRESS NOTES
Bedside and Verbal shift change report given to BLAKE Manzanares (oncoming nurse) by BLAKE Rossi (offgoing nurse). Report included the following information Nurse Handoff Report, Intake/Output, MAR, and Recent Results.

## 2024-01-08 NOTE — PROGRESS NOTES
[facility]                                                                                          Hospitalist Progress Note  Marco Oneal MD  Office Phone: [phone]        Date of Service:  2024  NAME:  Ce Roberts  :  1970  MRN:  [id number]       Admission Summary:   Rate 53-year-old female past medical history of fibromyalgia and hypertension presents to the ED due to complaints of dizziness, upper respiratory congestion, and abdominal pain.  Patient endorses taking antihypertensive medications, Xanax, Suboxone, and gabapentin for her fibromyalgia. Patient arrived to the ED tachycardic with a heart rate of 1 1 1 bpm and hypertensive with blood pressure of 99/59. Pertinent physical exam was positive for toxic appearing, dizzy ,diaphoretic, dry mucous membranes.  Significant labs include potassium 2.9, creatinine 1.28, troponin 6, WBC 8.6, and Lactic acid was ordered but not drawn.  Rapid influenza A positive, negative COVID and rapid influenza B.  UA was negative for infection.  ECG shows sinus tachycardia. Due to patient's low blood pressure and tachycardia sepsis protocol was initiated and patient received 3 L of normal saline, vancomycin and Tamiflu.  CT head and CTA chest, abdomen, pelvis showed no acute pathology.        Interval history / Subjective:   -patient began to have urinary symptoms of dysuria, suprapubic tenderness, UA was done which showed no signs of infection. Possible signs are related to fibromyalgia     -patient and daughter/friend continually asking for morphine. Patient has multiple drug allergies including but not limited to tramadol and Toradol, requesting morphine. Per home medications, patient's pain clinic started treating her with suboxone to manage pain. Currently on amitriptyline, flexeril, gabapentin, gave one low dose of suboxone     Currently, patient denies headache, vision or hearing changes, fever, chills,

## 2024-01-08 NOTE — PROGRESS NOTES
Comprehensive Nutrition Assessment    Type and Reason for Visit:  Initial, Consult    Nutrition Recommendations/Plan:   Diet as tolerated  Glucerna once per day to help meet ~ needs @ABW.  Pt needs Vit D3 5000 Ius day please, and B12 2000 mcg/day     Malnutrition Assessment:  No malnutrition    Nutrition Assessment:    Pt admitted with weakness, abd pain.  In ED because of tachy and hypotension sepsis protocol initiated and pt rec'd 3 L NS in ED.  Pt takes suboxone for pain, not addiction management.  Hx notable for type 2 DM controlled (A1c was 8.2 last year now 5.5), GERD, HLD, HTN, morbid obesity, MS.  Pt is profoundly vit D deficient and B12 is borderline.    Nutrition Related Findings:    Pt tolerating diet; BM 1/7; meds neurontin, humalo, mounjaro Wound Type: None       Current Nutrition Intake & Therapies:    Average Meal Intake: %  Average Supplements Intake: None Ordered  ADULT DIET; Regular; 3 carb choices (45 gm/meal); No pork please.    Anthropometric Measures:  Height: 161.3 cm (5' 3.5\")  Ideal Body Weight (IBW): 118 lbs (54 kg)       Current Body Weight: 105.7 kg (233 lb), 197.5 % IBW. Weight Source: Bed Scale  Current BMI (kg/m2): 40.6        Weight Adjustment For:  (79.7 kg ABW)        Wt Readings from Last 3 Encounters:   01/07/24 105.7 kg (233 lb)   12/21/23 102.5 kg (226 lb)   11/30/23 103 kg (227 lb)               BMI Categories: Obese Class 3 (BMI 40.0 or greater)    Estimated Daily Nutrient Needs:  Energy Requirements Based On: Formula  Weight Used for Energy Requirements: Adjusted  Energy (kcal/day): 1945  Weight Used for Protein Requirements: Adjusted  Protein (g/day): 88  Method Used for Fluid Requirements: Other (Comment) (MD order)  Fluid (ml/day): 2000    Nutrition Diagnosis:   Overweight/Obese, Limited adherence to nutrition-related recommendations related to   as evidenced by BMI    Nutrition Interventions:   Food and/or Nutrient Delivery: Continue Current Diet, Start Oral

## 2024-01-08 NOTE — PROGRESS NOTES
Patient’s case reviewed during interdisciplinary team meeting in Med Surg/Tele Unit 2.  Rev. Connor Miller MDiv, [facility]

## 2024-01-08 NOTE — CARE COORDINATION
RUR: 11%         CM opened case for assessment of D/C planning needs.  CM completed assessment with pt via phone due to Droplet precautions . Pt is alert and oriented throughout encounter. CM introduced self/role, verified demographics, and discussed discharge planning.    Patient lives with firon.  [facility] [address]  DME Nebulizer and CPAP patient not using machine due to supply states she has not received it and has not used it for about 1-2 month.    On discharge best friend is picking up patient.    Dimitris EMMA       01/08/24 1209   Service Assessment   Patient Orientation Alert and Oriented   Cognition Alert   History Provided By Patient   Primary Caregiver Self   Support Systems Spouse/Significant Other   Patient's Healthcare Decision Maker is: Legal Next of Kin   PCP Verified by CM Yes   Last Visit to PCP Within last 3 months   Prior Functional Level Independent in ADLs/IADLs   Current Functional Level Independent in ADLs/IADLs   Can patient return to prior living arrangement Yes   Ability to make needs known: Good   Family able to assist with home care needs: Yes   Would you like for me to discuss the discharge plan with any other family members/significant others, and if so, who? No   Financial Resources Medicaid;Medicare   CM/ Referral Disease Management Education   Social/Functional History   Lives With Significant other   Type of Home House   Home Layout One level   Home Access Stairs to enter with rails   Entrance Stairs - Number of Steps 12   Bathroom Toilet Standard   Bathroom Accessibility Accessible   Home Equipment Cane;Walker, rolling   Receives Help From Friend(s)   ADL Assistance Independent   Homemaking Assistance Independent   Homemaking Responsibilities Yes   Ambulation Assistance Independent   Transfer Assistance Independent   Active  No      Occupation On disability   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

## 2024-01-09 VITALS
OXYGEN SATURATION: 91 % | RESPIRATION RATE: 16 BRPM | WEIGHT: 233 LBS | HEART RATE: 81 BPM | TEMPERATURE: 98.1 F | DIASTOLIC BLOOD PRESSURE: 91 MMHG | SYSTOLIC BLOOD PRESSURE: 138 MMHG | HEIGHT: 64 IN | BODY MASS INDEX: 39.78 KG/M2

## 2024-01-09 LAB
GLUCOSE BLD STRIP.AUTO-MCNC: 118 MG/DL (ref 65–117)
SERVICE CMNT-IMP: ABNORMAL

## 2024-01-09 PROCEDURE — 82962 GLUCOSE BLOOD TEST: CPT

## 2024-01-09 PROCEDURE — 2580000003 HC RX 258: Performed by: HOSPITALIST

## 2024-01-09 PROCEDURE — 6370000000 HC RX 637 (ALT 250 FOR IP): Performed by: INTERNAL MEDICINE

## 2024-01-09 PROCEDURE — 6360000002 HC RX W HCPCS: Performed by: INTERNAL MEDICINE

## 2024-01-09 PROCEDURE — 6370000000 HC RX 637 (ALT 250 FOR IP): Performed by: HOSPITALIST

## 2024-01-09 RX ORDER — MAGNESIUM HYDROXIDE/ALUMINUM HYDROXICE/SIMETHICONE 120; 1200; 1200 MG/30ML; MG/30ML; MG/30ML
30 SUSPENSION ORAL EVERY 6 HOURS PRN
Status: DISCONTINUED | OUTPATIENT
Start: 2024-01-09 | End: 2024-01-09 | Stop reason: HOSPADM

## 2024-01-09 RX ORDER — CYCLOBENZAPRINE HCL 5 MG
5 TABLET ORAL 3 TIMES DAILY PRN
Qty: 15 TABLET | Refills: 0 | Status: SHIPPED | OUTPATIENT
Start: 2024-01-09 | End: 2024-01-19

## 2024-01-09 RX ORDER — ALBUTEROL SULFATE 90 UG/1
2 AEROSOL, METERED RESPIRATORY (INHALATION) 4 TIMES DAILY PRN
Qty: 18 G | Refills: 0 | Status: SHIPPED | OUTPATIENT
Start: 2024-01-09

## 2024-01-09 RX ORDER — NICOTINE 21 MG/24HR
1 PATCH, TRANSDERMAL 24 HOURS TRANSDERMAL EVERY 24 HOURS
Qty: 30 PATCH | Refills: 0 | Status: SHIPPED | OUTPATIENT
Start: 2024-01-09 | End: 2024-02-08

## 2024-01-09 RX ORDER — BISACODYL 5 MG/1
5 TABLET, DELAYED RELEASE ORAL DAILY PRN
Qty: 5 TABLET | Refills: 0 | Status: SHIPPED | OUTPATIENT
Start: 2024-01-09 | End: 2024-01-14

## 2024-01-09 RX ORDER — OSELTAMIVIR PHOSPHATE 75 MG/1
75 CAPSULE ORAL 2 TIMES DAILY
Qty: 6 CAPSULE | Refills: 0 | Status: SHIPPED | OUTPATIENT
Start: 2024-01-09 | End: 2024-01-12

## 2024-01-09 RX ORDER — AMITRIPTYLINE HYDROCHLORIDE 50 MG/1
50 TABLET, FILM COATED ORAL NIGHTLY
Qty: 30 TABLET | Refills: 0 | Status: SHIPPED | OUTPATIENT
Start: 2024-01-09

## 2024-01-09 RX ORDER — LOSARTAN POTASSIUM 50 MG/1
50 TABLET ORAL DAILY
Qty: 100 TABLET | Refills: 0 | Status: SHIPPED | OUTPATIENT
Start: 2024-01-09

## 2024-01-09 RX ADMIN — SODIUM CHLORIDE, PRESERVATIVE FREE 10 ML: 5 INJECTION INTRAVENOUS at 09:12

## 2024-01-09 RX ADMIN — BISACODYL 10 MG: 10 SUPPOSITORY RECTAL at 09:12

## 2024-01-09 RX ADMIN — ALUMINUM HYDROXIDE, MAGNESIUM HYDROXIDE, AND SIMETHICONE 30 ML: 200; 200; 20 SUSPENSION ORAL at 03:04

## 2024-01-09 RX ADMIN — OSELTAMIVIR 75 MG: 75 CAPSULE ORAL at 08:51

## 2024-01-09 RX ADMIN — ENOXAPARIN SODIUM 30 MG: 100 INJECTION SUBCUTANEOUS at 08:52

## 2024-01-09 RX ADMIN — GABAPENTIN 600 MG: 300 CAPSULE ORAL at 08:51

## 2024-01-09 ASSESSMENT — PAIN - FUNCTIONAL ASSESSMENT: PAIN_FUNCTIONAL_ASSESSMENT: ACTIVITIES ARE NOT PREVENTED

## 2024-01-09 ASSESSMENT — PAIN DESCRIPTION - LOCATION: LOCATION: BACK;LEG

## 2024-01-09 ASSESSMENT — PAIN SCALES - GENERAL: PAINLEVEL_OUTOF10: 7

## 2024-01-09 NOTE — PROGRESS NOTES
Patient’s case reviewed during interdisciplinary team meeting in Med Surg/Tele Unit 2.  Rev. Connor Miller MDiv, [facility]

## 2024-01-09 NOTE — DISCHARGE INSTRUCTIONS
Dear Ms. Roberts,    You were arrived to [facility] due to experiencing an upper respiratory congestion.  At the time of admission your blood pressure was dangerously low and there was concern that you had sepsis.  As such sepsis protocol was initiated and you were given fluids, broad-spectrum antibiotics.  During evaluation it showed that you were positive for influenza A and as such were given appropriate antiviral medication.  Through these interventions you were able to improve in your symptoms.  On admission your electrolytes were low and you also suffered from acute kidney injury.  Both of these problems have been resolved and you are stable to be discharged home.  We recommend that you follow-up with your primary care physician to manage your fibromyalgia and other chronic conditions.  We encourage you to continue smoking cessation and to look into bariatric surgery in order to lower your weight gain.  Thank you for choosing [facility] for your care.

## 2024-01-09 NOTE — PROGRESS NOTES
[facility]                                                                                          Hospitalist Progress Note  Marco Oneal MD  Office Phone: [phone]        Date of Service:  2024  NAME:  Ce Roberts  :  1970  MRN:  [id number]       Admission Summary:   Rate 53-year-old female past medical history of fibromyalgia and hypertension presents to the ED due to complaints of dizziness, upper respiratory congestion, and abdominal pain.  Patient endorses taking antihypertensive medications, Xanax, Suboxone, and gabapentin for her fibromyalgia. Patient arrived to the ED tachycardic with a heart rate of 1 1 1 bpm and hypertensive with blood pressure of 99/59. Pertinent physical exam was positive for toxic appearing, dizzy ,diaphoretic, dry mucous membranes.  Significant labs include potassium 2.9, creatinine 1.28, troponin 6, WBC 8.6, and Lactic acid was ordered but not drawn.  Rapid influenza A positive, negative COVID and rapid influenza B.  UA was negative for infection.  ECG shows sinus tachycardia. Due to patient's low blood pressure and tachycardia sepsis protocol was initiated and patient received 3 L of normal saline, vancomycin and Tamiflu.  CT head and CTA chest, abdomen, pelvis showed no acute pathology.        Interval history / Subjective:   -patient had a bowel movement once giving the patient prune juice.   -has shown great improvement, endorses feeling back to baseline  Currently, patient denies headache, vision or hearing changes, fever, chills, weakness, chest pain, or dyspnea  -plan for discharge today.     Assessment & Plan:      Exclusion criteria - the patient is NOT to be included for SEP-1 Core Measure due to: May have criteria for sepsis, but does not meet criteria for severe sepsis or septic shock    Acute kidney injury  likely prerenal  Acute hypokalemia   Hypotensive episode   resolved with IVF   in ED  Dehydration

## 2024-01-09 NOTE — PROGRESS NOTES
0850 -- Pt reporting constipation, she has been taking Miralax and Senokot while here but reports not helping. Pt offered Dulcolax and accepted, states she will sometimes have to take this at home. Declines prune juice.     0930 -- Pt also requesting Suboxone for pain, message to MD. Possible discharge today, MD will see pt first then decide if orders are needed. Pt aware.     1015 -- Interdisciplinary team rounds were held 1/9/2024 with the following team members:Nursing, Pharmacy, and Physician. MD aware pt has not yet had bowel movement, given suppository this morning. MD to discharge today with bowel regimen.

## 2024-01-09 NOTE — PLAN OF CARE
Care plan reviewed   Problem: Discharge Planning  Goal: Discharge to home or other facility with appropriate resources  1/8/2024 2008 by Gertrudis Jasmine RN  Outcome: Progressing  1/8/2024 1246 by Glenna Marti RN  Outcome: Progressing     Problem: Pain  Goal: Verbalizes/displays adequate comfort level or baseline comfort level  1/8/2024 2008 by Gertrudis Jasmine RN  Outcome: Progressing  1/8/2024 1246 by Glenna Marti RN  Outcome: Progressing     Problem: Safety - Adult  Goal: Free from fall injury  1/8/2024 2008 by Gertrudis Jasmine, RN  Outcome: Progressing  1/8/2024 1246 by Glenna Marti RN  Outcome: Progressing     Problem: Chronic Conditions and Co-morbidities  Goal: Patient's chronic conditions and co-morbidity symptoms are monitored and maintained or improved  1/8/2024 2008 by Gertrudis Jasmine, RN  Outcome: Progressing  1/8/2024 1246 by Glenna Marti RN  Outcome: Progressing

## 2024-01-10 ENCOUNTER — FOLLOWUP TELEPHONE ENCOUNTER (OUTPATIENT)
Facility: HOSPITAL | Age: 54
End: 2024-01-10

## 2024-01-10 NOTE — TELEPHONE ENCOUNTER
CM called patient by telephone to perform post discharge assessment and for the purpose of follow up call from inpatient discharge to check on environmental challenges/medications/appointment follow up/and questions/concerns.     Pt did not answer call today; CM left a HIPAA compliant vm. Will attempt a 2nd call in a few days.     Zahra Xiong LMSW, CM

## 2024-01-11 ENCOUNTER — APPOINTMENT (OUTPATIENT)
Facility: HOSPITAL | Age: 54
End: 2024-01-11
Payer: MEDICARE

## 2024-01-11 ENCOUNTER — HOSPITAL ENCOUNTER (EMERGENCY)
Facility: HOSPITAL | Age: 54
Discharge: HOME | End: 2024-01-11
Payer: MEDICARE

## 2024-01-11 VITALS
OXYGEN SATURATION: 94 % | SYSTOLIC BLOOD PRESSURE: 120 MMHG | DIASTOLIC BLOOD PRESSURE: 77 MMHG | RESPIRATION RATE: 15 BRPM | HEIGHT: 64 IN | WEIGHT: 222 LBS | BODY MASS INDEX: 37.9 KG/M2 | HEART RATE: 93 BPM | TEMPERATURE: 98.1 F

## 2024-01-11 DIAGNOSIS — J18.9 PNEUMONIA OF BOTH LOWER LOBES DUE TO INFECTIOUS ORGANISM: Primary | ICD-10-CM

## 2024-01-11 LAB
ALBUMIN SERPL-MCNC: 3.6 G/DL (ref 3.5–5)
ALBUMIN/GLOB SERPL: 0.8 (ref 1.1–2.2)
ALP SERPL-CCNC: 87 U/L (ref 45–117)
ALT SERPL-CCNC: 32 U/L (ref 12–78)
ANION GAP SERPL CALC-SCNC: 3 MMOL/L (ref 5–15)
AST SERPL-CCNC: 24 U/L (ref 15–37)
BASOPHILS # BLD: 0 K/UL (ref 0–0.1)
BASOPHILS NFR BLD: 0 % (ref 0–1)
BILIRUB SERPL-MCNC: 0.3 MG/DL (ref 0.2–1)
BUN SERPL-MCNC: 9 MG/DL (ref 6–20)
BUN/CREAT SERPL: 10 (ref 12–20)
CALCIUM SERPL-MCNC: 9.7 MG/DL (ref 8.5–10.1)
CHLORIDE SERPL-SCNC: 102 MMOL/L (ref 97–108)
CO2 SERPL-SCNC: 32 MMOL/L (ref 21–32)
CREAT SERPL-MCNC: 0.91 MG/DL (ref 0.55–1.02)
DIFFERENTIAL METHOD BLD: ABNORMAL
EKG ATRIAL RATE: 87 BPM
EKG DIAGNOSIS: NORMAL
EKG P AXIS: 40 DEGREES
EKG P-R INTERVAL: 130 MS
EKG Q-T INTERVAL: 350 MS
EKG QRS DURATION: 82 MS
EKG QTC CALCULATION (BAZETT): 421 MS
EKG R AXIS: 33 DEGREES
EKG T AXIS: -3 DEGREES
EKG VENTRICULAR RATE: 87 BPM
EOSINOPHIL # BLD: 0.3 K/UL (ref 0–0.4)
EOSINOPHIL NFR BLD: 3 % (ref 0–7)
ERYTHROCYTE [DISTWIDTH] IN BLOOD BY AUTOMATED COUNT: 14.1 % (ref 11.5–14.5)
GLOBULIN SER CALC-MCNC: 4.3 G/DL (ref 2–4)
GLUCOSE SERPL-MCNC: 104 MG/DL (ref 65–100)
HCT VFR BLD AUTO: 43.8 % (ref 35–47)
HGB BLD-MCNC: 13.8 G/DL (ref 11.5–16)
IMM GRANULOCYTES # BLD AUTO: 0 K/UL (ref 0–0.04)
IMM GRANULOCYTES NFR BLD AUTO: 0 % (ref 0–0.5)
LIPASE SERPL-CCNC: 28 U/L (ref 13–75)
LYMPHOCYTES # BLD: 4.4 K/UL (ref 0.8–3.5)
LYMPHOCYTES NFR BLD: 40 % (ref 12–49)
MAGNESIUM SERPL-MCNC: 2.4 MG/DL (ref 1.6–2.4)
MCH RBC QN AUTO: 29.1 PG (ref 26–34)
MCHC RBC AUTO-ENTMCNC: 31.5 G/DL (ref 30–36.5)
MCV RBC AUTO: 92.4 FL (ref 80–99)
MONOCYTES # BLD: 0 K/UL (ref 0–1)
MONOCYTES NFR BLD: 0 % (ref 5–13)
NEUTS SEG # BLD: 6.4 K/UL (ref 1.8–8)
NEUTS SEG NFR BLD: 57 % (ref 32–75)
NRBC # BLD: 0 K/UL (ref 0–0.01)
NRBC BLD-RTO: 0 PER 100 WBC
PLATELET # BLD AUTO: 310 K/UL (ref 150–400)
PMV BLD AUTO: 11.2 FL (ref 8.9–12.9)
POTASSIUM SERPL-SCNC: 3.7 MMOL/L (ref 3.5–5.1)
PROT SERPL-MCNC: 7.9 G/DL (ref 6.4–8.2)
RBC # BLD AUTO: 4.74 M/UL (ref 3.8–5.2)
RBC MORPH BLD: ABNORMAL
SODIUM SERPL-SCNC: 137 MMOL/L (ref 136–145)
TROPONIN I SERPL HS-MCNC: 4 NG/L (ref 0–51)
WBC # BLD AUTO: 11.1 K/UL (ref 3.6–11)
WBC MORPH BLD: ABNORMAL

## 2024-01-11 PROCEDURE — 93005 ELECTROCARDIOGRAM TRACING: CPT | Performed by: EMERGENCY MEDICINE

## 2024-01-11 PROCEDURE — 80053 COMPREHEN METABOLIC PANEL: CPT

## 2024-01-11 PROCEDURE — 2500000003 HC RX 250 WO HCPCS: Performed by: NURSE PRACTITIONER

## 2024-01-11 PROCEDURE — 96375 TX/PRO/DX INJ NEW DRUG ADDON: CPT

## 2024-01-11 PROCEDURE — 71045 X-RAY EXAM CHEST 1 VIEW: CPT

## 2024-01-11 PROCEDURE — 6370000000 HC RX 637 (ALT 250 FOR IP): Performed by: NURSE PRACTITIONER

## 2024-01-11 PROCEDURE — 83690 ASSAY OF LIPASE: CPT

## 2024-01-11 PROCEDURE — 83735 ASSAY OF MAGNESIUM: CPT

## 2024-01-11 PROCEDURE — 85025 COMPLETE CBC W/AUTO DIFF WBC: CPT

## 2024-01-11 PROCEDURE — 84484 ASSAY OF TROPONIN QUANT: CPT

## 2024-01-11 PROCEDURE — 96374 THER/PROPH/DIAG INJ IV PUSH: CPT

## 2024-01-11 PROCEDURE — 36415 COLL VENOUS BLD VENIPUNCTURE: CPT

## 2024-01-11 PROCEDURE — 94640 AIRWAY INHALATION TREATMENT: CPT

## 2024-01-11 PROCEDURE — 6360000002 HC RX W HCPCS: Performed by: NURSE PRACTITIONER

## 2024-01-11 PROCEDURE — 2580000003 HC RX 258: Performed by: NURSE PRACTITIONER

## 2024-01-11 PROCEDURE — A4216 STERILE WATER/SALINE, 10 ML: HCPCS | Performed by: NURSE PRACTITIONER

## 2024-01-11 PROCEDURE — 99285 EMERGENCY DEPT VISIT HI MDM: CPT

## 2024-01-11 RX ORDER — AZITHROMYCIN 250 MG/1
250 TABLET, FILM COATED ORAL SEE ADMIN INSTRUCTIONS
Qty: 6 TABLET | Refills: 0 | Status: SHIPPED | OUTPATIENT
Start: 2024-01-11 | End: 2024-01-16

## 2024-01-11 RX ORDER — IPRATROPIUM BROMIDE AND ALBUTEROL SULFATE 2.5; .5 MG/3ML; MG/3ML
1 SOLUTION RESPIRATORY (INHALATION)
Status: COMPLETED | OUTPATIENT
Start: 2024-01-11 | End: 2024-01-11

## 2024-01-11 RX ORDER — ONDANSETRON 2 MG/ML
4 INJECTION INTRAMUSCULAR; INTRAVENOUS ONCE
Status: COMPLETED | OUTPATIENT
Start: 2024-01-11 | End: 2024-01-11

## 2024-01-11 RX ORDER — GUAIFENESIN 600 MG/1
600 TABLET, EXTENDED RELEASE ORAL 2 TIMES DAILY
Qty: 30 TABLET | Refills: 0 | Status: SHIPPED | OUTPATIENT
Start: 2024-01-11 | End: 2024-01-26

## 2024-01-11 RX ADMIN — ONDANSETRON 4 MG: 2 INJECTION INTRAMUSCULAR; INTRAVENOUS at 15:40

## 2024-01-11 RX ADMIN — IPRATROPIUM BROMIDE AND ALBUTEROL SULFATE 1 DOSE: 2.5; .5 SOLUTION RESPIRATORY (INHALATION) at 15:45

## 2024-01-11 RX ADMIN — FAMOTIDINE 20 MG: 10 INJECTION, SOLUTION INTRAVENOUS at 15:41

## 2024-01-11 ASSESSMENT — PAIN SCALES - GENERAL: PAINLEVEL_OUTOF10: 10

## 2024-01-11 NOTE — PROGRESS NOTES
Physician Progress Note      PATIENT:               DARRYL BANUELOS  CSN #:                  [id number]  :                       1970  ADMIT DATE:       2024 11:15 PM  DISCH DATE:        2024 11:45 AM  RESPONDING  PROVIDER #:        Marco Oneal MD          QUERY TEXT:      Dear attending,    Patient admitted with influenza A.   Noted documentation of sepsis in the H&P.   The patient did not have elevated WBC, neutrophils or elevated temperature.  In order to support the diagnosis of sepsis, please include additional   clinical indicators in your documentation.  Or please document if the   diagnosis of sepsis has been ruled out after further study.    The medical record reflects the following:  Risk Factors: has flu, hx. asthma    Clinical Indicators: -100 degrees, , RR 32  Per H&P- Sepsis  Influenza  A positive  Acute kidney injury  likely prerenal  Cont iv vanco ( which was given in ed  empirically)  for  now  -Per PN- Sepsis  likely 2/2 to Influenza  A positive  -on vancomycin may de-escalate    Treatment: IV Vancocin, Monitor vital signs, labwork, imaging      Thank you    Tiffany Sorenson RN, BSN, CRCR, CCDS  Clinical Documentation Improvement  Office: [phone] or via Perfect Serve  Options provided:  -- Sepsis present as evidenced by, Please document evidence.  -- Sepsis was ruled out after study  -- Other - I will add my own diagnosis  -- Disagree - Not applicable / Not valid  -- Disagree - Clinically unable to determine / Unknown  -- Refer to Clinical Documentation Reviewer    PROVIDER RESPONSE TEXT:    Sepsis was ruled out after study.    Query created by: Tiffany Sorenson on 2024 11:00 AM      Electronically signed by:  Marco Oneal MD 2024 12:00 PM

## 2024-01-11 NOTE — ED PROVIDER NOTES
\[facility]\"" EMERGENCY DEPT  EMERGENCY DEPARTMENT ENCOUNTER       Pt Name: Ce Roberts  MRN: [id number]  Birthdate 1970  Date of evaluation: 1/11/2024  Provider: ASFIA Nunez NP   PCP: Ulisses Joy MD  Note Started: 2:48 PM EST 1/11/24     CHIEF COMPLAINT       Chief Complaint   Patient presents with    Chest Pain     BIBEMS from home with complaint of acute onset of right side chest pain just prior to EMS arrival.  Pt was admitted at [facility] and dc'd yesterday for flu sx and hypotension.          HISTORY OF PRESENT ILLNESS: 1 or more elements      History From: Patient  HPI Limitations: None     Ce Roberts is a 53 y.o. female who presents chest pain.  Onset today.  Pain located to the right chest wall.  Denies numbness, tingling, extremity weakness.  Associated with intermittent shortness of breath.  Patient states she was recently discharged from [facility] hospital 2 days ago.  At that time she was diagnosed with sepsis secondary to influenza.  Received multiple labs and imaging which was remarkable for CARLY, dehydration causing hypotension.  She reports feeling better upon discharge but noted to lay around since her discharge and when she attempted to get up she began to feel worse.  No fever or chills since discharge from the hospital.     Nursing Notes were all reviewed and agreed with or any disagreements were addressed in the HPI.     REVIEW OF SYSTEMS      Review of Systems     Positives and Pertinent negatives as per HPI.    PAST HISTORY     Past Medical History:  Past Medical History:   Diagnosis Date    Arthritis     Arthritis of low back     Asthma     Chronic pain     fibromyalgia    Colon spasm     Controlled type 2 diabetes mellitus without complication, without long-term current use of insulin (formerly [facility]) 02/16/2022    Depression     Fibromyalgia     Gastrointestinal disorder     IBS    GERD (gastroesophageal reflux disease)     H/O: hysterectomy     High cholesterol     Hypertension     IBS (irritable  Index  [CM] Tana Holley MD  [NA] Maddie Robert, APRN - NP         FINAL IMPRESSION     1. Pneumonia of both lower lobes due to infectious organism          DISPOSITION/PLAN   DISPOSITION Decision To Discharge 01/11/2024 05:47:10 PM    Discharge Note: The patient is stable for discharge home. The signs, symptoms, diagnosis, and discharge instructions have been discussed, understanding conveyed, and agreed upon. The patient is to follow up as recommended or return to ER should their symptoms worsen.      PATIENT REFERRED TO:  Ulisses Joy MD  [address] [id number]  [phone]    Call in 1 week  As needed, If symptoms worsen       DISCHARGE MEDICATIONS:     Medication List        START taking these medications      azithromycin 250 MG tablet  Commonly known as: ZITHROMAX  Take 1 tablet by mouth See Admin Instructions for 5 days 500mg on day 1 followed by 250mg on days 2 - 5     guaiFENesin 600 MG extended release tablet  Commonly known as: MUCINEX  Take 1 tablet by mouth 2 times daily for 15 days            ASK your doctor about these medications      albuterol sulfate  (90 Base) MCG/ACT inhaler  Commonly known as: Ventolin HFA  Inhale 2 puffs into the lungs 4 times daily as needed for Wheezing     ALPRAZolam 0.5 MG tablet  Commonly known as: XANAX  TAKE 1 TABLET BY MOUTH DAILY AS  NEEDED FOR ANXIETY     amitriptyline 50 MG tablet  Commonly known as: ELAVIL  Take 1 tablet by mouth nightly     Belbuca 750 MCG Film  Generic drug: Buprenorphine HCl     bisacodyl 5 MG EC tablet  Commonly known as: DULCOLAX  Take 1 tablet by mouth daily as needed for Constipation     bumetanide 2 MG tablet  Commonly known as: BUMEX  TAKE 1 TABLET BY MOUTH EVERY DAY     cyclobenzaprine 5 MG tablet  Commonly known as: FLEXERIL  Take 1 tablet by mouth 3 times daily as needed for Muscle spasms     gabapentin 600 MG tablet  Commonly known as: NEURONTIN  Take 1 tablet by mouth 2 times daily.

## 2024-01-12 ENCOUNTER — PREP FOR PROCEDURE (OUTPATIENT)
Age: 54
End: 2024-01-12

## 2024-01-12 ENCOUNTER — TELEPHONE (OUTPATIENT)
Age: 54
End: 2024-01-12

## 2024-01-12 NOTE — TELEPHONE ENCOUNTER
Patient called stating that she was just diagnosed with Flu A and pneumonia and needs to reschedule her EGD.

## 2024-01-12 NOTE — DISCHARGE SUMMARY
Discharge Summary   Please note that this dictation was completed with [employer], the computer voice recognition software.  Quite often unanticipated grammatical, syntax, homophones, and other interpretive errors are inadvertently transcribed by the computer software.  Please disregard these errors.  Please excuse any errors that have escaped final proofreading.    PATIENT ID: Ce Roberts  MRN: [id number]   YOB: 1970    DATE OF ADMISSION: 1/6/2024 11:15 PM    DATE OF DISCHARGE: 1/12/2024  PRIMARY CARE PROVIDER: Ulisses Joy MD         ATTENDING PHYSICIAN: Marco Oneal MD  DISCHARGING PROVIDER: Marco Oneal MD       CONSULTATIONS: IP CONSULT TO HOSPITALIST  IP CONSULT TO PHARMACY  IP CONSULT TO PHARMACY  IP CONSULT TO PHARMACY    PROCEDURES/SURGERIES: * No surgery found *    ADMITTING HPI from excerpted H&P   53-year-old female past medical history of fibromyalgia and hypertension presents to the ED due to complaints of dizziness, upper respiratory congestion, and abdominal pain.  Patient endorses taking antihypertensive medications, Xanax, Suboxone, and gabapentin for her fibromyalgia. Patient arrived to the ED tachycardic with a heart rate of 1 1 1 bpm and hypertensive with blood pressure of 99/59. Pertinent physical exam was positive for toxic appearing, dizzy ,diaphoretic, dry mucous membranes.  Significant labs include potassium 2.9, creatinine 1.28, troponin 6, WBC 8.6, and Lactic acid was ordered but not drawn.  Rapid influenza A positive, negative COVID and rapid influenza B.  UA was negative for infection.  ECG shows sinus tachycardia. Due to patient's low blood pressure and tachycardia sepsis protocol was initiated and patient received 3 L of normal saline, vancomycin and Tamiflu.  CT head and CTA chest, abdomen, pelvis showed no acute pathology.            HOSPITAL COURSE & DISCHARGE DIAGNOSIS/ PLAN:   53-year-old female past medical history of fibromyalgia and hypertension

## 2024-01-12 NOTE — TELEPHONE ENCOUNTER
LM letting patient know I will cancel her endo procedure for this Monday to call me back when she is ready to reschedule.

## 2024-01-12 NOTE — TELEPHONE ENCOUNTER
Patient returned call and stated she is ok with doing February and to just pick a date and she is ok with it.

## 2024-01-12 NOTE — TELEPHONE ENCOUNTER
ES for patient letting her know I received both of her messages and her Endo procedure has been canceled.  I let her know when she calls back there is a good chance she will get my VM due to I'm calling a lot of insurance companies that I will call her back as soon as I'm done with the call I'm on or she can call me next week

## 2024-01-17 ENCOUNTER — FOLLOWUP TELEPHONE ENCOUNTER (OUTPATIENT)
Facility: HOSPITAL | Age: 54
End: 2024-01-17

## 2024-01-17 NOTE — TELEPHONE ENCOUNTER
CM made a second attempt to reach patient today to perform post discharge assessment and to follow up about inpatient discharge to check on environmental challenges/medications/appointment follow up/and questions/concerns.     Pt did not answer 2nd attempt for follow up call; pt's case is now closed. CM left a HIPAA compliant vm and will be available to provide support if the pt calls back.     Update @ 2:45pm  Pt called CM back. She stated that she had been hospitalized again since her admission to [facility]. She is home now and doing better. She stated she [facility], she had all of her follow up appts scheduled, and had no questions about her follow up instructions.   She has a PCP follow up on 2/6 at 1:15pm.    Pt had no other questions or concerns at time of call.     Zahra Xiong, AURY,   [phone]

## 2024-01-18 ENCOUNTER — OFFICE VISIT (OUTPATIENT)
Age: 54
End: 2024-01-18

## 2024-01-18 DIAGNOSIS — E66.01 MORBID OBESITY (HCC): Primary | ICD-10-CM

## 2024-01-18 NOTE — PROGRESS NOTES
ounces of fluid per day to prevent dehydration post-operatively.                       Patient's current diet habits include: Pt is eating 3 meals per day. Drinking 2 protein shakes per day (Slim Fast). Dinner is protein (small portion) and vegetables (larger portions). Snack choices include yogurt or fruit cup with cottage cheese. Pt is eating refined carbohydrate foods (bread, pasta, rice, potatoes) in moderation. Pt is eating sweets/desserts minimal to none. Pt is using healthy cooking methods. Pt is eating meals prepared outside of the home 1-3 times per week. Pt is drinking 96 oz water and chews ice (denies anemia). Drinks tea with honey and coffee with Splenda. Occasional apple juice. Pt reports no to emotional eating.         Physical Activity/Exercise  We talked about the importance of increasing daily physical activity and beginning to develop an exercise regimen/routine. We talked about exercise as being an important part of long term weight loss after surgery.     Comments:  During class, I discussed with patient the importance of getting into an exercise routine.  Pt is currently doing chair and bed exercises for activity.  Pt has been encouraged to maintain and increase as tolerated.     Behavior Modification       We talked about how to eat more mindfully and identify emotional eating triggers. Tips and recommendations for how to make these changes were provided. Pt was encouraged to keep a food journal and record what they were taking in daily.         Overall Assessment: Pt demonstrates appropriate lifestyle changes evidenced by reported changes and reported wt loss. Will continue to assess.     Patient-Set Goals:   1. Nutrition - continue with protein focused meals, add protein to snack  2. Exercise - continue chair/bed exercises  3. Behavior - practice 30/30 rule and decrease use of straws     Tara Peraza, RD  1/18/2024

## 2024-01-22 ENCOUNTER — HOSPITAL ENCOUNTER (EMERGENCY)
Facility: HOSPITAL | Age: 54
Discharge: HOME | End: 2024-01-22
Payer: MEDICARE

## 2024-01-22 ENCOUNTER — APPOINTMENT (OUTPATIENT)
Facility: HOSPITAL | Age: 54
End: 2024-01-22
Payer: MEDICARE

## 2024-01-22 VITALS
WEIGHT: 221.2 LBS | TEMPERATURE: 98.2 F | RESPIRATION RATE: 18 BRPM | DIASTOLIC BLOOD PRESSURE: 73 MMHG | HEIGHT: 63 IN | SYSTOLIC BLOOD PRESSURE: 126 MMHG | BODY MASS INDEX: 39.19 KG/M2 | OXYGEN SATURATION: 100 % | HEART RATE: 100 BPM

## 2024-01-22 DIAGNOSIS — E87.6 HYPOKALEMIA: ICD-10-CM

## 2024-01-22 DIAGNOSIS — G93.31 POST VIRAL SYNDROME: ICD-10-CM

## 2024-01-22 DIAGNOSIS — M79.7 FIBROMYALGIA AFFECTING MULTIPLE SITES: ICD-10-CM

## 2024-01-22 DIAGNOSIS — M25.50 DIFFUSE ARTHRALGIA: Primary | ICD-10-CM

## 2024-01-22 LAB
ALBUMIN SERPL-MCNC: 3.2 G/DL (ref 3.5–5)
ALBUMIN/GLOB SERPL: 0.8 (ref 1.1–2.2)
ALP SERPL-CCNC: 86 U/L (ref 45–117)
ALT SERPL-CCNC: 22 U/L (ref 12–78)
ANION GAP SERPL CALC-SCNC: 8 MMOL/L (ref 5–15)
APPEARANCE UR: CLEAR
AST SERPL-CCNC: 15 U/L (ref 15–37)
BACTERIA URNS QL MICRO: NEGATIVE /HPF
BASOPHILS # BLD: 0 K/UL (ref 0–0.1)
BASOPHILS NFR BLD: 0 % (ref 0–1)
BILIRUB SERPL-MCNC: 0.3 MG/DL (ref 0.2–1)
BILIRUB UR QL: NEGATIVE
BUN SERPL-MCNC: 9 MG/DL (ref 6–20)
BUN/CREAT SERPL: 10 (ref 12–20)
CALCIUM SERPL-MCNC: 9 MG/DL (ref 8.5–10.1)
CHLORIDE SERPL-SCNC: 102 MMOL/L (ref 97–108)
CO2 SERPL-SCNC: 30 MMOL/L (ref 21–32)
COLOR UR: ABNORMAL
CREAT SERPL-MCNC: 0.93 MG/DL (ref 0.55–1.02)
DIFFERENTIAL METHOD BLD: ABNORMAL
EOSINOPHIL # BLD: 0.2 K/UL (ref 0–0.4)
EOSINOPHIL NFR BLD: 2 % (ref 0–7)
EPITH CASTS URNS QL MICRO: ABNORMAL /LPF
ERYTHROCYTE [DISTWIDTH] IN BLOOD BY AUTOMATED COUNT: 14.1 % (ref 11.5–14.5)
GLOBULIN SER CALC-MCNC: 4.1 G/DL (ref 2–4)
GLUCOSE SERPL-MCNC: 69 MG/DL (ref 65–100)
GLUCOSE UR STRIP.AUTO-MCNC: NEGATIVE MG/DL
HCG UR QL: NEGATIVE
HCT VFR BLD AUTO: 37.7 % (ref 35–47)
HGB BLD-MCNC: 12.1 G/DL (ref 11.5–16)
HGB UR QL STRIP: NEGATIVE
IMM GRANULOCYTES # BLD AUTO: 0.1 K/UL
IMM GRANULOCYTES NFR BLD AUTO: 1 %
KETONES UR QL STRIP.AUTO: NEGATIVE MG/DL
LEUKOCYTE ESTERASE UR QL STRIP.AUTO: NEGATIVE
LYMPHOCYTES # BLD: 4.4 K/UL (ref 0.8–3.5)
LYMPHOCYTES NFR BLD: 36 % (ref 12–49)
MAGNESIUM SERPL-MCNC: 2 MG/DL (ref 1.6–2.4)
MCH RBC QN AUTO: 29.2 PG (ref 26–34)
MCHC RBC AUTO-ENTMCNC: 32.1 G/DL (ref 30–36.5)
MCV RBC AUTO: 90.8 FL (ref 80–99)
MONOCYTES # BLD: 0.5 K/UL (ref 0–1)
MONOCYTES NFR BLD: 4 % (ref 5–13)
NEUTS SEG # BLD: 7 K/UL (ref 1.8–8)
NEUTS SEG NFR BLD: 57 % (ref 32–75)
NITRITE UR QL STRIP.AUTO: NEGATIVE
NRBC # BLD: 0 K/UL (ref 0–0.01)
NRBC BLD-RTO: 0 PER 100 WBC
PH UR STRIP: 6.5 (ref 5–8)
PLATELET # BLD AUTO: 310 K/UL (ref 150–400)
PMV BLD AUTO: 11.6 FL (ref 8.9–12.9)
POTASSIUM SERPL-SCNC: 3.4 MMOL/L (ref 3.5–5.1)
PROT SERPL-MCNC: 7.3 G/DL (ref 6.4–8.2)
PROT UR STRIP-MCNC: NEGATIVE MG/DL
RBC # BLD AUTO: 4.15 M/UL (ref 3.8–5.2)
RBC #/AREA URNS HPF: ABNORMAL /HPF (ref 0–5)
RBC MORPH BLD: ABNORMAL
SODIUM SERPL-SCNC: 140 MMOL/L (ref 136–145)
SP GR UR REFRACTOMETRY: 1.01
URINE CULTURE IF INDICATED: ABNORMAL
UROBILINOGEN UR QL STRIP.AUTO: 0.2 EU/DL (ref 0.2–1)
WBC # BLD AUTO: 12.2 K/UL (ref 3.6–11)
WBC URNS QL MICRO: ABNORMAL /HPF (ref 0–4)

## 2024-01-22 PROCEDURE — 2580000003 HC RX 258: Performed by: PHYSICIAN ASSISTANT

## 2024-01-22 PROCEDURE — 81025 URINE PREGNANCY TEST: CPT

## 2024-01-22 PROCEDURE — 36415 COLL VENOUS BLD VENIPUNCTURE: CPT

## 2024-01-22 PROCEDURE — 85025 COMPLETE CBC W/AUTO DIFF WBC: CPT

## 2024-01-22 PROCEDURE — 96375 TX/PRO/DX INJ NEW DRUG ADDON: CPT

## 2024-01-22 PROCEDURE — 80053 COMPREHEN METABOLIC PANEL: CPT

## 2024-01-22 PROCEDURE — 96361 HYDRATE IV INFUSION ADD-ON: CPT

## 2024-01-22 PROCEDURE — 81001 URINALYSIS AUTO W/SCOPE: CPT

## 2024-01-22 PROCEDURE — 83735 ASSAY OF MAGNESIUM: CPT

## 2024-01-22 PROCEDURE — 6370000000 HC RX 637 (ALT 250 FOR IP): Performed by: PHYSICIAN ASSISTANT

## 2024-01-22 PROCEDURE — 71045 X-RAY EXAM CHEST 1 VIEW: CPT

## 2024-01-22 PROCEDURE — 96374 THER/PROPH/DIAG INJ IV PUSH: CPT

## 2024-01-22 PROCEDURE — 6360000002 HC RX W HCPCS: Performed by: PHYSICIAN ASSISTANT

## 2024-01-22 PROCEDURE — 99284 EMERGENCY DEPT VISIT MOD MDM: CPT

## 2024-01-22 RX ORDER — POTASSIUM CHLORIDE 750 MG/1
20 TABLET, FILM COATED, EXTENDED RELEASE ORAL ONCE
Status: COMPLETED | OUTPATIENT
Start: 2024-01-22 | End: 2024-01-22

## 2024-01-22 RX ORDER — CYCLOBENZAPRINE HCL 10 MG
10 TABLET ORAL
Status: COMPLETED | OUTPATIENT
Start: 2024-01-22 | End: 2024-01-22

## 2024-01-22 RX ORDER — CYCLOBENZAPRINE HCL 5 MG
5 TABLET ORAL 3 TIMES DAILY PRN
Qty: 30 TABLET | Refills: 0 | Status: SHIPPED | OUTPATIENT
Start: 2024-01-22 | End: 2024-02-01

## 2024-01-22 RX ORDER — LIDOCAINE 4 G/G
1 PATCH TOPICAL
Status: DISCONTINUED | OUTPATIENT
Start: 2024-01-22 | End: 2024-01-22 | Stop reason: HOSPADM

## 2024-01-22 RX ORDER — MORPHINE SULFATE 2 MG/ML
2 INJECTION, SOLUTION INTRAMUSCULAR; INTRAVENOUS
Status: COMPLETED | OUTPATIENT
Start: 2024-01-22 | End: 2024-01-22

## 2024-01-22 RX ORDER — 0.9 % SODIUM CHLORIDE 0.9 %
1000 INTRAVENOUS SOLUTION INTRAVENOUS ONCE
Status: COMPLETED | OUTPATIENT
Start: 2024-01-22 | End: 2024-01-22

## 2024-01-22 RX ORDER — ACETAMINOPHEN 500 MG
1000 TABLET ORAL
Status: COMPLETED | OUTPATIENT
Start: 2024-01-22 | End: 2024-01-22

## 2024-01-22 RX ORDER — ACETAMINOPHEN 500 MG
1000 TABLET ORAL EVERY 6 HOURS PRN
Qty: 20 TABLET | Refills: 0 | Status: SHIPPED | OUTPATIENT
Start: 2024-01-22

## 2024-01-22 RX ORDER — ONDANSETRON 2 MG/ML
4 INJECTION INTRAMUSCULAR; INTRAVENOUS
Status: COMPLETED | OUTPATIENT
Start: 2024-01-22 | End: 2024-01-22

## 2024-01-22 RX ORDER — LIDOCAINE 50 MG/G
1 PATCH TOPICAL DAILY
Qty: 10 PATCH | Refills: 0 | Status: SHIPPED | OUTPATIENT
Start: 2024-01-22 | End: 2024-02-01

## 2024-01-22 RX ADMIN — ACETAMINOPHEN 1000 MG: 500 TABLET ORAL at 20:28

## 2024-01-22 RX ADMIN — CYCLOBENZAPRINE 10 MG: 10 TABLET, FILM COATED ORAL at 19:16

## 2024-01-22 RX ADMIN — ONDANSETRON 4 MG: 2 INJECTION INTRAMUSCULAR; INTRAVENOUS at 19:15

## 2024-01-22 RX ADMIN — SODIUM CHLORIDE 1000 ML: 9 INJECTION, SOLUTION INTRAVENOUS at 19:15

## 2024-01-22 RX ADMIN — POTASSIUM CHLORIDE 20 MEQ: 750 TABLET, EXTENDED RELEASE ORAL at 20:28

## 2024-01-22 RX ADMIN — MORPHINE SULFATE 2 MG: 2 INJECTION, SOLUTION INTRAMUSCULAR; INTRAVENOUS at 19:15

## 2024-01-22 ASSESSMENT — ENCOUNTER SYMPTOMS
SINUS PAIN: 0
VOMITING: 0
BACK PAIN: 1
FACIAL SWELLING: 0
SHORTNESS OF BREATH: 1
WHEEZING: 0
VOICE CHANGE: 0
SINUS PRESSURE: 0
STRIDOR: 0
TROUBLE SWALLOWING: 0
EYE PAIN: 0
APNEA: 0
RHINORRHEA: 0
COUGH: 1
SORE THROAT: 0
DIARRHEA: 0
CHEST TIGHTNESS: 0
CHOKING: 0
ABDOMINAL PAIN: 0
NAUSEA: 0

## 2024-01-22 ASSESSMENT — PAIN SCALES - GENERAL: PAINLEVEL_OUTOF10: 10

## 2024-01-22 ASSESSMENT — PAIN - FUNCTIONAL ASSESSMENT: PAIN_FUNCTIONAL_ASSESSMENT: 0-10

## 2024-01-22 ASSESSMENT — PAIN DESCRIPTION - ORIENTATION: ORIENTATION: RIGHT;LEFT

## 2024-01-22 ASSESSMENT — PAIN DESCRIPTION - LOCATION: LOCATION: BACK;HIP;KNEE;SHOULDER

## 2024-01-22 ASSESSMENT — PAIN DESCRIPTION - DESCRIPTORS: DESCRIPTORS: ACHING

## 2024-01-22 ASSESSMENT — VISUAL ACUITY: OU: 1

## 2024-01-22 NOTE — ED TRIAGE NOTES
Pt presents ambulatory to ED complaining of increased pain in bilateral hips, knees, shoulders, and back x5 days. Pt also recently diagnosed with pneumonia and Flu. Pt hx of fibromyalgia.

## 2024-01-23 NOTE — ED PROVIDER NOTES
[place] [facility]ERG[facility] DEPT  EMERGENCY DEPARTMENT ENCOUNTER         Pt Name: Ce Roberts  MRN: [id number]  Birthdate 1970  Date of evaluation: 1/22/2024  Provider: Suzi Moreno PA-C   PCP: Ulisses Joy MD  Note Started: 7:20 PM EST on 1/22/24     CHIEF COMPLAINT       Chief Complaint   Patient presents with    Fibromyalgia        HISTORY OF PRESENT ILLNESS: 1 or more elements      History From: Patient and Patient's Daughter  None     Ce Roberts is a 53 y.o. female with medical history significant for fibromyalgia, arthritis, asthma, chronic pain, diabetes, GERD, hypertension, IBS, migraine headaches, MS, tobacco abuse  who presents via private vehicle with family with complaints of acute on chronic moderate aching generalized joint pain including bilateral hips, knees, shoulders, back X 5 days.  Patient endorses symptoms feel like a fibromyalgia flare.  She also endorses that she was recently diagnosed with the flu and admitted to the hospital on 1/6/2024 and then subsequently diagnosed with bilateral pneumonia on 1/11/2024 at [facility] ED visit.  Endorses completing course of antibiotics (azithromycin).  She states that she still has a productive cough and sometimes shortness of breath.  Endorses using gabapentin for her pain.  She also endorses that she has a \"swishing\" sensation in her right ear that is exacerbated when she is laying on her right side.  She denies any pain, tinnitus, hearing impairment, ear drainage.  She specifically states that it is not pulsating.  She states that she called her PCP who advised her to go to the ED for further evaluation.  No new fever, chills, nausea, vomiting, diarrhea, urinary symptoms, abdominal pain, chest pain, lightheadedness, dizziness, syncope, seizure, hemoptysis, unilateral leg pain or swelling, orthopnea, PND, dyspnea on exertion.      Nursing Notes were all reviewed and agreed with or any disagreements were addressed in the HPI.     REVIEW

## 2024-01-23 NOTE — ED NOTES

## 2024-01-23 NOTE — ED NOTES
Emergency Department Nursing Plan of Care       The Nursing Plan of Care is developed from the Nursing assessment and Emergency Department Attending provider initial evaluation.  The plan of care may be reviewed in the “ED Provider note”.    The Plan of Care was developed with the following considerations:   Patient / Family readiness to learn indicated by:verbalized understanding  Persons(s) to be included in education: patient  Barriers to Learning/Limitations: No    Signed     Uma Hodges RN    1/22/2024   7:01 PM

## 2024-01-25 ENCOUNTER — TELEMEDICINE (OUTPATIENT)
Age: 54
End: 2024-01-25
Payer: MEDICARE

## 2024-01-25 DIAGNOSIS — E66.01 MORBID OBESITY (HCC): ICD-10-CM

## 2024-01-25 DIAGNOSIS — F43.22 ADJUSTMENT DISORDER WITH ANXIOUS MOOD: Primary | ICD-10-CM

## 2024-01-25 PROCEDURE — 90791 PSYCH DIAGNOSTIC EVALUATION: CPT | Performed by: COUNSELOR
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
## 2024-02-01 ENCOUNTER — TELEPHONE (OUTPATIENT)
Age: 54
End: 2024-02-01

## 2024-02-01 NOTE — TELEPHONE ENCOUNTER
Spoke to patient reminding them of their upcoming Endo procedure scheduled at [facility] this coming Monday 2/5/24 at 10:30AM with arrival time of 9:00AM  She confirmed she will be there

## 2024-02-05 ENCOUNTER — ANESTHESIA (OUTPATIENT)
Facility: HOSPITAL | Age: 54
End: 2024-02-05
Payer: MEDICARE

## 2024-02-05 ENCOUNTER — HOSPITAL ENCOUNTER (OUTPATIENT)
Facility: HOSPITAL | Age: 54
Setting detail: OUTPATIENT SURGERY
Discharge: HOME | End: 2024-02-05
Attending: SURGERY | Admitting: SURGERY
Payer: MEDICARE

## 2024-02-05 ENCOUNTER — ANESTHESIA EVENT (OUTPATIENT)
Facility: HOSPITAL | Age: 54
End: 2024-02-05
Payer: MEDICARE

## 2024-02-05 VITALS
OXYGEN SATURATION: 99 % | WEIGHT: 220.46 LBS | HEART RATE: 94 BPM | HEIGHT: 64 IN | RESPIRATION RATE: 16 BRPM | BODY MASS INDEX: 37.64 KG/M2 | DIASTOLIC BLOOD PRESSURE: 74 MMHG | SYSTOLIC BLOOD PRESSURE: 114 MMHG | TEMPERATURE: 98.1 F

## 2024-02-05 LAB
GLUCOSE BLD STRIP.AUTO-MCNC: 101 MG/DL (ref 65–117)
HELIOBACTER PYLORI ID: NEGATIVE
SERVICE CMNT-IMP: NORMAL

## 2024-02-05 PROCEDURE — 43239 EGD BIOPSY SINGLE/MULTIPLE: CPT | Performed by: SURGERY

## 2024-02-05 PROCEDURE — 3600007512: Performed by: SURGERY

## 2024-02-05 PROCEDURE — 7100000010 HC PHASE II RECOVERY - FIRST 15 MIN: Performed by: SURGERY

## 2024-02-05 PROCEDURE — 2580000003 HC RX 258: Performed by: SURGERY

## 2024-02-05 PROCEDURE — 82962 GLUCOSE BLOOD TEST: CPT

## 2024-02-05 PROCEDURE — 7100000011 HC PHASE II RECOVERY - ADDTL 15 MIN: Performed by: SURGERY

## 2024-02-05 PROCEDURE — 3700000001 HC ADD 15 MINUTES (ANESTHESIA): Performed by: SURGERY

## 2024-02-05 PROCEDURE — 3700000000 HC ANESTHESIA ATTENDED CARE: Performed by: SURGERY

## 2024-02-05 PROCEDURE — 6360000002 HC RX W HCPCS: Performed by: NURSE ANESTHETIST, CERTIFIED REGISTERED

## 2024-02-05 PROCEDURE — 3600007502: Performed by: SURGERY

## 2024-02-05 PROCEDURE — 2709999900 HC NON-CHARGEABLE SUPPLY: Performed by: SURGERY

## 2024-02-05 RX ORDER — PROPOFOL 10 MG/ML
INJECTION, EMULSION INTRAVENOUS PRN
Status: DISCONTINUED | OUTPATIENT
Start: 2024-02-05 | End: 2024-02-05 | Stop reason: SDUPTHER

## 2024-02-05 RX ORDER — LIDOCAINE HYDROCHLORIDE 20 MG/ML
INJECTION, SOLUTION INTRAVENOUS PRN
Status: DISCONTINUED | OUTPATIENT
Start: 2024-02-05 | End: 2024-02-05 | Stop reason: SDUPTHER

## 2024-02-05 RX ORDER — OMEPRAZOLE 40 MG/1
40 CAPSULE, DELAYED RELEASE ORAL
Qty: 90 CAPSULE | Refills: 3 | Status: SHIPPED | OUTPATIENT
Start: 2024-02-05

## 2024-02-05 RX ORDER — SODIUM CHLORIDE 0.9 % (FLUSH) 0.9 %
5-40 SYRINGE (ML) INJECTION EVERY 12 HOURS SCHEDULED
Status: DISCONTINUED | OUTPATIENT
Start: 2024-02-05 | End: 2024-02-05 | Stop reason: HOSPADM

## 2024-02-05 RX ORDER — SODIUM CHLORIDE 0.9 % (FLUSH) 0.9 %
5-40 SYRINGE (ML) INJECTION PRN
Status: DISCONTINUED | OUTPATIENT
Start: 2024-02-05 | End: 2024-02-05 | Stop reason: HOSPADM

## 2024-02-05 RX ORDER — SODIUM CHLORIDE 9 MG/ML
25 INJECTION, SOLUTION INTRAVENOUS PRN
Status: DISCONTINUED | OUTPATIENT
Start: 2024-02-05 | End: 2024-02-05 | Stop reason: HOSPADM

## 2024-02-05 RX ADMIN — PROPOFOL 100 MG: 10 INJECTION, EMULSION INTRAVENOUS at 10:29

## 2024-02-05 RX ADMIN — PROPOFOL 50 MG: 10 INJECTION, EMULSION INTRAVENOUS at 10:32

## 2024-02-05 RX ADMIN — PROPOFOL 50 MG: 10 INJECTION, EMULSION INTRAVENOUS at 10:34

## 2024-02-05 RX ADMIN — PROPOFOL 100 MG: 10 INJECTION, EMULSION INTRAVENOUS at 10:30

## 2024-02-05 RX ADMIN — SODIUM CHLORIDE: 9 INJECTION, SOLUTION INTRAVENOUS at 10:24

## 2024-02-05 RX ADMIN — LIDOCAINE HYDROCHLORIDE 100 MG: 20 INJECTION, SOLUTION INTRAVENOUS at 10:29

## 2024-02-05 ASSESSMENT — PAIN - FUNCTIONAL ASSESSMENT: PAIN_FUNCTIONAL_ASSESSMENT: 0-10

## 2024-02-05 ASSESSMENT — LIFESTYLE VARIABLES: SMOKING_STATUS: 1

## 2024-02-05 NOTE — OP NOTE
[facility]   Endoscopic Procedure Note        NAME:  Ce Roberts   :   1970   MRN:   [id number]     Date/Time:  2024 10:39 AM    Esophagogastroduodenoscopy (EGD) Procedure Note    Preoperative Diagnosis: Esophageal reflux [K21.9]  Postoperative Diagnosis: Post-Op Diagnosis Codes:     * Esophageal reflux [K21.9]       Surgeon:  Kevin Bolaños MD    Staff: Circulator: Charlee Grey RN     Implants: None    Anethesia/Sedation:  MAC anesthesia Propofol    Procedure Details     After infom consent was obtained for the procedure, with all risks and benefits of procedure explained the patient was taken to the endoscopy suite and placed in the left lateral decubitus position.  Following sequential administration of sedation as per above, the GIF-H190 gastroscope was inserted into the mouth and advanced under direct vision to duodenal bulb.  A careful inspection was made as the gastroscope was withdrawn, including a retroflexed view of the proximal stomach; findings and interventions are described below.      Findings:  Esophagus: Normal, GEJ at 39 cm, no hiatal hernia  Stomach: patchy gastritis, Hill Grade 2 valve  Duodenum/jejunum: Normal       Therapies: none    Specimens: Bx antrum for H pylori           EBL: Minimal    Complications:   None; patient tolerated the procedure well.           Impression:    See Postoperative diagnosis above    Recommendations:  Start PPI  Follow up with dietary    Discharge disposition:  Home in the company of  when able to ambulate    Kevin Bolaños MD, [facility], [facility]  Bariatric and General Surgeon  [facility]

## 2024-02-05 NOTE — ANESTHESIA PRE PROCEDURE
Department of Anesthesiology  Preprocedure Note       Name:  Ce Roberts   Age:  54 y.o.  :  1970                                          MRN:  [id number]         Date:  2024      Surgeon: Surgeon(s):  Kevin Bolaños MD    Procedure: Procedure(s):  EGD WITH BIOPSY    Medications prior to admission:   Prior to Admission medications    Medication Sig Start Date End Date Taking? Authorizing Provider   acetaminophen (TYLENOL) 500 MG tablet Take 2 tablets by mouth every 6 hours as needed for Pain 24   Suzi Moreno, PABryannaC   albuterol sulfate HFA (VENTOLIN HFA) 108 (90 Base) MCG/ACT inhaler Inhale 2 puffs into the lungs 4 times daily as needed for Wheezing 24   Marco Oneal MD   amitriptyline (ELAVIL) 50 MG tablet Take 1 tablet by mouth nightly 24   Marco Oneal MD   losartan (COZAAR) 50 MG tablet Take 1 tablet by mouth daily 24   Marco Oneal MD   nicotine (NICODERM CQ) 14 MG/24HR Place 1 patch onto the skin every 24 hours 24  Marco Oneal MD   Buprenorphine HCl (BELBUCA) 750 MCG FILM Place 750 mcg inside cheek 2 times daily as needed.    Provider, MD Samson   ALPRAZolam (XANAX) 0.5 MG tablet TAKE 1 TABLET BY MOUTH DAILY AS  NEEDED FOR ANXIETY  Patient taking differently: Take 1 tablet by mouth 2 times daily as needed for Anxiety. 23  Ulisses Joy MD   Tirzepatide (MOUNJARO) 10 MG/0.5ML SOPN SC injection Inject 0.5 mLs into the skin once a week 23   Ulisses Joy MD   gabapentin (NEURONTIN) 600 MG tablet Take 1 tablet by mouth 2 times daily. 10/11/23 10/10/24  Ulisses Joy MD   bumetanide (BUMEX) 2 MG tablet TAKE 1 TABLET BY MOUTH EVERY DAY 8/3/23   Ulisses Joy MD       Current medications:    Current Facility-Administered Medications   Medication Dose Route Frequency Provider Last Rate Last Admin   • sodium chloride flush 0.9 % injection 5-40 mL  5-40 mL IntraVENous 2 times per day Kevin Bolaños MD       • sodium chloride

## 2024-02-05 NOTE — PERIOP NOTE
Anesthesia staff at patient's bedside administering anesthesia and monitoring patients vital signs throughout procedure. See anesthesia note.    Scope pre washed by connor YIP Pt transported to post op via stretcher and placed on bedside cardiac monitor. VSS. Report given to receiving BLAKE bravo using sbar format.  Opportunity for questions provided and answered

## 2024-02-05 NOTE — PROGRESS NOTES
Endoscopy discharge instructions have been reviewed and given to patient.  The patient verbalized understanding and acceptance of instructions.      Dr. Bolaños discussed with friend procedure findings and next steps.

## 2024-02-05 NOTE — PROGRESS NOTES
Ce Roberts  1970  [id number]    Situation:  Verbal report received from: Charlee LOZANO  Procedure: Procedure(s):  EGD WITH BIOPSY    Background:    Preoperative diagnosis: Esophageal reflux [K21.9]  Postoperative diagnosis: * No post-op diagnosis entered *    :  Dr. Bolaños  Assistant(s): Circulator: Charlee Grey RN    Specimens: * No specimens in log *  H. Pylori  Yes    Assessment:  Intra-procedure medications   Anesthesia gave intra-procedure sedation and medications, see anesthesia flow sheet Yes    Intravenous fluids: NS@ KVO     Vital signs stable yes    Abdominal assessment: round and soft yes    Recommendation:  Discharge patient per MD orderyes.  Family or Friend - daughter  Permission to share finding with family or friend Yes

## 2024-02-05 NOTE — H&P
General Surgery History and Physical    CC: GERD    History of Present Illness:      Ce Roberts is a 54 y.o. female who presents with GERD an obesity here for eval.    Past Medical History:   Diagnosis Date    Arthritis     Arthritis of low back     Asthma     Chronic pain     fibromyalgia    Colon spasm     Controlled type 2 diabetes mellitus without complication, without long-term current use of insulin ([facility]) 02/16/2022    Depression     Fibromyalgia     Gastrointestinal disorder     IBS    GERD (gastroesophageal reflux disease)     H/O: hysterectomy     High cholesterol     Hypertension     IBS (irritable bowel syndrome)     Left axillary pain 02/22/2013    Migraine     LAST HEADACHE 2-16-12    Multiple sclerosis (HCC)     Other ill-defined conditions(799.89)     MS    Other ill-defined conditions(799.89)     fibromylgia     Past Surgical History:   Procedure Laterality Date    APPENDECTOMY      BLADDER REPAIR  04/2011     BREAST BIOPSY Left     benign    CHOLECYSTECTOMY      GYN      vaginal ablation.    GYN      For ectopic pregnancy, hx tubal ligation    UROLOGICAL SURGERY      bladder tuck, no mesh    UROLOGICAL SURGERY      sling removed 1/10/11      Family History   Adopted: Yes   Problem Relation Age of Onset    Other Other         family hx is completely unknown    Asthma Son     Asthma Daughter     Diabetes Brother      Social History     Socioeconomic History    Marital status:    Tobacco Use    Smoking status: Every Day     Current packs/day: 0.50     Types: Cigarettes    Smokeless tobacco: Never   Substance and Sexual Activity    Alcohol use: No     Alcohol/week: 0.0 standard drinks of alcohol    Drug use: No    Sexual activity: Defer     Birth control/protection: None     Social Determinants of Health     Financial Resource Strain: Medium Risk (9/5/2023)    Overall Financial Resource Strain (CARDIA)     Difficulty of Paying Living Expenses: Somewhat hard   Food Insecurity: No Food

## 2024-02-05 NOTE — PROGRESS NOTES
As patient was sitting in wheelchair being discharged complaining pf her upper lip hurting-no bleeding noted and skin intact-explained to patient to apply ice and continues to hurt please notify Dr Bolaños.

## 2024-02-05 NOTE — ANESTHESIA POSTPROCEDURE EVALUATION
Department of Anesthesiology  Postprocedure Note    Patient: Ce Roberts  MRN: [id number]  YOB: 1970  Date of evaluation: 2/5/2024    Procedure Summary       Date: 02/05/24 Room / Location: [facility] 02 / [facility] ENDOSCOPY    Anesthesia Start: 1024 Anesthesia Stop: 1041    Procedure: EGD WITH BIOPSY (Upper GI Region) Diagnosis:       Esophageal reflux      (Esophageal reflux [K21.9])    Surgeons: Kevin Bolaños MD Responsible Provider: Jesus Disla MD    Anesthesia Type: MAC ASA Status: 3            Anesthesia Type: No value filed.    Shayna Phase I:      Shayna Phase II: Shayna Score: 8    Anesthesia Post Evaluation    Patient location during evaluation: PACU  Patient participation: complete - patient participated  Level of consciousness: awake and alert  Pain score: 0  Airway patency: patent  Nausea & Vomiting: no vomiting and no nausea  Cardiovascular status: hemodynamically stable  Respiratory status: room air  Hydration status: stable  There was medical reason for not using a multimodal analgesia pain management approach.    No notable events documented.

## 2024-02-05 NOTE — DISCHARGE INSTRUCTIONS
Discharge Instructions for Endoscopy Patients     Findings:  Esophagus: Normal, GEJ at 39 cm, no hiatal hernia  Stomach: patchy gastritis, Hill Grade 2 valve  Duodenum/jejunum: Normal      Recommendations:  Start PPI  Follow up with dietary    Do not drive or operate machinery while taking sedating or narcotic medications.     Some discomfort is expected in your throat or upper abdomen. Take tylenol as needed.    If an acid monitoring device was deployed, please follow the instructions for recording and returning the device.    You may walk as desired and go up and down stairs as needed. Walking is encouraged.    You may shower like normal    You may resume regular diet.    Follow up with provider as scheduled.    If you experience fever (greater than 101.5), chills, vomiting or redness or drainage at surgical site, please contact your surgeon’s office.    If you have further questions or concerns, please call your surgeon’s office at [phone].

## 2024-02-06 ENCOUNTER — OFFICE VISIT (OUTPATIENT)
Age: 54
End: 2024-02-06
Payer: MEDICARE

## 2024-02-06 VITALS
OXYGEN SATURATION: 97 % | HEART RATE: 103 BPM | DIASTOLIC BLOOD PRESSURE: 72 MMHG | RESPIRATION RATE: 17 BRPM | TEMPERATURE: 98.1 F | SYSTOLIC BLOOD PRESSURE: 102 MMHG | BODY MASS INDEX: 37.56 KG/M2 | HEIGHT: 64 IN | WEIGHT: 220 LBS

## 2024-02-06 DIAGNOSIS — J18.9 PNEUMONIA DUE TO INFECTIOUS ORGANISM, UNSPECIFIED LATERALITY, UNSPECIFIED PART OF LUNG: Primary | ICD-10-CM

## 2024-02-06 DIAGNOSIS — J43.9 PULMONARY EMPHYSEMA, UNSPECIFIED EMPHYSEMA TYPE (HCC): ICD-10-CM

## 2024-02-06 DIAGNOSIS — E11.65 TYPE 2 DIABETES MELLITUS WITH HYPERGLYCEMIA, UNSPECIFIED WHETHER LONG TERM INSULIN USE (HCC): ICD-10-CM

## 2024-02-06 DIAGNOSIS — E78.2 MIXED HYPERLIPIDEMIA: ICD-10-CM

## 2024-02-06 DIAGNOSIS — I10 ESSENTIAL HYPERTENSION, BENIGN: ICD-10-CM

## 2024-02-06 PROBLEM — J10.1 INFLUENZA A: Status: RESOLVED | Noted: 2024-01-07 | Resolved: 2024-02-06

## 2024-02-06 PROCEDURE — 3017F COLORECTAL CA SCREEN DOC REV: CPT | Performed by: FAMILY MEDICINE

## 2024-02-06 PROCEDURE — 3078F DIAST BP <80 MM HG: CPT | Performed by: FAMILY MEDICINE

## 2024-02-06 PROCEDURE — 1111F DSCHRG MED/CURRENT MED MERGE: CPT | Performed by: FAMILY MEDICINE

## 2024-02-06 PROCEDURE — G8484 FLU IMMUNIZE NO ADMIN: HCPCS | Performed by: FAMILY MEDICINE

## 2024-02-06 PROCEDURE — 4004F PT TOBACCO SCREEN RCVD TLK: CPT | Performed by: FAMILY MEDICINE

## 2024-02-06 PROCEDURE — 3074F SYST BP LT 130 MM HG: CPT | Performed by: FAMILY MEDICINE

## 2024-02-06 PROCEDURE — 2022F DILAT RTA XM EVC RTNOPTHY: CPT | Performed by: FAMILY MEDICINE

## 2024-02-06 PROCEDURE — G8417 CALC BMI ABV UP PARAM F/U: HCPCS | Performed by: FAMILY MEDICINE

## 2024-02-06 PROCEDURE — 3044F HG A1C LEVEL LT 7.0%: CPT | Performed by: FAMILY MEDICINE

## 2024-02-06 PROCEDURE — 99214 OFFICE O/P EST MOD 30 MIN: CPT | Performed by: FAMILY MEDICINE

## 2024-02-06 PROCEDURE — G8427 DOCREV CUR MEDS BY ELIG CLIN: HCPCS | Performed by: FAMILY MEDICINE

## 2024-02-06 PROCEDURE — 3023F SPIROM DOC REV: CPT | Performed by: FAMILY MEDICINE

## 2024-02-06 NOTE — PROGRESS NOTES
Chief Complaint   Patient presents with    Follow-Up from Hospital     [facility] ED 1/11/24-[facility] ED 1/22/24 Diffuse arthralgia    Chronic Pain     Fibromyalgia  Dr Villalobos Rheumatology.    Hypertension    Diabetes    Lab Collection     Fasting today       1. Have you been to the ER, urgent care clinic since your last visit?  Hospitalized since your last visit?[facility] ED 1/11/24-[facility] ED 1/22/24    2. Have you seen or consulted any other health care providers outside of the [facility] since your last visit?  Include any pap smears or colon screening. Dr Villalobos Rheumatology.      Ce Roberts  2/6/2024  Provider:   Deirdre:  Diabetes Report Card   1) Have you seen the eye doctor in past year?yes    2) How would you  rate your Diabetic Diet?Very well   3) How well do you take care of your feet?Okay   4) Do you keep your Primary Care Follow Up Appts?yes    5) Do you know your A1C goal?no    6) Do you take your medications daily?yes    7) Do you check your blood sugars?no    8) Have you gained weight?no       9) Do you follow an exercise program?no    10) Can you do better?no      Hemoglobin A1C   Date Value Ref Range Status   01/07/2024 5.5 4.0 - 5.6 % Final     Comment:     (NOTE)  HbA1C Interpretive Ranges  <5.7              Normal  5.7 - 6.4         Consider Prediabetes  >6.5              Consider Diabetes             
E11.65 Hemoglobin A1C     Comprehensive Metabolic Panel     Lipid Panel   At goal   4. Mixed hyperlipidemia  E78.2 Comprehensive Metabolic Panel     Lipid Panel      5. Essential hypertension, benign  I10 Comprehensive Metabolic Panel     Lipid Panel         Time was used for level of billing: no    Hemoglobin A1C   Date Value Ref Range Status   01/07/2024 5.5 4.0 - 5.6 % Final     Comment:     (NOTE)  HbA1C Interpretive Ranges  <5.7              Normal  5.7 - 6.4         Consider Prediabetes  >6.5              Consider Diabetes             Encounter Diagnoses   Name Primary?    Pneumonia due to infectious organism, unspecified laterality, unspecified part of lung Yes    Pulmonary emphysema, unspecified emphysema type (HCC)     Type 2 diabetes mellitus with hyperglycemia, unspecified whether long term insulin use (HCC)     Mixed hyperlipidemia     Essential hypertension, benign      Orders Placed This Encounter    Hemoglobin A1C     Standing Status:   Future     Standing Expiration Date:   2/6/2025    Comprehensive Metabolic Panel     Standing Status:   Future     Standing Expiration Date:   2/6/2025    Lipid Panel     Standing Status:   Future     Standing Expiration Date:   2/6/2025     Order Specific Question:   Has the patient fasted?     Answer:   Yes      Follow-up and Dispositions    Return in about 6 months (around 8/6/2024) for Diabetes.       There are no Patient Instructions on file for this visit.    Dr. Ulisses Joy M.D.      Please note that this dictation was completed with [employer], the computer voice recognition software.  Quite often unanticipated grammatical, syntax, homophones, and other interpretive errors are inadvertently transcribed by the computer software.  Please disregard these errors.  Please excuse any errors that have escaped final proofreading.  Thank you.

## 2024-02-07 ENCOUNTER — TELEPHONE (OUTPATIENT)
Age: 54
End: 2024-02-07

## 2024-02-07 LAB
ALBUMIN SERPL-MCNC: 4 G/DL (ref 3.5–5)
ALBUMIN/GLOB SERPL: 1.1 (ref 1.1–2.2)
ALP SERPL-CCNC: 88 U/L (ref 45–117)
ALT SERPL-CCNC: 17 U/L (ref 12–78)
ANION GAP SERPL CALC-SCNC: 7 MMOL/L (ref 5–15)
AST SERPL-CCNC: 10 U/L (ref 15–37)
BILIRUB SERPL-MCNC: 0.4 MG/DL (ref 0.2–1)
BUN SERPL-MCNC: 17 MG/DL (ref 6–20)
BUN/CREAT SERPL: 19 (ref 12–20)
CALCIUM SERPL-MCNC: 10 MG/DL (ref 8.5–10.1)
CHLORIDE SERPL-SCNC: 100 MMOL/L (ref 97–108)
CHOLEST SERPL-MCNC: 186 MG/DL
CO2 SERPL-SCNC: 32 MMOL/L (ref 21–32)
CREAT SERPL-MCNC: 0.89 MG/DL (ref 0.55–1.02)
EST. AVERAGE GLUCOSE BLD GHB EST-MCNC: 108 MG/DL
GLOBULIN SER CALC-MCNC: 3.8 G/DL (ref 2–4)
GLUCOSE SERPL-MCNC: 90 MG/DL (ref 65–100)
HBA1C MFR BLD: 5.4 % (ref 4–5.6)
HDLC SERPL-MCNC: 53 MG/DL
HDLC SERPL: 3.5 (ref 0–5)
LDLC SERPL CALC-MCNC: 96.6 MG/DL (ref 0–100)
POTASSIUM SERPL-SCNC: 3.9 MMOL/L (ref 3.5–5.1)
PROT SERPL-MCNC: 7.8 G/DL (ref 6.4–8.2)
SODIUM SERPL-SCNC: 139 MMOL/L (ref 136–145)
TRIGL SERPL-MCNC: 182 MG/DL
VLDLC SERPL CALC-MCNC: 36.4 MG/DL

## 2024-02-09 DIAGNOSIS — E11.65 TYPE 2 DIABETES MELLITUS WITH HYPERGLYCEMIA, UNSPECIFIED WHETHER LONG TERM INSULIN USE (HCC): ICD-10-CM

## 2024-02-09 DIAGNOSIS — R60.9 EDEMA, UNSPECIFIED TYPE: ICD-10-CM

## 2024-02-12 RX ORDER — TIRZEPATIDE 10 MG/.5ML
INJECTION, SOLUTION SUBCUTANEOUS
Qty: 2 ML | Refills: 3 | Status: SHIPPED | OUTPATIENT
Start: 2024-02-12

## 2024-02-12 RX ORDER — BUMETANIDE 2 MG/1
TABLET ORAL
Qty: 90 TABLET | Refills: 1 | Status: SHIPPED | OUTPATIENT
Start: 2024-02-12

## 2024-02-14 DIAGNOSIS — F41.9 ANXIETY: ICD-10-CM

## 2024-02-15 ENCOUNTER — OFFICE VISIT (OUTPATIENT)
Age: 54
End: 2024-02-15

## 2024-02-15 DIAGNOSIS — E66.01 MORBID OBESITY (HCC): Primary | ICD-10-CM

## 2024-02-15 RX ORDER — ALPRAZOLAM 0.5 MG/1
0.5 TABLET ORAL DAILY PRN
Qty: 90 TABLET | Refills: 1 | Status: SHIPPED | OUTPATIENT
Start: 2024-02-15 | End: 2025-02-14

## 2024-02-15 NOTE — PROGRESS NOTES
[facility] at [facility]  Supervised Weight Loss     Date:   2/15/2024    Patient's Name: Ce Roberts  : 1970    Insurance:  [facility]                                Session:   Surgery: Gastric Bypass                  Surgeon:  Kevin Bolaños M.D.      Height: 63\"                 Reported Weight:    220      Lbs.                                 BMI: 39              Pounds Lost since last month: 1#               Pounds Gained since last month: 0     Starting Weight: 227#                       Previous Month’s Weight: 221#  Overall Pounds Lost: 7#                  Overall Pounds Gained: 0     Other Pertinent Information: Today's appointment was completed over the phone. Pt recommended to lose 10-15# prior to surgery approval. Pt recent hospitalized d/t hypotension, hypokalemia, hypoglycemia and flu later followed by chest pain, gas and pneumonia.     Smoking Status:  none  Alcohol Intake: none    I have reviewed with pt the guidelines of the supervised wt loss program.  Pt understands the expectations of some wt loss during the program and that wt gain could delay the process. I have also explained that appointments need to be consecutive and missing an appointment may result in starting over. Pt has received this information in writing.          Changes that patient has made since last month include:  smaller portions (using portion plate and measuring spoons), paying attention to protein.      Eating Habits and Behaviors  General healthy eating guidelines were also discussed. Pts were instructed that their plate should be made up 1/2 plate coming from non-starchy vegetables, 1/4 coming from lean meat, and 1/4 of their plate coming from carbohydrates, including fruits, starches, or milk.  We discussed measuring meals to 1/2 cup total per meal after surgery. Drinking only calorie-free, sugar-free and non-carbonated beverages. We discussed the importance of drinking 64 ounces of

## 2024-02-28 ENCOUNTER — TELEMEDICINE (OUTPATIENT)
Age: 54
End: 2024-02-28
Payer: MEDICARE

## 2024-02-28 DIAGNOSIS — F43.22 ADJUSTMENT DISORDER WITH ANXIOUS MOOD: Primary | ICD-10-CM

## 2024-02-28 DIAGNOSIS — E66.01 MORBID OBESITY (HCC): ICD-10-CM

## 2024-02-28 PROCEDURE — 90832 PSYTX W PT 30 MINUTES: CPT | Performed by: COUNSELOR

## 2024-02-28 NOTE — PROGRESS NOTES
[facility]  Psychosocial Evaluation Part 2 - Mental Health Progress Note    This note will not be viewable in [place] for the following reason(s). This is a Psychotherapy Note. (Behavorial Health Providers Only)    Name: Ce Roberts  : 1970  MRN: [id number]  Date of Service: 2024  Location of Service: [place] for both provider and patient  Type of Service:  Individual Therapy (Virtual Visit)  Start Time:  3:40 PM   End Time:   4:10 PM  CPT code: 59635      Ce Roberts was evaluated through a patient-initiated, synchronous (real-time) audio-visual encounter.  Patient (or guardian if applicable) is aware that it is a billable service, which includes applicable co-pays, with coverage as determined by her insurance carrier. This visit was conducted with the patient's (and/or legal guardian's) verbal consent.  The patient was located in a Counts include 234 beds at the [facility] where the provider was licensed to provide care.  The patient was located at: Home: [address] [place] [address]  The provider was located at: Facility (Appt Dept): [address] [place] [address]      ICD-10-CM    1. Adjustment disorder with anxious mood  F43.22       2. Morbid obesity (HCC)  E66.01            Reason for Visit:  Follow-up to Bariatric Evaluation - to determine appropriateness for bariatric surgery from a psychosocial perspective with requirements and/or recommendations.  Following the Bariatric Psychosocial Evaluation - Part 2, a formal copy of the Bariatric Psychosocial Evaluation will be scanned into Epic EHR.       Subjective (patient report):  Patient acknowledged taking medication and denied any side-effects.  Did not endorse any depression, anxiety, markus or psychotic symptoms.      Objective (factual account of what was observed, mental health status):  Mental Health Status: Patient was dressed properly. No abnormal psychomotor movements observed. Intellectual functioning appeared to

## 2024-03-06 ENCOUNTER — TELEPHONE (OUTPATIENT)
Age: 54
End: 2024-03-06

## 2024-03-06 NOTE — TELEPHONE ENCOUNTER
Attempted to contact patient in regards to 3/21 appointment. Due to scheduling error appt needs to be moved because it needs to be rescheduled for a 40 minute appt

## 2024-03-14 ENCOUNTER — OFFICE VISIT (OUTPATIENT)
Age: 54
End: 2024-03-14

## 2024-03-14 DIAGNOSIS — E66.01 MORBID OBESITY (HCC): Primary | ICD-10-CM

## 2024-03-17 DIAGNOSIS — M51.36 DDD (DEGENERATIVE DISC DISEASE), LUMBAR: ICD-10-CM

## 2024-03-20 ENCOUNTER — PATIENT MESSAGE (OUTPATIENT)
Age: 54
End: 2024-03-20

## 2024-03-20 RX ORDER — GABAPENTIN 600 MG/1
600 TABLET ORAL 2 TIMES DAILY
Qty: 180 TABLET | Refills: 3 | Status: SHIPPED | OUTPATIENT
Start: 2024-03-20 | End: 2025-03-20

## 2024-03-21 ENCOUNTER — OFFICE VISIT (OUTPATIENT)
Age: 54
End: 2024-03-21
Payer: MEDICARE

## 2024-03-21 VITALS
DIASTOLIC BLOOD PRESSURE: 76 MMHG | BODY MASS INDEX: 38.86 KG/M2 | OXYGEN SATURATION: 95 % | WEIGHT: 227.6 LBS | HEIGHT: 64 IN | TEMPERATURE: 98.1 F | RESPIRATION RATE: 18 BRPM | HEART RATE: 104 BPM | SYSTOLIC BLOOD PRESSURE: 113 MMHG

## 2024-03-21 DIAGNOSIS — D64.9 ANEMIA, UNSPECIFIED TYPE: ICD-10-CM

## 2024-03-21 DIAGNOSIS — E11.9 CONTROLLED TYPE 2 DIABETES MELLITUS WITHOUT COMPLICATION, WITHOUT LONG-TERM CURRENT USE OF INSULIN (HCC): ICD-10-CM

## 2024-03-21 DIAGNOSIS — G47.30 SLEEP APNEA, UNSPECIFIED TYPE: ICD-10-CM

## 2024-03-21 DIAGNOSIS — E66.01 MORBID OBESITY (HCC): Primary | ICD-10-CM

## 2024-03-21 DIAGNOSIS — I10 ESSENTIAL HYPERTENSION, BENIGN: ICD-10-CM

## 2024-03-21 DIAGNOSIS — E78.00 HIGH CHOLESTEROL: ICD-10-CM

## 2024-03-21 PROCEDURE — 99213 OFFICE O/P EST LOW 20 MIN: CPT | Performed by: NURSE PRACTITIONER

## 2024-03-21 PROCEDURE — 3078F DIAST BP <80 MM HG: CPT | Performed by: NURSE PRACTITIONER

## 2024-03-21 PROCEDURE — 1036F TOBACCO NON-USER: CPT | Performed by: NURSE PRACTITIONER

## 2024-03-21 PROCEDURE — 3044F HG A1C LEVEL LT 7.0%: CPT | Performed by: NURSE PRACTITIONER

## 2024-03-21 PROCEDURE — 3017F COLORECTAL CA SCREEN DOC REV: CPT | Performed by: NURSE PRACTITIONER

## 2024-03-21 PROCEDURE — G8484 FLU IMMUNIZE NO ADMIN: HCPCS | Performed by: NURSE PRACTITIONER

## 2024-03-21 PROCEDURE — 2022F DILAT RTA XM EVC RTNOPTHY: CPT | Performed by: NURSE PRACTITIONER

## 2024-03-21 PROCEDURE — G8417 CALC BMI ABV UP PARAM F/U: HCPCS | Performed by: NURSE PRACTITIONER

## 2024-03-21 PROCEDURE — G8427 DOCREV CUR MEDS BY ELIG CLIN: HCPCS | Performed by: NURSE PRACTITIONER

## 2024-03-21 PROCEDURE — 3074F SYST BP LT 130 MM HG: CPT | Performed by: NURSE PRACTITIONER

## 2024-03-21 ASSESSMENT — ENCOUNTER SYMPTOMS
SHORTNESS OF BREATH: 0
NAUSEA: 0
ABDOMINAL PAIN: 0
CHEST TIGHTNESS: 0
SINUS PAIN: 0
VOMITING: 0

## 2024-03-21 ASSESSMENT — PATIENT HEALTH QUESTIONNAIRE - PHQ9
SUM OF ALL RESPONSES TO PHQ QUESTIONS 1-9: 0
7. TROUBLE CONCENTRATING ON THINGS, SUCH AS READING THE NEWSPAPER OR WATCHING TELEVISION: NOT AT ALL
SUM OF ALL RESPONSES TO PHQ QUESTIONS 1-9: 0
1. LITTLE INTEREST OR PLEASURE IN DOING THINGS: NOT AT ALL
9. THOUGHTS THAT YOU WOULD BE BETTER OFF DEAD, OR OF HURTING YOURSELF: NOT AT ALL
8. MOVING OR SPEAKING SO SLOWLY THAT OTHER PEOPLE COULD HAVE NOTICED. OR THE OPPOSITE, BEING SO FIGETY OR RESTLESS THAT YOU HAVE BEEN MOVING AROUND A LOT MORE THAN USUAL: NOT AT ALL
5. POOR APPETITE OR OVEREATING: NOT AT ALL
2. FEELING DOWN, DEPRESSED OR HOPELESS: NOT AT ALL
SUM OF ALL RESPONSES TO PHQ QUESTIONS 1-9: 0
4. FEELING TIRED OR HAVING LITTLE ENERGY: NOT AT ALL
10. IF YOU CHECKED OFF ANY PROBLEMS, HOW DIFFICULT HAVE THESE PROBLEMS MADE IT FOR YOU TO DO YOUR WORK, TAKE CARE OF THINGS AT HOME, OR GET ALONG WITH OTHER PEOPLE: NOT DIFFICULT AT ALL
SUM OF ALL RESPONSES TO PHQ9 QUESTIONS 1 & 2: 0
SUM OF ALL RESPONSES TO PHQ QUESTIONS 1-9: 0
3. TROUBLE FALLING OR STAYING ASLEEP: NOT AT ALL
6. FEELING BAD ABOUT YOURSELF - OR THAT YOU ARE A FAILURE OR HAVE LET YOURSELF OR YOUR FAMILY DOWN: NOT AT ALL

## 2024-03-21 NOTE — PROGRESS NOTES
Chief Complaint   Patient presents with    Weight Management     [place] appointment       Ce Roberts 1970 presents today for presurgical bariatric evaluation in preparation for:     Procedure Gastric bypass  Surgeon Dr. Kevin Bolaños    Last Weight Metrics:      3/21/2024    10:38 AM 2/6/2024     1:22 PM 2/5/2024     9:57 AM 1/22/2024     6:15 PM 1/11/2024     2:15 PM 1/8/2024    10:24 AM 1/7/2024     3:00 AM   Weight Loss Metrics   Height 5' 4\" 5' 4\" 5' 4\" 5' 3\" 5' 3.5\" 5' 3.5\" 5' 3.5\"   Weight - Scale 227 lbs 10 oz 220 lbs 220 lbs 7 oz 221 lbs 3 oz 222 lbs  233 lbs   BMI (Calculated) 39.1 kg/m2 37.8 kg/m2 37.9 kg/m2 39.3 kg/m2 38.8 kg/m2  40.7 kg/m2       [x] Bariatric comorbidities present: hypertension, GERD, and high cholesterol, CPAP    [x] Ambulatory status: independent    [x] The patient's reported level of exercise: stretching.  Exercise: encouraged to perform at least 30 mins of at least moderate-intensity physical activity on 5 or more days a week. The activity can be undertaken in one session or several lasting 10 mins or more. Use of a wearable fitness device may help meet activity goals and was recommended. Apple watch    [x] Nutrient screening with iron studies, B12, folic acid, and 25-vitamin D   Iron  Not done  B12/Folate   296    D   10.1  [x] Sleep apnea screening - had sleep apnea, uses CPAP       [x] GI evaluation         Upper endoscopy results   Findings:  Esophagus: Normal, GEJ at 39 cm, no hiatal hernia  Stomach: patchy gastritis, Hill Grade 2 valve  Duodenum/jejunum: Normal         H. Pylori results  negative    [x] Endocrine evaluation (HgA1C <8% prior to surgery)         HgA1C  5.5    Date   1/7/2024    TSH results    TSH (uIU/mL)   Date Value   02/15/2022 1.760           [x] Psychological Evaluation    [x] Nutrition Evaluation/Visits 5 of 6    [] Cardiac Evaluation     [] Pulmonary Evaluation     [] Pregnancy counseling: it is recommended to delay pregnancy 12-18 months after

## 2024-03-21 NOTE — PROGRESS NOTES
Identified pt with two pt identifiers (name and ). Reviewed chart in preparation for visit and have obtained necessary documentation.    Ce Roberts is a 54 y.o. female  Chief Complaint   Patient presents with    Weight Management     [place] appointment     /76 (Site: Right Upper Arm, Position: Sitting, Cuff Size: Large Adult)   Pulse (!) 104   Temp 98.1 °F (36.7 °C) (Oral)   Resp 18   Ht 1.626 m (5' 4\")   Wt 103.2 kg (227 lb 9.6 oz)   SpO2 95%   BMI 39.07 kg/m²     1. Have you been to the ER, urgent care clinic since your last visit?  Hospitalized since your last visit?yes - hospitalized for hypokalemia    2. Have you seen or consulted any other health care providers outside of the [facility] since your last visit?  Include any pap smears or colon screening. no

## 2024-03-22 LAB
IRON SATN MFR SERPL: 10 % (ref 15–55)
IRON SERPL-MCNC: 31 UG/DL (ref 27–159)
TIBC SERPL-MCNC: 322 UG/DL (ref 250–450)
UIBC SERPL-MCNC: 291 UG/DL (ref 131–425)

## 2024-03-26 NOTE — TELEPHONE ENCOUNTER
Milagros, Processor 3/20/2024 3:03 PM EDT    Ok sounds great they said they found 2 medications BREZTRI Aerosphere and TRELEGY that was for COPD and I don’t take any of those meds never have, the only inhaler I’m on is my albuterol for my asthma. So I guess the letter is supposed to say that I do not take those medication I’m not on those medication I’m only on my albuterol medication so they said that’s what they need to know whether or not I was on those medication or not but I’m not never taking them

## 2024-04-11 ENCOUNTER — TELEPHONE (OUTPATIENT)
Age: 54
End: 2024-04-11

## 2024-04-11 ENCOUNTER — CLINICAL DOCUMENTATION (OUTPATIENT)
Age: 54
End: 2024-04-11

## 2024-04-11 ENCOUNTER — OFFICE VISIT (OUTPATIENT)
Age: 54
End: 2024-04-11

## 2024-04-11 DIAGNOSIS — E66.01 MORBID OBESITY (HCC): Primary | ICD-10-CM

## 2024-04-11 NOTE — PROGRESS NOTES
[facility] at [facility]  Supervised Weight Loss     Date:   2024    Patient's Name: Ce Roberts  : 1970    Insurance:  [facility]                                Session:   Surgery: Gastric Bypass                  Surgeon:  Kevin Bolaños M.D.      Height: 63\"                 Reported Weight:    220      Lbs.                                 BMI: 39              Pounds Lost since last month: 1#               Pounds Gained since last month: 0     Starting Weight: 227#                       Previous Month’s Weight: 221#  Overall Pounds Lost: 7#                  Overall Pounds Gained: 0     Other Pertinent Information: Today's appointment was completed over the phone. Pt recommended to lose 10-15# prior to surgery approval. Pt currently on Mounjaro.      Smoking Status:  quit smoking 2024  Alcohol Intake: none    I have reviewed with pt the guidelines of the supervised wt loss program.  Pt understands the expectations of some wt loss during the program and that wt gain could delay the process. I have also explained that appointments need to be consecutive and missing an appointment may result in starting over. Pt has received this information in writing.          Changes that patient has made since last month include:  eating more protein and vegetables, not skipping meals, portion control, practicing 30/30 rule    Eating Habits and Behaviors  General healthy eating guidelines were also discussed. Pts were instructed that their plate should be made up 1/2 plate coming from non-starchy vegetables, 1/4 coming from lean meat, and 1/4 of their plate coming from carbohydrates, including fruits, starches, or milk. We discussed measuring meals to 1/2 cup total per meal after surgery. Drinking only calorie-free, sugar-free and non-carbonated beverages. We discussed the importance of drinking 64 ounces of fluid per day to prevent dehydration post-operatively.

## 2024-04-11 NOTE — TELEPHONE ENCOUNTER
Identified patient with two patient identifiers (name and ). Reviewed chart in preparation for encounter and have obtained necessary documentation.   Spoke with patient and scheduled her for her Final review on 2024 at 11:00 AM with Dr. Bolaños. Patient understood what was discussed and was thankful.

## 2024-04-24 ENCOUNTER — CLINICAL DOCUMENTATION (OUTPATIENT)
Age: 54
End: 2024-04-24

## 2024-04-24 ENCOUNTER — OFFICE VISIT (OUTPATIENT)
Age: 54
End: 2024-04-24
Payer: MEDICARE

## 2024-04-24 VITALS
HEIGHT: 64 IN | SYSTOLIC BLOOD PRESSURE: 113 MMHG | TEMPERATURE: 98.2 F | BODY MASS INDEX: 39.09 KG/M2 | WEIGHT: 229 LBS | RESPIRATION RATE: 18 BRPM | DIASTOLIC BLOOD PRESSURE: 75 MMHG | HEART RATE: 92 BPM

## 2024-04-24 DIAGNOSIS — E66.01 MORBID OBESITY (HCC): Primary | ICD-10-CM

## 2024-04-24 PROCEDURE — 3074F SYST BP LT 130 MM HG: CPT | Performed by: SURGERY

## 2024-04-24 PROCEDURE — G8427 DOCREV CUR MEDS BY ELIG CLIN: HCPCS | Performed by: SURGERY

## 2024-04-24 PROCEDURE — G8417 CALC BMI ABV UP PARAM F/U: HCPCS | Performed by: SURGERY

## 2024-04-24 PROCEDURE — 99214 OFFICE O/P EST MOD 30 MIN: CPT | Performed by: SURGERY

## 2024-04-24 PROCEDURE — 3017F COLORECTAL CA SCREEN DOC REV: CPT | Performed by: SURGERY

## 2024-04-24 PROCEDURE — 1036F TOBACCO NON-USER: CPT | Performed by: SURGERY

## 2024-04-24 PROCEDURE — 3078F DIAST BP <80 MM HG: CPT | Performed by: SURGERY

## 2024-04-24 ASSESSMENT — PATIENT HEALTH QUESTIONNAIRE - PHQ9
5. POOR APPETITE OR OVEREATING: NOT AT ALL
SUM OF ALL RESPONSES TO PHQ QUESTIONS 1-9: 2
SUM OF ALL RESPONSES TO PHQ QUESTIONS 1-9: 0
8. MOVING OR SPEAKING SO SLOWLY THAT OTHER PEOPLE COULD HAVE NOTICED. OR THE OPPOSITE, BEING SO FIGETY OR RESTLESS THAT YOU HAVE BEEN MOVING AROUND A LOT MORE THAN USUAL: NOT AT ALL
4. FEELING TIRED OR HAVING LITTLE ENERGY: SEVERAL DAYS
SUM OF ALL RESPONSES TO PHQ QUESTIONS 1-9: 2
6. FEELING BAD ABOUT YOURSELF - OR THAT YOU ARE A FAILURE OR HAVE LET YOURSELF OR YOUR FAMILY DOWN: NOT AT ALL
SUM OF ALL RESPONSES TO PHQ QUESTIONS 1-9: 0
SUM OF ALL RESPONSES TO PHQ9 QUESTIONS 1 & 2: 0
9. THOUGHTS THAT YOU WOULD BE BETTER OFF DEAD, OR OF HURTING YOURSELF: NOT AT ALL
SUM OF ALL RESPONSES TO PHQ QUESTIONS 1-9: 2
7. TROUBLE CONCENTRATING ON THINGS, SUCH AS READING THE NEWSPAPER OR WATCHING TELEVISION: NOT AT ALL
SUM OF ALL RESPONSES TO PHQ9 QUESTIONS 1 & 2: 0
SUM OF ALL RESPONSES TO PHQ QUESTIONS 1-9: 0
1. LITTLE INTEREST OR PLEASURE IN DOING THINGS: NOT AT ALL
3. TROUBLE FALLING OR STAYING ASLEEP: SEVERAL DAYS
2. FEELING DOWN, DEPRESSED OR HOPELESS: NOT AT ALL
2. FEELING DOWN, DEPRESSED OR HOPELESS: NOT AT ALL
SUM OF ALL RESPONSES TO PHQ QUESTIONS 1-9: 2
1. LITTLE INTEREST OR PLEASURE IN DOING THINGS: NOT AT ALL
SUM OF ALL RESPONSES TO PHQ QUESTIONS 1-9: 0

## 2024-04-24 NOTE — PROGRESS NOTES
Identified patient with two patient identifiers (name and ). Reviewed chart in preparation for visit and have obtained necessary documentation.    Ce Roberts is a 54 y.o. female  No chief complaint on file.    /75 (Site: Right Upper Arm, Position: Sitting, Cuff Size: Medium Adult)   Pulse 92   Temp 98.2 °F (36.8 °C) (Oral)   Resp 18   Ht 1.626 m (5' 4\")   Wt 103.9 kg (229 lb)   BMI 39.31 kg/m²     1. Have you been to the ER, urgent care clinic since your last visit?  Hospitalized since your last visit?no    2. Have you seen or consulted any other health care providers outside of the [facility] since your last visit?  Include any pap smears or colon screening. no

## 2024-04-24 NOTE — PROGRESS NOTES
Bariatric Surgery Progress Note    CC: Obesity  Subjective:     Patient has done well with the preoperative process.  Cleared by dietary and psych.  Has lost about 5 pounds.  She has been free of tobacco of all 4 for the last 2 months.    Total weight loss to date: 5 lbs    Tobacco use: quit 2 months ago    EGD / UGI reviewed / issues: EGD with gastritis.  H. pylori negative    Sleep apnea addressed: Uses CPAP    Contraceptive plan: Menopause    Previous relevant surgeries: Cholecystectomy, hysterectomy, bladder sling, appendectomy    Patient Active Problem List    Diagnosis Date Noted    Esophageal reflux 11/21/2023    Type 2 diabetes mellitus with hyperglycemia 08/15/2023    Chronic obstructive pulmonary disease, unspecified COPD type ([facility]) 04/24/2023    Controlled type 2 diabetes mellitus without complication, without long-term current use of insulin ([facility]) 02/16/2022    Tobacco use 09/14/2021    Acute pyelonephritis 09/13/2021    Diarrhea of presumed infectious origin 01/02/2019    CAP (community acquired pneumonia) 12/31/2018    Sepsis ([facility]) 12/31/2018    Obesity, morbid ([facility]) 02/19/2018    Urinary frequency 10/21/2015    Essential hypertension, benign 08/25/2014    DDD (degenerative disc disease), lumbar 05/06/2013    Left axillary pain 02/22/2013    Migraine 05/17/2012    IBS (irritable bowel syndrome) 05/17/2012    Hyperlipidemia 11/10/2011    Fibromyalgia 10/13/2011    Nipple discharge 08/18/2011    Depression 08/18/2011      Past Medical History:   Diagnosis Date    Arthritis     Arthritis of low back     Asthma     Chronic pain     fibromyalgia    Colon spasm     Controlled type 2 diabetes mellitus without complication, without long-term current use of insulin ([facility]) 02/16/2022    Depression     Fibromyalgia     Gastrointestinal disorder     IBS    GERD (gastroesophageal reflux disease)     H/O: hysterectomy     High cholesterol     Hypertension     IBS (irritable bowel syndrome)     Left axillary pain

## 2024-04-24 NOTE — PROGRESS NOTES
Internal Medicine Internal Medicine Orthopedics Orthopedics Orthopedics Orthopedics Submitted clinicals to [facility] via website for ROBOTIC LADI-EN-Y GASTRIC BYPASS, pre auth for Dr Bolaños.      Pending auth  [id number].    Internal Medicine

## 2024-04-30 ENCOUNTER — PREP FOR PROCEDURE (OUTPATIENT)
Age: 54
End: 2024-04-30

## 2024-04-30 ENCOUNTER — TELEPHONE (OUTPATIENT)
Age: 54
End: 2024-04-30

## 2024-04-30 DIAGNOSIS — E66.01 MORBID OBESITY (HCC): Primary | ICD-10-CM

## 2024-04-30 PROBLEM — E66.9 OBESITY, UNSPECIFIED: Status: ACTIVE | Noted: 2024-04-30

## 2024-04-30 NOTE — TELEPHONE ENCOUNTER
Returning patients call to schedule surgery     Spoke to patient,  I offered 6/17 for surgery and she accepted.  I let her know that I will be scheduling her per op appointments which are Diet and infor class, PAT and H&P.  That is any of these appointments are a no show or rescheduled it could push her surgery out.  She understood.  I also let her know that I will schedule her post op appointment and that once everything is scheduled I will put in all in a letter with dates, times and if they are virtual or in person.  This letter will post to [facility] and I will mail it out.  I will also call to let you know when it has been posted.  I let her know she should should hear from me by end of day today.  She acknowledged ok and thank you

## 2024-04-30 NOTE — TELEPHONE ENCOUNTER
LM for patient to let her know her surgery letter has posted to her my chart and will go out in the mail. I reviewed the diet and info class that that won't show up in her my chart.  I informed her to be on the look out for an e-mail from [facility] in your junk/spam folder.  I explained what will be in the e-mail and to please reply to it so we know you received it.  To call me if there is any scheduling questions or concerns

## 2024-05-01 RX ORDER — SCOLOPAMINE TRANSDERMAL SYSTEM 1 MG/1
1 PATCH, EXTENDED RELEASE TRANSDERMAL
Status: CANCELLED | OUTPATIENT
Start: 2024-05-01 | End: 2024-05-04

## 2024-05-01 RX ORDER — SODIUM CHLORIDE, SODIUM LACTATE, POTASSIUM CHLORIDE, CALCIUM CHLORIDE 600; 310; 30; 20 MG/100ML; MG/100ML; MG/100ML; MG/100ML
INJECTION, SOLUTION INTRAVENOUS CONTINUOUS
Status: CANCELLED | OUTPATIENT
Start: 2024-05-01

## 2024-05-01 RX ORDER — SODIUM CHLORIDE 0.9 % (FLUSH) 0.9 %
5-40 SYRINGE (ML) INJECTION PRN
Status: CANCELLED | OUTPATIENT
Start: 2024-05-01

## 2024-05-01 RX ORDER — ACETAMINOPHEN 160 MG/5ML
1000 LIQUID ORAL
Status: CANCELLED | OUTPATIENT
Start: 2024-05-01

## 2024-05-01 RX ORDER — SODIUM CHLORIDE 9 MG/ML
INJECTION, SOLUTION INTRAVENOUS PRN
Status: CANCELLED | OUTPATIENT
Start: 2024-05-01

## 2024-05-01 RX ORDER — GABAPENTIN 250 MG/5ML
500 SOLUTION ORAL
Status: CANCELLED | OUTPATIENT
Start: 2024-05-01

## 2024-05-01 RX ORDER — SODIUM CHLORIDE 0.9 % (FLUSH) 0.9 %
5-40 SYRINGE (ML) INJECTION EVERY 12 HOURS SCHEDULED
Status: CANCELLED | OUTPATIENT
Start: 2024-05-01

## 2024-05-28 ENCOUNTER — HOSPITAL ENCOUNTER (OUTPATIENT)
Age: 54
Discharge: HOME | End: 2024-05-31
Payer: MEDICARE

## 2024-05-28 ENCOUNTER — HOSPITAL ENCOUNTER (OUTPATIENT)
Facility: HOSPITAL | Age: 54
Discharge: HOME | End: 2024-05-31
Payer: MEDICARE

## 2024-05-28 VITALS
DIASTOLIC BLOOD PRESSURE: 85 MMHG | HEART RATE: 87 BPM | BODY MASS INDEX: 37.73 KG/M2 | WEIGHT: 221 LBS | SYSTOLIC BLOOD PRESSURE: 120 MMHG | TEMPERATURE: 97.6 F | HEIGHT: 64 IN

## 2024-05-28 DIAGNOSIS — E66.01 MORBID OBESITY (HCC): ICD-10-CM

## 2024-05-28 LAB
ALBUMIN SERPL-MCNC: 3.1 G/DL (ref 3.5–5)
ALBUMIN/GLOB SERPL: 0.9 (ref 1.1–2.2)
ALP SERPL-CCNC: 74 U/L (ref 45–117)
ALT SERPL-CCNC: 15 U/L (ref 12–78)
ANION GAP SERPL CALC-SCNC: 5 MMOL/L (ref 5–15)
AST SERPL-CCNC: 11 U/L (ref 15–37)
BILIRUB SERPL-MCNC: 0.3 MG/DL (ref 0.2–1)
BUN SERPL-MCNC: 14 MG/DL (ref 6–20)
BUN/CREAT SERPL: 14 (ref 12–20)
CALCIUM SERPL-MCNC: 9.2 MG/DL (ref 8.5–10.1)
CHLORIDE SERPL-SCNC: 104 MMOL/L (ref 97–108)
CO2 SERPL-SCNC: 29 MMOL/L (ref 21–32)
CREAT SERPL-MCNC: 1 MG/DL (ref 0.55–1.02)
ERYTHROCYTE [DISTWIDTH] IN BLOOD BY AUTOMATED COUNT: 14.2 % (ref 11.5–14.5)
EST. AVERAGE GLUCOSE BLD GHB EST-MCNC: 100 MG/DL
FOLATE SERPL-MCNC: 4.8 NG/ML (ref 5–21)
GLOBULIN SER CALC-MCNC: 3.4 G/DL (ref 2–4)
GLUCOSE SERPL-MCNC: 122 MG/DL (ref 65–100)
HBA1C MFR BLD: 5.1 % (ref 4–5.6)
HCT VFR BLD AUTO: 39.3 % (ref 35–47)
HGB BLD-MCNC: 12.2 G/DL (ref 11.5–16)
MCH RBC QN AUTO: 28.7 PG (ref 26–34)
MCHC RBC AUTO-ENTMCNC: 31 G/DL (ref 30–36.5)
MCV RBC AUTO: 92.5 FL (ref 80–99)
NRBC # BLD: 0 K/UL (ref 0–0.01)
NRBC BLD-RTO: 0 PER 100 WBC
PLATELET # BLD AUTO: 275 K/UL (ref 150–400)
PMV BLD AUTO: 11.9 FL (ref 8.9–12.9)
POTASSIUM SERPL-SCNC: 3.2 MMOL/L (ref 3.5–5.1)
PROT SERPL-MCNC: 6.5 G/DL (ref 6.4–8.2)
RBC # BLD AUTO: 4.25 M/UL (ref 3.8–5.2)
SODIUM SERPL-SCNC: 138 MMOL/L (ref 136–145)
TSH SERPL DL<=0.05 MIU/L-ACNC: 1.02 UIU/ML (ref 0.36–3.74)
VIT B12 SERPL-MCNC: 129 PG/ML (ref 193–986)
WBC # BLD AUTO: 10.8 K/UL (ref 3.6–11)

## 2024-05-28 PROCEDURE — 84443 ASSAY THYROID STIM HORMONE: CPT

## 2024-05-28 PROCEDURE — 71046 X-RAY EXAM CHEST 2 VIEWS: CPT

## 2024-05-28 PROCEDURE — 85027 COMPLETE CBC AUTOMATED: CPT

## 2024-05-28 PROCEDURE — 84425 ASSAY OF VITAMIN B-1: CPT

## 2024-05-28 PROCEDURE — 82746 ASSAY OF FOLIC ACID SERUM: CPT

## 2024-05-28 PROCEDURE — 80053 COMPREHEN METABOLIC PANEL: CPT

## 2024-05-28 PROCEDURE — 83036 HEMOGLOBIN GLYCOSYLATED A1C: CPT

## 2024-05-28 PROCEDURE — 82607 VITAMIN B-12: CPT

## 2024-05-28 PROCEDURE — 36415 COLL VENOUS BLD VENIPUNCTURE: CPT

## 2024-05-28 ASSESSMENT — PAIN DESCRIPTION - LOCATION: LOCATION: KNEE;BACK

## 2024-05-28 ASSESSMENT — PAIN DESCRIPTION - DESCRIPTORS: DESCRIPTORS: ACHING

## 2024-05-28 ASSESSMENT — PAIN SCALES - GENERAL: PAINLEVEL_OUTOF10: 2

## 2024-05-28 ASSESSMENT — PAIN DESCRIPTION - FREQUENCY: FREQUENCY: CONTINUOUS

## 2024-05-28 ASSESSMENT — PAIN DESCRIPTION - ORIENTATION: ORIENTATION: LEFT;RIGHT;LOWER

## 2024-05-29 NOTE — PERIOP NOTE
PATIENT GIVEN WRITTEN INSTRUCTIONS TO GO TO THE IMAGING CENTER/CANCER CENTER [address] TO HAVE CHEST XRAY COMPLETED.   
The following message with sent to Dr. Charly Telles nurse via epic.     Marely Espinoza, Adrian Solitario, LPSINA  Hi Adrian,    Please review abnormal labs drawn at [facility] with Dr. Bolaños.    K: 3.2  Folate: 4.8  Vitamin B12: 129    Thank you,  Marely      All abnormal labs routed to PCP via [email or web address].   
breast surgery), or makeup, especially mascara). Remove fingernail polish except for clear.  Follow Chlorhexidine body wash instructions provided to you during PAT appointment. The night before and morning of surgery.  Do not shave or trim anywhere 24 hours before surgery  Wear your hair loose or down; no pony-tails, buns, or metal hair clips  Wear loose, comfortable, clean clothes  Wear glasses instead of contacts. Bring a case to keep your glasses safe.  Leave money, valuables, and jewelry, including body piercings, at home  If you were given an Incentive Spirometer, bring it on day of surgery.  If you use inhalers or CPAP machine, bring it with you the day of surgery.     Going Home - or Spending the Night OUTPATIENT SURGERY: You must have a responsible adult drive you home and stay with you 24 hours after surgery. You may not drive for 24 hours after surgery.  ADMITS: If your doctor is keeping you in the hospital after surgery, leave personal belongings/luggage in your car until you have a hospital room number.    Hospital discharge time is 12 noon  Drivers must be here before 12 noon unless you are told differently   Special Instructions Free  parking available from 6 AM until 4:30 PM.       Eating and Drinking Before Bariatric Surgery    Continue your liver shrinking diet until the night before surgery. You may eat your liver shrinking diet at the usual time on the day before your surgery.  Do NOT eat any solid foods after midnight.  You may drink clear liquids only from 12 midnight until 1 hours prior to your arrival time at the hospital on the day of your surgery. Do NOT drink alcohol.  Clear liquids include:  Water  Flavored, sugar-free water  Crystal Light, Tony or sugar free generic  Sugar-free Davis-Aid or generic  Decaffeinated tea or coffee  Vitamin Water Zero  Powerade Zero  Gatorade Zero  Clear Protein drinks  Diet V-8 Splash  Diet Snapple  Diet Lemonade  You may drink up to 12 ounces at one time

## 2024-05-30 LAB — VIT B1 BLD-SCNC: 87.4 NMOL/L (ref 66.5–200)

## 2024-06-05 ENCOUNTER — TELEMEDICINE (OUTPATIENT)
Age: 54
End: 2024-06-05

## 2024-06-05 VITALS — HEIGHT: 64 IN | WEIGHT: 225 LBS | BODY MASS INDEX: 38.41 KG/M2

## 2024-06-05 DIAGNOSIS — E66.01 MORBID OBESITY (HCC): Primary | ICD-10-CM

## 2024-06-05 DIAGNOSIS — G47.30 SLEEP APNEA, UNSPECIFIED TYPE: ICD-10-CM

## 2024-06-05 DIAGNOSIS — E78.00 HIGH CHOLESTEROL: ICD-10-CM

## 2024-06-05 DIAGNOSIS — E53.8 LOW SERUM VITAMIN B12: ICD-10-CM

## 2024-06-05 DIAGNOSIS — E11.9 CONTROLLED TYPE 2 DIABETES MELLITUS WITHOUT COMPLICATION, WITHOUT LONG-TERM CURRENT USE OF INSULIN (HCC): ICD-10-CM

## 2024-06-05 DIAGNOSIS — I10 ESSENTIAL HYPERTENSION, BENIGN: ICD-10-CM

## 2024-06-05 PROCEDURE — 99024 POSTOP FOLLOW-UP VISIT: CPT | Performed by: NURSE PRACTITIONER

## 2024-06-05 RX ORDER — OMEPRAZOLE 40 MG/1
40 CAPSULE, DELAYED RELEASE ORAL
Qty: 90 CAPSULE | Refills: 1 | Status: SHIPPED | OUTPATIENT
Start: 2024-06-05

## 2024-06-05 RX ORDER — POLYETHYLENE GLYCOL 3350 17 G/17G
17 POWDER, FOR SOLUTION ORAL DAILY
Qty: 1530 G | Refills: 0 | Status: SHIPPED | OUTPATIENT
Start: 2024-06-05 | End: 2024-09-03

## 2024-06-05 RX ORDER — ONDANSETRON 4 MG/1
4 TABLET, FILM COATED ORAL EVERY 8 HOURS PRN
Qty: 30 TABLET | Refills: 0 | Status: SHIPPED | OUTPATIENT
Start: 2024-06-05

## 2024-06-05 ASSESSMENT — PATIENT HEALTH QUESTIONNAIRE - PHQ9
1. LITTLE INTEREST OR PLEASURE IN DOING THINGS: NOT AT ALL
2. FEELING DOWN, DEPRESSED OR HOPELESS: NOT AT ALL
SUM OF ALL RESPONSES TO PHQ9 QUESTIONS 1 & 2: 0
SUM OF ALL RESPONSES TO PHQ QUESTIONS 1-9: 0

## 2024-06-05 ASSESSMENT — PAIN SCALES - GENERAL: PAINLEVEL_OUTOF10: 7

## 2024-06-05 ASSESSMENT — PAIN DESCRIPTION - LOCATION: LOCATION: BACK

## 2024-06-05 NOTE — PROGRESS NOTES
Ht 1.613 m (5' 3.5\")   Wt 102.1 kg (225 lb)   BMI 39.23 kg/m²       Plan:   1/2. Morbid Obesity- Patient is schedule for Gastric bypass  with Dr.Craig Bolaños on 6/17/2024 at [facility]. Counseled patient in regard to post diet restrictions, follow- up office visits, follow- up care. Patient as received educational booklet and vitamin list. Reviewed liver shrinking diet. Start date for Liver shrinking diet 6/3/2024  ERAS protocol reviewed:  Acetaminophen 1000 mg at noon and 8 pm day before surgery and may have clear liquids (no caffeine, no ETOH, no carbonation and calorie free) until 3 hours prior to surgery   Preadmission testing results reviewed with patient   Post operative prescriptions sent to pharmacy on file for AFTER surgery:    Acid suppression Prilosec 40 mg  x 30 days, then reassess need    Antiemetic Zofran 4 mg every 8 hours as needed for nausea #30   Antispasmodic na  Laxative:  Miralax 1 packet every day   DVT prophylaxis:      early ambulation and mechanical compression, non-smoker for at least 90 days   Post op pain management:  acetaminophen 1000 mg po q 8 hours prn; abdominal support / splinting; heating pad prn   Reviewed with patient that lifelong vitamin supplementation is recommended by provider as well as risks of non-compliance.  Dr. Calderon is her pain management doctor.  She advised her to stop her Buprenorphine patches 7 days prior to surgery.  She is to take her gabapentin and Tylenol to control her pain 7 days prior to surgery.  She can take narcotic prescription that has been prescribed by Dr. Bolaños after surgery.  Then her pain management doctor will represcribe her patches.  3/6. HTN/HIGH [id number] with primary care 4 weeks postop  4.  Diabetes-patient stopped Mounjaro 7 days prior to surgery  5.  Sleep apnea-patient does not use CPAP machine.  Advised patient to not bring her CPAP machine with her to the hospital.  7.  B12 deficiency-patient is B12 has dropped

## 2024-06-05 NOTE — PROGRESS NOTES
Identified patient with two patient identifiers (name and ). Reviewed chart in preparation for visit and have obtained necessary documentation.    Ce Roberts is a 54 y.o. female  Chief Complaint   Patient presents with    H&P     Scheduled for lap gastric bypass on 24 by Dr Kevin Bolaños     There were no vitals taken for this visit.    1. Have you been to the ER, urgent care clinic since your last visit?  Hospitalized since your last visit?no    2. Have you seen or consulted any other health care providers outside of the [facility] since your last visit?  Include any pap smears or colon screening. Rheumatologist, Dr Villalobos

## 2024-06-05 NOTE — PATIENT INSTRUCTIONS
You will start the liver shrinking diet 2 weeks before surgery unless otherwise told by physician or NP.   Follow your handbooks instructions for Liver shrinking diet.       Enhanced Recovery after Surgery (ERAS)  Take 1000mg of Tylenol with lunch and dinner the day before surgery.   You may drink clear liquids up to 1 hours before surgery.   Do NOT start medications prescribed until after surgery.     COVID_19 testing  Pre-Admission testing will be in touch with you in regards to COVID-19 testing.   You will be required to be tested 96 hours prior to surgery. Failure to have test completed or a positive result will require rescheduling of your procedure.           Your first follow up visit will be a face to face visit.   Your second and third follow up will be virtual.     Diet after surgery until your 2 week follow up visit.     Bariatric Full Liquids - 2 weeks   What is this diet?  Easy to swallow liquids allow your stomach to heal after surgery  Prevents dehydration  Introduction of liquid meals (protein shakes)  When do I begin?  The morning after surgery  Use 1 ounce (30 mL) cups  Initial goal is to drink 4 ounces of fluid over 1 hour (3 oz. water + 1 oz. protein shake). The rate at which you drink should be controlled.  Take a sip and evaluate how you feel before you continue to drink.   Repeat every hour while awake  Discharge Goal:  Consume & tolerate at least 4 ounces per hour for 4 hours  Stay on liquids for 2 weeks at home  What foods can I eat?  No sugar added, non-carbonated, non-caffeinated fluids  Meal replacement shakes (2-3 per day), from the approved list  Strained, blenderized soup  Limit juice to 4 ounces per day.  Choose only 100% no sugar added fruit juice.  Juice can be diluted with water.        Key Points  Sip, do not gulp  Use 1 ounce medicine cups to control the rate    Stop when full. If you feel fullness, pain or nausea stop sipping until the feeling passes  Do not drink from a

## 2024-06-06 ENCOUNTER — TELEPHONE (OUTPATIENT)
Age: 54
End: 2024-06-06

## 2024-06-06 NOTE — TELEPHONE ENCOUNTER
Bariatric RD contacted pt regarding vitamin B12 deficiency identified in preparation for upcoming bariatric surgery (6/17). NP and surgeon would like for pt to start an OTC supplement for the next 4 days. RD recommended 1000 mcg vitamin B12 for the next 4 days starting today. Stop taking on Monday 6/10 per PAT guidelines for stopping vitamins 1 week prior to surgery. Pt in agreement and expressed understanding.     RD also recommended for pt to purchase the recommended bariatric multivitamin (direct link emailed to the patient) to start once home after surgery. The bariatric multivitamin will provide recommended dose of vitamin B12 and she can discontinue use of OTC vitamin B12. Pt in agreement and understands.     Tara Peraza RD   Bariatric Dietitian   [phone]

## 2024-06-10 ENCOUNTER — OFFICE VISIT (OUTPATIENT)
Age: 54
End: 2024-06-10
Payer: MEDICARE

## 2024-06-10 VITALS
OXYGEN SATURATION: 96 % | WEIGHT: 221 LBS | SYSTOLIC BLOOD PRESSURE: 102 MMHG | DIASTOLIC BLOOD PRESSURE: 76 MMHG | TEMPERATURE: 98.2 F | BODY MASS INDEX: 37.73 KG/M2 | RESPIRATION RATE: 20 BRPM | HEART RATE: 96 BPM | HEIGHT: 64 IN

## 2024-06-10 DIAGNOSIS — E66.01 MORBID OBESITY (HCC): Primary | ICD-10-CM

## 2024-06-10 PROCEDURE — G8417 CALC BMI ABV UP PARAM F/U: HCPCS | Performed by: SURGERY

## 2024-06-10 PROCEDURE — 3078F DIAST BP <80 MM HG: CPT | Performed by: SURGERY

## 2024-06-10 PROCEDURE — 1036F TOBACCO NON-USER: CPT | Performed by: SURGERY

## 2024-06-10 PROCEDURE — 3017F COLORECTAL CA SCREEN DOC REV: CPT | Performed by: SURGERY

## 2024-06-10 PROCEDURE — G8427 DOCREV CUR MEDS BY ELIG CLIN: HCPCS | Performed by: SURGERY

## 2024-06-10 PROCEDURE — 3074F SYST BP LT 130 MM HG: CPT | Performed by: SURGERY

## 2024-06-10 PROCEDURE — 99213 OFFICE O/P EST LOW 20 MIN: CPT | Performed by: SURGERY

## 2024-06-10 RX ORDER — LINACLOTIDE 72 UG/1
CAPSULE, GELATIN COATED ORAL
COMMUNITY

## 2024-06-10 ASSESSMENT — PATIENT HEALTH QUESTIONNAIRE - PHQ9
1. LITTLE INTEREST OR PLEASURE IN DOING THINGS: NOT AT ALL
5. POOR APPETITE OR OVEREATING: NOT AT ALL
4. FEELING TIRED OR HAVING LITTLE ENERGY: SEVERAL DAYS
6. FEELING BAD ABOUT YOURSELF - OR THAT YOU ARE A FAILURE OR HAVE LET YOURSELF OR YOUR FAMILY DOWN: NOT AT ALL
SUM OF ALL RESPONSES TO PHQ QUESTIONS 1-9: 2
10. IF YOU CHECKED OFF ANY PROBLEMS, HOW DIFFICULT HAVE THESE PROBLEMS MADE IT FOR YOU TO DO YOUR WORK, TAKE CARE OF THINGS AT HOME, OR GET ALONG WITH OTHER PEOPLE: NOT DIFFICULT AT ALL
3. TROUBLE FALLING OR STAYING ASLEEP: SEVERAL DAYS
8. MOVING OR SPEAKING SO SLOWLY THAT OTHER PEOPLE COULD HAVE NOTICED. OR THE OPPOSITE, BEING SO FIGETY OR RESTLESS THAT YOU HAVE BEEN MOVING AROUND A LOT MORE THAN USUAL: NOT AT ALL
9. THOUGHTS THAT YOU WOULD BE BETTER OFF DEAD, OR OF HURTING YOURSELF: NOT AT ALL
SUM OF ALL RESPONSES TO PHQ9 QUESTIONS 1 & 2: 0
7. TROUBLE CONCENTRATING ON THINGS, SUCH AS READING THE NEWSPAPER OR WATCHING TELEVISION: NOT AT ALL
2. FEELING DOWN, DEPRESSED OR HOPELESS: NOT AT ALL
SUM OF ALL RESPONSES TO PHQ QUESTIONS 1-9: 2

## 2024-06-10 NOTE — PROGRESS NOTES
Identified patient with two patient identifiers (name and ). Reviewed chart in preparation for visit and have obtained necessary documentation.    Ce Roberts is a 54 y.o. female  Chief Complaint   Patient presents with    Follow-up     LADI - EN- Y, Final Review, insurance requirements completed.      /76 (Site: Right Upper Arm, Position: Sitting, Cuff Size: Medium Adult)   Pulse 96   Temp 98.2 °F (36.8 °C) (Oral)   Resp 20   Ht 1.613 m (5' 3.5\")   Wt 100.2 kg (221 lb)   SpO2 96%   BMI 38.53 kg/m²     1. Have you been to the ER, urgent care clinic since your last visit?  Hospitalized since your last visit?no    2. Have you seen or consulted any other health care providers outside of the [facility] since your last visit?  Include any pap smears or colon screening. no

## 2024-06-10 NOTE — PROGRESS NOTES
Surgery Progress Note    6/10/2024    CC: Preoperative check    Subjective:     Patient down more weight.  Doing well.  Her 's relative recently  over a year out from a sleeve.  He is now on edge about her surgery.  Patient is confident in surgery.  Looking forward to it.    Constitutional: No fever or chills  Neurologic: No headache  Eyes: No scleral icterus or irritated eyes  Nose: No nasal pain or drainage  Mouth: No oral lesions or sore throat  Cardiac: No palpations or chest pain  Pulmonary: No cough or shortness of breath  Gastrointestinal: No nausea, emesis, diarrhea, or constipation  Genitourinary: No dysuria  Musculoskeletal: No muscle or joint tenderness  Skin: No rashes or lesions  Psychiatric: No anxiety or depressed mood    Objective:     Vitals:    06/10/24 1022   BP: 102/76   Pulse: 96   Resp: 20   Temp: 98.2 °F (36.8 °C)   SpO2: 96%     Body mass index is 38.53 kg/m².  Wt 221 lbs    General: No acute distress, conversant  Eyes: PERRLA, no scleral icterus  HENT: Normocephalic without oral lesions  Neck: Trachea midline without LAD  Cardiac: Normal pulse rate and rhythm  Pulmonary: Symmetric chest rise with normal effort  GI: Soft, morbidly obese, NT, ND, no hernia, no splenomegaly  Skin: Warm without rash  Extremities: No edema or joint stiffness  Psych: Appropriate mood and affect    Assessment:     54-year-old female doing well prior to bypass    Plan:     Once again discussed the operative plan.  Discussed the risks and benefits.  Discussed the need for long-term diet, exercise, and vitamin compliance.  Gave her a handout of our most recent data for our risks and comorbidity resolution.    I will see her next week for surgery      Kevin Bolaños MD, [facility], [facility]  Bariatric and General Surgeon  [facility]

## 2024-06-10 NOTE — H&P (VIEW-ONLY)
Surgery Progress Note    6/10/2024    CC: Preoperative check    Subjective:     Patient down more weight.  Doing well.  Her 's relative recently  over a year out from a sleeve.  He is now on edge about her surgery.  Patient is confident in surgery.  Looking forward to it.    Constitutional: No fever or chills  Neurologic: No headache  Eyes: No scleral icterus or irritated eyes  Nose: No nasal pain or drainage  Mouth: No oral lesions or sore throat  Cardiac: No palpations or chest pain  Pulmonary: No cough or shortness of breath  Gastrointestinal: No nausea, emesis, diarrhea, or constipation  Genitourinary: No dysuria  Musculoskeletal: No muscle or joint tenderness  Skin: No rashes or lesions  Psychiatric: No anxiety or depressed mood    Objective:     Vitals:    06/10/24 1022   BP: 102/76   Pulse: 96   Resp: 20   Temp: 98.2 °F (36.8 °C)   SpO2: 96%     Body mass index is 38.53 kg/m².  Wt 221 lbs    General: No acute distress, conversant  Eyes: PERRLA, no scleral icterus  HENT: Normocephalic without oral lesions  Neck: Trachea midline without LAD  Cardiac: Normal pulse rate and rhythm  Pulmonary: Symmetric chest rise with normal effort  GI: Soft, morbidly obese, NT, ND, no hernia, no splenomegaly  Skin: Warm without rash  Extremities: No edema or joint stiffness  Psych: Appropriate mood and affect    Assessment:     54-year-old female doing well prior to bypass    Plan:     Once again discussed the operative plan.  Discussed the risks and benefits.  Discussed the need for long-term diet, exercise, and vitamin compliance.  Gave her a handout of our most recent data for our risks and comorbidity resolution.    I will see her next week for surgery      Kevin Bolaños MD, [facility], [facility]  Bariatric and General Surgeon  [facility]

## 2024-06-11 ENCOUNTER — ENROLLMENT (OUTPATIENT)
Dept: PHARMACY | Facility: CLINIC | Age: 54
End: 2024-06-11

## 2024-06-17 ENCOUNTER — ANESTHESIA EVENT (OUTPATIENT)
Facility: HOSPITAL | Age: 54
End: 2024-06-17
Payer: MEDICARE

## 2024-06-17 ENCOUNTER — ANESTHESIA (OUTPATIENT)
Facility: HOSPITAL | Age: 54
End: 2024-06-17
Payer: MEDICARE

## 2024-06-17 ENCOUNTER — HOSPITAL ENCOUNTER (INPATIENT)
Facility: HOSPITAL | Age: 54
LOS: 15 days | Discharge: HOME | DRG: 620 | End: 2024-07-02
Attending: SURGERY | Admitting: SURGERY
Payer: MEDICARE

## 2024-06-17 DIAGNOSIS — E66.01 CLASS 2 SEVERE OBESITY WITH SERIOUS COMORBIDITY AND BODY MASS INDEX (BMI) OF 39.0 TO 39.9 IN ADULT, UNSPECIFIED OBESITY TYPE (HCC): ICD-10-CM

## 2024-06-17 DIAGNOSIS — E66.812 CLASS 2 SEVERE OBESITY WITH SERIOUS COMORBIDITY AND BODY MASS INDEX (BMI) OF 39.0 TO 39.9 IN ADULT, UNSPECIFIED OBESITY TYPE: ICD-10-CM

## 2024-06-17 DIAGNOSIS — E66.01 MORBID OBESITY (HCC): Primary | ICD-10-CM

## 2024-06-17 LAB
GLUCOSE BLD STRIP.AUTO-MCNC: 106 MG/DL (ref 65–117)
GLUCOSE BLD STRIP.AUTO-MCNC: 144 MG/DL (ref 65–117)
GLUCOSE BLD STRIP.AUTO-MCNC: 162 MG/DL (ref 65–117)
SERVICE CMNT-IMP: ABNORMAL
SERVICE CMNT-IMP: ABNORMAL
SERVICE CMNT-IMP: NORMAL

## 2024-06-17 PROCEDURE — 43644 LAP GASTRIC BYPASS/ROUX-EN-Y: CPT | Performed by: PHYSICIAN ASSISTANT

## 2024-06-17 PROCEDURE — 2709999900 HC NON-CHARGEABLE SUPPLY: Performed by: SURGERY

## 2024-06-17 PROCEDURE — 0DJ08ZZ INSPECTION OF UPPER INTESTINAL TRACT, VIA NATURAL OR ARTIFICIAL OPENING ENDOSCOPIC: ICD-10-PCS | Performed by: SURGERY

## 2024-06-17 PROCEDURE — 3700000000 HC ANESTHESIA ATTENDED CARE: Performed by: SURGERY

## 2024-06-17 PROCEDURE — 6370000000 HC RX 637 (ALT 250 FOR IP): Performed by: SURGERY

## 2024-06-17 PROCEDURE — 7100000000 HC PACU RECOVERY - FIRST 15 MIN: Performed by: SURGERY

## 2024-06-17 PROCEDURE — 2500000003 HC RX 250 WO HCPCS

## 2024-06-17 PROCEDURE — 6360000002 HC RX W HCPCS: Performed by: SURGERY

## 2024-06-17 PROCEDURE — 2580000003 HC RX 258: Performed by: SURGERY

## 2024-06-17 PROCEDURE — 3600000019 HC SURGERY ROBOT ADDTL 15MIN: Performed by: SURGERY

## 2024-06-17 PROCEDURE — S2900 ROBOTIC SURGICAL SYSTEM: HCPCS | Performed by: SURGERY

## 2024-06-17 PROCEDURE — 6360000002 HC RX W HCPCS: Performed by: STUDENT IN AN ORGANIZED HEALTH CARE EDUCATION/TRAINING PROGRAM

## 2024-06-17 PROCEDURE — 82962 GLUCOSE BLOOD TEST: CPT

## 2024-06-17 PROCEDURE — 2500000003 HC RX 250 WO HCPCS: Performed by: SURGERY

## 2024-06-17 PROCEDURE — 3600000009 HC SURGERY ROBOT BASE: Performed by: SURGERY

## 2024-06-17 PROCEDURE — 3700000001 HC ADD 15 MINUTES (ANESTHESIA): Performed by: SURGERY

## 2024-06-17 PROCEDURE — 6360000002 HC RX W HCPCS

## 2024-06-17 PROCEDURE — 8E0W4CZ ROBOTIC ASSISTED PROCEDURE OF TRUNK REGION, PERCUTANEOUS ENDOSCOPIC APPROACH: ICD-10-PCS | Performed by: SURGERY

## 2024-06-17 PROCEDURE — 7100000001 HC PACU RECOVERY - ADDTL 15 MIN: Performed by: SURGERY

## 2024-06-17 PROCEDURE — C1781 MESH (IMPLANTABLE): HCPCS | Performed by: SURGERY

## 2024-06-17 PROCEDURE — 1200000000 HC SEMI PRIVATE

## 2024-06-17 PROCEDURE — 2720000010 HC SURG SUPPLY STERILE: Performed by: SURGERY

## 2024-06-17 PROCEDURE — 0D164ZA BYPASS STOMACH TO JEJUNUM, PERCUTANEOUS ENDOSCOPIC APPROACH: ICD-10-PCS | Performed by: SURGERY

## 2024-06-17 RX ORDER — ENOXAPARIN SODIUM 100 MG/ML
40 INJECTION SUBCUTANEOUS EVERY 12 HOURS SCHEDULED
Status: DISCONTINUED | OUTPATIENT
Start: 2024-06-18 | End: 2024-07-02 | Stop reason: HOSPADM

## 2024-06-17 RX ORDER — CYANOCOBALAMIN 1000 UG/ML
1000 INJECTION, SOLUTION INTRAMUSCULAR; SUBCUTANEOUS ONCE
Status: COMPLETED | OUTPATIENT
Start: 2024-06-18 | End: 2024-06-18

## 2024-06-17 RX ORDER — SODIUM CHLORIDE 0.9 % (FLUSH) 0.9 %
5-40 SYRINGE (ML) INJECTION EVERY 12 HOURS SCHEDULED
Status: DISCONTINUED | OUTPATIENT
Start: 2024-06-17 | End: 2024-06-17 | Stop reason: HOSPADM

## 2024-06-17 RX ORDER — GABAPENTIN 250 MG/5ML
500 SOLUTION ORAL
Status: DISCONTINUED | OUTPATIENT
Start: 2024-06-17 | End: 2024-06-17 | Stop reason: HOSPADM

## 2024-06-17 RX ORDER — OXYCODONE HYDROCHLORIDE 5 MG/1
5 TABLET ORAL EVERY 4 HOURS PRN
Status: DISCONTINUED | OUTPATIENT
Start: 2024-06-17 | End: 2024-06-18

## 2024-06-17 RX ORDER — ONDANSETRON 2 MG/ML
4 INJECTION INTRAMUSCULAR; INTRAVENOUS EVERY 6 HOURS PRN
Status: DISCONTINUED | OUTPATIENT
Start: 2024-06-17 | End: 2024-07-02 | Stop reason: HOSPADM

## 2024-06-17 RX ORDER — SUCCINYLCHOLINE/SOD CL,ISO/PF 200MG/10ML
SYRINGE (ML) INTRAVENOUS PRN
Status: DISCONTINUED | OUTPATIENT
Start: 2024-06-17 | End: 2024-06-17 | Stop reason: SDUPTHER

## 2024-06-17 RX ORDER — ONDANSETRON 2 MG/ML
INJECTION INTRAMUSCULAR; INTRAVENOUS PRN
Status: DISCONTINUED | OUTPATIENT
Start: 2024-06-17 | End: 2024-06-17 | Stop reason: SDUPTHER

## 2024-06-17 RX ORDER — LIDOCAINE HYDROCHLORIDE ANHYDROUS AND DEXTROSE MONOHYDRATE 5; 400 G/100ML; MG/100ML
INJECTION, SOLUTION INTRAVENOUS CONTINUOUS PRN
Status: DISCONTINUED | OUTPATIENT
Start: 2024-06-17 | End: 2024-06-17 | Stop reason: SDUPTHER

## 2024-06-17 RX ORDER — GABAPENTIN 600 MG/1
300 TABLET ORAL 2 TIMES DAILY
Status: DISCONTINUED | OUTPATIENT
Start: 2024-06-17 | End: 2024-06-17 | Stop reason: SDUPTHER

## 2024-06-17 RX ORDER — LIDOCAINE HYDROCHLORIDE 20 MG/ML
INJECTION, SOLUTION EPIDURAL; INFILTRATION; INTRACAUDAL; PERINEURAL CONTINUOUS PRN
Status: DISCONTINUED | OUTPATIENT
Start: 2024-06-17 | End: 2024-06-17 | Stop reason: SDUPTHER

## 2024-06-17 RX ORDER — FENTANYL CITRATE 50 UG/ML
25 INJECTION, SOLUTION INTRAMUSCULAR; INTRAVENOUS EVERY 5 MIN PRN
Status: DISCONTINUED | OUTPATIENT
Start: 2024-06-17 | End: 2024-06-17 | Stop reason: HOSPADM

## 2024-06-17 RX ORDER — THIAMINE HYDROCHLORIDE 100 MG/ML
100 INJECTION, SOLUTION INTRAMUSCULAR; INTRAVENOUS ONCE
Status: COMPLETED | OUTPATIENT
Start: 2024-06-18 | End: 2024-06-18

## 2024-06-17 RX ORDER — HYDRALAZINE HYDROCHLORIDE 20 MG/ML
20 INJECTION INTRAMUSCULAR; INTRAVENOUS EVERY 4 HOURS PRN
Status: DISCONTINUED | OUTPATIENT
Start: 2024-06-17 | End: 2024-07-02 | Stop reason: HOSPADM

## 2024-06-17 RX ORDER — DEXMEDETOMIDINE HYDROCHLORIDE 100 UG/ML
INJECTION, SOLUTION INTRAVENOUS PRN
Status: DISCONTINUED | OUTPATIENT
Start: 2024-06-17 | End: 2024-06-17 | Stop reason: SDUPTHER

## 2024-06-17 RX ORDER — SIMETHICONE 80 MG
125 TABLET,CHEWABLE ORAL EVERY 6 HOURS PRN
Status: DISCONTINUED | OUTPATIENT
Start: 2024-06-17 | End: 2024-07-02 | Stop reason: HOSPADM

## 2024-06-17 RX ORDER — HYDROXYZINE HYDROCHLORIDE 10 MG/1
10 TABLET, FILM COATED ORAL EVERY 8 HOURS PRN
Status: DISCONTINUED | OUTPATIENT
Start: 2024-06-17 | End: 2024-07-01

## 2024-06-17 RX ORDER — SODIUM CHLORIDE 9 MG/ML
INJECTION, SOLUTION INTRAVENOUS PRN
Status: DISCONTINUED | OUTPATIENT
Start: 2024-06-17 | End: 2024-07-02 | Stop reason: HOSPADM

## 2024-06-17 RX ORDER — ACETAMINOPHEN 160 MG/5ML
1000 LIQUID ORAL
Status: DISCONTINUED | OUTPATIENT
Start: 2024-06-17 | End: 2024-06-17 | Stop reason: HOSPADM

## 2024-06-17 RX ORDER — ROCURONIUM BROMIDE 10 MG/ML
INJECTION, SOLUTION INTRAVENOUS PRN
Status: DISCONTINUED | OUTPATIENT
Start: 2024-06-17 | End: 2024-06-17 | Stop reason: SDUPTHER

## 2024-06-17 RX ORDER — AMITRIPTYLINE HYDROCHLORIDE 25 MG/1
50 TABLET, FILM COATED ORAL NIGHTLY
Status: DISCONTINUED | OUTPATIENT
Start: 2024-06-17 | End: 2024-07-02 | Stop reason: HOSPADM

## 2024-06-17 RX ORDER — DIPHENHYDRAMINE HYDROCHLORIDE 50 MG/ML
12.5 INJECTION INTRAMUSCULAR; INTRAVENOUS EVERY 6 HOURS PRN
Status: DISCONTINUED | OUTPATIENT
Start: 2024-06-17 | End: 2024-06-23

## 2024-06-17 RX ORDER — ONDANSETRON 4 MG/1
4 TABLET, ORALLY DISINTEGRATING ORAL EVERY 8 HOURS PRN
Status: DISCONTINUED | OUTPATIENT
Start: 2024-06-17 | End: 2024-07-02 | Stop reason: HOSPADM

## 2024-06-17 RX ORDER — SODIUM CHLORIDE 9 MG/ML
INJECTION, SOLUTION INTRAVENOUS PRN
Status: DISCONTINUED | OUTPATIENT
Start: 2024-06-17 | End: 2024-06-17 | Stop reason: HOSPADM

## 2024-06-17 RX ORDER — MORPHINE SULFATE 4 MG/ML
4 INJECTION, SOLUTION INTRAMUSCULAR; INTRAVENOUS
Status: DISCONTINUED | OUTPATIENT
Start: 2024-06-17 | End: 2024-06-18

## 2024-06-17 RX ORDER — SODIUM CHLORIDE 0.9 % (FLUSH) 0.9 %
5-40 SYRINGE (ML) INJECTION EVERY 12 HOURS SCHEDULED
Status: DISCONTINUED | OUTPATIENT
Start: 2024-06-17 | End: 2024-07-02 | Stop reason: HOSPADM

## 2024-06-17 RX ORDER — SODIUM CHLORIDE AND POTASSIUM CHLORIDE 150; 450 MG/100ML; MG/100ML
100 INJECTION, SOLUTION INTRAVENOUS CONTINUOUS
Status: DISCONTINUED | OUTPATIENT
Start: 2024-06-17 | End: 2024-06-18

## 2024-06-17 RX ORDER — LORAZEPAM 2 MG/ML
1 INJECTION INTRAMUSCULAR EVERY 6 HOURS PRN
Status: DISCONTINUED | OUTPATIENT
Start: 2024-06-17 | End: 2024-07-02 | Stop reason: HOSPADM

## 2024-06-17 RX ORDER — BUPIVACAINE HYDROCHLORIDE AND EPINEPHRINE 5; 5 MG/ML; UG/ML
INJECTION, SOLUTION PERINEURAL PRN
Status: DISCONTINUED | OUTPATIENT
Start: 2024-06-17 | End: 2024-06-17 | Stop reason: HOSPADM

## 2024-06-17 RX ORDER — HYDRALAZINE HYDROCHLORIDE 20 MG/ML
10 INJECTION INTRAMUSCULAR; INTRAVENOUS
Status: DISCONTINUED | OUTPATIENT
Start: 2024-06-17 | End: 2024-06-17 | Stop reason: HOSPADM

## 2024-06-17 RX ORDER — GABAPENTIN 600 MG/1
600 TABLET ORAL 2 TIMES DAILY
Status: DISCONTINUED | OUTPATIENT
Start: 2024-06-17 | End: 2024-06-21

## 2024-06-17 RX ORDER — PROCHLORPERAZINE EDISYLATE 5 MG/ML
10 INJECTION INTRAMUSCULAR; INTRAVENOUS EVERY 6 HOURS PRN
Status: DISCONTINUED | OUTPATIENT
Start: 2024-06-17 | End: 2024-07-02 | Stop reason: HOSPADM

## 2024-06-17 RX ORDER — PROCHLORPERAZINE EDISYLATE 5 MG/ML
5 INJECTION INTRAMUSCULAR; INTRAVENOUS
Status: DISCONTINUED | OUTPATIENT
Start: 2024-06-17 | End: 2024-06-17 | Stop reason: HOSPADM

## 2024-06-17 RX ORDER — SODIUM CHLORIDE 0.9 % (FLUSH) 0.9 %
5-40 SYRINGE (ML) INJECTION PRN
Status: DISCONTINUED | OUTPATIENT
Start: 2024-06-17 | End: 2024-06-17 | Stop reason: HOSPADM

## 2024-06-17 RX ORDER — ONDANSETRON 2 MG/ML
4 INJECTION INTRAMUSCULAR; INTRAVENOUS
Status: COMPLETED | OUTPATIENT
Start: 2024-06-17 | End: 2024-06-17

## 2024-06-17 RX ORDER — LOSARTAN POTASSIUM 50 MG/1
50 TABLET ORAL DAILY
Status: DISCONTINUED | OUTPATIENT
Start: 2024-06-17 | End: 2024-06-22

## 2024-06-17 RX ORDER — SODIUM CHLORIDE 0.9 % (FLUSH) 0.9 %
5-40 SYRINGE (ML) INJECTION PRN
Status: DISCONTINUED | OUTPATIENT
Start: 2024-06-17 | End: 2024-07-02 | Stop reason: HOSPADM

## 2024-06-17 RX ORDER — KETAMINE HCL IN NACL, ISO-OSM 100MG/10ML
SYRINGE (ML) INJECTION PRN
Status: DISCONTINUED | OUTPATIENT
Start: 2024-06-17 | End: 2024-06-17 | Stop reason: SDUPTHER

## 2024-06-17 RX ORDER — ALBUTEROL SULFATE 2.5 MG/3ML
2.5 SOLUTION RESPIRATORY (INHALATION) EVERY 6 HOURS PRN
Status: DISCONTINUED | OUTPATIENT
Start: 2024-06-17 | End: 2024-07-02 | Stop reason: HOSPADM

## 2024-06-17 RX ORDER — NALOXONE HYDROCHLORIDE 0.4 MG/ML
INJECTION, SOLUTION INTRAMUSCULAR; INTRAVENOUS; SUBCUTANEOUS PRN
Status: DISCONTINUED | OUTPATIENT
Start: 2024-06-17 | End: 2024-06-17 | Stop reason: HOSPADM

## 2024-06-17 RX ORDER — FENTANYL CITRATE 50 UG/ML
INJECTION, SOLUTION INTRAMUSCULAR; INTRAVENOUS PRN
Status: DISCONTINUED | OUTPATIENT
Start: 2024-06-17 | End: 2024-06-17 | Stop reason: SDUPTHER

## 2024-06-17 RX ORDER — METRONIDAZOLE 500 MG/100ML
500 INJECTION, SOLUTION INTRAVENOUS
Status: DISCONTINUED | OUTPATIENT
Start: 2024-06-17 | End: 2024-06-17 | Stop reason: HOSPADM

## 2024-06-17 RX ORDER — DEXAMETHASONE SODIUM PHOSPHATE 4 MG/ML
INJECTION, SOLUTION INTRA-ARTICULAR; INTRALESIONAL; INTRAMUSCULAR; INTRAVENOUS; SOFT TISSUE PRN
Status: DISCONTINUED | OUTPATIENT
Start: 2024-06-17 | End: 2024-06-17 | Stop reason: SDUPTHER

## 2024-06-17 RX ORDER — CYCLOBENZAPRINE HCL 10 MG
10 TABLET ORAL 3 TIMES DAILY PRN
Status: DISCONTINUED | OUTPATIENT
Start: 2024-06-17 | End: 2024-06-18

## 2024-06-17 RX ORDER — SCOLOPAMINE TRANSDERMAL SYSTEM 1 MG/1
1 PATCH, EXTENDED RELEASE TRANSDERMAL
Status: DISCONTINUED | OUTPATIENT
Start: 2024-06-17 | End: 2024-06-17

## 2024-06-17 RX ORDER — OXYCODONE HYDROCHLORIDE 5 MG/1
10 TABLET ORAL EVERY 4 HOURS PRN
Status: DISCONTINUED | OUTPATIENT
Start: 2024-06-17 | End: 2024-06-18

## 2024-06-17 RX ORDER — METOCLOPRAMIDE HYDROCHLORIDE 5 MG/ML
10 INJECTION INTRAMUSCULAR; INTRAVENOUS EVERY 6 HOURS PRN
Status: DISCONTINUED | OUTPATIENT
Start: 2024-06-17 | End: 2024-06-22

## 2024-06-17 RX ORDER — ACETAMINOPHEN 160 MG/5ML
1000 LIQUID ORAL EVERY 6 HOURS PRN
Status: DISCONTINUED | OUTPATIENT
Start: 2024-06-17 | End: 2024-07-02 | Stop reason: HOSPADM

## 2024-06-17 RX ORDER — SODIUM CHLORIDE, SODIUM LACTATE, POTASSIUM CHLORIDE, CALCIUM CHLORIDE 600; 310; 30; 20 MG/100ML; MG/100ML; MG/100ML; MG/100ML
INJECTION, SOLUTION INTRAVENOUS CONTINUOUS
Status: DISCONTINUED | OUTPATIENT
Start: 2024-06-17 | End: 2024-06-17 | Stop reason: HOSPADM

## 2024-06-17 RX ORDER — SCOLOPAMINE TRANSDERMAL SYSTEM 1 MG/1
1 PATCH, EXTENDED RELEASE TRANSDERMAL
Status: DISCONTINUED | OUTPATIENT
Start: 2024-06-20 | End: 2024-06-26

## 2024-06-17 RX ORDER — PROPOFOL 10 MG/ML
INJECTION, EMULSION INTRAVENOUS PRN
Status: DISCONTINUED | OUTPATIENT
Start: 2024-06-17 | End: 2024-06-17 | Stop reason: SDUPTHER

## 2024-06-17 RX ORDER — MIDAZOLAM HYDROCHLORIDE 1 MG/ML
INJECTION INTRAMUSCULAR; INTRAVENOUS PRN
Status: DISCONTINUED | OUTPATIENT
Start: 2024-06-17 | End: 2024-06-17 | Stop reason: SDUPTHER

## 2024-06-17 RX ADMIN — ROCURONIUM BROMIDE 10 MG: 10 INJECTION, SOLUTION INTRAVENOUS at 11:11

## 2024-06-17 RX ADMIN — WATER 2000 MG: 1 INJECTION INTRAMUSCULAR; INTRAVENOUS; SUBCUTANEOUS at 10:15

## 2024-06-17 RX ADMIN — SODIUM CHLORIDE, POTASSIUM CHLORIDE, SODIUM LACTATE AND CALCIUM CHLORIDE: 600; 310; 30; 20 INJECTION, SOLUTION INTRAVENOUS at 09:37

## 2024-06-17 RX ADMIN — LIDOCAINE HYDROCHLORIDE 1 MG/KG/HR: 20 INJECTION, SOLUTION EPIDURAL; INFILTRATION; INTRACAUDAL; PERINEURAL at 10:18

## 2024-06-17 RX ADMIN — ROCURONIUM BROMIDE 40 MG: 10 INJECTION, SOLUTION INTRAVENOUS at 10:12

## 2024-06-17 RX ADMIN — AMITRIPTYLINE HYDROCHLORIDE 50 MG: 50 TABLET, FILM COATED ORAL at 21:18

## 2024-06-17 RX ADMIN — METRONIDAZOLE 500 MG: 500 SOLUTION INTRAVENOUS at 10:15

## 2024-06-17 RX ADMIN — SUGAMMADEX 300 MG: 100 INJECTION, SOLUTION INTRAVENOUS at 11:29

## 2024-06-17 RX ADMIN — DEXMEDETOMIDINE HYDROCHLORIDE 8 MCG: 100 INJECTION, SOLUTION, CONCENTRATE INTRAVENOUS at 10:43

## 2024-06-17 RX ADMIN — FENTANYL CITRATE 25 MCG: 50 INJECTION INTRAMUSCULAR; INTRAVENOUS at 12:01

## 2024-06-17 RX ADMIN — DEXMEDETOMIDINE HYDROCHLORIDE 4 MCG: 100 INJECTION, SOLUTION, CONCENTRATE INTRAVENOUS at 10:51

## 2024-06-17 RX ADMIN — ONDANSETRON 4 MG: 2 INJECTION INTRAMUSCULAR; INTRAVENOUS at 11:31

## 2024-06-17 RX ADMIN — LIDOCAINE HYDROCHLORIDE 1 MG/KG/HR: 4 INJECTION, SOLUTION INTRAVENOUS at 10:35

## 2024-06-17 RX ADMIN — POTASSIUM CHLORIDE AND SODIUM CHLORIDE 100 ML/HR: 450; 150 INJECTION, SOLUTION INTRAVENOUS at 12:19

## 2024-06-17 RX ADMIN — GABAPENTIN 500 MG: 250 SOLUTION ORAL at 09:19

## 2024-06-17 RX ADMIN — MORPHINE SULFATE 4 MG: 4 INJECTION, SOLUTION INTRAMUSCULAR; INTRAVENOUS at 14:21

## 2024-06-17 RX ADMIN — GABAPENTIN 600 MG: 600 TABLET, FILM COATED ORAL at 21:18

## 2024-06-17 RX ADMIN — MIDAZOLAM 2 MG: 1 INJECTION INTRAMUSCULAR; INTRAVENOUS at 09:59

## 2024-06-17 RX ADMIN — ACETAMINOPHEN 1000 MG: 650 SOLUTION ORAL at 09:19

## 2024-06-17 RX ADMIN — LIDOCAINE HYDROCHLORIDE 100 MG/KG/HR: 20 INJECTION, SOLUTION EPIDURAL; INFILTRATION; INTRACAUDAL; PERINEURAL at 10:06

## 2024-06-17 RX ADMIN — DEXAMETHASONE SODIUM PHOSPHATE 4 MG: 4 INJECTION INTRA-ARTICULAR; INTRALESIONAL; INTRAMUSCULAR; INTRAVENOUS; SOFT TISSUE at 10:21

## 2024-06-17 RX ADMIN — ROCURONIUM BROMIDE 10 MG: 10 INJECTION, SOLUTION INTRAVENOUS at 10:06

## 2024-06-17 RX ADMIN — MORPHINE SULFATE 4 MG: 4 INJECTION, SOLUTION INTRAMUSCULAR; INTRAVENOUS at 18:19

## 2024-06-17 RX ADMIN — Medication 50 MG: at 10:20

## 2024-06-17 RX ADMIN — ONDANSETRON 4 MG: 4 TABLET, ORALLY DISINTEGRATING ORAL at 21:27

## 2024-06-17 RX ADMIN — ONDANSETRON 4 MG: 2 INJECTION INTRAMUSCULAR; INTRAVENOUS at 14:20

## 2024-06-17 RX ADMIN — Medication 200 MG: at 10:06

## 2024-06-17 RX ADMIN — PROPOFOL 200 MG: 10 INJECTION, EMULSION INTRAVENOUS at 10:06

## 2024-06-17 RX ADMIN — PROCHLORPERAZINE EDISYLATE 10 MG: 5 INJECTION INTRAMUSCULAR; INTRAVENOUS at 18:30

## 2024-06-17 RX ADMIN — FENTANYL CITRATE 100 MCG: 50 INJECTION, SOLUTION INTRAMUSCULAR; INTRAVENOUS at 10:06

## 2024-06-17 RX ADMIN — ONDANSETRON 4 MG: 2 INJECTION INTRAMUSCULAR; INTRAVENOUS at 13:04

## 2024-06-17 ASSESSMENT — PAIN SCALES - GENERAL
PAINLEVEL_OUTOF10: 10
PAINLEVEL_OUTOF10: 0
PAINLEVEL_OUTOF10: 10

## 2024-06-17 ASSESSMENT — PAIN - FUNCTIONAL ASSESSMENT: PAIN_FUNCTIONAL_ASSESSMENT: 0-10

## 2024-06-17 ASSESSMENT — LIFESTYLE VARIABLES: SMOKING_STATUS: 1

## 2024-06-17 ASSESSMENT — PAIN DESCRIPTION - LOCATION
LOCATION: ABDOMEN
LOCATION: ABDOMEN

## 2024-06-17 NOTE — PERIOP NOTE
TRANSFER - OUT REPORT:    Verbal report given to Sun LOZANO(name) on Ce Roberts  being transferred to 1146(unit) for routine post-op       Report consisted of patient’s Situation, Background, Assessment and   Recommendations(SBAR).     Time Pre op antibiotic given:1146  Anesthesia Stop time: 1015    Information from the following report(s) SBAR, OR Summary, Intake/Output, and MAR was reviewed with the receiving nurse.    Opportunity for questions and clarification was provided.     Is the patient on 02? Yes       L/Min 2       Other     Is the patient on a monitor?  NO    Is the nurse transporting with the patient? No    Surgical Waiting Area notified of patient's transfer from PACU? Yes

## 2024-06-17 NOTE — ANESTHESIA POSTPROCEDURE EVALUATION
Department of Anesthesiology  Postprocedure Note    Patient: Ce Roberts  MRN: [id number]  YOB: 1970  Date of evaluation: 6/17/2024    Procedure Summary       Date: 06/17/24 Room / Location: [facility] MAIN OR [address] [place] [address] MAIN OR    Anesthesia Start: 0959 Anesthesia Stop: 1149    Procedure: ROBOTIC LADI-EN-Y GASTRIC BYPASS, ESOPHAGOGASTRODUODENOSCOPY (ERAS) (Abdomen) Diagnosis:       Obesity, unspecified      (Obesity, unspecified [E66.9])    Providers: Kevin Bolaños MD Responsible Provider: Concepcion Sim DO    Anesthesia Type: General ASA Status: 3            Anesthesia Type: General    Shayna Phase I: Shayna Score: 8    Shayna Phase II:      Anesthesia Post Evaluation    Patient location during evaluation: PACU  Patient participation: complete - patient participated  Level of consciousness: sleepy but conscious  Pain score: 0  Airway patency: patent  Nausea & Vomiting: no nausea and no vomiting  Cardiovascular status: hemodynamically stable  Respiratory status: acceptable  Hydration status: euvolemic  Pain management: adequate    No notable events documented.

## 2024-06-17 NOTE — OP NOTE
OPERATIVE NOTE    Date of Procedure: 6/17/2024     Preoperative Diagnosis: Obesity, unspecified [E66.9]  Postoperative Diagnosis: Post-Op Diagnosis Codes:     * Obesity, unspecified [E66.9]      Procedure: Procedure(s):  ROBOTIC LADI-EN-Y GASTRIC BYPASS, ESOPHAGOGASTRODUODENOSCOPY (ERAS)    Surgeon: Kevin Bolaños MD  Assistant(s): INGA Bowers - They were critically important in the exposure and key portions of the case including entry, exposure, instrument exchange, and closure of the abdomen.  Surgical Staff: Circulator: Sadaf Luna RN  Relief Scrub: Katelin Gómez RN  Scrub Person First: Jessica Esparza RN  Scrub Person Second: Mitzy Allen RN  Physician Assistant: Donna Tucker PA    Anesthesia: General   Indications: 53 y/o F presents with morbid obesity here for RNYGB  Findings: normal intra-abdominal anatomy    Description of Operation: Ce Roberts was identified in the pre-operative holding area. Informed consent was obtained after a complete discussion of risks, benefits and alternatives to surgery were had with the patient.    The patient was brought back to the operating room and placed under general endotracheal anesthesia in the supine position on the operating room table. The patient was then prepped and draped in the usual sterile fashion. A timeout was performed.       We then injected local anesthetic 15 cm below the Xiphoid to the left of midline. A 5 mm incision was then made using an 11 blade. We entered the abdomen safely with a 5 mm trocar and insufflated the abdomen. I placed two 8 mm trochars in the left mid abdomen followed by a 12 mm trocar on the right mid abdomen and an 8 mm trocar laterally. We then upsized the initial entry trocar to an 8 trocar. We placed the patient in reverse Trendelenburg. We then inserted the Jenny retractor in the epigastrium.    We then docked the robot.     Initial inspection did not demonstrate a hiatal hernia. We began our

## 2024-06-17 NOTE — INTERVAL H&P NOTE
Update History & Physical    The patient's History and Physical of 6/10/24 was reviewed with the patient and I examined the patient. There was no change. The surgical site was confirmed by the patient and me.     Plan: The risks, benefits, expected outcome, and alternative to the recommended procedure have been discussed with the patient. Patient understands and wants to proceed with the procedure.     Electronically signed by Kevin Bolaños MD on 6/17/2024 at 9:15 AM

## 2024-06-17 NOTE — ANESTHESIA PRE PROCEDURE
Department of Anesthesiology  Preprocedure Note       Name:  Ce Roberts   Age:  54 y.o.  :  1970                                          MRN:  [id number]         Date:  2024      Surgeon: Surgeon(s):  Kevin Bolaños MD    Procedure: Procedure(s):  ROBOTIC LADI-EN-Y GASTRIC BYPASS, ESOPHAGOGASTRODUODENOSCOPY (ERAS)    Medications prior to admission:   Prior to Admission medications    Medication Sig Start Date End Date Taking? Authorizing Provider   LINZESS 72 MCG CAPS capsule 1 capsule at least 30 minutes before the first meal of the day on an empty stomach Orally Once a day for 30 day(s)    ProviderSamson MD   polyethylene glycol (GLYCOLAX) 17 GM/SCOOP powder Take 17 g by mouth daily  Patient not taking: Reported on 6/10/2024 6/5/24 9/3/24  Urmila Esqueda APRN - NP   ondansetron (ZOFRAN) 4 MG tablet Take 1 tablet by mouth every 8 hours as needed for Nausea or Vomiting  Patient not taking: Reported on 6/10/2024 6/5/24   Urmila Esqueda APRN - NP   omeprazole (PRILOSEC) 40 MG delayed release capsule Take 1 capsule by mouth every morning (before breakfast)  Patient not taking: Reported on 6/10/2024 6/5/24   Urmila Esqueda APRN - NP   Tirzepatide (MOUNJARO) 12.5 MG/0.5ML SOPN SC injection Inject 0.5 mLs into the skin once a week 6/3/24   Ulisses Joy MD   NONFORMULARY Place 1 patch onto the skin every 7 days Buprenorphine patches    EVERY     ProviderSamson MD   gabapentin (NEURONTIN) 600 MG tablet Take 1 tablet by mouth 2 times daily. Max Daily Amount: 1,200 mg 3/20/24 3/20/25  Ulisses Joy MD   ALPRAZolam (XANAX) 0.5 MG tablet Take 1 tablet by mouth daily as needed for Anxiety. 2/15/24 2/14/25  Ulisses Joy MD   bumetanide (BUMEX) 2 MG tablet TAKE 1 TABLET BY MOUTH EVERY DAY 24   Ulisses Joy MD   albuterol sulfate HFA (VENTOLIN HFA) 108 (90 Base) MCG/ACT inhaler Inhale 2 puffs into the lungs 4 times daily as needed for Wheezing 24   Marco Oneal MD

## 2024-06-18 LAB
ANION GAP SERPL CALC-SCNC: 5 MMOL/L (ref 5–15)
BASOPHILS # BLD: 0 K/UL (ref 0–0.1)
BASOPHILS NFR BLD: 0 % (ref 0–1)
BUN SERPL-MCNC: 13 MG/DL (ref 6–20)
BUN/CREAT SERPL: 15 (ref 12–20)
CALCIUM SERPL-MCNC: 9.3 MG/DL (ref 8.5–10.1)
CHLORIDE SERPL-SCNC: 107 MMOL/L (ref 97–108)
CO2 SERPL-SCNC: 24 MMOL/L (ref 21–32)
CREAT SERPL-MCNC: 0.84 MG/DL (ref 0.55–1.02)
DIFFERENTIAL METHOD BLD: ABNORMAL
EOSINOPHIL # BLD: 0 K/UL (ref 0–0.4)
EOSINOPHIL NFR BLD: 0 % (ref 0–7)
ERYTHROCYTE [DISTWIDTH] IN BLOOD BY AUTOMATED COUNT: 14.1 % (ref 11.5–14.5)
GLUCOSE SERPL-MCNC: 106 MG/DL (ref 65–100)
HCT VFR BLD AUTO: 41.1 % (ref 35–47)
HGB BLD-MCNC: 13.5 G/DL (ref 11.5–16)
IMM GRANULOCYTES # BLD AUTO: 0.1 K/UL (ref 0–0.04)
IMM GRANULOCYTES NFR BLD AUTO: 0 % (ref 0–0.5)
LYMPHOCYTES # BLD: 1.4 K/UL (ref 0.8–3.5)
LYMPHOCYTES NFR BLD: 11 % (ref 12–49)
MCH RBC QN AUTO: 29 PG (ref 26–34)
MCHC RBC AUTO-ENTMCNC: 32.8 G/DL (ref 30–36.5)
MCV RBC AUTO: 88.4 FL (ref 80–99)
MONOCYTES # BLD: 0.4 K/UL (ref 0–1)
MONOCYTES NFR BLD: 3 % (ref 5–13)
NEUTS SEG # BLD: 10.5 K/UL (ref 1.8–8)
NEUTS SEG NFR BLD: 85 % (ref 32–75)
NRBC # BLD: 0 K/UL (ref 0–0.01)
NRBC BLD-RTO: 0 PER 100 WBC
PLATELET # BLD AUTO: 290 K/UL (ref 150–400)
PMV BLD AUTO: 11.6 FL (ref 8.9–12.9)
POTASSIUM SERPL-SCNC: 5.2 MMOL/L (ref 3.5–5.1)
RBC # BLD AUTO: 4.65 M/UL (ref 3.8–5.2)
SODIUM SERPL-SCNC: 136 MMOL/L (ref 136–145)
WBC # BLD AUTO: 12.3 K/UL (ref 3.6–11)

## 2024-06-18 PROCEDURE — 36415 COLL VENOUS BLD VENIPUNCTURE: CPT

## 2024-06-18 PROCEDURE — 6370000000 HC RX 637 (ALT 250 FOR IP): Performed by: SURGERY

## 2024-06-18 PROCEDURE — 85025 COMPLETE CBC W/AUTO DIFF WBC: CPT

## 2024-06-18 PROCEDURE — 1200000000 HC SEMI PRIVATE

## 2024-06-18 PROCEDURE — 6360000002 HC RX W HCPCS: Performed by: SURGERY

## 2024-06-18 PROCEDURE — C9113 INJ PANTOPRAZOLE SODIUM, VIA: HCPCS | Performed by: SURGERY

## 2024-06-18 PROCEDURE — 80048 BASIC METABOLIC PNL TOTAL CA: CPT

## 2024-06-18 PROCEDURE — 2580000003 HC RX 258: Performed by: SURGERY

## 2024-06-18 PROCEDURE — 94760 N-INVAS EAR/PLS OXIMETRY 1: CPT

## 2024-06-18 PROCEDURE — 6370000000 HC RX 637 (ALT 250 FOR IP): Performed by: NURSE PRACTITIONER

## 2024-06-18 PROCEDURE — 2700000000 HC OXYGEN THERAPY PER DAY

## 2024-06-18 PROCEDURE — 99024 POSTOP FOLLOW-UP VISIT: CPT | Performed by: SURGERY

## 2024-06-18 RX ORDER — CYCLOBENZAPRINE HCL 10 MG
10 TABLET ORAL 3 TIMES DAILY PRN
Qty: 30 TABLET | Refills: 0 | Status: SHIPPED | OUTPATIENT
Start: 2024-06-18 | End: 2024-06-28

## 2024-06-18 RX ORDER — OXYCODONE HYDROCHLORIDE 5 MG/1
15 TABLET ORAL EVERY 4 HOURS PRN
Status: DISCONTINUED | OUTPATIENT
Start: 2024-06-18 | End: 2024-06-18

## 2024-06-18 RX ORDER — OXYCODONE HYDROCHLORIDE 5 MG/1
10 TABLET ORAL EVERY 4 HOURS PRN
Status: DISCONTINUED | OUTPATIENT
Start: 2024-06-18 | End: 2024-06-18

## 2024-06-18 RX ORDER — HYDROCODONE BITARTRATE AND ACETAMINOPHEN 5; 325 MG/1; MG/1
1 TABLET ORAL EVERY 4 HOURS PRN
Status: DISCONTINUED | OUTPATIENT
Start: 2024-06-18 | End: 2024-06-19

## 2024-06-18 RX ORDER — OXYCODONE HYDROCHLORIDE 10 MG/1
10 TABLET ORAL EVERY 6 HOURS PRN
Qty: 25 TABLET | Refills: 0 | Status: SHIPPED | OUTPATIENT
Start: 2024-06-18 | End: 2024-06-19 | Stop reason: HOSPADM

## 2024-06-18 RX ORDER — HYDROCODONE BITARTRATE AND ACETAMINOPHEN 5; 325 MG/1; MG/1
2 TABLET ORAL EVERY 4 HOURS PRN
Status: DISCONTINUED | OUTPATIENT
Start: 2024-06-18 | End: 2024-06-19

## 2024-06-18 RX ORDER — MORPHINE SULFATE 4 MG/ML
6 INJECTION, SOLUTION INTRAMUSCULAR; INTRAVENOUS
Status: DISCONTINUED | OUTPATIENT
Start: 2024-06-18 | End: 2024-06-27

## 2024-06-18 RX ORDER — SODIUM CHLORIDE 9 MG/ML
INJECTION, SOLUTION INTRAVENOUS CONTINUOUS
Status: DISCONTINUED | OUTPATIENT
Start: 2024-06-18 | End: 2024-06-22

## 2024-06-18 RX ORDER — CYCLOBENZAPRINE HCL 10 MG
10 TABLET ORAL 3 TIMES DAILY
Status: DISCONTINUED | OUTPATIENT
Start: 2024-06-18 | End: 2024-06-23

## 2024-06-18 RX ADMIN — MORPHINE SULFATE 6 MG: 4 INJECTION, SOLUTION INTRAMUSCULAR; INTRAVENOUS at 09:30

## 2024-06-18 RX ADMIN — ENOXAPARIN SODIUM 40 MG: 100 INJECTION SUBCUTANEOUS at 20:18

## 2024-06-18 RX ADMIN — MORPHINE SULFATE 6 MG: 4 INJECTION, SOLUTION INTRAMUSCULAR; INTRAVENOUS at 19:11

## 2024-06-18 RX ADMIN — CYCLOBENZAPRINE 10 MG: 10 TABLET, FILM COATED ORAL at 20:18

## 2024-06-18 RX ADMIN — THIAMINE HYDROCHLORIDE 100 MG: 100 INJECTION, SOLUTION INTRAMUSCULAR; INTRAVENOUS at 09:37

## 2024-06-18 RX ADMIN — ENOXAPARIN SODIUM 40 MG: 100 INJECTION SUBCUTANEOUS at 09:37

## 2024-06-18 RX ADMIN — CYANOCOBALAMIN 1000 MCG: 1000 INJECTION, SOLUTION INTRAMUSCULAR at 09:38

## 2024-06-18 RX ADMIN — SODIUM CHLORIDE: 9 INJECTION, SOLUTION INTRAVENOUS at 07:14

## 2024-06-18 RX ADMIN — ONDANSETRON 4 MG: 4 TABLET, ORALLY DISINTEGRATING ORAL at 16:13

## 2024-06-18 RX ADMIN — SODIUM CHLORIDE, PRESERVATIVE FREE 40 MG: 5 INJECTION INTRAVENOUS at 09:38

## 2024-06-18 RX ADMIN — CYCLOBENZAPRINE 10 MG: 10 TABLET, FILM COATED ORAL at 09:38

## 2024-06-18 RX ADMIN — LOSARTAN POTASSIUM 50 MG: 50 TABLET, FILM COATED ORAL at 09:35

## 2024-06-18 RX ADMIN — CYCLOBENZAPRINE 10 MG: 10 TABLET, FILM COATED ORAL at 14:01

## 2024-06-18 RX ADMIN — AMITRIPTYLINE HYDROCHLORIDE 50 MG: 50 TABLET, FILM COATED ORAL at 20:18

## 2024-06-18 RX ADMIN — GABAPENTIN 600 MG: 600 TABLET, FILM COATED ORAL at 09:35

## 2024-06-18 RX ADMIN — GABAPENTIN 600 MG: 600 TABLET, FILM COATED ORAL at 20:18

## 2024-06-18 RX ADMIN — DIPHENHYDRAMINE HYDROCHLORIDE 12.5 MG: 50 INJECTION, SOLUTION INTRAMUSCULAR; INTRAVENOUS at 20:37

## 2024-06-18 RX ADMIN — HYDROCODONE BITARTRATE AND ACETAMINOPHEN 2 TABLET: 5; 325 TABLET ORAL at 20:36

## 2024-06-18 RX ADMIN — SODIUM CHLORIDE: 9 INJECTION, SOLUTION INTRAVENOUS at 14:07

## 2024-06-18 RX ADMIN — MORPHINE SULFATE 4 MG: 4 INJECTION, SOLUTION INTRAMUSCULAR; INTRAVENOUS at 00:33

## 2024-06-18 RX ADMIN — MORPHINE SULFATE 4 MG: 4 INJECTION, SOLUTION INTRAMUSCULAR; INTRAVENOUS at 07:13

## 2024-06-18 RX ADMIN — PROCHLORPERAZINE EDISYLATE 10 MG: 5 INJECTION INTRAMUSCULAR; INTRAVENOUS at 09:31

## 2024-06-18 RX ADMIN — SODIUM CHLORIDE, PRESERVATIVE FREE 10 ML: 5 INJECTION INTRAVENOUS at 09:38

## 2024-06-18 ASSESSMENT — PAIN DESCRIPTION - ORIENTATION
ORIENTATION: RIGHT
ORIENTATION: MID;RIGHT;LEFT;LOWER;UPPER
ORIENTATION: RIGHT

## 2024-06-18 ASSESSMENT — PAIN DESCRIPTION - DESCRIPTORS
DESCRIPTORS: ACHING
DESCRIPTORS: ACHING;SORE
DESCRIPTORS: ACHING
DESCRIPTORS: ACHING
DESCRIPTORS: ACHING;SORE;TENDER;SHARP

## 2024-06-18 ASSESSMENT — PAIN SCALES - GENERAL
PAINLEVEL_OUTOF10: 8
PAINLEVEL_OUTOF10: 9
PAINLEVEL_OUTOF10: 9
PAINLEVEL_OUTOF10: 8
PAINLEVEL_OUTOF10: 6
PAINLEVEL_OUTOF10: 9
PAINLEVEL_OUTOF10: 0
PAINLEVEL_OUTOF10: 0
PAINLEVEL_OUTOF10: 10

## 2024-06-18 ASSESSMENT — PAIN DESCRIPTION - LOCATION
LOCATION: ABDOMEN

## 2024-06-18 NOTE — PROGRESS NOTES
Surgery Progress Note    6/18/2024    Admit Date: 6/17/2024  8:05 AM    CC: Abd pain    POD: 1 bypass    Subjective:     Doing fair.  Moderate pain.  No nausea    Constitutional: No fever or chills  Neurologic: No headache  Eyes: No scleral icterus or irritated eyes  Nose: No nasal pain or drainage  Mouth: No oral lesions or sore throat  Cardiac: No palpations or chest pain  Pulmonary: No cough or shortness of breath  Gastrointestinal: Abdominal pain, no nausea, emesis, diarrhea, or constipation  Genitourinary: No dysuria  Musculoskeletal: No muscle or joint tenderness  Skin: No rashes or lesions  Psychiatric: No anxiety or depressed mood    Objective:     Vitals:    06/18/24 0953   BP:    Pulse: 100   Resp:    Temp:    SpO2:        General: No acute distress, conversant  Eyes: PERRLA, no scleral icterus  HENT: Normocephalic without oral lesions  Neck: Trachea midline without LAD  Cardiac: Normal pulse rate and rhythm  Pulmonary: Symmetric chest rise with normal effort  GI: Soft, ATTP, wounds cdi  Skin: Warm without rash  Extremities: No edema or joint stiffness  Psych: Appropriate mood and affect    Labs, vital signs, and I/O reviewed.    Assessment:     54-year-old female doing fair after gastric bypass    Plan:     Discussed with pain specialist regarding her patch and the plan for that.  They recommend continuing patch, I increased the dose this morning.  This is appropriate.  Continue pain plan.  Change IV fluids to remove the potassium  Fabian fulls, PPI  Lovenox  Ambulate  Continue floor    Kevin Bolaños MD, [facility]  Bariatric and General Surgeon  [facility]

## 2024-06-18 NOTE — DISCHARGE INSTRUCTIONS
[facility] at [place]  Bariatric Surgery Discharge Instructions         OKAY TO SHOWER. DRY GAUZE DRESSING TO MIDLINE WOUND DAILY.      Procedure bypass    Future Appointments   Date Time Provider Department Center   7/1/2024  8:20 AM Urmila Esqueda APRN - NP [facility] AMB   7/16/2024  8:20 AM Urmila Esqueda APRN - NP [facility] AMB   7/30/2024  9:00 AM Kevin Bolaños MD [facility] AMB   8/12/2024  8:30 AM Ulisses Joy MD [id number]         Contact Information:    [facility] at [address], [address]   [place] [address]  [phone]    After Hours and Weekends  [phone] On Call Surgeon    Non Emergent Medical Needs  Call during office hours or send a message via My Chart   (messages returned during business hours)    DIET    Please remember that you are on ONLY FULL LIQUIDS for the first 2 weeks after surgery.     Do not advance to the next phase until advised by your surgeon or Nurse Practitioner.   Refer to the Bariatric Handbook for detailed information.     TO PREVENT DEHYDRATION:  consume 64 ounces of liquids daily.  At least 64 ounces of that should come from water, Crystal Light, sugar free popsicles, sugar free gelatin or other calorie-free, sugar-free, caffeine free and noncarbonated beverages. Do not drink with a straw.   Sip, sip, sip throughout the day  Main priority is to stay hydrated  Aim for 60 grams of protein every day.  Most of your protein will come from shakes.  Refer to the Bariatric Handbook for detailed information.  Add additional protein supplements to meet protein needs (protein powder, clear protein such as protein water, non-fat dry milk powder, NO protein bars at this at this stage)     MEDICATIONS & VITAMINS    Pre-surgery medications should be reviewed with your Bariatric provider and taken as prescribed   Take no more than 2 pills at a time and wait 15-20 minutes between

## 2024-06-18 NOTE — CONSULTS
Nutrition Education    Educated on post-bariatric surgery diet  Learners: Patient  Readiness: Acceptance  Method: Explanation  Response: Verbalizes Understanding  Patient should follow-up with Bariatric clinic for any additional diet questions once returns home.    Joanne Diego RD  Contact via Perfect Serve or ext [phone]

## 2024-06-18 NOTE — PROGRESS NOTES
Spiritual Care Partner Volunteer visited patient at [facility] in [facility] 5E2 SURGICAL UNIT on 6/18/2024   Documented by:  Royce Magana MDIV, BCC

## 2024-06-19 LAB
APPEARANCE UR: CLEAR
BILIRUB UR QL: NEGATIVE
COLOR UR: NORMAL
COMMENT:: NORMAL
GLUCOSE BLD STRIP.AUTO-MCNC: 96 MG/DL (ref 65–117)
GLUCOSE BLD STRIP.AUTO-MCNC: 98 MG/DL (ref 65–117)
GLUCOSE UR STRIP.AUTO-MCNC: NEGATIVE MG/DL
HGB UR QL STRIP: NEGATIVE
KETONES UR QL STRIP.AUTO: NEGATIVE MG/DL
LEUKOCYTE ESTERASE UR QL STRIP.AUTO: NEGATIVE
NITRITE UR QL STRIP.AUTO: NEGATIVE
PH UR STRIP: 6.5 (ref 5–8)
PROT UR STRIP-MCNC: NEGATIVE MG/DL
SERVICE CMNT-IMP: NORMAL
SERVICE CMNT-IMP: NORMAL
SP GR UR REFRACTOMETRY: 1.01 (ref 1–1.03)
SPECIMEN HOLD: NORMAL
UROBILINOGEN UR QL STRIP.AUTO: 0.2 EU/DL (ref 0.2–1)

## 2024-06-19 PROCEDURE — C9113 INJ PANTOPRAZOLE SODIUM, VIA: HCPCS | Performed by: SURGERY

## 2024-06-19 PROCEDURE — 1200000000 HC SEMI PRIVATE

## 2024-06-19 PROCEDURE — 6370000000 HC RX 637 (ALT 250 FOR IP): Performed by: NURSE PRACTITIONER

## 2024-06-19 PROCEDURE — 6370000000 HC RX 637 (ALT 250 FOR IP): Performed by: SURGERY

## 2024-06-19 PROCEDURE — 82962 GLUCOSE BLOOD TEST: CPT

## 2024-06-19 PROCEDURE — 81002 URINALYSIS NONAUTO W/O SCOPE: CPT

## 2024-06-19 PROCEDURE — 99024 POSTOP FOLLOW-UP VISIT: CPT | Performed by: SURGERY

## 2024-06-19 PROCEDURE — 6360000002 HC RX W HCPCS: Performed by: SURGERY

## 2024-06-19 PROCEDURE — 2580000003 HC RX 258: Performed by: SURGERY

## 2024-06-19 RX ORDER — HYDROCODONE BITARTRATE AND ACETAMINOPHEN 5; 325 MG/1; MG/1
2 TABLET ORAL EVERY 4 HOURS PRN
Status: DISCONTINUED | OUTPATIENT
Start: 2024-06-19 | End: 2024-06-19

## 2024-06-19 RX ORDER — INSULIN LISPRO 100 [IU]/ML
0-16 INJECTION, SOLUTION INTRAVENOUS; SUBCUTANEOUS
Status: DISCONTINUED | OUTPATIENT
Start: 2024-06-19 | End: 2024-06-22

## 2024-06-19 RX ORDER — DIPHENHYDRAMINE HYDROCHLORIDE 50 MG/ML
25 INJECTION INTRAMUSCULAR; INTRAVENOUS ONCE
Status: COMPLETED | OUTPATIENT
Start: 2024-06-19 | End: 2024-06-19

## 2024-06-19 RX ORDER — DEXTROSE MONOHYDRATE 100 MG/ML
INJECTION, SOLUTION INTRAVENOUS CONTINUOUS PRN
Status: DISCONTINUED | OUTPATIENT
Start: 2024-06-19 | End: 2024-07-02 | Stop reason: HOSPADM

## 2024-06-19 RX ORDER — DIPHENHYDRAMINE HCL 50 MG
50 CAPSULE ORAL EVERY 4 HOURS
Status: DISCONTINUED | OUTPATIENT
Start: 2024-06-19 | End: 2024-06-20

## 2024-06-19 RX ORDER — MORPHINE SULFATE 30 MG/1
30 TABLET ORAL EVERY 4 HOURS PRN
Status: DISCONTINUED | OUTPATIENT
Start: 2024-06-19 | End: 2024-07-02 | Stop reason: HOSPADM

## 2024-06-19 RX ORDER — HYDROCODONE BITARTRATE AND ACETAMINOPHEN 7.5; 325 MG/1; MG/1
2 TABLET ORAL EVERY 4 HOURS PRN
Status: DISCONTINUED | OUTPATIENT
Start: 2024-06-19 | End: 2024-06-19 | Stop reason: SDUPTHER

## 2024-06-19 RX ORDER — DIPHENHYDRAMINE HCL 50 MG
50 CAPSULE ORAL EVERY 6 HOURS
Status: DISCONTINUED | OUTPATIENT
Start: 2024-06-19 | End: 2024-06-19

## 2024-06-19 RX ORDER — INSULIN LISPRO 100 [IU]/ML
0-4 INJECTION, SOLUTION INTRAVENOUS; SUBCUTANEOUS NIGHTLY
Status: DISCONTINUED | OUTPATIENT
Start: 2024-06-19 | End: 2024-07-02 | Stop reason: HOSPADM

## 2024-06-19 RX ADMIN — DIPHENHYDRAMINE HYDROCHLORIDE 50 MG: 50 CAPSULE ORAL at 07:26

## 2024-06-19 RX ADMIN — ACETAMINOPHEN 1000 MG: 650 SOLUTION ORAL at 21:04

## 2024-06-19 RX ADMIN — SODIUM CHLORIDE, PRESERVATIVE FREE 40 MG: 5 INJECTION INTRAVENOUS at 09:49

## 2024-06-19 RX ADMIN — DIPHENHYDRAMINE HYDROCHLORIDE 25 MG: 50 INJECTION INTRAMUSCULAR; INTRAVENOUS at 11:54

## 2024-06-19 RX ADMIN — ENOXAPARIN SODIUM 40 MG: 100 INJECTION SUBCUTANEOUS at 21:02

## 2024-06-19 RX ADMIN — GABAPENTIN 600 MG: 600 TABLET, FILM COATED ORAL at 21:02

## 2024-06-19 RX ADMIN — SIMETHICONE 120 MG: 80 TABLET, CHEWABLE ORAL at 09:41

## 2024-06-19 RX ADMIN — SODIUM CHLORIDE: 9 INJECTION, SOLUTION INTRAVENOUS at 02:56

## 2024-06-19 RX ADMIN — HYDROCODONE BITARTRATE AND ACETAMINOPHEN 2 TABLET: 5; 325 TABLET ORAL at 10:01

## 2024-06-19 RX ADMIN — CYCLOBENZAPRINE 10 MG: 10 TABLET, FILM COATED ORAL at 21:02

## 2024-06-19 RX ADMIN — LOSARTAN POTASSIUM 50 MG: 50 TABLET, FILM COATED ORAL at 09:45

## 2024-06-19 RX ADMIN — SODIUM CHLORIDE: 9 INJECTION, SOLUTION INTRAVENOUS at 14:09

## 2024-06-19 RX ADMIN — SIMETHICONE 120 MG: 80 TABLET, CHEWABLE ORAL at 20:10

## 2024-06-19 RX ADMIN — ENOXAPARIN SODIUM 40 MG: 100 INJECTION SUBCUTANEOUS at 09:55

## 2024-06-19 RX ADMIN — DIPHENHYDRAMINE HYDROCHLORIDE 50 MG: 50 CAPSULE ORAL at 19:13

## 2024-06-19 RX ADMIN — MORPHINE SULFATE 6 MG: 4 INJECTION, SOLUTION INTRAMUSCULAR; INTRAVENOUS at 19:14

## 2024-06-19 RX ADMIN — SODIUM CHLORIDE, PRESERVATIVE FREE 10 ML: 5 INJECTION INTRAVENOUS at 21:16

## 2024-06-19 RX ADMIN — HYDROCODONE BITARTRATE AND ACETAMINOPHEN 2 TABLET: 5; 325 TABLET ORAL at 02:51

## 2024-06-19 RX ADMIN — DIPHENHYDRAMINE HYDROCHLORIDE 50 MG: 50 CAPSULE ORAL at 17:06

## 2024-06-19 RX ADMIN — AMITRIPTYLINE HYDROCHLORIDE 50 MG: 50 TABLET, FILM COATED ORAL at 21:02

## 2024-06-19 RX ADMIN — ONDANSETRON 4 MG: 2 INJECTION INTRAMUSCULAR; INTRAVENOUS at 08:40

## 2024-06-19 RX ADMIN — MORPHINE SULFATE 6 MG: 4 INJECTION, SOLUTION INTRAMUSCULAR; INTRAVENOUS at 14:16

## 2024-06-19 RX ADMIN — CYCLOBENZAPRINE 10 MG: 10 TABLET, FILM COATED ORAL at 09:41

## 2024-06-19 RX ADMIN — GABAPENTIN 600 MG: 600 TABLET, FILM COATED ORAL at 09:41

## 2024-06-19 RX ADMIN — DIPHENHYDRAMINE HYDROCHLORIDE 12.5 MG: 50 INJECTION, SOLUTION INTRAMUSCULAR; INTRAVENOUS at 02:51

## 2024-06-19 ASSESSMENT — PAIN DESCRIPTION - ORIENTATION
ORIENTATION: ANTERIOR
ORIENTATION: RIGHT
ORIENTATION: ANTERIOR

## 2024-06-19 ASSESSMENT — PAIN DESCRIPTION - DESCRIPTORS
DESCRIPTORS: DULL;ACHING
DESCRIPTORS: ACHING
DESCRIPTORS: ACHING;DULL
DESCRIPTORS: ACHING
DESCRIPTORS: ACHING

## 2024-06-19 ASSESSMENT — PAIN SCALES - GENERAL
PAINLEVEL_OUTOF10: 5
PAINLEVEL_OUTOF10: 10
PAINLEVEL_OUTOF10: 10
PAINLEVEL_OUTOF10: 8
PAINLEVEL_OUTOF10: 0
PAINLEVEL_OUTOF10: 7
PAINLEVEL_OUTOF10: 5
PAINLEVEL_OUTOF10: 10
PAINLEVEL_OUTOF10: 6
PAINLEVEL_OUTOF10: 8

## 2024-06-19 ASSESSMENT — PAIN DESCRIPTION - LOCATION
LOCATION: ABDOMEN

## 2024-06-19 NOTE — PROGRESS NOTES
After review of patient's chart it appears that she has two children diagnosed recently with influenza A. If she's developing flu-like illness today I would like her to start tamiflu. Medication sent to loaded pharmacy. Please notify patient. Surgery Progress Note    6/19/2024    Admit Date: 6/17/2024  8:05 AM    CC: Abd pain    POD: 2 bypass    Subjective:     Itchy following narcotics, still with poor pain control.    Constitutional: No fever or chills  Neurologic: No headache  Eyes: No scleral icterus or irritated eyes  Nose: No nasal pain or drainage  Mouth: No oral lesions or sore throat  Cardiac: No palpations or chest pain  Pulmonary: No cough or shortness of breath  Gastrointestinal: Abdominal pain, no nausea, emesis, diarrhea, or constipation  Genitourinary: No dysuria  Musculoskeletal: No muscle or joint tenderness  Skin: Itchy  Psychiatric: No anxiety or depressed mood    Objective:     Vitals:    06/19/24 0600   BP:    Pulse: 80   Resp:    Temp:    SpO2:        General: No acute distress, conversant  Eyes: PERRLA, no scleral icterus  HENT: Normocephalic without oral lesions  Neck: Trachea midline without LAD  Cardiac: Normal pulse rate and rhythm  Pulmonary: Symmetric chest rise with normal effort  GI: Soft, ATTP, wounds cdi  Skin: Warm without rash  Extremities: No edema or joint stiffness  Psych: Appropriate mood and affect    Labs, vital signs, and I/O reviewed.    Assessment:     54-year-old female doing fair after gastric bypass    Plan:     Benadryl scheduled for itchiness with narcotics.  Reaction to almost all narcotics including severe reaction to Dilaudid.  Will increase dose of Norco.  Cannot do Toradol because a reaction to that.  Continue her patch.  Flexeril  Continue IV fluid  Fabian fulls, PPI  Lovenox  Ambulate  Can discharge home once pain is controlled    Kevin Bolaños MD, [facility], [facility]  Bariatric and General Surgeon  [facility]

## 2024-06-20 ENCOUNTER — APPOINTMENT (OUTPATIENT)
Facility: HOSPITAL | Age: 54
DRG: 620 | End: 2024-06-20
Attending: SURGERY
Payer: MEDICARE

## 2024-06-20 LAB
ANION GAP SERPL CALC-SCNC: 6 MMOL/L (ref 5–15)
BASOPHILS # BLD: 0 K/UL (ref 0–0.1)
BASOPHILS NFR BLD: 0 % (ref 0–1)
BUN SERPL-MCNC: 6 MG/DL (ref 6–20)
BUN/CREAT SERPL: 8 (ref 12–20)
CALCIUM SERPL-MCNC: 8.9 MG/DL (ref 8.5–10.1)
CHLORIDE SERPL-SCNC: 105 MMOL/L (ref 97–108)
CO2 SERPL-SCNC: 27 MMOL/L (ref 21–32)
CREAT SERPL-MCNC: 0.77 MG/DL (ref 0.55–1.02)
DIFFERENTIAL METHOD BLD: NORMAL
EOSINOPHIL # BLD: 0.1 K/UL (ref 0–0.4)
EOSINOPHIL NFR BLD: 1 % (ref 0–7)
ERYTHROCYTE [DISTWIDTH] IN BLOOD BY AUTOMATED COUNT: 14.4 % (ref 11.5–14.5)
GLUCOSE BLD STRIP.AUTO-MCNC: 101 MG/DL (ref 65–117)
GLUCOSE BLD STRIP.AUTO-MCNC: 106 MG/DL (ref 65–117)
GLUCOSE BLD STRIP.AUTO-MCNC: 111 MG/DL (ref 65–117)
GLUCOSE BLD STRIP.AUTO-MCNC: 123 MG/DL (ref 65–117)
GLUCOSE BLD STRIP.AUTO-MCNC: 88 MG/DL (ref 65–117)
GLUCOSE SERPL-MCNC: 89 MG/DL (ref 65–100)
HCT VFR BLD AUTO: 37.2 % (ref 35–47)
HGB BLD-MCNC: 11.9 G/DL (ref 11.5–16)
IMM GRANULOCYTES # BLD AUTO: 0 K/UL (ref 0–0.04)
IMM GRANULOCYTES NFR BLD AUTO: 0 % (ref 0–0.5)
LYMPHOCYTES # BLD: 3 K/UL (ref 0.8–3.5)
LYMPHOCYTES NFR BLD: 28 % (ref 12–49)
MCH RBC QN AUTO: 29 PG (ref 26–34)
MCHC RBC AUTO-ENTMCNC: 32 G/DL (ref 30–36.5)
MCV RBC AUTO: 90.7 FL (ref 80–99)
MONOCYTES # BLD: 0.5 K/UL (ref 0–1)
MONOCYTES NFR BLD: 5 % (ref 5–13)
NEUTS SEG # BLD: 6.8 K/UL (ref 1.8–8)
NEUTS SEG NFR BLD: 66 % (ref 32–75)
NRBC # BLD: 0 K/UL (ref 0–0.01)
NRBC BLD-RTO: 0 PER 100 WBC
PLATELET # BLD AUTO: 260 K/UL (ref 150–400)
PMV BLD AUTO: 11.7 FL (ref 8.9–12.9)
POTASSIUM SERPL-SCNC: 3.6 MMOL/L (ref 3.5–5.1)
RBC # BLD AUTO: 4.1 M/UL (ref 3.8–5.2)
SERVICE CMNT-IMP: ABNORMAL
SERVICE CMNT-IMP: NORMAL
SODIUM SERPL-SCNC: 138 MMOL/L (ref 136–145)
WBC # BLD AUTO: 10.5 K/UL (ref 3.6–11)

## 2024-06-20 PROCEDURE — 6370000000 HC RX 637 (ALT 250 FOR IP): Performed by: SURGERY

## 2024-06-20 PROCEDURE — 2709999900 HC NON-CHARGEABLE SUPPLY

## 2024-06-20 PROCEDURE — 85025 COMPLETE CBC W/AUTO DIFF WBC: CPT

## 2024-06-20 PROCEDURE — 99024 POSTOP FOLLOW-UP VISIT: CPT | Performed by: SURGERY

## 2024-06-20 PROCEDURE — 2580000003 HC RX 258: Performed by: SURGERY

## 2024-06-20 PROCEDURE — 1200000000 HC SEMI PRIVATE

## 2024-06-20 PROCEDURE — 82962 GLUCOSE BLOOD TEST: CPT

## 2024-06-20 PROCEDURE — 6360000002 HC RX W HCPCS: Performed by: SURGERY

## 2024-06-20 PROCEDURE — 80048 BASIC METABOLIC PNL TOTAL CA: CPT

## 2024-06-20 PROCEDURE — 36415 COLL VENOUS BLD VENIPUNCTURE: CPT

## 2024-06-20 PROCEDURE — 76937 US GUIDE VASCULAR ACCESS: CPT

## 2024-06-20 PROCEDURE — 74018 RADEX ABDOMEN 1 VIEW: CPT

## 2024-06-20 PROCEDURE — C9113 INJ PANTOPRAZOLE SODIUM, VIA: HCPCS | Performed by: SURGERY

## 2024-06-20 RX ORDER — MORPHINE SULFATE 30 MG/1
30 TABLET ORAL EVERY 8 HOURS
Status: DISCONTINUED | OUTPATIENT
Start: 2024-06-20 | End: 2024-06-20

## 2024-06-20 RX ORDER — MORPHINE SULFATE 15 MG/1
30 TABLET, FILM COATED, EXTENDED RELEASE ORAL EVERY 12 HOURS SCHEDULED
Status: DISCONTINUED | OUTPATIENT
Start: 2024-06-20 | End: 2024-06-25

## 2024-06-20 RX ORDER — POLYETHYLENE GLYCOL 3350 17 G/17G
17 POWDER, FOR SOLUTION ORAL DAILY
Status: DISCONTINUED | OUTPATIENT
Start: 2024-06-20 | End: 2024-06-20

## 2024-06-20 RX ORDER — ENEMA 19; 7 G/133ML; G/133ML
1 ENEMA RECTAL ONCE
Status: COMPLETED | OUTPATIENT
Start: 2024-06-20 | End: 2024-06-20

## 2024-06-20 RX ORDER — LACTULOSE 10 G/15ML
30 SOLUTION ORAL ONCE
Status: COMPLETED | OUTPATIENT
Start: 2024-06-20 | End: 2024-06-20

## 2024-06-20 RX ORDER — DIPHENHYDRAMINE HCL 50 MG
50 CAPSULE ORAL 3 TIMES DAILY
Status: DISCONTINUED | OUTPATIENT
Start: 2024-06-20 | End: 2024-06-21

## 2024-06-20 RX ORDER — POLYETHYLENE GLYCOL 3350 17 G/17G
17 POWDER, FOR SOLUTION ORAL 4 TIMES DAILY
Status: DISCONTINUED | OUTPATIENT
Start: 2024-06-20 | End: 2024-06-21

## 2024-06-20 RX ORDER — MORPHINE SULFATE 30 MG/1
30 TABLET ORAL 3 TIMES DAILY
Status: DISCONTINUED | OUTPATIENT
Start: 2024-06-20 | End: 2024-06-20

## 2024-06-20 RX ADMIN — DIPHENHYDRAMINE HYDROCHLORIDE 50 MG: 50 CAPSULE ORAL at 00:29

## 2024-06-20 RX ADMIN — DIPHENHYDRAMINE HYDROCHLORIDE 50 MG: 50 CAPSULE ORAL at 20:55

## 2024-06-20 RX ADMIN — CYCLOBENZAPRINE 10 MG: 10 TABLET, FILM COATED ORAL at 14:07

## 2024-06-20 RX ADMIN — DIPHENHYDRAMINE HYDROCHLORIDE 50 MG: 50 CAPSULE ORAL at 06:35

## 2024-06-20 RX ADMIN — LACTULOSE 30 G: 20 SOLUTION ORAL at 07:24

## 2024-06-20 RX ADMIN — POLYETHYLENE GLYCOL 3350 17 G: 17 POWDER, FOR SOLUTION ORAL at 11:57

## 2024-06-20 RX ADMIN — CYCLOBENZAPRINE 10 MG: 10 TABLET, FILM COATED ORAL at 20:55

## 2024-06-20 RX ADMIN — MAGNESIUM HYDROXIDE 30 ML: 400 SUSPENSION ORAL at 07:25

## 2024-06-20 RX ADMIN — POLYETHYLENE GLYCOL 3350 17 G: 17 POWDER, FOR SOLUTION ORAL at 07:24

## 2024-06-20 RX ADMIN — ENOXAPARIN SODIUM 40 MG: 100 INJECTION SUBCUTANEOUS at 08:46

## 2024-06-20 RX ADMIN — POLYETHYLENE GLYCOL 3350 17 G: 17 POWDER, FOR SOLUTION ORAL at 20:53

## 2024-06-20 RX ADMIN — MORPHINE SULFATE 30 MG: 30 TABLET, FILM COATED, EXTENDED RELEASE ORAL at 20:54

## 2024-06-20 RX ADMIN — HYOSCYAMINE SULFATE 125 MCG: 0.12 TABLET SUBLINGUAL at 06:35

## 2024-06-20 RX ADMIN — GABAPENTIN 600 MG: 600 TABLET, FILM COATED ORAL at 08:49

## 2024-06-20 RX ADMIN — MORPHINE SULFATE 30 MG: 30 TABLET ORAL at 07:07

## 2024-06-20 RX ADMIN — MORPHINE SULFATE 30 MG: 30 TABLET, FILM COATED, EXTENDED RELEASE ORAL at 11:57

## 2024-06-20 RX ADMIN — SODIUM CHLORIDE: 9 INJECTION, SOLUTION INTRAVENOUS at 00:29

## 2024-06-20 RX ADMIN — MORPHINE SULFATE 6 MG: 4 INJECTION, SOLUTION INTRAMUSCULAR; INTRAVENOUS at 10:07

## 2024-06-20 RX ADMIN — DIPHENHYDRAMINE HYDROCHLORIDE 50 MG: 50 CAPSULE ORAL at 14:07

## 2024-06-20 RX ADMIN — SODIUM PHOSPHATE, DIBASIC AND SODIUM PHOSPHATE, MONOBASIC 1 ENEMA: 7; 19 ENEMA RECTAL at 07:31

## 2024-06-20 RX ADMIN — MORPHINE SULFATE 6 MG: 4 INJECTION, SOLUTION INTRAMUSCULAR; INTRAVENOUS at 22:46

## 2024-06-20 RX ADMIN — LOSARTAN POTASSIUM 50 MG: 50 TABLET, FILM COATED ORAL at 08:48

## 2024-06-20 RX ADMIN — CYCLOBENZAPRINE 10 MG: 10 TABLET, FILM COATED ORAL at 08:49

## 2024-06-20 RX ADMIN — SODIUM CHLORIDE: 9 INJECTION, SOLUTION INTRAVENOUS at 19:41

## 2024-06-20 RX ADMIN — GABAPENTIN 600 MG: 600 TABLET, FILM COATED ORAL at 20:55

## 2024-06-20 RX ADMIN — POLYETHYLENE GLYCOL 3350 17 G: 17 POWDER, FOR SOLUTION ORAL at 17:17

## 2024-06-20 RX ADMIN — MORPHINE SULFATE 6 MG: 4 INJECTION, SOLUTION INTRAMUSCULAR; INTRAVENOUS at 14:07

## 2024-06-20 RX ADMIN — SODIUM CHLORIDE, PRESERVATIVE FREE 40 MG: 5 INJECTION INTRAVENOUS at 08:47

## 2024-06-20 RX ADMIN — AMITRIPTYLINE HYDROCHLORIDE 50 MG: 50 TABLET, FILM COATED ORAL at 20:55

## 2024-06-20 RX ADMIN — MAGNESIUM HYDROXIDE 30 ML: 400 SUSPENSION ORAL at 20:53

## 2024-06-20 RX ADMIN — SODIUM CHLORIDE, PRESERVATIVE FREE 10 ML: 5 INJECTION INTRAVENOUS at 08:54

## 2024-06-20 ASSESSMENT — PAIN DESCRIPTION - DESCRIPTORS
DESCRIPTORS: ACHING

## 2024-06-20 ASSESSMENT — PAIN SCALES - GENERAL
PAINLEVEL_OUTOF10: 9
PAINLEVEL_OUTOF10: 9
PAINLEVEL_OUTOF10: 10
PAINLEVEL_OUTOF10: 0
PAINLEVEL_OUTOF10: 10

## 2024-06-20 ASSESSMENT — PAIN DESCRIPTION - LOCATION
LOCATION: ABDOMEN

## 2024-06-20 NOTE — PROGRESS NOTES
Patient complaining of bloat ness and abdominal distention notified on call MD ordered Miralax but patient states that its not helping ordered KUB to r/o impaction or gas

## 2024-06-20 NOTE — PROCEDURES
Vascular Access Team - peripheral IV catheter insertion note     A 20 gauge, 45 mm (1.75 inch) PIV was inserted into the right anterior forearm using ultrasound guidance. The IV flushed easily and had brisk blood return. The patient tolerated the procedure well.   Bandar Antonio RN

## 2024-06-20 NOTE — CARE COORDINATION
Care Management Initial Assessment       RUR: 7% low  Readmission? No  1st IM letter given? Yes 6/20/24  1st  letter given: NA    CM met with patient at the bedside, Patient is independent at home with ADLS and IADLS. Patient lives in a one story house with 5-6 steps into the back entrance, and approximately 8 steps into the front entrance. Her adult daughter Mare is her emergency contact. Patient states she has a cane and a walker with a seat at home that she doesn't typically use. Patient has no prior experience using HH, Rehab or SNF. CM will follow if needs arise.        06/20/24 1035   Service Assessment   Patient Orientation Alert and Oriented;Person;Place;Situation;Self   Cognition Alert   History Provided By Patient   Primary Caregiver Self   Support Systems Spouse/Significant Other;Family Members;Friends/Neighbors   Patient's Healthcare Decision Maker is: Legal Next of Kin   PCP Verified by CM Yes  (Ulisses Joy, last seen april 2024)   Last Visit to PCP Within last 6 months   Prior Functional Level Independent in ADLs/IADLs   Current Functional Level Independent in ADLs/IADLs   Can patient return to prior living arrangement Yes   Ability to make needs known: Good   Family able to assist with home care needs: Yes   Would you like for me to discuss the discharge plan with any other family members/significant others, and if so, who? Yes  (as per patient request)   Financial Resources Medicare;Medicaid   Community Resources None   Social/Functional History   Lives With Significant other   Type of Home House   Home Layout One level   Home Access Stairs to enter with rails   Entrance Stairs - Number of Steps 5-6 steps in the back, 8 steps in the front   Bathroom Shower/Tub None   Bathroom Equipment None   Home Equipment Cane;Walker - Standard  (has a cane and a walker with a seat)   Receives Help From Family;Friend(s)   ADL Assistance Independent   Homemaking Assistance Independent   Ambulation  Assistance Independent   Transfer Assistance Independent   Active  Yes   Mode of Transportation Car   Discharge Planning   Type of Residence House   Living Arrangements Spouse/Significant Other   Current Services Prior To Admission None   Potential Assistance Needed N/A   DME Ordered? Cane;Walker  (has a cane and a walker with a seat)   Type of Home Care Services None   Patient expects to be discharged to: House   One/Two Story Residence One story   Services At/After Discharge   Transition of Care Consult (CM Consult) N/A   Services At/After Discharge None   Confirm Follow Up Transport Family

## 2024-06-20 NOTE — PLAN OF CARE
Problem: Chronic Conditions and Co-morbidities  Goal: Patient's chronic conditions and co-morbidity symptoms are monitored and maintained or improved  Outcome: Progressing, Patient tolerating fluids without episodes of nausea     Problem: Pain  Goal: Verbalizes/displays adequate comfort level or baseline comfort level  Outcome: Progressing     Problem: Safety - Adult  Goal: Free from fall injury  Outcome: Progressing

## 2024-06-20 NOTE — PROGRESS NOTES
Patient complaining of gas pains administered simethicone has walked 2 laps but she gets dizzy and will notify md

## 2024-06-20 NOTE — PROGRESS NOTES
Patient experiencing bleeding from the incision site above umbilicus. Assessed site, small dot of drainage noted. Covered with gauze and tape. Dr. Keating notified via Perfect Serve at 1801 and waiting to hear back for further instruction.

## 2024-06-20 NOTE — PROGRESS NOTES
Enema administered patient tolorated procedure well awaits results, patient passing gas two large ones

## 2024-06-20 NOTE — PROGRESS NOTES
Surgery Progress Note    6/20/2024    Admit Date: 6/17/2024  8:05 AM    CC: Abd pain    POD: 3 bypass    Subjective:     Abd pain remains. Not getting morphine routinely given. Acute on chronic likely opioid induced constipation. Awaiting her home Linzess    Constitutional: No fever or chills  Neurologic: No headache  Eyes: No scleral icterus or irritated eyes  Nose: No nasal pain or drainage  Mouth: No oral lesions or sore throat  Cardiac: No palpations or chest pain  Pulmonary: No cough or shortness of breath  Gastrointestinal: Abdominal pain, no nausea, emesis, diarrhea, or constipation  Genitourinary: No dysuria  Musculoskeletal: No muscle or joint tenderness  Skin: Itchy  Psychiatric: No anxiety or depressed mood    Objective:     Vitals:    06/20/24 0707   BP:    Pulse:    Resp: 18   Temp:    SpO2:        General: No acute distress, conversant  Eyes: PERRLA, no scleral icterus  HENT: Normocephalic without oral lesions  Neck: Trachea midline without LAD  Cardiac: Normal pulse rate and rhythm  Pulmonary: Symmetric chest rise with normal effort  GI: Soft, ATTP, bloated, wounds cdi  Skin: Warm without rash  Extremities: No edema or joint stiffness  Psych: Appropriate mood and affect    Labs, vital signs, and I/O reviewed.    Assessment:     54-year-old female doing fair after gastric bypass with constipation    Plan:     Itching better. Decrease benadryl. Schedule morphine PO q8h. Prn also available. Continue her patch.  Flexeril  Continue IV fluid  Fabian fulls, PPI  Miralax QID, MOM BID, lactulose, enema ordered. Resume home lactulose  Check labs  Lovenox  Ambulate  Cont floor    Kevin Bolaños MD, FACS, [facility]  Bariatric and General Surgeon  [facility]

## 2024-06-20 NOTE — FLOWSHEET NOTE
06/19/24 2014   Handoff   Communication Given Shift Handoff   Handoff Given To Marlen LOZANO   Handoff Received From Ce LOZANO   Handoff Communication Face to Face;At bedside   Time Handoff Given 1951   End of Shift Check Performed Yes

## 2024-06-20 NOTE — FLOWSHEET NOTE
06/20/24 0800   Rhythm Interpretation   WA Interval 0.15   QT Interval 0.33   QRS Length 0.09   Handoff   Communication Given Other  (Pt family requested specific RN)   Handoff Given To Sun RN   Handoff Received From Ce GARDINER RN   Handoff Communication Face to Face   Time Handoff Given 0800   End of Shift Check Performed Yes

## 2024-06-21 ENCOUNTER — APPOINTMENT (OUTPATIENT)
Facility: HOSPITAL | Age: 54
DRG: 620 | End: 2024-06-21
Attending: SURGERY
Payer: MEDICARE

## 2024-06-21 LAB
ALBUMIN SERPL-MCNC: 3 G/DL (ref 3.5–5)
ALBUMIN/GLOB SERPL: 0.7 (ref 1.1–2.2)
ALP SERPL-CCNC: 77 U/L (ref 45–117)
ALT SERPL-CCNC: 17 U/L (ref 12–78)
ANION GAP SERPL CALC-SCNC: 7 MMOL/L (ref 5–15)
AST SERPL-CCNC: 21 U/L (ref 15–37)
BASOPHILS # BLD: 0 K/UL (ref 0–0.1)
BASOPHILS NFR BLD: 0 % (ref 0–1)
BILIRUB DIRECT SERPL-MCNC: 0.5 MG/DL (ref 0–0.2)
BILIRUB SERPL-MCNC: 1 MG/DL (ref 0.2–1)
BUN SERPL-MCNC: 14 MG/DL (ref 6–20)
BUN/CREAT SERPL: 6 (ref 12–20)
CALCIUM SERPL-MCNC: 9.3 MG/DL (ref 8.5–10.1)
CHLORIDE SERPL-SCNC: 102 MMOL/L (ref 97–108)
CO2 SERPL-SCNC: 26 MMOL/L (ref 21–32)
CREAT SERPL-MCNC: 2.32 MG/DL (ref 0.55–1.02)
DIFFERENTIAL METHOD BLD: ABNORMAL
EKG ATRIAL RATE: 105 BPM
EKG ATRIAL RATE: 112 BPM
EKG DIAGNOSIS: NORMAL
EKG DIAGNOSIS: NORMAL
EKG P AXIS: 52 DEGREES
EKG P AXIS: 52 DEGREES
EKG P-R INTERVAL: 124 MS
EKG P-R INTERVAL: 126 MS
EKG Q-T INTERVAL: 328 MS
EKG Q-T INTERVAL: 338 MS
EKG QRS DURATION: 76 MS
EKG QRS DURATION: 78 MS
EKG QTC CALCULATION (BAZETT): 446 MS
EKG QTC CALCULATION (BAZETT): 447 MS
EKG R AXIS: 20 DEGREES
EKG R AXIS: 28 DEGREES
EKG T AXIS: 23 DEGREES
EKG T AXIS: 27 DEGREES
EKG VENTRICULAR RATE: 105 BPM
EKG VENTRICULAR RATE: 112 BPM
EOSINOPHIL # BLD: 0.1 K/UL (ref 0–0.4)
EOSINOPHIL NFR BLD: 0 % (ref 0–7)
ERYTHROCYTE [DISTWIDTH] IN BLOOD BY AUTOMATED COUNT: 14.5 % (ref 11.5–14.5)
GLOBULIN SER CALC-MCNC: 4.1 G/DL (ref 2–4)
GLUCOSE BLD STRIP.AUTO-MCNC: 115 MG/DL (ref 65–117)
GLUCOSE BLD STRIP.AUTO-MCNC: 123 MG/DL (ref 65–117)
GLUCOSE BLD STRIP.AUTO-MCNC: 127 MG/DL (ref 65–117)
GLUCOSE BLD STRIP.AUTO-MCNC: 142 MG/DL (ref 65–117)
GLUCOSE SERPL-MCNC: 125 MG/DL (ref 65–100)
HCT VFR BLD AUTO: 39 % (ref 35–47)
HGB BLD-MCNC: 12.5 G/DL (ref 11.5–16)
IMM GRANULOCYTES # BLD AUTO: 0.1 K/UL (ref 0–0.04)
IMM GRANULOCYTES NFR BLD AUTO: 1 % (ref 0–0.5)
LACTATE SERPL-SCNC: 0.9 MMOL/L (ref 0.4–2)
LYMPHOCYTES # BLD: 1.7 K/UL (ref 0.8–3.5)
LYMPHOCYTES NFR BLD: 11 % (ref 12–49)
MAGNESIUM SERPL-MCNC: 2.7 MG/DL (ref 1.6–2.4)
MCH RBC QN AUTO: 28.7 PG (ref 26–34)
MCHC RBC AUTO-ENTMCNC: 32.1 G/DL (ref 30–36.5)
MCV RBC AUTO: 89.7 FL (ref 80–99)
MONOCYTES # BLD: 0.8 K/UL (ref 0–1)
MONOCYTES NFR BLD: 5 % (ref 5–13)
NEUTS SEG # BLD: 12.3 K/UL (ref 1.8–8)
NEUTS SEG NFR BLD: 83 % (ref 32–75)
NRBC # BLD: 0 K/UL (ref 0–0.01)
NRBC BLD-RTO: 0 PER 100 WBC
PHOSPHATE SERPL-MCNC: 5.8 MG/DL (ref 2.6–4.7)
PLATELET # BLD AUTO: 359 K/UL (ref 150–400)
PMV BLD AUTO: 11 FL (ref 8.9–12.9)
POTASSIUM SERPL-SCNC: 3.8 MMOL/L (ref 3.5–5.1)
PROT SERPL-MCNC: 7.1 G/DL (ref 6.4–8.2)
RBC # BLD AUTO: 4.35 M/UL (ref 3.8–5.2)
SERVICE CMNT-IMP: ABNORMAL
SERVICE CMNT-IMP: NORMAL
SODIUM SERPL-SCNC: 135 MMOL/L (ref 136–145)
TROPONIN I SERPL HS-MCNC: <4 NG/L (ref 0–51)
WBC # BLD AUTO: 14.8 K/UL (ref 3.6–11)

## 2024-06-21 PROCEDURE — 6360000002 HC RX W HCPCS: Performed by: SURGERY

## 2024-06-21 PROCEDURE — 80076 HEPATIC FUNCTION PANEL: CPT

## 2024-06-21 PROCEDURE — 83605 ASSAY OF LACTIC ACID: CPT

## 2024-06-21 PROCEDURE — 93005 ELECTROCARDIOGRAM TRACING: CPT | Performed by: SURGERY

## 2024-06-21 PROCEDURE — 2580000003 HC RX 258: Performed by: SURGERY

## 2024-06-21 PROCEDURE — 6360000004 HC RX CONTRAST MEDICATION: Performed by: RADIOLOGY

## 2024-06-21 PROCEDURE — 2060000000 HC ICU INTERMEDIATE R&B

## 2024-06-21 PROCEDURE — 84100 ASSAY OF PHOSPHORUS: CPT

## 2024-06-21 PROCEDURE — 94761 N-INVAS EAR/PLS OXIMETRY MLT: CPT

## 2024-06-21 PROCEDURE — 6370000000 HC RX 637 (ALT 250 FOR IP): Performed by: SURGERY

## 2024-06-21 PROCEDURE — 93005 ELECTROCARDIOGRAM TRACING: CPT | Performed by: PHYSICIAN ASSISTANT

## 2024-06-21 PROCEDURE — 2700000000 HC OXYGEN THERAPY PER DAY

## 2024-06-21 PROCEDURE — 36415 COLL VENOUS BLD VENIPUNCTURE: CPT

## 2024-06-21 PROCEDURE — 80048 BASIC METABOLIC PNL TOTAL CA: CPT

## 2024-06-21 PROCEDURE — 74177 CT ABD & PELVIS W/CONTRAST: CPT

## 2024-06-21 PROCEDURE — 85025 COMPLETE CBC W/AUTO DIFF WBC: CPT

## 2024-06-21 PROCEDURE — 84484 ASSAY OF TROPONIN QUANT: CPT

## 2024-06-21 PROCEDURE — 83735 ASSAY OF MAGNESIUM: CPT

## 2024-06-21 PROCEDURE — C9113 INJ PANTOPRAZOLE SODIUM, VIA: HCPCS | Performed by: SURGERY

## 2024-06-21 PROCEDURE — 82962 GLUCOSE BLOOD TEST: CPT

## 2024-06-21 PROCEDURE — 87040 BLOOD CULTURE FOR BACTERIA: CPT

## 2024-06-21 PROCEDURE — 99024 POSTOP FOLLOW-UP VISIT: CPT | Performed by: SURGERY

## 2024-06-21 PROCEDURE — 71275 CT ANGIOGRAPHY CHEST: CPT

## 2024-06-21 PROCEDURE — 2500000003 HC RX 250 WO HCPCS: Performed by: PHYSICIAN ASSISTANT

## 2024-06-21 RX ORDER — 0.9 % SODIUM CHLORIDE 0.9 %
1000 INTRAVENOUS SOLUTION INTRAVENOUS ONCE
Status: COMPLETED | OUTPATIENT
Start: 2024-06-21 | End: 2024-06-21

## 2024-06-21 RX ORDER — GABAPENTIN 600 MG/1
300 TABLET ORAL 2 TIMES DAILY
Status: DISCONTINUED | OUTPATIENT
Start: 2024-06-21 | End: 2024-06-24

## 2024-06-21 RX ORDER — LACTULOSE 10 G/15ML
30 SOLUTION ORAL ONCE
Status: DISCONTINUED | OUTPATIENT
Start: 2024-06-21 | End: 2024-06-21

## 2024-06-21 RX ORDER — DIPHENHYDRAMINE HCL 50 MG
50 CAPSULE ORAL EVERY 6 HOURS PRN
Status: DISCONTINUED | OUTPATIENT
Start: 2024-06-21 | End: 2024-06-22 | Stop reason: SDUPTHER

## 2024-06-21 RX ORDER — METOPROLOL TARTRATE 1 MG/ML
5 INJECTION, SOLUTION INTRAVENOUS EVERY 6 HOURS
Status: DISCONTINUED | OUTPATIENT
Start: 2024-06-21 | End: 2024-06-22

## 2024-06-21 RX ADMIN — METOPROLOL TARTRATE 5 MG: 1 INJECTION, SOLUTION INTRAVENOUS at 16:50

## 2024-06-21 RX ADMIN — SODIUM CHLORIDE: 9 INJECTION, SOLUTION INTRAVENOUS at 05:32

## 2024-06-21 RX ADMIN — SODIUM CHLORIDE 1000 ML: 9 INJECTION, SOLUTION INTRAVENOUS at 17:22

## 2024-06-21 RX ADMIN — GABAPENTIN 300 MG: 600 TABLET, FILM COATED ORAL at 21:06

## 2024-06-21 RX ADMIN — SODIUM CHLORIDE, PRESERVATIVE FREE 40 MG: 5 INJECTION INTRAVENOUS at 09:29

## 2024-06-21 RX ADMIN — SODIUM CHLORIDE, PRESERVATIVE FREE 10 ML: 5 INJECTION INTRAVENOUS at 09:39

## 2024-06-21 RX ADMIN — ENOXAPARIN SODIUM 40 MG: 100 INJECTION SUBCUTANEOUS at 21:07

## 2024-06-21 RX ADMIN — SODIUM CHLORIDE, PRESERVATIVE FREE 10 ML: 5 INJECTION INTRAVENOUS at 21:07

## 2024-06-21 RX ADMIN — IOPAMIDOL 100 ML: 755 INJECTION, SOLUTION INTRAVENOUS at 08:52

## 2024-06-21 RX ADMIN — AMITRIPTYLINE HYDROCHLORIDE 50 MG: 50 TABLET, FILM COATED ORAL at 21:06

## 2024-06-21 RX ADMIN — CYCLOBENZAPRINE 10 MG: 10 TABLET, FILM COATED ORAL at 21:06

## 2024-06-21 RX ADMIN — METOPROLOL TARTRATE 5 MG: 1 INJECTION, SOLUTION INTRAVENOUS at 10:20

## 2024-06-21 RX ADMIN — CYCLOBENZAPRINE 10 MG: 10 TABLET, FILM COATED ORAL at 14:10

## 2024-06-21 RX ADMIN — MORPHINE SULFATE 30 MG: 30 TABLET, FILM COATED, EXTENDED RELEASE ORAL at 21:06

## 2024-06-21 RX ADMIN — METOPROLOL TARTRATE 5 MG: 1 INJECTION, SOLUTION INTRAVENOUS at 21:07

## 2024-06-21 RX ADMIN — ENOXAPARIN SODIUM 40 MG: 100 INJECTION SUBCUTANEOUS at 09:29

## 2024-06-21 RX ADMIN — CYCLOBENZAPRINE 10 MG: 10 TABLET, FILM COATED ORAL at 09:30

## 2024-06-21 RX ADMIN — SODIUM CHLORIDE 1000 ML: 9 INJECTION, SOLUTION INTRAVENOUS at 09:41

## 2024-06-21 RX ADMIN — MORPHINE SULFATE 6 MG: 4 INJECTION, SOLUTION INTRAMUSCULAR; INTRAVENOUS at 06:12

## 2024-06-21 RX ADMIN — SODIUM CHLORIDE: 9 INJECTION, SOLUTION INTRAVENOUS at 16:34

## 2024-06-21 RX ADMIN — SIMETHICONE 120 MG: 80 TABLET, CHEWABLE ORAL at 09:43

## 2024-06-21 ASSESSMENT — PAIN SCALES - GENERAL
PAINLEVEL_OUTOF10: 10
PAINLEVEL_OUTOF10: 0
PAINLEVEL_OUTOF10: 0
PAINLEVEL_OUTOF10: 10
PAINLEVEL_OUTOF10: 10

## 2024-06-21 ASSESSMENT — PAIN SCALES - WONG BAKER
WONGBAKER_NUMERICALRESPONSE: NO HURT
WONGBAKER_NUMERICALRESPONSE: HURTS WORST

## 2024-06-21 ASSESSMENT — PAIN DESCRIPTION - LOCATION
LOCATION: ABDOMEN
LOCATION: ABDOMEN

## 2024-06-21 NOTE — PROGRESS NOTES
Labs reviewed.  CARLY.  New leukocytosis.  Hemoglobin stable.  CT scan done.  Reviewed with to radiologist.  No signs of volvulus.  No signs of bowel obstruction and or leak at either anastomosis.  No acute surgical intervention required.  No thrombus or PE.  We will hydrate her, place her on telemetry, Puga catheter, GI consult.  Move to stepdown    Kevin Bolaños MD, FACS, [facility]  Bariatric and General Surgeon  [facility]

## 2024-06-21 NOTE — PROGRESS NOTES
Surgery Progress Note    6/21/2024    Admit Date: 6/17/2024  8:05 AM    CC: Abd pain    POD: 4 bypass    Subjective:     Pain improving but very distended. Tachycardia last night. AF. No BF. On oxygen now.    Constitutional: No fever or chills  Neurologic: No headache  Eyes: No scleral icterus or irritated eyes  Nose: No nasal pain or drainage  Mouth: No oral lesions or sore throat  Cardiac: No palpations or chest pain  Pulmonary: No cough or shortness of breath  Gastrointestinal: Abdominal pain, no nausea, emesis, diarrhea, or constipation  Genitourinary: No dysuria  Musculoskeletal: No muscle or joint tenderness  Skin: Itchy  Psychiatric: No anxiety or depressed mood    Objective:     Vitals:    06/21/24 0559   BP:    Pulse: (!) 122   Resp:    Temp:    SpO2:        General: No acute distress, conversant  Eyes: PERRLA, no scleral icterus  HENT: Normocephalic without oral lesions  Neck: Trachea midline without LAD  Cardiac: Normal pulse rate and rhythm  Pulmonary: Symmetric chest rise with normal effort  GI: Soft, ATTP, severely bloated, non peritoneal. Supraumbilical wound with serous drainage  Skin: Warm without rash  Extremities: No edema or joint stiffness  Psych: Appropriate mood and affect    Labs, vital signs, and I/O reviewed.    Assessment:     54-year-old female doing fair after gastric bypass with constipation and new post op tachycardia    Plan:     Cont MS quarles. Prn also available. Continue her patch.  Flexeril  Wean NC as able  Continue IV fluid  Fabian fulls, PPI  Miralax QID, MOM BID, lactulose, enema ordered. Resume home lactulose. Movantik  Check labs again this AM and lactate ordered  Stat CT chest / abd / pelvis to eval for PE as well as post op leak or obstruction  Lovenox  Ambulate  Cont floor    Kevin Bolaños MD, FACS, [facility]  Bariatric and General Surgeon  [facility]

## 2024-06-21 NOTE — PROGRESS NOTES
Okay to hold Enoxaparin for tonight due to drainage on the incision site as per Dr. Keating.  Dressing was mildly soaked and was changed.

## 2024-06-21 NOTE — CONSULTS
swelling     Milnacipran Hives     INSOMNIA    Solifenacin Other (See Comments)     Nose bleeds    Prednisone Rash and Angioedema     And pt felt throat closing and steroid injections       Home Medications:  Prior to Admission Medications   Prescriptions Last Dose Informant Patient Reported? Taking?   ALPRAZolam (XANAX) 0.5 MG tablet Past Week  No No   Sig: Take 1 tablet by mouth daily as needed for Anxiety.   LINZESS 72 MCG CAPS capsule Past Week  Yes No   Si capsule at least 30 minutes before the first meal of the day on an empty stomach Orally Once a day for 30 day(s)   NONFORMULARY 2024  Yes No   Sig: Place 1 patch onto the skin every 7 days Buprenorphine patches    EVERY    Tirzepatide (MOUNJARO) 12.5 MG/0.5ML SOPN SC injection 2024  No No   Sig: Inject 0.5 mLs into the skin once a week   albuterol sulfate HFA (VENTOLIN HFA) 108 (90 Base) MCG/ACT inhaler Past Month  No No   Sig: Inhale 2 puffs into the lungs 4 times daily as needed for Wheezing   amitriptyline (ELAVIL) 50 MG tablet Past Week  No No   Sig: Take 1 tablet by mouth nightly   bumetanide (BUMEX) 2 MG tablet 2024  No No   Sig: TAKE 1 TABLET BY MOUTH EVERY DAY   gabapentin (NEURONTIN) 600 MG tablet 2024  No No   Sig: Take 1 tablet by mouth 2 times daily. Max Daily Amount: 1,200 mg   losartan (COZAAR) 50 MG tablet 2024  No No   Sig: Take 1 tablet by mouth daily   omeprazole (PRILOSEC) 40 MG delayed release capsule   No No   Sig: Take 1 capsule by mouth every morning (before breakfast)   ondansetron (ZOFRAN) 4 MG tablet   No No   Sig: Take 1 tablet by mouth every 8 hours as needed for Nausea or Vomiting   polyethylene glycol (GLYCOLAX) 17 GM/SCOOP powder   No No   Sig: Take 17 g by mouth daily      Facility-Administered Medications: None       Hospital Medications:  Current Facility-Administered Medications   Medication Dose Route Frequency    diphenhydrAMINE (BENADRYL) capsule 50 mg  50 mg Oral Q6H PRN    metoprolol  MG/5ML solution 1,000 mg  1,000 mg Oral Q6H PRN    diphenhydrAMINE (BENADRYL) injection 12.5 mg  12.5 mg IntraVENous Q6H PRN    hydrALAZINE (APRESOLINE) injection 20 mg  20 mg IntraVENous Q4H PRN    simethicone (MYLICON) chewable tablet 120 mg  120 mg Oral Q6H PRN    hyoscyamine (LEVSIN/SL) sublingual tablet 125 mcg  125 mcg SubLINGual Q4H PRN    prochlorperazine (COMPAZINE) injection 10 mg  10 mg IntraVENous Q6H PRN    LORazepam (ATIVAN) injection 1 mg  1 mg IntraVENous Q6H PRN       Social History:  Social History     Tobacco Use    Smoking status: Former     Current packs/day: 0.00     Types: Cigarettes     Quit date: 2024     Years since quittin.3    Smokeless tobacco: Never   Substance Use Topics    Alcohol use: Not Currently       Family History:  Family History   Adopted: Yes   Problem Relation Age of Onset    Other Other         family hx is completely unknown    Asthma Son     Asthma Daughter     Diabetes Brother        Review of Systems: limited     Constitutional: + fever, negative chills, negative weight loss  Eyes:   negative visual changes  ENT:   negative sore throat, tongue or lip swelling  Respiratory:  negative cough, negative dyspnea  Cards:  negative for chest pain, palpitations, lower extremity edema  GI:   See HPI  :  negative for frequency, dysuria  Integument:  negative for rash and pruritus  Heme:  negative for easy bruising and gum/nose bleeding  Musculoskel: negative for myalgias,  back pain and muscle weakness  Neuro:  negative for headaches, dizziness, vertigo  Psych: negative for feelings of anxiety, depression      Objective:   Patient Vitals for the past 8 hrs:   BP Temp Temp src Pulse Resp SpO2   24 1214 -- -- -- (!) 111 -- --   24 1155 112/81 98.7 °F (37.1 °C) Oral (!) 110 15 --   24 1042 -- -- -- (!) 101 -- --   24 1000 -- -- -- (!) 122 -- --   24 0930 -- -- -- -- 18 --   24 0827 -- -- -- -- -- 97 %   24 0813 101/67 99.1 °F (37.3

## 2024-06-21 NOTE — PROGRESS NOTES
Transported pt to 4W on defib monitoring and 2l O2 NC.    Updates given to receiving RN at bedside (1 lead EKG nd indwelling cat insertion pending)    Pt's daughter at bedside and reeducated on poc.  Pt's status unchanged from previous assessment at transfer.    Portable tele box returned.

## 2024-06-21 NOTE — PROGRESS NOTES
Renal Dosing/Monitoring  Medication: gabapentin   Current regimen:  600 mg PO every 12 hr  Recent Labs     06/20/24  0720 06/21/24  0733   CREATININE 0.77 2.32*   BUN 6 14     Estimated CrCl:  33 ml/min, now has CARLY  Plan: Change to 50% dose reduction, 300 mg PO Q12H

## 2024-06-21 NOTE — PROGRESS NOTES
I spoke to the patient's daughter twice today.  Just went by and reexamined patient.  She is resting soundly.  Urine remains dark-colored.  I will give another bolus.  Potential third spacing?  No bowel function yet.  No definitive etiology for her change in vitals and leukocytosis today.  We will trend labs tomorrow.    Kevin Bolaños MD, FACS, [facility]  Bariatric and General Surgeon  [facility]

## 2024-06-21 NOTE — PROGRESS NOTES
Notified by [facility] regarding patient's sustained HR to 130's. That was the time when patient was being assisted from bed to chair. Dr. Bolaños was aware and blood works had been drawn. Pt is for stat CT as well.

## 2024-06-22 ENCOUNTER — ANESTHESIA (OUTPATIENT)
Facility: HOSPITAL | Age: 54
End: 2024-06-22
Payer: MEDICARE

## 2024-06-22 ENCOUNTER — APPOINTMENT (OUTPATIENT)
Facility: HOSPITAL | Age: 54
DRG: 620 | End: 2024-06-22
Attending: SURGERY
Payer: MEDICARE

## 2024-06-22 ENCOUNTER — ANESTHESIA EVENT (OUTPATIENT)
Facility: HOSPITAL | Age: 54
End: 2024-06-22
Payer: MEDICARE

## 2024-06-22 PROBLEM — K59.81 COLONIC PSEUDOOBSTRUCTION: Status: ACTIVE | Noted: 2024-06-22

## 2024-06-22 PROBLEM — K55.9 ISCHEMIC COLON (HCC): Status: ACTIVE | Noted: 2024-06-22

## 2024-06-22 LAB
ALBUMIN SERPL-MCNC: 2.2 G/DL (ref 3.5–5)
ALBUMIN/GLOB SERPL: 0.6 (ref 1.1–2.2)
ALP SERPL-CCNC: 75 U/L (ref 45–117)
ALT SERPL-CCNC: 16 U/L (ref 12–78)
ANION GAP SERPL CALC-SCNC: 8 MMOL/L (ref 5–15)
ANION GAP SERPL CALC-SCNC: 9 MMOL/L (ref 5–15)
ARTERIAL PATENCY WRIST A: YES
AST SERPL-CCNC: 32 U/L (ref 15–37)
BASE DEFICIT BLDA-SCNC: 9.1 MMOL/L
BASOPHILS # BLD: 0 K/UL (ref 0–0.1)
BASOPHILS # BLD: 0 K/UL (ref 0–0.1)
BASOPHILS NFR BLD: 0 % (ref 0–1)
BASOPHILS NFR BLD: 0 % (ref 0–1)
BDY SITE: ABNORMAL
BILIRUB SERPL-MCNC: 1.3 MG/DL (ref 0.2–1)
BUN SERPL-MCNC: 33 MG/DL (ref 6–20)
BUN SERPL-MCNC: 34 MG/DL (ref 6–20)
BUN/CREAT SERPL: 13 (ref 12–20)
BUN/CREAT SERPL: 14 (ref 12–20)
CALCIUM SERPL-MCNC: 7.6 MG/DL (ref 8.5–10.1)
CALCIUM SERPL-MCNC: 8.8 MG/DL (ref 8.5–10.1)
CHLORIDE SERPL-SCNC: 103 MMOL/L (ref 97–108)
CHLORIDE SERPL-SCNC: 108 MMOL/L (ref 97–108)
CO2 SERPL-SCNC: 18 MMOL/L (ref 21–32)
CO2 SERPL-SCNC: 21 MMOL/L (ref 21–32)
CREAT SERPL-MCNC: 2.46 MG/DL (ref 0.55–1.02)
CREAT SERPL-MCNC: 2.56 MG/DL (ref 0.55–1.02)
DIFFERENTIAL METHOD BLD: ABNORMAL
DIFFERENTIAL METHOD BLD: ABNORMAL
EOSINOPHIL # BLD: 0 K/UL (ref 0–0.4)
EOSINOPHIL # BLD: 0 K/UL (ref 0–0.4)
EOSINOPHIL NFR BLD: 0 % (ref 0–7)
EOSINOPHIL NFR BLD: 0 % (ref 0–7)
ERYTHROCYTE [DISTWIDTH] IN BLOOD BY AUTOMATED COUNT: 14.5 % (ref 11.5–14.5)
ERYTHROCYTE [DISTWIDTH] IN BLOOD BY AUTOMATED COUNT: 14.6 % (ref 11.5–14.5)
GLOBULIN SER CALC-MCNC: 3.5 G/DL (ref 2–4)
GLUCOSE BLD STRIP.AUTO-MCNC: 125 MG/DL (ref 65–117)
GLUCOSE BLD STRIP.AUTO-MCNC: 146 MG/DL (ref 65–117)
GLUCOSE SERPL-MCNC: 128 MG/DL (ref 65–100)
GLUCOSE SERPL-MCNC: 144 MG/DL (ref 65–100)
HCG UR QL: NEGATIVE
HCO3 BLDA-SCNC: 18 MMOL/L (ref 22–26)
HCT VFR BLD AUTO: 37.9 % (ref 35–47)
HCT VFR BLD AUTO: 40.9 % (ref 35–47)
HGB BLD-MCNC: 12 G/DL (ref 11.5–16)
HGB BLD-MCNC: 12.5 G/DL (ref 11.5–16)
IMM GRANULOCYTES # BLD AUTO: 0 K/UL
IMM GRANULOCYTES # BLD AUTO: 0 K/UL
IMM GRANULOCYTES NFR BLD AUTO: 0 %
IMM GRANULOCYTES NFR BLD AUTO: 0 %
LACTATE SERPL-SCNC: 1.9 MMOL/L (ref 0.4–2)
LYMPHOCYTES # BLD: 0.6 K/UL (ref 0.8–3.5)
LYMPHOCYTES # BLD: 1.7 K/UL (ref 0.8–3.5)
LYMPHOCYTES NFR BLD: 13 % (ref 12–49)
LYMPHOCYTES NFR BLD: 9 % (ref 12–49)
MAGNESIUM SERPL-MCNC: 2.9 MG/DL (ref 1.6–2.4)
MCH RBC QN AUTO: 29 PG (ref 26–34)
MCH RBC QN AUTO: 29.3 PG (ref 26–34)
MCHC RBC AUTO-ENTMCNC: 30.6 G/DL (ref 30–36.5)
MCHC RBC AUTO-ENTMCNC: 31.7 G/DL (ref 30–36.5)
MCV RBC AUTO: 92.4 FL (ref 80–99)
MCV RBC AUTO: 94.9 FL (ref 80–99)
METAMYELOCYTES NFR BLD MANUAL: 2 %
MONOCYTES # BLD: 0.1 K/UL (ref 0–1)
MONOCYTES # BLD: 1.7 K/UL (ref 0–1)
MONOCYTES NFR BLD: 3 % (ref 5–13)
MONOCYTES NFR BLD: 9 % (ref 5–13)
MYELOCYTES NFR BLD MANUAL: 1 %
NEUTS BAND NFR BLD MANUAL: 12 % (ref 0–6)
NEUTS BAND NFR BLD MANUAL: 20 % (ref 0–6)
NEUTS SEG # BLD: 16 K/UL (ref 1.8–8)
NEUTS SEG # BLD: 3.8 K/UL (ref 1.8–8)
NEUTS SEG NFR BLD: 61 % (ref 32–75)
NEUTS SEG NFR BLD: 70 % (ref 32–75)
NRBC # BLD: 0 K/UL (ref 0–0.01)
NRBC # BLD: 0 K/UL (ref 0–0.01)
NRBC BLD-RTO: 0 PER 100 WBC
NRBC BLD-RTO: 0 PER 100 WBC
PCO2 BLDA: 46 MMHG (ref 35–45)
PH BLDA: 7.22 (ref 7.35–7.45)
PHOSPHATE SERPL-MCNC: 5.6 MG/DL (ref 2.6–4.7)
PLATELET # BLD AUTO: 320 K/UL (ref 150–400)
PLATELET # BLD AUTO: 384 K/UL (ref 150–400)
PMV BLD AUTO: 11.3 FL (ref 8.9–12.9)
PMV BLD AUTO: 11.4 FL (ref 8.9–12.9)
PO2 BLDA: 93 MMHG (ref 80–100)
POTASSIUM SERPL-SCNC: 4.4 MMOL/L (ref 3.5–5.1)
POTASSIUM SERPL-SCNC: 4.4 MMOL/L (ref 3.5–5.1)
PROT SERPL-MCNC: 5.7 G/DL (ref 6.4–8.2)
RBC # BLD AUTO: 4.1 M/UL (ref 3.8–5.2)
RBC # BLD AUTO: 4.31 M/UL (ref 3.8–5.2)
RBC MORPH BLD: ABNORMAL
RBC MORPH BLD: ABNORMAL
SAO2 % BLD: 96 % (ref 92–97)
SAO2% DEVICE SAO2% SENSOR NAME: ABNORMAL
SERVICE CMNT-IMP: ABNORMAL
SODIUM SERPL-SCNC: 133 MMOL/L (ref 136–145)
SODIUM SERPL-SCNC: 134 MMOL/L (ref 136–145)
SPECIMEN SITE: ABNORMAL
WBC # BLD AUTO: 19.4 K/UL (ref 3.6–11)
WBC # BLD AUTO: 4.7 K/UL (ref 3.6–11)

## 2024-06-22 PROCEDURE — 3600000014 HC SURGERY LEVEL 4 ADDTL 15MIN: Performed by: SURGERY

## 2024-06-22 PROCEDURE — 2500000003 HC RX 250 WO HCPCS: Performed by: PHYSICIAN ASSISTANT

## 2024-06-22 PROCEDURE — 6360000002 HC RX W HCPCS: Performed by: SURGERY

## 2024-06-22 PROCEDURE — 2720000010 HC SURG SUPPLY STERILE: Performed by: SURGERY

## 2024-06-22 PROCEDURE — 2580000003 HC RX 258: Performed by: SURGERY

## 2024-06-22 PROCEDURE — 94002 VENT MGMT INPAT INIT DAY: CPT

## 2024-06-22 PROCEDURE — 3700000000 HC ANESTHESIA ATTENDED CARE: Performed by: SURGERY

## 2024-06-22 PROCEDURE — 44160 REMOVAL OF COLON: CPT | Performed by: SURGERY

## 2024-06-22 PROCEDURE — 6370000000 HC RX 637 (ALT 250 FOR IP): Performed by: INTERNAL MEDICINE

## 2024-06-22 PROCEDURE — 81025 URINE PREGNANCY TEST: CPT

## 2024-06-22 PROCEDURE — 2580000003 HC RX 258: Performed by: ANESTHESIOLOGY

## 2024-06-22 PROCEDURE — 88307 TISSUE EXAM BY PATHOLOGIST: CPT

## 2024-06-22 PROCEDURE — 2709999900 HC NON-CHARGEABLE SUPPLY: Performed by: SURGERY

## 2024-06-22 PROCEDURE — 0DTF0ZZ RESECTION OF RIGHT LARGE INTESTINE, OPEN APPROACH: ICD-10-PCS | Performed by: SURGERY

## 2024-06-22 PROCEDURE — 82803 BLOOD GASES ANY COMBINATION: CPT

## 2024-06-22 PROCEDURE — 6360000002 HC RX W HCPCS: Performed by: NURSE ANESTHETIST, CERTIFIED REGISTERED

## 2024-06-22 PROCEDURE — 3600000004 HC SURGERY LEVEL 4 BASE: Performed by: SURGERY

## 2024-06-22 PROCEDURE — 71045 X-RAY EXAM CHEST 1 VIEW: CPT

## 2024-06-22 PROCEDURE — 36415 COLL VENOUS BLD VENIPUNCTURE: CPT

## 2024-06-22 PROCEDURE — 6360000002 HC RX W HCPCS: Performed by: INTERNAL MEDICINE

## 2024-06-22 PROCEDURE — 99024 POSTOP FOLLOW-UP VISIT: CPT | Performed by: SURGERY

## 2024-06-22 PROCEDURE — 82962 GLUCOSE BLOOD TEST: CPT

## 2024-06-22 PROCEDURE — 6370000000 HC RX 637 (ALT 250 FOR IP): Performed by: SURGERY

## 2024-06-22 PROCEDURE — 80053 COMPREHEN METABOLIC PANEL: CPT

## 2024-06-22 PROCEDURE — 2000000000 HC ICU R&B

## 2024-06-22 PROCEDURE — 85025 COMPLETE CBC W/AUTO DIFF WBC: CPT

## 2024-06-22 PROCEDURE — 2500000003 HC RX 250 WO HCPCS: Performed by: INTERNAL MEDICINE

## 2024-06-22 PROCEDURE — 84100 ASSAY OF PHOSPHORUS: CPT

## 2024-06-22 PROCEDURE — C9113 INJ PANTOPRAZOLE SODIUM, VIA: HCPCS | Performed by: SURGERY

## 2024-06-22 PROCEDURE — 36600 WITHDRAWAL OF ARTERIAL BLOOD: CPT

## 2024-06-22 PROCEDURE — 5A1935Z RESPIRATORY VENTILATION, LESS THAN 24 CONSECUTIVE HOURS: ICD-10-PCS | Performed by: SURGERY

## 2024-06-22 PROCEDURE — 3700000001 HC ADD 15 MINUTES (ANESTHESIA): Performed by: SURGERY

## 2024-06-22 PROCEDURE — 2580000003 HC RX 258: Performed by: NURSE ANESTHETIST, CERTIFIED REGISTERED

## 2024-06-22 PROCEDURE — 74018 RADEX ABDOMEN 1 VIEW: CPT

## 2024-06-22 PROCEDURE — 83605 ASSAY OF LACTIC ACID: CPT

## 2024-06-22 PROCEDURE — P9045 ALBUMIN (HUMAN), 5%, 250 ML: HCPCS | Performed by: NURSE ANESTHETIST, CERTIFIED REGISTERED

## 2024-06-22 PROCEDURE — 0BH17EZ INSERTION OF ENDOTRACHEAL AIRWAY INTO TRACHEA, VIA NATURAL OR ARTIFICIAL OPENING: ICD-10-PCS | Performed by: SURGERY

## 2024-06-22 PROCEDURE — 83735 ASSAY OF MAGNESIUM: CPT

## 2024-06-22 PROCEDURE — 2500000003 HC RX 250 WO HCPCS: Performed by: NURSE PRACTITIONER

## 2024-06-22 PROCEDURE — 2500000003 HC RX 250 WO HCPCS: Performed by: NURSE ANESTHETIST, CERTIFIED REGISTERED

## 2024-06-22 PROCEDURE — 2580000003 HC RX 258: Performed by: INTERNAL MEDICINE

## 2024-06-22 RX ORDER — SODIUM CHLORIDE, SODIUM LACTATE, POTASSIUM CHLORIDE, CALCIUM CHLORIDE 600; 310; 30; 20 MG/100ML; MG/100ML; MG/100ML; MG/100ML
INJECTION, SOLUTION INTRAVENOUS CONTINUOUS PRN
Status: DISCONTINUED | OUTPATIENT
Start: 2024-06-22 | End: 2024-06-22 | Stop reason: SDUPTHER

## 2024-06-22 RX ORDER — ROCURONIUM BROMIDE 10 MG/ML
INJECTION, SOLUTION INTRAVENOUS PRN
Status: DISCONTINUED | OUTPATIENT
Start: 2024-06-22 | End: 2024-06-22 | Stop reason: SDUPTHER

## 2024-06-22 RX ORDER — SODIUM CHLORIDE 9 MG/ML
INJECTION, SOLUTION INTRAVENOUS PRN
Status: DISCONTINUED | OUTPATIENT
Start: 2024-06-22 | End: 2024-06-22

## 2024-06-22 RX ORDER — LIDOCAINE HYDROCHLORIDE 20 MG/ML
INJECTION, SOLUTION EPIDURAL; INFILTRATION; INTRACAUDAL; PERINEURAL PRN
Status: DISCONTINUED | OUTPATIENT
Start: 2024-06-22 | End: 2024-06-22 | Stop reason: SDUPTHER

## 2024-06-22 RX ORDER — METRONIDAZOLE 500 MG/100ML
500 INJECTION, SOLUTION INTRAVENOUS EVERY 8 HOURS
Status: DISCONTINUED | OUTPATIENT
Start: 2024-06-22 | End: 2024-06-26

## 2024-06-22 RX ORDER — MIDAZOLAM HYDROCHLORIDE 1 MG/ML
INJECTION INTRAMUSCULAR; INTRAVENOUS PRN
Status: DISCONTINUED | OUTPATIENT
Start: 2024-06-22 | End: 2024-06-22 | Stop reason: SDUPTHER

## 2024-06-22 RX ORDER — SODIUM CHLORIDE 0.9 % (FLUSH) 0.9 %
5-40 SYRINGE (ML) INJECTION EVERY 12 HOURS SCHEDULED
Status: DISCONTINUED | OUTPATIENT
Start: 2024-06-22 | End: 2024-06-24

## 2024-06-22 RX ORDER — SODIUM CHLORIDE 9 MG/ML
INJECTION, SOLUTION INTRAVENOUS CONTINUOUS PRN
Status: DISCONTINUED | OUTPATIENT
Start: 2024-06-22 | End: 2024-06-22 | Stop reason: SDUPTHER

## 2024-06-22 RX ORDER — PROPOFOL 10 MG/ML
INJECTION, EMULSION INTRAVENOUS PRN
Status: DISCONTINUED | OUTPATIENT
Start: 2024-06-22 | End: 2024-06-22 | Stop reason: SDUPTHER

## 2024-06-22 RX ORDER — NOREPINEPHRINE BITARTRATE 0.06 MG/ML
1-100 INJECTION, SOLUTION INTRAVENOUS CONTINUOUS
Status: DISCONTINUED | OUTPATIENT
Start: 2024-06-22 | End: 2024-06-23

## 2024-06-22 RX ORDER — HYDRALAZINE HYDROCHLORIDE 20 MG/ML
10 INJECTION INTRAMUSCULAR; INTRAVENOUS
Status: DISCONTINUED | OUTPATIENT
Start: 2024-06-22 | End: 2024-06-23

## 2024-06-22 RX ORDER — PROCHLORPERAZINE EDISYLATE 5 MG/ML
5 INJECTION INTRAMUSCULAR; INTRAVENOUS
Status: DISCONTINUED | OUTPATIENT
Start: 2024-06-22 | End: 2024-06-23

## 2024-06-22 RX ORDER — PHENYLEPHRINE HCL IN 0.9% NACL 0.4MG/10ML
SYRINGE (ML) INTRAVENOUS PRN
Status: DISCONTINUED | OUTPATIENT
Start: 2024-06-22 | End: 2024-06-22 | Stop reason: SDUPTHER

## 2024-06-22 RX ORDER — FENTANYL CITRATE 50 UG/ML
100 INJECTION, SOLUTION INTRAMUSCULAR; INTRAVENOUS
Status: DISCONTINUED | OUTPATIENT
Start: 2024-06-22 | End: 2024-06-22

## 2024-06-22 RX ORDER — METOCLOPRAMIDE HYDROCHLORIDE 5 MG/ML
5 INJECTION INTRAMUSCULAR; INTRAVENOUS EVERY 6 HOURS PRN
Status: DISCONTINUED | OUTPATIENT
Start: 2024-06-22 | End: 2024-06-27

## 2024-06-22 RX ORDER — SUCCINYLCHOLINE/SOD CL,ISO/PF 200MG/10ML
SYRINGE (ML) INTRAVENOUS PRN
Status: DISCONTINUED | OUTPATIENT
Start: 2024-06-22 | End: 2024-06-22 | Stop reason: SDUPTHER

## 2024-06-22 RX ORDER — KETAMINE HCL IN NACL, ISO-OSM 100MG/10ML
SYRINGE (ML) INJECTION PRN
Status: DISCONTINUED | OUTPATIENT
Start: 2024-06-22 | End: 2024-06-22 | Stop reason: SDUPTHER

## 2024-06-22 RX ORDER — ONDANSETRON 2 MG/ML
4 INJECTION INTRAMUSCULAR; INTRAVENOUS ONCE
Status: DISCONTINUED | OUTPATIENT
Start: 2024-06-22 | End: 2024-06-22

## 2024-06-22 RX ORDER — INSULIN LISPRO 100 [IU]/ML
0-16 INJECTION, SOLUTION INTRAVENOUS; SUBCUTANEOUS EVERY 6 HOURS
Status: DISCONTINUED | OUTPATIENT
Start: 2024-06-22 | End: 2024-07-02 | Stop reason: HOSPADM

## 2024-06-22 RX ORDER — INSULIN LISPRO 100 [IU]/ML
0-16 INJECTION, SOLUTION INTRAVENOUS; SUBCUTANEOUS EVERY 4 HOURS
Status: DISCONTINUED | OUTPATIENT
Start: 2024-06-22 | End: 2024-06-22

## 2024-06-22 RX ORDER — DIPHENHYDRAMINE HYDROCHLORIDE 50 MG/ML
12.5 INJECTION INTRAMUSCULAR; INTRAVENOUS
Status: DISCONTINUED | OUTPATIENT
Start: 2024-06-22 | End: 2024-06-23

## 2024-06-22 RX ORDER — FENTANYL CITRATE 50 UG/ML
50 INJECTION, SOLUTION INTRAMUSCULAR; INTRAVENOUS EVERY 5 MIN PRN
Status: DISCONTINUED | OUTPATIENT
Start: 2024-06-22 | End: 2024-06-23

## 2024-06-22 RX ORDER — NALOXONE HYDROCHLORIDE 0.4 MG/ML
INJECTION, SOLUTION INTRAMUSCULAR; INTRAVENOUS; SUBCUTANEOUS PRN
Status: DISCONTINUED | OUTPATIENT
Start: 2024-06-22 | End: 2024-06-24

## 2024-06-22 RX ORDER — SODIUM CHLORIDE 0.9 % (FLUSH) 0.9 %
5-40 SYRINGE (ML) INJECTION EVERY 12 HOURS SCHEDULED
Status: DISCONTINUED | OUTPATIENT
Start: 2024-06-22 | End: 2024-06-22

## 2024-06-22 RX ORDER — SODIUM CHLORIDE 9 MG/ML
INJECTION, SOLUTION INTRAVENOUS PRN
Status: DISCONTINUED | OUTPATIENT
Start: 2024-06-22 | End: 2024-06-24

## 2024-06-22 RX ORDER — MIDAZOLAM HYDROCHLORIDE 2 MG/2ML
2 INJECTION, SOLUTION INTRAMUSCULAR; INTRAVENOUS
Status: DISCONTINUED | OUTPATIENT
Start: 2024-06-22 | End: 2024-06-23

## 2024-06-22 RX ORDER — MIDAZOLAM HYDROCHLORIDE 2 MG/2ML
2 INJECTION, SOLUTION INTRAMUSCULAR; INTRAVENOUS
Status: DISCONTINUED | OUTPATIENT
Start: 2024-06-22 | End: 2024-06-22

## 2024-06-22 RX ORDER — SODIUM CHLORIDE 0.9 % (FLUSH) 0.9 %
5-40 SYRINGE (ML) INJECTION PRN
Status: DISCONTINUED | OUTPATIENT
Start: 2024-06-22 | End: 2024-06-22

## 2024-06-22 RX ORDER — ONDANSETRON 2 MG/ML
4 INJECTION INTRAMUSCULAR; INTRAVENOUS
Status: DISPENSED | OUTPATIENT
Start: 2024-06-22 | End: 2024-06-23

## 2024-06-22 RX ORDER — FENTANYL CITRATE 50 UG/ML
INJECTION, SOLUTION INTRAMUSCULAR; INTRAVENOUS PRN
Status: DISCONTINUED | OUTPATIENT
Start: 2024-06-22 | End: 2024-06-22 | Stop reason: SDUPTHER

## 2024-06-22 RX ORDER — DEXAMETHASONE SODIUM PHOSPHATE 4 MG/ML
INJECTION, SOLUTION INTRA-ARTICULAR; INTRALESIONAL; INTRAMUSCULAR; INTRAVENOUS; SOFT TISSUE PRN
Status: DISCONTINUED | OUTPATIENT
Start: 2024-06-22 | End: 2024-06-22 | Stop reason: SDUPTHER

## 2024-06-22 RX ORDER — SODIUM CHLORIDE, SODIUM LACTATE, POTASSIUM CHLORIDE, CALCIUM CHLORIDE 600; 310; 30; 20 MG/100ML; MG/100ML; MG/100ML; MG/100ML
INJECTION, SOLUTION INTRAVENOUS CONTINUOUS
Status: DISCONTINUED | OUTPATIENT
Start: 2024-06-22 | End: 2024-06-23

## 2024-06-22 RX ORDER — SODIUM CHLORIDE 0.9 % (FLUSH) 0.9 %
5-40 SYRINGE (ML) INJECTION PRN
Status: DISCONTINUED | OUTPATIENT
Start: 2024-06-22 | End: 2024-06-24

## 2024-06-22 RX ORDER — SODIUM CHLORIDE 9 MG/ML
INJECTION, SOLUTION INTRAVENOUS CONTINUOUS
Status: DISCONTINUED | OUTPATIENT
Start: 2024-06-22 | End: 2024-06-22

## 2024-06-22 RX ORDER — DEXMEDETOMIDINE HYDROCHLORIDE 4 UG/ML
.1-1.5 INJECTION, SOLUTION INTRAVENOUS CONTINUOUS
Status: DISCONTINUED | OUTPATIENT
Start: 2024-06-22 | End: 2024-06-23

## 2024-06-22 RX ORDER — SODIUM CHLORIDE, SODIUM LACTATE, POTASSIUM CHLORIDE, CALCIUM CHLORIDE 600; 310; 30; 20 MG/100ML; MG/100ML; MG/100ML; MG/100ML
INJECTION, SOLUTION INTRAVENOUS CONTINUOUS
Status: DISCONTINUED | OUTPATIENT
Start: 2024-06-22 | End: 2024-06-22

## 2024-06-22 RX ORDER — ACETAMINOPHEN 650 MG/1
650 SUPPOSITORY RECTAL EVERY 6 HOURS PRN
Status: DISCONTINUED | OUTPATIENT
Start: 2024-06-22 | End: 2024-07-02 | Stop reason: HOSPADM

## 2024-06-22 RX ORDER — FENTANYL CITRATE-0.9 % NACL/PF 10 MCG/ML
25-200 PLASTIC BAG, INJECTION (ML) INTRAVENOUS CONTINUOUS
Status: DISCONTINUED | OUTPATIENT
Start: 2024-06-22 | End: 2024-06-23

## 2024-06-22 RX ORDER — DIPHENHYDRAMINE HCL 25 MG
50 CAPSULE ORAL EVERY 6 HOURS PRN
Status: DISCONTINUED | OUTPATIENT
Start: 2024-06-22 | End: 2024-06-23

## 2024-06-22 RX ORDER — PROPOFOL 10 MG/ML
5-50 INJECTION, EMULSION INTRAVENOUS CONTINUOUS
Status: DISCONTINUED | OUTPATIENT
Start: 2024-06-22 | End: 2024-06-23

## 2024-06-22 RX ORDER — ALBUMIN, HUMAN INJ 5% 5 %
SOLUTION INTRAVENOUS PRN
Status: DISCONTINUED | OUTPATIENT
Start: 2024-06-22 | End: 2024-06-22 | Stop reason: SDUPTHER

## 2024-06-22 RX ORDER — CEFAZOLIN SODIUM 1 G/3ML
INJECTION, POWDER, FOR SOLUTION INTRAMUSCULAR; INTRAVENOUS PRN
Status: DISCONTINUED | OUTPATIENT
Start: 2024-06-22 | End: 2024-06-22

## 2024-06-22 RX ORDER — ONDANSETRON 2 MG/ML
INJECTION INTRAMUSCULAR; INTRAVENOUS PRN
Status: DISCONTINUED | OUTPATIENT
Start: 2024-06-22 | End: 2024-06-22 | Stop reason: SDUPTHER

## 2024-06-22 RX ADMIN — PROPOFOL 35 MCG/KG/MIN: 10 INJECTION, EMULSION INTRAVENOUS at 14:20

## 2024-06-22 RX ADMIN — PROPOFOL 25 MCG/KG/MIN: 10 INJECTION, EMULSION INTRAVENOUS at 11:59

## 2024-06-22 RX ADMIN — Medication 120 MCG: at 11:31

## 2024-06-22 RX ADMIN — ENOXAPARIN SODIUM 40 MG: 100 INJECTION SUBCUTANEOUS at 21:39

## 2024-06-22 RX ADMIN — SODIUM CHLORIDE, PRESERVATIVE FREE 40 MG: 5 INJECTION INTRAVENOUS at 09:23

## 2024-06-22 RX ADMIN — ONDANSETRON 4 MG: 2 INJECTION INTRAMUSCULAR; INTRAVENOUS at 11:18

## 2024-06-22 RX ADMIN — DEXAMETHASONE SODIUM PHOSPHATE 4 MG: 4 INJECTION INTRA-ARTICULAR; INTRALESIONAL; INTRAMUSCULAR; INTRAVENOUS; SOFT TISSUE at 11:18

## 2024-06-22 RX ADMIN — SODIUM CHLORIDE, PRESERVATIVE FREE 40 ML: 5 INJECTION INTRAVENOUS at 21:00

## 2024-06-22 RX ADMIN — ALBUMIN (HUMAN) 250 ML: 12.5 INJECTION, SOLUTION INTRAVENOUS at 12:17

## 2024-06-22 RX ADMIN — PROPOFOL 120 MG: 10 INJECTION, EMULSION INTRAVENOUS at 11:14

## 2024-06-22 RX ADMIN — DEXMEDETOMIDINE HYDROCHLORIDE 0.6 MCG/KG/HR: 400 INJECTION, SOLUTION INTRAVENOUS at 22:31

## 2024-06-22 RX ADMIN — METRONIDAZOLE 500 MG: 500 INJECTION, SOLUTION INTRAVENOUS at 18:21

## 2024-06-22 RX ADMIN — SODIUM CHLORIDE 35 MCG/MIN: 9 INJECTION, SOLUTION INTRAVENOUS at 14:37

## 2024-06-22 RX ADMIN — METOPROLOL TARTRATE 5 MG: 1 INJECTION, SOLUTION INTRAVENOUS at 09:26

## 2024-06-22 RX ADMIN — Medication 120 MCG: at 11:40

## 2024-06-22 RX ADMIN — Medication 120 MCG: at 11:35

## 2024-06-22 RX ADMIN — Medication 120 MCG: at 11:26

## 2024-06-22 RX ADMIN — DEXMEDETOMIDINE HYDROCHLORIDE 0.2 MCG/KG/HR: 400 INJECTION, SOLUTION INTRAVENOUS at 14:53

## 2024-06-22 RX ADMIN — ROCURONIUM BROMIDE 30 MG: 10 INJECTION, SOLUTION INTRAVENOUS at 11:18

## 2024-06-22 RX ADMIN — Medication 30 MG: at 11:27

## 2024-06-22 RX ADMIN — PHENYLEPHRINE HYDROCHLORIDE 50 MCG/MIN: 10 INJECTION INTRAVENOUS at 11:45

## 2024-06-22 RX ADMIN — Medication 120 MCG: at 11:22

## 2024-06-22 RX ADMIN — LIDOCAINE HYDROCHLORIDE 50 MG: 20 INJECTION, SOLUTION EPIDURAL; INFILTRATION; INTRACAUDAL; PERINEURAL at 11:14

## 2024-06-22 RX ADMIN — SODIUM CHLORIDE: 9 INJECTION, SOLUTION INTRAVENOUS at 12:25

## 2024-06-22 RX ADMIN — WATER 2000 MG: 1 INJECTION INTRAMUSCULAR; INTRAVENOUS; SUBCUTANEOUS at 11:20

## 2024-06-22 RX ADMIN — WATER 1000 MG: 1 INJECTION INTRAMUSCULAR; INTRAVENOUS; SUBCUTANEOUS at 16:03

## 2024-06-22 RX ADMIN — Medication 120 MG: at 11:14

## 2024-06-22 RX ADMIN — MIDAZOLAM 2 MG: 1 INJECTION INTRAMUSCULAR; INTRAVENOUS at 11:09

## 2024-06-22 RX ADMIN — Medication 50 MCG/HR: at 21:27

## 2024-06-22 RX ADMIN — Medication 120 MCG: at 11:19

## 2024-06-22 RX ADMIN — ACETAMINOPHEN 650 MG: 650 SUPPOSITORY RECTAL at 17:30

## 2024-06-22 RX ADMIN — SODIUM CHLORIDE, PRESERVATIVE FREE 30 ML: 5 INJECTION INTRAVENOUS at 21:00

## 2024-06-22 RX ADMIN — SODIUM CHLORIDE, POTASSIUM CHLORIDE, SODIUM LACTATE AND CALCIUM CHLORIDE: 600; 310; 30; 20 INJECTION, SOLUTION INTRAVENOUS at 14:48

## 2024-06-22 RX ADMIN — ROCURONIUM BROMIDE 10 MG: 10 INJECTION, SOLUTION INTRAVENOUS at 11:14

## 2024-06-22 RX ADMIN — SODIUM CHLORIDE: 9 INJECTION, SOLUTION INTRAVENOUS at 11:55

## 2024-06-22 RX ADMIN — SODIUM CHLORIDE, POTASSIUM CHLORIDE, SODIUM LACTATE AND CALCIUM CHLORIDE: 600; 310; 30; 20 INJECTION, SOLUTION INTRAVENOUS at 10:55

## 2024-06-22 RX ADMIN — PROPOFOL 20 MCG/KG/MIN: 10 INJECTION, EMULSION INTRAVENOUS at 19:53

## 2024-06-22 RX ADMIN — NOREPINEPHRINE BITARTRATE 5 MCG/MIN: 1 INJECTION, SOLUTION, CONCENTRATE INTRAVENOUS at 11:46

## 2024-06-22 RX ADMIN — SODIUM CHLORIDE, POTASSIUM CHLORIDE, SODIUM LACTATE AND CALCIUM CHLORIDE: 600; 310; 30; 20 INJECTION, SOLUTION INTRAVENOUS at 23:16

## 2024-06-22 RX ADMIN — METOPROLOL TARTRATE 5 MG: 1 INJECTION, SOLUTION INTRAVENOUS at 04:52

## 2024-06-22 RX ADMIN — FENTANYL CITRATE 50 MCG: 50 INJECTION, SOLUTION INTRAMUSCULAR; INTRAVENOUS at 11:52

## 2024-06-22 RX ADMIN — SODIUM CHLORIDE, PRESERVATIVE FREE 10 ML: 5 INJECTION INTRAVENOUS at 09:23

## 2024-06-22 RX ADMIN — SODIUM BICARBONATE 50 MEQ: 84 INJECTION, SOLUTION INTRAVENOUS at 16:03

## 2024-06-22 RX ADMIN — FENTANYL CITRATE 50 MCG: 50 INJECTION, SOLUTION INTRAMUSCULAR; INTRAVENOUS at 12:30

## 2024-06-22 RX ADMIN — Medication 20 MG: at 11:52

## 2024-06-22 RX ADMIN — METRONIDAZOLE 500 MG: 500 SOLUTION INTRAVENOUS at 11:25

## 2024-06-22 RX ADMIN — Medication 200 MCG: at 11:46

## 2024-06-22 ASSESSMENT — PULMONARY FUNCTION TESTS
PIF_VALUE: 17
PIF_VALUE: 20
PIF_VALUE: 16

## 2024-06-22 ASSESSMENT — PAIN SCALES - GENERAL
PAINLEVEL_OUTOF10: 0
PAINLEVEL_OUTOF10: 10

## 2024-06-22 ASSESSMENT — COPD QUESTIONNAIRES: CAT_SEVERITY: MODERATE

## 2024-06-22 NOTE — PROGRESS NOTES
Progress Note  Date:2024       Room:[facility]  Patient Name:Ce Roberts     YOB: 1970     Age:54 y.o.        Subjective    Subjective   Review of Systems  Patient appears lethargic.  Complains of pain.  States pain is worse than yesterday.  Objective         Vitals Last 24 Hours:  TEMPERATURE:  Temp  Av.1 °F (37.3 °C)  Min: 98.1 °F (36.7 °C)  Max: 99.8 °F (37.7 °C)  RESPIRATIONS RANGE: Resp  Av.3  Min: 15  Max: 19  PULSE OXIMETRY RANGE: SpO2  Av.5 %  Min: 96 %  Max: 97 %  PULSE RANGE: Pulse  Av.6  Min: 101  Max: 122  BLOOD PRESSURE RANGE: Systolic (24hrs), Av , Min:104 , Max:135   ; Diastolic (24hrs), Av, Min:75, Max:96    I/O (24Hr):    Intake/Output Summary (Last 24 hours) at 2024 0913  Last data filed at 2024 0131  Gross per 24 hour   Intake 7408.5 ml   Output --   Net 7408.5 ml     Objective:  Vital signs: (most recent): Blood pressure (!) 135/96, pulse (!) 116, temperature 99.8 °F (37.7 °C), temperature source Oral, resp. rate 15, height 1.613 m (5' 3.5\"), weight 102.1 kg (225 lb 1.6 oz), SpO2 97 %.    General arousable  Lungs clear  Heart tachycardic  Abdomen distended with tenderness in the upper abdomen much so than the left lower abdomen  Labs/Imaging/Diagnostics    Labs:  CBC:  Recent Labs     24  0733 24  0459   WBC 10.5 14.8* 19.4*   RBC 4.10 4.35 4.10   HGB 11.9 12.5 12.0   HCT 37.2 39.0 37.9   MCV 90.7 89.7 92.4   RDW 14.4 14.5 14.6*    359 384     CHEMISTRIES:  Recent Labs     24  0733 24  0459    135* 133*   K 3.6 3.8 4.4    102 103   CO2 27 26 21   BUN 6 14 33*   CREATININE 0.77 2.32* 2.56*   GLUCOSE 89 125* 128*   PHOS  --  5.8* 5.6*   MG  --  2.7* 2.9*     PT/INR:No results for input(s): \"PROTIME\", \"INR\" in the last 72 hours.  APTT:No results for input(s): \"APTT\" in the last 72 hours.  LIVER PROFILE:  Recent Labs     24  0733   AST 21   ALT 17   BILIDIR 0.5*

## 2024-06-22 NOTE — ANESTHESIA POSTPROCEDURE EVALUATION
Post-Anesthesia Evaluation and Assessment    Patient: Ce Roberts MRN: [id number]  SSN: [id number]    YOB: 1970  Age: 54 y.o.  Sex: female      I have evaluated the patient and they are stable and ready for discharge from the PACU.     Cardiovascular Function/Vital Signs  Visit Vitals  /60   Pulse (!) 109   Temp 99.1 °F (37.3 °C) (Oral)   Resp 15   Ht 1.613 m (5' 3.5\")   Wt 102.1 kg (225 lb 1.6 oz)   SpO2 100%   BMI 39.25 kg/m²       Patient is status post General anesthesia for Procedure(s):  EXPLORATORY LAPAROTOMY, RIGHT HEMICOLECTOMY.    Nausea/Vomiting: None    Postoperative hydration reviewed and adequate.    Pain:      Managed    Neurological Status:       At baseline    Mental Status, Level of Consciousness: Alert and  oriented to person, place, and time    Pulmonary Status:       Adequate oxygenation and airway patent    Complications related to anesthesia: None    Post-anesthesia assessment completed. No concerns    Signed By: Arsen Rojas MD     June 22, 2024            Department of Anesthesiology  Postprocedure Note    Patient: Ce Roberts  MRN: [id number]  YOB: 1970  Date of evaluation: 6/22/2024    Procedure Summary       Date: 06/22/24 Room / Location: [facility] MAIN OR [address] [place] [address] MAIN OR    Anesthesia Start: 1104 Anesthesia Stop: 1308    Procedure: EXPLORATORY LAPAROTOMY, RIGHT HEMICOLECTOMY (Abdomen) Diagnosis: Postoperative surgical complication involving digestive system associated with digestive system procedure, unspecified complication    Providers: Kevin Bolaños MD Responsible Provider: Arsen Rojas MD    Anesthesia Type: General ASA Status: 3            Anesthesia Type: General    Shayna Phase I: Shayna Score: 8    Shayna Phase II:      Anesthesia Post Evaluation    No notable events documented.

## 2024-06-22 NOTE — PROGRESS NOTES
Worsening labs without source. Taking back to OR to explore. Pt's daughter called.    Kevin Bolaños MD, FACS, [facility]  Bariatric and General Surgeon  [facility]

## 2024-06-22 NOTE — PROGRESS NOTES
Physical Therapy - defer  Order received, chart reviewed.  Noted worsening labs, pt now in the OR for exploration.  Will follow up tomorrow for therapy evaluation as appropriate.

## 2024-06-22 NOTE — PROGRESS NOTES
1315: Pt arrives from OR intubated with propofol, marina-synephrine, and levophed gtts running. ETT taped 22cm @ teeth, OGT 45cm @ lips. BP 95/32, SpO2 100% on 40% FiO2, RR 14, . Esophageal probe placed for continuous temperature monitoring, T 97.2*F. Verbal orders from Nely URIOSTEGUI to stop marina-synephrine gtt.

## 2024-06-22 NOTE — CONSULTS
[facility]    ICU Team Consult Note/History and Physical    Name: Ce Roberts   : 1970   MRN: [id number]   Date: 2024      Reason for ICU Admission: Acute respiratory insufficiency postop    HPI     54-year-old morbid obese female past medical history of GERD, IBS, constipation, fibromyalgia, chronic pain  Admitted for John-en-Y gastric bypass performed on .    Postoperative course with  Abdominal distention, nausea, bloating -abdominal x-ray on  reviewed dilated right lower quadrant bowel loop.   CT abdomen pelvis with IV contrast revealed -  Small bowel and proximal colon are distended with gas and fluid, without any abrupt transition point, with concern for postoperative ileus/constipation.  Patient was reported to be lethargic, noted to have watery stools with increasing upper abdominal pain and upper abdominal distention.   patient noted to have increasing abdominal tenderness, increasing white blood cell count and bandemia and tachycardia, patient taken back to the OR for exploration.    She was taken to the OR for ex lap -found to have cecal ischemia and perforation, with no signs of cecal volvulus-suspicion for colonic pseudoobstruction.  Bypass anatomy reviewed-GJ look good, John limb was appropriate, JJ was healthy without signs of obstruction.     Post op pt is transferred to the ICU with plans to be intubated for 24 hours. Patient arrives on propofol, Levophed and phenylephrine.     Active Problem List:     [unfilled]    Past Medical History:      has a past medical history of Arthritis, Arthritis of low back, Asthma, Chronic pain, Colon spasm, Controlled type 2 diabetes mellitus without complication, without long-term current use of insulin ([facility]), Depression, Fibromyalgia, GERD (gastroesophageal reflux disease), High cholesterol, Hypertension, IBS (irritable bowel syndrome), Left axillary pain, Migraine, and PRASHANT (obstructive sleep apnea).    Past Surgical  History:      has a past surgical history that includes gyn; Urological Surgery; Cholecystectomy; bladder repair (04/2011 ); gyn; Urological Surgery; Appendectomy; Breast biopsy (Left); Upper gastrointestinal endoscopy (N/A, 02/05/2024); Hysterectomy, total abdominal; and John-en-Y Gastric Bypass (N/A, 6/17/2024).    Home Medications:     Prior to Admission medications    Medication Sig Start Date End Date Taking? Authorizing Provider   cyclobenzaprine (FLEXERIL) 10 MG tablet Take 1 tablet by mouth 3 times daily as needed for Muscle spasms 6/18/24 6/28/24 Yes Kevin Bolaños MD   LINZESS 72 MCG CAPS capsule 1 capsule at least 30 minutes before the first meal of the day on an empty stomach Orally Once a day for 30 day(s)    Provider, MD Samson   polyethylene glycol (GLYCOLAX) 17 GM/SCOOP powder Take 17 g by mouth daily 6/5/24 9/3/24  Urmila Esqueda APRN - NP   ondansetron (ZOFRAN) 4 MG tablet Take 1 tablet by mouth every 8 hours as needed for Nausea or Vomiting 6/5/24   Urmila Esqueda APRN - NP   omeprazole (PRILOSEC) 40 MG delayed release capsule Take 1 capsule by mouth every morning (before breakfast) 6/5/24   Urmila Esqueda APRN - NP   Tirzepatide (MOUNJARO) 12.5 MG/0.5ML SOPN SC injection Inject 0.5 mLs into the skin once a week 6/3/24   Ulisses Joy MD   gabapentin (NEURONTIN) 600 MG tablet Take 1 tablet by mouth 2 times daily. Max Daily Amount: 1,200 mg 3/20/24 3/20/25  Ulisses Joy MD   ALPRAZolam (XANAX) 0.5 MG tablet Take 1 tablet by mouth daily as needed for Anxiety. 2/15/24 2/14/25  Ulisses Joy MD   albuterol sulfate HFA (VENTOLIN HFA) 108 (90 Base) MCG/ACT inhaler Inhale 2 puffs into the lungs 4 times daily as needed for Wheezing 1/9/24   Marco Oneal MD   amitriptyline (ELAVIL) 50 MG tablet Take 1 tablet by mouth nightly 1/9/24   Marco Oneal MD   losartan (COZAAR) 50 MG tablet Take 1 tablet by mouth daily 1/9/24   Marco Oneal MD       Allergies/Social/Family History:

## 2024-06-22 NOTE — PROGRESS NOTES
12:43pm: RN gave report on Ce Roberts to ICU 7S RN for transfer of care.    RN provided information on PMH, admitting diagnosis, surgery, assessment, labs, IV fluids, IV lines, allergies, ACHS blood glucose check, Code staus (Full), skin assessment.     RN provided time for questions and clarification. No questions from 7S RN at this time.    RN collected all of patient's belongings and [facility] [place] delivered them to 7S ICU bed 9.

## 2024-06-22 NOTE — ANESTHESIA PRE PROCEDURE
Department of Anesthesiology  Preprocedure Note       Name:  Ce Roberts   Age:  54 y.o.  :  1970                                          MRN:  [id number]         Date:  2024      Surgeon: Surgeon(s):  Kevin Bolaños MD    Procedure: Procedure(s):  LAPAROSCOPY DIAGNOSTIC POSSIBLE EXPLORATORY LAPAROTOMY    Medications prior to admission:   Prior to Admission medications    Medication Sig Start Date End Date Taking? Authorizing Provider   cyclobenzaprine (FLEXERIL) 10 MG tablet Take 1 tablet by mouth 3 times daily as needed for Muscle spasms 24 Yes Kevin Bolaños MD   LINZESS 72 MCG CAPS capsule 1 capsule at least 30 minutes before the first meal of the day on an empty stomach Orally Once a day for 30 day(s)    Provider, MD Samson   polyethylene glycol (GLYCOLAX) 17 GM/SCOOP powder Take 17 g by mouth daily 6/5/24 9/3/24  Urmila Esqueda APRN - NP   ondansetron (ZOFRAN) 4 MG tablet Take 1 tablet by mouth every 8 hours as needed for Nausea or Vomiting 24   Urmila Esqueda APRN - NP   omeprazole (PRILOSEC) 40 MG delayed release capsule Take 1 capsule by mouth every morning (before breakfast) 24   Urmila Esqueda APRN - NP   Tirzepatide (MOUNJARO) 12.5 MG/0.5ML SOPN SC injection Inject 0.5 mLs into the skin once a week 6/3/24   Ulisses Joy MD   gabapentin (NEURONTIN) 600 MG tablet Take 1 tablet by mouth 2 times daily. Max Daily Amount: 1,200 mg 3/20/24 3/20/25  Ulisses Joy MD   ALPRAZolam (XANAX) 0.5 MG tablet Take 1 tablet by mouth daily as needed for Anxiety. 2/15/24 2/14/25  Ulisses Joy MD   albuterol sulfate HFA (VENTOLIN HFA) 108 (90 Base) MCG/ACT inhaler Inhale 2 puffs into the lungs 4 times daily as needed for Wheezing 24   Marco Oneal MD   amitriptyline (ELAVIL) 50 MG tablet Take 1 tablet by mouth nightly 24   Marco Oneal MD   losartan (COZAAR) 50 MG tablet Take 1 tablet by mouth daily 24   Marco Oneal MD       Current medications:

## 2024-06-22 NOTE — PERIOP NOTE
TRANSFER - IN REPORT:    Verbal report received from BLAKE Navarrete on Ce Roberts  being received from 4W for ordered procedure      Report consisted of patient's Situation, Background, Assessment and   Recommendations(SBAR).     Information from the following report(s) Nurse Handoff Report was reviewed with the receiving nurse.    Opportunity for questions and clarification was provided.      Assessment completed upon patient's arrival to unit and care assumed.

## 2024-06-22 NOTE — PROGRESS NOTES
Renal Dosing/Monitoring  Medication: Metoclopramide   Current regimen:  10 mg IV every 6 hr PRN  Recent Labs     06/20/24  0720 06/21/24  0733 06/22/24  0459   CREATININE 0.77 2.32* 2.56*   BUN 6 14 33*     Estimated CrCl:  ~30 ml/min  Plan: Change to 5 mg IV every 6 hours PRN per [facility] P&T Committee Protocol with respect to renal function.  Pharmacy will continue to monitor patient daily and will make dosage adjustments based upon changing renal function.

## 2024-06-22 NOTE — OP NOTE
OPERATIVE NOTE    Date of Procedure: 6/22/2024    Preoperative Diagnosis: Postoperative complication  Postoperative Diagnosis: Cecal ischemia, colonic pseudoobstruction      Procedure: Procedure(s):  EXPLORATORY LAPAROTOMY, RIGHT HEMICOLECTOMY    Surgeon: Kevin Bolaños MD    Surgical Staff: Circulator: Hunter Pate RN  Surgical Assistant: Felipe Ayers  Scrub Person First: Chai Matos RN  Circulator Assist: Jen Parada RN    Anesthesia: General   Indications: 54-year-old female who underwent gastric bypass surgery 5 days ago displayed worsening signs of sepsis over the last 2 days.  The patient was fine for 4 days postoperatively and then on the morning of day 5 displayed tachycardia, leukocytosis, and CARLY.  The patient underwent a CT scan of her chest abdomen and pelvis which did not demonstrate any obvious postoperative concerns.  Colon was distended in the cecum but no signs of ischemia or volvulus.  We moved her to stepdown and rehydrated her.  The next day she looked worse and was taken to the OR for exploration.  Findings: Cecal ischemia and perforation, no signs of cecal volvulus, suspect colonic pseudoobstruction    Description of Operation: Ce Roberts was identified in the pre-operative holding area. Informed consent was obtained after a complete discussion of risks, benefits and alternatives to surgery were had with the patient.    The patient was brought back to the operating room and placed under general endotracheal anesthesia on the operating room table in supine position. The patient was then prepped and draped in the usual sterile fashion. A timeout was performed.      The patient had a massively dilated abdomen.  I elected to perform a midline laparotomy.  Upon entering the abdomen I noted a very large cecum.  There was some small bowel adhesed to this.  Using my hand I gently broke up these loculations and immediately the cecum perforated.  We then used the pool sucker and  removed over 2 L of stool from the cecum as well as intra-abdominal spillage.  There were multiple areas of ischemia in the cecal wall.  We then oversewed the areas of perforation.  We identified the bypass anatomy.  The GJ look good, John limb was appropriate, JJ was healthy without signs of obstruction.  This appeared to be a colonic pseudoobstruction with cecal ischemia.    I then elected to transect the terminal ileum 5 cm from the TI.  I used a 60 mm white load.  I then mobilized the white line of Toldt along the right colon cephalad around the hepatic flexure to the transverse colon.  I went all the way to our previous omental division of from the bypass.  I then went back and identified the ileocecal vascular bundle.  I isolated this and took it with a 60 mm gray load.  I then used the vessel sealer to take the mesentery of the right colon and the right colonic artery up to the transverse colon.  About 5 cm medial to the John limb I elected to divided the transverse colon with 260 mm white loads.  This appeared to be healthy noninvolved tissue.  Of note, during this dissection the duodenum was isolated and identified and protected.    Next, given the close proximity of the John limb to our future anastomosis I did release our Brewer space closure.  Next we performed a side-to-side enterocolostomy using a 60 mm white load.  I closed the common enterotomy with a double layer 3-0 strata fix V-Loc suture.  I then closed the mesenteric defect with a 2-0 permanent V-Loc suture.  The anastomosis appeared to be patent.  We then closed Petersons space once again with a 2-0 permanent V-Loc suture for the John limb.    I placed a 19 [place] round JESUS MANUEL drain in the right upper quadrant over the anastomosis and placed another JESUS MANUEL drain into the pelvis via the left side.  These were secured with 2-0 nylons.  We irrigated the abdomen.    Next, we closed the midline fascia with running 0 PDS suture.  We then reapproximated the

## 2024-06-22 NOTE — ANESTHESIA POSTPROCEDURE EVALUATION
Post-Anesthesia Evaluation and Assessment    Patient: Ce Roberts MRN: [id number]  SSN: [id number]    YOB: 1970  Age: 54 y.o.  Sex: female      I have evaluated the patient and they are stable and ready for discharge from the PACU.     Cardiovascular Function/Vital Signs  Visit Vitals  /60   Pulse (!) 109   Temp 99.1 °F (37.3 °C) (Oral)   Resp 15   Ht 1.613 m (5' 3.5\")   Wt 102.1 kg (225 lb 1.6 oz)   SpO2 100%   BMI 39.25 kg/m²       Patient is status post General anesthesia for Procedure(s):  EXPLORATORY LAPAROTOMY, RIGHT HEMICOLECTOMY.    Nausea/Vomiting: None    Postoperative hydration reviewed and adequate.    Pain:      Managed    Neurological Status:       sedated    Mental Status, Level of Consciousness: Alert and  oriented to person, place, and time    Pulmonary Status:       Adequate oxygenation and airway patent intubated ventilated    Complications related to anesthesia: None    Post-anesthesia assessment completed. No concerns    Signed By: Arsen Rojas MD     June 22, 2024            Department of Anesthesiology  Postprocedure Note    Patient: Ce Roberts  MRN: [id number]  YOB: 1970  Date of evaluation: 6/22/2024    Procedure Summary       Date: 06/22/24 Room / Location: [facility] MAIN OR [address] [place] [address] MAIN OR    Anesthesia Start: 1104 Anesthesia Stop: 1308    Procedure: EXPLORATORY LAPAROTOMY, RIGHT HEMICOLECTOMY (Abdomen) Diagnosis: Postoperative surgical complication involving digestive system associated with digestive system procedure, unspecified complication    Providers: Kevin Bolaños MD Responsible Provider: Arsen Rojas MD    Anesthesia Type: General ASA Status: 3            Anesthesia Type: General    Shayna Phase I: Shayna Score: 8    Shayna Phase II:      Anesthesia Post Evaluation    No notable events documented.

## 2024-06-23 ENCOUNTER — APPOINTMENT (OUTPATIENT)
Facility: HOSPITAL | Age: 54
DRG: 620 | End: 2024-06-23
Attending: SURGERY
Payer: MEDICARE

## 2024-06-23 LAB
ANION GAP SERPL CALC-SCNC: 4 MMOL/L (ref 5–15)
ANION GAP SERPL CALC-SCNC: 5 MMOL/L (ref 5–15)
ANION GAP SERPL CALC-SCNC: 7 MMOL/L (ref 5–15)
ARTERIAL PATENCY WRIST A: YES
BASE DEFICIT BLDA-SCNC: 4.4 MMOL/L
BASOPHILS # BLD: 0 K/UL (ref 0–0.1)
BASOPHILS NFR BLD: 0 % (ref 0–1)
BDY SITE: ABNORMAL
BUN SERPL-MCNC: 20 MG/DL (ref 6–20)
BUN SERPL-MCNC: 23 MG/DL (ref 6–20)
BUN SERPL-MCNC: 23 MG/DL (ref 6–20)
BUN/CREAT SERPL: 22 (ref 12–20)
BUN/CREAT SERPL: 22 (ref 12–20)
BUN/CREAT SERPL: 27 (ref 12–20)
CALCIUM SERPL-MCNC: 6.5 MG/DL (ref 8.5–10.1)
CALCIUM SERPL-MCNC: 7.8 MG/DL (ref 8.5–10.1)
CALCIUM SERPL-MCNC: 8 MG/DL (ref 8.5–10.1)
CHLORIDE SERPL-SCNC: 107 MMOL/L (ref 97–108)
CHLORIDE SERPL-SCNC: 108 MMOL/L (ref 97–108)
CHLORIDE SERPL-SCNC: 118 MMOL/L (ref 97–108)
CO2 SERPL-SCNC: 15 MMOL/L (ref 21–32)
CO2 SERPL-SCNC: 25 MMOL/L (ref 21–32)
CO2 SERPL-SCNC: 26 MMOL/L (ref 21–32)
CREAT SERPL-MCNC: 0.85 MG/DL (ref 0.55–1.02)
CREAT SERPL-MCNC: 0.91 MG/DL (ref 0.55–1.02)
CREAT SERPL-MCNC: 1.06 MG/DL (ref 0.55–1.02)
DIFFERENTIAL METHOD BLD: ABNORMAL
EOSINOPHIL # BLD: 0 K/UL (ref 0–0.4)
EOSINOPHIL NFR BLD: 0 % (ref 0–7)
ERYTHROCYTE [DISTWIDTH] IN BLOOD BY AUTOMATED COUNT: 14.3 % (ref 11.5–14.5)
FIO2 ON VENT: 30 %
GLUCOSE BLD STRIP.AUTO-MCNC: 129 MG/DL (ref 65–117)
GLUCOSE BLD STRIP.AUTO-MCNC: 139 MG/DL (ref 65–117)
GLUCOSE BLD STRIP.AUTO-MCNC: 145 MG/DL (ref 65–117)
GLUCOSE BLD STRIP.AUTO-MCNC: 150 MG/DL (ref 65–117)
GLUCOSE BLD STRIP.AUTO-MCNC: 152 MG/DL (ref 65–117)
GLUCOSE SERPL-MCNC: 140 MG/DL (ref 65–100)
GLUCOSE SERPL-MCNC: 150 MG/DL (ref 65–100)
GLUCOSE SERPL-MCNC: 254 MG/DL (ref 65–100)
HCO3 BLDA-SCNC: 19 MMOL/L (ref 22–26)
HCT VFR BLD AUTO: 35.5 % (ref 35–47)
HGB BLD-MCNC: 11 G/DL (ref 11.5–16)
IMM GRANULOCYTES # BLD AUTO: 0 K/UL
IMM GRANULOCYTES NFR BLD AUTO: 0 %
LACTATE SERPL-SCNC: 1.3 MMOL/L (ref 0.4–2)
LYMPHOCYTES # BLD: 0.4 K/UL (ref 0.8–3.5)
LYMPHOCYTES NFR BLD: 5 % (ref 12–49)
MCH RBC QN AUTO: 28.5 PG (ref 26–34)
MCHC RBC AUTO-ENTMCNC: 31 G/DL (ref 30–36.5)
MCV RBC AUTO: 92 FL (ref 80–99)
MONOCYTES # BLD: 0.6 K/UL (ref 0–1)
MONOCYTES NFR BLD: 8 % (ref 5–13)
NEUTS BAND NFR BLD MANUAL: 7 % (ref 0–6)
NEUTS SEG # BLD: 6 K/UL (ref 1.8–8)
NEUTS SEG NFR BLD: 80 % (ref 32–75)
NRBC # BLD: 0 K/UL (ref 0–0.01)
NRBC BLD-RTO: 0 PER 100 WBC
PCO2 BLDA: 32 MMHG (ref 35–45)
PEEP RESPIRATORY: 5
PH BLDA: 7.4 (ref 7.35–7.45)
PLATELET # BLD AUTO: 268 K/UL (ref 150–400)
PMV BLD AUTO: 11.4 FL (ref 8.9–12.9)
PO2 BLDA: 70 MMHG (ref 80–100)
POTASSIUM SERPL-SCNC: 4 MMOL/L (ref 3.5–5.1)
POTASSIUM SERPL-SCNC: 4 MMOL/L (ref 3.5–5.1)
POTASSIUM SERPL-SCNC: 4.2 MMOL/L (ref 3.5–5.1)
PRESSURE SUPPORT SETTING VENT: 5
PROCALCITONIN SERPL-MCNC: 5.26 NG/ML
RBC # BLD AUTO: 3.86 M/UL (ref 3.8–5.2)
RBC MORPH BLD: ABNORMAL
RBC MORPH BLD: ABNORMAL
SAO2 % BLD: 94 % (ref 92–97)
SAO2% DEVICE SAO2% SENSOR NAME: ABNORMAL
SERVICE CMNT-IMP: ABNORMAL
SODIUM SERPL-SCNC: 137 MMOL/L (ref 136–145)
SODIUM SERPL-SCNC: 138 MMOL/L (ref 136–145)
SODIUM SERPL-SCNC: 140 MMOL/L (ref 136–145)
SPECIMEN SITE: ABNORMAL
VENTILATION MODE VENT: ABNORMAL
WBC # BLD AUTO: 7 K/UL (ref 3.6–11)

## 2024-06-23 PROCEDURE — 2580000003 HC RX 258: Performed by: NURSE PRACTITIONER

## 2024-06-23 PROCEDURE — 36600 WITHDRAWAL OF ARTERIAL BLOOD: CPT

## 2024-06-23 PROCEDURE — 94003 VENT MGMT INPAT SUBQ DAY: CPT

## 2024-06-23 PROCEDURE — 2580000003 HC RX 258: Performed by: INTERNAL MEDICINE

## 2024-06-23 PROCEDURE — 6360000002 HC RX W HCPCS: Performed by: SURGERY

## 2024-06-23 PROCEDURE — 71045 X-RAY EXAM CHEST 1 VIEW: CPT

## 2024-06-23 PROCEDURE — 2000000000 HC ICU R&B

## 2024-06-23 PROCEDURE — 82962 GLUCOSE BLOOD TEST: CPT

## 2024-06-23 PROCEDURE — 6370000000 HC RX 637 (ALT 250 FOR IP): Performed by: INTERNAL MEDICINE

## 2024-06-23 PROCEDURE — 6370000000 HC RX 637 (ALT 250 FOR IP): Performed by: SURGERY

## 2024-06-23 PROCEDURE — 36415 COLL VENOUS BLD VENIPUNCTURE: CPT

## 2024-06-23 PROCEDURE — 6360000002 HC RX W HCPCS: Performed by: INTERNAL MEDICINE

## 2024-06-23 PROCEDURE — C9113 INJ PANTOPRAZOLE SODIUM, VIA: HCPCS | Performed by: SURGERY

## 2024-06-23 PROCEDURE — 2500000003 HC RX 250 WO HCPCS: Performed by: NURSE PRACTITIONER

## 2024-06-23 PROCEDURE — 83605 ASSAY OF LACTIC ACID: CPT

## 2024-06-23 PROCEDURE — 99024 POSTOP FOLLOW-UP VISIT: CPT | Performed by: SURGERY

## 2024-06-23 PROCEDURE — 80048 BASIC METABOLIC PNL TOTAL CA: CPT

## 2024-06-23 PROCEDURE — 82803 BLOOD GASES ANY COMBINATION: CPT

## 2024-06-23 PROCEDURE — 84145 PROCALCITONIN (PCT): CPT

## 2024-06-23 PROCEDURE — 85025 COMPLETE CBC W/AUTO DIFF WBC: CPT

## 2024-06-23 PROCEDURE — 2500000003 HC RX 250 WO HCPCS: Performed by: INTERNAL MEDICINE

## 2024-06-23 PROCEDURE — 6360000002 HC RX W HCPCS: Performed by: ANESTHESIOLOGY

## 2024-06-23 PROCEDURE — 2580000003 HC RX 258: Performed by: SURGERY

## 2024-06-23 PROCEDURE — 2580000003 HC RX 258: Performed by: ANESTHESIOLOGY

## 2024-06-23 RX ORDER — MIDODRINE HYDROCHLORIDE 5 MG/1
10 TABLET ORAL
Status: DISCONTINUED | OUTPATIENT
Start: 2024-06-23 | End: 2024-06-23

## 2024-06-23 RX ORDER — ALBUMIN, HUMAN INJ 5% 5 %
25 SOLUTION INTRAVENOUS ONCE
Status: DISCONTINUED | OUTPATIENT
Start: 2024-06-23 | End: 2024-06-25

## 2024-06-23 RX ORDER — ALPRAZOLAM 0.5 MG/1
0.5 TABLET ORAL DAILY
Status: DISCONTINUED | OUTPATIENT
Start: 2024-06-24 | End: 2024-07-02 | Stop reason: HOSPADM

## 2024-06-23 RX ORDER — MIDODRINE HYDROCHLORIDE 5 MG/1
10 TABLET ORAL
Status: DISCONTINUED | OUTPATIENT
Start: 2024-06-24 | End: 2024-06-25

## 2024-06-23 RX ORDER — CYCLOBENZAPRINE HCL 10 MG
10 TABLET ORAL 3 TIMES DAILY PRN
Status: DISCONTINUED | OUTPATIENT
Start: 2024-06-23 | End: 2024-07-02 | Stop reason: HOSPADM

## 2024-06-23 RX ADMIN — MORPHINE SULFATE 30 MG: 30 TABLET ORAL at 12:07

## 2024-06-23 RX ADMIN — MORPHINE SULFATE 6 MG: 4 INJECTION, SOLUTION INTRAMUSCULAR; INTRAVENOUS at 20:49

## 2024-06-23 RX ADMIN — METRONIDAZOLE 500 MG: 500 INJECTION, SOLUTION INTRAVENOUS at 03:04

## 2024-06-23 RX ADMIN — DEXMEDETOMIDINE HYDROCHLORIDE 0.7 MCG/KG/HR: 400 INJECTION, SOLUTION INTRAVENOUS at 00:34

## 2024-06-23 RX ADMIN — SODIUM CHLORIDE, PRESERVATIVE FREE 40 ML: 5 INJECTION INTRAVENOUS at 20:54

## 2024-06-23 RX ADMIN — CYCLOBENZAPRINE 10 MG: 10 TABLET, FILM COATED ORAL at 23:19

## 2024-06-23 RX ADMIN — ENOXAPARIN SODIUM 40 MG: 100 INJECTION SUBCUTANEOUS at 08:49

## 2024-06-23 RX ADMIN — WATER 1000 MG: 1 INJECTION INTRAMUSCULAR; INTRAVENOUS; SUBCUTANEOUS at 15:33

## 2024-06-23 RX ADMIN — FENTANYL CITRATE 50 MCG: 50 INJECTION INTRAMUSCULAR; INTRAVENOUS at 00:38

## 2024-06-23 RX ADMIN — GABAPENTIN 300 MG: 600 TABLET, FILM COATED ORAL at 20:48

## 2024-06-23 RX ADMIN — ACETAMINOPHEN 1000 MG: 650 SOLUTION ORAL at 16:14

## 2024-06-23 RX ADMIN — SODIUM BICARBONATE: 84 INJECTION, SOLUTION INTRAVENOUS at 07:24

## 2024-06-23 RX ADMIN — DEXMEDETOMIDINE HYDROCHLORIDE 0.7 MCG/KG/HR: 400 INJECTION, SOLUTION INTRAVENOUS at 06:18

## 2024-06-23 RX ADMIN — DEXMEDETOMIDINE HYDROCHLORIDE 0.9 MCG/KG/HR: 400 INJECTION, SOLUTION INTRAVENOUS at 04:10

## 2024-06-23 RX ADMIN — METRONIDAZOLE 500 MG: 500 INJECTION, SOLUTION INTRAVENOUS at 19:03

## 2024-06-23 RX ADMIN — SODIUM CHLORIDE, PRESERVATIVE FREE 10 ML: 5 INJECTION INTRAVENOUS at 08:49

## 2024-06-23 RX ADMIN — ONDANSETRON 4 MG: 2 INJECTION INTRAMUSCULAR; INTRAVENOUS at 12:22

## 2024-06-23 RX ADMIN — ENOXAPARIN SODIUM 40 MG: 100 INJECTION SUBCUTANEOUS at 20:49

## 2024-06-23 RX ADMIN — METRONIDAZOLE 500 MG: 500 INJECTION, SOLUTION INTRAVENOUS at 12:03

## 2024-06-23 RX ADMIN — ACETAMINOPHEN 650 MG: 650 SUPPOSITORY RECTAL at 00:41

## 2024-06-23 RX ADMIN — MIDODRINE HYDROCHLORIDE 10 MG: 5 TABLET ORAL at 15:31

## 2024-06-23 RX ADMIN — MORPHINE SULFATE 30 MG: 30 TABLET, FILM COATED, EXTENDED RELEASE ORAL at 22:03

## 2024-06-23 RX ADMIN — ACETAMINOPHEN 650 MG: 650 SUPPOSITORY RECTAL at 09:49

## 2024-06-23 RX ADMIN — ONDANSETRON 4 MG: 2 INJECTION INTRAMUSCULAR; INTRAVENOUS at 23:05

## 2024-06-23 RX ADMIN — AMITRIPTYLINE HYDROCHLORIDE 50 MG: 50 TABLET, FILM COATED ORAL at 20:48

## 2024-06-23 RX ADMIN — SODIUM CHLORIDE, PRESERVATIVE FREE 40 MG: 5 INJECTION INTRAVENOUS at 08:49

## 2024-06-23 RX ADMIN — MORPHINE SULFATE 30 MG: 30 TABLET, FILM COATED, EXTENDED RELEASE ORAL at 15:31

## 2024-06-23 ASSESSMENT — PAIN SCALES - GENERAL
PAINLEVEL_OUTOF10: 8
PAINLEVEL_OUTOF10: 7
PAINLEVEL_OUTOF10: 6
PAINLEVEL_OUTOF10: 10
PAINLEVEL_OUTOF10: 6
PAINLEVEL_OUTOF10: 0
PAINLEVEL_OUTOF10: 5
PAINLEVEL_OUTOF10: 7
PAINLEVEL_OUTOF10: 5

## 2024-06-23 ASSESSMENT — PAIN DESCRIPTION - ORIENTATION
ORIENTATION: MID
ORIENTATION: MID
ORIENTATION: ANTERIOR
ORIENTATION: MID

## 2024-06-23 ASSESSMENT — PAIN DESCRIPTION - LOCATION
LOCATION: ABDOMEN

## 2024-06-23 ASSESSMENT — PULMONARY FUNCTION TESTS
PIF_VALUE: 10
PIF_VALUE: 14
PIF_VALUE: 17

## 2024-06-23 ASSESSMENT — PAIN DESCRIPTION - DESCRIPTORS
DESCRIPTORS: ACHING
DESCRIPTORS: ACHING
DESCRIPTORS: BURNING
DESCRIPTORS: BURNING
DESCRIPTORS: ACHING
DESCRIPTORS: BURNING

## 2024-06-23 NOTE — PROGRESS NOTES
[facility]     ICU TEAM Progress Note           Name: Ce Roberts   : 1970   MRN: [id number]   Date: 2024          CU PROBLEM LIST   -Acute respiratory insufficiency, postoperative  -Shock, likely distributive and related to medications.  -Post gastric bypass     HISTORY OF PRESENT ILLNESS:     54-year-old morbid obese female past medical history of GERD, IBS, constipation, fibromyalgia, chronic pain  Admitted for John-en-Y gastric bypass performed on .     Postoperative course with  Abdominal distention, nausea, bloating -abdominal x-ray on  reviewed dilated right lower quadrant bowel loop.   CT abdomen pelvis with IV contrast revealed -  Small bowel and proximal colon are distended with gas and fluid, without any abrupt transition point, with concern for postoperative ileus/constipation.  Patient was reported to be lethargic, noted to have watery stools with increasing upper abdominal pain and upper abdominal distention.   patient noted to have increasing abdominal tenderness, increasing white blood cell count and bandemia and tachycardia, patient taken back to the OR for exploration.    She was taken to the OR for ex lap -found to have cecal ischemia and perforation, with no signs of cecal volvulus-suspicion for colonic pseudoobstruction.  Bypass anatomy reviewed-GJ look good, John limb was appropriate, JJ was healthy without signs of obstruction.      Post op pt is transferred to the ICU with plans to be intubated for 24 hours. Patient arrives on propofol, Levophed and phenylephrine.     -a.m. extubated, started on bariatric diet as per surgery.  Overnight noted to be febrile, improving hemodynamics and WBC otherwise.    SUBJECTIVE:   As above      ICU PROBLEM LIST   -Acute respiratory insufficiency, postoperative  -Post gastric bypass     NEUROLOGICAL:       Sedation   -- Discontinue propofol, Precedex, fentanyl     Chronic Pain  -- Twice daily  morphine, as needed Tylenol, morphine IV and p.o.,     Anxiety/  --continued on benzos (chronically on daily xanax-0.5 mg daily)  -- Resume home medications with continue Amitriptyline 50 daily, Flexeril 10 mg as needed, Neurontin 300 mg twice daily -will need to resume buprenorphine patch 20 mcg daily     PULMONOLOGY:      Acute respiratory insufficiency -status post extubation  -- Incentive spirometry  --Monitor ins and outs,  --Low threshold to diurese in the setting of increasing oxygen requirement.     CARDIOVASCULAR:      Hypotension- vasoplegia   --wean vasopressors  -- Will begin midodrine.     GASTROINTESTINAL:      Post gastric bypass/  Post Ex lap for abdominal distension,   --appreciate gen surg recs  -- hold off on IV fluids     IBS  Resume linaclotide 72 mcg daily     GI ppx    Diet  Begin bariatric clear/full liquid as cleared by surgery.     RENAL/ELECTROLYTE/FLUIDS:      CARLY-prerenal azotemia -resolved  -- Monitor ins and outs    Acidosis (resolved)  Compensated as per blood gas  DDx; hyperchloremic, noted to have normal lactic  -Previously on bicarb drip-discontinued  -Repeat BMP in the p.m.     ENDOCRINE:      SSI  Keep glucose <150 - 180     HEMATOLOGY/ONCOLOGY:      DVT PPX     ID/MICRO:      Post surg ppx  -- Metronidazole plus ceftriaxone     SURGICAL/MSK/DERM/ENT:      As above     ICU DAILY CHECKLIST      Code Status: Full code  DVT Prophylaxis: Lovenox  T/L/D: PIV, U-cath- remove in 24 h  SUP: PPI  Diet: reg  Activity Level: bed rest  ABCDEF Bundle/Checklist Completed: yes  Disposition: ICU  Multidisciplinary Rounds Completed: pending  Patient/Family Updated: At bedside.    POD:  1 Day Post-Op    S/P:   Procedure(s):  EXPLORATORY LAPAROTOMY, RIGHT HEMICOLECTOMY    Active Problem List:     [unfilled]    Past Medical History:      has a past medical history of Arthritis, Arthritis of low back, Asthma, Chronic pain, Colon spasm, Controlled type 2 diabetes mellitus without complication, without

## 2024-06-23 NOTE — PROGRESS NOTES
Progress Note  Date:2024       Room:[address] [place] [address]  Patient Name:Ce Roberts     YOB: 1970     Age:54 y.o.        Subjective    Subjective   Review of Systems  Patient still complaining of some pain on the incision but overall feeling better.  Was extubated this morning.  Still on a small amount of pressors.  Objective         Vitals Last 24 Hours:  TEMPERATURE:  Temp  Av.1 °F (37.8 °C)  Min: 97.4 °F (36.3 °C)  Max: 102.2 °F (39 °C)  RESPIRATIONS RANGE: Resp  Av.5  Min: 15  Max: 27  PULSE OXIMETRY RANGE: SpO2  Av %  Min: 100 %  Max: 100 %  PULSE RANGE: Pulse  Av.2  Min: 73  Max: 121  BLOOD PRESSURE RANGE: Systolic (24hrs), Av , Min:72 , Max:189   ; Diastolic (24hrs), Av, Min:43, Max:101    I/O (24Hr):    Intake/Output Summary (Last 24 hours) at 2024 1332  Last data filed at 2024 1200  Gross per 24 hour   Intake 3015.77 ml   Output 6510 ml   Net -3494.23 ml     Objective:  Vital signs: (most recent): Blood pressure 102/64, pulse 95, temperature (!) 101.1 °F (38.4 °C), temperature source Rectal, resp. rate 24, height 1.613 m (5' 3.5\"), weight 116.2 kg (256 lb 2.8 oz), SpO2 100 %.    General alert no acute distress  Lungs clear  Heart regular rate rhythm  Abdomen soft some tenderness along incision mild amount of drainage wound otherwise clean  Labs/Imaging/Diagnostics    Labs:  CBC:  Recent Labs     24  0459 24  1340 24  0419   WBC 19.4* 4.7 7.0   RBC 4.10 4.31 3.86   HGB 12.0 12.5 11.0*   HCT 37.9 40.9 35.5   MCV 92.4 94.9 92.0   RDW 14.6* 14.5 14.3    320 268     CHEMISTRIES:  Recent Labs     24  0733 24  0459 24  1340 24  0419 24  0900   * 133* 134* 140 138   K 3.8 4.4 4.4 4.0 4.2    103 108 118* 108   CO2 26 21 18* 15* 26   BUN 14 33* 34* 20 23*   CREATININE 2.32* 2.56* 2.46* 0.91 1.06*   GLUCOSE 125* 128* 144* 140* 254*   PHOS 5.8* 5.6*  --   --   --    MG 2.7* 2.9*  --   --   --

## 2024-06-23 NOTE — PROGRESS NOTES
PT Note:    Chart reviewed and spoke with nursing.  Plans to extubate patient this morning.  Will re-attempt in PM as schedule allows otherwise will follow up tomorrow.  Thank you.

## 2024-06-24 LAB
ANION GAP SERPL CALC-SCNC: 4 MMOL/L (ref 5–15)
BASOPHILS # BLD: 0 K/UL (ref 0–0.1)
BASOPHILS NFR BLD: 0 % (ref 0–1)
BUN SERPL-MCNC: 18 MG/DL (ref 6–20)
BUN/CREAT SERPL: 37 (ref 12–20)
CALCIUM SERPL-MCNC: 7.4 MG/DL (ref 8.5–10.1)
CHLORIDE SERPL-SCNC: 112 MMOL/L (ref 97–108)
CO2 SERPL-SCNC: 23 MMOL/L (ref 21–32)
CREAT SERPL-MCNC: 0.49 MG/DL (ref 0.55–1.02)
DIFFERENTIAL METHOD BLD: ABNORMAL
EOSINOPHIL # BLD: 0 K/UL (ref 0–0.4)
EOSINOPHIL NFR BLD: 0 % (ref 0–7)
ERYTHROCYTE [DISTWIDTH] IN BLOOD BY AUTOMATED COUNT: 14.4 % (ref 11.5–14.5)
GLUCOSE BLD STRIP.AUTO-MCNC: 87 MG/DL (ref 65–117)
GLUCOSE BLD STRIP.AUTO-MCNC: 91 MG/DL (ref 65–117)
GLUCOSE BLD STRIP.AUTO-MCNC: 92 MG/DL (ref 65–117)
GLUCOSE BLD STRIP.AUTO-MCNC: 92 MG/DL (ref 65–117)
GLUCOSE SERPL-MCNC: 96 MG/DL (ref 65–100)
HCT VFR BLD AUTO: 27.5 % (ref 35–47)
HGB BLD-MCNC: 8.6 G/DL (ref 11.5–16)
IMM GRANULOCYTES # BLD AUTO: 0 K/UL
IMM GRANULOCYTES NFR BLD AUTO: 0 %
LYMPHOCYTES # BLD: 1 K/UL (ref 0.8–3.5)
LYMPHOCYTES NFR BLD: 14 % (ref 12–49)
MAGNESIUM SERPL-MCNC: 2.2 MG/DL (ref 1.6–2.4)
MCH RBC QN AUTO: 29 PG (ref 26–34)
MCHC RBC AUTO-ENTMCNC: 31.3 G/DL (ref 30–36.5)
MCV RBC AUTO: 92.6 FL (ref 80–99)
MONOCYTES # BLD: 0.3 K/UL (ref 0–1)
MONOCYTES NFR BLD: 5 % (ref 5–13)
NEUTS BAND NFR BLD MANUAL: 2 % (ref 0–6)
NEUTS SEG # BLD: 5.6 K/UL (ref 1.8–8)
NEUTS SEG NFR BLD: 79 % (ref 32–75)
NRBC # BLD: 0 K/UL (ref 0–0.01)
NRBC BLD-RTO: 0 PER 100 WBC
PHOSPHATE SERPL-MCNC: 1.2 MG/DL (ref 2.6–4.7)
PLATELET # BLD AUTO: 229 K/UL (ref 150–400)
PMV BLD AUTO: 11.2 FL (ref 8.9–12.9)
POTASSIUM SERPL-SCNC: 3.5 MMOL/L (ref 3.5–5.1)
RBC # BLD AUTO: 2.97 M/UL (ref 3.8–5.2)
RBC MORPH BLD: ABNORMAL
SERVICE CMNT-IMP: NORMAL
SODIUM SERPL-SCNC: 139 MMOL/L (ref 136–145)
WBC # BLD AUTO: 6.9 K/UL (ref 3.6–11)

## 2024-06-24 PROCEDURE — 6370000000 HC RX 637 (ALT 250 FOR IP): Performed by: SURGERY

## 2024-06-24 PROCEDURE — 97530 THERAPEUTIC ACTIVITIES: CPT

## 2024-06-24 PROCEDURE — 2580000003 HC RX 258: Performed by: SURGERY

## 2024-06-24 PROCEDURE — 97116 GAIT TRAINING THERAPY: CPT

## 2024-06-24 PROCEDURE — 1100000000 HC RM PRIVATE

## 2024-06-24 PROCEDURE — 97165 OT EVAL LOW COMPLEX 30 MIN: CPT

## 2024-06-24 PROCEDURE — 99024 POSTOP FOLLOW-UP VISIT: CPT | Performed by: SURGERY

## 2024-06-24 PROCEDURE — 6360000002 HC RX W HCPCS: Performed by: SURGERY

## 2024-06-24 PROCEDURE — 6370000000 HC RX 637 (ALT 250 FOR IP): Performed by: INTERNAL MEDICINE

## 2024-06-24 PROCEDURE — 2500000003 HC RX 250 WO HCPCS: Performed by: INTERNAL MEDICINE

## 2024-06-24 PROCEDURE — 82962 GLUCOSE BLOOD TEST: CPT

## 2024-06-24 PROCEDURE — 2700000000 HC OXYGEN THERAPY PER DAY

## 2024-06-24 PROCEDURE — 6370000000 HC RX 637 (ALT 250 FOR IP): Performed by: NURSE PRACTITIONER

## 2024-06-24 PROCEDURE — 2580000003 HC RX 258: Performed by: ANESTHESIOLOGY

## 2024-06-24 PROCEDURE — 84100 ASSAY OF PHOSPHORUS: CPT

## 2024-06-24 PROCEDURE — 36415 COLL VENOUS BLD VENIPUNCTURE: CPT

## 2024-06-24 PROCEDURE — 80048 BASIC METABOLIC PNL TOTAL CA: CPT

## 2024-06-24 PROCEDURE — 83735 ASSAY OF MAGNESIUM: CPT

## 2024-06-24 PROCEDURE — 2580000003 HC RX 258: Performed by: INTERNAL MEDICINE

## 2024-06-24 PROCEDURE — 85025 COMPLETE CBC W/AUTO DIFF WBC: CPT

## 2024-06-24 PROCEDURE — 97161 PT EVAL LOW COMPLEX 20 MIN: CPT

## 2024-06-24 RX ORDER — CYCLOBENZAPRINE HCL 10 MG
10 TABLET ORAL 3 TIMES DAILY PRN
COMMUNITY

## 2024-06-24 RX ORDER — PANTOPRAZOLE SODIUM 40 MG/1
40 TABLET, DELAYED RELEASE ORAL
Status: DISCONTINUED | OUTPATIENT
Start: 2024-06-24 | End: 2024-07-02 | Stop reason: HOSPADM

## 2024-06-24 RX ORDER — MELOXICAM 15 MG/1
15 TABLET ORAL DAILY
Status: ON HOLD | COMMUNITY
End: 2024-07-01 | Stop reason: HOSPADM

## 2024-06-24 RX ORDER — BUPRENORPHINE 20 UG/H
1 PATCH TRANSDERMAL WEEKLY
COMMUNITY

## 2024-06-24 RX ORDER — GABAPENTIN 600 MG/1
600 TABLET ORAL 2 TIMES DAILY
Status: DISCONTINUED | OUTPATIENT
Start: 2024-06-24 | End: 2024-07-02 | Stop reason: HOSPADM

## 2024-06-24 RX ORDER — HYDROCHLOROTHIAZIDE 25 MG/1
25 TABLET ORAL DAILY
Status: ON HOLD | COMMUNITY
End: 2024-07-01 | Stop reason: HOSPADM

## 2024-06-24 RX ORDER — POLYETHYLENE GLYCOL 3350 17 G/17G
17 POWDER, FOR SOLUTION ORAL DAILY
Status: DISCONTINUED | OUTPATIENT
Start: 2024-06-24 | End: 2024-06-28

## 2024-06-24 RX ADMIN — ONDANSETRON 4 MG: 2 INJECTION INTRAMUSCULAR; INTRAVENOUS at 16:50

## 2024-06-24 RX ADMIN — SODIUM CHLORIDE, PRESERVATIVE FREE 10 ML: 5 INJECTION INTRAVENOUS at 22:29

## 2024-06-24 RX ADMIN — MORPHINE SULFATE 30 MG: 30 TABLET, FILM COATED, EXTENDED RELEASE ORAL at 08:19

## 2024-06-24 RX ADMIN — MIDODRINE HYDROCHLORIDE 10 MG: 5 TABLET ORAL at 12:39

## 2024-06-24 RX ADMIN — MORPHINE SULFATE 6 MG: 4 INJECTION, SOLUTION INTRAMUSCULAR; INTRAVENOUS at 07:19

## 2024-06-24 RX ADMIN — GABAPENTIN 300 MG: 600 TABLET, FILM COATED ORAL at 08:17

## 2024-06-24 RX ADMIN — MORPHINE SULFATE 6 MG: 4 INJECTION, SOLUTION INTRAMUSCULAR; INTRAVENOUS at 01:38

## 2024-06-24 RX ADMIN — AMITRIPTYLINE HYDROCHLORIDE 50 MG: 50 TABLET, FILM COATED ORAL at 22:29

## 2024-06-24 RX ADMIN — METRONIDAZOLE 500 MG: 500 INJECTION, SOLUTION INTRAVENOUS at 04:15

## 2024-06-24 RX ADMIN — ENOXAPARIN SODIUM 40 MG: 100 INJECTION SUBCUTANEOUS at 08:16

## 2024-06-24 RX ADMIN — METRONIDAZOLE 500 MG: 500 INJECTION, SOLUTION INTRAVENOUS at 11:38

## 2024-06-24 RX ADMIN — SODIUM CHLORIDE, PRESERVATIVE FREE 10 ML: 5 INJECTION INTRAVENOUS at 08:19

## 2024-06-24 RX ADMIN — MORPHINE SULFATE 6 MG: 4 INJECTION, SOLUTION INTRAMUSCULAR; INTRAVENOUS at 16:56

## 2024-06-24 RX ADMIN — MIDODRINE HYDROCHLORIDE 10 MG: 5 TABLET ORAL at 08:17

## 2024-06-24 RX ADMIN — GABAPENTIN 600 MG: 600 TABLET, FILM COATED ORAL at 22:29

## 2024-06-24 RX ADMIN — SODIUM CHLORIDE, PRESERVATIVE FREE 10 ML: 5 INJECTION INTRAVENOUS at 08:20

## 2024-06-24 RX ADMIN — WATER 2000 MG: 1 INJECTION INTRAMUSCULAR; INTRAVENOUS; SUBCUTANEOUS at 14:51

## 2024-06-24 RX ADMIN — PANTOPRAZOLE SODIUM 40 MG: 40 TABLET, DELAYED RELEASE ORAL at 08:19

## 2024-06-24 RX ADMIN — ONDANSETRON 4 MG: 2 INJECTION INTRAMUSCULAR; INTRAVENOUS at 22:52

## 2024-06-24 RX ADMIN — METRONIDAZOLE 500 MG: 500 INJECTION, SOLUTION INTRAVENOUS at 18:49

## 2024-06-24 RX ADMIN — ALPRAZOLAM 0.5 MG: 0.5 TABLET ORAL at 08:17

## 2024-06-24 RX ADMIN — ENOXAPARIN SODIUM 40 MG: 100 INJECTION SUBCUTANEOUS at 22:28

## 2024-06-24 RX ADMIN — SODIUM PHOSPHATE, MONOBASIC, MONOHYDRATE AND SODIUM PHOSPHATE, DIBASIC, ANHYDROUS 30 MMOL: 142; 276 INJECTION, SOLUTION INTRAVENOUS at 08:13

## 2024-06-24 ASSESSMENT — PAIN SCALES - GENERAL
PAINLEVEL_OUTOF10: 10
PAINLEVEL_OUTOF10: 5
PAINLEVEL_OUTOF10: 5
PAINLEVEL_OUTOF10: 10
PAINLEVEL_OUTOF10: 10
PAINLEVEL_OUTOF10: 5
PAINLEVEL_OUTOF10: 4
PAINLEVEL_OUTOF10: 10

## 2024-06-24 ASSESSMENT — PAIN DESCRIPTION - ORIENTATION
ORIENTATION: MID
ORIENTATION: ANTERIOR;MID
ORIENTATION: MID
ORIENTATION: ANTERIOR

## 2024-06-24 ASSESSMENT — PAIN DESCRIPTION - DESCRIPTORS
DESCRIPTORS: ACHING

## 2024-06-24 ASSESSMENT — PAIN DESCRIPTION - LOCATION
LOCATION: ABDOMEN

## 2024-06-24 ASSESSMENT — PAIN DESCRIPTION - FREQUENCY: FREQUENCY: CONTINUOUS

## 2024-06-24 NOTE — PLAN OF CARE
Problem: Occupational Therapy - Adult  Goal: By Discharge: Performs self-care activities at highest level of function for planned discharge setting.  See evaluation for individualized goals.  Description: FUNCTIONAL STATUS PRIOR TO ADMISSION:  Patient was INDP with ADLs and IADLs, still drives. Does not work.Hx of falls in the past and hx of L RTC repair, reports R RTC is currently injured limiting ROM.     Receives Help From: Family, Friend(s), ADL Assistance: Independent  Assistance: Independent, Ambulation Assistance: Independent, Transfer Assistance: Independent, Active : Yes     HOME SUPPORT: Patient lived with spouse. Has supportive family/friends that live locally.    Occupational Therapy Goals:  Initiated 6/24/2024  1.  Patient will perform standing grooming with Minimal Assist within 7 day(s).  2.  Patient will perform seated UB bathing with Minimal Assist within 7 day(s).  3.  Patient will perform lower body dressing with Moderate Assist and use of LRAD within 7 day(s).  4.  Patient will perform toilet transfers with Moderate Assist and Assist x1  within 7 day(s).  5.  Patient will perform all aspects of toileting with Moderate Assist within 7 day(s).  6.  Patient will participate in upper extremity therapeutic exercise/activities with Minimal Assist for 3 minutes within 7 day(s).    7.  Patient will utilize energy conservation techniques during functional activities with verbal cues within 7 day(s).   Outcome: Progressing   OCCUPATIONAL THERAPY EVALUATION    Patient: Ce Roberts (54 y.o. female)  Date: 6/24/2024  Primary Diagnosis: Obesity, unspecified [E66.9]  Morbid obesity (HCC) [E66.01]  Ischemic colon (HCC) [K55.9]  Procedure(s) (LRB):  EXPLORATORY LAPAROTOMY, RIGHT HEMICOLECTOMY (N/A) 2 Days Post-Op     Precautions: Fall Risk (log roll for pain mgmt)                  ASSESSMENT :  The patient is limited by decreased functional mobility, independence in ADLs, high-level IADLs, ROM,  comorbidities that affect functional and  no verbal  or physical assist needed to complete eval tasks   Based on the above components, the patient evaluation is determined to be of the following complexity level: Low

## 2024-06-24 NOTE — PLAN OF CARE
Problem: Physical Therapy - Adult  Goal: By Discharge: Performs mobility at highest level of function for planned discharge setting.  See evaluation for individualized goals.  Description: FUNCTIONAL STATUS PRIOR TO ADMISSION: Patient was independent and active without use of DME.    HOME SUPPORT PRIOR TO ADMISSION: The patient lived with her significant other but did not require assistance.    Physical Therapy Goals  Initiated 6/24/2024  1.  Patient will move from supine to sit and sit to supine and scoot up and down in bed with minimal assistance within 7 day(s).    2.  Patient will perform sit to stand with contact guard assist within 7 day(s).  3.  Patient will transfer from bed to chair and chair to bed with contact guard assist using the least restrictive device within 7 day(s).  4.  Patient will ambulate with contact guard assist for 100 feet with the least restrictive device within 7 day(s).   5.  Patient will ascend/descend 5 stairs with handrail(s) with contact guard assist within 7 day(s).    Outcome: Progressing   PHYSICAL THERAPY EVALUATION    Patient: Ce Roberts (54 y.o. female)  Date: 6/24/2024  Primary Diagnosis: Obesity, unspecified [E66.9]  Morbid obesity (HCC) [E66.01]  Ischemic colon (HCC) [K55.9]  Procedure(s) (LRB):  EXPLORATORY LAPAROTOMY, RIGHT HEMICOLECTOMY (N/A) 2 Days Post-Op   Precautions: Restrictions/Precautions: Fall Risk                      ASSESSMENT :   DEFICITS/IMPAIRMENTS:   The patient presents with impaired functional mobility as compared to baseline level 2* c/o post op pain, generalized weakness, impaired balance, impaired gait, and decr tolerance to activity following admission for elective John-en-Y bypass, complicated by ischemic bowel requiring ex lap with hemicolectomy. At baseline, she lives at home with her spouse and is indep and active without AD.    Received pt supine in bed, cleared for mobility by RN. Very drowsy throughout requiring persistent stimuli to  Dynamic: Fair;Constant support  Ambulation/Gait Training:                       Gait  Gait Training: Yes  Overall Level of Assistance: Minimum assistance;Assist X2  Distance (ft): 15 Feet  Assistive Device: Gait belt;Walker, rolling  Interventions: Safety awareness training;Tactile cues;Verbal cues  Speed/Zenia: Slow;Shuffled  Step Length: Right shortened;Left shortened  Gait Abnormalities: Trunk sway increased;Shuffling gait                 Wheelchair Management  Wheelchair Management: No                                                                                                                                                                                                                                              [facility]-PAC®      Basic Mobility Inpatient Short Form (6-Clicks) Version 2    How much help is needed turning from your back to your side while in a flat bed without using bedrails?: A Lot  How much help is needed moving from lying on your back to sitting on the side of a flat bed without using bedrails?: A Lot  How much help is needed moving to and from a bed to a chair?: A Lot  How much help is needed standing up from a chair using your arms?: A Lot  How much help is needed walking in hospital room?: A Little  How much help is needed climbing 3-5 steps with a railing?: A Lot    -PAC Inpatient Mobility Raw Score : 13  -PAC Inpatient T-Scale Score : 36.74     Cutoff score ?171,2,3 had higher odds of discharging home with home health or need of SNF/IPR.    1. Ana Paez, Lovely Edge, Ambrosio Hoang, Destinee Bennett, Keith Casas, Geovany Paez.  Validity of the -PAC “6-Clicks” Inpatient Daily Activity and Basic Mobility Short Forms. Physical Therapy Mar 2014, 94 (3) 379-391; DOI: 10.2522/ptj.04125662  2. Papa GARDINER, Erwin GRIMALDO, Mulu GRIMALDO, Perez GRIMALDO. Association of AM-PAC \"6-Clicks\" Basic Mobility and Daily Activity Scores With Discharge Destination. Phys Ther.

## 2024-06-24 NOTE — PROGRESS NOTES
Clinical Pharmacy Note: IV to PO Automatic Conversion  Please note: Ce Roberts’s medication  pantoprazole    has been changed from IV to PO based on the following critiera:    Patient is taking scheduled oral medications  Patient is tolerating tube feeds at goal rate or a full liquid, soft or regular diet    This IV to PO conversion is based on the P&[employer] approved automatic conversion policy for eligible patients.  Please call with questions.

## 2024-06-24 NOTE — PROGRESS NOTES
[facility]     ICU TEAM Progress Note           Name: Ce Roberts   : 1970   MRN: [id number]   Date: 2024          CU PROBLEM LIST   -Acute respiratory insufficiency, postoperative  -Shock, likely distributive and related to medications.  -Post gastric bypass     HISTORY OF PRESENT ILLNESS:     54-year-old morbid obese female past medical history of GERD, IBS, constipation, fibromyalgia, chronic pain  Admitted for John-en-Y gastric bypass performed on .     Postoperative course   Abdominal distention, nausea, bloating -abdominal x-ray on  reviewed dilated right lower quadrant bowel loop.   CT abdomen pelvis with IV contrast revealed -  Small bowel and proximal colon are distended with gas and fluid, without any abrupt transition point, with concern for postoperative ileus/constipation.  Patient was reported to be lethargic, noted to have watery stools with increasing upper abdominal pain and upper abdominal distention.   patient noted to have increasing abdominal tenderness, increasing white blood cell count and bandemia and tachycardia, patient taken back to the OR for exploration.    She was taken to the OR for ex lap -found to have cecal ischemia and perforation, with no signs of cecal volvulus-suspicion for colonic pseudoobstruction.  Bypass anatomy reviewed-GJ look good, John limb was appropriate, JJ was healthy without signs of obstruction.      Post op pt is transferred to the ICU with plans to be intubated for 24 hours. Patient arrives on propofol, Levophed and phenylephrine.     - A.M. extubated, started on bariatric diet as per surgery.  Overnight noted to be febrile, improving hemodynamics and WBC otherwise.    -no acute events overnight, hemodynamically stable.    SUBJECTIVE:   As above      ICU PROBLEM LIST   -Acute respiratory insufficiency, postoperative  -Post gastric bypass     NEUROLOGICAL:       Chronic Pain  -- Twice daily

## 2024-06-24 NOTE — PROGRESS NOTES
Surgery Progress Note    6/24/2024    Admit Date: 6/17/2024  8:05 AM    CC: Abd pain    POD: 2 right hemicolectomy  POD: 7 bypass    Subjective:     Extubated yesterday. Drinking liquids. Pain controlled.     Constitutional: No fever or chills  Neurologic: No headache  Eyes: No scleral icterus or irritated eyes  Nose: No nasal pain or drainage  Mouth: No oral lesions or sore throat  Cardiac: No palpations or chest pain  Pulmonary: No cough or shortness of breath  Gastrointestinal: Abdominal pain, no nausea, emesis, diarrhea, or constipation  Genitourinary: No dysuria  Musculoskeletal: No muscle or joint tenderness  Skin: Itchy  Psychiatric: No anxiety or depressed mood    Objective:     Vitals:    06/24/24 0900   BP: 112/71   Pulse: (!) 105   Resp: 15   Temp:    SpO2: 92%       General: No acute distress, conversant  Eyes: PERRLA, no scleral icterus  HENT: Normocephalic without oral lesions  Neck: Trachea midline without LAD  Cardiac: Normal pulse rate and rhythm  Pulmonary: Symmetric chest rise with normal effort  GI: Soft, ATTP, dressing CDI. JESUS MANUEL drains with minimal serosang  Skin: Warm without rash  Extremities: No edema or joint stiffness  Psych: Appropriate mood and affect    Puga in place    Labs, vital signs, and I/O reviewed.    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 Prn also available. Continue her patch.  Flexeril  Fabian fulls, PPI. Shakes  Miralax daily. Movantik  Lovenox  Ambulate  PT  Daily dressing changes with Telfa and gauze  Back to floor    Kevin Bolaños MD, FACS, [facility]  Bariatric and General Surgeon  [facility]

## 2024-06-25 LAB
ANION GAP SERPL CALC-SCNC: 7 MMOL/L (ref 5–15)
BASOPHILS # BLD: 0 K/UL (ref 0–0.1)
BASOPHILS NFR BLD: 0 % (ref 0–1)
BUN SERPL-MCNC: 15 MG/DL (ref 6–20)
BUN/CREAT SERPL: 34 (ref 12–20)
CALCIUM SERPL-MCNC: 8.2 MG/DL (ref 8.5–10.1)
CHLORIDE SERPL-SCNC: 104 MMOL/L (ref 97–108)
CO2 SERPL-SCNC: 25 MMOL/L (ref 21–32)
CREAT SERPL-MCNC: 0.44 MG/DL (ref 0.55–1.02)
DIFFERENTIAL METHOD BLD: ABNORMAL
EOSINOPHIL # BLD: 0 K/UL (ref 0–0.4)
EOSINOPHIL NFR BLD: 0 % (ref 0–7)
ERYTHROCYTE [DISTWIDTH] IN BLOOD BY AUTOMATED COUNT: 14.7 % (ref 11.5–14.5)
GLUCOSE BLD STRIP.AUTO-MCNC: 74 MG/DL (ref 65–117)
GLUCOSE BLD STRIP.AUTO-MCNC: 75 MG/DL (ref 65–117)
GLUCOSE BLD STRIP.AUTO-MCNC: 78 MG/DL (ref 65–117)
GLUCOSE BLD STRIP.AUTO-MCNC: 79 MG/DL (ref 65–117)
GLUCOSE SERPL-MCNC: 76 MG/DL (ref 65–100)
HCT VFR BLD AUTO: 32.4 % (ref 35–47)
HGB BLD-MCNC: 10.3 G/DL (ref 11.5–16)
IMM GRANULOCYTES # BLD AUTO: 0 K/UL
IMM GRANULOCYTES NFR BLD AUTO: 0 %
LYMPHOCYTES # BLD: 1.4 K/UL (ref 0.8–3.5)
LYMPHOCYTES NFR BLD: 19 % (ref 12–49)
MAGNESIUM SERPL-MCNC: 2.2 MG/DL (ref 1.6–2.4)
MCH RBC QN AUTO: 28.4 PG (ref 26–34)
MCHC RBC AUTO-ENTMCNC: 31.8 G/DL (ref 30–36.5)
MCV RBC AUTO: 89.3 FL (ref 80–99)
MONOCYTES # BLD: 0.4 K/UL (ref 0–1)
MONOCYTES NFR BLD: 6 % (ref 5–13)
NEUTS SEG # BLD: 5.6 K/UL (ref 1.8–8)
NEUTS SEG NFR BLD: 75 % (ref 32–75)
NRBC # BLD: 0 K/UL (ref 0–0.01)
NRBC BLD-RTO: 0 PER 100 WBC
PHOSPHATE SERPL-MCNC: 2.2 MG/DL (ref 2.6–4.7)
PLATELET # BLD AUTO: 273 K/UL (ref 150–400)
PMV BLD AUTO: 11 FL (ref 8.9–12.9)
POTASSIUM SERPL-SCNC: 3.5 MMOL/L (ref 3.5–5.1)
RBC # BLD AUTO: 3.63 M/UL (ref 3.8–5.2)
RBC MORPH BLD: ABNORMAL
SERVICE CMNT-IMP: NORMAL
SODIUM SERPL-SCNC: 136 MMOL/L (ref 136–145)
WBC # BLD AUTO: 7.4 K/UL (ref 3.6–11)

## 2024-06-25 PROCEDURE — 97116 GAIT TRAINING THERAPY: CPT

## 2024-06-25 PROCEDURE — 2580000003 HC RX 258: Performed by: SURGERY

## 2024-06-25 PROCEDURE — 80048 BASIC METABOLIC PNL TOTAL CA: CPT

## 2024-06-25 PROCEDURE — 6370000000 HC RX 637 (ALT 250 FOR IP): Performed by: SURGERY

## 2024-06-25 PROCEDURE — 6360000002 HC RX W HCPCS: Performed by: SURGERY

## 2024-06-25 PROCEDURE — 36415 COLL VENOUS BLD VENIPUNCTURE: CPT

## 2024-06-25 PROCEDURE — 84100 ASSAY OF PHOSPHORUS: CPT

## 2024-06-25 PROCEDURE — 1100000000 HC RM PRIVATE

## 2024-06-25 PROCEDURE — 6370000000 HC RX 637 (ALT 250 FOR IP): Performed by: NURSE PRACTITIONER

## 2024-06-25 PROCEDURE — 99024 POSTOP FOLLOW-UP VISIT: CPT | Performed by: SURGERY

## 2024-06-25 PROCEDURE — 82962 GLUCOSE BLOOD TEST: CPT

## 2024-06-25 PROCEDURE — 97530 THERAPEUTIC ACTIVITIES: CPT

## 2024-06-25 PROCEDURE — 85025 COMPLETE CBC W/AUTO DIFF WBC: CPT

## 2024-06-25 PROCEDURE — 2500000003 HC RX 250 WO HCPCS: Performed by: SURGERY

## 2024-06-25 PROCEDURE — 83735 ASSAY OF MAGNESIUM: CPT

## 2024-06-25 RX ORDER — FUROSEMIDE 20 MG/1
20 TABLET ORAL 2 TIMES DAILY
Status: COMPLETED | OUTPATIENT
Start: 2024-06-25 | End: 2024-06-26

## 2024-06-25 RX ORDER — MIDODRINE HYDROCHLORIDE 5 MG/1
10 TABLET ORAL 2 TIMES DAILY WITH MEALS
Status: DISCONTINUED | OUTPATIENT
Start: 2024-06-25 | End: 2024-06-26

## 2024-06-25 RX ADMIN — ONDANSETRON 4 MG: 2 INJECTION INTRAMUSCULAR; INTRAVENOUS at 18:48

## 2024-06-25 RX ADMIN — POLYETHYLENE GLYCOL 3350 17 G: 17 POWDER, FOR SOLUTION ORAL at 09:26

## 2024-06-25 RX ADMIN — NALOXEGOL OXALATE 12.5 MG: 12.5 TABLET, FILM COATED ORAL at 07:19

## 2024-06-25 RX ADMIN — ENOXAPARIN SODIUM 40 MG: 100 INJECTION SUBCUTANEOUS at 21:25

## 2024-06-25 RX ADMIN — MORPHINE SULFATE 30 MG: 30 TABLET, FILM COATED, EXTENDED RELEASE ORAL at 09:25

## 2024-06-25 RX ADMIN — ALPRAZOLAM 0.5 MG: 0.5 TABLET ORAL at 09:26

## 2024-06-25 RX ADMIN — FUROSEMIDE 20 MG: 20 TABLET ORAL at 18:40

## 2024-06-25 RX ADMIN — GABAPENTIN 600 MG: 600 TABLET, FILM COATED ORAL at 21:25

## 2024-06-25 RX ADMIN — METRONIDAZOLE 500 MG: 500 INJECTION, SOLUTION INTRAVENOUS at 04:07

## 2024-06-25 RX ADMIN — HYOSCYAMINE SULFATE 125 MCG: 0.12 TABLET SUBLINGUAL at 14:45

## 2024-06-25 RX ADMIN — FUROSEMIDE 20 MG: 20 TABLET ORAL at 09:26

## 2024-06-25 RX ADMIN — METRONIDAZOLE 500 MG: 500 INJECTION, SOLUTION INTRAVENOUS at 11:47

## 2024-06-25 RX ADMIN — SODIUM CHLORIDE, PRESERVATIVE FREE 10 ML: 5 INJECTION INTRAVENOUS at 09:27

## 2024-06-25 RX ADMIN — GABAPENTIN 600 MG: 600 TABLET, FILM COATED ORAL at 09:26

## 2024-06-25 RX ADMIN — SODIUM PHOSPHATE, MONOBASIC, MONOHYDRATE AND SODIUM PHOSPHATE, DIBASIC, ANHYDROUS 30 MMOL: 142; 276 INJECTION, SOLUTION INTRAVENOUS at 11:48

## 2024-06-25 RX ADMIN — AMITRIPTYLINE HYDROCHLORIDE 50 MG: 50 TABLET, FILM COATED ORAL at 21:25

## 2024-06-25 RX ADMIN — PANTOPRAZOLE SODIUM 40 MG: 40 TABLET, DELAYED RELEASE ORAL at 07:19

## 2024-06-25 RX ADMIN — METRONIDAZOLE 500 MG: 500 INJECTION, SOLUTION INTRAVENOUS at 18:42

## 2024-06-25 RX ADMIN — WATER 2000 MG: 1 INJECTION INTRAMUSCULAR; INTRAVENOUS; SUBCUTANEOUS at 15:44

## 2024-06-25 RX ADMIN — ENOXAPARIN SODIUM 40 MG: 100 INJECTION SUBCUTANEOUS at 09:26

## 2024-06-25 ASSESSMENT — PAIN SCALES - GENERAL
PAINLEVEL_OUTOF10: 9
PAINLEVEL_OUTOF10: 5

## 2024-06-25 ASSESSMENT — PAIN DESCRIPTION - ORIENTATION
ORIENTATION: MID
ORIENTATION: MID

## 2024-06-25 ASSESSMENT — PAIN DESCRIPTION - DESCRIPTORS
DESCRIPTORS: ACHING
DESCRIPTORS: CRAMPING

## 2024-06-25 ASSESSMENT — PAIN DESCRIPTION - LOCATION
LOCATION: ABDOMEN
LOCATION: ABDOMEN

## 2024-06-25 NOTE — PROGRESS NOTES
Surgery Progress Note    6/25/2024    Admit Date: 6/17/2024  8:05 AM    CC: Abd pain    POD: 3 right hemicolectomy  POD: 8 bypass    Subjective:    Moved to floor. Still on 2 L NC. Pain controlled. No flatus yet.    Constitutional: No fever or chills  Neurologic: No headache  Eyes: No scleral icterus or irritated eyes  Nose: No nasal pain or drainage  Mouth: No oral lesions or sore throat  Cardiac: No palpations or chest pain  Pulmonary: No cough or shortness of breath  Gastrointestinal: Abdominal pain, no nausea, emesis, diarrhea, or constipation  Genitourinary: No dysuria  Musculoskeletal: No muscle or joint tenderness  Skin: No acute issues  Psychiatric: No anxiety or depressed mood    Objective:     Vitals:    06/25/24 0616   BP: 139/66   Pulse: (!) 108   Resp: 18   Temp: 99.3 °F (37.4 °C)   SpO2: 97%       General: No acute distress, conversant  Eyes: PERRLA, no scleral icterus  HENT: Normocephalic without oral lesions  Neck: Trachea midline without LAD  Cardiac: Normal pulse rate and rhythm  Pulmonary: Symmetric chest rise with normal effort  GI: Soft, ATTP, dressing CDI. JESUS MANUEL drains with serosang  Skin: Warm without rash  Extremities: No edema or joint stiffness  Psych: Appropriate mood and affect    Labs, vital signs, and I/O reviewed.    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 Prn also available. Continue her patch.  Flexeril  Wean Midodrine  Lasix BID next two days for fluid overload.  Check AM labs including phos and mag  Fabian fulls, PPI. Shakes  Miralax daily. Movantik  Lovenox  Cont abx. Will have them end end of day Wednesday  Ambulate  PT  Daily dressing changes with Telfa and gauze  Cont floor  Cont drains   Possibly DC by end of week home    Kevin Bolaños MD, FACS, [facility]  Bariatric and General Surgeon  [facility]

## 2024-06-25 NOTE — PLAN OF CARE
Problem: Physical Therapy - Adult  Goal: By Discharge: Performs mobility at highest level of function for planned discharge setting.  See evaluation for individualized goals.  Description: FUNCTIONAL STATUS PRIOR TO ADMISSION: Patient was independent and active without use of DME.    HOME SUPPORT PRIOR TO ADMISSION: The patient lived with her significant other but did not require assistance.    Physical Therapy Goals  Initiated 6/24/2024  1.  Patient will move from supine to sit and sit to supine and scoot up and down in bed with minimal assistance within 7 day(s).    2.  Patient will perform sit to stand with contact guard assist within 7 day(s).  3.  Patient will transfer from bed to chair and chair to bed with contact guard assist using the least restrictive device within 7 day(s).  4.  Patient will ambulate with contact guard assist for 100 feet with the least restrictive device within 7 day(s).   5.  Patient will ascend/descend 5 stairs with handrail(s) with contact guard assist within 7 day(s).    Outcome: Progressing   PHYSICAL THERAPY TREATMENT    Patient: Ce Roberts (54 y.o. female)  Date: 6/25/2024  Diagnosis: Obesity, unspecified [E66.9]  Morbid obesity (HCC) [E66.01]  Ischemic colon (HCC) [K55.9] Morbid obesity (HCC)  Procedure(s) (LRB):  EXPLORATORY LAPAROTOMY, RIGHT HEMICOLECTOMY (N/A) 3 Days Post-Op  Precautions: Fall Risk                      ASSESSMENT:  Patient continues to benefit from skilled PT services and is slowly progressing towards goals. Pt continues to be drowsy - sleeping throughout day.  Pt able to mobilize to bedside chair and use BSC with assist x 1 and extra time.  Unable to progress with ambulation.          PLAN:  Patient continues to benefit from skilled intervention to address the above impairments.  Continue treatment per established plan of care.        Recommendation for discharge: (in order for the patient to meet his/her long term goals): Continue to assess pending  progress        IF patient discharges home will need the following DME: patient owns DME required for discharge       SUBJECTIVE:   Patient stated, \"I just want something other than chicken, vegetable or bone broth.\"    OBJECTIVE DATA SUMMARY:   Critical Behavior:  Orientation  Overall Orientation Status: Impaired  Orientation Level: Oriented X4  Cognition  Overall Cognitive Status: Exceptions  Arousal/Alertness: Impaired (falls asleep easily, drowsy)  Following Commands: Appears intact  Attention Span: Impaired  Insights: Decreased awareness of deficits  Initiation: Requires cues for all  Sequencing: Requires cues for some    Functional Mobility Training:  Bed Mobility:  Bed Mobility Training  Bed Mobility Training: Yes  Overall Level of Assistance: Moderate assistance;Assist X1;Additional time;Adaptive equipment  Rolling: Moderate assistance;Assist X1;Adaptive equipment;Additional time  Supine to Sit: Moderate assistance;Assist X1;Adaptive equipment;Additional time  Sit to Supine:  (pt remained in chair)  Scooting: Stand-by assistance  Transfers:  Transfer Training  Transfer Training: Yes  Overall Level of Assistance: Minimum assistance;Moderate assistance;Assist X1;Additional time;Adaptive equipment  Interventions: Verbal cues;Safety awareness training  Sit to Stand: Moderate assistance;Assist X1;Additional time  Stand to Sit: Minimum assistance;Assist X1  Bed to Chair: Moderate assistance;Assist X1;Additional time  Balance:  Balance  Sitting: Impaired  Sitting - Static: Good (unsupported)  Sitting - Dynamic: Fair (occasional) (limited secondary to incisional pain)  Standing: Impaired  Standing - Static: Good;Constant support  Standing - Dynamic: Constant support;Fair   Ambulation/Gait Training:     Gait  Gait Training: Yes  Overall Level of Assistance: Contact-guard assistance;Additional time;Adaptive equipment  Distance (ft): 5 Feet  Assistive Device: Walker, rolling;Gait belt  Interventions: Verbal cues;Safety

## 2024-06-26 LAB
BACTERIA SPEC CULT: NORMAL
BACTERIA SPEC CULT: NORMAL
GLUCOSE BLD STRIP.AUTO-MCNC: 75 MG/DL (ref 65–117)
GLUCOSE BLD STRIP.AUTO-MCNC: 77 MG/DL (ref 65–117)
GLUCOSE BLD STRIP.AUTO-MCNC: 80 MG/DL (ref 65–117)
GLUCOSE BLD STRIP.AUTO-MCNC: 82 MG/DL (ref 65–117)
SERVICE CMNT-IMP: NORMAL

## 2024-06-26 PROCEDURE — 6360000002 HC RX W HCPCS: Performed by: SURGERY

## 2024-06-26 PROCEDURE — 6370000000 HC RX 637 (ALT 250 FOR IP): Performed by: SURGERY

## 2024-06-26 PROCEDURE — 99024 POSTOP FOLLOW-UP VISIT: CPT | Performed by: SURGERY

## 2024-06-26 PROCEDURE — 97530 THERAPEUTIC ACTIVITIES: CPT

## 2024-06-26 PROCEDURE — 2580000003 HC RX 258: Performed by: SURGERY

## 2024-06-26 PROCEDURE — 97116 GAIT TRAINING THERAPY: CPT

## 2024-06-26 PROCEDURE — 2700000000 HC OXYGEN THERAPY PER DAY

## 2024-06-26 PROCEDURE — 94760 N-INVAS EAR/PLS OXIMETRY 1: CPT

## 2024-06-26 PROCEDURE — 82962 GLUCOSE BLOOD TEST: CPT

## 2024-06-26 PROCEDURE — 6370000000 HC RX 637 (ALT 250 FOR IP): Performed by: NURSE PRACTITIONER

## 2024-06-26 PROCEDURE — 1100000000 HC RM PRIVATE

## 2024-06-26 RX ADMIN — MIDODRINE HYDROCHLORIDE 10 MG: 5 TABLET ORAL at 07:30

## 2024-06-26 RX ADMIN — FUROSEMIDE 20 MG: 20 TABLET ORAL at 09:29

## 2024-06-26 RX ADMIN — ONDANSETRON 4 MG: 2 INJECTION INTRAMUSCULAR; INTRAVENOUS at 14:53

## 2024-06-26 RX ADMIN — MORPHINE SULFATE 6 MG: 4 INJECTION, SOLUTION INTRAMUSCULAR; INTRAVENOUS at 14:48

## 2024-06-26 RX ADMIN — NALOXEGOL OXALATE 12.5 MG: 12.5 TABLET, FILM COATED ORAL at 07:30

## 2024-06-26 RX ADMIN — PANTOPRAZOLE SODIUM 40 MG: 40 TABLET, DELAYED RELEASE ORAL at 07:30

## 2024-06-26 RX ADMIN — ENOXAPARIN SODIUM 40 MG: 100 INJECTION SUBCUTANEOUS at 09:30

## 2024-06-26 RX ADMIN — ALPRAZOLAM 0.5 MG: 0.5 TABLET ORAL at 09:31

## 2024-06-26 RX ADMIN — GABAPENTIN 600 MG: 600 TABLET, FILM COATED ORAL at 09:30

## 2024-06-26 RX ADMIN — METRONIDAZOLE 500 MG: 500 INJECTION, SOLUTION INTRAVENOUS at 03:10

## 2024-06-26 RX ADMIN — FUROSEMIDE 20 MG: 20 TABLET ORAL at 18:00

## 2024-06-26 RX ADMIN — SODIUM CHLORIDE, PRESERVATIVE FREE 10 ML: 5 INJECTION INTRAVENOUS at 09:28

## 2024-06-26 RX ADMIN — METRONIDAZOLE 500 MG: 500 INJECTION, SOLUTION INTRAVENOUS at 11:07

## 2024-06-26 ASSESSMENT — PAIN DESCRIPTION - LOCATION: LOCATION: ABDOMEN

## 2024-06-26 ASSESSMENT — PAIN SCALES - GENERAL
PAINLEVEL_OUTOF10: 0
PAINLEVEL_OUTOF10: 7
PAINLEVEL_OUTOF10: 2
PAINLEVEL_OUTOF10: 0

## 2024-06-26 NOTE — PLAN OF CARE
Problem: Physical Therapy - Adult  Goal: By Discharge: Performs mobility at highest level of function for planned discharge setting.  See evaluation for individualized goals.  Description: FUNCTIONAL STATUS PRIOR TO ADMISSION: Patient was independent and active without use of DME.    HOME SUPPORT PRIOR TO ADMISSION: The patient lived with her significant other but did not require assistance.    Physical Therapy Goals  Initiated 6/24/2024  1.  Patient will move from supine to sit and sit to supine and scoot up and down in bed with minimal assistance within 7 day(s).    2.  Patient will perform sit to stand with contact guard assist within 7 day(s).  3.  Patient will transfer from bed to chair and chair to bed with contact guard assist using the least restrictive device within 7 day(s).  4.  Patient will ambulate with contact guard assist for 100 feet with the least restrictive device within 7 day(s).   5.  Patient will ascend/descend 5 stairs with handrail(s) with contact guard assist within 7 day(s).    Outcome: Progressing   PHYSICAL THERAPY TREATMENT    Patient: Ce Roberts (54 y.o. female)  Date: 6/26/2024  Diagnosis: Obesity, unspecified [E66.9]  Morbid obesity (HCC) [E66.01]  Ischemic colon (HCC) [K55.9] Morbid obesity (HCC)  Procedure(s) (LRB):  EXPLORATORY LAPAROTOMY, RIGHT HEMICOLECTOMY (N/A) 4 Days Post-Op  Precautions: Fall Risk                      ASSESSMENT:  Patient continues to benefit from skilled PT services and is slowly progressing towards goals. Pt continues with drowsiness and required sustained stimulation to amb short distance with RW.  Pt remains far below baseline function.  Pt needs encouragement to increase activity tolerance (today up in chair 2 hrs 20 mins/amb to doorway).         PLAN:  Patient continues to benefit from skilled intervention to address the above impairments.  Continue treatment per established plan of care.        Recommendation for discharge: (in order for the

## 2024-06-26 NOTE — PROGRESS NOTES
Surgery Progress Note    6/26/2024    Admit Date: 6/17/2024  8:05 AM    CC: Abd pain    POD: 4 right hemicolectomy  POD: 9 bypass    Subjective:   More alert of ER oxy. Drinking some. No BM yet.    Constitutional: No fever or chills  Neurologic: No headache  Eyes: No scleral icterus or irritated eyes  Nose: No nasal pain or drainage  Mouth: No oral lesions or sore throat  Cardiac: No palpations or chest pain  Pulmonary: No cough or shortness of breath  Gastrointestinal: Abdominal pain, no nausea, emesis, diarrhea, or constipation  Genitourinary: No dysuria  Musculoskeletal: No muscle or joint tenderness  Skin: No acute issues  Psychiatric: No anxiety or depressed mood    Objective:     Vitals:    06/26/24 0538   BP:    Pulse: (!) 115   Resp:    Temp:    SpO2:        General: No acute distress, conversant  Eyes: PERRLA, no scleral icterus  HENT: Normocephalic without oral lesions  Neck: Trachea midline without LAD  Cardiac: Normal pulse rate and rhythm  Pulmonary: Symmetric chest rise with normal effort  GI: Soft, ATTP, wound CDI. JESUS MANUEL drains with serosang  Skin: Warm without rash  Extremities: No edema or joint stiffness  Psych: Appropriate mood and affect    Labs, vital signs, and I/O reviewed.    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 Continue her patch.  Flexeril  Stop Midodrine  Lasix BID  fluid overload.  Fabian fulls, PPI. Shakes  Stop scop patch  Miralax daily. Movantik. Awaiting ROBF  Lovenox  Cont abx today. Stop tomorrow  DC both drains today.  Ambulate  PT  Daily dressing changes with Telfa and gauze  Possibly DC by end of week home    Kevin Bolaños MD, FACS, [facility]  Bariatric and General Surgeon  [facility]

## 2024-06-26 NOTE — PLAN OF CARE
Problem: Occupational Therapy - Adult  Goal: By Discharge: Performs self-care activities at highest level of function for planned discharge setting.  See evaluation for individualized goals.  Description: FUNCTIONAL STATUS PRIOR TO ADMISSION:  Patient was INDP with ADLs and IADLs, still drives. Does not work.Hx of falls in the past and hx of L RTC repair, reports R RTC is currently injured limiting ROM.     Receives Help From: Family, Friend(s), ADL Assistance: Independent  Assistance: Independent, Ambulation Assistance: Independent, Transfer Assistance: Independent, Active : Yes     HOME SUPPORT: Patient lived with spouse. Has supportive family/friends that live locally.    Occupational Therapy Goals:  Initiated 6/24/2024  1.  Patient will perform standing grooming with Minimal Assist within 7 day(s).  2.  Patient will perform seated UB bathing with Minimal Assist within 7 day(s).  3.  Patient will perform lower body dressing with Moderate Assist and use of LRAD within 7 day(s).  4.  Patient will perform toilet transfers with Moderate Assist and Assist x1  within 7 day(s).  5.  Patient will perform all aspects of toileting with Moderate Assist within 7 day(s).  6.  Patient will participate in upper extremity therapeutic exercise/activities with Minimal Assist for 3 minutes within 7 day(s).    7.  Patient will utilize energy conservation techniques during functional activities with verbal cues within 7 day(s).   Outcome: Progressing   OCCUPATIONAL THERAPY TREATMENT  Patient: Ce Roberts (54 y.o. female)  Date: 6/26/2024  Primary Diagnosis: Obesity, unspecified [E66.9]  Morbid obesity (HCC) [E66.01]  Ischemic colon (HCC) [K55.9]  Procedure(s) (LRB):  EXPLORATORY LAPAROTOMY, RIGHT HEMICOLECTOMY (N/A) 4 Days Post-Op   Precautions: Fall Risk                Chart, occupational therapy assessment, plan of care, and goals were reviewed.    ASSESSMENT  Patient continues to benefit from skilled OT services and is  slowly progressing towards goals. Pt received supine in bed, drowsy and difficulty keeping eyes open.  Supportive family member present.  Encouragement needed to participate, discussed the importance of getting OOB to improve endurance/activity tolerance.  Pt achieved sitting on EOB with mod assist x 1.  Performed sit to stand using RW with min assist x 1 using RW (stand by assist from nursing).  Pt achieved accessing chair with min assist.  Overall, pt presents at min to mod assist with mobility and total/dependent with toileting/LB self-care.  Pt limited by drowsiness, activity tolerance, generalized weakness, habitus, endurance and impaired balance. Feel pt would benefit from further rehab at d/c d/t pt being far below her baseline.        PLAN :  Patient continues to benefit from skilled intervention to address the above impairments.  Continue treatment per established plan of care to address goals.    Recommend with staff: OOB 3x per day    Recommend next OT session: LB self-care using AE when pt able to fully participate     Recommendation for discharge: (in order for the patient to meet his/her long term goals): Therapy 3 hours/day 5-7 days/week    Other factors to consider for discharge: high risk for falls and concern for safely navigating or managing the home environment    IF patient discharges home will need the following DME:  TBD       SUBJECTIVE:   Patient stated “It burns.\"    OBJECTIVE DATA SUMMARY:   Cognitive/Behavioral Status:          Functional Mobility and Transfers for ADLs:  Bed Mobility:  Bed Mobility Training  Bed Mobility Training: Yes  Overall Level of Assistance: Moderate assistance;Assist X1;Additional time;Adaptive equipment  Rolling: Moderate assistance;Assist X1;Adaptive equipment;Additional time  Supine to Sit: Moderate assistance;Assist X1;Adaptive equipment;Additional time  Scooting: Stand-by assistance     Transfers:   Transfer Training  Transfer Training: Yes  Overall Level of

## 2024-06-27 ENCOUNTER — TELEPHONE (OUTPATIENT)
Age: 54
End: 2024-06-27

## 2024-06-27 LAB
ANION GAP SERPL CALC-SCNC: 11 MMOL/L (ref 5–15)
BUN SERPL-MCNC: 8 MG/DL (ref 6–20)
BUN/CREAT SERPL: 16 (ref 12–20)
CALCIUM SERPL-MCNC: 8.8 MG/DL (ref 8.5–10.1)
CHLORIDE SERPL-SCNC: 100 MMOL/L (ref 97–108)
CO2 SERPL-SCNC: 25 MMOL/L (ref 21–32)
CREAT SERPL-MCNC: 0.51 MG/DL (ref 0.55–1.02)
GLUCOSE BLD STRIP.AUTO-MCNC: 120 MG/DL (ref 65–117)
GLUCOSE BLD STRIP.AUTO-MCNC: 133 MG/DL (ref 65–117)
GLUCOSE BLD STRIP.AUTO-MCNC: 82 MG/DL (ref 65–117)
GLUCOSE BLD STRIP.AUTO-MCNC: 96 MG/DL (ref 65–117)
GLUCOSE SERPL-MCNC: 109 MG/DL (ref 65–100)
LACTATE SERPL-SCNC: 1.2 MMOL/L (ref 0.4–2)
MAGNESIUM SERPL-MCNC: 2 MG/DL (ref 1.6–2.4)
POTASSIUM SERPL-SCNC: 2.8 MMOL/L (ref 3.5–5.1)
SERVICE CMNT-IMP: ABNORMAL
SERVICE CMNT-IMP: ABNORMAL
SERVICE CMNT-IMP: NORMAL
SERVICE CMNT-IMP: NORMAL
SODIUM SERPL-SCNC: 136 MMOL/L (ref 136–145)

## 2024-06-27 PROCEDURE — 6360000002 HC RX W HCPCS: Performed by: SURGERY

## 2024-06-27 PROCEDURE — 82962 GLUCOSE BLOOD TEST: CPT

## 2024-06-27 PROCEDURE — 6370000000 HC RX 637 (ALT 250 FOR IP): Performed by: SURGERY

## 2024-06-27 PROCEDURE — 80048 BASIC METABOLIC PNL TOTAL CA: CPT

## 2024-06-27 PROCEDURE — 36415 COLL VENOUS BLD VENIPUNCTURE: CPT

## 2024-06-27 PROCEDURE — 2580000003 HC RX 258: Performed by: SURGERY

## 2024-06-27 PROCEDURE — 6360000004 HC RX CONTRAST MEDICATION: Performed by: SURGERY

## 2024-06-27 PROCEDURE — 97116 GAIT TRAINING THERAPY: CPT

## 2024-06-27 PROCEDURE — 83735 ASSAY OF MAGNESIUM: CPT

## 2024-06-27 PROCEDURE — 85025 COMPLETE CBC W/AUTO DIFF WBC: CPT

## 2024-06-27 PROCEDURE — 1100000000 HC RM PRIVATE

## 2024-06-27 PROCEDURE — 6370000000 HC RX 637 (ALT 250 FOR IP): Performed by: NURSE PRACTITIONER

## 2024-06-27 PROCEDURE — 97530 THERAPEUTIC ACTIVITIES: CPT

## 2024-06-27 PROCEDURE — 83605 ASSAY OF LACTIC ACID: CPT

## 2024-06-27 PROCEDURE — 99024 POSTOP FOLLOW-UP VISIT: CPT | Performed by: SURGERY

## 2024-06-27 RX ORDER — FUROSEMIDE 40 MG/1
40 TABLET ORAL 2 TIMES DAILY
Status: COMPLETED | OUTPATIENT
Start: 2024-06-27 | End: 2024-06-27

## 2024-06-27 RX ORDER — MORPHINE SULFATE 4 MG/ML
4 INJECTION, SOLUTION INTRAMUSCULAR; INTRAVENOUS
Status: DISCONTINUED | OUTPATIENT
Start: 2024-06-27 | End: 2024-07-02 | Stop reason: HOSPADM

## 2024-06-27 RX ADMIN — GABAPENTIN 600 MG: 600 TABLET, FILM COATED ORAL at 00:08

## 2024-06-27 RX ADMIN — ENOXAPARIN SODIUM 40 MG: 100 INJECTION SUBCUTANEOUS at 21:22

## 2024-06-27 RX ADMIN — FUROSEMIDE 40 MG: 40 TABLET ORAL at 18:25

## 2024-06-27 RX ADMIN — GABAPENTIN 600 MG: 600 TABLET, FILM COATED ORAL at 09:40

## 2024-06-27 RX ADMIN — SODIUM CHLORIDE, PRESERVATIVE FREE 10 ML: 5 INJECTION INTRAVENOUS at 21:27

## 2024-06-27 RX ADMIN — AMITRIPTYLINE HYDROCHLORIDE 50 MG: 50 TABLET, FILM COATED ORAL at 00:08

## 2024-06-27 RX ADMIN — ONDANSETRON 4 MG: 2 INJECTION INTRAMUSCULAR; INTRAVENOUS at 21:07

## 2024-06-27 RX ADMIN — MORPHINE SULFATE 4 MG: 4 INJECTION, SOLUTION INTRAMUSCULAR; INTRAVENOUS at 21:21

## 2024-06-27 RX ADMIN — NALOXEGOL OXALATE 12.5 MG: 12.5 TABLET, FILM COATED ORAL at 06:28

## 2024-06-27 RX ADMIN — GABAPENTIN 600 MG: 600 TABLET, FILM COATED ORAL at 21:23

## 2024-06-27 RX ADMIN — ENOXAPARIN SODIUM 40 MG: 100 INJECTION SUBCUTANEOUS at 09:39

## 2024-06-27 RX ADMIN — SODIUM CHLORIDE, PRESERVATIVE FREE 10 ML: 5 INJECTION INTRAVENOUS at 10:30

## 2024-06-27 RX ADMIN — AMITRIPTYLINE HYDROCHLORIDE 50 MG: 50 TABLET, FILM COATED ORAL at 21:22

## 2024-06-27 RX ADMIN — DIATRIZOATE MEGLUMINE AND DIATRIZOATE SODIUM 120 ML: 660; 100 LIQUID ORAL; RECTAL at 18:25

## 2024-06-27 RX ADMIN — POLYETHYLENE GLYCOL 3350 17 G: 17 POWDER, FOR SOLUTION ORAL at 09:42

## 2024-06-27 RX ADMIN — MORPHINE SULFATE 6 MG: 4 INJECTION, SOLUTION INTRAMUSCULAR; INTRAVENOUS at 07:05

## 2024-06-27 RX ADMIN — PANTOPRAZOLE SODIUM 40 MG: 40 TABLET, DELAYED RELEASE ORAL at 06:28

## 2024-06-27 RX ADMIN — FUROSEMIDE 40 MG: 40 TABLET ORAL at 09:40

## 2024-06-27 RX ADMIN — ALPRAZOLAM 0.5 MG: 0.5 TABLET ORAL at 09:40

## 2024-06-27 ASSESSMENT — PAIN SCALES - GENERAL
PAINLEVEL_OUTOF10: 10
PAINLEVEL_OUTOF10: 8

## 2024-06-27 ASSESSMENT — PAIN DESCRIPTION - DESCRIPTORS: DESCRIPTORS: ACHING

## 2024-06-27 ASSESSMENT — PAIN - FUNCTIONAL ASSESSMENT: PAIN_FUNCTIONAL_ASSESSMENT: PREVENTS OR INTERFERES SOME ACTIVE ACTIVITIES AND ADLS

## 2024-06-27 ASSESSMENT — PAIN DESCRIPTION - LOCATION: LOCATION: INCISION;ABDOMEN

## 2024-06-27 NOTE — PLAN OF CARE
Rating:  Pain noted no verbal score given      Activity Tolerance:   Good and Fair   Please refer to the flowsheet for vital signs taken during this treatment.    After treatment:   Patient left in no apparent distress sitting up in chair, Call bell within reach, and Caregiver / family present    COMMUNICATION/EDUCATION:   The patient's plan of care was discussed with: physical therapist and registered nurse and          Thank you for this referral.  Jenn Tubbs, OT  Minutes: 32

## 2024-06-27 NOTE — TELEPHONE ENCOUNTER
Called and spoke with Jaqueline. Advised that Dr. Joy will follow for nursing, PT and OT. Jaqueline verbalized understanding.

## 2024-06-27 NOTE — TELEPHONE ENCOUNTER
Jaqueline from [facility] [phone] wants to know if  will follow pt for Nursing & PT & OT

## 2024-06-27 NOTE — TELEPHONE ENCOUNTER
I called and spoke with Jaqueline. I informed her the answer is yes. She asked for the fax number to clarify what she had was correct.

## 2024-06-27 NOTE — TELEPHONE ENCOUNTER
Jaqueline from [facility] home health called and asked if Dr. Bolaños would be follow and sign home health orders for patient.

## 2024-06-27 NOTE — PLAN OF CARE
Problem: Physical Therapy - Adult  Goal: By Discharge: Performs mobility at highest level of function for planned discharge setting.  See evaluation for individualized goals.  Description: FUNCTIONAL STATUS PRIOR TO ADMISSION: Patient was independent and active without use of DME.    HOME SUPPORT PRIOR TO ADMISSION: The patient lived with her significant other but did not require assistance.    Physical Therapy Goals  Initiated 6/24/2024  1.  Patient will move from supine to sit and sit to supine and scoot up and down in bed with minimal assistance within 7 day(s).    2.  Patient will perform sit to stand with contact guard assist within 7 day(s).  3.  Patient will transfer from bed to chair and chair to bed with contact guard assist using the least restrictive device within 7 day(s).  4.  Patient will ambulate with contact guard assist for 100 feet with the least restrictive device within 7 day(s).   5.  Patient will ascend/descend 5 stairs with handrail(s) with contact guard assist within 7 day(s).    Outcome: Progressing   PHYSICAL THERAPY TREATMENT    Patient: Ce Roberts (54 y.o. female)  Date: 6/27/2024  Diagnosis: Obesity, unspecified [E66.9]  Morbid obesity (HCC) [E66.01]  Ischemic colon (HCC) [K55.9] Morbid obesity (HCC)  Procedure(s) (LRB):  EXPLORATORY LAPAROTOMY, RIGHT HEMICOLECTOMY (N/A) 5 Days Post-Op  Precautions: Fall Risk                      ASSESSMENT:  Patient continues to benefit from skilled PT services and is slowly progressing towards goals. Patient received in chair where her daughter had assisted her from the bed.  More alert today and interactive at times.  Assessed ability to manage steps to simulate getting into home.  Did well using rail and on hand hold support.  Daughter present throughout session and confirms family support and well practiced in assisting patient with mobility.         PLAN:  Patient continues to benefit from skilled intervention to address the above     FRANKY SERNA, PT  Minutes: 23

## 2024-06-27 NOTE — CARE COORDINATION
Transition of Care Plan:     RUR: 14% LOW  Prior Level of Functioning: Independent   Disposition: Home w/ family and HH PT/OT/SN (with [facility])  Follow up appointments: PCP, specialist   DME needed: No DME needs at this time   Transportation at discharge: Family   IM/IMM Medicare/ letter given: 1st IM: 6/20/24, 2nd IM: 6/27/24  Is patient a [place] and connected with VA? NA  Caregiver Contact: [facility] [phone]  Discharge Caregiver contacted prior to discharge? Upon patient request  Care Conference needed? No  Barriers to discharge: Medical     CM met with patient to discuss  recommendations, Patient in agreement with sending referrals to [employer] to see who accepts her insurance, she has no preference at this time on [employer]. CM sent mass referral via [employer], awaiting responses. Anticipate DC home tomorrow pending final recommendations and progress.    [facility] has accepted for PT/OT/SN [phone]    AVS updated     CM notified patient of [facility] acceptance, patient agrees to move forward with them pending DC tomorrow    Lovely Angulo RN, BSN,        06/27/24 1226   Condition of Participation: Discharge Planning   The Plan for Transition of Care is related to the following treatment goals: Home health PT OT SN   The Patient and/or Patient Representative was provided with a Choice of Provider? Patient   The Patient and/Or Patient Representative agree with the Discharge Plan? Yes   Freedom of Choice list was provided with basic dialogue that supports the patient's individualized plan of care/goals, treatment preferences, and shares the quality data associated with the providers?  Yes

## 2024-06-27 NOTE — PROGRESS NOTES
NUTRITION  Reason for Assessment: LOS      Recommendations/Interventions/Plan:   Continue Bariatric full liquid diet, Low kcal/high protein ONS TID    Will rescreen per policy       Past Medical History:   Diagnosis Date    Arthritis     Arthritis of low back     Asthma     Chronic pain     fibromyalgia    Colon spasm     Controlled type 2 diabetes mellitus without complication, without long-term current use of insulin (HCC) 02/16/2022    Depression     Fibromyalgia     GERD (gastroesophageal reflux disease)     High cholesterol     Hypertension     IBS (irritable bowel syndrome)     Left axillary pain 02/22/2013    Migraine     LAST HEADACHE 2-16-12    PRASHANT (obstructive sleep apnea)     DOES NOT WEAR CPAP         Pt screened for LOS.  Chart/labs/meds reviewed.  Admitted with Obesity, unspecified [E66.9]  Morbid obesity (HCC) [E66.01]  Ischemic colon (HCC) [K55.9]  Pt sleepy at time of visit this morning. Friend at bedside, who has been assisting in making sure patient is taking liquids and ONS. Grew tired of our shakes, so bringing in compliant high protein/low sugar shake. 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 Appears taking liquids well, no acute/overt malnutrition concerns at this time, will continue to follow per policy.     Nutrition Related Findings:   Edema: Right lower extremity, Left lower extremity   Edema Generalized: +1  RUE Edema: Trace  LUE Edema: Trace  RLE Edema: +1  LLE Edema: +1      Last BM: 06/21/24      Skin: +Surgical incision      Current Nutrition Therapies:  Diet: Bariatric full liquid  Supplements: Ensure High Protein TID  Meal Intake:   Patient Vitals for the past 168 hrs:   PO Meals Eaten (%)   06/22/24 0800 0%     Supplement Intake:  No data found.      Weight Hx:  Wt Readings from Last 10 Encounters:   06/24/24 110.5 kg (243 lb 9.7 oz)   06/10/24 100.2 kg (221 lb)   06/05/24 102.1 kg (225 lb)   05/28/24 100.2 kg (221  lb)   04/24/24 103.9 kg (229 lb)   03/21/24 103.2 kg (227 lb 9.6 oz)   02/06/24 99.8 kg (220 lb)   02/05/24 100 kg (220 lb 7.4 oz)   01/22/24 100.3 kg (221 lb 3.2 oz)   01/11/24 100.7 kg (222 lb)         Recent Labs     06/25/24  0939 06/25/24  0948   GLUCOSE  --  76   BUN  --  15   CREATININE  --  0.44*   NA  --  136   K  --  3.5   CL  --  104   CO2  --  25   CALCIUM  --  8.2*   PHOS 2.2*  --    MG  --  2.2       Recent Labs     06/24/24  1137 06/24/24  1622 06/24/24  2102 06/24/24  2104 06/25/24  0628 06/25/24  1134 06/25/24  1639 06/25/24  2123 06/26/24  0116 06/26/24  0705 06/26/24  1126 06/26/24  1717 06/27/24  0626   POCGLU 91 92 92 87 75 78 79 74 75 77 80 82 82       Lab Results   Component Value Date/Time    LABA1C 5.1 05/28/2024 10:17 AM    LABA1C 5.4 02/06/2024 02:17 PM    LABA1C 5.5 01/07/2024 06:08 AM     05/28/2024 10:17 AM           Joanne Diego RD  Available via [employer] or ext [phone]

## 2024-06-27 NOTE — PROGRESS NOTES
Surgery Progress Note    6/27/2024    Admit Date: 6/17/2024  8:05 AM    CC: Abd pain    POD: 5 right hemicolectomy  POD: 10 bypass    Subjective:   Drank at goal yesterday per friend. Pain controlled. No flatus or BM yet.    Constitutional: No fever or chills  Neurologic: No headache  Eyes: No scleral icterus or irritated eyes  Nose: No nasal pain or drainage  Mouth: No oral lesions or sore throat  Cardiac: No palpations or chest pain  Pulmonary: No cough or shortness of breath  Gastrointestinal: Abdominal pain, no nausea, emesis, diarrhea, or constipation  Genitourinary: No dysuria  Musculoskeletal: No muscle or joint tenderness  Skin: No acute issues  Psychiatric: No anxiety or depressed mood    Objective:     Vitals:    06/27/24 0543   BP:    Pulse: (!) 111   Resp:    Temp:    SpO2:        General: No acute distress, conversant  Eyes: PERRLA, no scleral icterus  HENT: Normocephalic without oral lesions  Neck: Trachea midline without LAD  Cardiac: Normal pulse rate and rhythm  Pulmonary: Symmetric chest rise with normal effort  GI: Soft, ATTP, wound CDI.  Skin: Warm without rash  Extremities: No edema or joint stiffness  Psych: Appropriate mood and affect    Labs, vital signs, and I/O reviewed.    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 Continue her patch.  Flexeril PRN  Lasix again today BID  Fabian fulls, PPI. Shakes  Miralax daily. Movantik. Gastrographin challenge today  Lovenox  Ambulate  PT  Daily dressing changes with Telfa and gauze  Hopefully DC home tomorrow    Kevin Bolaños MD, FACS, [facility]  Bariatric and General Surgeon  [facility]

## 2024-06-27 NOTE — PROGRESS NOTES
Spiritual Care Assessment/Progress Note  [facility]    Name: Ce Roberts MRN: [id number]    Age: 54 y.o.     Sex: female   Language: English     Date: 6/27/2024            Total Time Calculated: 24 min              Spiritual Assessment begun in [facility] 5E2 SURGICAL UNIT  Service Provided For: Friend  Referral/Consult From: Rounding  Encounter Overview/Reason: Initial Encounter    Spiritual beliefs:      [] Involved in a nancy tradition/spiritual practice:      [] Supported by a nancy community:      [] Claims no spiritual orientation:      [] Seeking spiritual identity:           [] Adheres to an individual form of spirituality:      [x] Not able to assess:                Identified resources for coping and support system:   Support System: Children, Family members, Friends/neighbors       [] Prayer                  [] Devotional reading               [] Music                  [] Guided Imagery     [] Pet visits                                        [] Other: (COMMENT)     Specific area/focus of visit   Encounter:    Crisis:    Spiritual/Emotional needs:    Ritual, Rites and Sacraments:    Grief, Loss, and Adjustments:    Ethics/Mediation:    Behavioral Health:    Palliative Care:    Advance Care Planning:           Narrative:    Patient was sleeping on and off during 's visit. Patient's friend was with her and helped her answer questions and answered for her at times. Her friend said the patient had a good deal of support and has a deep nancy although is not part of a nancy community at this time. Her friend discussed possible discharge tomorrow.  Spiritual care is available if patient remains in hospital and can be assessed by nurse or calling the office.Nina Duong,  Intern  Spiritual Health Services  Paging service: [phone] (MARC)

## 2024-06-28 ENCOUNTER — APPOINTMENT (OUTPATIENT)
Facility: HOSPITAL | Age: 54
DRG: 620 | End: 2024-06-28
Attending: SURGERY
Payer: MEDICARE

## 2024-06-28 LAB
ALBUMIN SERPL-MCNC: 2 G/DL (ref 3.5–5)
ALBUMIN/GLOB SERPL: 0.6 (ref 1.1–2.2)
ALP SERPL-CCNC: 81 U/L (ref 45–117)
ALT SERPL-CCNC: 15 U/L (ref 12–78)
ANION GAP SERPL CALC-SCNC: 10 MMOL/L (ref 5–15)
AST SERPL-CCNC: 13 U/L (ref 15–37)
BASOPHILS # BLD: 0.1 K/UL (ref 0–0.1)
BASOPHILS NFR BLD: 1 % (ref 0–1)
BILIRUB SERPL-MCNC: 0.3 MG/DL (ref 0.2–1)
BUN SERPL-MCNC: 10 MG/DL (ref 6–20)
BUN/CREAT SERPL: 22 (ref 12–20)
CALCIUM SERPL-MCNC: 8.1 MG/DL (ref 8.5–10.1)
CHLORIDE SERPL-SCNC: 103 MMOL/L (ref 97–108)
CO2 SERPL-SCNC: 24 MMOL/L (ref 21–32)
CREAT SERPL-MCNC: 0.45 MG/DL (ref 0.55–1.02)
DIFFERENTIAL METHOD BLD: ABNORMAL
EOSINOPHIL # BLD: 0.1 K/UL (ref 0–0.4)
EOSINOPHIL NFR BLD: 1 % (ref 0–7)
ERYTHROCYTE [DISTWIDTH] IN BLOOD BY AUTOMATED COUNT: 15.2 % (ref 11.5–14.5)
GLOBULIN SER CALC-MCNC: 3.5 G/DL (ref 2–4)
GLUCOSE BLD STRIP.AUTO-MCNC: 100 MG/DL (ref 65–117)
GLUCOSE BLD STRIP.AUTO-MCNC: 111 MG/DL (ref 65–117)
GLUCOSE BLD STRIP.AUTO-MCNC: 135 MG/DL (ref 65–117)
GLUCOSE BLD STRIP.AUTO-MCNC: 87 MG/DL (ref 65–117)
GLUCOSE BLD STRIP.AUTO-MCNC: 88 MG/DL (ref 65–117)
GLUCOSE SERPL-MCNC: 104 MG/DL (ref 65–100)
HCT VFR BLD AUTO: 34.2 % (ref 35–47)
HGB BLD-MCNC: 11.1 G/DL (ref 11.5–16)
IMM GRANULOCYTES # BLD AUTO: 0 K/UL
IMM GRANULOCYTES NFR BLD AUTO: 0 %
LYMPHOCYTES # BLD: 2.7 K/UL (ref 0.8–3.5)
LYMPHOCYTES NFR BLD: 20 % (ref 12–49)
MCH RBC QN AUTO: 28.9 PG (ref 26–34)
MCHC RBC AUTO-ENTMCNC: 32.5 G/DL (ref 30–36.5)
MCV RBC AUTO: 89.1 FL (ref 80–99)
MONOCYTES # BLD: 0.7 K/UL (ref 0–1)
MONOCYTES NFR BLD: 5 % (ref 5–13)
NEUTS BAND NFR BLD MANUAL: 10 % (ref 0–6)
NEUTS SEG # BLD: 9.8 K/UL (ref 1.8–8)
NEUTS SEG NFR BLD: 63 % (ref 32–75)
NRBC # BLD: 0.02 K/UL (ref 0–0.01)
NRBC BLD-RTO: 0.1 PER 100 WBC
PLATELET # BLD AUTO: 448 K/UL (ref 150–400)
PMV BLD AUTO: 11.4 FL (ref 8.9–12.9)
POTASSIUM SERPL-SCNC: 3.6 MMOL/L (ref 3.5–5.1)
PROT SERPL-MCNC: 5.5 G/DL (ref 6.4–8.2)
RBC # BLD AUTO: 3.84 M/UL (ref 3.8–5.2)
RBC MORPH BLD: ABNORMAL
SERVICE CMNT-IMP: ABNORMAL
SERVICE CMNT-IMP: NORMAL
SODIUM SERPL-SCNC: 137 MMOL/L (ref 136–145)
WBC # BLD AUTO: 13.4 K/UL (ref 3.6–11)

## 2024-06-28 PROCEDURE — 99024 POSTOP FOLLOW-UP VISIT: CPT | Performed by: SURGERY

## 2024-06-28 PROCEDURE — 87205 SMEAR GRAM STAIN: CPT

## 2024-06-28 PROCEDURE — 6370000000 HC RX 637 (ALT 250 FOR IP): Performed by: SURGERY

## 2024-06-28 PROCEDURE — 97535 SELF CARE MNGMENT TRAINING: CPT

## 2024-06-28 PROCEDURE — 6360000004 HC RX CONTRAST MEDICATION: Performed by: RADIOLOGY

## 2024-06-28 PROCEDURE — 1100000000 HC RM PRIVATE

## 2024-06-28 PROCEDURE — 49406 IMAGE CATH FLUID PERI/RETRO: CPT

## 2024-06-28 PROCEDURE — 2580000003 HC RX 258: Performed by: SURGERY

## 2024-06-28 PROCEDURE — 6360000002 HC RX W HCPCS: Performed by: SURGERY

## 2024-06-28 PROCEDURE — 97530 THERAPEUTIC ACTIVITIES: CPT

## 2024-06-28 PROCEDURE — 6370000000 HC RX 637 (ALT 250 FOR IP): Performed by: NURSE PRACTITIONER

## 2024-06-28 PROCEDURE — 80053 COMPREHEN METABOLIC PANEL: CPT

## 2024-06-28 PROCEDURE — 36415 COLL VENOUS BLD VENIPUNCTURE: CPT

## 2024-06-28 PROCEDURE — 74018 RADEX ABDOMEN 1 VIEW: CPT

## 2024-06-28 PROCEDURE — 74177 CT ABD & PELVIS W/CONTRAST: CPT

## 2024-06-28 PROCEDURE — 87070 CULTURE OTHR SPECIMN AEROBIC: CPT

## 2024-06-28 PROCEDURE — 82962 GLUCOSE BLOOD TEST: CPT

## 2024-06-28 PROCEDURE — 81001 URINALYSIS AUTO W/SCOPE: CPT

## 2024-06-28 RX ORDER — SODIUM CHLORIDE AND POTASSIUM CHLORIDE 150; 900 MG/100ML; MG/100ML
INJECTION, SOLUTION INTRAVENOUS CONTINUOUS
Status: DISCONTINUED | OUTPATIENT
Start: 2024-06-28 | End: 2024-07-01

## 2024-06-28 RX ORDER — METRONIDAZOLE 500 MG/100ML
500 INJECTION, SOLUTION INTRAVENOUS EVERY 8 HOURS
Status: DISCONTINUED | OUTPATIENT
Start: 2024-06-28 | End: 2024-07-01

## 2024-06-28 RX ORDER — POTASSIUM CHLORIDE 7.45 MG/ML
10 INJECTION INTRAVENOUS
Status: COMPLETED | OUTPATIENT
Start: 2024-06-28 | End: 2024-06-28

## 2024-06-28 RX ADMIN — ENOXAPARIN SODIUM 40 MG: 100 INJECTION SUBCUTANEOUS at 22:33

## 2024-06-28 RX ADMIN — SODIUM CHLORIDE, PRESERVATIVE FREE 10 ML: 5 INJECTION INTRAVENOUS at 22:37

## 2024-06-28 RX ADMIN — POTASSIUM CHLORIDE 10 MEQ: 10 INJECTION, SOLUTION INTRAVENOUS at 04:22

## 2024-06-28 RX ADMIN — POTASSIUM CHLORIDE AND SODIUM CHLORIDE: 900; 150 INJECTION, SOLUTION INTRAVENOUS at 11:16

## 2024-06-28 RX ADMIN — IOPAMIDOL 100 ML: 755 INJECTION, SOLUTION INTRAVENOUS at 14:14

## 2024-06-28 RX ADMIN — ONDANSETRON 4 MG: 2 INJECTION INTRAMUSCULAR; INTRAVENOUS at 06:25

## 2024-06-28 RX ADMIN — POTASSIUM CHLORIDE 10 MEQ: 10 INJECTION, SOLUTION INTRAVENOUS at 05:35

## 2024-06-28 RX ADMIN — GABAPENTIN 600 MG: 600 TABLET, FILM COATED ORAL at 22:36

## 2024-06-28 RX ADMIN — POTASSIUM CHLORIDE 10 MEQ: 10 INJECTION, SOLUTION INTRAVENOUS at 08:54

## 2024-06-28 RX ADMIN — MORPHINE SULFATE 4 MG: 4 INJECTION, SOLUTION INTRAMUSCULAR; INTRAVENOUS at 06:17

## 2024-06-28 RX ADMIN — POTASSIUM CHLORIDE 10 MEQ: 10 INJECTION, SOLUTION INTRAVENOUS at 11:44

## 2024-06-28 RX ADMIN — MORPHINE SULFATE 4 MG: 4 INJECTION, SOLUTION INTRAMUSCULAR; INTRAVENOUS at 22:36

## 2024-06-28 RX ADMIN — NALOXEGOL OXALATE 12.5 MG: 12.5 TABLET, FILM COATED ORAL at 05:34

## 2024-06-28 RX ADMIN — METRONIDAZOLE 500 MG: 500 INJECTION, SOLUTION INTRAVENOUS at 11:00

## 2024-06-28 RX ADMIN — AMITRIPTYLINE HYDROCHLORIDE 50 MG: 50 TABLET, FILM COATED ORAL at 22:36

## 2024-06-28 RX ADMIN — WATER 1000 MG: 1 INJECTION INTRAMUSCULAR; INTRAVENOUS; SUBCUTANEOUS at 09:25

## 2024-06-28 RX ADMIN — MORPHINE SULFATE 4 MG: 4 INJECTION, SOLUTION INTRAMUSCULAR; INTRAVENOUS at 09:30

## 2024-06-28 RX ADMIN — SODIUM CHLORIDE, PRESERVATIVE FREE 10 ML: 5 INJECTION INTRAVENOUS at 06:17

## 2024-06-28 RX ADMIN — SODIUM CHLORIDE, PRESERVATIVE FREE 10 ML: 5 INJECTION INTRAVENOUS at 09:34

## 2024-06-28 RX ADMIN — PANTOPRAZOLE SODIUM 40 MG: 40 TABLET, DELAYED RELEASE ORAL at 05:34

## 2024-06-28 RX ADMIN — ONDANSETRON 4 MG: 2 INJECTION INTRAMUSCULAR; INTRAVENOUS at 22:40

## 2024-06-28 RX ADMIN — METRONIDAZOLE 500 MG: 500 INJECTION, SOLUTION INTRAVENOUS at 18:12

## 2024-06-28 ASSESSMENT — PAIN DESCRIPTION - LOCATION
LOCATION: ABDOMEN

## 2024-06-28 ASSESSMENT — PAIN SCALES - GENERAL
PAINLEVEL_OUTOF10: 8
PAINLEVEL_OUTOF10: 10
PAINLEVEL_OUTOF10: 8

## 2024-06-28 ASSESSMENT — PAIN DESCRIPTION - ORIENTATION: ORIENTATION: INNER

## 2024-06-28 ASSESSMENT — PAIN DESCRIPTION - DESCRIPTORS
DESCRIPTORS: ACHING
DESCRIPTORS: ACHING
DESCRIPTORS: ACHING;SORE

## 2024-06-28 ASSESSMENT — PAIN - FUNCTIONAL ASSESSMENT: PAIN_FUNCTIONAL_ASSESSMENT: PREVENTS OR INTERFERES WITH MANY ACTIVE NOT PASSIVE ACTIVITIES

## 2024-06-28 ASSESSMENT — PAIN DESCRIPTION - FREQUENCY: FREQUENCY: CONTINUOUS

## 2024-06-28 ASSESSMENT — PAIN DESCRIPTION - ONSET: ONSET: ON-GOING

## 2024-06-28 ASSESSMENT — PAIN DESCRIPTION - PAIN TYPE: TYPE: ACUTE PAIN

## 2024-06-28 NOTE — PLAN OF CARE
Problem: Physical Therapy - Adult  Goal: By Discharge: Performs mobility at highest level of function for planned discharge setting.  See evaluation for individualized goals.  Description: FUNCTIONAL STATUS PRIOR TO ADMISSION: Patient was independent and active without use of DME.    HOME SUPPORT PRIOR TO ADMISSION: The patient lived with her significant other but did not require assistance.    Physical Therapy Goals  Initiated 6/24/2024  1.  Patient will move from supine to sit and sit to supine and scoot up and down in bed with minimal assistance within 7 day(s).    2.  Patient will perform sit to stand with contact guard assist within 7 day(s).  3.  Patient will transfer from bed to chair and chair to bed with contact guard assist using the least restrictive device within 7 day(s).  4.  Patient will ambulate with contact guard assist for 100 feet with the least restrictive device within 7 day(s).   5.  Patient will ascend/descend 5 stairs with handrail(s) with contact guard assist within 7 day(s).    Outcome: Progressing     PHYSICAL THERAPY TREATMENT    Patient: Ce Roberts (54 y.o. female)  Date: 6/28/2024  Diagnosis: Obesity, unspecified [E66.9]  Morbid obesity (HCC) [E66.01]  Ischemic colon (HCC) [K55.9] Morbid obesity (HCC)  Procedure(s) (LRB):  EXPLORATORY LAPAROTOMY, RIGHT HEMICOLECTOMY (N/A) 6 Days Post-Op  Precautions: Fall Risk                      ASSESSMENT:  Patient continues to benefit from skilled PT services and is slowly progressing towards goals. Patient received on BSC with OT, significant loose stools today, complaints of fatigue and lightheadedness. Eventually agreeable to return to bed secondary to dizziness. Required overall min to mod A to stand at RW and mod A to pivot to bed. She required up to max A to return to supine. Required multimodal cues to improve standing balance (strong posterior lean when eyes closed). She has had a significant decline from previous session, unable to

## 2024-06-28 NOTE — PROGRESS NOTES
Surgery Progress Note    6/28/2024    Admit Date: 6/17/2024  8:05 AM    CC: Abd pain    POD: 6 right hemicolectomy  POD: 11 bypass    Subjective:   Emesis yesterday after contrast. Reports a BM this AM. Labs done. Worsening right sided pain.    Constitutional: No fever or chills  Neurologic: No headache  Eyes: No scleral icterus or irritated eyes  Nose: No nasal pain or drainage  Mouth: No oral lesions or sore throat  Cardiac: No palpations or chest pain  Pulmonary: No cough or shortness of breath  Gastrointestinal: Abdominal pain, no nausea, emesis, diarrhea, or constipation  Genitourinary: No dysuria  Musculoskeletal: No muscle or joint tenderness  Skin: No acute issues  Psychiatric: No anxiety or depressed mood    Objective:     Vitals:    06/28/24 0617   BP:    Pulse:    Resp: 20   Temp:    SpO2:        General: No acute distress, conversant  Eyes: PERRLA, no scleral icterus  HENT: Normocephalic without oral lesions  Neck: Trachea midline without LAD  Cardiac: Normal pulse rate and rhythm  Pulmonary: Symmetric chest rise with normal effort  GI: Soft, ATTP, wound CDI.  Skin: Warm without rash  Extremities: No edema or joint stiffness  Psych: Appropriate mood and affect    Labs, vital signs, and I/O reviewed.    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 Continue her patch.  Flexeril PRN  Hypokalemia from lasix likely. Start gentle IVF. Check BMP at 2 PM. Check mag and phos tomorrow.  NPO for CT scan. PPI  Miralax daily. Stop Movantik  Lovenox-held  Ambulate  PT  Daily dressing changes with Telfa and gauze  Resume Rocephin and Flagyl. CT scan today for abscess    Kevin Bolaños MD, FACS, [facility]  Bariatric and General Surgeon  [facility]

## 2024-06-28 NOTE — PROGRESS NOTES
Patient complained of abdominal pain and as the nurse reached the room patient started projectile vomiting approximately 200 mls of clear liquid. Zofran administered and morphine for pain Started sweating profusely and looks flushed extremities cold. Up in chair after vomiting so she can sitt up straight,  at this time. Notified charge nurse and provider

## 2024-06-28 NOTE — PROGRESS NOTES
Physical Therapy 6/28/24    Chart reviewed. Per RN, patient was not appropriate for therapy this AM. Will follow up this PM as able/appropriate.    Thank you for your consideration,    Suzy Malave, PT, DPT

## 2024-06-28 NOTE — PROGRESS NOTES
Pt with ongoing diarrhea since this AM, this has been her first ROBF in 2 weeks and also had gastrografin.    Feeling tired and a bit light headed  No n/v  Working with PT but up to bedside commode several times  No blood or melena but passing high amount of watery brown stool   Visit Vitals  BP (!) 127/92   Pulse (!) 113   Temp 98.4 °F (36.9 °C) (Oral)   Resp 20   Ht 1.613 m (5' 3.5\")   Wt 110.5 kg (243 lb 9.7 oz)   SpO2 97%   BMI 42.48 kg/m²      Increase IV fluids  Continue K repletion for hypokalemia  Stop linzess and Miralax  Ct scan pending for leukocytosis   INGA Bowers

## 2024-06-29 LAB
ALBUMIN SERPL-MCNC: 1.9 G/DL (ref 3.5–5)
ALBUMIN/GLOB SERPL: 0.5 (ref 1.1–2.2)
ALP SERPL-CCNC: 79 U/L (ref 45–117)
ALT SERPL-CCNC: 14 U/L (ref 12–78)
ANION GAP SERPL CALC-SCNC: 10 MMOL/L (ref 5–15)
APPEARANCE UR: ABNORMAL
AST SERPL-CCNC: 14 U/L (ref 15–37)
BACTERIA URNS QL MICRO: ABNORMAL /HPF
BASOPHILS # BLD: 0 K/UL (ref 0–0.1)
BASOPHILS NFR BLD: 0 % (ref 0–1)
BILIRUB SERPL-MCNC: 0.3 MG/DL (ref 0.2–1)
BILIRUB UR QL: NEGATIVE
BUN SERPL-MCNC: 7 MG/DL (ref 6–20)
BUN/CREAT SERPL: 19 (ref 12–20)
CALCIUM SERPL-MCNC: 8.1 MG/DL (ref 8.5–10.1)
CHLORIDE SERPL-SCNC: 106 MMOL/L (ref 97–108)
CO2 SERPL-SCNC: 21 MMOL/L (ref 21–32)
COLOR UR: ABNORMAL
CREAT SERPL-MCNC: 0.37 MG/DL (ref 0.55–1.02)
DIFFERENTIAL METHOD BLD: ABNORMAL
EOSINOPHIL # BLD: 0.2 K/UL (ref 0–0.4)
EOSINOPHIL NFR BLD: 2 % (ref 0–7)
EPITH CASTS URNS QL MICRO: ABNORMAL /LPF
ERYTHROCYTE [DISTWIDTH] IN BLOOD BY AUTOMATED COUNT: 14.9 % (ref 11.5–14.5)
GLOBULIN SER CALC-MCNC: 4.1 G/DL (ref 2–4)
GLUCOSE BLD STRIP.AUTO-MCNC: 81 MG/DL (ref 65–117)
GLUCOSE BLD STRIP.AUTO-MCNC: 82 MG/DL (ref 65–117)
GLUCOSE BLD STRIP.AUTO-MCNC: 88 MG/DL (ref 65–117)
GLUCOSE BLD STRIP.AUTO-MCNC: 98 MG/DL (ref 65–117)
GLUCOSE SERPL-MCNC: 86 MG/DL (ref 65–100)
GLUCOSE UR STRIP.AUTO-MCNC: NEGATIVE MG/DL
HCT VFR BLD AUTO: 31.2 % (ref 35–47)
HGB BLD-MCNC: 9.9 G/DL (ref 11.5–16)
HGB UR QL STRIP: ABNORMAL
HYALINE CASTS URNS QL MICRO: ABNORMAL /LPF (ref 0–5)
IMM GRANULOCYTES # BLD AUTO: 0 K/UL
IMM GRANULOCYTES NFR BLD AUTO: 0 %
KETONES UR QL STRIP.AUTO: >80 MG/DL
LEUKOCYTE ESTERASE UR QL STRIP.AUTO: ABNORMAL
LYMPHOCYTES # BLD: 3 K/UL (ref 0.8–3.5)
LYMPHOCYTES NFR BLD: 28 % (ref 12–49)
MAGNESIUM SERPL-MCNC: 1.8 MG/DL (ref 1.6–2.4)
MCH RBC QN AUTO: 28 PG (ref 26–34)
MCHC RBC AUTO-ENTMCNC: 31.7 G/DL (ref 30–36.5)
MCV RBC AUTO: 88.1 FL (ref 80–99)
METAMYELOCYTES NFR BLD MANUAL: 1 %
MONOCYTES # BLD: 0.5 K/UL (ref 0–1)
MONOCYTES NFR BLD: 5 % (ref 5–13)
NEUTS BAND NFR BLD MANUAL: 1 % (ref 0–6)
NEUTS SEG # BLD: 6.8 K/UL (ref 1.8–8)
NEUTS SEG NFR BLD: 63 % (ref 32–75)
NITRITE UR QL STRIP.AUTO: NEGATIVE
NRBC # BLD: 0 K/UL (ref 0–0.01)
NRBC BLD-RTO: 0 PER 100 WBC
PH UR STRIP: 6 (ref 5–8)
PHOSPHATE SERPL-MCNC: 1.6 MG/DL (ref 2.6–4.7)
PLATELET # BLD AUTO: 419 K/UL (ref 150–400)
PLATELET COMMENT: ABNORMAL
PMV BLD AUTO: 10.6 FL (ref 8.9–12.9)
POTASSIUM SERPL-SCNC: 3.1 MMOL/L (ref 3.5–5.1)
PROT SERPL-MCNC: 6 G/DL (ref 6.4–8.2)
PROT UR STRIP-MCNC: 30 MG/DL
RBC # BLD AUTO: 3.54 M/UL (ref 3.8–5.2)
RBC #/AREA URNS HPF: ABNORMAL /HPF (ref 0–5)
RBC MORPH BLD: ABNORMAL
SERVICE CMNT-IMP: NORMAL
SODIUM SERPL-SCNC: 137 MMOL/L (ref 136–145)
SP GR UR REFRACTOMETRY: >1.03
URINE CULTURE IF INDICATED: ABNORMAL
UROBILINOGEN UR QL STRIP.AUTO: 0.2 EU/DL (ref 0.2–1)
WBC # BLD AUTO: 10.6 K/UL (ref 3.6–11)
WBC URNS QL MICRO: ABNORMAL /HPF (ref 0–4)

## 2024-06-29 PROCEDURE — 6370000000 HC RX 637 (ALT 250 FOR IP): Performed by: NURSE PRACTITIONER

## 2024-06-29 PROCEDURE — 36415 COLL VENOUS BLD VENIPUNCTURE: CPT

## 2024-06-29 PROCEDURE — 2580000003 HC RX 258: Performed by: SURGERY

## 2024-06-29 PROCEDURE — 2500000003 HC RX 250 WO HCPCS: Performed by: SURGERY

## 2024-06-29 PROCEDURE — 6370000000 HC RX 637 (ALT 250 FOR IP): Performed by: SURGERY

## 2024-06-29 PROCEDURE — 83735 ASSAY OF MAGNESIUM: CPT

## 2024-06-29 PROCEDURE — 80053 COMPREHEN METABOLIC PANEL: CPT

## 2024-06-29 PROCEDURE — 84100 ASSAY OF PHOSPHORUS: CPT

## 2024-06-29 PROCEDURE — 82962 GLUCOSE BLOOD TEST: CPT

## 2024-06-29 PROCEDURE — 85025 COMPLETE CBC W/AUTO DIFF WBC: CPT

## 2024-06-29 PROCEDURE — 6360000002 HC RX W HCPCS: Performed by: SURGERY

## 2024-06-29 PROCEDURE — 1100000000 HC RM PRIVATE

## 2024-06-29 RX ADMIN — SODIUM CHLORIDE: 9 INJECTION, SOLUTION INTRAVENOUS at 11:39

## 2024-06-29 RX ADMIN — SODIUM CHLORIDE, PRESERVATIVE FREE 10 ML: 5 INJECTION INTRAVENOUS at 22:22

## 2024-06-29 RX ADMIN — METRONIDAZOLE 500 MG: 500 INJECTION, SOLUTION INTRAVENOUS at 02:59

## 2024-06-29 RX ADMIN — AMITRIPTYLINE HYDROCHLORIDE 50 MG: 50 TABLET, FILM COATED ORAL at 22:15

## 2024-06-29 RX ADMIN — ENOXAPARIN SODIUM 40 MG: 100 INJECTION SUBCUTANEOUS at 22:15

## 2024-06-29 RX ADMIN — SODIUM PHOSPHATE, MONOBASIC, MONOHYDRATE AND SODIUM PHOSPHATE, DIBASIC, ANHYDROUS 30 MMOL: 142; 276 INJECTION, SOLUTION INTRAVENOUS at 09:39

## 2024-06-29 RX ADMIN — WATER 1000 MG: 1 INJECTION INTRAMUSCULAR; INTRAVENOUS; SUBCUTANEOUS at 07:27

## 2024-06-29 RX ADMIN — SODIUM CHLORIDE, PRESERVATIVE FREE 10 ML: 5 INJECTION INTRAVENOUS at 09:40

## 2024-06-29 RX ADMIN — PANTOPRAZOLE SODIUM 40 MG: 40 TABLET, DELAYED RELEASE ORAL at 05:55

## 2024-06-29 RX ADMIN — GABAPENTIN 600 MG: 600 TABLET, FILM COATED ORAL at 09:31

## 2024-06-29 RX ADMIN — ONDANSETRON 4 MG: 2 INJECTION INTRAMUSCULAR; INTRAVENOUS at 11:31

## 2024-06-29 RX ADMIN — MORPHINE SULFATE 4 MG: 4 INJECTION, SOLUTION INTRAMUSCULAR; INTRAVENOUS at 11:26

## 2024-06-29 RX ADMIN — GABAPENTIN 600 MG: 600 TABLET, FILM COATED ORAL at 22:15

## 2024-06-29 RX ADMIN — METRONIDAZOLE 500 MG: 500 INJECTION, SOLUTION INTRAVENOUS at 19:11

## 2024-06-29 RX ADMIN — METRONIDAZOLE 500 MG: 500 INJECTION, SOLUTION INTRAVENOUS at 11:44

## 2024-06-29 RX ADMIN — ENOXAPARIN SODIUM 40 MG: 100 INJECTION SUBCUTANEOUS at 09:31

## 2024-06-29 RX ADMIN — ALPRAZOLAM 0.5 MG: 0.5 TABLET ORAL at 09:31

## 2024-06-29 RX ADMIN — SODIUM PHOSPHATE, MONOBASIC, MONOHYDRATE AND SODIUM PHOSPHATE, DIBASIC, ANHYDROUS 30 MMOL: 142; 276 INJECTION, SOLUTION INTRAVENOUS at 22:22

## 2024-06-29 ASSESSMENT — PAIN - FUNCTIONAL ASSESSMENT: PAIN_FUNCTIONAL_ASSESSMENT: PREVENTS OR INTERFERES SOME ACTIVE ACTIVITIES AND ADLS

## 2024-06-29 ASSESSMENT — PAIN SCALES - GENERAL
PAINLEVEL_OUTOF10: 6
PAINLEVEL_OUTOF10: 8

## 2024-06-29 ASSESSMENT — PAIN DESCRIPTION - DESCRIPTORS
DESCRIPTORS: ACHING
DESCRIPTORS: ACHING

## 2024-06-29 ASSESSMENT — PAIN DESCRIPTION - LOCATION
LOCATION: ABDOMEN
LOCATION: ABDOMEN

## 2024-06-29 ASSESSMENT — PAIN DESCRIPTION - ORIENTATION
ORIENTATION: LEFT;RIGHT;ANTERIOR
ORIENTATION: INNER

## 2024-06-29 NOTE — PROGRESS NOTES
Patient has UA lab ordered; RN was unable to obtain a clean catch urine sample due to incidental mixture of stool with urinary occurrences.

## 2024-06-29 NOTE — PROGRESS NOTES
Admit Date: 6/17/2024  POD 7 s/p right colectomy  POD 12 bypass   POD 1 percutaneous drain    Subjective:     Patient sound asleep     Scheduled Meds:   sodium phosphate IVPB (PERIPHERAL line)  30 mmol IntraVENous BID    cefTRIAXone (ROCEPHIN) IV  1,000 mg IntraVENous Q24H    metroNIDAZOLE  500 mg IntraVENous Q8H    pantoprazole  40 mg Oral QAM AC    gabapentin  600 mg Oral BID    ALPRAZolam  0.5 mg Oral Daily    insulin lispro  0-16 Units SubCUTAneous Q6H    insulin lispro  0-4 Units SubCUTAneous Nightly    amitriptyline  50 mg Oral Nightly    sodium chloride flush  5-40 mL IntraVENous 2 times per day    enoxaparin  40 mg SubCUTAneous 2 times per day     Continuous Infusions:   0.9% NaCl with KCl 20 mEq Stopped (06/29/24 0936)    dextrose      sodium chloride Stopped (06/29/24 1144)     PRN Meds:morphine, cyclobenzaprine, acetaminophen, morphine, glucose, dextrose bolus **OR** dextrose bolus, glucagon (rDNA), dextrose, albuterol, sodium chloride flush, sodium chloride, hydrOXYzine HCl, ondansetron **OR** ondansetron, acetaminophen, hydrALAZINE, simethicone, hyoscyamine, prochlorperazine, LORazepam      Objective:     Patient Vitals for the past 8 hrs:   BP Temp Temp src Pulse Resp SpO2   06/29/24 1200 -- -- -- (!) 111 -- --   06/29/24 1000 -- -- -- (!) 107 -- --   06/29/24 0808 95/72 98.4 °F (36.9 °C) Oral (!) 109 18 98 %     I/O last 3 completed shifts:  In: -   Out: 101 [Emesis/NG output:100; Stool:1]  I/O this shift:  In: 2049.5 [I.V.:1026.5; IV Piggyback:1023]  Out: -     Patient very sound asleep, did not want to disturb her     However family member relates she is eating and her pain is well controlled     Assessment:     Principal Problem:    Morbid obesity (HCC)  Active Problems:    Obesity, unspecified    Ischemic colon (HCC)    Colonic pseudoobstruction  Resolved Problems:    * No resolved hospital problems. *      Plan:   Patient has been slightly tachycardic however no fevers and she is sound asleep in

## 2024-06-29 NOTE — PLAN OF CARE
Problem: Chronic Conditions and Co-morbidities  Goal: Patient's chronic conditions and co-morbidity symptoms are monitored and maintained or improved  Outcome: Progressing     Problem: Pain  Goal: Verbalizes/displays adequate comfort level or baseline comfort level  Outcome: Progressing     Problem: Safety - Adult  Goal: Free from fall injury  Outcome: Progressing     Problem: Discharge Planning  Goal: Discharge to home or other facility with appropriate resources  Outcome: Progressing     Problem: Skin/Tissue Integrity  Goal: Absence of new skin breakdown  Description: 1.  Monitor for areas of redness and/or skin breakdown  2.  Assess vascular access sites hourly  3.  Every 4-6 hours minimum:  Change oxygen saturation probe site  4.  Every 4-6 hours:  If on nasal continuous positive airway pressure, respiratory therapy assess nares and determine need for appliance change or resting period.  Outcome: Progressing     Problem: Safety - Medical Restraint  Goal: Remains free of injury from restraints (Restraint for Interference with Medical Device)  Description: INTERVENTIONS:  1. Determine that other, less restrictive measures have been tried or would not be effective before applying the restraint  2. Evaluate the patient's condition at the time of restraint application  3. Inform patient/family regarding the reason for restraint  4. Q2H: Monitor safety, psychosocial status, comfort, nutrition and hydration  Outcome: Progressing

## 2024-06-30 LAB
ANION GAP SERPL CALC-SCNC: 9 MMOL/L (ref 5–15)
BASOPHILS # BLD: 0 K/UL (ref 0–0.1)
BASOPHILS NFR BLD: 0 % (ref 0–1)
BUN SERPL-MCNC: 4 MG/DL (ref 6–20)
BUN/CREAT SERPL: 11 (ref 12–20)
CALCIUM SERPL-MCNC: 8.1 MG/DL (ref 8.5–10.1)
CHLORIDE SERPL-SCNC: 106 MMOL/L (ref 97–108)
CO2 SERPL-SCNC: 22 MMOL/L (ref 21–32)
CREAT SERPL-MCNC: 0.35 MG/DL (ref 0.55–1.02)
DIFFERENTIAL METHOD BLD: ABNORMAL
EOSINOPHIL # BLD: 0 K/UL (ref 0–0.4)
EOSINOPHIL NFR BLD: 0 % (ref 0–7)
ERYTHROCYTE [DISTWIDTH] IN BLOOD BY AUTOMATED COUNT: 14.8 % (ref 11.5–14.5)
GLUCOSE BLD STRIP.AUTO-MCNC: 101 MG/DL (ref 65–117)
GLUCOSE BLD STRIP.AUTO-MCNC: 85 MG/DL (ref 65–117)
GLUCOSE BLD STRIP.AUTO-MCNC: 86 MG/DL (ref 65–117)
GLUCOSE BLD STRIP.AUTO-MCNC: 91 MG/DL (ref 65–117)
GLUCOSE BLD STRIP.AUTO-MCNC: 94 MG/DL (ref 65–117)
GLUCOSE SERPL-MCNC: 86 MG/DL (ref 65–100)
HCT VFR BLD AUTO: 29 % (ref 35–47)
HGB BLD-MCNC: 9.4 G/DL (ref 11.5–16)
IMM GRANULOCYTES # BLD AUTO: 0 K/UL
IMM GRANULOCYTES NFR BLD AUTO: 0 %
LYMPHOCYTES # BLD: 2.7 K/UL (ref 0.8–3.5)
LYMPHOCYTES NFR BLD: 22 % (ref 12–49)
MAGNESIUM SERPL-MCNC: 1.6 MG/DL (ref 1.6–2.4)
MCH RBC QN AUTO: 28.7 PG (ref 26–34)
MCHC RBC AUTO-ENTMCNC: 32.4 G/DL (ref 30–36.5)
MCV RBC AUTO: 88.4 FL (ref 80–99)
MONOCYTES # BLD: 0.4 K/UL (ref 0–1)
MONOCYTES NFR BLD: 3 % (ref 5–13)
NEUTS BAND NFR BLD MANUAL: 1 % (ref 0–6)
NEUTS SEG # BLD: 9.3 K/UL (ref 1.8–8)
NEUTS SEG NFR BLD: 74 % (ref 32–75)
NRBC # BLD: 0 K/UL (ref 0–0.01)
NRBC BLD-RTO: 0 PER 100 WBC
PHOSPHATE SERPL-MCNC: 2.6 MG/DL (ref 2.6–4.7)
PLATELET # BLD AUTO: 381 K/UL (ref 150–400)
PMV BLD AUTO: 10.7 FL (ref 8.9–12.9)
POTASSIUM SERPL-SCNC: 2.7 MMOL/L (ref 3.5–5.1)
RBC # BLD AUTO: 3.28 M/UL (ref 3.8–5.2)
RBC MORPH BLD: ABNORMAL
SERVICE CMNT-IMP: NORMAL
SODIUM SERPL-SCNC: 137 MMOL/L (ref 136–145)
WBC # BLD AUTO: 12.4 K/UL (ref 3.6–11)

## 2024-06-30 PROCEDURE — 6360000002 HC RX W HCPCS: Performed by: SURGERY

## 2024-06-30 PROCEDURE — 6370000000 HC RX 637 (ALT 250 FOR IP): Performed by: SURGERY

## 2024-06-30 PROCEDURE — 83735 ASSAY OF MAGNESIUM: CPT

## 2024-06-30 PROCEDURE — 2580000003 HC RX 258: Performed by: SURGERY

## 2024-06-30 PROCEDURE — 1100000000 HC RM PRIVATE

## 2024-06-30 PROCEDURE — 80048 BASIC METABOLIC PNL TOTAL CA: CPT

## 2024-06-30 PROCEDURE — 84100 ASSAY OF PHOSPHORUS: CPT

## 2024-06-30 PROCEDURE — 6370000000 HC RX 637 (ALT 250 FOR IP): Performed by: NURSE PRACTITIONER

## 2024-06-30 PROCEDURE — 82962 GLUCOSE BLOOD TEST: CPT

## 2024-06-30 PROCEDURE — 85025 COMPLETE CBC W/AUTO DIFF WBC: CPT

## 2024-06-30 PROCEDURE — 36415 COLL VENOUS BLD VENIPUNCTURE: CPT

## 2024-06-30 RX ORDER — POTASSIUM CHLORIDE 750 MG/1
10 TABLET, FILM COATED, EXTENDED RELEASE ORAL 2 TIMES DAILY
Status: DISCONTINUED | OUTPATIENT
Start: 2024-06-30 | End: 2024-07-01

## 2024-06-30 RX ORDER — POTASSIUM CHLORIDE 7.45 MG/ML
10 INJECTION INTRAVENOUS
Status: DISPENSED | OUTPATIENT
Start: 2024-06-30 | End: 2024-06-30

## 2024-06-30 RX ORDER — POTASSIUM CHLORIDE 14.9 MG/ML
20 INJECTION INTRAVENOUS
Status: DISCONTINUED | OUTPATIENT
Start: 2024-06-30 | End: 2024-06-30

## 2024-06-30 RX ORDER — POTASSIUM CHLORIDE 14.9 MG/ML
20 INJECTION INTRAVENOUS
Status: DISCONTINUED | OUTPATIENT
Start: 2024-06-30 | End: 2024-06-30 | Stop reason: RX

## 2024-06-30 RX ORDER — POTASSIUM CHLORIDE 7.45 MG/ML
10 INJECTION INTRAVENOUS
Status: COMPLETED | OUTPATIENT
Start: 2024-06-30 | End: 2024-06-30

## 2024-06-30 RX ADMIN — POTASSIUM CHLORIDE 10 MEQ: 10 INJECTION, SOLUTION INTRAVENOUS at 13:08

## 2024-06-30 RX ADMIN — POTASSIUM CHLORIDE 10 MEQ: 750 TABLET, FILM COATED, EXTENDED RELEASE ORAL at 13:12

## 2024-06-30 RX ADMIN — ENOXAPARIN SODIUM 40 MG: 100 INJECTION SUBCUTANEOUS at 09:05

## 2024-06-30 RX ADMIN — METRONIDAZOLE 500 MG: 500 INJECTION, SOLUTION INTRAVENOUS at 15:11

## 2024-06-30 RX ADMIN — POTASSIUM CHLORIDE 10 MEQ: 10 INJECTION, SOLUTION INTRAVENOUS at 09:35

## 2024-06-30 RX ADMIN — PROCHLORPERAZINE EDISYLATE 10 MG: 5 INJECTION INTRAMUSCULAR; INTRAVENOUS at 17:47

## 2024-06-30 RX ADMIN — MORPHINE SULFATE 4 MG: 4 INJECTION, SOLUTION INTRAMUSCULAR; INTRAVENOUS at 17:34

## 2024-06-30 RX ADMIN — SODIUM CHLORIDE: 9 INJECTION, SOLUTION INTRAVENOUS at 09:33

## 2024-06-30 RX ADMIN — GABAPENTIN 600 MG: 600 TABLET, FILM COATED ORAL at 20:50

## 2024-06-30 RX ADMIN — ENOXAPARIN SODIUM 40 MG: 100 INJECTION SUBCUTANEOUS at 20:50

## 2024-06-30 RX ADMIN — POTASSIUM CHLORIDE 10 MEQ: 7.46 INJECTION, SOLUTION INTRAVENOUS at 07:11

## 2024-06-30 RX ADMIN — PROCHLORPERAZINE EDISYLATE 10 MG: 5 INJECTION INTRAMUSCULAR; INTRAVENOUS at 09:17

## 2024-06-30 RX ADMIN — POTASSIUM CHLORIDE 10 MEQ: 10 INJECTION, SOLUTION INTRAVENOUS at 11:47

## 2024-06-30 RX ADMIN — SODIUM CHLORIDE, PRESERVATIVE FREE 10 ML: 5 INJECTION INTRAVENOUS at 09:35

## 2024-06-30 RX ADMIN — PANTOPRAZOLE SODIUM 40 MG: 40 TABLET, DELAYED RELEASE ORAL at 07:11

## 2024-06-30 RX ADMIN — MORPHINE SULFATE 4 MG: 4 INJECTION, SOLUTION INTRAMUSCULAR; INTRAVENOUS at 02:15

## 2024-06-30 RX ADMIN — MORPHINE SULFATE 4 MG: 4 INJECTION, SOLUTION INTRAMUSCULAR; INTRAVENOUS at 09:04

## 2024-06-30 RX ADMIN — METRONIDAZOLE 500 MG: 500 INJECTION, SOLUTION INTRAVENOUS at 02:34

## 2024-06-30 RX ADMIN — POTASSIUM CHLORIDE 10 MEQ: 10 INJECTION, SOLUTION INTRAVENOUS at 10:46

## 2024-06-30 RX ADMIN — GABAPENTIN 600 MG: 600 TABLET, FILM COATED ORAL at 09:06

## 2024-06-30 RX ADMIN — AMITRIPTYLINE HYDROCHLORIDE 50 MG: 50 TABLET, FILM COATED ORAL at 20:50

## 2024-06-30 RX ADMIN — METRONIDAZOLE 500 MG: 500 INJECTION, SOLUTION INTRAVENOUS at 23:00

## 2024-06-30 RX ADMIN — ONDANSETRON 4 MG: 2 INJECTION INTRAMUSCULAR; INTRAVENOUS at 02:20

## 2024-06-30 RX ADMIN — ALPRAZOLAM 0.5 MG: 0.5 TABLET ORAL at 09:06

## 2024-06-30 RX ADMIN — POTASSIUM CHLORIDE AND SODIUM CHLORIDE: 900; 150 INJECTION, SOLUTION INTRAVENOUS at 11:49

## 2024-06-30 RX ADMIN — POTASSIUM CHLORIDE 10 MEQ: 750 TABLET, FILM COATED, EXTENDED RELEASE ORAL at 20:50

## 2024-06-30 RX ADMIN — WATER 1000 MG: 1 INJECTION INTRAMUSCULAR; INTRAVENOUS; SUBCUTANEOUS at 09:02

## 2024-06-30 ASSESSMENT — PAIN - FUNCTIONAL ASSESSMENT: PAIN_FUNCTIONAL_ASSESSMENT: ACTIVITIES ARE NOT PREVENTED

## 2024-06-30 ASSESSMENT — PAIN SCALES - GENERAL
PAINLEVEL_OUTOF10: 8
PAINLEVEL_OUTOF10: 7
PAINLEVEL_OUTOF10: 5
PAINLEVEL_OUTOF10: 10

## 2024-06-30 ASSESSMENT — PAIN DESCRIPTION - LOCATION
LOCATION: ABDOMEN;INCISION
LOCATION: ABDOMEN;INCISION
LOCATION: ABDOMEN
LOCATION: ABDOMEN;INCISION

## 2024-06-30 ASSESSMENT — PAIN DESCRIPTION - DESCRIPTORS
DESCRIPTORS: ACHING

## 2024-06-30 ASSESSMENT — PAIN DESCRIPTION - PAIN TYPE: TYPE: ACUTE PAIN

## 2024-06-30 NOTE — PLAN OF CARE
Patient gets nausea and vomiting as soon as IV morphine is given, so recommend giving zofran before or along with morphine.     No BM noted on day shift.     Patient concerned about blood sugar getting too low, but she has been 88 breakfast, 82 lunch, and 81 dinner. Per bariatric recommendations patient should not have juice or concentrated sweets while on full liquid diet. Patient, michelle, and her daughter were all informed of this in room by LPN. Reminded patient and daughter at dinner that the blood sugar is okay until it is below 70 and that is when to worry.    Problem: Chronic Conditions and Co-morbidities  Goal: Patient's chronic conditions and co-morbidity symptoms are monitored and maintained or improved  Outcome: Progressing     Problem: Pain  Goal: Verbalizes/displays adequate comfort level or baseline comfort level  Outcome: Progressing     Problem: Safety - Adult  Goal: Free from fall injury  Outcome: Progressing     Problem: Skin/Tissue Integrity  Goal: Absence of new skin breakdown  Description: 1.  Monitor for areas of redness and/or skin breakdown  2.  Assess vascular access sites hourly  Outcome: Progressing     Problem: Discharge Planning  Goal: Discharge to home or other facility with appropriate resources  Outcome: Progressing

## 2024-06-30 NOTE — PROGRESS NOTES
Admit Date: 6/17/2024  POD 8 s/p right colectomy  POD 13 bypass   POD 2 percutaneous drain    Subjective:     Patient has similar complaint of right lower quadrant pain however less than yesterday.  She was ambulating to nurse station    Scheduled Meds:   potassium chloride  10 mEq IntraVENous Q1H    potassium chloride  10 mEq Oral BID    cefTRIAXone (ROCEPHIN) IV  1,000 mg IntraVENous Q24H    metroNIDAZOLE  500 mg IntraVENous Q8H    pantoprazole  40 mg Oral QAM AC    gabapentin  600 mg Oral BID    ALPRAZolam  0.5 mg Oral Daily    insulin lispro  0-16 Units SubCUTAneous Q6H    insulin lispro  0-4 Units SubCUTAneous Nightly    amitriptyline  50 mg Oral Nightly    sodium chloride flush  5-40 mL IntraVENous 2 times per day    enoxaparin  40 mg SubCUTAneous 2 times per day     Continuous Infusions:   0.9% NaCl with KCl 20 mEq 50 mL/hr at 06/30/24 1149    dextrose      sodium chloride 10 mL/hr at 06/30/24 0933     PRN Meds:morphine, cyclobenzaprine, acetaminophen, morphine, glucose, dextrose bolus **OR** dextrose bolus, glucagon (rDNA), dextrose, albuterol, sodium chloride flush, sodium chloride, hydrOXYzine HCl, ondansetron **OR** ondansetron, acetaminophen, hydrALAZINE, simethicone, hyoscyamine, prochlorperazine, LORazepam      Objective:     Patient Vitals for the past 8 hrs:   BP Temp Temp src Pulse Resp SpO2   06/30/24 1000 -- -- -- (!) 101 -- --   06/30/24 0741 123/81 98.4 °F (36.9 °C) Oral 98 18 100 %   06/30/24 0552 -- -- -- (!) 102 -- --   06/30/24 0430 117/70 98.7 °F (37.1 °C) Oral (!) 101 18 --     I/O last 3 completed shifts:  In: 2049.5 [I.V.:1026.5; IV Piggyback:1023]  Out: -   No intake/output data recorded.    Gen: does not appear in any discomfort   Neuro A/O x 3  Resp non-labored   Abd soft, very min ttp in RLQ only   Ext warm    No positive cultures     WBC 12 yest 10k    Assessment:     Principal Problem:    Morbid obesity (HCC)  Active Problems:    Obesity, unspecified    Ischemic colon (HCC)     Colonic pseudoobstruction  Resolved Problems:    * No resolved hospital problems. *      Plan:     Patient without any fevers/ HR chronically 100-110 however recently dropped ot below 100    No positive culture     Continue abx     Encourage ambulation     Continue same diet     Repeat lab work tomorrow

## 2024-06-30 NOTE — PLAN OF CARE
Problem: Chronic Conditions and Co-morbidities  Goal: Patient's chronic conditions and co-morbidity symptoms are monitored and maintained or improved  6/30/2024 0945 by Nancy Ceja RN  Outcome: Progressing  Flowsheets (Taken 6/30/2024 0103 by Kasey Ramos, RN)  Care Plan - Patient's Chronic Conditions and Co-Morbidity Symptoms are Monitored and Maintained or Improved: Monitor and assess patient's chronic conditions and comorbid symptoms for stability, deterioration, or improvement  6/29/2024 2139 by Lianne Mcneal LPN  Outcome: Progressing     Problem: Pain  Goal: Verbalizes/displays adequate comfort level or baseline comfort level  6/30/2024 0945 by Nancy Ceja RN  Outcome: Progressing  6/29/2024 2139 by Lianne Mcneal LPN  Outcome: Progressing     Problem: Safety - Adult  Goal: Free from fall injury  6/30/2024 0945 by Nancy Ceja RN  Outcome: Progressing  6/29/2024 2139 by Lianne Mcneal LPN  Outcome: Progressing     Problem: Discharge Planning  Goal: Discharge to home or other facility with appropriate resources  6/30/2024 0945 by Nancy Ceja RN  Outcome: Progressing  Flowsheets (Taken 6/30/2024 0103 by Kasey Ramos, RN)  Discharge to home or other facility with appropriate resources: Identify barriers to discharge with patient and caregiver  6/29/2024 2139 by Lianne Mcneal LPN  Outcome: Progressing     Problem: Skin/Tissue Integrity  Goal: Absence of new skin breakdown  Description: 1.  Monitor for areas of redness and/or skin breakdown  2.  Assess vascular access sites hourly  3.  Every 4-6 hours minimum:  Change oxygen saturation probe site  4.  Every 4-6 hours:  If on nasal continuous positive airway pressure, respiratory therapy assess nares and determine need for appliance change or resting period.  6/30/2024 0945 by Nancy Ceja RN  Outcome: Progressing  6/29/2024 2139 by Lianne Mcneal LPN  Outcome: Progressing     Problem: Safety - Medical Restraint  Goal:

## 2024-07-01 LAB
ANION GAP SERPL CALC-SCNC: 8 MMOL/L (ref 5–15)
BASOPHILS # BLD: 0 K/UL (ref 0–0.1)
BASOPHILS NFR BLD: 0 % (ref 0–1)
BUN SERPL-MCNC: 2 MG/DL (ref 6–20)
BUN/CREAT SERPL: 6 (ref 12–20)
CALCIUM SERPL-MCNC: 8.2 MG/DL (ref 8.5–10.1)
CHLORIDE SERPL-SCNC: 104 MMOL/L (ref 97–108)
CO2 SERPL-SCNC: 26 MMOL/L (ref 21–32)
CREAT SERPL-MCNC: 0.35 MG/DL (ref 0.55–1.02)
DIFFERENTIAL METHOD BLD: ABNORMAL
EOSINOPHIL # BLD: 0.1 K/UL (ref 0–0.4)
EOSINOPHIL NFR BLD: 1 % (ref 0–7)
ERYTHROCYTE [DISTWIDTH] IN BLOOD BY AUTOMATED COUNT: 14.9 % (ref 11.5–14.5)
GLUCOSE BLD STRIP.AUTO-MCNC: 115 MG/DL (ref 65–117)
GLUCOSE BLD STRIP.AUTO-MCNC: 87 MG/DL (ref 65–117)
GLUCOSE BLD STRIP.AUTO-MCNC: 91 MG/DL (ref 65–117)
GLUCOSE BLD STRIP.AUTO-MCNC: 94 MG/DL (ref 65–117)
GLUCOSE SERPL-MCNC: 140 MG/DL (ref 65–100)
HCT VFR BLD AUTO: 31.2 % (ref 35–47)
HGB BLD-MCNC: 10.2 G/DL (ref 11.5–16)
IMM GRANULOCYTES # BLD AUTO: 0.3 K/UL (ref 0–0.04)
IMM GRANULOCYTES NFR BLD AUTO: 2 % (ref 0–0.5)
LYMPHOCYTES # BLD: 2.4 K/UL (ref 0.8–3.5)
LYMPHOCYTES NFR BLD: 19 % (ref 12–49)
MCH RBC QN AUTO: 28.7 PG (ref 26–34)
MCHC RBC AUTO-ENTMCNC: 32.7 G/DL (ref 30–36.5)
MCV RBC AUTO: 87.9 FL (ref 80–99)
MONOCYTES # BLD: 0.6 K/UL (ref 0–1)
MONOCYTES NFR BLD: 5 % (ref 5–13)
NEUTS SEG # BLD: 9.1 K/UL (ref 1.8–8)
NEUTS SEG NFR BLD: 73 % (ref 32–75)
NRBC # BLD: 0 K/UL (ref 0–0.01)
NRBC BLD-RTO: 0 PER 100 WBC
PLATELET # BLD AUTO: 407 K/UL (ref 150–400)
PMV BLD AUTO: 10.3 FL (ref 8.9–12.9)
POTASSIUM SERPL-SCNC: 2.9 MMOL/L (ref 3.5–5.1)
RBC # BLD AUTO: 3.55 M/UL (ref 3.8–5.2)
SERVICE CMNT-IMP: NORMAL
SODIUM SERPL-SCNC: 138 MMOL/L (ref 136–145)
WBC # BLD AUTO: 12.5 K/UL (ref 3.6–11)

## 2024-07-01 PROCEDURE — 2580000003 HC RX 258: Performed by: SURGERY

## 2024-07-01 PROCEDURE — 85025 COMPLETE CBC W/AUTO DIFF WBC: CPT

## 2024-07-01 PROCEDURE — 36415 COLL VENOUS BLD VENIPUNCTURE: CPT

## 2024-07-01 PROCEDURE — 99024 POSTOP FOLLOW-UP VISIT: CPT | Performed by: SURGERY

## 2024-07-01 PROCEDURE — 82962 GLUCOSE BLOOD TEST: CPT

## 2024-07-01 PROCEDURE — 80048 BASIC METABOLIC PNL TOTAL CA: CPT

## 2024-07-01 PROCEDURE — 1100000000 HC RM PRIVATE

## 2024-07-01 PROCEDURE — 6360000002 HC RX W HCPCS: Performed by: SURGERY

## 2024-07-01 PROCEDURE — 6370000000 HC RX 637 (ALT 250 FOR IP): Performed by: SURGERY

## 2024-07-01 PROCEDURE — 6370000000 HC RX 637 (ALT 250 FOR IP): Performed by: NURSE PRACTITIONER

## 2024-07-01 RX ORDER — LEVOFLOXACIN 500 MG/1
500 TABLET, FILM COATED ORAL DAILY
Qty: 7 TABLET | Refills: 0 | Status: SHIPPED | OUTPATIENT
Start: 2024-07-01 | End: 2024-07-08

## 2024-07-01 RX ORDER — LEVOFLOXACIN 500 MG/1
500 TABLET, FILM COATED ORAL DAILY
Status: DISCONTINUED | OUTPATIENT
Start: 2024-07-01 | End: 2024-07-02 | Stop reason: HOSPADM

## 2024-07-01 RX ORDER — MORPHINE SULFATE 30 MG/1
30 TABLET ORAL EVERY 6 HOURS PRN
Qty: 10 TABLET | Refills: 0 | Status: SHIPPED | OUTPATIENT
Start: 2024-07-01 | End: 2024-07-04

## 2024-07-01 RX ORDER — POTASSIUM CHLORIDE 750 MG/1
40 TABLET, FILM COATED, EXTENDED RELEASE ORAL
Status: DISCONTINUED | OUTPATIENT
Start: 2024-07-01 | End: 2024-07-01

## 2024-07-01 RX ORDER — DIPHENHYDRAMINE HCL 50 MG
50 CAPSULE ORAL EVERY 6 HOURS PRN
Status: DISCONTINUED | OUTPATIENT
Start: 2024-07-01 | End: 2024-07-02 | Stop reason: HOSPADM

## 2024-07-01 RX ORDER — LEVOFLOXACIN 500 MG/1
500 TABLET, FILM COATED ORAL DAILY
Status: DISCONTINUED | OUTPATIENT
Start: 2024-07-01 | End: 2024-07-01

## 2024-07-01 RX ORDER — POTASSIUM CHLORIDE 750 MG/1
40 TABLET, FILM COATED, EXTENDED RELEASE ORAL EVERY 4 HOURS
Status: COMPLETED | OUTPATIENT
Start: 2024-07-01 | End: 2024-07-01

## 2024-07-01 RX ORDER — POTASSIUM CHLORIDE 750 MG/1
40 TABLET, FILM COATED, EXTENDED RELEASE ORAL ONCE
Status: DISCONTINUED | OUTPATIENT
Start: 2024-07-01 | End: 2024-07-01

## 2024-07-01 RX ADMIN — ENOXAPARIN SODIUM 40 MG: 100 INJECTION SUBCUTANEOUS at 21:56

## 2024-07-01 RX ADMIN — POTASSIUM CHLORIDE 40 MEQ: 750 TABLET, FILM COATED, EXTENDED RELEASE ORAL at 12:01

## 2024-07-01 RX ADMIN — POTASSIUM CHLORIDE 40 MEQ: 750 TABLET, FILM COATED, EXTENDED RELEASE ORAL at 13:31

## 2024-07-01 RX ADMIN — METRONIDAZOLE 500 MG: 500 INJECTION, SOLUTION INTRAVENOUS at 06:55

## 2024-07-01 RX ADMIN — POTASSIUM CHLORIDE 40 MEQ: 750 TABLET, FILM COATED, EXTENDED RELEASE ORAL at 17:18

## 2024-07-01 RX ADMIN — POTASSIUM CHLORIDE 10 MEQ: 750 TABLET, FILM COATED, EXTENDED RELEASE ORAL at 08:41

## 2024-07-01 RX ADMIN — SODIUM CHLORIDE, PRESERVATIVE FREE 10 ML: 5 INJECTION INTRAVENOUS at 21:57

## 2024-07-01 RX ADMIN — GABAPENTIN 600 MG: 600 TABLET, FILM COATED ORAL at 08:41

## 2024-07-01 RX ADMIN — DIPHENHYDRAMINE HYDROCHLORIDE 50 MG: 50 CAPSULE ORAL at 13:30

## 2024-07-01 RX ADMIN — MORPHINE SULFATE 4 MG: 4 INJECTION, SOLUTION INTRAMUSCULAR; INTRAVENOUS at 00:45

## 2024-07-01 RX ADMIN — PROCHLORPERAZINE EDISYLATE 10 MG: 5 INJECTION INTRAMUSCULAR; INTRAVENOUS at 00:45

## 2024-07-01 RX ADMIN — WATER 1000 MG: 1 INJECTION INTRAMUSCULAR; INTRAVENOUS; SUBCUTANEOUS at 08:45

## 2024-07-01 RX ADMIN — LEVOFLOXACIN 500 MG: 500 TABLET, FILM COATED ORAL at 13:31

## 2024-07-01 RX ADMIN — ENOXAPARIN SODIUM 40 MG: 100 INJECTION SUBCUTANEOUS at 08:41

## 2024-07-01 RX ADMIN — AMITRIPTYLINE HYDROCHLORIDE 50 MG: 50 TABLET, FILM COATED ORAL at 23:05

## 2024-07-01 RX ADMIN — ALPRAZOLAM 0.5 MG: 0.5 TABLET ORAL at 08:41

## 2024-07-01 RX ADMIN — GABAPENTIN 600 MG: 600 TABLET, FILM COATED ORAL at 23:04

## 2024-07-01 RX ADMIN — POTASSIUM CHLORIDE 40 MEQ: 750 TABLET, FILM COATED, EXTENDED RELEASE ORAL at 21:56

## 2024-07-01 NOTE — PROGRESS NOTES
Potassium too low for DC. Will replace more aggressively today with 40mg X 3 dose. Will check again in AM. Change to PO abx.    Pt updated.    Kevin Bolaños MD, FACS, [facility]  Bariatric and General Surgeon  [facility]

## 2024-07-01 NOTE — DISCHARGE SUMMARY
Physician Discharge Summary     Patient ID:  Ce Roberts  [id number]  54 y.o.  1970    Admit date: 6/17/2024    Discharge date and time: No discharge date for patient encounter.     Admitting Physician: Kevin Bolaños MD     Discharge Physician: same    Admission Diagnoses: Obesity, unspecified [E66.9]  Morbid obesity (HCC) [E66.01]  Ischemic colon (HCC) [K55.9]    Discharge Diagnoses: same    Admission Condition: good    Discharged Condition: good    Indication for Admission: Morbid obesity    Hospital Course: 53 y/o F underwent gastric bypass for obesity. Post op we fought her chronic opioid dependence. She developed a post operative colonic ileus which worsened to ischemic colitis requiring ex lap and right hemicolectomy 5 days post op. She was placed in the ICU after and progressed nicely back to the floor. She had return of bowel function and after a two day hospital course was discharged home.      Discharge Exam:  Vitals:    07/01/24 0809   BP: (!) 135/91   Pulse: (!) 112   Resp: 18   Temp: 98.2 °F (36.8 °C)   SpO2: 97%     General: No acute distress, conversant  Eyes: PERRLA, no scleral icterus  HENT: Normocephalic without oral lesions  Neck: Trachea midline without LAD  Cardiac: Normal pulse rate and rhythm  Pulmonary: Symmetric chest rise with normal effort  GI: Soft, wound partially closed,  Skin: Warm without rash  Extremities: No edema or joint stiffness  Psych: Appropriate mood and affect      Disposition: home    In process/preliminary results:  Outstanding Order Results       Date and Time Order Name Status Description    6/28/2024  3:30 PM Culture, Body Fluid Preliminary             Patient Instructions:   Current Discharge Medication List        CONTINUE these medications which have NOT CHANGED    Details   cyclobenzaprine (FLEXERIL) 10 MG tablet Take 1 tablet by mouth 3 times daily as needed for Muscle spasms      buprenorphine 20 MCG/HR PTWK Place 1 patch onto the skin once a week. Max

## 2024-07-01 NOTE — PROGRESS NOTES
Transition of Care Plan:     RUR: 13% LOW  Prior Level of Functioning: Independent   Disposition: Home w/ family and HH PT/OT/SN (with [facility])  Follow up appointments: PCP, specialist   DME needed: patient requesting a raised toilet seat with handles to go over her low toilet seat at home - order submitted via [place] to [facility]   Transportation at discharge: Family   IM/IMM Medicare/ letter given: 1st IM: 6/20/24, 2nd IM: 6/27/24, 2nd IM: 7/1/24  Is patient a [place] and connected with VA? NA  Caregiver Contact: daughter, Mare Tyler [phone]  Discharge Caregiver contacted prior to discharge? Upon patient request  Care Conference needed? No  Barriers to discharge: none, discharge planned for today     CM met with patient, patient made known need for raised toilet seat with handles due to her own toilet being so low and hard for her to get up off of it. Sent order via [place]; awaiting response.      Patient states she already spoke with [facility] via phone and they are scheduled to see her tomorrow for PT/OT/SN [phone]      EMMA found out that raised toilet seat is not covered by insurance, CM went to patient's bedside to notify the patient who is on the phone with her doctor, so CM notified patient's daughter Mare. Mare states she will look into ordering one off of [employer] potentially. CM showed options via google search for her to look at and decide. Mare also said that the doctor told them that her mom will discharge tomorrow due to lab results needing to improve.     CM notified [facility]  of discharge planned for tomorrow instead of today via [facility]    Lovely Angulo RN, BSN, CM

## 2024-07-02 VITALS
HEIGHT: 64 IN | HEART RATE: 114 BPM | TEMPERATURE: 98.4 F | OXYGEN SATURATION: 96 % | WEIGHT: 243.61 LBS | DIASTOLIC BLOOD PRESSURE: 92 MMHG | BODY MASS INDEX: 41.59 KG/M2 | SYSTOLIC BLOOD PRESSURE: 128 MMHG | RESPIRATION RATE: 14 BRPM

## 2024-07-02 LAB
ANION GAP SERPL CALC-SCNC: 7 MMOL/L (ref 5–15)
BACTERIA SPEC CULT: NORMAL
BUN SERPL-MCNC: 1 MG/DL (ref 6–20)
BUN/CREAT SERPL: 3 (ref 12–20)
CALCIUM SERPL-MCNC: 8.7 MG/DL (ref 8.5–10.1)
CHLORIDE SERPL-SCNC: 109 MMOL/L (ref 97–108)
CO2 SERPL-SCNC: 22 MMOL/L (ref 21–32)
CREAT SERPL-MCNC: 0.39 MG/DL (ref 0.55–1.02)
GLUCOSE BLD STRIP.AUTO-MCNC: 87 MG/DL (ref 65–117)
GLUCOSE SERPL-MCNC: 86 MG/DL (ref 65–100)
GRAM STN SPEC: NORMAL
POTASSIUM SERPL-SCNC: 4.3 MMOL/L (ref 3.5–5.1)
SERVICE CMNT-IMP: NORMAL
SERVICE CMNT-IMP: NORMAL
SODIUM SERPL-SCNC: 138 MMOL/L (ref 136–145)

## 2024-07-02 PROCEDURE — 36415 COLL VENOUS BLD VENIPUNCTURE: CPT

## 2024-07-02 PROCEDURE — 2580000003 HC RX 258: Performed by: SURGERY

## 2024-07-02 PROCEDURE — 99024 POSTOP FOLLOW-UP VISIT: CPT | Performed by: SURGERY

## 2024-07-02 PROCEDURE — 82962 GLUCOSE BLOOD TEST: CPT

## 2024-07-02 PROCEDURE — 80048 BASIC METABOLIC PNL TOTAL CA: CPT

## 2024-07-02 PROCEDURE — 6370000000 HC RX 637 (ALT 250 FOR IP): Performed by: SURGERY

## 2024-07-02 RX ADMIN — SODIUM CHLORIDE, PRESERVATIVE FREE 10 ML: 5 INJECTION INTRAVENOUS at 09:03

## 2024-07-02 RX ADMIN — PANTOPRAZOLE SODIUM 40 MG: 40 TABLET, DELAYED RELEASE ORAL at 05:58

## 2024-07-02 NOTE — DISCHARGE SUMMARY
Physician Discharge Summary     Patient ID:  Ce Roberts  [id number]  54 y.o.  1970    Admit date: 6/17/2024    Discharge date and time: No discharge date for patient encounter.     Admitting Physician: Kevin Bolaños MD     Discharge Physician: same    Admission Diagnoses: Obesity, unspecified [E66.9]  Morbid obesity (HCC) [E66.01]  Ischemic colon (HCC) [K55.9]    Discharge Diagnoses: same    Admission Condition: good    Discharged Condition: good    Indication for Admission: Morbid obesity    Hospital Course: 55 y/o F underwent gastric bypass for obesity. Post op we fought her chronic opioid dependence. She developed a post operative colonic ileus which worsened to ischemic colitis requiring ex lap and right hemicolectomy 5 days post op. She was placed in the ICU after and progressed nicely back to the floor. She had return of bowel function.  We replaced her potassium.  After a two week hospital course was discharged home.      Discharge Exam:  Vitals:    07/02/24 0600   BP:    Pulse: (!) 104   Resp:    Temp:    SpO2:      General: No acute distress, conversant  Eyes: PERRLA, no scleral icterus  HENT: Normocephalic without oral lesions  Neck: Trachea midline without LAD  Cardiac: Normal pulse rate and rhythm  Pulmonary: Symmetric chest rise with normal effort  GI: Soft, wound partially closed  Skin: Warm without rash  Extremities: No edema or joint stiffness  Psych: Appropriate mood and affect      Disposition: home    In process/preliminary results:  Outstanding Order Results       Date and Time Order Name Status Description    6/28/2024  3:30 PM Culture, Body Fluid Preliminary             Patient Instructions:   Current Discharge Medication List        CONTINUE these medications which have NOT CHANGED    Details   cyclobenzaprine (FLEXERIL) 10 MG tablet Take 1 tablet by mouth 3 times daily as needed for Muscle spasms      buprenorphine 20 MCG/HR PTWK Place 1 patch onto the skin once a week. Max Daily  Amount: 1 patch      hydroCHLOROthiazide (HYDRODIURIL) 25 MG tablet Take 1 tablet by mouth daily      meloxicam (MOBIC) 15 MG tablet Take 1 tablet by mouth daily      LINZESS 72 MCG CAPS capsule 1 capsule at least 30 minutes before the first meal of the day on an empty stomach Orally Once a day for 30 day(s)      polyethylene glycol (GLYCOLAX) 17 GM/SCOOP powder Take 17 g by mouth daily  Qty: 1530 g, Refills: 0      ondansetron (ZOFRAN) 4 MG tablet Take 1 tablet by mouth every 8 hours as needed for Nausea or Vomiting  Qty: 30 tablet, Refills: 0      omeprazole (PRILOSEC) 40 MG delayed release capsule Take 1 capsule by mouth every morning (before breakfast)  Qty: 90 capsule, Refills: 1      Tirzepatide (MOUNJARO) 12.5 MG/0.5ML SOPN SC injection Inject 0.5 mLs into the skin once a week  Qty: 4 Adjustable Dose Pre-filled Pen Syringe, Refills: 5      gabapentin (NEURONTIN) 600 MG tablet Take 1 tablet by mouth 2 times daily. Max Daily Amount: 1,200 mg  Qty: 180 tablet, Refills: 3    Comments: Please send a replace/new response with 100-Day Supply if appropriate to maximize member benefit. Requesting 1 year supply.  Associated Diagnoses: DDD (degenerative disc disease), lumbar      ALPRAZolam (XANAX) 0.5 MG tablet Take 1 tablet by mouth daily as needed for Anxiety.  Qty: 90 tablet, Refills: 1    Associated Diagnoses: Anxiety      albuterol sulfate HFA (VENTOLIN HFA) 108 (90 Base) MCG/ACT inhaler Inhale 2 puffs into the lungs 4 times daily as needed for Wheezing  Qty: 18 g, Refills: 0      amitriptyline (ELAVIL) 50 MG tablet Take 1 tablet by mouth nightly  Qty: 30 tablet, Refills: 0      losartan (COZAAR) 50 MG tablet Take 1 tablet by mouth daily  Qty: 100 tablet, Refills: 0    Comments: Please send a replace/new response with 100-Day Supply if appropriate to maximize member benefit. Requesting 1 year supply.           STOP taking these medications       NONFORMULARY Comments:   Reason for Stopping:         bumetanide

## 2024-07-02 NOTE — CARE COORDINATION
Transition of Care Plan:     RUR: 12% LOW  Prior Level of Functioning: Independent   Disposition: Home w/ family and HH PT/OT/SN (with [facility])  Follow up appointments: PCP, specialist   DME needed: patient requesting a raised toilet seat with handles to go over her low toilet seat at home - order submitted via [place] to [employer] and was denied by both primary and secondary insurance, patient and daughter have been made aware, they will pick one up that they found online from [employer]  Transportation at discharge: family  IM/IMM Medicare/ letter given: 1st IM: 6/20/24, 2nd IM: 6/27/24, 2nd IM: 7/1/24  Is patient a [place] and connected with VA? NA  Caregiver Contact: daughterMare [phone]  Discharge Caregiver contacted prior to discharge? Upon patient request  Care Conference needed? No  Barriers to discharge: none, discharge planned for today 7/2/24      Lovely Angulo RN, BSN, CM

## 2024-07-02 NOTE — PROGRESS NOTES
[facility]   Daily Progress Note    Admit Date: 2024  Post-Operative Day: 10 Days Post-Op from Procedure(s):  EXPLORATORY LAPAROTOMY, RIGHT HEMICOLECTOMY     Subjective:     Last 24 hrs: pt is in chair eating breakfast, no complaints; wants to know when she gets IV and tele monitor off       Objective:     Blood pressure (!) 128/92, pulse (!) 113, temperature 98.4 °F (36.9 °C), temperature source Oral, resp. rate 14, height 1.613 m (5' 3.5\"), weight 110.5 kg (243 lb 9.7 oz), SpO2 96 %.  Temp (24hrs), Av.5 °F (36.9 °C), Min:97.7 °F (36.5 °C), Max:99 °F (37.2 °C)      _____________________  Physical Exam:     Alert and Oriented, x3, in no acute distress.  Cardiovascular: RRR, no peripheral nolvia  Abdomen: soft, nt      Assessment:   Principal Problem:    Morbid obesity (HCC)  Active Problems:    Obesity, unspecified    Ischemic colon (HCC)    Colonic pseudoobstruction  Resolved Problems:    * No resolved hospital problems. *          Plan:     Home today  Follow up w/ Dr Bolaños in one week  May have dry drsg to incision.    Data Review:    Recent Labs     24  0254 24  1044   WBC 12.4* 12.5*   HGB 9.4* 10.2*   HCT 29.0* 31.2*    407*     Recent Labs     24  0254 24  1044 24  0554    138 138   K 2.7* 2.9* 4.3    104 109*   CO2 22 26 22   BUN 4* 2* 1*   MG 1.6  --   --    PHOS 2.6  --   --      Invalid input(s): \"AML\", \"LPSE\"        ______________________  Medications:    Current Facility-Administered Medications   Medication Dose Route Frequency    levoFLOXacin (LEVAQUIN) tablet 500 mg  500 mg Oral Daily    diphenhydrAMINE (BENADRYL) capsule 50 mg  50 mg Oral Q6H PRN    morphine (PF) injection 4 mg  4 mg IntraVENous Q2H PRN    pantoprazole (PROTONIX) tablet 40 mg  40 mg Oral QAM AC    gabapentin (NEURONTIN) tablet 600 mg  600 mg Oral BID    cyclobenzaprine (FLEXERIL) tablet 10 mg  10 mg Oral TID PRN    ALPRAZolam (XANAX) tablet 0.5 mg  0.5 mg Oral Daily

## 2024-07-02 NOTE — PROGRESS NOTES
Hospital follow-up PCP transitional care appointment has been scheduled with Dr. Ulisses Joy for Monday July 29th, 2024 at 1:15 pm. Pending patient discharge. Shakir Pop, Care Management Assistant

## 2024-07-02 NOTE — PROGRESS NOTES
Pt noted to be DC today, states that she does not have any questions or needs. Will follow to DC    Claudia Posadas, DPT, PT

## 2024-07-02 NOTE — PLAN OF CARE
Problem: Chronic Conditions and Co-morbidities  Goal: Patient's chronic conditions and co-morbidity symptoms are monitored and maintained or improved  Outcome: Adequate for Discharge     Problem: Pain  Goal: Verbalizes/displays adequate comfort level or baseline comfort level  7/2/2024 1111 by Angelic Pitt RN  Outcome: Adequate for Discharge     Problem: Safety - Adult  Goal: Free from fall injury  Outcome: Adequate for Discharge     Problem: Discharge Planning  Goal: Discharge to home or other facility with appropriate resources  Outcome: Adequate for Discharge     Problem: Skin/Tissue Integrity  Goal: Absence of new skin breakdown  Description: 1.  Monitor for areas of redness and/or skin breakdown  2.  Assess vascular access sites hourly  3.  Every 4-6 hours minimum:  Change oxygen saturation probe site  4.  Every 4-6 hours:  If on nasal continuous positive airway pressure, respiratory therapy assess nares and determine need for appliance change or resting period.  Outcome: Adequate for Discharge     Problem: ABCDS Injury Assessment  Goal: Absence of physical injury  Outcome: Adequate for Discharge

## 2024-07-03 ENCOUNTER — TELEPHONE (OUTPATIENT)
Age: 54
End: 2024-07-03

## 2024-07-03 NOTE — TELEPHONE ENCOUNTER
I spoke with the Home Health Nurse. Wound currently has no depth. Requesting new wound care orders. Recommends cleansing with NSS applying calcium (silver) alginate and ABD pad three times a week. Verbal order given.

## 2024-07-03 NOTE — TELEPHONE ENCOUNTER
Shiloh with home health called and would like to speak with the nurse regarding wound care orders.

## 2024-07-03 NOTE — TELEPHONE ENCOUNTER
Bariatric Post Op Call 48 hour    Hydration: Less than 32 ounces of water daily is fair to poor (Goal is 64 ounces per day)  Poor [] Fair []  Good [x] Great []    Amount: 48 ounces     Comment: Per patient discharged yesterday and has consumed 48 ounces at this point of the day.    Ambulation:( walking throughout the day, at least every 2 hours while awake. Patient should be up and out of bed most of the day.)   Poor [] Fair []  Good [] Great [x]    Comment:    Urine Color: Question of any odor and color (should be pari, pale, and clear)   Dark [] Pari [] Pale [x] Clear []    Comment: No odor, complained of pain and burning sensation yesterday and today when passing urine.     Diet: Question any nausea and/or vomiting.            Protein intake (ultimately goal is 60 grams of protein daily, but at 2 days post op they should be working towards this and may not be at goal yet)  Poor [] Fair []  Good [x] Great []    Comment: No nausea and or vomiting      Bowel movements: Question of any constipation- haven't had any bowel movements for more than 3 days. This could be related to protein intake and/or narcotic pain medication usage.     Comment: Reported having two BM's today and described as soft but not loose/watery like diarrhea.          Use of incentive spirometer:  Yes [x]  No []    Comment: I requested that she use it a couple of times every hour for the first few days.     Incision: (No redness, pain, swelling or fever)     Healing Well [x] Redness [] Pain [] Drainage [] Swelling []    Comment: No Fever    Pain: Right sided incisional (usually the largest incision with deep stitch) abdominal pain is normal (should be less than 3).  Pain 0 - 10: 0    Comment (are they taking pain medication and is it helping? Abdominal support / splinting/ ice or heat?): Taking Morphine. I informed her she can use a pillow to splint the abdominal muscles when changing positions or she can use an abdominal binder or spandex

## 2024-07-05 RX ORDER — BLOOD-GLUCOSE METER
EACH MISCELLANEOUS
Qty: 1 KIT | Refills: 0 | Status: SHIPPED | OUTPATIENT
Start: 2024-07-05

## 2024-07-11 ENCOUNTER — TELEPHONE (OUTPATIENT)
Age: 54
End: 2024-07-11

## 2024-07-12 RX ORDER — TIRZEPATIDE 10 MG/.5ML
INJECTION, SOLUTION SUBCUTANEOUS
Qty: 2 ML | Refills: 0 | Status: SHIPPED | OUTPATIENT
Start: 2024-07-12

## 2024-07-12 NOTE — TELEPHONE ENCOUNTER
I called and spoke with the patient. I asked her what medication she was requesting a refill on and she stated, \"Morphine!\" I told her I don't think he will refill it but he may prescribe something else. She stated, \"I can't take anything else because, I am allergic to everything else.\" I told her I will speak with him and call you back. I spoke with the surgeon. He will not refill the Morphine and is referring the patient to her pain specialist. I called and informed the patient of this and she mentioned a patch. I told her he did mention the patch and that is why he is referring you to your pain specialist. She acknowledged understanding and thanked me for the call.

## 2024-07-15 ENCOUNTER — TELEPHONE (OUTPATIENT)
Age: 54
End: 2024-07-15

## 2024-07-15 NOTE — TELEPHONE ENCOUNTER
Patient called and stated that she still has the staples in and wanted to know when they needed to be removed.

## 2024-07-15 NOTE — TELEPHONE ENCOUNTER
I spoke with the patient. I asked her to send pictures via [employer]. I told her that tomorrow's visit more than likely should be in person. I will check and the appointment may need to be changed to an in office visit. She acknowledged understanding and thanked me for the call.

## 2024-07-16 ENCOUNTER — OFFICE VISIT (OUTPATIENT)
Age: 54
End: 2024-07-16

## 2024-07-16 VITALS
RESPIRATION RATE: 20 BRPM | BODY MASS INDEX: 34.98 KG/M2 | SYSTOLIC BLOOD PRESSURE: 111 MMHG | HEIGHT: 63 IN | TEMPERATURE: 98 F | WEIGHT: 197.4 LBS | OXYGEN SATURATION: 98 % | HEART RATE: 113 BPM | DIASTOLIC BLOOD PRESSURE: 78 MMHG

## 2024-07-16 DIAGNOSIS — Z09 SURGICAL FOLLOW-UP CARE: ICD-10-CM

## 2024-07-16 DIAGNOSIS — E66.01 MORBID OBESITY (HCC): Primary | ICD-10-CM

## 2024-07-16 PROCEDURE — 99024 POSTOP FOLLOW-UP VISIT: CPT | Performed by: NURSE PRACTITIONER

## 2024-07-16 ASSESSMENT — PATIENT HEALTH QUESTIONNAIRE - PHQ9
9. THOUGHTS THAT YOU WOULD BE BETTER OFF DEAD, OR OF HURTING YOURSELF: NOT AT ALL
5. POOR APPETITE OR OVEREATING: NOT AT ALL
6. FEELING BAD ABOUT YOURSELF - OR THAT YOU ARE A FAILURE OR HAVE LET YOURSELF OR YOUR FAMILY DOWN: NOT AT ALL
SUM OF ALL RESPONSES TO PHQ QUESTIONS 1-9: 0
7. TROUBLE CONCENTRATING ON THINGS, SUCH AS READING THE NEWSPAPER OR WATCHING TELEVISION: NOT AT ALL
3. TROUBLE FALLING OR STAYING ASLEEP: NOT AT ALL
1. LITTLE INTEREST OR PLEASURE IN DOING THINGS: NOT AT ALL
4. FEELING TIRED OR HAVING LITTLE ENERGY: NOT AT ALL
10. IF YOU CHECKED OFF ANY PROBLEMS, HOW DIFFICULT HAVE THESE PROBLEMS MADE IT FOR YOU TO DO YOUR WORK, TAKE CARE OF THINGS AT HOME, OR GET ALONG WITH OTHER PEOPLE: NOT DIFFICULT AT ALL
SUM OF ALL RESPONSES TO PHQ9 QUESTIONS 1 & 2: 0
SUM OF ALL RESPONSES TO PHQ QUESTIONS 1-9: 0
SUM OF ALL RESPONSES TO PHQ QUESTIONS 1-9: 0
8. MOVING OR SPEAKING SO SLOWLY THAT OTHER PEOPLE COULD HAVE NOTICED. OR THE OPPOSITE, BEING SO FIGETY OR RESTLESS THAT YOU HAVE BEEN MOVING AROUND A LOT MORE THAN USUAL: NOT AT ALL
2. FEELING DOWN, DEPRESSED OR HOPELESS: NOT AT ALL
SUM OF ALL RESPONSES TO PHQ QUESTIONS 1-9: 0

## 2024-07-16 NOTE — PROGRESS NOTES
Identified pt with two pt identifiers (name and ). Reviewed chart in preparation for visit and have obtained necessary documentation.    Ce Roberts is a 54 y.o. female  No chief complaint on file.    /78 (Site: Left Upper Arm, Position: Sitting, Cuff Size: Large Adult)   Pulse (!) 113   Temp 98 °F (36.7 °C) (Oral)   Resp 20   Ht 1.6 m (5' 3\")   Wt 89.5 kg (197 lb 6.4 oz)   SpO2 98%   BMI 34.97 kg/m²     1. Have you been to the ER, urgent care clinic since your last visit?  Hospitalized since your last visit?no    2. Have you seen or consulted any other health care providers outside of the [facility] since your last visit?  Include any pap smears or colon screening. no

## 2024-07-16 NOTE — PROGRESS NOTES
4 weeks status post gastric bypass and 3.5 week status post exp lap.   Pt reports doing well on liquids and soft foods. .    She complains of pain. She is working with her pain management doctor.   Pt reports no nausea and no vomiting  Sheis drinking approximately 48 oz of water daily  + BM  She is drinking and eating 50 grams of protein daily.     She is taking bariatric vitamins without issue.     Total weight loss since surgery 27lbs  Weight loss since last visit 27lbs  /78 (Site: Left Upper Arm, Position: Sitting, Cuff Size: Large Adult)   Pulse (!) 113   Temp 98 °F (36.7 °C) (Oral)   Resp 20   Ht 1.6 m (5' 3\")   Wt 89.5 kg (197 lb 6.4 oz)   SpO2 98%   BMI 34.97 kg/m²            Ms. Roberts has a reminder for a \"due or due soon\" health maintenance. I have asked that she contact her primary care provider for follow-up on this health maintenance.      Physical Examination: General appearance - alert, well appearing, and in no distress,  Chest - clear to auscultation bilaterally  Heart - normal rate, regular rhythm, normal S1, S2, no murmurs, rubs, clicks or gallops  Abdomen - soft, nontender, nondistended  scars from previous incisions healing without erythema or induration, staples removed    A/P    Doing well 4 weeks status post laparoscopic Gastric Bypass  Diet advanced to soft meats.    Focus on 50-60 grams of protein daily.   Encouraged water intake to 64 oz of non-carbonated/no calorie beverages daily.   Supplement with unflavored protein powder daily.   Continue PPI  Follow up in 2 weeks.         SAFIA Valerio - NP

## 2024-07-17 ENCOUNTER — TELEPHONE (OUTPATIENT)
Age: 54
End: 2024-07-17

## 2024-07-17 NOTE — TELEPHONE ENCOUNTER
ARMAAN Manzanares with [facility] called and would like to know if the patient can shower with the wound uncovered. Glenna stated that patient was unsure as well.

## 2024-07-17 NOTE — TELEPHONE ENCOUNTER
I called and spoke with Glenna. I informed her that I spoke with the surgeon. The patient can shower. She is to leave the area open to air, no coverage. She acknowledged understanding. She informed me she will let the patient know that she can shower.

## 2024-07-23 ENCOUNTER — TELEPHONE (OUTPATIENT)
Age: 54
End: 2024-07-23

## 2024-07-24 ENCOUNTER — TELEPHONE (OUTPATIENT)
Age: 54
End: 2024-07-24

## 2024-07-26 ENCOUNTER — TELEPHONE (OUTPATIENT)
Age: 54
End: 2024-07-26

## 2024-07-26 DIAGNOSIS — R10.13 EPIGASTRIC PAIN: Primary | ICD-10-CM

## 2024-07-26 NOTE — TELEPHONE ENCOUNTER
Bariatric Post Op Call: Week 3     Hydration: Less than 32 ounces of water daily is fair to poor (Goal is 64 ounces per day)  Poor [] Fair []  Good [] Great [x]    Amount: 64 ounces    Comment: Water or Apple Juice    Ambulation: walking at least 3x/ week for 30 minutes   Poor [] Fair []  Good [] Great [x]    Comment:    Urine Color: Question of any odor and color (should be pari, pale, and clear)   Dark [] Pari [] Pale [x] Clear []    Comment: No odor    Diet: Question any nausea and/or vomiting.   Protein intake (ultimately goal is 60 grams of protein daily and at this stage they should be meeting goal).  Poor [] Fair []  Good [] Great (Yes)    Comment: Nausea and Vomiting noted, Patient reported trying Mashed Potatoes, Finely chopped chicken but feels like the food gets stuck and she then experience severe epigastric pain.      Bowel movements: Question of any constipation- haven't had any bowel movements for more than 3 days. This could be related to protein, fluid intake, medications and activity.     Comment: BM's noted soft     Incision: (No redness, pain, swelling or fever)     Healing Well [x] Redness [] Pain [] Drainage [] Swelling []    Comment: No Fever    Pain: Right sided incisional (usually the largest incision with deep stitch) abdominal pain is normal (should be less than 3). This should be better by 3 weeks post op.   Pain 0 - 10: 0    Comment (are they taking pain medication and is it helping? Abdominal support / splinting/ ice or heat?): Not taking anything for pain related to the bariatric procedure.       Referred to provider: Surgeon  Next Appointment:  7/30/24   Support Group: 2nd Thursday every month from 6-7 pm on Zoom     Red Flags = prompt referral to a bariatric team provider for follow up    Fever > 101  Vomiting and not tolerating liquids   Weak and dizzy / lightheaded   Dark urine   Abdominal pain despite medication, splinting, ice or heat   SOB  Calf swelling and or redness

## 2024-07-29 ENCOUNTER — OFFICE VISIT (OUTPATIENT)
Age: 54
End: 2024-07-29
Payer: MEDICARE

## 2024-07-29 VITALS
HEART RATE: 108 BPM | OXYGEN SATURATION: 96 % | BODY MASS INDEX: 33.66 KG/M2 | SYSTOLIC BLOOD PRESSURE: 115 MMHG | RESPIRATION RATE: 18 BRPM | TEMPERATURE: 98 F | HEIGHT: 63 IN | WEIGHT: 190 LBS | DIASTOLIC BLOOD PRESSURE: 84 MMHG

## 2024-07-29 DIAGNOSIS — I10 ESSENTIAL HYPERTENSION, BENIGN: ICD-10-CM

## 2024-07-29 DIAGNOSIS — Z98.84 H/O GASTRIC BYPASS: ICD-10-CM

## 2024-07-29 DIAGNOSIS — R79.89 ELEVATED SERUM CREATININE: ICD-10-CM

## 2024-07-29 DIAGNOSIS — Z90.49 S/P COLECTOMY: ICD-10-CM

## 2024-07-29 DIAGNOSIS — K59.00 CONSTIPATION, UNSPECIFIED CONSTIPATION TYPE: Primary | ICD-10-CM

## 2024-07-29 DIAGNOSIS — E11.65 TYPE 2 DIABETES MELLITUS WITH HYPERGLYCEMIA, UNSPECIFIED WHETHER LONG TERM INSULIN USE (HCC): ICD-10-CM

## 2024-07-29 DIAGNOSIS — R11.0 NAUSEA: ICD-10-CM

## 2024-07-29 DIAGNOSIS — E87.6 HYPOKALEMIA: ICD-10-CM

## 2024-07-29 PROCEDURE — 1111F DSCHRG MED/CURRENT MED MERGE: CPT | Performed by: FAMILY MEDICINE

## 2024-07-29 PROCEDURE — G8427 DOCREV CUR MEDS BY ELIG CLIN: HCPCS | Performed by: FAMILY MEDICINE

## 2024-07-29 PROCEDURE — 3079F DIAST BP 80-89 MM HG: CPT | Performed by: FAMILY MEDICINE

## 2024-07-29 PROCEDURE — 2022F DILAT RTA XM EVC RTNOPTHY: CPT | Performed by: FAMILY MEDICINE

## 2024-07-29 PROCEDURE — 3074F SYST BP LT 130 MM HG: CPT | Performed by: FAMILY MEDICINE

## 2024-07-29 PROCEDURE — 99214 OFFICE O/P EST MOD 30 MIN: CPT | Performed by: FAMILY MEDICINE

## 2024-07-29 PROCEDURE — 3044F HG A1C LEVEL LT 7.0%: CPT | Performed by: FAMILY MEDICINE

## 2024-07-29 PROCEDURE — 1036F TOBACCO NON-USER: CPT | Performed by: FAMILY MEDICINE

## 2024-07-29 PROCEDURE — 3017F COLORECTAL CA SCREEN DOC REV: CPT | Performed by: FAMILY MEDICINE

## 2024-07-29 PROCEDURE — G8417 CALC BMI ABV UP PARAM F/U: HCPCS | Performed by: FAMILY MEDICINE

## 2024-07-29 RX ORDER — ONDANSETRON HYDROCHLORIDE 8 MG/1
8 TABLET, FILM COATED ORAL EVERY 8 HOURS PRN
Qty: 30 TABLET | Refills: 1 | Status: SHIPPED | OUTPATIENT
Start: 2024-07-29

## 2024-07-29 SDOH — ECONOMIC STABILITY: FOOD INSECURITY: WITHIN THE PAST 12 MONTHS, THE FOOD YOU BOUGHT JUST DIDN'T LAST AND YOU DIDN'T HAVE MONEY TO GET MORE.: NEVER TRUE

## 2024-07-29 SDOH — ECONOMIC STABILITY: FOOD INSECURITY: WITHIN THE PAST 12 MONTHS, YOU WORRIED THAT YOUR FOOD WOULD RUN OUT BEFORE YOU GOT MONEY TO BUY MORE.: NEVER TRUE

## 2024-07-29 SDOH — ECONOMIC STABILITY: INCOME INSECURITY: HOW HARD IS IT FOR YOU TO PAY FOR THE VERY BASICS LIKE FOOD, HOUSING, MEDICAL CARE, AND HEATING?: NOT HARD AT ALL

## 2024-07-29 NOTE — PROGRESS NOTES
Chief Complaint   Patient presents with    Follow-Up from Hospital     Patient presents in office today for KEN f/u from [facility].  Admitted on 6/17/24 for colon surgery.  Discharged on on 7/2/24.  States that she has been unable to tolerate eating.  She states the smells make her very nauseous.  No other concerns.        \"Have you been to the ER, urgent care clinic since your last visit?  Hospitalized since your last visit?\"    YES - When: approximately 6/17/24 ago.  Where and Why: [facility] for colon surgery.    “Have you seen or consulted any other health care providers outside of [facility] since your last visit?”    NO            Click Here for Release of Records Request

## 2024-07-29 NOTE — PROGRESS NOTES
Chief Complaint   Patient presents with    Follow-Up from Hospital     Patient presents in office today for KEN f/u from [facility].  Admitted on 24 for colon surgery.  Discharged on on 24.  States that she has been unable to tolerate eating.  She states the smells make her very nauseous.  No other concerns.        \"Have you been to the ER, urgent care clinic since your last visit?  Hospitalized since your last visit?\"    YES - When: approximately 24 ago.  Where and Why: [facility] for colon surgery.    “Have you seen or consulted any other health care providers outside of [facility] since your last visit?”    NO            Click Here for Release of Records Request    Ce Roberts (:  1970) is a 54 y.o. female, here for evaluation of the following chief complaint(s):  Follow-Up from Hospital         Assessment & Plan  1. Post-gastric bypass surgery status.  She is experiencing significant nausea and difficulty with food intake post-gastric bypass surgery. A prescription for Zofran with refills will be provided at a higher dose for better efficacy. Electrolyte levels will be checked to ensure stability. She is advised to mix Pedialyte with apple juice for hydration. A follow-up with the gastric bypass doctor is scheduled for tomorrow to discuss the nutritional plan. It was communicated that recovery may take a few months. An upper GI SPECT is scheduled for Wednesday to ensure proper gastrointestinal function.    2. Hypertension.  Her hypertension requires ongoing management. She is advised to discontinue her blood pressure medications and diuretics as they are not suitable for her current condition. Increased fluid intake is recommended to address potential dehydration.    3. Medication Management.  She requested a refill for Cymbalta as she is nearing the end of her current supply. An appointment should be scheduled for November or December for medication review. If refills are needed before

## 2024-07-30 ENCOUNTER — TELEPHONE (OUTPATIENT)
Age: 54
End: 2024-07-30

## 2024-07-30 ENCOUNTER — TELEMEDICINE (OUTPATIENT)
Age: 54
End: 2024-07-30

## 2024-07-30 VITALS — BODY MASS INDEX: 33.66 KG/M2 | HEIGHT: 63 IN | WEIGHT: 190 LBS

## 2024-07-30 DIAGNOSIS — R13.19 ESOPHAGEAL DYSPHAGIA: Primary | ICD-10-CM

## 2024-07-30 PROBLEM — K59.81 COLONIC PSEUDOOBSTRUCTION: Status: RESOLVED | Noted: 2024-06-22 | Resolved: 2024-07-30

## 2024-07-30 PROBLEM — K55.9 ISCHEMIC COLON (HCC): Status: RESOLVED | Noted: 2024-06-22 | Resolved: 2024-07-30

## 2024-07-30 PROBLEM — R19.7 DIARRHEA OF PRESUMED INFECTIOUS ORIGIN: Status: RESOLVED | Noted: 2019-01-02 | Resolved: 2024-07-30

## 2024-07-30 LAB
ALBUMIN SERPL-MCNC: 3.2 G/DL (ref 3.5–5)
ALBUMIN/GLOB SERPL: 0.8 (ref 1.1–2.2)
ALP SERPL-CCNC: 99 U/L (ref 45–117)
ALT SERPL-CCNC: 32 U/L (ref 12–78)
ANION GAP SERPL CALC-SCNC: 7 MMOL/L (ref 5–15)
AST SERPL-CCNC: 22 U/L (ref 15–37)
BASOPHILS # BLD: 0.1 K/UL (ref 0–0.1)
BASOPHILS NFR BLD: 1 % (ref 0–1)
BILIRUB SERPL-MCNC: 0.6 MG/DL (ref 0.2–1)
BUN SERPL-MCNC: 9 MG/DL (ref 6–20)
BUN/CREAT SERPL: 15 (ref 12–20)
CALCIUM SERPL-MCNC: 9.5 MG/DL (ref 8.5–10.1)
CHLORIDE SERPL-SCNC: 106 MMOL/L (ref 97–108)
CO2 SERPL-SCNC: 26 MMOL/L (ref 21–32)
CREAT SERPL-MCNC: 0.61 MG/DL (ref 0.55–1.02)
DIFFERENTIAL METHOD BLD: ABNORMAL
EOSINOPHIL # BLD: 0.1 K/UL (ref 0–0.4)
EOSINOPHIL NFR BLD: 1 % (ref 0–7)
ERYTHROCYTE [DISTWIDTH] IN BLOOD BY AUTOMATED COUNT: 15.4 % (ref 11.5–14.5)
GLOBULIN SER CALC-MCNC: 3.8 G/DL (ref 2–4)
GLUCOSE SERPL-MCNC: 108 MG/DL (ref 65–100)
HCT VFR BLD AUTO: 43.8 % (ref 35–47)
HGB BLD-MCNC: 13.9 G/DL (ref 11.5–16)
IMM GRANULOCYTES # BLD AUTO: 0 K/UL (ref 0–0.04)
IMM GRANULOCYTES NFR BLD AUTO: 0 % (ref 0–0.5)
LYMPHOCYTES # BLD: 3.7 K/UL (ref 0.8–3.5)
LYMPHOCYTES NFR BLD: 35 % (ref 12–49)
MCH RBC QN AUTO: 28.8 PG (ref 26–34)
MCHC RBC AUTO-ENTMCNC: 31.7 G/DL (ref 30–36.5)
MCV RBC AUTO: 90.9 FL (ref 80–99)
MONOCYTES # BLD: 0.5 K/UL (ref 0–1)
MONOCYTES NFR BLD: 5 % (ref 5–13)
NEUTS SEG # BLD: 6.2 K/UL (ref 1.8–8)
NEUTS SEG NFR BLD: 58 % (ref 32–75)
NRBC # BLD: 0 K/UL (ref 0–0.01)
NRBC BLD-RTO: 0 PER 100 WBC
PLATELET # BLD AUTO: 362 K/UL (ref 150–400)
PMV BLD AUTO: 12.2 FL (ref 8.9–12.9)
POTASSIUM SERPL-SCNC: 3.6 MMOL/L (ref 3.5–5.1)
PROT SERPL-MCNC: 7 G/DL (ref 6.4–8.2)
RBC # BLD AUTO: 4.82 M/UL (ref 3.8–5.2)
SODIUM SERPL-SCNC: 139 MMOL/L (ref 136–145)
WBC # BLD AUTO: 10.6 K/UL (ref 3.6–11)

## 2024-07-30 PROCEDURE — 99024 POSTOP FOLLOW-UP VISIT: CPT | Performed by: SURGERY

## 2024-07-30 ASSESSMENT — PATIENT HEALTH QUESTIONNAIRE - PHQ9
4. FEELING TIRED OR HAVING LITTLE ENERGY: SEVERAL DAYS
6. FEELING BAD ABOUT YOURSELF - OR THAT YOU ARE A FAILURE OR HAVE LET YOURSELF OR YOUR FAMILY DOWN: NOT AT ALL
10. IF YOU CHECKED OFF ANY PROBLEMS, HOW DIFFICULT HAVE THESE PROBLEMS MADE IT FOR YOU TO DO YOUR WORK, TAKE CARE OF THINGS AT HOME, OR GET ALONG WITH OTHER PEOPLE: NOT DIFFICULT AT ALL
SUM OF ALL RESPONSES TO PHQ QUESTIONS 1-9: 1
1. LITTLE INTEREST OR PLEASURE IN DOING THINGS: NOT AT ALL
SUM OF ALL RESPONSES TO PHQ QUESTIONS 1-9: 1
7. TROUBLE CONCENTRATING ON THINGS, SUCH AS READING THE NEWSPAPER OR WATCHING TELEVISION: NOT AT ALL
8. MOVING OR SPEAKING SO SLOWLY THAT OTHER PEOPLE COULD HAVE NOTICED. OR THE OPPOSITE, BEING SO FIGETY OR RESTLESS THAT YOU HAVE BEEN MOVING AROUND A LOT MORE THAN USUAL: NOT AT ALL
SUM OF ALL RESPONSES TO PHQ QUESTIONS 1-9: 1
SUM OF ALL RESPONSES TO PHQ QUESTIONS 1-9: 1
9. THOUGHTS THAT YOU WOULD BE BETTER OFF DEAD, OR OF HURTING YOURSELF: NOT AT ALL
3. TROUBLE FALLING OR STAYING ASLEEP: NOT AT ALL
2. FEELING DOWN, DEPRESSED OR HOPELESS: NOT AT ALL
SUM OF ALL RESPONSES TO PHQ9 QUESTIONS 1 & 2: 0

## 2024-07-30 ASSESSMENT — PAIN DESCRIPTION - LOCATION: LOCATION: ABDOMEN

## 2024-07-30 ASSESSMENT — PAIN SCALES - GENERAL: PAINLEVEL_OUTOF10: 5

## 2024-07-30 NOTE — PROGRESS NOTES
Surgery Progress Note    2024    CC: dysphagia    Subjective:     TELEHEALTH EVALUATION -- Audio/Visual    HPI:    Ce Roberts (:  1970) has requested an audio/video evaluation for the following concern(s):    Patient with significant intolerance to any solid food.  Only on liquids.  Had labs drawn yesterday by PCP and stable.  Upper GI supposed to be done tomorrow.  She reports emesis minutes to hours after she eats with her food coming up with foam.  Feels weak.  On Pedialyte right now.    Ce Roberts, was evaluated through a synchronous (real-time) audio-video encounter. The patient (or guardian if applicable) is aware that this is a billable service, which includes applicable co-pays. This Virtual Visit was conducted with patient's (and/or legal guardian's) consent. Patient identification was verified, and a caregiver was present when appropriate.   The patient was located at Home: [address] [place] [address].  [facility] [address]  Provider was located at Facility (Appt Dept): [address] [place] [address] [address]  [place] [address]  Confirm you are appropriately licensed, registered, or certified to deliver care in the state where the patient is located as indicated above. If you are not or unsure, please re-schedule the visit: Yes, I confirm.       Constitutional: No fever or chills  Neurologic: No headache  Eyes: No scleral icterus or irritated eyes  Nose: No nasal pain or drainage  Mouth: No oral lesions or sore throat  Cardiac: No palpations or chest pain  Pulmonary: No cough or shortness of breath  Gastrointestinal: Nausea and emesis, no diarrhea, or constipation  Genitourinary: No dysuria  Musculoskeletal: No muscle or joint tenderness  Skin: No rashes or lesions  Psychiatric: No anxiety or depressed mood    Objective:   There were no vitals filed for this visit.  Body mass index is 33.66 kg/m².  Wt 190 lbs    General: No acute distress, conversant      Assessment:     54-year-old female status post

## 2024-07-30 NOTE — H&P (VIEW-ONLY)
Surgery Progress Note    2024    CC: dysphagia    Subjective:     TELEHEALTH EVALUATION -- Audio/Visual    HPI:    Ce Roberts (:  1970) has requested an audio/video evaluation for the following concern(s):    Patient with significant intolerance to any solid food.  Only on liquids.  Had labs drawn yesterday by PCP and stable.  Upper GI supposed to be done tomorrow.  She reports emesis minutes to hours after she eats with her food coming up with foam.  Feels weak.  On Pedialyte right now.    Ce Roberts, was evaluated through a synchronous (real-time) audio-video encounter. The patient (or guardian if applicable) is aware that this is a billable service, which includes applicable co-pays. This Virtual Visit was conducted with patient's (and/or legal guardian's) consent. Patient identification was verified, and a caregiver was present when appropriate.   The patient was located at Home: [address] [place] [address].  [facility] [address]  Provider was located at Facility (Appt Dept): [address] [place] [address] [address]  [place] [address]  Confirm you are appropriately licensed, registered, or certified to deliver care in the state where the patient is located as indicated above. If you are not or unsure, please re-schedule the visit: Yes, I confirm.       Constitutional: No fever or chills  Neurologic: No headache  Eyes: No scleral icterus or irritated eyes  Nose: No nasal pain or drainage  Mouth: No oral lesions or sore throat  Cardiac: No palpations or chest pain  Pulmonary: No cough or shortness of breath  Gastrointestinal: Nausea and emesis, no diarrhea, or constipation  Genitourinary: No dysuria  Musculoskeletal: No muscle or joint tenderness  Skin: No rashes or lesions  Psychiatric: No anxiety or depressed mood    Objective:   There were no vitals filed for this visit.  Body mass index is 33.66 kg/m².  Wt 190 lbs    General: No acute distress, conversant      Assessment:     54-year-old female status post

## 2024-07-30 NOTE — PROGRESS NOTES
Identified patient with two patient identifiers (name and ). Reviewed chart in preparation for visit and have obtained necessary documentation.    Ce Roberts is a 54 y.o. female  Chief Complaint   Patient presents with    Post-Op Check     S/P 6 week follow up     Ht 1.6 m (5' 3\")   Wt 86.2 kg (190 lb)   BMI 33.66 kg/m²     1. Have you been to the ER, urgent care clinic since your last visit?  Hospitalized since your last visit?no    2. Have you seen or consulted any other health care providers outside of the [facility] since your last visit?  Include any pap smears or colon screening. no

## 2024-07-30 NOTE — TELEPHONE ENCOUNTER
Spoke to patient to let her know her EGD is scheduled tomorrow at 7:30 with an arrival time of 6:30.  I let her know no eating after midnight, and someone needs to come with her that will be responsible to take her home.      Patient is working on canceling her UGI for tomorrow.

## 2024-07-31 ENCOUNTER — ANESTHESIA (OUTPATIENT)
Facility: HOSPITAL | Age: 54
End: 2024-07-31
Payer: MEDICARE

## 2024-07-31 ENCOUNTER — ANESTHESIA EVENT (OUTPATIENT)
Facility: HOSPITAL | Age: 54
End: 2024-07-31
Payer: MEDICARE

## 2024-07-31 ENCOUNTER — TELEPHONE (OUTPATIENT)
Age: 54
End: 2024-07-31

## 2024-07-31 ENCOUNTER — HOSPITAL ENCOUNTER (OUTPATIENT)
Facility: HOSPITAL | Age: 54
Setting detail: OUTPATIENT SURGERY
Discharge: HOME | End: 2024-07-31
Attending: SURGERY | Admitting: SURGERY
Payer: MEDICARE

## 2024-07-31 VITALS
DIASTOLIC BLOOD PRESSURE: 90 MMHG | WEIGHT: 191 LBS | RESPIRATION RATE: 18 BRPM | HEART RATE: 96 BPM | BODY MASS INDEX: 33.84 KG/M2 | SYSTOLIC BLOOD PRESSURE: 138 MMHG | HEIGHT: 63 IN | OXYGEN SATURATION: 99 % | TEMPERATURE: 97 F

## 2024-07-31 PROBLEM — K28.9 MARGINAL ULCER: Status: ACTIVE | Noted: 2024-07-31

## 2024-07-31 PROCEDURE — 3700000001 HC ADD 15 MINUTES (ANESTHESIA): Performed by: SURGERY

## 2024-07-31 PROCEDURE — 2709999900 HC NON-CHARGEABLE SUPPLY: Performed by: SURGERY

## 2024-07-31 PROCEDURE — 2720000010 HC SURG SUPPLY STERILE: Performed by: SURGERY

## 2024-07-31 PROCEDURE — 2500000003 HC RX 250 WO HCPCS: Performed by: ANESTHESIOLOGY

## 2024-07-31 PROCEDURE — 3600007512: Performed by: SURGERY

## 2024-07-31 PROCEDURE — 3700000000 HC ANESTHESIA ATTENDED CARE: Performed by: SURGERY

## 2024-07-31 PROCEDURE — 2580000003 HC RX 258: Performed by: ANESTHESIOLOGY

## 2024-07-31 PROCEDURE — 7100000010 HC PHASE II RECOVERY - FIRST 15 MIN: Performed by: SURGERY

## 2024-07-31 PROCEDURE — 6360000002 HC RX W HCPCS: Performed by: ANESTHESIOLOGY

## 2024-07-31 PROCEDURE — 3600007502: Performed by: SURGERY

## 2024-07-31 PROCEDURE — 43245 EGD DILATE STRICTURE: CPT | Performed by: SURGERY

## 2024-07-31 PROCEDURE — 7100000011 HC PHASE II RECOVERY - ADDTL 15 MIN: Performed by: SURGERY

## 2024-07-31 RX ORDER — SODIUM CHLORIDE 9 MG/ML
INJECTION, SOLUTION INTRAVENOUS CONTINUOUS PRN
Status: DISCONTINUED | OUTPATIENT
Start: 2024-07-31 | End: 2024-07-31 | Stop reason: SDUPTHER

## 2024-07-31 RX ORDER — SODIUM CHLORIDE, SODIUM LACTATE, POTASSIUM CHLORIDE, AND CALCIUM CHLORIDE .6; .31; .03; .02 G/100ML; G/100ML; G/100ML; G/100ML
2000 INJECTION, SOLUTION INTRAVENOUS ONCE
Status: DISCONTINUED | OUTPATIENT
Start: 2024-07-31 | End: 2024-07-31 | Stop reason: HOSPADM

## 2024-07-31 RX ORDER — SUCRALFATE 1 G/1
1 TABLET ORAL 4 TIMES DAILY
Qty: 120 TABLET | Refills: 3 | Status: SHIPPED | OUTPATIENT
Start: 2024-07-31

## 2024-07-31 RX ORDER — SODIUM CHLORIDE 9 MG/ML
25 INJECTION, SOLUTION INTRAVENOUS PRN
Status: DISCONTINUED | OUTPATIENT
Start: 2024-07-31 | End: 2024-07-31 | Stop reason: HOSPADM

## 2024-07-31 RX ORDER — SODIUM CHLORIDE 0.9 % (FLUSH) 0.9 %
5-40 SYRINGE (ML) INJECTION EVERY 12 HOURS SCHEDULED
Status: DISCONTINUED | OUTPATIENT
Start: 2024-07-31 | End: 2024-07-31 | Stop reason: HOSPADM

## 2024-07-31 RX ORDER — OMEPRAZOLE 40 MG/1
40 CAPSULE, DELAYED RELEASE ORAL 2 TIMES DAILY
Qty: 180 CAPSULE | Refills: 0 | Status: SHIPPED | OUTPATIENT
Start: 2024-07-31 | End: 2024-10-29

## 2024-07-31 RX ORDER — SODIUM CHLORIDE 0.9 % (FLUSH) 0.9 %
5-40 SYRINGE (ML) INJECTION PRN
Status: DISCONTINUED | OUTPATIENT
Start: 2024-07-31 | End: 2024-07-31 | Stop reason: HOSPADM

## 2024-07-31 RX ORDER — LIDOCAINE HYDROCHLORIDE 20 MG/ML
INJECTION, SOLUTION EPIDURAL; INFILTRATION; INTRACAUDAL; PERINEURAL PRN
Status: DISCONTINUED | OUTPATIENT
Start: 2024-07-31 | End: 2024-07-31 | Stop reason: SDUPTHER

## 2024-07-31 RX ADMIN — LIDOCAINE HYDROCHLORIDE 100 MG: 20 INJECTION, SOLUTION EPIDURAL; INFILTRATION; INTRACAUDAL; PERINEURAL at 07:27

## 2024-07-31 RX ADMIN — PROPOFOL 100 MG: 10 INJECTION, EMULSION INTRAVENOUS at 07:27

## 2024-07-31 RX ADMIN — PROPOFOL 50 MG: 10 INJECTION, EMULSION INTRAVENOUS at 07:33

## 2024-07-31 RX ADMIN — PROPOFOL 100 MG: 10 INJECTION, EMULSION INTRAVENOUS at 07:30

## 2024-07-31 RX ADMIN — SODIUM CHLORIDE: 9 INJECTION, SOLUTION INTRAVENOUS at 07:25

## 2024-07-31 RX ADMIN — PROPOFOL 50 MG: 10 INJECTION, EMULSION INTRAVENOUS at 07:35

## 2024-07-31 NOTE — OP NOTE
[facility]   Endoscopic Procedure Note        NAME:  Ce Roberts   :   1970   MRN:   [id number]     Date/Time:  2024 7:44 AM    Esophagogastroduodenoscopy (EGD) Procedure Note    Preoperative Diagnosis: Dysphagia, unspecified type [R13.10]  Postoperative Diagnosis: Marginal ulcer, EG stricture    Surgeon:  Kevin Bolaños MD    Staff: Circulator: David Mcneal RN  Endoscopy Technician: Beatrice Hilton     Implants: none    Anethesia/Sedation:  MAC anesthesia Propofol    Procedure Details     After infom consent was obtained for the procedure, with all risks and benefits of procedure explained the patient was taken to the endoscopy suite and placed in the left lateral decubitus position.  Following sequential administration of sedation as per above, the GIF-H190 gastroscope was inserted into the mouth and advanced under direct vision to proximal jejunum.  A careful inspection was made as the gastroscope was withdrawn; findings and interventions are described below.      Findings:  Esophagus: no irrigation, mild distal esophageal stricture, GEJ at 39 cm, no hiatal hernia  Stomach: 5 cm pouch, GJ appropriate 18 mm size with 3 mm marginal ulcers around most of anastomosis  Duodenum/jejunum: Normal       Therapies: CRE balloon dilation of distal esophagus    Specimens: None           EBL: Minimal    Complications:   None; patient tolerated the procedure well.           Impression:    See Postoperative diagnosis above    Recommendations:  Start BID PPI  Carfate  Levsin for spasms  Follow up in one week    Discharge disposition:  Home in the company of  when able to ambulate    Kevin Bolaños MD, [facility], [facility]  Bariatric and General Surgeon  [facility]

## 2024-07-31 NOTE — INTERVAL H&P NOTE
Update History & Physical    The patient's History and Physical of 7/30/24 was reviewed with the patient and I examined the patient. There was no change. The surgical site was confirmed by the patient and me.     Plan: The risks, benefits, expected outcome, and alternative to the recommended procedure have been discussed with the patient. Patient understands and wants to proceed with the procedure.     Electronically signed by Kevin Bolaños MD on 7/31/2024 at 7:23 AM

## 2024-07-31 NOTE — ANESTHESIA POSTPROCEDURE EVALUATION
Department of Anesthesiology  Postprocedure Note    Patient: Ce Roberts  MRN: [id number]  YOB: 1970  Date of evaluation: 7/31/2024    Procedure Summary       Date: 07/31/24 Room / Location: [facility] ENDO 03 / [facility] ENDOSCOPY    Anesthesia Start: 0724 Anesthesia Stop: 0745    Procedure: ESOPHAGOGASTRODUODENOSCOPY Diagnosis:       Dysphagia, unspecified type      (Dysphagia, unspecified type [R13.10])    Surgeons: Kevin Bolaños MD Responsible Provider: Heriberto Dennison MD    Anesthesia Type: MAC ASA Status: 3            Anesthesia Type: MAC    Shayna Phase I: Shayna Score: 10    Shayna Phase II:      Anesthesia Post Evaluation    Patient location during evaluation: bedside  Nausea & Vomiting: no nausea  Cardiovascular status: blood pressure returned to baseline  Respiratory status: acceptable  Hydration status: euvolemic    No notable events documented.

## 2024-07-31 NOTE — ANESTHESIA PRE PROCEDURE
2024 Dayan Chua PA [facility]       Social History:    Social History     Tobacco Use   • Smoking status: Former     Current packs/day: 0.00     Average packs/day: 1 pack/day for 32.1 years (32.1 ttl pk-yrs)     Types: Cigarettes     Start date:      Quit date: 2024     Years since quittin.4   • Smokeless tobacco: Never   Substance Use Topics   • Alcohol use: Not Currently                                Counseling given: Not Answered      Vital Signs (Current):   Vitals:    24 0703   BP: (!) 127/92   Pulse: 97   Resp: 13   Temp: 97.9 °F (36.6 °C)   TempSrc: Temporal   SpO2: 97%   Weight: 86.6 kg (191 lb)   Height: 1.6 m (5' 3\")                                              BP Readings from Last 3 Encounters:   24 (!) 127/92   24 115/84   24 111/78       NPO Status: Time of last liquid consumption:                         Time of last solid consumption:                         Date of last liquid consumption: 24                        Date of last solid food consumption: 24    BMI:   Wt Readings from Last 3 Encounters:   24 86.6 kg (191 lb)   24 86.2 kg (190 lb)   24 86.2 kg (190 lb)     Body mass index is 33.83 kg/m².    CBC:   Lab Results   Component Value Date/Time    WBC 10.6 2024 02:02 PM    RBC 4.82 2024 02:02 PM    HGB 13.9 2024 02:02 PM    HCT 43.8 2024 02:02 PM    MCV 90.9 2024 02:02 PM    RDW 15.4 2024 02:02 PM     2024 02:02 PM       CMP:   Lab Results   Component Value Date/Time     2024 02:02 PM    K 3.6 2024 02:02 PM     2024 02:02 PM    CO2 26 2024 02:02 PM    BUN 9 2024 02:02 PM    CREATININE 0.61 2024 02:02 PM    GFRAA >60 2022 07:07 PM    AGRATIO 0.8 2023 11:23 PM    LABGLOM >90 2024 02:02 PM    LABGLOM >60 2024 02:17 PM    LABGLOM >60 2023 11:23 PM    GLUCOSE 108 2024 02:02 PM    CALCIUM

## 2024-07-31 NOTE — PROGRESS NOTES
Verified patient name and date of birth, scheduled procedure, and informed consent. Reviewed general discharge instructions and  information.  Assessed patient. Awake, alert, and oriented per baseline. Vital signs stable (see vital sign flowsheet). Respiratory status within defined limits, abdomen soft and non tender. Skin with in defined limits.     Initial RN admission and assessment performed and documented in Endoscopy navigator.     Patient evaluated by anesthesia in pre-procedure holding.     All procedural vital signs, airway assessment, and level of consciousness information monitored and recorded by anesthesia staff on the anesthesia record.     Report received from CRNA post procedure.  Patient transported to recovery area by RN.    Endoscope was pre-cleaned at bedside immediately following procedure by Yuri.

## 2024-07-31 NOTE — DISCHARGE INSTRUCTIONS
Discharge Instructions for Endoscopy Patients       Diagnosis: Marginal ulcers      Plan: PPI BID, carafate, levsin      Do not drive or operate machinery while taking sedating or narcotic medications.     Some discomfort is expected in your throat or upper abdomen. Take tylenol as needed.    If an acid monitoring device was deployed, please follow the instructions for recording and returning the device.    You may walk as desired and go up and down stairs as needed. Walking is encouraged.    You may shower like normal    You may resume regular diet.    Follow up with provider as scheduled.    If you experience fever (greater than 101.5), chills, vomiting or redness or drainage at surgical site, please contact your surgeon’s office.    If you have further questions or concerns, please call your surgeon’s office at [phone].

## 2024-07-31 NOTE — TELEPHONE ENCOUNTER
Patients daughter called and stated the nausea medication that was called in to pharmacy is not being covered by insurance and they either need an alternative medication or something sent over for insurance to cover it.

## 2024-07-31 NOTE — TELEPHONE ENCOUNTER
Spoke with pt. 2 pt. Identifiers verified. She stated she was referring to the Levsin that was prescribed on yesterday. Her insurance will not cover it. She stated that she would pay for the 30 day supply because she needs the medication. I informed her that I would also work on prior authorization which she thanked me for.

## 2024-08-14 ENCOUNTER — TELEPHONE (OUTPATIENT)
Age: 54
End: 2024-08-14

## 2024-08-14 ENCOUNTER — TELEMEDICINE (OUTPATIENT)
Age: 54
End: 2024-08-14

## 2024-08-14 VITALS — BODY MASS INDEX: 32.78 KG/M2 | WEIGHT: 185 LBS | HEIGHT: 63 IN

## 2024-08-14 DIAGNOSIS — Z98.890 POST-OPERATIVE STATE: Primary | ICD-10-CM

## 2024-08-14 PROCEDURE — 99024 POSTOP FOLLOW-UP VISIT: CPT | Performed by: SURGERY

## 2024-08-14 ASSESSMENT — PATIENT HEALTH QUESTIONNAIRE - PHQ9
SUM OF ALL RESPONSES TO PHQ9 QUESTIONS 1 & 2: 2
SUM OF ALL RESPONSES TO PHQ QUESTIONS 1-9: 2
SUM OF ALL RESPONSES TO PHQ QUESTIONS 1-9: 2
1. LITTLE INTEREST OR PLEASURE IN DOING THINGS: SEVERAL DAYS
SUM OF ALL RESPONSES TO PHQ QUESTIONS 1-9: 2
2. FEELING DOWN, DEPRESSED OR HOPELESS: SEVERAL DAYS
SUM OF ALL RESPONSES TO PHQ QUESTIONS 1-9: 2

## 2024-08-14 ASSESSMENT — PAIN SCALES - GENERAL: PAINLEVEL_OUTOF10: 5

## 2024-08-14 ASSESSMENT — PAIN DESCRIPTION - LOCATION: LOCATION: ABDOMEN

## 2024-08-14 NOTE — PROGRESS NOTES
Surgery Progress Note    2024    CC: Postoperative state    Subjective:     TELEHEALTH EVALUATION -- Audio/Visual    HPI:    Ce Roberts (:  1970) has requested an audio/video evaluation for the following concern(s):    Patient underwent endoscopy last week and found to have marginal ulcers that were small but no stricture.  She has been taking the PPI twice a day.  Still with significant aversion to all meat.  Staying well-hydrated.  Taking and babyfood consistency.  About 30 g of protein a day.  Not much energy.    Ce Roberts, was evaluated through a synchronous (real-time) audio-video encounter. The patient (or guardian if applicable) is aware that this is a billable service, which includes applicable co-pays. This Virtual Visit was conducted with patient's (and/or legal guardian's) consent. Patient identification was verified, and a caregiver was present when appropriate.   The patient was located at Home: [address] [place] [address].  [facility] [address]  Provider was located at Facility (Appt Dept): [address] [place] [address] [address]  [place] [address]  Confirm you are appropriately licensed, registered, or certified to deliver care in the [facility] where the patient is located as indicated above. If you are not or unsure, please re-schedule the visit: Yes, I confirm.       Constitutional: No fever or chills  Neurologic: No headache  Eyes: No scleral icterus or irritated eyes  Nose: No nasal pain or drainage  Mouth: No oral lesions or sore throat  Cardiac: No palpations or chest pain  Pulmonary: No cough or shortness of breath  Gastrointestinal: No nausea, emesis, diarrhea, or constipation  Genitourinary: No dysuria  Musculoskeletal: No muscle or joint tenderness  Skin: No rashes or lesions  Psychiatric: No anxiety or depressed mood    Objective:   There were no vitals filed for this visit.  Body mass index is 32.77 kg/m².  Wt 185 lbs    General: No acute distress, conversant      Assessment:

## 2024-08-14 NOTE — PROGRESS NOTES
Identified patient with two patient identifiers (name and ). Reviewed chart in preparation for visit and have obtained necessary documentation.    Ce Roberts is a 54 y.o. female  Chief Complaint   Patient presents with    Post-Op Check     2 WEEK S/P EGD, 8 WEEKS S/P ROBOTIC LADI-EN-Y GASTRIC BYPASS, ESOPHAGOGASTRODUODENOSCOPY (ERAS)     Ht 1.6 m (5' 3\")   Wt 83.9 kg (185 lb)   BMI 32.77 kg/m²     1. Have you been to the ER, urgent care clinic since your last visit?  Hospitalized since your last visit?no    2. Have you seen or consulted any other health care providers outside of the [facility] since your last visit?  Include any pap smears or colon screening. no

## 2024-08-14 NOTE — TELEPHONE ENCOUNTER
Attempted to call patient to schedule for a post-op nutrition visit and a 2-week follow-up with Urmila burks per Miky, can be VV. LVM to return call.

## 2024-08-15 ENCOUNTER — HOSPITAL ENCOUNTER (EMERGENCY)
Facility: HOSPITAL | Age: 54
Discharge: HOME | End: 2024-08-15
Payer: MEDICARE

## 2024-08-15 ENCOUNTER — APPOINTMENT (OUTPATIENT)
Facility: HOSPITAL | Age: 54
End: 2024-08-15
Payer: MEDICARE

## 2024-08-15 VITALS
RESPIRATION RATE: 18 BRPM | OXYGEN SATURATION: 98 % | HEIGHT: 63 IN | HEART RATE: 87 BPM | WEIGHT: 183.6 LBS | DIASTOLIC BLOOD PRESSURE: 85 MMHG | BODY MASS INDEX: 32.53 KG/M2 | SYSTOLIC BLOOD PRESSURE: 123 MMHG | TEMPERATURE: 98.3 F

## 2024-08-15 DIAGNOSIS — K59.00 CONSTIPATION, UNSPECIFIED CONSTIPATION TYPE: ICD-10-CM

## 2024-08-15 DIAGNOSIS — R10.9 POSTOPERATIVE ABDOMINAL PAIN: Primary | ICD-10-CM

## 2024-08-15 DIAGNOSIS — G89.18 POSTOPERATIVE ABDOMINAL PAIN: Primary | ICD-10-CM

## 2024-08-15 DIAGNOSIS — R19.7 NAUSEA VOMITING AND DIARRHEA: ICD-10-CM

## 2024-08-15 DIAGNOSIS — R11.2 NAUSEA VOMITING AND DIARRHEA: ICD-10-CM

## 2024-08-15 LAB
ALBUMIN SERPL-MCNC: 2.5 G/DL (ref 3.5–5)
ALBUMIN/GLOB SERPL: 0.7 (ref 1.1–2.2)
ALP SERPL-CCNC: 90 U/L (ref 45–117)
ALT SERPL-CCNC: 53 U/L (ref 12–78)
ANION GAP SERPL CALC-SCNC: 8 MMOL/L (ref 5–15)
APPEARANCE UR: CLEAR
AST SERPL-CCNC: 78 U/L (ref 15–37)
BACTERIA URNS QL MICRO: NEGATIVE /HPF
BASOPHILS # BLD: 0 K/UL (ref 0–0.1)
BASOPHILS NFR BLD: 1 % (ref 0–1)
BILIRUB SERPL-MCNC: 0.3 MG/DL (ref 0.2–1)
BILIRUB UR QL: NEGATIVE
BUN SERPL-MCNC: 7 MG/DL (ref 6–20)
BUN/CREAT SERPL: 9 (ref 12–20)
CALCIUM SERPL-MCNC: 8.1 MG/DL (ref 8.5–10.1)
CHLORIDE SERPL-SCNC: 103 MMOL/L (ref 97–108)
CO2 SERPL-SCNC: 28 MMOL/L (ref 21–32)
COLOR UR: ABNORMAL
CREAT SERPL-MCNC: 0.78 MG/DL (ref 0.55–1.02)
DIFFERENTIAL METHOD BLD: ABNORMAL
EOSINOPHIL # BLD: 0 K/UL (ref 0–0.4)
EOSINOPHIL NFR BLD: 0 % (ref 0–7)
EPITH CASTS URNS QL MICRO: ABNORMAL /LPF
ERYTHROCYTE [DISTWIDTH] IN BLOOD BY AUTOMATED COUNT: 15.3 % (ref 11.5–14.5)
GLOBULIN SER CALC-MCNC: 3.7 G/DL (ref 2–4)
GLUCOSE SERPL-MCNC: 86 MG/DL (ref 65–100)
GLUCOSE UR STRIP.AUTO-MCNC: NEGATIVE MG/DL
HCT VFR BLD AUTO: 42.8 % (ref 35–47)
HGB BLD-MCNC: 13.8 G/DL (ref 11.5–16)
HGB UR QL STRIP: NEGATIVE
IMM GRANULOCYTES # BLD AUTO: 0 K/UL (ref 0–0.04)
IMM GRANULOCYTES NFR BLD AUTO: 0 % (ref 0–0.5)
KETONES UR QL STRIP.AUTO: 15 MG/DL
LEUKOCYTE ESTERASE UR QL STRIP.AUTO: NEGATIVE
LIPASE SERPL-CCNC: 27 U/L (ref 13–75)
LYMPHOCYTES # BLD: 2.7 K/UL (ref 0.8–3.5)
LYMPHOCYTES NFR BLD: 46 % (ref 12–49)
MAGNESIUM SERPL-MCNC: 1.8 MG/DL (ref 1.6–2.4)
MCH RBC QN AUTO: 28.5 PG (ref 26–34)
MCHC RBC AUTO-ENTMCNC: 32.2 G/DL (ref 30–36.5)
MCV RBC AUTO: 88.4 FL (ref 80–99)
MONOCYTES # BLD: 0.5 K/UL (ref 0–1)
MONOCYTES NFR BLD: 9 % (ref 5–13)
NEUTS SEG # BLD: 2.6 K/UL (ref 1.8–8)
NEUTS SEG NFR BLD: 44 % (ref 32–75)
NITRITE UR QL STRIP.AUTO: NEGATIVE
NRBC # BLD: 0 K/UL (ref 0–0.01)
NRBC BLD-RTO: 0 PER 100 WBC
PH UR STRIP: 7 (ref 5–8)
PLATELET # BLD AUTO: 235 K/UL (ref 150–400)
PMV BLD AUTO: 11.6 FL (ref 8.9–12.9)
POTASSIUM SERPL-SCNC: 3.4 MMOL/L (ref 3.5–5.1)
PROT SERPL-MCNC: 6.2 G/DL (ref 6.4–8.2)
PROT UR STRIP-MCNC: NEGATIVE MG/DL
RBC # BLD AUTO: 4.84 M/UL (ref 3.8–5.2)
RBC #/AREA URNS HPF: ABNORMAL /HPF (ref 0–5)
SODIUM SERPL-SCNC: 139 MMOL/L (ref 136–145)
SP GR UR REFRACTOMETRY: 1.02
URINE CULTURE IF INDICATED: ABNORMAL
UROBILINOGEN UR QL STRIP.AUTO: 1 EU/DL (ref 0.2–1)
WBC # BLD AUTO: 5.8 K/UL (ref 3.6–11)
WBC URNS QL MICRO: ABNORMAL /HPF (ref 0–4)

## 2024-08-15 PROCEDURE — 36415 COLL VENOUS BLD VENIPUNCTURE: CPT

## 2024-08-15 PROCEDURE — 6360000004 HC RX CONTRAST MEDICATION

## 2024-08-15 PROCEDURE — 81001 URINALYSIS AUTO W/SCOPE: CPT

## 2024-08-15 PROCEDURE — 80053 COMPREHEN METABOLIC PANEL: CPT

## 2024-08-15 PROCEDURE — 71046 X-RAY EXAM CHEST 2 VIEWS: CPT

## 2024-08-15 PROCEDURE — 96374 THER/PROPH/DIAG INJ IV PUSH: CPT

## 2024-08-15 PROCEDURE — 83690 ASSAY OF LIPASE: CPT

## 2024-08-15 PROCEDURE — 2580000003 HC RX 258

## 2024-08-15 PROCEDURE — 74177 CT ABD & PELVIS W/CONTRAST: CPT

## 2024-08-15 PROCEDURE — 99285 EMERGENCY DEPT VISIT HI MDM: CPT

## 2024-08-15 PROCEDURE — 6360000002 HC RX W HCPCS

## 2024-08-15 PROCEDURE — 6370000000 HC RX 637 (ALT 250 FOR IP)

## 2024-08-15 PROCEDURE — 85025 COMPLETE CBC W/AUTO DIFF WBC: CPT

## 2024-08-15 PROCEDURE — 83735 ASSAY OF MAGNESIUM: CPT

## 2024-08-15 RX ORDER — ONDANSETRON HYDROCHLORIDE 8 MG/1
8 TABLET, FILM COATED ORAL EVERY 8 HOURS PRN
Qty: 30 TABLET | Refills: 1 | Status: SHIPPED | OUTPATIENT
Start: 2024-08-15 | End: 2024-08-19 | Stop reason: SDUPTHER

## 2024-08-15 RX ORDER — FENTANYL CITRATE 50 UG/ML
50 INJECTION, SOLUTION INTRAMUSCULAR; INTRAVENOUS ONCE
Status: COMPLETED | OUTPATIENT
Start: 2024-08-15 | End: 2024-08-15

## 2024-08-15 RX ORDER — ONDANSETRON 4 MG/1
8 TABLET, ORALLY DISINTEGRATING ORAL ONCE
Status: COMPLETED | OUTPATIENT
Start: 2024-08-15 | End: 2024-08-15

## 2024-08-15 RX ORDER — 0.9 % SODIUM CHLORIDE 0.9 %
1000 INTRAVENOUS SOLUTION INTRAVENOUS ONCE
Status: COMPLETED | OUTPATIENT
Start: 2024-08-15 | End: 2024-08-15

## 2024-08-15 RX ADMIN — SODIUM CHLORIDE 1000 ML: 9 INJECTION, SOLUTION INTRAVENOUS at 18:49

## 2024-08-15 RX ADMIN — ONDANSETRON 8 MG: 4 TABLET, ORALLY DISINTEGRATING ORAL at 19:30

## 2024-08-15 RX ADMIN — IOPAMIDOL 100 ML: 755 INJECTION, SOLUTION INTRAVENOUS at 19:48

## 2024-08-15 RX ADMIN — FENTANYL CITRATE 50 MCG: 50 INJECTION, SOLUTION INTRAMUSCULAR; INTRAVENOUS at 19:31

## 2024-08-15 ASSESSMENT — PAIN DESCRIPTION - LOCATION
LOCATION: ABDOMEN

## 2024-08-15 ASSESSMENT — PAIN SCALES - GENERAL
PAINLEVEL_OUTOF10: 10
PAINLEVEL_OUTOF10: 5
PAINLEVEL_OUTOF10: 10

## 2024-08-15 ASSESSMENT — PAIN DESCRIPTION - ORIENTATION
ORIENTATION: RIGHT;LEFT;UPPER
ORIENTATION: RIGHT;LEFT;UPPER

## 2024-08-15 ASSESSMENT — PAIN DESCRIPTION - FREQUENCY: FREQUENCY: CONTINUOUS

## 2024-08-15 ASSESSMENT — PAIN DESCRIPTION - PAIN TYPE: TYPE: SURGICAL PAIN

## 2024-08-15 ASSESSMENT — PAIN DESCRIPTION - DESCRIPTORS
DESCRIPTORS: ACHING
DESCRIPTORS: ACHING

## 2024-08-15 ASSESSMENT — PAIN - FUNCTIONAL ASSESSMENT: PAIN_FUNCTIONAL_ASSESSMENT: 0-10

## 2024-08-15 NOTE — ED NOTES
Pt presents to ED via wheelchair complaining of diffuse abdominal pain, n/v/d, HA, body aches since receiving bariatric and colon surgery. Pt reports sx have worsened since yesterday. Pt had bariatric surgery on 6/17/24 at [facility] with complications of \"bowel rupture\" per patient. Pt had colon surgery to correct the problem on 6/24/24. Pt called surgeon yesterday but he told her \"We're just going to keep an eye on things.\" Pt is alert and oriented x 4, RR even and unlabored, skin is warm and dry. Pt appears in NAD at this time. Assessment completed and pt updated on plan of care.  Call bell in reach.  Emergency Department Nursing Plan of Care  The Nursing Plan of Care is developed from the Nursing assessment and Emergency Department Attending provider initial evaluation.  The plan of care may be reviewed in the “ED Provider note”.  The Plan of Care was developed with the following considerations:  Patient / Family readiness to learn indicated by:Refer to Medical chart in [facility]  Persons(s) to be included in education: Refer to Medical chart in [facility]  Barriers to Learning/Limitations:Normal

## 2024-08-15 NOTE — ED PROVIDER NOTES
08/15/2024 10:28:48 PM  Condition at Disposition: Stable      Discharge Note: The patient is stable for discharge home. The signs, symptoms, diagnosis, and discharge instructions have been discussed, understanding conveyed, and agreed upon. The patient is to follow up as recommended or return to ER should their symptoms worsen.      PATIENT REFERRED TO:  Ulisses Joy MD  [address]  [facility] [address] [phone]    In 3 days      Kevin Bolaños MD  [address]  [facility] [address] [phone]    In 3 days      [facility] EMERGENCY DEPT  [address] [facility] [id number] [phone]    If symptoms worsen       DISCHARGE MEDICATIONS:     Medication List        CONTINUE taking these medications      ondansetron 8 MG tablet  Commonly known as: ZOFRAN  Take 1 tablet by mouth every 8 hours as needed for Nausea or Vomiting            ASK your doctor about these medications      albuterol sulfate  (90 Base) MCG/ACT inhaler  Commonly known as: Ventolin HFA  Inhale 2 puffs into the lungs 4 times daily as needed for Wheezing     ALPRAZolam 0.5 MG tablet  Commonly known as: XANAX  Take 1 tablet by mouth daily as needed for Anxiety.     amitriptyline 50 MG tablet  Commonly known as: ELAVIL  Take 1 tablet by mouth nightly     gabapentin 600 MG tablet  Commonly known as: NEURONTIN  Take 1 tablet by mouth 2 times daily. Max Daily Amount: 1,200 mg     hyoscyamine 125 MCG sublingual tablet  Commonly known as: Levsin/SL  Place 1 tablet under the tongue 3 times daily (before meals)     omeprazole 40 MG delayed release capsule  Commonly known as: PRILOSEC  Take 1 capsule by mouth in the morning and at bedtime Open capsule over food and consume food.     polyethylene glycol 17 GM/SCOOP powder  Commonly known as: GLYCOLAX  Take 17 g by mouth daily     sucralfate 1 GM tablet  Commonly known as: Carafate  Take 1 tablet by mouth 4 times daily Mix tablet with a little water to make a thick slurry

## 2024-08-15 NOTE — ED TRIAGE NOTES
Pt presents via wheelchair to the ED c/o diffuse abdominal pain, n/v/d, HA, body aches since receiving bariatric and colon surgery. Pt reports symptoms have worsened since yesterday.    Pt had Bariatric surgery on 6/17/24 at [facility] with complications of \"bowel rupture\" per patient. Pt had colon surgery to correct problem on 6/24/24.     Pt called surgeon yesterday but he told her \"We're just going to keep an eye on things.\"

## 2024-08-15 NOTE — ED NOTES
Bedside and Verbal shift change report given to BLAKE Acosta (oncoming nurse) by BLAKE Keita (offgoing nurse). Report included the following information Nurse Handoff Report, ED Encounter Summary, ED SBAR, Intake/Output, MAR, and Recent Results.

## 2024-08-16 ENCOUNTER — TELEPHONE (OUTPATIENT)
Age: 54
End: 2024-08-16

## 2024-08-16 NOTE — DISCHARGE INSTRUCTIONS
EXAM: CT ABDOMEN PELVIS W IV CONTRAST     INDICATION: Abdominal pain, Post op multiple bowel surgery     COMPARISON: CT abdomen/pelvis June 28, 2024     TECHNIQUE:  Following the uneventful intravenous administration of contrast, thin axial  images were obtained through the abdomen and pelvis. Coronal and sagittal  reconstructions were generated. CT dose reduction was achieved through use of a  standardized protocol tailored for this examination and automatic exposure  control for dose modulation.     CONTRAST: 100 mL of Isovue-370.     ORAL CONTRAST: None     FINDINGS:     VISUALIZED THORAX: No acute abnormality.  LIVER:Normal size. Smooth surface contour. Diffusely decreased attenuation. No  focal lesions.  GALLBLADDER: Surgically absent.   BILE DUCTS: No biliary ductal dilation.  SPLEEN: Normal size. No focal lesions.  PANCREAS: No masses.No ductal dilation.  ADRENALS: Within normal limits.   KIDNEYS AND URETERS: No masses. No hydronephrosis or hydroureter. No calculi.  BLADDER: No wall thickening.  STOMACH: Postsurgical change of gastric bypass.  SMALL BOWEL: Postsurgical change of partial small bowel resection with  anastomosis in the left lower quadrant.  LARGE BOWEL: Postsurgical change of right hemicolectomy. Appendix is surgically  absent.  VESSELS: Abdominal aorta is normal in caliber.  LYMPH NODES: No morphologically abnormal lymph nodes.  PERITONEUM/RETROPERITONEUM: No pneumoperitoneum. The fluid previously present in  the subhepatic space has resolved.  REPRODUCTIVE ORGANS: Within normal limits.  ABDOMINAL WALL: Anterior vertically oriented surgical scar.  BONES: No aggressive osseous lesions.      IMPRESSION:  No findings of an acute process in the abdomen or pelvis. The previous fluid  collection in the subhepatic space has resolved.

## 2024-08-16 NOTE — TELEPHONE ENCOUNTER
Patient called stated Dr. Bolaños did her colon surgery and bariatric surgery. Patient stated she has no idea what medication to take. Patient stated her nose is stuffed up and the Robitussin she took last night put her in the emergency room. Patient wants a call back from Dr. Bolaños or his nurse in regards to safe medication.

## 2024-08-16 NOTE — TELEPHONE ENCOUNTER
I left a message informing the patient that I had replied with Dr. Parks response to her PST [employer] message. He is recommending Mucinex.

## 2024-08-18 ENCOUNTER — PATIENT MESSAGE (OUTPATIENT)
Age: 54
End: 2024-08-18

## 2024-08-18 DIAGNOSIS — K59.00 CONSTIPATION, UNSPECIFIED CONSTIPATION TYPE: ICD-10-CM

## 2024-08-19 RX ORDER — ONDANSETRON HYDROCHLORIDE 8 MG/1
8 TABLET, FILM COATED ORAL EVERY 8 HOURS PRN
Qty: 30 TABLET | Refills: 1 | Status: SHIPPED | OUTPATIENT
Start: 2024-08-19

## 2024-08-20 ENCOUNTER — PATIENT MESSAGE (OUTPATIENT)
Age: 54
End: 2024-08-20

## 2024-08-20 RX ORDER — HYDROCHLOROTHIAZIDE 25 MG/1
25 TABLET ORAL DAILY
Qty: 100 TABLET | Refills: 2 | Status: SHIPPED | OUTPATIENT
Start: 2024-08-20

## 2024-08-22 DIAGNOSIS — R11.0 NAUSEA: Primary | ICD-10-CM

## 2024-08-22 RX ORDER — ONDANSETRON 4 MG/1
8 TABLET, ORALLY DISINTEGRATING ORAL EVERY 6 HOURS PRN
Qty: 50 TABLET | Refills: 1 | Status: SHIPPED | OUTPATIENT
Start: 2024-08-22

## 2024-08-29 DIAGNOSIS — M51.36 DDD (DEGENERATIVE DISC DISEASE), LUMBAR: ICD-10-CM

## 2024-09-03 RX ORDER — GABAPENTIN 600 MG/1
TABLET ORAL
Qty: 180 TABLET | Refills: 3 | Status: SHIPPED | OUTPATIENT
Start: 2024-09-03 | End: 2025-09-03

## 2024-09-12 NOTE — PLAN OF CARE
Problem: Occupational Therapy - Adult  Goal: By Discharge: Performs self-care activities at highest level of function for planned discharge setting.  See evaluation for individualized goals.  Description: FUNCTIONAL STATUS PRIOR TO ADMISSION:  Patient was INDP with ADLs and IADLs, still drives. Does not work.Hx of falls in the past and hx of L RTC repair, reports R RTC is currently injured limiting ROM.     Receives Help From: Family, Friend(s), ADL Assistance: Independent  Assistance: Independent, Ambulation Assistance: Independent, Transfer Assistance: Independent, Active : Yes     HOME SUPPORT: Patient lived with spouse. Has supportive family/friends that live locally.    Occupational Therapy Goals:  Initiated 6/24/2024  1.  Patient will perform standing grooming with Minimal Assist within 7 day(s).  2.  Patient will perform seated UB bathing with Minimal Assist within 7 day(s).  3.  Patient will perform lower body dressing with Moderate Assist and use of LRAD within 7 day(s).  4.  Patient will perform toilet transfers with Moderate Assist and Assist x1  within 7 day(s).  5.  Patient will perform all aspects of toileting with Moderate Assist within 7 day(s).  6.  Patient will participate in upper extremity therapeutic exercise/activities with Minimal Assist for 3 minutes within 7 day(s).    7.  Patient will utilize energy conservation techniques during functional activities with verbal cues within 7 day(s).   Outcome: Progressing   OCCUPATIONAL THERAPY TREATMENT  Patient: Ce Roberts (54 y.o. female)  Date: 6/28/2024  Primary Diagnosis: Obesity, unspecified [E66.9]  Morbid obesity (HCC) [E66.01]  Ischemic colon (HCC) [K55.9]  Procedure(s) (LRB):  EXPLORATORY LAPAROTOMY, RIGHT HEMICOLECTOMY (N/A) 6 Days Post-Op   Precautions: Fall Risk                Chart, occupational therapy assessment, plan of care, and goals were reviewed.    ASSESSMENT  Patient continues to benefit from skilled OT services and is  n/a

## 2024-09-14 ENCOUNTER — HOSPITAL ENCOUNTER (EMERGENCY)
Facility: HOSPITAL | Age: 54
Discharge: HOME | End: 2024-09-14
Attending: STUDENT IN AN ORGANIZED HEALTH CARE EDUCATION/TRAINING PROGRAM
Payer: MEDICARE

## 2024-09-14 VITALS
DIASTOLIC BLOOD PRESSURE: 84 MMHG | RESPIRATION RATE: 17 BRPM | TEMPERATURE: 97.7 F | OXYGEN SATURATION: 97 % | BODY MASS INDEX: 31.18 KG/M2 | SYSTOLIC BLOOD PRESSURE: 122 MMHG | HEIGHT: 63 IN | HEART RATE: 94 BPM | WEIGHT: 176 LBS

## 2024-09-14 DIAGNOSIS — R11.2 NAUSEA AND VOMITING, UNSPECIFIED VOMITING TYPE: Primary | ICD-10-CM

## 2024-09-14 DIAGNOSIS — E87.6 HYPOKALEMIA: ICD-10-CM

## 2024-09-14 LAB
ALBUMIN SERPL-MCNC: 2.6 G/DL (ref 3.5–5)
ALBUMIN/GLOB SERPL: 0.7 (ref 1.1–2.2)
ALP SERPL-CCNC: 94 U/L (ref 45–117)
ALT SERPL-CCNC: 48 U/L (ref 12–78)
AMPHET UR QL SCN: NEGATIVE
ANION GAP SERPL CALC-SCNC: 7 MMOL/L (ref 2–12)
APPEARANCE UR: CLEAR
AST SERPL-CCNC: 75 U/L (ref 15–37)
BACTERIA URNS QL MICRO: ABNORMAL /HPF
BARBITURATES UR QL SCN: NEGATIVE
BASOPHILS # BLD: 0.1 K/UL (ref 0–0.1)
BASOPHILS NFR BLD: 1 % (ref 0–1)
BENZODIAZ UR QL: NEGATIVE
BILIRUB SERPL-MCNC: 0.7 MG/DL (ref 0.2–1)
BILIRUB UR QL CFM: NEGATIVE
BUN SERPL-MCNC: 8 MG/DL (ref 6–20)
BUN/CREAT SERPL: 11 (ref 12–20)
CALCIUM SERPL-MCNC: 8.7 MG/DL (ref 8.5–10.1)
CANNABINOIDS UR QL SCN: NEGATIVE
CAOX CRY URNS QL MICRO: ABNORMAL
CHLORIDE SERPL-SCNC: 108 MMOL/L (ref 97–108)
CO2 SERPL-SCNC: 25 MMOL/L (ref 21–32)
COCAINE UR QL SCN: NEGATIVE
COLOR UR: YELLOW
CREAT SERPL-MCNC: 0.7 MG/DL (ref 0.55–1.02)
DIFFERENTIAL METHOD BLD: ABNORMAL
EOSINOPHIL # BLD: 0.1 K/UL (ref 0–0.4)
EOSINOPHIL NFR BLD: 1 % (ref 0–7)
EPITH CASTS URNS QL MICRO: ABNORMAL /LPF
ERYTHROCYTE [DISTWIDTH] IN BLOOD BY AUTOMATED COUNT: 15.9 % (ref 11.5–14.5)
GLOBULIN SER CALC-MCNC: 3.7 G/DL (ref 2–4)
GLUCOSE SERPL-MCNC: 99 MG/DL (ref 65–100)
GLUCOSE UR STRIP.AUTO-MCNC: NEGATIVE MG/DL
HCT VFR BLD AUTO: 43.2 % (ref 35–47)
HGB BLD-MCNC: 14.3 G/DL (ref 11.5–16)
HGB UR QL STRIP: NEGATIVE
IMM GRANULOCYTES # BLD AUTO: 0 K/UL (ref 0–0.04)
IMM GRANULOCYTES NFR BLD AUTO: 0 % (ref 0–0.5)
KETONES UR QL STRIP.AUTO: 40 MG/DL
LEUKOCYTE ESTERASE UR QL STRIP.AUTO: ABNORMAL
LIPASE SERPL-CCNC: 21 U/L (ref 13–75)
LYMPHOCYTES # BLD: 3.6 K/UL (ref 0.8–3.5)
LYMPHOCYTES NFR BLD: 33 % (ref 12–49)
Lab: NORMAL
MAGNESIUM SERPL-MCNC: 1.8 MG/DL (ref 1.6–2.4)
MCH RBC QN AUTO: 29 PG (ref 26–34)
MCHC RBC AUTO-ENTMCNC: 33.1 G/DL (ref 30–36.5)
MCV RBC AUTO: 87.6 FL (ref 80–99)
METHADONE UR QL: NEGATIVE
MONOCYTES # BLD: 0.5 K/UL (ref 0–1)
MONOCYTES NFR BLD: 5 % (ref 5–13)
NEUTS SEG # BLD: 6.4 K/UL (ref 1.8–8)
NEUTS SEG NFR BLD: 60 % (ref 32–75)
NITRITE UR QL STRIP.AUTO: NEGATIVE
NRBC # BLD: 0 K/UL (ref 0–0.01)
NRBC BLD-RTO: 0 PER 100 WBC
OPIATES UR QL: NEGATIVE
PCP UR QL: NEGATIVE
PH UR STRIP: 6 (ref 5–8)
PLATELET # BLD AUTO: 279 K/UL (ref 150–400)
PMV BLD AUTO: 12.1 FL (ref 8.9–12.9)
POTASSIUM SERPL-SCNC: 3 MMOL/L (ref 3.5–5.1)
PROT SERPL-MCNC: 6.3 G/DL (ref 6.4–8.2)
PROT UR STRIP-MCNC: 30 MG/DL
RBC # BLD AUTO: 4.93 M/UL (ref 3.8–5.2)
RBC #/AREA URNS HPF: ABNORMAL /HPF (ref 0–5)
SODIUM SERPL-SCNC: 140 MMOL/L (ref 136–145)
SP GR UR REFRACTOMETRY: 1.02 (ref 1–1.03)
URINE CULTURE IF INDICATED: ABNORMAL
UROBILINOGEN UR QL STRIP.AUTO: 2 EU/DL (ref 0.2–1)
WBC # BLD AUTO: 10.7 K/UL (ref 3.6–11)
WBC URNS QL MICRO: ABNORMAL /HPF (ref 0–4)

## 2024-09-14 PROCEDURE — 96375 TX/PRO/DX INJ NEW DRUG ADDON: CPT

## 2024-09-14 PROCEDURE — 81001 URINALYSIS AUTO W/SCOPE: CPT

## 2024-09-14 PROCEDURE — 80053 COMPREHEN METABOLIC PANEL: CPT

## 2024-09-14 PROCEDURE — 6370000000 HC RX 637 (ALT 250 FOR IP): Performed by: NURSE PRACTITIONER

## 2024-09-14 PROCEDURE — 2580000003 HC RX 258: Performed by: NURSE PRACTITIONER

## 2024-09-14 PROCEDURE — 6360000002 HC RX W HCPCS: Performed by: NURSE PRACTITIONER

## 2024-09-14 PROCEDURE — 36415 COLL VENOUS BLD VENIPUNCTURE: CPT

## 2024-09-14 PROCEDURE — 83690 ASSAY OF LIPASE: CPT

## 2024-09-14 PROCEDURE — 80307 DRUG TEST PRSMV CHEM ANLYZR: CPT

## 2024-09-14 PROCEDURE — 99284 EMERGENCY DEPT VISIT MOD MDM: CPT

## 2024-09-14 PROCEDURE — 85025 COMPLETE CBC W/AUTO DIFF WBC: CPT

## 2024-09-14 PROCEDURE — 96361 HYDRATE IV INFUSION ADD-ON: CPT

## 2024-09-14 PROCEDURE — 83735 ASSAY OF MAGNESIUM: CPT

## 2024-09-14 PROCEDURE — 96365 THER/PROPH/DIAG IV INF INIT: CPT

## 2024-09-14 RX ORDER — POTASSIUM CHLORIDE 750 MG/1
20 TABLET, EXTENDED RELEASE ORAL ONCE
Status: COMPLETED | OUTPATIENT
Start: 2024-09-14 | End: 2024-09-14

## 2024-09-14 RX ORDER — PROMETHAZINE HYDROCHLORIDE 25 MG/1
25 SUPPOSITORY RECTAL EVERY 6 HOURS PRN
Qty: 20 SUPPOSITORY | Refills: 0 | Status: SHIPPED | OUTPATIENT
Start: 2024-09-14 | End: 2024-09-21

## 2024-09-14 RX ORDER — DROPERIDOL 2.5 MG/ML
1.25 INJECTION, SOLUTION INTRAMUSCULAR; INTRAVENOUS ONCE
Status: COMPLETED | OUTPATIENT
Start: 2024-09-14 | End: 2024-09-14

## 2024-09-14 RX ORDER — POTASSIUM CHLORIDE 7.45 MG/ML
10 INJECTION INTRAVENOUS ONCE
Status: COMPLETED | OUTPATIENT
Start: 2024-09-14 | End: 2024-09-14

## 2024-09-14 RX ORDER — 0.9 % SODIUM CHLORIDE 0.9 %
1000 INTRAVENOUS SOLUTION INTRAVENOUS ONCE
Status: COMPLETED | OUTPATIENT
Start: 2024-09-14 | End: 2024-09-14

## 2024-09-14 RX ADMIN — POTASSIUM CHLORIDE 10 MEQ: 7.46 INJECTION, SOLUTION INTRAVENOUS at 18:07

## 2024-09-14 RX ADMIN — SODIUM CHLORIDE 1000 ML: 9 INJECTION, SOLUTION INTRAVENOUS at 17:00

## 2024-09-14 RX ADMIN — DROPERIDOL 1.25 MG: 2.5 INJECTION, SOLUTION INTRAMUSCULAR; INTRAVENOUS at 17:01

## 2024-09-14 RX ADMIN — POTASSIUM CHLORIDE 20 MEQ: 750 TABLET, EXTENDED RELEASE ORAL at 18:03

## 2024-09-14 ASSESSMENT — PAIN - FUNCTIONAL ASSESSMENT: PAIN_FUNCTIONAL_ASSESSMENT: 0-10

## 2024-09-14 ASSESSMENT — ENCOUNTER SYMPTOMS
VOMITING: 1
ABDOMINAL PAIN: 1
NAUSEA: 1

## 2024-09-14 ASSESSMENT — PAIN DESCRIPTION - DESCRIPTORS: DESCRIPTORS: SHARP

## 2024-09-14 ASSESSMENT — PAIN DESCRIPTION - ORIENTATION: ORIENTATION: MID

## 2024-09-14 ASSESSMENT — PAIN SCALES - GENERAL: PAINLEVEL_OUTOF10: 10

## 2024-09-14 ASSESSMENT — PAIN DESCRIPTION - LOCATION: LOCATION: ABDOMEN

## 2024-09-20 ENCOUNTER — CLINICAL DOCUMENTATION (OUTPATIENT)
Age: 54
End: 2024-09-20

## 2024-10-10 NOTE — TELEPHONE ENCOUNTER
Hua with [facility] stated that the pt was discharged from skilled nursing services because there was no skilled nursing needed.   Phone [phone] No

## 2024-10-14 RX ORDER — LOSARTAN POTASSIUM 50 MG/1
TABLET ORAL DAILY
Qty: 100 TABLET | Refills: 2 | Status: SHIPPED | OUTPATIENT
Start: 2024-10-14

## 2024-10-16 ENCOUNTER — TELEMEDICINE (OUTPATIENT)
Age: 54
End: 2024-10-16
Payer: MEDICARE

## 2024-10-16 DIAGNOSIS — M79.7 FIBROMYALGIA: ICD-10-CM

## 2024-10-16 DIAGNOSIS — R11.0 NAUSEA: Primary | ICD-10-CM

## 2024-10-16 PROCEDURE — G8417 CALC BMI ABV UP PARAM F/U: HCPCS | Performed by: FAMILY MEDICINE

## 2024-10-16 PROCEDURE — G8427 DOCREV CUR MEDS BY ELIG CLIN: HCPCS | Performed by: FAMILY MEDICINE

## 2024-10-16 PROCEDURE — 3017F COLORECTAL CA SCREEN DOC REV: CPT | Performed by: FAMILY MEDICINE

## 2024-10-16 PROCEDURE — 99214 OFFICE O/P EST MOD 30 MIN: CPT | Performed by: FAMILY MEDICINE

## 2024-10-16 PROCEDURE — G8484 FLU IMMUNIZE NO ADMIN: HCPCS | Performed by: FAMILY MEDICINE

## 2024-10-16 PROCEDURE — 1036F TOBACCO NON-USER: CPT | Performed by: FAMILY MEDICINE

## 2024-10-16 RX ORDER — ONDANSETRON 8 MG/1
8 TABLET, FILM COATED ORAL EVERY 8 HOURS PRN
Qty: 60 TABLET | Refills: 1 | Status: SHIPPED | OUTPATIENT
Start: 2024-10-16

## 2024-10-16 SDOH — ECONOMIC STABILITY: FOOD INSECURITY: WITHIN THE PAST 12 MONTHS, YOU WORRIED THAT YOUR FOOD WOULD RUN OUT BEFORE YOU GOT MONEY TO BUY MORE.: NEVER TRUE

## 2024-10-16 SDOH — ECONOMIC STABILITY: FOOD INSECURITY: WITHIN THE PAST 12 MONTHS, THE FOOD YOU BOUGHT JUST DIDN'T LAST AND YOU DIDN'T HAVE MONEY TO GET MORE.: NEVER TRUE

## 2024-10-16 SDOH — ECONOMIC STABILITY: INCOME INSECURITY: HOW HARD IS IT FOR YOU TO PAY FOR THE VERY BASICS LIKE FOOD, HOUSING, MEDICAL CARE, AND HEATING?: NOT HARD AT ALL

## 2024-10-16 ASSESSMENT — PATIENT HEALTH QUESTIONNAIRE - PHQ9
SUM OF ALL RESPONSES TO PHQ9 QUESTIONS 1 & 2: 0
SUM OF ALL RESPONSES TO PHQ QUESTIONS 1-9: 0
1. LITTLE INTEREST OR PLEASURE IN DOING THINGS: NOT AT ALL
SUM OF ALL RESPONSES TO PHQ QUESTIONS 1-9: 0
2. FEELING DOWN, DEPRESSED OR HOPELESS: NOT AT ALL
SUM OF ALL RESPONSES TO PHQ QUESTIONS 1-9: 0
SUM OF ALL RESPONSES TO PHQ QUESTIONS 1-9: 0

## 2024-10-16 NOTE — PROGRESS NOTES
Ce Roberts (:  1970) is a 54 y.o. female, Established patient, here for evaluation of the following chief complaint(s):  No chief complaint on file.         Assessment & Plan  1. Nausea.  The patient reports persistent nausea every morning, likely related to recent bariatric and colon surgeries. She has been taking Zofran, which helps alleviate the symptoms. A prescription for Zofran was provided, with instructions to take it up to three times daily as needed. She was advised to consult with her surgeon regarding her ongoing symptoms.    2. Medication Management.  The patient is currently out of cyclobenzaprine and requires a refill. She is also taking gabapentin three times a day.      Results    1. Nausea  -     ondansetron (ZOFRAN) 8 MG tablet; Take 1 tablet by mouth every 8 hours as needed for Nausea or Vomiting, Disp-60 tablet, R-1Normal  2. Fibromyalgia    No follow-ups on file.       Subjective   History of Present Illness  The patient presents for evaluation of nausea.    She reports experiencing morning nausea that lasts for the first 20 minutes after waking up, often accompanied by a gagging sensation. To manage her nausea, she takes Zofran twice daily, which she finds beneficial. She has discussed her symptoms with her surgeon, who prescribed an enema suppository. Unfortunately, this treatment led to constipation, a condition that previously resulted in the loss of her colon. She is uncertain about what foods to consume as she experiences severe hunger pains that exacerbate her nausea.    She also mentions body soreness due to sleep disturbances. Her current medication regimen includes gabapentin, taken three times daily - in the morning, afternoon, and before bedtime. She has run out of cyclobenzaprine.    She reports an improvement in her overall condition following bariatric and colon surgery.    Review of Systems       Objective   There were no vitals taken for this visit.  Physical

## 2024-10-30 ENCOUNTER — TELEPHONE (OUTPATIENT)
Dept: PHARMACY | Facility: CLINIC | Age: 54
End: 2024-10-30

## 2024-10-30 NOTE — TELEPHONE ENCOUNTER
[facility] CLINICAL PHARMACY: ADHERENCE REVIEW  Identified care gap per [employer] fills with [facility]  [facility] PHARMACY  Pharmacy: Diabetes adherence    Medicare and long-term Dual Special Needs Plan - MRDSNP  MA-PCPi  Per insurer report, LIS-1 - may be able to receive up to a 100-day supply for the same cost as a 30-day supply.  Patient also appears to be prescribed: ACE/ARB    ASSESSMENT    ACE/ARB ADHERENCE    Insurance Records claims through  10.20.24  (Prior Year PDC = 100% - PASSED ; YTD PDC = 100% - PASSED ):   LOSARTAN POT TAB 50 MG last filled on 10.15.24 for 100 day supply. Next refill due: 01.23.25    BP Readings from Last 3 Encounters:   09/14/24 122/84   08/15/24 123/85   07/31/24 (!) 138/90     Estimated Creatinine Clearance: 94 mL/min (based on SCr of 0.7 mg/dL).  Lab Results   Component Value Date    CREATININE 0.70 09/14/2024     Lab Results   Component Value Date    K 3.0 (L) 09/14/2024     DIABETES ADHERENCE    Insurance Records claims through  10.20.24  (Prior Year PDC = 100% - PASSED ; YTD PDC = 78%; Potential Fail Date: 11.05.24):   MOUNJARO INJ 10MG/0.5 ML last filled on 07.12.24 for 28 day supply. Next refill due: 08.09.24    Prescribed sig:   INJECT 10MG UNDER THE SKIN ONCE WEEKLY   Patient taking differently: Inject 0.5 mLs into the skin once a week EVERY SUNDAY     Per Reconcile Dispense History and Insurer Portal: last filled on 07.12.24 for 28 day supply.     Per OV note on 06.05.24, Diabetes-patient stopped Mounjaro 7 days prior to surgery. Pt had gastric bypass.    Lab Results   Component Value Date    LABA1C 5.1 05/28/2024    LABA1C 5.4 02/06/2024    LABA1C 5.5 01/07/2024     PLAN  The following are interventions that have been identified:   Pt no longer taking MOUNJARO INJ 10MG/0.5 ML, Per OV note on 06.05.24, Diabetes-patient stopped Mounjaro 7 days prior to surgery. Pt had gastric bypass.    Outreach:  No outreach planned at this time    Last Visit: 07.29.24  Next Visit:

## 2024-11-01 ENCOUNTER — TELEPHONE (OUTPATIENT)
Age: 54
End: 2024-11-01

## 2024-11-01 NOTE — TELEPHONE ENCOUNTER
Pts daughter Mare came into the office and dropped off [employer] serious medical form to be completed please. Call Mare at [phone] when ready for , thanks. Placed on your desk.

## 2024-11-20 RX ORDER — OMEPRAZOLE 40 MG/1
40 CAPSULE, DELAYED RELEASE ORAL 2 TIMES DAILY
Qty: 180 CAPSULE | Refills: 0 | Status: SHIPPED | OUTPATIENT
Start: 2024-11-20 | End: 2025-02-18

## 2024-12-09 ENCOUNTER — APPOINTMENT (OUTPATIENT)
Facility: HOSPITAL | Age: 54
End: 2024-12-09
Payer: MEDICARE

## 2024-12-09 ENCOUNTER — HOSPITAL ENCOUNTER (INPATIENT)
Facility: HOSPITAL | Age: 54
LOS: 4 days | Discharge: HOME | End: 2024-12-13
Attending: EMERGENCY MEDICINE | Admitting: INTERNAL MEDICINE
Payer: MEDICARE

## 2024-12-09 DIAGNOSIS — N39.0 URINARY TRACT INFECTION WITHOUT HEMATURIA, SITE UNSPECIFIED: ICD-10-CM

## 2024-12-09 DIAGNOSIS — R11.2 NAUSEA AND VOMITING, UNSPECIFIED VOMITING TYPE: ICD-10-CM

## 2024-12-09 DIAGNOSIS — K52.9 COLITIS: Primary | ICD-10-CM

## 2024-12-09 LAB
ALBUMIN SERPL-MCNC: 3 G/DL (ref 3.5–5)
ALBUMIN/GLOB SERPL: 0.8 (ref 1.1–2.2)
ALP SERPL-CCNC: 92 U/L (ref 45–117)
ALT SERPL-CCNC: 30 U/L (ref 12–78)
ANION GAP SERPL CALC-SCNC: 8 MMOL/L (ref 2–12)
APPEARANCE UR: CLEAR
AST SERPL-CCNC: 56 U/L (ref 15–37)
BACTERIA URNS QL MICRO: ABNORMAL /HPF
BASOPHILS # BLD: 0.1 K/UL (ref 0–0.1)
BASOPHILS NFR BLD: 1 % (ref 0–1)
BILIRUB SERPL-MCNC: 0.7 MG/DL (ref 0.2–1)
BILIRUB UR QL CFM: NEGATIVE
BUN SERPL-MCNC: 9 MG/DL (ref 6–20)
BUN/CREAT SERPL: 13 (ref 12–20)
CALCIUM SERPL-MCNC: 9.5 MG/DL (ref 8.5–10.1)
CAOX CRY URNS QL MICRO: ABNORMAL
CHLORIDE SERPL-SCNC: 105 MMOL/L (ref 97–108)
CO2 SERPL-SCNC: 25 MMOL/L (ref 21–32)
COLOR UR: ABNORMAL
CREAT SERPL-MCNC: 0.67 MG/DL (ref 0.55–1.02)
DIFFERENTIAL METHOD BLD: ABNORMAL
EOSINOPHIL # BLD: 0.2 K/UL (ref 0–0.4)
EOSINOPHIL NFR BLD: 2 % (ref 0–7)
EPITH CASTS URNS QL MICRO: ABNORMAL /LPF
ERYTHROCYTE [DISTWIDTH] IN BLOOD BY AUTOMATED COUNT: 14.7 % (ref 11.5–14.5)
GLOBULIN SER CALC-MCNC: 3.9 G/DL (ref 2–4)
GLUCOSE SERPL-MCNC: 82 MG/DL (ref 65–100)
GLUCOSE UR STRIP.AUTO-MCNC: 100 MG/DL
HCT VFR BLD AUTO: 46.7 % (ref 35–47)
HGB BLD-MCNC: 15.1 G/DL (ref 11.5–16)
HGB UR QL STRIP: NEGATIVE
IMM GRANULOCYTES # BLD AUTO: 0 K/UL (ref 0–0.04)
IMM GRANULOCYTES NFR BLD AUTO: 0 % (ref 0–0.5)
KETONES UR QL STRIP.AUTO: 40 MG/DL
LACTATE SERPL-SCNC: 1.2 MMOL/L (ref 0.4–2)
LEUKOCYTE ESTERASE UR QL STRIP.AUTO: NEGATIVE
LYMPHOCYTES # BLD: 4.3 K/UL (ref 0.8–3.5)
LYMPHOCYTES NFR BLD: 44 % (ref 12–49)
MAGNESIUM SERPL-MCNC: 2 MG/DL (ref 1.6–2.4)
MCH RBC QN AUTO: 31.5 PG (ref 26–34)
MCHC RBC AUTO-ENTMCNC: 32.3 G/DL (ref 30–36.5)
MCV RBC AUTO: 97.3 FL (ref 80–99)
MONOCYTES # BLD: 0.4 K/UL (ref 0–1)
MONOCYTES NFR BLD: 4 % (ref 5–13)
MUCOUS THREADS URNS QL MICRO: ABNORMAL /LPF
NEUTS SEG # BLD: 4.9 K/UL (ref 1.8–8)
NEUTS SEG NFR BLD: 49 % (ref 32–75)
NITRITE UR QL STRIP.AUTO: POSITIVE
NRBC # BLD: 0 K/UL (ref 0–0.01)
NRBC BLD-RTO: 0 PER 100 WBC
PH UR STRIP: 6.5 (ref 5–8)
PLATELET # BLD AUTO: 308 K/UL (ref 150–400)
PMV BLD AUTO: 12.2 FL (ref 8.9–12.9)
POTASSIUM SERPL-SCNC: 3.4 MMOL/L (ref 3.5–5.1)
PROCALCITONIN SERPL-MCNC: <0.05 NG/ML
PROT SERPL-MCNC: 6.9 G/DL (ref 6.4–8.2)
PROT UR STRIP-MCNC: 100 MG/DL
RBC # BLD AUTO: 4.8 M/UL (ref 3.8–5.2)
RBC #/AREA URNS HPF: ABNORMAL /HPF (ref 0–5)
SODIUM SERPL-SCNC: 138 MMOL/L (ref 136–145)
SP GR UR REFRACTOMETRY: >1.03 (ref 1–1.03)
UROBILINOGEN UR QL STRIP.AUTO: 1 EU/DL (ref 0.2–1)
WBC # BLD AUTO: 9.8 K/UL (ref 3.6–11)
WBC URNS QL MICRO: ABNORMAL /HPF (ref 0–4)

## 2024-12-09 PROCEDURE — 36415 COLL VENOUS BLD VENIPUNCTURE: CPT

## 2024-12-09 PROCEDURE — 83605 ASSAY OF LACTIC ACID: CPT

## 2024-12-09 PROCEDURE — 84145 PROCALCITONIN (PCT): CPT

## 2024-12-09 PROCEDURE — 6360000004 HC RX CONTRAST MEDICATION: Performed by: EMERGENCY MEDICINE

## 2024-12-09 PROCEDURE — 83735 ASSAY OF MAGNESIUM: CPT

## 2024-12-09 PROCEDURE — 85025 COMPLETE CBC W/AUTO DIFF WBC: CPT

## 2024-12-09 PROCEDURE — 1100000000 HC RM PRIVATE

## 2024-12-09 PROCEDURE — 74174 CTA ABD&PLVS W/CONTRAST: CPT

## 2024-12-09 PROCEDURE — 2580000003 HC RX 258: Performed by: EMERGENCY MEDICINE

## 2024-12-09 PROCEDURE — 87186 SC STD MICRODIL/AGAR DIL: CPT

## 2024-12-09 PROCEDURE — 80307 DRUG TEST PRSMV CHEM ANLYZR: CPT

## 2024-12-09 PROCEDURE — 6370000000 HC RX 637 (ALT 250 FOR IP): Performed by: EMERGENCY MEDICINE

## 2024-12-09 PROCEDURE — 81001 URINALYSIS AUTO W/SCOPE: CPT

## 2024-12-09 PROCEDURE — 6360000002 HC RX W HCPCS: Performed by: EMERGENCY MEDICINE

## 2024-12-09 PROCEDURE — 80053 COMPREHEN METABOLIC PANEL: CPT

## 2024-12-09 PROCEDURE — 87086 URINE CULTURE/COLONY COUNT: CPT

## 2024-12-09 PROCEDURE — 87088 URINE BACTERIA CULTURE: CPT

## 2024-12-09 PROCEDURE — 99285 EMERGENCY DEPT VISIT HI MDM: CPT

## 2024-12-09 PROCEDURE — 87040 BLOOD CULTURE FOR BACTERIA: CPT

## 2024-12-09 RX ORDER — METRONIDAZOLE 500 MG/100ML
500 INJECTION, SOLUTION INTRAVENOUS ONCE
Status: COMPLETED | OUTPATIENT
Start: 2024-12-09 | End: 2024-12-10

## 2024-12-09 RX ORDER — 0.9 % SODIUM CHLORIDE 0.9 %
1000 INTRAVENOUS SOLUTION INTRAVENOUS ONCE
Status: COMPLETED | OUTPATIENT
Start: 2024-12-09 | End: 2024-12-10

## 2024-12-09 RX ORDER — NITROFURANTOIN 25; 75 MG/1; MG/1
100 CAPSULE ORAL
Status: DISCONTINUED | OUTPATIENT
Start: 2024-12-09 | End: 2024-12-09

## 2024-12-09 RX ORDER — ONDANSETRON 2 MG/ML
4 INJECTION INTRAMUSCULAR; INTRAVENOUS ONCE
Status: COMPLETED | OUTPATIENT
Start: 2024-12-09 | End: 2024-12-09

## 2024-12-09 RX ORDER — POTASSIUM CHLORIDE 750 MG/1
40 TABLET, EXTENDED RELEASE ORAL
Status: COMPLETED | OUTPATIENT
Start: 2024-12-09 | End: 2024-12-09

## 2024-12-09 RX ORDER — FENTANYL CITRATE 50 UG/ML
50 INJECTION, SOLUTION INTRAMUSCULAR; INTRAVENOUS EVERY 4 HOURS PRN
Status: DISCONTINUED | OUTPATIENT
Start: 2024-12-09 | End: 2024-12-13 | Stop reason: HOSPADM

## 2024-12-09 RX ORDER — IOPAMIDOL 755 MG/ML
100 INJECTION, SOLUTION INTRAVASCULAR
Status: COMPLETED | OUTPATIENT
Start: 2024-12-09 | End: 2024-12-09

## 2024-12-09 RX ORDER — FENTANYL CITRATE 50 UG/ML
50 INJECTION, SOLUTION INTRAMUSCULAR; INTRAVENOUS ONCE
Status: COMPLETED | OUTPATIENT
Start: 2024-12-09 | End: 2024-12-09

## 2024-12-09 RX ADMIN — CEFTRIAXONE SODIUM 1000 MG: 1 INJECTION, POWDER, FOR SOLUTION INTRAMUSCULAR; INTRAVENOUS at 23:44

## 2024-12-09 RX ADMIN — ONDANSETRON 4 MG: 2 INJECTION INTRAMUSCULAR; INTRAVENOUS at 23:36

## 2024-12-09 RX ADMIN — POTASSIUM CHLORIDE 40 MEQ: 750 TABLET, EXTENDED RELEASE ORAL at 23:46

## 2024-12-09 RX ADMIN — FENTANYL CITRATE 50 MCG: 50 INJECTION INTRAMUSCULAR; INTRAVENOUS at 23:39

## 2024-12-09 RX ADMIN — IOPAMIDOL 100 ML: 755 INJECTION, SOLUTION INTRAVENOUS at 21:10

## 2024-12-09 ASSESSMENT — PAIN DESCRIPTION - DESCRIPTORS
DESCRIPTORS: ACHING
DESCRIPTORS: STABBING

## 2024-12-09 ASSESSMENT — PAIN SCALES - GENERAL
PAINLEVEL_OUTOF10: 8
PAINLEVEL_OUTOF10: 10

## 2024-12-09 ASSESSMENT — PAIN DESCRIPTION - LOCATION
LOCATION: ABDOMEN;RECTUM
LOCATION: ABDOMEN

## 2024-12-09 ASSESSMENT — PAIN - FUNCTIONAL ASSESSMENT: PAIN_FUNCTIONAL_ASSESSMENT: 0-10

## 2024-12-10 ENCOUNTER — APPOINTMENT (OUTPATIENT)
Facility: HOSPITAL | Age: 54
End: 2024-12-10
Payer: MEDICARE

## 2024-12-10 LAB
ALBUMIN SERPL-MCNC: 2.4 G/DL (ref 3.5–5)
ALBUMIN/GLOB SERPL: 0.9 (ref 1.1–2.2)
ALP SERPL-CCNC: 70 U/L (ref 45–117)
ALT SERPL-CCNC: 22 U/L (ref 12–78)
AMPHET UR QL SCN: NEGATIVE
ANION GAP SERPL CALC-SCNC: 6 MMOL/L (ref 2–12)
AST SERPL-CCNC: 43 U/L (ref 15–37)
BARBITURATES UR QL SCN: NEGATIVE
BASOPHILS # BLD: 0 K/UL (ref 0–0.1)
BASOPHILS NFR BLD: 0 % (ref 0–1)
BENZODIAZ UR QL: NEGATIVE
BILIRUB SERPL-MCNC: 0.4 MG/DL (ref 0.2–1)
BUN SERPL-MCNC: 8 MG/DL (ref 6–20)
BUN/CREAT SERPL: 16 (ref 12–20)
C COLI+JEJUNI TUF STL QL NAA+PROBE: NEGATIVE
C DIFF TOX GENS STL QL NAA+PROBE: POSITIVE
C DIFF TOXIN INTERPRETATION: ABNORMAL
CALCIUM SERPL-MCNC: 8.5 MG/DL (ref 8.5–10.1)
CANNABINOIDS UR QL SCN: POSITIVE
CHLORIDE SERPL-SCNC: 113 MMOL/L (ref 97–108)
CO2 SERPL-SCNC: 22 MMOL/L (ref 21–32)
COCAINE UR QL SCN: NEGATIVE
COMMENT:: NORMAL
CREAT SERPL-MCNC: 0.49 MG/DL (ref 0.55–1.02)
DIFFERENTIAL METHOD BLD: ABNORMAL
EC STX1+STX2 GENES STL QL NAA+PROBE: NEGATIVE
EOSINOPHIL # BLD: 0.2 K/UL (ref 0–0.4)
EOSINOPHIL NFR BLD: 2 % (ref 0–7)
ERYTHROCYTE [DISTWIDTH] IN BLOOD BY AUTOMATED COUNT: 14.7 % (ref 11.5–14.5)
ETEC ELTA+ESTB GENES STL QL NAA+PROBE: NEGATIVE
FOLATE SERPL-MCNC: 1 NG/ML (ref 5–21)
GLOBULIN SER CALC-MCNC: 2.8 G/DL (ref 2–4)
GLUCOSE SERPL-MCNC: 70 MG/DL (ref 65–100)
HCT VFR BLD AUTO: 37.3 % (ref 35–47)
HGB BLD-MCNC: 12.2 G/DL (ref 11.5–16)
IMM GRANULOCYTES # BLD AUTO: 0 K/UL (ref 0–0.04)
IMM GRANULOCYTES NFR BLD AUTO: 0 % (ref 0–0.5)
LACTATE SERPL-SCNC: 1 MMOL/L (ref 0.4–2)
LIPASE SERPL-CCNC: 10 U/L (ref 13–75)
LYMPHOCYTES # BLD: 3.9 K/UL (ref 0.8–3.5)
LYMPHOCYTES NFR BLD: 46 % (ref 12–49)
Lab: ABNORMAL
MAGNESIUM SERPL-MCNC: 1.9 MG/DL (ref 1.6–2.4)
MCH RBC QN AUTO: 31.7 PG (ref 26–34)
MCHC RBC AUTO-ENTMCNC: 32.7 G/DL (ref 30–36.5)
MCV RBC AUTO: 96.9 FL (ref 80–99)
METHADONE UR QL: NEGATIVE
MONOCYTES # BLD: 0.4 K/UL (ref 0–1)
MONOCYTES NFR BLD: 5 % (ref 5–13)
NEUTS SEG # BLD: 4 K/UL (ref 1.8–8)
NEUTS SEG NFR BLD: 47 % (ref 32–75)
NRBC # BLD: 0 K/UL (ref 0–0.01)
NRBC BLD-RTO: 0 PER 100 WBC
OPIATES UR QL: NEGATIVE
P SHIGELLOIDES DNA STL QL NAA+PROBE: NEGATIVE
PCP UR QL: NEGATIVE
PHOSPHATE SERPL-MCNC: 3.6 MG/DL (ref 2.6–4.7)
PLATELET # BLD AUTO: 228 K/UL (ref 150–400)
PMV BLD AUTO: 12.4 FL (ref 8.9–12.9)
POTASSIUM SERPL-SCNC: 3.7 MMOL/L (ref 3.5–5.1)
PROT SERPL-MCNC: 5.2 G/DL (ref 6.4–8.2)
RBC # BLD AUTO: 3.85 M/UL (ref 3.8–5.2)
REFLEX: ABNORMAL
SALMONELLA SP SPAO STL QL NAA+PROBE: NEGATIVE
SHIGELLA SP+EIEC IPAH STL QL NAA+PROBE: NEGATIVE
SODIUM SERPL-SCNC: 141 MMOL/L (ref 136–145)
SPECIMEN HOLD: NORMAL
TROPONIN I SERPL HS-MCNC: 4 NG/L (ref 0–51)
TROPONIN I SERPL HS-MCNC: 6 NG/L (ref 0–51)
TSH SERPL DL<=0.05 MIU/L-ACNC: 1.77 UIU/ML (ref 0.36–3.74)
V CHOL+PARA+VUL DNA STL QL NAA+NON-PROBE: NEGATIVE
VIT B12 SERPL-MCNC: 388 PG/ML (ref 193–986)
WBC # BLD AUTO: 8.5 K/UL (ref 3.6–11)
Y ENTEROCOL DNA STL QL NAA+NON-PROBE: NEGATIVE

## 2024-12-10 PROCEDURE — 83735 ASSAY OF MAGNESIUM: CPT

## 2024-12-10 PROCEDURE — 2580000003 HC RX 258: Performed by: EMERGENCY MEDICINE

## 2024-12-10 PROCEDURE — 71045 X-RAY EXAM CHEST 1 VIEW: CPT

## 2024-12-10 PROCEDURE — 2580000003 HC RX 258: Performed by: NURSE PRACTITIONER

## 2024-12-10 PROCEDURE — 84443 ASSAY THYROID STIM HORMONE: CPT

## 2024-12-10 PROCEDURE — 84484 ASSAY OF TROPONIN QUANT: CPT

## 2024-12-10 PROCEDURE — 83690 ASSAY OF LIPASE: CPT

## 2024-12-10 PROCEDURE — 85025 COMPLETE CBC W/AUTO DIFF WBC: CPT

## 2024-12-10 PROCEDURE — 6360000002 HC RX W HCPCS: Performed by: SURGERY

## 2024-12-10 PROCEDURE — 87506 IADNA-DNA/RNA PROBE TQ 6-11: CPT

## 2024-12-10 PROCEDURE — 80053 COMPREHEN METABOLIC PANEL: CPT

## 2024-12-10 PROCEDURE — 87449 NOS EACH ORGANISM AG IA: CPT

## 2024-12-10 PROCEDURE — 99231 SBSQ HOSP IP/OBS SF/LOW 25: CPT | Performed by: NURSE PRACTITIONER

## 2024-12-10 PROCEDURE — 6360000002 HC RX W HCPCS: Performed by: NURSE PRACTITIONER

## 2024-12-10 PROCEDURE — 6360000002 HC RX W HCPCS: Performed by: EMERGENCY MEDICINE

## 2024-12-10 PROCEDURE — 82746 ASSAY OF FOLIC ACID SERUM: CPT

## 2024-12-10 PROCEDURE — 6370000000 HC RX 637 (ALT 250 FOR IP): Performed by: INTERNAL MEDICINE

## 2024-12-10 PROCEDURE — 6360000002 HC RX W HCPCS: Performed by: INTERNAL MEDICINE

## 2024-12-10 PROCEDURE — 82607 VITAMIN B-12: CPT

## 2024-12-10 PROCEDURE — 1200000000 HC SEMI PRIVATE

## 2024-12-10 PROCEDURE — 84100 ASSAY OF PHOSPHORUS: CPT

## 2024-12-10 PROCEDURE — 36415 COLL VENOUS BLD VENIPUNCTURE: CPT

## 2024-12-10 PROCEDURE — 87324 CLOSTRIDIUM AG IA: CPT

## 2024-12-10 PROCEDURE — 87493 C DIFF AMPLIFIED PROBE: CPT

## 2024-12-10 PROCEDURE — 2500000003 HC RX 250 WO HCPCS: Performed by: SURGERY

## 2024-12-10 PROCEDURE — 2580000003 HC RX 258: Performed by: SURGERY

## 2024-12-10 PROCEDURE — 2580000003 HC RX 258: Performed by: INTERNAL MEDICINE

## 2024-12-10 PROCEDURE — 74240 X-RAY XM UPR GI TRC 1CNTRST: CPT

## 2024-12-10 PROCEDURE — 83605 ASSAY OF LACTIC ACID: CPT

## 2024-12-10 RX ORDER — PANTOPRAZOLE SODIUM 40 MG/1
40 TABLET, DELAYED RELEASE ORAL
Status: DISCONTINUED | OUTPATIENT
Start: 2024-12-10 | End: 2024-12-10

## 2024-12-10 RX ORDER — LOSARTAN POTASSIUM 50 MG/1
50 TABLET ORAL DAILY
Status: DISCONTINUED | OUTPATIENT
Start: 2024-12-10 | End: 2024-12-13 | Stop reason: HOSPADM

## 2024-12-10 RX ORDER — AMITRIPTYLINE HYDROCHLORIDE 50 MG/1
50 TABLET ORAL NIGHTLY
Status: DISCONTINUED | OUTPATIENT
Start: 2024-12-10 | End: 2024-12-10

## 2024-12-10 RX ORDER — POTASSIUM CHLORIDE 750 MG/1
40 TABLET, EXTENDED RELEASE ORAL PRN
Status: DISCONTINUED | OUTPATIENT
Start: 2024-12-10 | End: 2024-12-13 | Stop reason: HOSPADM

## 2024-12-10 RX ORDER — METOCLOPRAMIDE HYDROCHLORIDE 5 MG/ML
10 INJECTION INTRAMUSCULAR; INTRAVENOUS EVERY 6 HOURS
Status: DISCONTINUED | OUTPATIENT
Start: 2024-12-10 | End: 2024-12-11

## 2024-12-10 RX ORDER — PROCHLORPERAZINE EDISYLATE 5 MG/ML
10 INJECTION INTRAMUSCULAR; INTRAVENOUS EVERY 6 HOURS PRN
Status: DISCONTINUED | OUTPATIENT
Start: 2024-12-10 | End: 2024-12-13 | Stop reason: HOSPADM

## 2024-12-10 RX ORDER — FOLIC ACID 5 MG/ML
1 INJECTION, SOLUTION INTRAMUSCULAR; INTRAVENOUS; SUBCUTANEOUS DAILY
Status: DISCONTINUED | OUTPATIENT
Start: 2024-12-10 | End: 2024-12-10

## 2024-12-10 RX ORDER — POTASSIUM CHLORIDE 7.45 MG/ML
10 INJECTION INTRAVENOUS PRN
Status: DISCONTINUED | OUTPATIENT
Start: 2024-12-10 | End: 2024-12-13 | Stop reason: HOSPADM

## 2024-12-10 RX ORDER — ONDANSETRON 4 MG/1
4 TABLET, ORALLY DISINTEGRATING ORAL EVERY 8 HOURS PRN
Status: DISCONTINUED | OUTPATIENT
Start: 2024-12-10 | End: 2024-12-10

## 2024-12-10 RX ORDER — MAGNESIUM SULFATE IN WATER 40 MG/ML
2000 INJECTION, SOLUTION INTRAVENOUS PRN
Status: DISCONTINUED | OUTPATIENT
Start: 2024-12-10 | End: 2024-12-13 | Stop reason: HOSPADM

## 2024-12-10 RX ORDER — GABAPENTIN 600 MG/1
600 TABLET ORAL 2 TIMES DAILY
Status: DISCONTINUED | OUTPATIENT
Start: 2024-12-10 | End: 2024-12-13 | Stop reason: HOSPADM

## 2024-12-10 RX ORDER — SODIUM CHLORIDE 0.9 % (FLUSH) 0.9 %
5-40 SYRINGE (ML) INJECTION PRN
Status: DISCONTINUED | OUTPATIENT
Start: 2024-12-10 | End: 2024-12-13 | Stop reason: HOSPADM

## 2024-12-10 RX ORDER — POLYETHYLENE GLYCOL 3350 17 G/17G
17 POWDER, FOR SOLUTION ORAL DAILY PRN
Status: DISCONTINUED | OUTPATIENT
Start: 2024-12-10 | End: 2024-12-13 | Stop reason: HOSPADM

## 2024-12-10 RX ORDER — SODIUM CHLORIDE 0.9 % (FLUSH) 0.9 %
5-40 SYRINGE (ML) INJECTION EVERY 12 HOURS SCHEDULED
Status: DISCONTINUED | OUTPATIENT
Start: 2024-12-10 | End: 2024-12-13 | Stop reason: HOSPADM

## 2024-12-10 RX ORDER — POTASSIUM CHLORIDE 750 MG/1
40 TABLET, EXTENDED RELEASE ORAL ONCE
Status: DISCONTINUED | OUTPATIENT
Start: 2024-12-10 | End: 2024-12-10

## 2024-12-10 RX ORDER — SODIUM CHLORIDE 9 MG/ML
INJECTION, SOLUTION INTRAVENOUS CONTINUOUS
Status: DISCONTINUED | OUTPATIENT
Start: 2024-12-10 | End: 2024-12-13 | Stop reason: HOSPADM

## 2024-12-10 RX ORDER — SODIUM CHLORIDE 9 MG/ML
INJECTION, SOLUTION INTRAVENOUS PRN
Status: DISCONTINUED | OUTPATIENT
Start: 2024-12-10 | End: 2024-12-13 | Stop reason: HOSPADM

## 2024-12-10 RX ORDER — ALPRAZOLAM 0.5 MG
0.5 TABLET ORAL DAILY PRN
Status: DISCONTINUED | OUTPATIENT
Start: 2024-12-10 | End: 2024-12-13 | Stop reason: HOSPADM

## 2024-12-10 RX ORDER — SUCRALFATE 1 G/1
1 TABLET ORAL 4 TIMES DAILY
Status: DISCONTINUED | OUTPATIENT
Start: 2024-12-10 | End: 2024-12-10

## 2024-12-10 RX ORDER — ONDANSETRON 2 MG/ML
4 INJECTION INTRAMUSCULAR; INTRAVENOUS EVERY 6 HOURS PRN
Status: DISCONTINUED | OUTPATIENT
Start: 2024-12-10 | End: 2024-12-10

## 2024-12-10 RX ORDER — DIATRIZOATE MEGLUMINE AND DIATRIZOATE SODIUM 660; 100 MG/ML; MG/ML
240 SOLUTION ORAL; RECTAL
Status: DISCONTINUED | OUTPATIENT
Start: 2024-12-10 | End: 2024-12-13 | Stop reason: HOSPADM

## 2024-12-10 RX ADMIN — SODIUM CHLORIDE, PRESERVATIVE FREE 10 ML: 5 INJECTION INTRAVENOUS at 20:32

## 2024-12-10 RX ADMIN — SODIUM CHLORIDE, PRESERVATIVE FREE 40 MG: 5 INJECTION INTRAVENOUS at 20:31

## 2024-12-10 RX ADMIN — ONDANSETRON 4 MG: 2 INJECTION INTRAMUSCULAR; INTRAVENOUS at 06:35

## 2024-12-10 RX ADMIN — LOSARTAN POTASSIUM 50 MG: 50 TABLET, FILM COATED ORAL at 08:19

## 2024-12-10 RX ADMIN — PROCHLORPERAZINE EDISYLATE 10 MG: 5 INJECTION INTRAMUSCULAR; INTRAVENOUS at 08:32

## 2024-12-10 RX ADMIN — PROCHLORPERAZINE EDISYLATE 10 MG: 5 INJECTION INTRAMUSCULAR; INTRAVENOUS at 20:41

## 2024-12-10 RX ADMIN — SODIUM CHLORIDE, PRESERVATIVE FREE 10 ML: 5 INJECTION INTRAVENOUS at 08:20

## 2024-12-10 RX ADMIN — SODIUM CHLORIDE: 9 INJECTION, SOLUTION INTRAVENOUS at 06:41

## 2024-12-10 RX ADMIN — SODIUM CHLORIDE 1000 ML: 9 INJECTION, SOLUTION INTRAVENOUS at 00:11

## 2024-12-10 RX ADMIN — METOCLOPRAMIDE 10 MG: 5 INJECTION, SOLUTION INTRAMUSCULAR; INTRAVENOUS at 12:38

## 2024-12-10 RX ADMIN — GABAPENTIN 600 MG: 600 TABLET, FILM COATED ORAL at 08:19

## 2024-12-10 RX ADMIN — FOLIC ACID 1 MG: 5 INJECTION, SOLUTION INTRAMUSCULAR; INTRAVENOUS; SUBCUTANEOUS at 15:12

## 2024-12-10 RX ADMIN — SODIUM CHLORIDE: 9 INJECTION, SOLUTION INTRAVENOUS at 18:05

## 2024-12-10 RX ADMIN — FENTANYL CITRATE 50 MCG: 50 INJECTION INTRAMUSCULAR; INTRAVENOUS at 06:42

## 2024-12-10 RX ADMIN — METOCLOPRAMIDE 10 MG: 5 INJECTION, SOLUTION INTRAMUSCULAR; INTRAVENOUS at 23:40

## 2024-12-10 RX ADMIN — GABAPENTIN 600 MG: 600 TABLET, FILM COATED ORAL at 20:31

## 2024-12-10 RX ADMIN — CEFEPIME 2000 MG: 2 INJECTION, POWDER, FOR SOLUTION INTRAVENOUS at 18:04

## 2024-12-10 RX ADMIN — METRONIDAZOLE 500 MG: 500 INJECTION, SOLUTION INTRAVENOUS at 00:10

## 2024-12-10 RX ADMIN — METOCLOPRAMIDE 10 MG: 5 INJECTION, SOLUTION INTRAMUSCULAR; INTRAVENOUS at 18:02

## 2024-12-10 RX ADMIN — SODIUM CHLORIDE, PRESERVATIVE FREE 40 MG: 5 INJECTION INTRAVENOUS at 08:19

## 2024-12-10 RX ADMIN — CEFEPIME 2000 MG: 2 INJECTION, POWDER, FOR SOLUTION INTRAVENOUS at 06:31

## 2024-12-10 ASSESSMENT — PAIN - FUNCTIONAL ASSESSMENT: PAIN_FUNCTIONAL_ASSESSMENT: NONE - DENIES PAIN

## 2024-12-10 ASSESSMENT — PAIN SCALES - GENERAL
PAINLEVEL_OUTOF10: 8
PAINLEVEL_OUTOF10: 0

## 2024-12-10 ASSESSMENT — PAIN DESCRIPTION - LOCATION: LOCATION: ABDOMEN

## 2024-12-10 NOTE — ED NOTES
ED TO INPATIENT SBAR HANDOFF    Patient Name: Ce Roberts   :  1970  54 y.o.   MRN:  [id number]  ED Room #:  ER30/30     Situation  Code Status: Full Code   Allergies: Aspirin, Hydromorphone, Levofloxacin, Penicillins, Ketorolac tromethamine, Oxycodone-acetaminophen, Sulfa antibiotics, Sumatriptan, Tramadol, Bupropion, Cephalexin, Hydrocodone, Lisinopril, Milnacipran, Pork-derived products, Solifenacin, and Prednisone  Weight: Patient Vitals for the past 96 hrs (Last 3 readings):   Weight   24 1935 66.5 kg (146 lb 9.7 oz)       Arrived from: home    Chief Complaint:   Chief Complaint   Patient presents with    Rectal Bleeding       Hospital Problem/Diagnosis:  Principal Problem:    Acute colitis  Resolved Problems:    * No resolved hospital problems. *      Mobility: limited transfer mobility   ED Fall Risk: Presents to emergency department  because of falls (Syncope, seizure, or loss of consciousness): No, Age > 70: No, Altered Mental Status, Intoxication with alcohol or substance confusion (Disorientation, impaired judgment, poor safety awaremess, or inability to follow instructions): No, Impaired Mobility: Ambulates or transfers with assistive devices or assistance; Unable to ambulate or transer.: Yes, Nursing Judgement: Yes   Fell in ED or prior to admission: no   Restraints: no     Sitter: no   Family/Caregiver Present: yes    Neet to know social/safety information: none     Background  History:   Past Medical History:   Diagnosis Date    Arthritis     Arthritis of low back     Asthma     Chronic pain     fibromyalgia    Colon spasm     Controlled type 2 diabetes mellitus without complication, without long-term current use of insulin ([facility]) 2022    Depression     Fibromyalgia     GERD (gastroesophageal reflux disease)     High cholesterol     Hypertension     IBS (irritable bowel syndrome)     Left axillary pain 2013    Migraine     LAST HEADACHE 216-12    PRASHANT (obstructive sleep  EARLY DETECTION OF SEPSIS VERSION 2 Model 9% Urinary Tract Infection present     8% Count of Active LDAs 4     7% Albumin 3.0 g/dL     5% Systolic Blood Pressure 99     -4% Chloride 105 mmol/L     3% Count of Active Peripheral IVs 2     3% Change in Respirations 20 -> 14     -3% Total Active Inpatient Meds 1     -3% Absolute Lymphocytes 4.3 K/UL      Recent Labs     12/09/24 1958   WBC 9.8     Blood Cultures Drawn: Yes 1958  Repeat LA: Time Due n/a  Antibiotic Given: Yes  Fluid Resuscitation: Total needed unknown , Status infusing   VS x 2 post-fluid resuscitation: YES    Recommendation    Plan for next 24 hours: treat colitis   Additional Comments: none      Pending orders [id number]  Consults ordered: IP CONSULT TO HOSPITALIST    Consulted provider:      If any further questions, please call Sending RN at [phone]  Electronically signed by: Electronically signed by Brandon Nation RN on 12/10/2024 at 1:31 AM

## 2024-12-10 NOTE — CONSULTS
[facility]  Inpatient Consultation        Admit Date: 12/9/2024  Reason for Consultation: abdominal pain, n/v  Referring Provider: Dr Huang    HPI:  Ce Roberts is a 54 y.o. female w/ hx of gastric bypass in June of this year and subsequent R hemicolectomy for cecal ischemia 5 days later presents to the ED w/ c/o abd pain, n/v for 3 weeks.  She hasn't been able to keep anything down except water.  She has been having stools w/ blood for 3 days described as bright red then dark red; not a lot of blood, mostly on toilet tissue.  Denies known hemorrhoids.  She had an egd at the end fo July which showed a marginal ulcer and EG stricture.  Pt does not currently take a PPI.  She smokes \"occasionally\", no marijuana but is \"around\" it.  No NSAIDs.      CT  STOMACH/SMALL BOWEL: Sequelae of prior John-en-Y gastric bypass without obvious  perianastomotic complications. No evidence of active hemorrhage.  COLON: Sequelae of prior and partial right hemicolectomy with ileocolic  anastomosis. Mild diffuse wall thickening of the nondistended mid transverse  colon, indeterminate and may be due to nondistention. At least mild to moderate  colonic diverticulosis predominantly involving the descending colon and sigmoid  colon. Associated mild diffuse wall thickening of the sigmoid colon measuring up  to 1.2 cm in thickness, may suggest mild diverticulitis versus colitis. No  evidence of active hemorrhage.     IMPRESSION:  No evidence of active GI bleed.  Findings may suggest mild patchy colitis versus mild acute sigmoid  diverticulitis clinical correlation advised.     Sequelae of prior John-en-Y gastric bypass and partial right hemicolectomy  without obvious anastomotic complications.     Severe hepatic steatosis.       Patient Active Problem List    Diagnosis Date Noted    Acute colitis 12/09/2024    Marginal ulcer 07/31/2024    H/O gastric bypass 07/29/2024    Morbid obesity 06/17/2024     BID  Cont abx for colitis/diverticulitis  Dr Bolaños to see    Thank you for allowing us to participate in the care of this patient.     Total time spent with patient: 40 minutes.    Signed By: SAFIA Carballo - DEBORAH     December 10, 2024

## 2024-12-10 NOTE — PROGRESS NOTES
Received consults for General Surgery to see patient regarding Abdominal Pain, S/p gastric bypass. Perfect Serve Lovely Jarrett NP@8:50am.   Also received consult for GI to see patient regarding Abdominal Pain Telephone call to [facility], stated Dr. RE Shepard will be over to see patient today.

## 2024-12-10 NOTE — PROGRESS NOTES
[facility]  Hospitalist Group                                                                                          Hospitalist Progress Note  SAFIA Renee - CNP  Answering service: [phone] OR [phone] from in house phone        Date of Service:  12/10/2024  NAME:  Ce Roberts  :  1970  MRN:  [id number]       Admission Summary:   Ce Roberts is a 54 y.o. female with past medical history significant for hypertension, s/p gastric bypass secondary to obesity presented at the emergency room with multiple medical complaints including rectal bleeding, rectal pain and nausea.  Patient was admitted to this hospital in 2024 to the surgical service and underwent gastric bypass for obesity, this became complicated with ischemic colitis necessitating exploratory laparotomy and right hemicolectomy 5 days postop.  Patient stated that she has been having GI symptoms on and off since then.  The present symptoms started a couple of days ago.  The rectal pain is constant with no radiation and no known aggravating or relieving factors. The  reported rectal bleeding is without clot formation.  CT scan of the abdomen and pelvis done in the emergency room did not show any evidence of active GI bleed but this showed findings suggestive of either colitis or diverticulitis.  Patient was treated with Rocephin and Flagyl and was referred to the hospitalist service for admission.       Interval history / Subjective:   1/10 still having n/v this am, added compazine prn     Assessment & Plan:      Acute sigmoid diverticulitis vs colitis  Hx of gastric bypass and right colectomy   -Gi consulted  -Surgery consulted  -zofran/compazine for n/v  -cefepime iv  -iv fluids  -CT of abd showed suggestive of either colitis or diverticulitis.    HTN  -continue home losartan    UTI  -cefepime iv     Code status: full code  Prophylaxis: lovenox  Care Plan discussed with:patient, family

## 2024-12-10 NOTE — CONSULTS
[facility]  [address]  [place], [place] [id number] [phone]                     GI CONSULTATION NOTE      NAME:  Ce Roberts   :   1970   MRN:   [id number]     Consult Date: 12/10/2024     Chief Complaint: abdominal pain, bloody diarrhea     History of Present Illness:  Patient is a 54 y.o. who is seen in consultation at the request of Dr. Huang for the above mentioned problem.    53 y/o female with hx of gastric bypass in July complicated by cecal ischemia and a right hemicolectomy, presented to [facility] ER with abdominal pain and diarrhea for 3 weeks. For 3 days PTA she was experiencing bloody stool.     Reports difficulty advancing her diet and nausea off/on since her surgery. Stools described as soft. 3 weeks ago she began to have worsening abdominal pain a/w diarrhea. Endorses 3-4 loose liquid stools daily. 3 day ago she noted dark red/bright red blood in her stool.     CT AP 24  IMPRESSION:  No evidence of active GI bleed.  Findings may suggest mild patchy colitis versus mild acute sigmoid  diverticulitis clinical correlation advised.     Sequelae of prior John-en-Y gastric bypass and partial right hemicolectomy  without obvious anastomotic complications.    Folate level 1.0 - she has not been taking her post procedure vitamins.       PMH:  Past Medical History:   Diagnosis Date    Arthritis     Arthritis of low back     Asthma     Chronic pain     fibromyalgia    Colon spasm     Controlled type 2 diabetes mellitus without complication, without long-term current use of insulin ([facility]) 2022    Depression     Fibromyalgia     GERD (gastroesophageal reflux disease)     High cholesterol     Hypertension     IBS (irritable bowel syndrome)     Left axillary pain 2013    Migraine     LAST HEADACHE 12    PRASHANT (obstructive sleep apnea)     DOES NOT WEAR CPAP       PSH:  Past Surgical History:   Procedure Laterality Date    APPENDECTOMY      BLADDER REPAIR   as needed for Anxiety.   albuterol sulfate HFA (VENTOLIN HFA) 108 (90 Base) MCG/ACT inhaler Unknown  No No   Sig: Inhale 2 puffs into the lungs 4 times daily as needed for Wheezing   gabapentin (NEURONTIN) 600 MG tablet 12/9/2024  No Yes   Sig: TAKE 1 TABLET BY MOUTH TWICE  DAILY MAX DAILY AMOUNT: 1,200 MG   hydroCHLOROthiazide (HYDRODIURIL) 25 MG tablet Past Month  No Yes   Sig: TAKE 1 TABLET BY MOUTH DAILY   losartan (COZAAR) 50 MG tablet Past Month  No Yes   Sig: TAKE 1 TABLET BY MOUTH DAILY   omeprazole (PRILOSEC) 40 MG delayed release capsule Past Month  No Yes   Sig: TAKE 1 CAPSULE BY MOUTH IN THE MORNING AND AT BEDTIME OPEN CAPSULE OVER FOOD AND CONSUME FOOD.   ondansetron (ZOFRAN) 8 MG tablet 12/9/2024  No Yes   Sig: Take 1 tablet by mouth every 8 hours as needed for Nausea or Vomiting   ondansetron (ZOFRAN) 8 MG tablet   No No   Sig: Take 1 tablet by mouth every 8 hours as needed for Nausea or Vomiting      Facility-Administered Medications: None       Hospital Medications:  Current Facility-Administered Medications   Medication Dose Route Frequency    ALPRAZolam (XANAX) tablet 0.5 mg  0.5 mg Oral Daily PRN    gabapentin (NEURONTIN) tablet 600 mg  600 mg Oral BID    losartan (COZAAR) tablet 50 mg  50 mg Oral Daily    sodium chloride flush 0.9 % injection 5-40 mL  5-40 mL IntraVENous 2 times per day    sodium chloride flush 0.9 % injection 5-40 mL  5-40 mL IntraVENous PRN    0.9 % sodium chloride infusion   IntraVENous PRN    potassium chloride (KLOR-CON) extended release tablet 40 mEq  40 mEq Oral PRN    Or    potassium bicarb-citric acid (EFFER-K) effervescent tablet 40 mEq  40 mEq Oral PRN    Or    potassium chloride 10 mEq/100 mL IVPB (Peripheral Line)  10 mEq IntraVENous PRN    magnesium sulfate 2000 mg in 50 mL IVPB premix  2,000 mg IntraVENous PRN    polyethylene glycol (GLYCOLAX) packet 17 g  17 g Oral Daily PRN    0.9 % sodium chloride infusion   IntraVENous Continuous    ceFEPIme (MAXIPIME) 2,000

## 2024-12-10 NOTE — PROGRESS NOTES
Patient reports she had 3 watery, brown bowel movements of the same consistency in the last 24 hours. One at home prior to coming to the ED at 3-3:30 pm, one at 2 am this morning, and one now. C. Diff samples collected from current stool.

## 2024-12-10 NOTE — ED PROVIDER NOTES
[facility] EMERGENCY DEP  EMERGENCY DEPARTMENT ENCOUNTER      Pt Name: Ce Roberts  MRN: [id number]  Birthdate 1970  Date of evaluation: 12/9/2024  Provider: Mansi Wadsworth MD    CHIEF COMPLAINT     No chief complaint on file.        HISTORY OF PRESENT ILLNESS    Ce Roberts is a 55 yo F with h/o gastric bypass and right shiva colectomy in June of this year who has had 3 weeks of abdominal pain and vomiting for the past 3 weeks and rectal pain and bleeding for past 3 days.  She is not able to tolerate anything by mouth.  She last took zofran yesterday but vomited.  Her surgeon is Dr. Bolaños. She denies fever.           Additional history from independent historians:     Review of External Medical Records:     Nursing Notes were reviewed.    REVIEW OF SYSTEMS       Review of Systems    Except as noted above the remainder of the review of systems was reviewed and negative.       PAST MEDICAL HISTORY     Past Medical History:   Diagnosis Date    Arthritis     Arthritis of low back     Asthma     Chronic pain     fibromyalgia    Colon spasm     Controlled type 2 diabetes mellitus without complication, without long-term current use of insulin (HCC) 02/16/2022    Depression     Fibromyalgia     GERD (gastroesophageal reflux disease)     High cholesterol     Hypertension     IBS (irritable bowel syndrome)     Left axillary pain 02/22/2013    Migraine     LAST HEADACHE 2-16-12    PRASHANT (obstructive sleep apnea)     DOES NOT WEAR CPAP         SURGICAL HISTORY       Past Surgical History:   Procedure Laterality Date    APPENDECTOMY      BLADDER REPAIR  04/2011     BREAST BIOPSY Left     benign    CHOLECYSTECTOMY      GYN      vaginal ablation.    GYN      For ectopic pregnancy, hx tubal ligation    HYSTERECTOMY, TOTAL ABDOMINAL (CERVIX REMOVED)      LAPAROSCOPY N/A 6/22/2024    EXPLORATORY LAPAROTOMY, RIGHT HEMICOLECTOMY performed by Kevin Bolaños MD at [facility] MAIN OR    LADI-EN-Y GASTRIC BYPASS N/A 6/17/2024    ROBOTIC

## 2024-12-10 NOTE — ED TRIAGE NOTES
Pt arrives c/o rectal bleeding x3 days, pain around anus, not eating for 3 weeks, nausea, eating. Hx of bowel resection.

## 2024-12-10 NOTE — H&P
History and Physical    Date of Service:  12/10/2024  Primary Care Provider: Ulisses Joy MD  Source of information: The patient and review of EMR    Chief Complaint: Rectal Bleeding      History of Presenting Illness:   Ce Roberts is a 54 y.o. female with past medical history significant for hypertension, s/p gastric bypass secondary to obesity presented at the emergency room with multiple medical complaints including rectal bleeding, rectal pain and nausea.  Patient was admitted to this hospital in June 2024 to the surgical service and underwent gastric bypass for obesity, this became complicated with ischemic colitis necessitating exploratory laparotomy and right hemicolectomy 5 days postop.  Patient stated that she has been having GI symptoms on and off since then.  The present symptoms started a couple of days ago.  The rectal pain is constant with no radiation and no known aggravating or relieving factors. The  reported rectal bleeding is without clot formation.  CT scan of the abdomen and pelvis done in the emergency room did not show any evidence of active GI bleed but this showed findings suggestive of either colitis or diverticulitis.  Patient was treated with Rocephin and Flagyl and was referred to the hospitalist service for admission.       REVIEW OF SYSTEMS:  REVIEW OF SYSTEMS:  HEAD, EYES, EARS, NOSE AND THROAT:  No headache.  No dizziness.  No blurring of vision.  No photophobia.  RESPIRATORY SYSTEM:  No shortness of breath.  No cough.  No hemoptysis.  CARDIOVASCULAR SYSTEM:  No chest pain.  No orthopnea.  No palpitations.  GASTROINTESTINAL SYSTEM: Positive for rectal bleeding and nausea no vomiting.  No diarrhea.  No constipation.  GENITOURINARY SYSTEM:  No dysuria, no urgency, and no frequency.     All other systems are reviewed and they are negative.        Past Medical History:   Diagnosis Date    Arthritis     Arthritis of low back     Asthma     Chronic pain     fibromyalgia

## 2024-12-11 LAB — PREALB SERPL-MCNC: 9.5 MG/DL (ref 20–40)

## 2024-12-11 PROCEDURE — 6360000002 HC RX W HCPCS: Performed by: FAMILY MEDICINE

## 2024-12-11 PROCEDURE — 6360000002 HC RX W HCPCS: Performed by: NURSE PRACTITIONER

## 2024-12-11 PROCEDURE — 2580000003 HC RX 258: Performed by: NURSE PRACTITIONER

## 2024-12-11 PROCEDURE — 2580000003 HC RX 258: Performed by: FAMILY MEDICINE

## 2024-12-11 PROCEDURE — 84134 ASSAY OF PREALBUMIN: CPT

## 2024-12-11 PROCEDURE — 2500000003 HC RX 250 WO HCPCS: Performed by: SURGERY

## 2024-12-11 PROCEDURE — 99232 SBSQ HOSP IP/OBS MODERATE 35: CPT | Performed by: SURGERY

## 2024-12-11 PROCEDURE — 6360000002 HC RX W HCPCS

## 2024-12-11 PROCEDURE — 6360000002 HC RX W HCPCS: Performed by: INTERNAL MEDICINE

## 2024-12-11 PROCEDURE — 6360000002 HC RX W HCPCS: Performed by: SURGERY

## 2024-12-11 PROCEDURE — 2580000003 HC RX 258: Performed by: SURGERY

## 2024-12-11 PROCEDURE — 36415 COLL VENOUS BLD VENIPUNCTURE: CPT

## 2024-12-11 PROCEDURE — 6370000000 HC RX 637 (ALT 250 FOR IP)

## 2024-12-11 PROCEDURE — 6370000000 HC RX 637 (ALT 250 FOR IP): Performed by: INTERNAL MEDICINE

## 2024-12-11 PROCEDURE — 2580000003 HC RX 258: Performed by: INTERNAL MEDICINE

## 2024-12-11 PROCEDURE — 1200000000 HC SEMI PRIVATE

## 2024-12-11 RX ORDER — ONDANSETRON 2 MG/ML
4 INJECTION INTRAMUSCULAR; INTRAVENOUS EVERY 6 HOURS PRN
Status: DISCONTINUED | OUTPATIENT
Start: 2024-12-11 | End: 2024-12-13 | Stop reason: HOSPADM

## 2024-12-11 RX ORDER — VANCOMYCIN HYDROCHLORIDE 50 MG/ML
125 KIT ORAL EVERY 6 HOURS SCHEDULED
Status: DISCONTINUED | OUTPATIENT
Start: 2024-12-11 | End: 2024-12-13 | Stop reason: HOSPADM

## 2024-12-11 RX ORDER — VANCOMYCIN HYDROCHLORIDE 50 MG/ML
125 KIT ORAL ONCE
Status: DISCONTINUED | OUTPATIENT
Start: 2024-12-11 | End: 2024-12-11

## 2024-12-11 RX ADMIN — Medication 125 MG: at 07:15

## 2024-12-11 RX ADMIN — SODIUM CHLORIDE: 9 INJECTION, SOLUTION INTRAVENOUS at 11:31

## 2024-12-11 RX ADMIN — ONDANSETRON 4 MG: 2 INJECTION INTRAMUSCULAR; INTRAVENOUS at 06:57

## 2024-12-11 RX ADMIN — SODIUM CHLORIDE, PRESERVATIVE FREE 40 MG: 5 INJECTION INTRAVENOUS at 08:04

## 2024-12-11 RX ADMIN — FOLIC ACID 1 MG: 5 INJECTION, SOLUTION INTRAMUSCULAR; INTRAVENOUS; SUBCUTANEOUS at 15:51

## 2024-12-11 RX ADMIN — ONDANSETRON 4 MG: 2 INJECTION INTRAMUSCULAR; INTRAVENOUS at 13:03

## 2024-12-11 RX ADMIN — SODIUM CHLORIDE, PRESERVATIVE FREE 40 MG: 5 INJECTION INTRAVENOUS at 20:41

## 2024-12-11 RX ADMIN — CEFEPIME 2000 MG: 2 INJECTION, POWDER, FOR SOLUTION INTRAVENOUS at 05:46

## 2024-12-11 RX ADMIN — Medication 125 MG: at 05:44

## 2024-12-11 RX ADMIN — Medication 125 MG: at 00:50

## 2024-12-11 RX ADMIN — PROCHLORPERAZINE EDISYLATE 10 MG: 5 INJECTION INTRAMUSCULAR; INTRAVENOUS at 18:15

## 2024-12-11 RX ADMIN — SODIUM CHLORIDE, PRESERVATIVE FREE 10 ML: 5 INJECTION INTRAVENOUS at 08:07

## 2024-12-11 RX ADMIN — METOCLOPRAMIDE 10 MG: 5 INJECTION, SOLUTION INTRAMUSCULAR; INTRAVENOUS at 05:44

## 2024-12-11 RX ADMIN — Medication 125 MG: at 18:15

## 2024-12-11 RX ADMIN — ONDANSETRON 4 MG: 2 INJECTION INTRAMUSCULAR; INTRAVENOUS at 21:58

## 2024-12-11 RX ADMIN — GABAPENTIN 600 MG: 600 TABLET, FILM COATED ORAL at 08:04

## 2024-12-11 RX ADMIN — SODIUM CHLORIDE, PRESERVATIVE FREE 10 ML: 5 INJECTION INTRAVENOUS at 20:53

## 2024-12-11 RX ADMIN — Medication 125 MG: at 13:03

## 2024-12-11 RX ADMIN — WATER 1000 MG: 1 INJECTION INTRAMUSCULAR; INTRAVENOUS; SUBCUTANEOUS at 18:15

## 2024-12-11 RX ADMIN — PROCHLORPERAZINE EDISYLATE 10 MG: 5 INJECTION INTRAMUSCULAR; INTRAVENOUS at 08:04

## 2024-12-11 ASSESSMENT — PAIN SCALES - GENERAL: PAINLEVEL_OUTOF10: 0

## 2024-12-11 NOTE — PROGRESS NOTES
[facility]                                                                                          Hospitalist Progress Note  SAFIA Renee - CNP  Answering service: [phone] OR [phone] from in house phone        Date of Service:  2024  NAME:  Ce Roberts  :  1970  MRN:  [id number]       Admission Summary:   Ce Roberts is a 54 y.o. female with past medical history significant for hypertension, s/p gastric bypass secondary to obesity presented at the emergency room with multiple medical complaints including rectal bleeding, rectal pain and nausea.  Patient was admitted to this hospital in 2024 to the surgical service and underwent gastric bypass for obesity, this became complicated with ischemic colitis necessitating exploratory laparotomy and right hemicolectomy 5 days postop.  Patient stated that she has been having GI symptoms on and off since then.  The present symptoms started a couple of days ago.  The rectal pain is constant with no radiation and no known aggravating or relieving factors. The  reported rectal bleeding is without clot formation.  CT scan of the abdomen and pelvis done in the emergency room did not show any evidence of active GI bleed but this showed findings suggestive of either colitis or diverticulitis.  Patient was treated with Rocephin and Flagyl and was referred to the hospitalist service for admission.       Interval history / Subjective:   12/10 still having n/v this am, added compazine prn   feeling improved overnight, tolerating po       Assessment & Plan:      Acute sigmoid diverticulitis vs colitis due to C.Diff  Hx of gastric bypass and right colectomy   -Gi consulted  -Surgery consulted,   -Upper GI shows no stricture.  Advance to bariatric full liquid diet and protein shakes 3 times daily.   -zofran/compazine for n/v  -vancomycin po  -iv fluids  -CT of abd showed suggestive of either

## 2024-12-11 NOTE — PROGRESS NOTES
[facility]  [address]  [place] [address] [phone]                     GI PROGRESS NOTE    Patient Name: Ce Roberts      : 1970      MRN: [id number]  Admit Date: 2024  Today's Date: 2024  CC: colitis    Subjective:     Patient overall feeling better. She reports nausea is better controlled with Reglan. She is asking for apple sauce.     Objective:     Blood pressure 107/80, pulse 79, temperature 98.8 °F (37.1 °C), temperature source Oral, resp. rate 16, height 1.6 m (5' 3\"), weight 66.5 kg (146 lb 9.7 oz), SpO2 98%.    Physical Exam:  General appearance: cooperative, no distress, appears stated age  Skin: Extremities and face reveal no rashes.  HEENT: Sclerae anicteric.   Cardiovascular: Regular rate and rhythm.   Respiratory: Comfortable breathing with no accessory muscle use.   GI: Abdomen nondistended, soft, and tender. Normal active bowel sounds.  Rectal: Deferred   Musculoskeletal: No pitting edema of the lower legs.    Neurological: Gross memory appears intact. Patient is alert and oriented.   Psychiatric:   No anxiety or agitation.      Data Review:    Recent Results (from the past 24 hour(s))   Enteric Bact Panel, DNA    Collection Time: 12/10/24  2:39 PM    Specimen: Stool   Result Value Ref Range    SHIGELLA SPECIES, DNA Negative NEG      CAMPYLOBACTER SPECIES, DNA Negative NEG      VIBRIO SPECIES, DNA Negative NEG      ENTEROTOXIGEN E COLI, DNA Negative NEG      SHIGA TOXIN PRODUCING, DNA Negative NEG      SALMONELLA SPECIES, DNA Negative NEG      P. SHIGELLOIDES, DNA Negative NEG      Y. ENTEROCOLITICA, DNA Negative NEG     Clostridium Difficile Toxin/Antigen    Collection Time: 12/10/24  2:39 PM    Specimen: Stool   Result Value Ref Range    Reflex See Reflex order for C. difficile (DNA)      C Diff Toxin Interpretation (A) NTXCD       Indeterminate for Toxigenic C. difficile, DNA confirmation to follow.   Clostridium Difficile PCR

## 2024-12-12 PROBLEM — A04.72 C. DIFFICILE COLITIS: Status: ACTIVE | Noted: 2024-12-12

## 2024-12-12 PROBLEM — R82.71 ASYMPTOMATIC BACTERIURIA: Status: ACTIVE | Noted: 2024-12-12

## 2024-12-12 LAB
ALBUMIN SERPL-MCNC: 2.2 G/DL (ref 3.5–5)
ALBUMIN/GLOB SERPL: 0.8 (ref 1.1–2.2)
ALP SERPL-CCNC: 56 U/L (ref 45–117)
ALT SERPL-CCNC: 22 U/L (ref 12–78)
ANION GAP SERPL CALC-SCNC: 10 MMOL/L (ref 2–12)
AST SERPL-CCNC: 41 U/L (ref 15–37)
BACTERIA SPEC CULT: ABNORMAL
BACTERIA SPEC CULT: ABNORMAL
BASOPHILS # BLD: 0 K/UL (ref 0–0.1)
BASOPHILS NFR BLD: 1 % (ref 0–1)
BILIRUB SERPL-MCNC: 0.6 MG/DL (ref 0.2–1)
BUN SERPL-MCNC: 3 MG/DL (ref 6–20)
BUN/CREAT SERPL: 6 (ref 12–20)
CALCIUM SERPL-MCNC: 8 MG/DL (ref 8.5–10.1)
CC UR VC: ABNORMAL
CHLORIDE SERPL-SCNC: 112 MMOL/L (ref 97–108)
CO2 SERPL-SCNC: 20 MMOL/L (ref 21–32)
CREAT SERPL-MCNC: 0.53 MG/DL (ref 0.55–1.02)
DIFFERENTIAL METHOD BLD: ABNORMAL
EOSINOPHIL # BLD: 0.1 K/UL (ref 0–0.4)
EOSINOPHIL NFR BLD: 2 % (ref 0–7)
ERYTHROCYTE [DISTWIDTH] IN BLOOD BY AUTOMATED COUNT: 14.6 % (ref 11.5–14.5)
GLOBULIN SER CALC-MCNC: 2.6 G/DL (ref 2–4)
GLUCOSE SERPL-MCNC: 65 MG/DL (ref 65–100)
HCT VFR BLD AUTO: 33.7 % (ref 35–47)
HGB BLD-MCNC: 11 G/DL (ref 11.5–16)
IMM GRANULOCYTES # BLD AUTO: 0 K/UL (ref 0–0.04)
IMM GRANULOCYTES NFR BLD AUTO: 1 % (ref 0–0.5)
LYMPHOCYTES # BLD: 2.3 K/UL (ref 0.8–3.5)
LYMPHOCYTES NFR BLD: 39 % (ref 12–49)
MCH RBC QN AUTO: 31.7 PG (ref 26–34)
MCHC RBC AUTO-ENTMCNC: 32.6 G/DL (ref 30–36.5)
MCV RBC AUTO: 97.1 FL (ref 80–99)
MONOCYTES # BLD: 0.4 K/UL (ref 0–1)
MONOCYTES NFR BLD: 6 % (ref 5–13)
NEUTS SEG # BLD: 3.1 K/UL (ref 1.8–8)
NEUTS SEG NFR BLD: 51 % (ref 32–75)
NRBC # BLD: 0 K/UL (ref 0–0.01)
NRBC BLD-RTO: 0 PER 100 WBC
PLATELET # BLD AUTO: 186 K/UL (ref 150–400)
PMV BLD AUTO: 12.1 FL (ref 8.9–12.9)
POTASSIUM SERPL-SCNC: 2.8 MMOL/L (ref 3.5–5.1)
PREALB SERPL-MCNC: 10 MG/DL (ref 20–40)
PROT SERPL-MCNC: 4.8 G/DL (ref 6.4–8.2)
RBC # BLD AUTO: 3.47 M/UL (ref 3.8–5.2)
SERVICE CMNT-IMP: ABNORMAL
SODIUM SERPL-SCNC: 142 MMOL/L (ref 136–145)
WBC # BLD AUTO: 6 K/UL (ref 3.6–11)

## 2024-12-12 PROCEDURE — 1200000000 HC SEMI PRIVATE

## 2024-12-12 PROCEDURE — 76937 US GUIDE VASCULAR ACCESS: CPT

## 2024-12-12 PROCEDURE — 84134 ASSAY OF PREALBUMIN: CPT

## 2024-12-12 PROCEDURE — 6370000000 HC RX 637 (ALT 250 FOR IP)

## 2024-12-12 PROCEDURE — 36415 COLL VENOUS BLD VENIPUNCTURE: CPT

## 2024-12-12 PROCEDURE — 85025 COMPLETE CBC W/AUTO DIFF WBC: CPT

## 2024-12-12 PROCEDURE — 2580000003 HC RX 258: Performed by: INTERNAL MEDICINE

## 2024-12-12 PROCEDURE — 99222 1ST HOSP IP/OBS MODERATE 55: CPT | Performed by: INTERNAL MEDICINE

## 2024-12-12 PROCEDURE — 6360000002 HC RX W HCPCS: Performed by: NURSE PRACTITIONER

## 2024-12-12 PROCEDURE — 99232 SBSQ HOSP IP/OBS MODERATE 35: CPT | Performed by: SURGERY

## 2024-12-12 PROCEDURE — 80053 COMPREHEN METABOLIC PANEL: CPT

## 2024-12-12 PROCEDURE — 2580000003 HC RX 258: Performed by: SURGERY

## 2024-12-12 PROCEDURE — 6360000002 HC RX W HCPCS: Performed by: HOSPITALIST

## 2024-12-12 PROCEDURE — 6360000002 HC RX W HCPCS: Performed by: INTERNAL MEDICINE

## 2024-12-12 PROCEDURE — 6360000002 HC RX W HCPCS

## 2024-12-12 PROCEDURE — 2580000003 HC RX 258: Performed by: NURSE PRACTITIONER

## 2024-12-12 PROCEDURE — 2500000003 HC RX 250 WO HCPCS: Performed by: SURGERY

## 2024-12-12 RX ORDER — POTASSIUM CHLORIDE 750 MG/1
40 TABLET, EXTENDED RELEASE ORAL 2 TIMES DAILY
Status: DISCONTINUED | OUTPATIENT
Start: 2024-12-12 | End: 2024-12-13 | Stop reason: HOSPADM

## 2024-12-12 RX ORDER — POTASSIUM CHLORIDE 7.45 MG/ML
10 INJECTION INTRAVENOUS
Status: DISPENSED | OUTPATIENT
Start: 2024-12-12 | End: 2024-12-12

## 2024-12-12 RX ADMIN — SODIUM CHLORIDE: 9 INJECTION, SOLUTION INTRAVENOUS at 00:20

## 2024-12-12 RX ADMIN — SODIUM CHLORIDE, PRESERVATIVE FREE 40 MG: 5 INJECTION INTRAVENOUS at 20:57

## 2024-12-12 RX ADMIN — POTASSIUM CHLORIDE 10 MEQ: 7.46 INJECTION, SOLUTION INTRAVENOUS at 15:17

## 2024-12-12 RX ADMIN — Medication 125 MG: at 18:07

## 2024-12-12 RX ADMIN — Medication 125 MG: at 05:39

## 2024-12-12 RX ADMIN — FOLIC ACID 1 MG: 5 INJECTION, SOLUTION INTRAMUSCULAR; INTRAVENOUS; SUBCUTANEOUS at 13:44

## 2024-12-12 RX ADMIN — Medication 125 MG: at 13:36

## 2024-12-12 RX ADMIN — Medication 125 MG: at 23:58

## 2024-12-12 RX ADMIN — PROCHLORPERAZINE EDISYLATE 10 MG: 5 INJECTION INTRAMUSCULAR; INTRAVENOUS at 05:39

## 2024-12-12 RX ADMIN — SODIUM CHLORIDE, PRESERVATIVE FREE 40 MG: 5 INJECTION INTRAVENOUS at 08:47

## 2024-12-12 RX ADMIN — Medication 125 MG: at 00:18

## 2024-12-12 RX ADMIN — POTASSIUM CHLORIDE 10 MEQ: 7.46 INJECTION, SOLUTION INTRAVENOUS at 16:43

## 2024-12-12 RX ADMIN — ONDANSETRON 4 MG: 2 INJECTION INTRAMUSCULAR; INTRAVENOUS at 19:56

## 2024-12-12 RX ADMIN — POTASSIUM CHLORIDE 10 MEQ: 7.46 INJECTION, SOLUTION INTRAVENOUS at 13:41

## 2024-12-12 RX ADMIN — POTASSIUM CHLORIDE 10 MEQ: 7.46 INJECTION, SOLUTION INTRAVENOUS at 18:08

## 2024-12-12 RX ADMIN — SODIUM CHLORIDE, PRESERVATIVE FREE 10 ML: 5 INJECTION INTRAVENOUS at 19:59

## 2024-12-12 ASSESSMENT — PAIN SCALES - GENERAL: PAINLEVEL_OUTOF10: 3

## 2024-12-12 NOTE — PLAN OF CARE
Problem: Chronic Conditions and Co-morbidities  Goal: Patient's chronic conditions and co-morbidity symptoms are monitored and maintained or improved  12/11/2024 0843 by Meg Centeno RN  Outcome: Progressing     Problem: Safety - Adult  Goal: Free from fall injury  12/11/2024 2224 by Lacey Morrell, RN  Outcome: Progressing  12/11/2024 0843 by Meg Centeno RN  Outcome: Progressing

## 2024-12-12 NOTE — PROGRESS NOTES
[facility]  [address]  [place] [address] [phone]                     GI PROGRESS NOTE    Patient Name: Ce Roberts      : 1970      MRN: [id number]  Admit Date: 2024  Today's Date: 2024  CC: colitis    Subjective:     Patient overall feeling better. She tolerated FLD, she is drinking protein drinks. She is anxious to go home. Denies diarrhea.    Objective:     Blood pressure 123/89, pulse (!) 104, temperature 98.6 °F (37 °C), temperature source Oral, resp. rate 20, height 1.6 m (5' 3\"), weight 66.5 kg (146 lb 9.7 oz), SpO2 100%.    Physical Exam:  General appearance: cooperative, no distress, appears stated age  Skin: Extremities and face reveal no rashes.  HEENT: Sclerae anicteric.   Cardiovascular: Regular rate and rhythm.   Respiratory: Comfortable breathing with no accessory muscle use.   GI: Abdomen nondistended, soft, and tender. Normal active bowel sounds.  Rectal: Deferred   Musculoskeletal: No pitting edema of the lower legs.    Neurological: Gross memory appears intact. Patient is alert and oriented.   Psychiatric:   No anxiety or agitation.      Data Review:    Recent Results (from the past 24 hour(s))   CBC with Auto Differential    Collection Time: 24  6:45 AM   Result Value Ref Range    WBC 6.0 3.6 - 11.0 K/uL    RBC 3.47 (L) 3.80 - 5.20 M/uL    Hemoglobin 11.0 (L) 11.5 - 16.0 g/dL    Hematocrit 33.7 (L) 35.0 - 47.0 %    MCV 97.1 80.0 - 99.0 FL    MCH 31.7 26.0 - 34.0 PG    MCHC 32.6 30.0 - 36.5 g/dL    RDW 14.6 (H) 11.5 - 14.5 %    Platelets 186 150 - 400 K/uL    MPV 12.1 8.9 - 12.9 FL    Nucleated RBCs 0.0 0  WBC    nRBC 0.00 0.00 - 0.01 K/uL    Neutrophils % 51 32 - 75 %    Lymphocytes % 39 12 - 49 %    Monocytes % 6 5 - 13 %    Eosinophils % 2 0 - 7 %    Basophils % 1 0 - 1 %    Immature Granulocytes % 1 (H) 0.0 - 0.5 %    Neutrophils Absolute 3.1 1.8 - 8.0 K/UL    Lymphocytes Absolute 2.3 0.8 - 3.5 K/UL    Monocytes

## 2024-12-12 NOTE — PROGRESS NOTES
Surgery Progress Note    12/12/2024    Admit Date: 12/9/2024 10:07 PM    CC: Abdominal discomfort      Subjective:     Drank almost one protein shake. Does not like the other food. Drinking okay. No diarrhea.    Constitutional: No fever or chills  Neurologic: No headache  Eyes: No scleral icterus or irritated eyes  Nose: No nasal pain or drainage  Mouth: No oral lesions or sore throat  Cardiac: No palpations or chest pain  Pulmonary: No cough or shortness of breath  Gastrointestinal: Abdominal pain, no nausea, emesis, diarrhea, or constipation  Genitourinary: No dysuria  Musculoskeletal: No muscle or joint tenderness  Skin: No rashes or lesions  Psychiatric: No anxiety or depressed mood    Objective:     Vitals:    12/12/24 0544   BP: 123/89   Pulse: (!) 104   Resp: 20   Temp: 98.6 °F (37 °C)   SpO2: 100%       General: No acute distress, conversant  Eyes: PERRLA, no scleral icterus  HENT: Normocephalic without oral lesions  Neck: Trachea midline without LAD  Cardiac: Normal pulse rate and rhythm  Pulmonary: Symmetric chest rise with normal effort  GI: Soft, NT, ND, no hernia, no splenomegaly  Skin: Warm without rash  Extremities: No edema or joint stiffness  Psych: Appropriate mood and affect    Labs, vital signs, and I/O reviewed.    Assessment:     54-year-old female with poor p.o. intake with history of gastric bypass and now colitis found to have C. difficile    Plan:     Upper GI shows no stricture.  Advance to low carb regular diet and protein shakes 3 times daily. Patient is moderately malnourished based on her albumin. We will see how she does treating the infection and tolerating a diet.    P.o. Vanco for C. difficile    Kevin Bolaños MD, FACS, [facility]  Bariatric and General Surgeon  [facility]

## 2024-12-12 NOTE — CARE COORDINATION
KEN:    CM noted of ID consult, other specialities following. Cm will meet with her to complete assessment.    [facility]  Care Management Department  Ph:[phone]

## 2024-12-12 NOTE — PROCEDURES
PROCEDURE NOTE  Date: 12/12/2024   Name: Ce Roberts  YOB: 1970    Procedures  Ultrasound guided peripheral IV placed on left mid forearm. RN made aware.  See LDA.    Padmini Crain RN  VAT

## 2024-12-12 NOTE — PROGRESS NOTES
active Medications were reviewed, considered, added and adjusted based on the clinical condition today.      Home Medications were reconciled to the best of my ability given all available resources at the time of admission. Route is PO if not otherwise noted.      Admission Status:[id number]:::1}      Medications Reviewed:     Current Facility-Administered Medications   Medication Dose Route Frequency    potassium chloride 10 mEq/100 mL IVPB (Peripheral Line)  10 mEq IntraVENous Q1H    potassium chloride (KLOR-CON) extended release tablet 40 mEq  40 mEq Oral BID    meropenem (MERREM) 1,000 mg in sodium chloride 0.9 % 100 mL IVPB (mini-bag)  1,000 mg IntraVENous Q8H    vancomycin (FIRVANQ) 50 MG/ML oral solution 125 mg  125 mg Oral 4 times per day    ondansetron (ZOFRAN) injection 4 mg  4 mg IntraVENous Q6H PRN    ALPRAZolam (XANAX) tablet 0.5 mg  0.5 mg Oral Daily PRN    gabapentin (NEURONTIN) tablet 600 mg  600 mg Oral BID    losartan (COZAAR) tablet 50 mg  50 mg Oral Daily    sodium chloride flush 0.9 % injection 5-40 mL  5-40 mL IntraVENous 2 times per day    sodium chloride flush 0.9 % injection 5-40 mL  5-40 mL IntraVENous PRN    0.9 % sodium chloride infusion   IntraVENous PRN    potassium chloride (KLOR-CON) extended release tablet 40 mEq  40 mEq Oral PRN    Or    potassium bicarb-citric acid (EFFER-K) effervescent tablet 40 mEq  40 mEq Oral PRN    Or    potassium chloride 10 mEq/100 mL IVPB (Peripheral Line)  10 mEq IntraVENous PRN    magnesium sulfate 2000 mg in 50 mL IVPB premix  2,000 mg IntraVENous PRN    polyethylene glycol (GLYCOLAX) packet 17 g  17 g Oral Daily PRN    0.9 % sodium chloride infusion   IntraVENous Continuous    prochlorperazine (COMPAZINE) injection 10 mg  10 mg IntraVENous Q6H PRN    pantoprazole (PROTONIX) 40 mg in sodium chloride (PF) 0.9 % 10 mL injection  40 mg IntraVENous Q12H    diatrizoate meglumine-sodium (GASTROGRAFIN) 66-10 % solution 240 mL  240 mL Oral ONCE PRN    folic acid  1 mg in sodium chloride 0.9 % 50 mL IVPB  1 mg IntraVENous Daily    fentaNYL (SUBLIMAZE) injection 50 mcg  50 mcg IntraVENous Q4H PRN     ______________________________________________________________________  EXPECTED LENGTH OF STAY: Unable to retrieve estimated LOS  ACTUAL LENGTH OF STAY:          3                 Chacne Tsang MD

## 2024-12-13 VITALS
HEART RATE: 95 BPM | DIASTOLIC BLOOD PRESSURE: 89 MMHG | RESPIRATION RATE: 16 BRPM | TEMPERATURE: 98.2 F | OXYGEN SATURATION: 99 % | SYSTOLIC BLOOD PRESSURE: 129 MMHG | WEIGHT: 146.61 LBS | HEIGHT: 63 IN | BODY MASS INDEX: 25.98 KG/M2

## 2024-12-13 LAB
ALBUMIN SERPL-MCNC: 2.9 G/DL (ref 3.5–5)
ALBUMIN/GLOB SERPL: 1 (ref 1.1–2.2)
ALP SERPL-CCNC: 77 U/L (ref 45–117)
ALT SERPL-CCNC: 27 U/L (ref 12–78)
ANION GAP SERPL CALC-SCNC: 9 MMOL/L (ref 2–12)
AST SERPL-CCNC: 49 U/L (ref 15–37)
BASOPHILS # BLD: 0 K/UL (ref 0–0.1)
BASOPHILS NFR BLD: 1 % (ref 0–1)
BILIRUB SERPL-MCNC: 0.7 MG/DL (ref 0.2–1)
BUN SERPL-MCNC: 2 MG/DL (ref 6–20)
BUN/CREAT SERPL: 4 (ref 12–20)
CALCIUM SERPL-MCNC: 8.9 MG/DL (ref 8.5–10.1)
CHLORIDE SERPL-SCNC: 107 MMOL/L (ref 97–108)
CO2 SERPL-SCNC: 20 MMOL/L (ref 21–32)
CREAT SERPL-MCNC: 0.47 MG/DL (ref 0.55–1.02)
DIFFERENTIAL METHOD BLD: ABNORMAL
EOSINOPHIL # BLD: 0.2 K/UL (ref 0–0.4)
EOSINOPHIL NFR BLD: 2 % (ref 0–7)
ERYTHROCYTE [DISTWIDTH] IN BLOOD BY AUTOMATED COUNT: 14.5 % (ref 11.5–14.5)
GLOBULIN SER CALC-MCNC: 2.8 G/DL (ref 2–4)
GLUCOSE SERPL-MCNC: 70 MG/DL (ref 65–100)
HCT VFR BLD AUTO: 39.5 % (ref 35–47)
HGB BLD-MCNC: 13.2 G/DL (ref 11.5–16)
IMM GRANULOCYTES # BLD AUTO: 0 K/UL (ref 0–0.04)
IMM GRANULOCYTES NFR BLD AUTO: 0 % (ref 0–0.5)
LYMPHOCYTES # BLD: 3.5 K/UL (ref 0.8–3.5)
LYMPHOCYTES NFR BLD: 40 % (ref 12–49)
MCH RBC QN AUTO: 31.8 PG (ref 26–34)
MCHC RBC AUTO-ENTMCNC: 33.4 G/DL (ref 30–36.5)
MCV RBC AUTO: 95.2 FL (ref 80–99)
MONOCYTES # BLD: 0.4 K/UL (ref 0–1)
MONOCYTES NFR BLD: 4 % (ref 5–13)
NEUTS SEG # BLD: 4.7 K/UL (ref 1.8–8)
NEUTS SEG NFR BLD: 53 % (ref 32–75)
NRBC # BLD: 0 K/UL (ref 0–0.01)
NRBC BLD-RTO: 0 PER 100 WBC
PLATELET # BLD AUTO: 263 K/UL (ref 150–400)
PMV BLD AUTO: 12.5 FL (ref 8.9–12.9)
POTASSIUM SERPL-SCNC: 3.6 MMOL/L (ref 3.5–5.1)
PREALB SERPL-MCNC: 12.6 MG/DL (ref 20–40)
PROT SERPL-MCNC: 5.7 G/DL (ref 6.4–8.2)
RBC # BLD AUTO: 4.15 M/UL (ref 3.8–5.2)
SODIUM SERPL-SCNC: 136 MMOL/L (ref 136–145)
WBC # BLD AUTO: 8.8 K/UL (ref 3.6–11)

## 2024-12-13 PROCEDURE — 6370000000 HC RX 637 (ALT 250 FOR IP)

## 2024-12-13 PROCEDURE — 6370000000 HC RX 637 (ALT 250 FOR IP): Performed by: HOSPITALIST

## 2024-12-13 PROCEDURE — 2580000003 HC RX 258: Performed by: INTERNAL MEDICINE

## 2024-12-13 PROCEDURE — 85025 COMPLETE CBC W/AUTO DIFF WBC: CPT

## 2024-12-13 PROCEDURE — 99232 SBSQ HOSP IP/OBS MODERATE 35: CPT | Performed by: SURGERY

## 2024-12-13 PROCEDURE — 2580000003 HC RX 258: Performed by: NURSE PRACTITIONER

## 2024-12-13 PROCEDURE — 6360000002 HC RX W HCPCS: Performed by: NURSE PRACTITIONER

## 2024-12-13 PROCEDURE — 80053 COMPREHEN METABOLIC PANEL: CPT

## 2024-12-13 PROCEDURE — 84134 ASSAY OF PREALBUMIN: CPT

## 2024-12-13 PROCEDURE — 6360000002 HC RX W HCPCS

## 2024-12-13 PROCEDURE — 36415 COLL VENOUS BLD VENIPUNCTURE: CPT

## 2024-12-13 RX ORDER — VANCOMYCIN HYDROCHLORIDE 50 MG/ML
125 KIT ORAL EVERY 6 HOURS
Qty: 120 ML | Refills: 0 | Status: SHIPPED | OUTPATIENT
Start: 2024-12-13 | End: 2024-12-16 | Stop reason: HOSPADM

## 2024-12-13 RX ORDER — FOLIC ACID 1 MG/1
1 TABLET ORAL DAILY
Qty: 90 TABLET | Refills: 1 | Status: SHIPPED | OUTPATIENT
Start: 2024-12-13

## 2024-12-13 RX ORDER — L.ACID,PARA/B.BIFIDUM/S.THERM 8B CELL
1 CAPSULE ORAL DAILY
Qty: 30 CAPSULE | Refills: 0 | Status: SHIPPED | OUTPATIENT
Start: 2024-12-13 | End: 2025-01-12

## 2024-12-13 RX ORDER — VANCOMYCIN HYDROCHLORIDE 50 MG/ML
125 KIT ORAL EVERY 6 HOURS
Qty: 120 ML | Refills: 0 | Status: SHIPPED | OUTPATIENT
Start: 2024-12-13 | End: 2024-12-13

## 2024-12-13 RX ADMIN — SODIUM CHLORIDE, PRESERVATIVE FREE 10 ML: 5 INJECTION INTRAVENOUS at 09:31

## 2024-12-13 RX ADMIN — POTASSIUM CHLORIDE 40 MEQ: 750 TABLET, EXTENDED RELEASE ORAL at 09:30

## 2024-12-13 RX ADMIN — Medication 125 MG: at 06:12

## 2024-12-13 RX ADMIN — SODIUM CHLORIDE, PRESERVATIVE FREE 40 MG: 5 INJECTION INTRAVENOUS at 09:30

## 2024-12-13 RX ADMIN — ONDANSETRON 4 MG: 2 INJECTION INTRAMUSCULAR; INTRAVENOUS at 06:12

## 2024-12-13 NOTE — PROGRESS NOTES
Surgery Progress Note    12/13/2024    Admit Date: 12/9/2024 10:07 PM    CC: Abdominal discomfort      Subjective:     Drink 2 protein shakes yesterday and other solid food.  No nausea.  No emesis.  Seen by ID.    Constitutional: No fever or chills  Neurologic: No headache  Eyes: No scleral icterus or irritated eyes  Nose: No nasal pain or drainage  Mouth: No oral lesions or sore throat  Cardiac: No palpations or chest pain  Pulmonary: No cough or shortness of breath  Gastrointestinal: Abdominal pain, no nausea, emesis, diarrhea, or constipation  Genitourinary: No dysuria  Musculoskeletal: No muscle or joint tenderness  Skin: No rashes or lesions  Psychiatric: No anxiety or depressed mood    Objective:     Vitals:    12/13/24 0600   BP: 129/89   Pulse: 95   Resp: 16   Temp: 98.2 °F (36.8 °C)   SpO2: 99%       General: No acute distress, conversant  Eyes: PERRLA, no scleral icterus  HENT: Normocephalic without oral lesions  Neck: Trachea midline without LAD  Cardiac: Normal pulse rate and rhythm  Pulmonary: Symmetric chest rise with normal effort  GI: Soft, NT, ND, no hernia, no splenomegaly  Skin: Warm without rash  Extremities: No edema or joint stiffness  Psych: Appropriate mood and affect    Labs, vital signs, and I/O reviewed.    Assessment:     54-year-old female with poor p.o. intake with history of gastric bypass and now colitis found to have C. difficile    Plan:     Okay to discharge on p.o. Vanco per ID recommendations.  Continue bariatric diet at home and protein shakes.  Will see her back in 1 to 2 weeks in the office.    Kevin Bolaños MD, FACS, [facility]  Bariatric and General Surgeon  [facility]

## 2024-12-13 NOTE — PROGRESS NOTES
Per MD order, PT received AVS documentation for discharge (DC). PT A&O x4 at time of discharge. Provided PT teaching on medications, follow-up appointments & s/sx of infection. Allowed PT & daughter time for questions and PT & daughter verbalized understanding. All lines were removed (intact) prior to discharge. PT was escorted to the PT discharge lot by a PCT where PT left in a private vehicle with her daughter to her home residence.

## 2024-12-13 NOTE — PLAN OF CARE
Problem: Chronic Conditions and Co-morbidities  Goal: Patient's chronic conditions and co-morbidity symptoms are monitored and maintained or improved  Outcome: Adequate for Discharge     Problem: Safety - Adult  Goal: Free from fall injury  12/13/2024 1200 by Yancy Arndt, RN  Outcome: Adequate for Discharge  12/13/2024 0211 by Dafne Mayfield, RN  Outcome: Progressing

## 2024-12-13 NOTE — CARE COORDINATION
KEN:    Patient is discharging home with no cm needs. ID cleared for PO abx.     [facility]  Care Management Department  Ph:[phone]

## 2024-12-13 NOTE — DISCHARGE INSTRUCTIONS
Discharge Instructions       PATIENT ID: Ce Roberts  MRN: [id number]   YOB: 1970    DATE OF ADMISSION: 12/9/2024   DATE OF DISCHARGE: 12/13/2024    PRIMARY CARE PROVIDER: Ulisses Joy     ATTENDING PHYSICIAN: Chance Tsang MD   DISCHARGING PROVIDER: Chance Tsang MD    To contact this individual call [phone] and ask the  to page.   If unavailable ask to be transferred to the Adult Hospitalist Department.  Ce Roberts thank you for the opportunity to allow us to care for you during this hospitalization.  Below is a summary of the condition/conditions you were admitted and treated for, discharge care plan and follow-up recommendations.    DISCHARGE DIAGNOSES     Acute sigmoid diverticulitis, C. difficile colitis.  Please complete the prescribed antibiotics as directed.  Follow-up with Dr. Bolaños as established.       CONSULTATIONS: You are seen and followed by general surgery, gastroenterologist, infectious disease.    PROCEDURES/SURGERIES:* No surgery found *    PENDING TEST RESULTS:   At the time of discharge the following test results are still pending: None    FOLLOW UP APPOINTMENTS:   General Recommend DC your PCP within a week of hospital discharge  Your surgeon Dr. Bolaños would like to see you in 1-2 weeks.    ADDITIONAL CARE RECOMMENDATIONS:     DIET: regular diet      ACTIVITY: activity as tolerated          DISCHARGE MEDICATIONS:   See Medication Reconciliation Form    It is important that you take the medication exactly as they are prescribed.   Keep your medication in the bottles provided by the pharmacist and keep a list of the medication names, dosages, and times to be taken in your wallet.   Do not take other medications without consulting your doctor.       NOTIFY YOUR PHYSICIAN FOR ANY OF THE FOLLOWING:   Fever over 101 degrees for 24 hours.   Chest pain, shortness of breath, fever, chills, nausea, vomiting, diarrhea, change in mentation, falling,

## 2024-12-13 NOTE — CONSULTS
Infectious Disease Consult Note    Assessment / Plan:       55 y/o female with diarrhea + for C diff and UA without pyuria and culture with ESBL E coli.      Lack of pyuria on UA argues strongly against presence of UTI in light of alternative diagnosis.  Patient appears to have symptomatically improved without effective treatment of ESBL E coli so far during hospitalization.    C diff treatment - continue PO Vancomycin, defer to GI to complete course.    Stopped Meropenem.    Will sign off, please call with questions.         Reason for Consult: C diff, ESBL E coli on urine culture  Date of Consultation: December 12, 2024  Date of Admission: 12/9/2024  Referring Physician: Dr. Tsang    HPI:    55 y/o female with John-en-Y gastric bypass surgery complicated by cecal ischemia and perforation requiring right hemicolectomy, post-op marginal ulcers by anastamosis in stomach and mild distal esophageal stricture admitted for diarrhea and hematochezia.  CT A/P with sigmoid colitis.  Stool C diff positive - indeterminate GDH/Toxin but + PCR.  UA obtained with 0-4 WBC and culture with ESBL E coli.      Treated with Vanco/Ceftriaxone, then Cefepime.      Since then PO Vancomycin and IV Meropenem ordered.  Patient feeling better prior to carbapenem use and diarrhea resolved.    Past Medical History:  Past Medical History:   Diagnosis Date    Arthritis     Arthritis of low back     Asthma     Chronic pain     fibromyalgia    Colon spasm     Controlled type 2 diabetes mellitus without complication, without long-term current use of insulin ([facility]) 02/16/2022    Depression     Fibromyalgia     GERD (gastroesophageal reflux disease)     High cholesterol     Hypertension     IBS (irritable bowel syndrome)     Left axillary pain 02/22/2013    Migraine     LAST HEADACHE 2-16-12    PRASHANT (obstructive sleep apnea)     DOES NOT WEAR CPAP         Surgical History:  Past Surgical History:   Procedure Laterality Date    APPENDECTOMY      BLADDER     Chloride 112 (H) 97 - 108 mmol/L    CO2 20 (L) 21 - 32 mmol/L    Anion Gap 10 2 - 12 mmol/L    Glucose 65 65 - 100 mg/dL    BUN 3 (L) 6 - 20 MG/DL    Creatinine 0.53 (L) 0.55 - 1.02 MG/DL    BUN/Creatinine Ratio 6 (L) 12 - 20      Est, Glom Filt Rate >90 >60 ml/min/1.73m2    Calcium 8.0 (L) 8.5 - 10.1 MG/DL    Total Bilirubin 0.6 0.2 - 1.0 MG/DL    ALT 22 12 - 78 U/L    AST 41 (H) 15 - 37 U/L    Alk Phosphatase 56 45 - 117 U/L    Total Protein 4.8 (L) 6.4 - 8.2 g/dL    Albumin 2.2 (L) 3.5 - 5.0 g/dL    Globulin 2.6 2.0 - 4.0 g/dL    Albumin/Globulin Ratio 0.8 (L) 1.1 - 2.2     Prealbumin    Collection Time: 12/12/24  6:45 AM   Result Value Ref Range    Prealbumin 10.0 (L) 20.0 - 40.0 mg/dL       Microbiology Data:       Blood: NGTD      Urine: ESBL E coli but UA with 0-4 WBC      Stool: C diff indeterminate GDH/toxin but PCR +    Imaging: CT A/P with sigmoid colitis, prior hemicolectomy with ileocolic anastamosis, diverticulosis    Thank you Dr. Tsang for the opportunity to participate in the care of this patient. Please contact with questions or concerns.     A total time of 60 minutes was spent on today's encounter.  Greater than 50% of the time was spent on the following:  Preparing for visit and chart review.  Obtaining and/or reviewing separately obtained history  Performing a medically appropriate exam and/or evaluation  Counseling and educating a patient/family/caregiver as noted above  Placing relevant orders  Referring and communicating with other professionals (not separately reported)  Independently interpreting results (not separately reported) and communicating results to the patient/family/caregiver  Care coordination (not separately reported) as noted above  Documenting clinical information in the electronic health records (e.g. problem list, visit note) on the day of the encounter

## 2024-12-16 ENCOUNTER — TELEPHONE (OUTPATIENT)
Age: 54
End: 2024-12-16

## 2024-12-16 RX ORDER — VANCOMYCIN HYDROCHLORIDE 125 MG/1
125 CAPSULE ORAL 4 TIMES DAILY
Qty: 48 CAPSULE | Refills: 0 | Status: SHIPPED | OUTPATIENT
Start: 2024-12-16 | End: 2024-12-28

## 2024-12-16 NOTE — PROGRESS NOTES
I received a message from Jeri Mai, care transitions nurse that her insurance would not cover or needs PA for the oral vancomycin.  I discharged patient on 12/13 and I had specifically instructed her to have the pharmacy or herself call me if there is any issue with the prescription but I did not hear from either.  I called the [facility] pharmacy at [address].  The pharmacist said the vancomycin capsule is covered but not the liquid form but they do not have the capsule and stack to have to order it.  I checked with [facility] pharmacy and they have it.  I called patient twice, unable to reach,  with my direct callback number.    Ms. oRberts call me back at 12:15 PM  She said she is feeling well  She said she was trying to call since Friday, I have not gotten any message until today.  [facility]  A prescription for oral vancomycin sent to the [facility] at [address].  I called and spoke with the pharmacist who confirmed med the prescription is covered and they have within the stack.  I called and left voice message to Mr. Roberts to go  the prescription.

## 2024-12-16 NOTE — TELEPHONE ENCOUNTER
LM for pt to call and schedule 8 month bariatric exam. Letter sent. [employer] message also sent. [facility]

## 2024-12-17 ENCOUNTER — TELEPHONE (OUTPATIENT)
Age: 54
End: 2024-12-17

## 2024-12-17 NOTE — TELEPHONE ENCOUNTER
Returned call to patient.  Two patient identifiers used.  Patient states she has redness and a sore throat from taking Vancomycin. Denies fever, shortness of breath, or any other symptom. Advised patient to stop taking medication and informed her I will make Dr. Bolaños aware. She verbalized understanding.

## 2024-12-17 NOTE — TELEPHONE ENCOUNTER
Patient stated she started taking the Vancocin and it has made her throat very red and burn. Patient would like to know what she should do.

## 2024-12-17 NOTE — TELEPHONE ENCOUNTER
Returned call to patient.  Two patient identifiers used.  Informed patient that her PCP prescribed the vancomycin so she needs to contact her PCP regarding her symptoms. She verbalized understanding.

## 2024-12-27 ENCOUNTER — OFFICE VISIT (OUTPATIENT)
Age: 54
End: 2024-12-27
Payer: MEDICARE

## 2024-12-27 VITALS
BODY MASS INDEX: 25.55 KG/M2 | TEMPERATURE: 97.7 F | RESPIRATION RATE: 20 BRPM | HEART RATE: 100 BPM | WEIGHT: 144.2 LBS | HEIGHT: 63 IN | DIASTOLIC BLOOD PRESSURE: 84 MMHG | SYSTOLIC BLOOD PRESSURE: 104 MMHG | OXYGEN SATURATION: 99 %

## 2024-12-27 DIAGNOSIS — R30.0 DYSURIA: Primary | ICD-10-CM

## 2024-12-27 DIAGNOSIS — R30.0 DYSURIA: ICD-10-CM

## 2024-12-27 DIAGNOSIS — A04.72 C. DIFFICILE COLITIS: ICD-10-CM

## 2024-12-27 PROCEDURE — 3017F COLORECTAL CA SCREEN DOC REV: CPT | Performed by: SURGERY

## 2024-12-27 PROCEDURE — 99214 OFFICE O/P EST MOD 30 MIN: CPT | Performed by: SURGERY

## 2024-12-27 PROCEDURE — G8484 FLU IMMUNIZE NO ADMIN: HCPCS | Performed by: SURGERY

## 2024-12-27 PROCEDURE — G8427 DOCREV CUR MEDS BY ELIG CLIN: HCPCS | Performed by: SURGERY

## 2024-12-27 PROCEDURE — G8417 CALC BMI ABV UP PARAM F/U: HCPCS | Performed by: SURGERY

## 2024-12-27 PROCEDURE — 3074F SYST BP LT 130 MM HG: CPT | Performed by: SURGERY

## 2024-12-27 PROCEDURE — 1111F DSCHRG MED/CURRENT MED MERGE: CPT | Performed by: SURGERY

## 2024-12-27 PROCEDURE — 3079F DIAST BP 80-89 MM HG: CPT | Performed by: SURGERY

## 2024-12-27 PROCEDURE — 1036F TOBACCO NON-USER: CPT | Performed by: SURGERY

## 2024-12-27 RX ORDER — PROMETHAZINE HYDROCHLORIDE 25 MG/1
25 TABLET ORAL 4 TIMES DAILY PRN
Qty: 30 TABLET | Refills: 2 | Status: SHIPPED | OUTPATIENT
Start: 2024-12-27 | End: 2025-01-19

## 2024-12-27 RX ORDER — FLUCONAZOLE 150 MG/1
150 TABLET ORAL
Qty: 2 TABLET | Refills: 0 | Status: SHIPPED | OUTPATIENT
Start: 2024-12-27 | End: 2025-01-02

## 2024-12-27 ASSESSMENT — PATIENT HEALTH QUESTIONNAIRE - PHQ9
2. FEELING DOWN, DEPRESSED OR HOPELESS: NOT AT ALL
SUM OF ALL RESPONSES TO PHQ9 QUESTIONS 1 & 2: 0
1. LITTLE INTEREST OR PLEASURE IN DOING THINGS: NOT AT ALL
SUM OF ALL RESPONSES TO PHQ QUESTIONS 1-9: 0

## 2024-12-27 NOTE — PROGRESS NOTES
Identified patient with two patient identifiers (name and ). Reviewed chart in preparation for visit and have obtained necessary documentation.    Ce Roberts is a 54 y.o. female  Chief Complaint   Patient presents with    Follow-up     /84 (Site: Right Upper Arm, Position: Sitting, Cuff Size: Large Adult)   Pulse 100   Temp 97.7 °F (36.5 °C) (Oral)   Resp 20   Ht 1.6 m (5' 3\")   Wt 65.4 kg (144 lb 3.2 oz)   SpO2 99%   BMI 25.54 kg/m²     1. Have you been to the ER, urgent care clinic since your last visit?  Hospitalized since your last visit?yes - [facility] 2024 - 2024    2. Have you seen or consulted any other health care providers outside of the [facility] since your last visit?  Include any pap smears or colon screening. no  
for vaginal candidiasis.  I will check a UA for dysuria.  Encouraged her to try to get to 40 to 60 g of protein over the next few weeks.  I will give her Phenergan for her a.m. nausea.  See her back in a few weeks.      Kevin Bolaños MD, FACS, [facility] [place]  Bariatric and General Surgeon  [facility]

## 2025-01-01 LAB
APPEARANCE UR: ABNORMAL
BACTERIA #/AREA URNS HPF: ABNORMAL /[HPF]
BACTERIA UR CULT: ABNORMAL
BACTERIA UR CULT: ABNORMAL
BILIRUB UR QL STRIP: ABNORMAL
CASTS URNS QL MICRO: ABNORMAL /LPF
COLOR UR: ABNORMAL
CRYSTALS URNS MICRO: ABNORMAL
EPI CELLS #/AREA URNS HPF: >10 /HPF (ref 0–10)
GLUCOSE UR QL STRIP: NEGATIVE
HGB UR QL STRIP: NEGATIVE
KETONES UR QL STRIP: ABNORMAL
LEUKOCYTE ESTERASE UR QL STRIP: ABNORMAL
MICRO URNS: ABNORMAL
NITRITE UR QL STRIP: NEGATIVE
PH UR STRIP: 6 [PH] (ref 5–7.5)
PROT UR QL STRIP: ABNORMAL
RBC #/AREA URNS HPF: ABNORMAL /HPF (ref 0–2)
SP GR UR STRIP: >=1.03 (ref 1–1.03)
UNIDENT CRYS URNS QL MICRO: PRESENT
URINALYSIS REFLEX: ABNORMAL
UROBILINOGEN UR STRIP-MCNC: 1 MG/DL (ref 0.2–1)
WBC #/AREA URNS HPF: ABNORMAL /HPF (ref 0–5)

## 2025-01-04 ENCOUNTER — APPOINTMENT (OUTPATIENT)
Facility: HOSPITAL | Age: 55
End: 2025-01-04
Payer: MEDICARE

## 2025-01-04 ENCOUNTER — HOSPITAL ENCOUNTER (EMERGENCY)
Facility: HOSPITAL | Age: 55
Discharge: HOME | End: 2025-01-04
Attending: EMERGENCY MEDICINE
Payer: MEDICARE

## 2025-01-04 VITALS
TEMPERATURE: 97.7 F | OXYGEN SATURATION: 100 % | RESPIRATION RATE: 12 BRPM | BODY MASS INDEX: 25.23 KG/M2 | DIASTOLIC BLOOD PRESSURE: 85 MMHG | SYSTOLIC BLOOD PRESSURE: 114 MMHG | WEIGHT: 142.42 LBS | HEIGHT: 63 IN | HEART RATE: 95 BPM

## 2025-01-04 DIAGNOSIS — Z98.84 S/P GASTRIC BYPASS: ICD-10-CM

## 2025-01-04 DIAGNOSIS — M79.7 FIBROMYALGIA: ICD-10-CM

## 2025-01-04 DIAGNOSIS — E87.6 HYPOKALEMIA: Primary | ICD-10-CM

## 2025-01-04 LAB
ALBUMIN SERPL-MCNC: 2.5 G/DL (ref 3.5–5)
ALBUMIN/GLOB SERPL: 0.7 (ref 1.1–2.2)
ALP SERPL-CCNC: 72 U/L (ref 45–117)
ALT SERPL-CCNC: 19 U/L (ref 12–78)
AMPHET UR QL SCN: NEGATIVE
ANION GAP SERPL CALC-SCNC: 7 MMOL/L (ref 2–12)
APPEARANCE UR: CLEAR
AST SERPL-CCNC: 45 U/L (ref 15–37)
BACTERIA URNS QL MICRO: NEGATIVE /HPF
BARBITURATES UR QL SCN: NEGATIVE
BASOPHILS # BLD: 0 K/UL (ref 0–0.1)
BASOPHILS NFR BLD: 0 % (ref 0–1)
BENZODIAZ UR QL: NEGATIVE
BILIRUB SERPL-MCNC: 0.6 MG/DL (ref 0.2–1)
BILIRUB UR QL: NEGATIVE
BUN SERPL-MCNC: 7 MG/DL (ref 6–20)
BUN/CREAT SERPL: 13 (ref 12–20)
CALCIUM SERPL-MCNC: 8.5 MG/DL (ref 8.5–10.1)
CANNABINOIDS UR QL SCN: NEGATIVE
CAOX CRY URNS QL MICRO: ABNORMAL
CHLORIDE SERPL-SCNC: 109 MMOL/L (ref 97–108)
CO2 SERPL-SCNC: 25 MMOL/L (ref 21–32)
COCAINE UR QL SCN: NEGATIVE
COLOR UR: ABNORMAL
COMMENT:: NORMAL
CREAT SERPL-MCNC: 0.56 MG/DL (ref 0.55–1.02)
DIFFERENTIAL METHOD BLD: ABNORMAL
EOSINOPHIL # BLD: 0.1 K/UL (ref 0–0.4)
EOSINOPHIL NFR BLD: 2 % (ref 0–7)
EPITH CASTS URNS QL MICRO: ABNORMAL /LPF
ERYTHROCYTE [DISTWIDTH] IN BLOOD BY AUTOMATED COUNT: 14.9 % (ref 11.5–14.5)
FLUAV RNA SPEC QL NAA+PROBE: NOT DETECTED
FLUBV RNA SPEC QL NAA+PROBE: NOT DETECTED
GLOBULIN SER CALC-MCNC: 3.4 G/DL (ref 2–4)
GLUCOSE SERPL-MCNC: 82 MG/DL (ref 65–100)
GLUCOSE UR STRIP.AUTO-MCNC: NEGATIVE MG/DL
HCT VFR BLD AUTO: 40 % (ref 35–47)
HGB BLD-MCNC: 13.4 G/DL (ref 11.5–16)
HGB UR QL STRIP: NEGATIVE
IMM GRANULOCYTES # BLD AUTO: 0 K/UL (ref 0–0.04)
IMM GRANULOCYTES NFR BLD AUTO: 0 % (ref 0–0.5)
KETONES UR QL STRIP.AUTO: 15 MG/DL
LEUKOCYTE ESTERASE UR QL STRIP.AUTO: ABNORMAL
LYMPHOCYTES # BLD: 3.8 K/UL (ref 0.8–3.5)
LYMPHOCYTES NFR BLD: 42 % (ref 12–49)
Lab: ABNORMAL
MAGNESIUM SERPL-MCNC: 1.8 MG/DL (ref 1.6–2.4)
MCH RBC QN AUTO: 32.1 PG (ref 26–34)
MCHC RBC AUTO-ENTMCNC: 33.5 G/DL (ref 30–36.5)
MCV RBC AUTO: 95.7 FL (ref 80–99)
METHADONE UR QL: NEGATIVE
MONOCYTES # BLD: 0.5 K/UL (ref 0–1)
MONOCYTES NFR BLD: 6 % (ref 5–13)
NEUTS SEG # BLD: 4.5 K/UL (ref 1.8–8)
NEUTS SEG NFR BLD: 50 % (ref 32–75)
NITRITE UR QL STRIP.AUTO: NEGATIVE
NRBC # BLD: 0 K/UL (ref 0–0.01)
NRBC BLD-RTO: 0 PER 100 WBC
OPIATES UR QL: POSITIVE
PCP UR QL: NEGATIVE
PH UR STRIP: 7 (ref 5–8)
PLATELET # BLD AUTO: 276 K/UL (ref 150–400)
PMV BLD AUTO: 11.4 FL (ref 8.9–12.9)
POTASSIUM SERPL-SCNC: 3.4 MMOL/L (ref 3.5–5.1)
PROT SERPL-MCNC: 5.9 G/DL (ref 6.4–8.2)
PROT UR STRIP-MCNC: NEGATIVE MG/DL
RBC # BLD AUTO: 4.18 M/UL (ref 3.8–5.2)
RBC #/AREA URNS HPF: ABNORMAL /HPF (ref 0–5)
SARS-COV-2 RNA RESP QL NAA+PROBE: NOT DETECTED
SODIUM SERPL-SCNC: 141 MMOL/L (ref 136–145)
SOURCE: NORMAL
SP GR UR REFRACTOMETRY: 1.01 (ref 1–1.03)
SPECIMEN HOLD: NORMAL
T4 FREE SERPL-MCNC: 1.3 NG/DL (ref 0.8–1.5)
TSH SERPL DL<=0.05 MIU/L-ACNC: 1.8 UIU/ML (ref 0.36–3.74)
URINE CULTURE IF INDICATED: ABNORMAL
UROBILINOGEN UR QL STRIP.AUTO: 1 EU/DL (ref 0.2–1)
WBC # BLD AUTO: 9 K/UL (ref 3.6–11)
WBC URNS QL MICRO: ABNORMAL /HPF (ref 0–4)

## 2025-01-04 PROCEDURE — 87636 SARSCOV2 & INF A&B AMP PRB: CPT

## 2025-01-04 PROCEDURE — 96375 TX/PRO/DX INJ NEW DRUG ADDON: CPT

## 2025-01-04 PROCEDURE — 83735 ASSAY OF MAGNESIUM: CPT

## 2025-01-04 PROCEDURE — 2580000003 HC RX 258: Performed by: EMERGENCY MEDICINE

## 2025-01-04 PROCEDURE — 6360000002 HC RX W HCPCS: Performed by: EMERGENCY MEDICINE

## 2025-01-04 PROCEDURE — 71045 X-RAY EXAM CHEST 1 VIEW: CPT

## 2025-01-04 PROCEDURE — 84443 ASSAY THYROID STIM HORMONE: CPT

## 2025-01-04 PROCEDURE — 80053 COMPREHEN METABOLIC PANEL: CPT

## 2025-01-04 PROCEDURE — 96361 HYDRATE IV INFUSION ADD-ON: CPT

## 2025-01-04 PROCEDURE — 81001 URINALYSIS AUTO W/SCOPE: CPT

## 2025-01-04 PROCEDURE — 6360000004 HC RX CONTRAST MEDICATION: Performed by: EMERGENCY MEDICINE

## 2025-01-04 PROCEDURE — 85025 COMPLETE CBC W/AUTO DIFF WBC: CPT

## 2025-01-04 PROCEDURE — 96365 THER/PROPH/DIAG IV INF INIT: CPT

## 2025-01-04 PROCEDURE — 84439 ASSAY OF FREE THYROXINE: CPT

## 2025-01-04 PROCEDURE — 99285 EMERGENCY DEPT VISIT HI MDM: CPT

## 2025-01-04 PROCEDURE — 96376 TX/PRO/DX INJ SAME DRUG ADON: CPT

## 2025-01-04 PROCEDURE — 80307 DRUG TEST PRSMV CHEM ANLYZR: CPT

## 2025-01-04 PROCEDURE — 74174 CTA ABD&PLVS W/CONTRAST: CPT

## 2025-01-04 PROCEDURE — 36415 COLL VENOUS BLD VENIPUNCTURE: CPT

## 2025-01-04 RX ORDER — FENTANYL CITRATE 50 UG/ML
50 INJECTION, SOLUTION INTRAMUSCULAR; INTRAVENOUS
Status: COMPLETED | OUTPATIENT
Start: 2025-01-04 | End: 2025-01-04

## 2025-01-04 RX ORDER — MORPHINE SULFATE 4 MG/ML
4 INJECTION, SOLUTION INTRAMUSCULAR; INTRAVENOUS
Status: COMPLETED | OUTPATIENT
Start: 2025-01-04 | End: 2025-01-04

## 2025-01-04 RX ORDER — POTASSIUM CHLORIDE 7.45 MG/ML
10 INJECTION INTRAVENOUS
Status: COMPLETED | OUTPATIENT
Start: 2025-01-04 | End: 2025-01-04

## 2025-01-04 RX ORDER — POTASSIUM CHLORIDE 1.5 G/1.58G
20 POWDER, FOR SOLUTION ORAL DAILY
Qty: 6 PACKET | Refills: 0 | Status: SHIPPED | OUTPATIENT
Start: 2025-01-04 | End: 2025-01-10

## 2025-01-04 RX ORDER — IOPAMIDOL 755 MG/ML
100 INJECTION, SOLUTION INTRAVASCULAR
Status: COMPLETED | OUTPATIENT
Start: 2025-01-04 | End: 2025-01-04

## 2025-01-04 RX ORDER — 0.9 % SODIUM CHLORIDE 0.9 %
1000 INTRAVENOUS SOLUTION INTRAVENOUS ONCE
Status: COMPLETED | OUTPATIENT
Start: 2025-01-04 | End: 2025-01-04

## 2025-01-04 RX ORDER — ONDANSETRON 2 MG/ML
4 INJECTION INTRAMUSCULAR; INTRAVENOUS ONCE
Status: COMPLETED | OUTPATIENT
Start: 2025-01-04 | End: 2025-01-04

## 2025-01-04 RX ORDER — SODIUM CHLORIDE 9 MG/ML
INJECTION, SOLUTION INTRAVENOUS CONTINUOUS
Status: DISCONTINUED | OUTPATIENT
Start: 2025-01-04 | End: 2025-01-04 | Stop reason: HOSPADM

## 2025-01-04 RX ADMIN — MORPHINE SULFATE 4 MG: 4 INJECTION, SOLUTION INTRAMUSCULAR; INTRAVENOUS at 03:44

## 2025-01-04 RX ADMIN — SODIUM CHLORIDE: 9 INJECTION, SOLUTION INTRAVENOUS at 05:14

## 2025-01-04 RX ADMIN — IOPAMIDOL 100 ML: 755 INJECTION, SOLUTION INTRAVENOUS at 04:04

## 2025-01-04 RX ADMIN — POTASSIUM CHLORIDE 10 MEQ: 7.45 INJECTION INTRAVENOUS at 05:12

## 2025-01-04 RX ADMIN — ONDANSETRON 4 MG: 2 INJECTION, SOLUTION INTRAMUSCULAR; INTRAVENOUS at 03:15

## 2025-01-04 RX ADMIN — SODIUM CHLORIDE 1000 ML: 9 INJECTION, SOLUTION INTRAVENOUS at 03:14

## 2025-01-04 RX ADMIN — FENTANYL CITRATE 50 MCG: 50 INJECTION INTRAMUSCULAR; INTRAVENOUS at 03:14

## 2025-01-04 RX ADMIN — POTASSIUM CHLORIDE 10 MEQ: 7.45 INJECTION INTRAVENOUS at 06:29

## 2025-01-04 ASSESSMENT — PAIN DESCRIPTION - DESCRIPTORS: DESCRIPTORS: ACHING

## 2025-01-04 ASSESSMENT — PAIN SCALES - GENERAL
PAINLEVEL_OUTOF10: 10
PAINLEVEL_OUTOF10: 10

## 2025-01-04 ASSESSMENT — PAIN DESCRIPTION - ONSET: ONSET: ON-GOING

## 2025-01-04 ASSESSMENT — PAIN DESCRIPTION - LOCATION
LOCATION: GENERALIZED
LOCATION: GENERALIZED

## 2025-01-04 ASSESSMENT — PAIN - FUNCTIONAL ASSESSMENT
PAIN_FUNCTIONAL_ASSESSMENT: 0-10
PAIN_FUNCTIONAL_ASSESSMENT: ACTIVITIES ARE NOT PREVENTED

## 2025-01-04 NOTE — ED PROVIDER NOTES
[facility] EMERGENCY DEPT  EMERGENCY DEPARTMENT ENCOUNTER      Pt Name: Ce Roberts  MRN: [id number]  Birthdate 1970  Date of evaluation: 1/4/2025  Provider: Jasbir Hale MD    CHIEF COMPLAINT       Chief Complaint   Patient presents with    Generalized Body Aches         HISTORY OF PRESENT ILLNESS   (Location/Symptom, Timing/Onset, Context/Setting, Quality, Duration, Modifying Factors, Severity)  Note limiting factors.   The history is provided by the patient, a friend and medical records.     55yo F patient presents from home with a chief complaint of widespread pain, described metaphorically as being held up by a pyramid. The pain encompasses her head, arms, back, legs, and extends down to her toes. The onset of this pain was approximately around Saturday, Sunday, or Monday, coinciding with a change in weather conditions. She denies any past episodes of similar pain.    Her past medical history (PMHx) is significant for a recent hospitalization in early December for C Diff. She has been seen by her surgeon, Dr. Bolaños, since then. Additionally, she reports experiencing symptoms of a cold in the past few days, but denies the presence of fevers, chills, or fatigue. The patient has a known diagnosis of fibromyalgia, for which she is currently prescribed gabapentin. Despite taking 600 mg of gabapentin in the morning, 600 mg in the afternoon, and 1200 mg at night, she reports no relief for her current pain symptoms.    The patient also has a surgical history of gastric bypass and R hemicolectomy, which she reports cause her to experience nausea and vomiting during the first two hours of the day. To manage these symptoms, she takes phenergan suppositories. During the visit, the patient's best friend, who is present, mentions that the patient's fiancé has also been experiencing illness recently.    Furthermore, the patient has a history of low potassium levels, necessitating supplementation during previous  Jasbir Hale MD       Discussed findings, likely diagnosis, likely course, symptomatic management w/ OTC +/- Rx meds, need for follow-up, and return precautions with patient      CONSULTS:  None    PROCEDURES:  Unless otherwise noted below, none     Procedures    MEDS:  Medications   sodium chloride 0.9 % bolus 1,000 mL (0 mLs IntraVENous Stopped 1/4/25 0430)   ondansetron (ZOFRAN) injection 4 mg (4 mg IntraVENous Given 1/4/25 0315)   fentaNYL (SUBLIMAZE) injection 50 mcg (50 mcg IntraVENous Given 1/4/25 0314)   morphine sulfate (PF) injection 4 mg (4 mg IntraVENous Given 1/4/25 0344)   iopamidol (ISOVUE-370) 76 % injection 100 mL (100 mLs IntraVENous Given 1/4/25 0404)   potassium chloride 10 mEq/100 mL IVPB (Peripheral Line) (0 mEq IntraVENous Stopped 1/4/25 0749)           FINAL IMPRESSION      1. Hypokalemia    2. Fibromyalgia    3. S/P gastric bypass          DISPOSITION/PLAN   DISPOSITION   Decision To Discharge  01/04/2025 06:49:29 AM      PATIENT REFERRED TO:  Kevin Bolaños MD  [address] [id number] [phone]    Go on 1/10/2025        DISCHARGE MEDICATIONS:  Discharge Medication List as of 1/4/2025  7:42 AM        START taking these medications    Details   potassium chloride (KLOR-CON) 20 MEQ packet Take 20 mEq by mouth daily for 6 days, Disp-6 packet, R-0Normal               (Please note that portions of this note were completed with a voice recognition program.  Efforts were made to edit the dictations but occasionally words are mis-transcribed.)    Jasbir Hale MD (electronically signed)  Emergency Attending Physician             Jasbir Hale MD  01/18/25 0628

## 2025-01-04 NOTE — ED TRIAGE NOTES
Pt via wheelchair to ED for c/o of possible fibromyalgia flare up starting on Sunday. Reports 10/10 pain everywhere.   Family states pt usually takes gabapentin for her pain, but its not helping her pain anymore.  Pt is aaox4, gcs15, rr even and unlabored.  
show

## 2025-01-04 NOTE — DISCHARGE INSTRUCTIONS
Thank you for allowing us to provide you with medical care today.  We realize that you have many choices for your emergency care needs.  We thank you for choosing [facility].  Please choose us in the future for any continued health care needs.     We hope we addressed all of your medical concerns. We strive to provide excellent quality care in the Emergency Department.  Anything less than excellent does not meet our expectations.     The exam and treatment you received in the Emergency Department were for an emergent problem and are not intended as complete care. It is important that you follow up with a doctor, nurse practitioner, or physician’s assistant for ongoing care. If your symptoms worsen or you do not improve as expected and you are unable to reach your usual health care provider, you should return to the Emergency Department. We are available 24 hours a day.     Take this sheet with you when you go to your follow-up visit.     If you have any problem arranging the follow-up visit, contact the Emergency Department immediately.     Make an appointment your family doctor for follow up of this visit. Return to the ER if you are unable to be seen in a timely manner.

## 2025-01-10 ENCOUNTER — OFFICE VISIT (OUTPATIENT)
Age: 55
End: 2025-01-10
Payer: MEDICARE

## 2025-01-10 VITALS
HEIGHT: 63 IN | SYSTOLIC BLOOD PRESSURE: 120 MMHG | RESPIRATION RATE: 19 BRPM | WEIGHT: 139 LBS | DIASTOLIC BLOOD PRESSURE: 88 MMHG | TEMPERATURE: 97.6 F | OXYGEN SATURATION: 98 % | BODY MASS INDEX: 24.63 KG/M2 | HEART RATE: 69 BPM

## 2025-01-10 DIAGNOSIS — G89.4 CHRONIC PAIN SYNDROME: Primary | ICD-10-CM

## 2025-01-10 PROCEDURE — 3074F SYST BP LT 130 MM HG: CPT | Performed by: SURGERY

## 2025-01-10 PROCEDURE — 1111F DSCHRG MED/CURRENT MED MERGE: CPT | Performed by: SURGERY

## 2025-01-10 PROCEDURE — 3017F COLORECTAL CA SCREEN DOC REV: CPT | Performed by: SURGERY

## 2025-01-10 PROCEDURE — G8427 DOCREV CUR MEDS BY ELIG CLIN: HCPCS | Performed by: SURGERY

## 2025-01-10 PROCEDURE — 1036F TOBACCO NON-USER: CPT | Performed by: SURGERY

## 2025-01-10 PROCEDURE — 3079F DIAST BP 80-89 MM HG: CPT | Performed by: SURGERY

## 2025-01-10 PROCEDURE — 99214 OFFICE O/P EST MOD 30 MIN: CPT | Performed by: SURGERY

## 2025-01-10 PROCEDURE — G8420 CALC BMI NORM PARAMETERS: HCPCS | Performed by: SURGERY

## 2025-01-10 RX ORDER — POTASSIUM CHLORIDE 1.5 G/1.58G
20 POWDER, FOR SOLUTION ORAL DAILY
Qty: 30 PACKET | Refills: 3 | Status: SHIPPED | OUTPATIENT
Start: 2025-01-10 | End: 2025-05-10

## 2025-01-10 RX ORDER — OMEPRAZOLE 40 MG/1
40 CAPSULE, DELAYED RELEASE ORAL 2 TIMES DAILY
Qty: 180 CAPSULE | Refills: 0 | Status: SHIPPED | OUTPATIENT
Start: 2025-01-10 | End: 2025-04-10

## 2025-01-10 RX ORDER — AMITRIPTYLINE HYDROCHLORIDE 50 MG/1
50 TABLET ORAL NIGHTLY
Qty: 90 TABLET | Refills: 1 | Status: SHIPPED | OUTPATIENT
Start: 2025-01-10

## 2025-01-10 RX ORDER — CYCLOBENZAPRINE HCL 10 MG
10 TABLET ORAL 3 TIMES DAILY PRN
Qty: 30 TABLET | Refills: 3 | Status: SHIPPED | OUTPATIENT
Start: 2025-01-10 | End: 2025-02-19

## 2025-01-10 ASSESSMENT — PATIENT HEALTH QUESTIONNAIRE - PHQ9
SUM OF ALL RESPONSES TO PHQ QUESTIONS 1-9: 0
1. LITTLE INTEREST OR PLEASURE IN DOING THINGS: NOT AT ALL
2. FEELING DOWN, DEPRESSED OR HOPELESS: NOT AT ALL
SUM OF ALL RESPONSES TO PHQ QUESTIONS 1-9: 0
SUM OF ALL RESPONSES TO PHQ9 QUESTIONS 1 & 2: 0

## 2025-01-10 NOTE — PROGRESS NOTES
Surgery Progress Note    1/10/2025    CC: Follow-up    Subjective:     Patient presenting now with persistent diffuse abdominal pain as well as peripheral from myalgia complaints.  Will do well 1 day eating in the next day not.  Not doing well with the potassium powder secondary to bloating.  She reports the pills are too hard to swallow.  Having nausea but improved with the Phenergan.  She reports her lower extremities burn frequently from the mid thigh down.  She went to the ER was found to have a normal workup besides potassium of 3.3.    She has lost 5 pounds since her last visit.    Constitutional: No fever or chills  Neurologic: No headache  Eyes: No scleral icterus or irritated eyes  Nose: No nasal pain or drainage  Mouth: No oral lesions or sore throat  Cardiac: No palpations or chest pain  Pulmonary: No cough or shortness of breath  Gastrointestinal: Abdominal pain, bloating, improved nausea, no emesis, diarrhea, or constipation  Genitourinary: No dysuria  Musculoskeletal: Lower extremity pain  Skin: No rashes or lesions  Psychiatric: No anxiety or depressed mood    Objective:     Vitals:    01/10/25 1045   BP: 120/88   Pulse: 69   Resp: 19   Temp: 97.6 °F (36.4 °C)   SpO2: 98%     Body mass index is 24.62 kg/m².  Wt 139 lbs    General: No acute distress, conversant  Eyes: PERRLA, no scleral icterus  HENT: Normocephalic without oral lesions  Neck: Trachea midline without LAD  Cardiac: Normal pulse rate and rhythm  Pulmonary: Symmetric chest rise with normal effort  GI: Soft, NT, ND, no hernia, no splenomegaly  Skin: Warm without rash  Extremities: No edema or joint stiffness  Psych: Appropriate mood and affect    Assessment:     54-year-old female with fibromyalgia and failure to thrive after previous gastric bypass    Plan:     Continue to promote high-protein diet.  As needed nausea medication.  Elavil to help with fibromyalgia but also esophageal motility and p.o. intolerance.  Flexeril as needed for

## 2025-01-10 NOTE — PROGRESS NOTES
Identified patient with two patient identifiers (name and ). Reviewed chart in preparation for visit and have obtained necessary documentation.    Ce Roberts is a 54 y.o. female  Chief Complaint   Patient presents with    Follow-up     /88 (Site: Left Upper Arm, Position: Sitting, Cuff Size: Medium Adult)   Pulse 69   Temp 97.6 °F (36.4 °C) (Oral)   Resp 19   Ht 1.6 m (5' 3\")   Wt 63 kg (139 lb)   SpO2 98%   BMI 24.62 kg/m²     1. Have you been to the ER, urgent care clinic since your last visit?  Hospitalized since your last visit?yes - [facility] Emergency Department 2025 Generalized Body Aches     2. Have you seen or consulted any other health care providers outside of the [facility] since your last visit?  Include any pap smears or colon screening. no

## 2025-02-11 DIAGNOSIS — M51.369 DDD (DEGENERATIVE DISC DISEASE), LUMBAR: ICD-10-CM

## 2025-02-13 RX ORDER — GABAPENTIN 600 MG/1
TABLET ORAL
Qty: 180 TABLET | Refills: 3 | Status: SHIPPED | OUTPATIENT
Start: 2025-02-13 | End: 2026-02-13

## 2025-02-28 ENCOUNTER — TELEPHONE (OUTPATIENT)
Facility: CLINIC | Age: 55
End: 2025-02-28

## 2025-02-28 NOTE — TELEPHONE ENCOUNTER
Attempted to contact patient regarding upcoming Medicare wellness appointment and completion of HRA questionnaire. LVM for patient to please return call at  [phone]

## 2025-03-03 SDOH — ECONOMIC STABILITY: FOOD INSECURITY: WITHIN THE PAST 12 MONTHS, THE FOOD YOU BOUGHT JUST DIDN'T LAST AND YOU DIDN'T HAVE MONEY TO GET MORE.: SOMETIMES TRUE

## 2025-03-03 SDOH — ECONOMIC STABILITY: FOOD INSECURITY: WITHIN THE PAST 12 MONTHS, YOU WORRIED THAT YOUR FOOD WOULD RUN OUT BEFORE YOU GOT MONEY TO BUY MORE.: SOMETIMES TRUE

## 2025-03-03 SDOH — ECONOMIC STABILITY: INCOME INSECURITY: IN THE LAST 12 MONTHS, WAS THERE A TIME WHEN YOU WERE NOT ABLE TO PAY THE MORTGAGE OR RENT ON TIME?: NO

## 2025-03-03 SDOH — ECONOMIC STABILITY: TRANSPORTATION INSECURITY
IN THE PAST 12 MONTHS, HAS THE LACK OF TRANSPORTATION KEPT YOU FROM MEDICAL APPOINTMENTS OR FROM GETTING MEDICATIONS?: YES

## 2025-03-03 SDOH — ECONOMIC STABILITY: TRANSPORTATION INSECURITY
IN THE PAST 12 MONTHS, HAS LACK OF TRANSPORTATION KEPT YOU FROM MEETINGS, WORK, OR FROM GETTING THINGS NEEDED FOR DAILY LIVING?: YES

## 2025-03-04 SDOH — ECONOMIC STABILITY: FOOD INSECURITY: WITHIN THE PAST 12 MONTHS, THE FOOD YOU BOUGHT JUST DIDN'T LAST AND YOU DIDN'T HAVE MONEY TO GET MORE.: SOMETIMES TRUE

## 2025-03-04 SDOH — ECONOMIC STABILITY: FOOD INSECURITY: WITHIN THE PAST 12 MONTHS, YOU WORRIED THAT YOUR FOOD WOULD RUN OUT BEFORE YOU GOT MONEY TO BUY MORE.: SOMETIMES TRUE

## 2025-03-04 SDOH — ECONOMIC STABILITY: INCOME INSECURITY: IN THE LAST 12 MONTHS, WAS THERE A TIME WHEN YOU WERE NOT ABLE TO PAY THE MORTGAGE OR RENT ON TIME?: NO

## 2025-03-05 ENCOUNTER — OFFICE VISIT (OUTPATIENT)
Facility: CLINIC | Age: 55
End: 2025-03-05

## 2025-03-05 VITALS
BODY MASS INDEX: 21.62 KG/M2 | RESPIRATION RATE: 16 BRPM | HEART RATE: 95 BPM | TEMPERATURE: 97.6 F | HEIGHT: 63 IN | DIASTOLIC BLOOD PRESSURE: 79 MMHG | WEIGHT: 122 LBS | SYSTOLIC BLOOD PRESSURE: 113 MMHG | OXYGEN SATURATION: 96 %

## 2025-03-05 DIAGNOSIS — Z00.00 MEDICARE ANNUAL WELLNESS VISIT, SUBSEQUENT: Primary | ICD-10-CM

## 2025-03-05 DIAGNOSIS — J43.9 PULMONARY EMPHYSEMA, UNSPECIFIED EMPHYSEMA TYPE (HCC): ICD-10-CM

## 2025-03-05 DIAGNOSIS — M79.7 FIBROMYALGIA: ICD-10-CM

## 2025-03-05 DIAGNOSIS — M79.604 BILATERAL LEG PAIN: ICD-10-CM

## 2025-03-05 DIAGNOSIS — E78.2 MIXED HYPERLIPIDEMIA: ICD-10-CM

## 2025-03-05 DIAGNOSIS — E11.65 TYPE 2 DIABETES MELLITUS WITH HYPERGLYCEMIA, UNSPECIFIED WHETHER LONG TERM INSULIN USE (HCC): ICD-10-CM

## 2025-03-05 DIAGNOSIS — M79.605 BILATERAL LEG PAIN: ICD-10-CM

## 2025-03-05 DIAGNOSIS — I10 ESSENTIAL HYPERTENSION, BENIGN: ICD-10-CM

## 2025-03-05 DIAGNOSIS — R63.4 WEIGHT LOSS: ICD-10-CM

## 2025-03-05 DIAGNOSIS — J44.9 CHRONIC OBSTRUCTIVE PULMONARY DISEASE, UNSPECIFIED COPD TYPE (HCC): ICD-10-CM

## 2025-03-05 DIAGNOSIS — F32.A DEPRESSION, UNSPECIFIED DEPRESSION TYPE: ICD-10-CM

## 2025-03-05 DIAGNOSIS — R29.6 FALLS FREQUENTLY: ICD-10-CM

## 2025-03-05 RX ORDER — CITALOPRAM HYDROBROMIDE 10 MG/1
10 TABLET ORAL DAILY
Qty: 30 TABLET | Refills: 3 | Status: SHIPPED | OUTPATIENT
Start: 2025-03-05

## 2025-03-05 RX ORDER — CYCLOBENZAPRINE HCL 10 MG
10 TABLET ORAL 3 TIMES DAILY PRN
COMMUNITY

## 2025-03-05 SDOH — ECONOMIC STABILITY: FOOD INSECURITY: WITHIN THE PAST 12 MONTHS, YOU WORRIED THAT YOUR FOOD WOULD RUN OUT BEFORE YOU GOT MONEY TO BUY MORE.: SOMETIMES TRUE

## 2025-03-05 SDOH — ECONOMIC STABILITY: FOOD INSECURITY: WITHIN THE PAST 12 MONTHS, THE FOOD YOU BOUGHT JUST DIDN'T LAST AND YOU DIDN'T HAVE MONEY TO GET MORE.: SOMETIMES TRUE

## 2025-03-05 ASSESSMENT — PATIENT HEALTH QUESTIONNAIRE - PHQ9
SUM OF ALL RESPONSES TO PHQ QUESTIONS 1-9: 0
2. FEELING DOWN, DEPRESSED OR HOPELESS: NOT AT ALL
1. LITTLE INTEREST OR PLEASURE IN DOING THINGS: NOT AT ALL
SUM OF ALL RESPONSES TO PHQ QUESTIONS 1-9: 0

## 2025-03-05 NOTE — PATIENT INSTRUCTIONS
[facility].  Phone: [phone] Website: [email or web address]    [facility] Senior Resources - Serves residents in [place], [place], [place], [place], [facility] and [facility].  Phone: [phone]  Website: [email or web address].org/non-emergency-medical-transportation.[email or web address]    [facility] - Transport services for residents of [facility] aged 60+ or those with a short term or long-term disability.  Phone: [phone] ([phone])  Website: [email or web address] Memorial [email or web address] Memorial HospitalDASH  Additional Information: One-way rides are $5 - must book 24 hours in advance  Hours of operation: Monday - Sunday (6:00AM - 6:00PM)   [facility] - Serves seniors age 60+ who are not able to drive. Transportation is for medical appointments, grocery shopping, or personal business (ex: banking). Seniors must be ambulatory. Areas: [place] ([address], [address]) and [place] ([address], [address], [address], [address])  Phone: [place] area - [phone]; [place] area - [phone]  Website: https://www.[employer]/  [facility] for [facility] - Transportation services for residents of [facility] who are 60+, disabled, or have low income (200% below Federal Poverty Level).  Website: http://[email or web address]/get-help/transportation-services/  Call to schedule an appointment: [phone]  [facility] - Transportation services for residents of [facility] who are 60+, disabled, or meet income guidelines.  Phone: [phone]   Website: [email or web address]/Mobility-Services  The service area includes any location in [facility]. Rides are available to destinations outside the [facility] for employment and medical purposes, including limited service to the cities of [place], [facility] and [place]; the Community [facility] and [place]; and [place] (formerly

## 2025-03-05 NOTE — PROGRESS NOTES
Chief Complaint   Patient presents with    Medicare AWV     Patient presents in office today for AMW visit.  Has c/o pain in her legs that is causing her to feel weak and fall.  States that she has had 3 falls in the last 3 weeks.  Also has c/o lack of appetite.  States that she has also been more depressed lately due to the lack of being able to function like she used to.   No other concerns.        \"Have you been to the ER, urgent care clinic since your last visit?  Hospitalized since your last visit?\"    YES - When: approximately 1/4/25 ago.  Where and Why: [facility] ED for hypokalemia .    “Have you seen or consulted any other health care providers outside our system since your last visit?”    NO      “Have you had a diabetic eye exam?”    NO     No diabetic eye exam on file

## 2025-03-05 NOTE — PROGRESS NOTES
Chief Complaint   Patient presents with    Medicare AW     Patient presents in office today for AMW visit.  Has c/o pain in her legs that is causing her to feel weak and fall.  States that she has had 3 falls in the last 3 weeks.  Also has c/o lack of appetite.  States that she has also been more depressed lately due to the lack of being able to function like she used to.   No other concerns.    Time was used for level of billing: yes, 30 minutes reviewing previous notes, test results and office visit with the patient discussing the diagnosis and importance of compliance with the treatment plan as well as documenting on the day of the visit.    This was additional time spent outside of the routine AWV.    \"Have you been to the ER, urgent care clinic since your last visit?  Hospitalized since your last visit?\"    YES - When: approximately 1/4/25 ago.  Where and Why: [facility] ED for hypokalemia .    “Have you seen or consulted any other health care providers outside our system since your last visit?”    NO      “Have you had a diabetic eye exam?”    NO     No diabetic eye exam on file        Medicare Annual Wellness Visit    Ce Roberts is here for Medicare AWV    Assessment & Plan  1. Fibromyalgia.  The patient's fibromyalgia pain has worsened, particularly affecting her legs from the knees to the toes, exacerbated by weather changes. She is currently on gabapentin 600 mg, taking 2 tablets in the morning, 1 in the afternoon, and 2 at night. This regimen was recently adjusted. She is also taking amitriptyline at night to help with sleep. She will continue with her current pain management plan under Dr. Villalobos's care.    2. Weight loss.  The patient has experienced significant weight loss, now down to 120 pounds, due to recent bariatric and colon surgeries. She has started to regain weight by consuming protein shakes and increasing her intake of fruits and vegetables. She is advised to incorporate Ensure, Boost, or a

## 2025-03-06 LAB
ALBUMIN SERPL-MCNC: 3 G/DL (ref 3.5–5)
ALBUMIN/GLOB SERPL: 1 (ref 1.1–2.2)
ALP SERPL-CCNC: 76 U/L (ref 45–117)
ALT SERPL-CCNC: 28 U/L (ref 12–78)
ANION GAP SERPL CALC-SCNC: 8 MMOL/L (ref 2–12)
AST SERPL-CCNC: 53 U/L (ref 15–37)
BASOPHILS # BLD: 0.05 K/UL (ref 0–0.1)
BASOPHILS NFR BLD: 0.6 % (ref 0–1)
BILIRUB SERPL-MCNC: 0.7 MG/DL (ref 0.2–1)
BUN SERPL-MCNC: 10 MG/DL (ref 6–20)
BUN/CREAT SERPL: 18 (ref 12–20)
CALCIUM SERPL-MCNC: 9.3 MG/DL (ref 8.5–10.1)
CHLORIDE SERPL-SCNC: 108 MMOL/L (ref 97–108)
CHOLEST SERPL-MCNC: 141 MG/DL
CO2 SERPL-SCNC: 24 MMOL/L (ref 21–32)
CREAT SERPL-MCNC: 0.55 MG/DL (ref 0.55–1.02)
CREAT UR-MCNC: 223 MG/DL
DIFFERENTIAL METHOD BLD: ABNORMAL
EOSINOPHIL # BLD: 0.09 K/UL (ref 0–0.4)
EOSINOPHIL NFR BLD: 1 % (ref 0–7)
ERYTHROCYTE [DISTWIDTH] IN BLOOD BY AUTOMATED COUNT: 16.2 % (ref 11.5–14.5)
EST. AVERAGE GLUCOSE BLD GHB EST-MCNC: 68 MG/DL
GLOBULIN SER CALC-MCNC: 3.1 G/DL (ref 2–4)
GLUCOSE SERPL-MCNC: 76 MG/DL (ref 65–100)
HBA1C MFR BLD: 4 % (ref 4–5.6)
HCT VFR BLD AUTO: 38.2 % (ref 35–47)
HDLC SERPL-MCNC: 34 MG/DL
HDLC SERPL: 4.1 (ref 0–5)
HGB BLD-MCNC: 12.6 G/DL (ref 11.5–16)
IMM GRANULOCYTES # BLD AUTO: 0.02 K/UL (ref 0–0.04)
IMM GRANULOCYTES NFR BLD AUTO: 0.2 % (ref 0–0.5)
LDLC SERPL CALC-MCNC: 79 MG/DL (ref 0–100)
LYMPHOCYTES # BLD: 3.58 K/UL (ref 0.8–3.5)
LYMPHOCYTES NFR BLD: 40.5 % (ref 12–49)
MCH RBC QN AUTO: 33.7 PG (ref 26–34)
MCHC RBC AUTO-ENTMCNC: 33 G/DL (ref 30–36.5)
MCV RBC AUTO: 102.1 FL (ref 80–99)
MICROALBUMIN UR-MCNC: 3.95 MG/DL
MICROALBUMIN/CREAT UR-RTO: 18 MG/G (ref 0–30)
MONOCYTES # BLD: 0.43 K/UL (ref 0–1)
MONOCYTES NFR BLD: 4.9 % (ref 5–13)
NEUTS SEG # BLD: 4.66 K/UL (ref 1.8–8)
NEUTS SEG NFR BLD: 52.8 % (ref 32–75)
NRBC # BLD: 0 K/UL (ref 0–0.01)
NRBC BLD-RTO: 0 PER 100 WBC
PLATELET # BLD AUTO: 382 K/UL (ref 150–400)
PMV BLD AUTO: 12.1 FL (ref 8.9–12.9)
POTASSIUM SERPL-SCNC: 3.8 MMOL/L (ref 3.5–5.1)
PROT SERPL-MCNC: 6.1 G/DL (ref 6.4–8.2)
RBC # BLD AUTO: 3.74 M/UL (ref 3.8–5.2)
SODIUM SERPL-SCNC: 140 MMOL/L (ref 136–145)
TRIGL SERPL-MCNC: 140 MG/DL
TSH SERPL DL<=0.05 MIU/L-ACNC: 1.95 UIU/ML (ref 0.36–3.74)
VLDLC SERPL CALC-MCNC: 28 MG/DL
WBC # BLD AUTO: 8.8 K/UL (ref 3.6–11)

## 2025-03-10 ENCOUNTER — RESULTS FOLLOW-UP (OUTPATIENT)
Facility: CLINIC | Age: 55
End: 2025-03-10

## 2025-03-28 DIAGNOSIS — F32.A DEPRESSION, UNSPECIFIED DEPRESSION TYPE: ICD-10-CM

## 2025-03-30 DIAGNOSIS — R11.0 NAUSEA: ICD-10-CM

## 2025-03-31 RX ORDER — CITALOPRAM HYDROBROMIDE 10 MG/1
10 TABLET ORAL DAILY
Qty: 90 TABLET | Refills: 2 | Status: SHIPPED | OUTPATIENT
Start: 2025-03-31

## 2025-03-31 RX ORDER — ONDANSETRON 8 MG/1
8 TABLET, FILM COATED ORAL EVERY 8 HOURS PRN
Qty: 60 TABLET | Refills: 1 | Status: SHIPPED | OUTPATIENT
Start: 2025-03-31

## 2025-04-02 ENCOUNTER — PATIENT MESSAGE (OUTPATIENT)
Facility: CLINIC | Age: 55
End: 2025-04-02

## 2025-04-02 RX ORDER — ALBUTEROL SULFATE 90 UG/1
2 INHALANT RESPIRATORY (INHALATION) 4 TIMES DAILY PRN
Qty: 3 EACH | Refills: 5 | Status: SHIPPED | OUTPATIENT
Start: 2025-04-02

## 2025-04-07 RX ORDER — POTASSIUM CHLORIDE 1.5 G/1.58G
POWDER, FOR SOLUTION ORAL
Qty: 90 PACKET | Refills: 1 | OUTPATIENT
Start: 2025-04-07

## 2025-04-16 ENCOUNTER — HOSPITAL ENCOUNTER (OUTPATIENT)
Facility: HOSPITAL | Age: 55
Setting detail: RECURRING SERIES
Discharge: HOME | End: 2025-04-19
Payer: MEDICARE

## 2025-04-16 PROCEDURE — 97161 PT EVAL LOW COMPLEX 20 MIN: CPT

## 2025-04-16 NOTE — THERAPY EVALUATION
Eduar [facility]  [address], [address]  [place] [address]  Phone: [phone]    Fax: [phone]         PHYSICAL THERAPY - MEDICARE EVALUATION/PLAN OF CARE NOTE (updated 3/23)      Date: 2025          Patient Name:  Ce Roberts :  1970   Medical   Diagnosis:  Pain in right leg [M79.604] Treatment Diagnosis:  M62.81  GENERAL MUSCLE WEAKNESS    Referral Source:  Ulisses Joy MD Provider #:  [id number]                Insurance: Payor: [facility] MEDICARE / Plan: [employer] DUAL COMPLETE / Product Type: *No Product type* /      Patient  verified yes     Visit #   Current  / Total 1 24   Time   In / Out 1000 1100   Total Treatment Time 60   Total Timed Codes    1:1 Treatment Time 60      [facility] Totals Reminder:  bill using total billable   min of TIMED therapeutic procedures and modalities.   8-22 min = 1 unit; 23-37 min = 2 units; 38-52 min = 3 units;  53-67 min = 4 units; 68-82 min = 5 units           SUBJECTIVE  Pain Level (0-10 scale): 8  []constant []intermittent []improving []worsening []no change since onset    Any medication changes, allergies to medications, adverse drug reactions, diagnosis change, or new procedure performed?: [x] No    [] Yes (see summary sheet for update)  Medications: Verified on Patient Summary List    Subjective functional status/changes:     Za not worked for 10+ years.  I was 286 lb.  Had bariatric surg. 24.  Complication; impacked colon which ruptured.  Had colon surgery; only 1/2 of my colon remains.  Difficult to tolerate eating/digest food.  Now I am down to 113 lb and I  And super weak.  I need help to get up/down, walk.  Too weak to get out of house and go places.  Even before bariatric surgery left leg weak. Don't know why.  Knees down both legs feel like they are on fire so I have  My friend rub them all the time.    Start of Care: 2025  Onset Date: 2024 bariatric and colon

## 2025-04-21 ENCOUNTER — TELEPHONE (OUTPATIENT)
Age: 55
End: 2025-04-21

## 2025-04-21 NOTE — TELEPHONE ENCOUNTER
LM for pt to call and schedule annual bariatric exam.  [employer] message also sent. Letter sent. [facility]

## 2025-04-23 ENCOUNTER — HOSPITAL ENCOUNTER (OUTPATIENT)
Facility: HOSPITAL | Age: 55
Setting detail: RECURRING SERIES
Discharge: HOME | End: 2025-04-26
Payer: MEDICARE

## 2025-04-23 PROCEDURE — 97110 THERAPEUTIC EXERCISES: CPT

## 2025-04-23 NOTE — PROGRESS NOTES
PHYSICAL THERAPY - MEDICARE DAILY TREATMENT NOTE (updated 3/23)      Date: 2025          Patient Name:  Ce Roberts :  1970   Medical   Diagnosis:  Pain in right leg [M79.604]  Pain in left leg [M79.605] Treatment Diagnosis:  M79.604  Pain in right leg and M79.605  Pain in left leg    Referral Source:  Ulisses Joy MD Insurance:   Payor: [facility] MEDICARE / Plan: [employer] DUAL COMPLETE / Product Type: *No Product type* /                     Patient  verified yes     Visit #   Current  / Total 2 24   Time   In / Out 3:30 4   Total Treatment Time 30   Total Timed Codes 27   1:1 Treatment Time 27      [facility] Totals Reminder:  bill using total billable   min of TIMED therapeutic procedures and modalities.   8-22 min = 1 unit; 23-37 min = 2 units; 38-52 min = 3 units; 53-67 min = 4 units; 68-82 min = 5 units            SUBJECTIVE    Pain Level (0-10 scale): 0    Any medication changes, allergies to medications, adverse drug reactions, diagnosis change, or new procedure performed?: [x] No    [] Yes (see summary sheet for update)  Medications: Verified on Patient Summary List    Subjective functional status/changes:     \"Fell twice this morning, my legs tend to give out on me.\"    OBJECTIVE      Therapeutic Procedures:  Tx Min Billable or 1:1 Min (if diff from Tx Min) Procedure, Rationale, Specifics   27  27609 Therapeutic Exercise (timed):  increase ROM, strength, coordination, balance, and proprioception to improve patient's ability to progress to PLOF and address remaining functional goals. (see flow sheet as applicable)     Details if applicable:       61474 Neuromuscular Re-Education (timed):  improve balance, coordination, kinesthetic sense, posture, core stability and proprioception to improve patient's ability to develop conscious control of individual muscles and awareness of position of extremities in order to progress to PLOF and address remaining functional goals. (see flow sheet as

## 2025-04-30 ENCOUNTER — APPOINTMENT (OUTPATIENT)
Facility: HOSPITAL | Age: 55
DRG: 689 | End: 2025-04-30
Payer: MEDICARE

## 2025-04-30 ENCOUNTER — HOSPITAL ENCOUNTER (INPATIENT)
Facility: HOSPITAL | Age: 55
LOS: 33 days | Discharge: HOME HEALTH | DRG: 689 | End: 2025-06-02
Attending: STUDENT IN AN ORGANIZED HEALTH CARE EDUCATION/TRAINING PROGRAM | Admitting: INTERNAL MEDICINE
Payer: MEDICARE

## 2025-04-30 DIAGNOSIS — M79.7 FIBROMYALGIA: ICD-10-CM

## 2025-04-30 DIAGNOSIS — E86.0 DEHYDRATION: Primary | ICD-10-CM

## 2025-04-30 DIAGNOSIS — J96.01 ACUTE HYPOXIC RESPIRATORY FAILURE (HCC): ICD-10-CM

## 2025-04-30 DIAGNOSIS — R11.10 INTRACTABLE VOMITING: ICD-10-CM

## 2025-04-30 DIAGNOSIS — E46 PROTEIN-CALORIE MALNUTRITION, UNSPECIFIED SEVERITY: ICD-10-CM

## 2025-04-30 DIAGNOSIS — N30.00 ACUTE CYSTITIS WITHOUT HEMATURIA: ICD-10-CM

## 2025-04-30 DIAGNOSIS — R57.9 SHOCK (HCC): ICD-10-CM

## 2025-04-30 PROBLEM — R11.2 INTRACTABLE NAUSEA AND VOMITING: Status: ACTIVE | Noted: 2025-04-30

## 2025-04-30 LAB
ALBUMIN SERPL-MCNC: 2.4 G/DL (ref 3.5–5)
ALBUMIN/GLOB SERPL: 0.7 (ref 1.1–2.2)
ALP SERPL-CCNC: 81 U/L (ref 45–117)
ALT SERPL-CCNC: 24 U/L (ref 12–78)
ANION GAP SERPL CALC-SCNC: 11 MMOL/L (ref 2–12)
APPEARANCE UR: ABNORMAL
AST SERPL-CCNC: 71 U/L (ref 15–37)
BACTERIA URNS QL MICRO: ABNORMAL /HPF
BASOPHILS # BLD: 0.04 K/UL (ref 0–0.1)
BASOPHILS NFR BLD: 0.4 % (ref 0–1)
BILIRUB SERPL-MCNC: 0.8 MG/DL (ref 0.2–1)
BILIRUB UR QL CFM: POSITIVE
BUN SERPL-MCNC: 13 MG/DL (ref 6–20)
BUN/CREAT SERPL: 21 (ref 12–20)
CALCIUM SERPL-MCNC: 8.5 MG/DL (ref 8.5–10.1)
CAOX CRY URNS QL MICRO: ABNORMAL
CHLORIDE SERPL-SCNC: 106 MMOL/L (ref 97–108)
CO2 SERPL-SCNC: 23 MMOL/L (ref 21–32)
COLOR UR: ABNORMAL
COMMENT:: NORMAL
CREAT SERPL-MCNC: 0.61 MG/DL (ref 0.55–1.02)
DIFFERENTIAL METHOD BLD: ABNORMAL
EOSINOPHIL # BLD: 0.07 K/UL (ref 0–0.4)
EOSINOPHIL NFR BLD: 0.6 % (ref 0–7)
EPITH CASTS URNS QL MICRO: ABNORMAL /LPF
ERYTHROCYTE [DISTWIDTH] IN BLOOD BY AUTOMATED COUNT: 14.3 % (ref 11.5–14.5)
GLOBULIN SER CALC-MCNC: 3.4 G/DL (ref 2–4)
GLUCOSE SERPL-MCNC: 79 MG/DL (ref 65–100)
GLUCOSE UR STRIP.AUTO-MCNC: NEGATIVE MG/DL
HCT VFR BLD AUTO: 37.7 % (ref 35–47)
HGB BLD-MCNC: 12.9 G/DL (ref 11.5–16)
HGB UR QL STRIP: NEGATIVE
IMM GRANULOCYTES # BLD AUTO: 0.03 K/UL (ref 0–0.04)
IMM GRANULOCYTES NFR BLD AUTO: 0.3 % (ref 0–0.5)
KETONES UR QL STRIP.AUTO: 40 MG/DL
LEUKOCYTE ESTERASE UR QL STRIP.AUTO: ABNORMAL
LIPASE SERPL-CCNC: 16 U/L (ref 13–75)
LYMPHOCYTES # BLD: 4.34 K/UL (ref 0.8–3.5)
LYMPHOCYTES NFR BLD: 40 % (ref 12–49)
MAGNESIUM SERPL-MCNC: 1.8 MG/DL (ref 1.6–2.4)
MCH RBC QN AUTO: 33.3 PG (ref 26–34)
MCHC RBC AUTO-ENTMCNC: 34.2 G/DL (ref 30–36.5)
MCV RBC AUTO: 97.4 FL (ref 80–99)
MONOCYTES # BLD: 0.5 K/UL (ref 0–1)
MONOCYTES NFR BLD: 4.6 % (ref 5–13)
NEUTS SEG # BLD: 5.88 K/UL (ref 1.8–8)
NEUTS SEG NFR BLD: 54.1 % (ref 32–75)
NITRITE UR QL STRIP.AUTO: NEGATIVE
NRBC # BLD: 0 K/UL (ref 0–0.01)
NRBC BLD-RTO: 0 PER 100 WBC
PH UR STRIP: 5.5 (ref 5–8)
PLATELET # BLD AUTO: 402 K/UL (ref 150–400)
PMV BLD AUTO: 11.1 FL (ref 8.9–12.9)
POTASSIUM SERPL-SCNC: 3.3 MMOL/L (ref 3.5–5.1)
PROT SERPL-MCNC: 5.8 G/DL (ref 6.4–8.2)
PROT UR STRIP-MCNC: 30 MG/DL
RBC # BLD AUTO: 3.87 M/UL (ref 3.8–5.2)
RBC #/AREA URNS HPF: ABNORMAL /HPF (ref 0–5)
SODIUM SERPL-SCNC: 140 MMOL/L (ref 136–145)
SP GR UR REFRACTOMETRY: 1.02 (ref 1–1.03)
SPECIMEN HOLD: NORMAL
SPECIMEN HOLD: NORMAL
UROBILINOGEN UR QL STRIP.AUTO: 1 EU/DL (ref 0.2–1)
WBC # BLD AUTO: 10.9 K/UL (ref 3.6–11)
WBC URNS QL MICRO: ABNORMAL /HPF (ref 0–4)

## 2025-04-30 PROCEDURE — 87186 SC STD MICRODIL/AGAR DIL: CPT

## 2025-04-30 PROCEDURE — 87088 URINE BACTERIA CULTURE: CPT

## 2025-04-30 PROCEDURE — 99285 EMERGENCY DEPT VISIT HI MDM: CPT

## 2025-04-30 PROCEDURE — 2500000003 HC RX 250 WO HCPCS: Performed by: STUDENT IN AN ORGANIZED HEALTH CARE EDUCATION/TRAINING PROGRAM

## 2025-04-30 PROCEDURE — 80053 COMPREHEN METABOLIC PANEL: CPT

## 2025-04-30 PROCEDURE — 6360000004 HC RX CONTRAST MEDICATION: Performed by: STUDENT IN AN ORGANIZED HEALTH CARE EDUCATION/TRAINING PROGRAM

## 2025-04-30 PROCEDURE — 81001 URINALYSIS AUTO W/SCOPE: CPT

## 2025-04-30 PROCEDURE — 83690 ASSAY OF LIPASE: CPT

## 2025-04-30 PROCEDURE — 83735 ASSAY OF MAGNESIUM: CPT

## 2025-04-30 PROCEDURE — 2580000003 HC RX 258: Performed by: STUDENT IN AN ORGANIZED HEALTH CARE EDUCATION/TRAINING PROGRAM

## 2025-04-30 PROCEDURE — 87086 URINE CULTURE/COLONY COUNT: CPT

## 2025-04-30 PROCEDURE — 99284 EMERGENCY DEPT VISIT MOD MDM: CPT

## 2025-04-30 PROCEDURE — 6360000002 HC RX W HCPCS: Performed by: STUDENT IN AN ORGANIZED HEALTH CARE EDUCATION/TRAINING PROGRAM

## 2025-04-30 PROCEDURE — 1100000000 HC RM PRIVATE

## 2025-04-30 PROCEDURE — 96375 TX/PRO/DX INJ NEW DRUG ADDON: CPT

## 2025-04-30 PROCEDURE — 85025 COMPLETE CBC W/AUTO DIFF WBC: CPT

## 2025-04-30 PROCEDURE — 93005 ELECTROCARDIOGRAM TRACING: CPT | Performed by: STUDENT IN AN ORGANIZED HEALTH CARE EDUCATION/TRAINING PROGRAM

## 2025-04-30 PROCEDURE — 96361 HYDRATE IV INFUSION ADD-ON: CPT

## 2025-04-30 PROCEDURE — 74177 CT ABD & PELVIS W/CONTRAST: CPT

## 2025-04-30 PROCEDURE — 96374 THER/PROPH/DIAG INJ IV PUSH: CPT

## 2025-04-30 RX ORDER — MORPHINE SULFATE 2 MG/ML
2 INJECTION, SOLUTION INTRAMUSCULAR; INTRAVENOUS ONCE
Status: COMPLETED | OUTPATIENT
Start: 2025-04-30 | End: 2025-04-30

## 2025-04-30 RX ORDER — POTASSIUM CHLORIDE 7.45 MG/ML
10 INJECTION INTRAVENOUS
Status: COMPLETED | OUTPATIENT
Start: 2025-05-01 | End: 2025-05-01

## 2025-04-30 RX ORDER — 0.9 % SODIUM CHLORIDE 0.9 %
1000 INTRAVENOUS SOLUTION INTRAVENOUS ONCE
Status: COMPLETED | OUTPATIENT
Start: 2025-04-30 | End: 2025-04-30

## 2025-04-30 RX ORDER — SODIUM CHLORIDE AND POTASSIUM CHLORIDE 150; 900 MG/100ML; MG/100ML
INJECTION, SOLUTION INTRAVENOUS CONTINUOUS
Status: DISCONTINUED | OUTPATIENT
Start: 2025-04-30 | End: 2025-05-01

## 2025-04-30 RX ORDER — ONDANSETRON 2 MG/ML
4 INJECTION INTRAMUSCULAR; INTRAVENOUS ONCE
Status: COMPLETED | OUTPATIENT
Start: 2025-04-30 | End: 2025-04-30

## 2025-04-30 RX ORDER — IOPAMIDOL 755 MG/ML
100 INJECTION, SOLUTION INTRAVASCULAR
Status: COMPLETED | OUTPATIENT
Start: 2025-04-30 | End: 2025-04-30

## 2025-04-30 RX ADMIN — SODIUM CHLORIDE 1000 ML: 0.9 INJECTION, SOLUTION INTRAVENOUS at 19:23

## 2025-04-30 RX ADMIN — WATER 1000 MG: 1 INJECTION INTRAMUSCULAR; INTRAVENOUS; SUBCUTANEOUS at 21:36

## 2025-04-30 RX ADMIN — SODIUM CHLORIDE AND POTASSIUM CHLORIDE: .9; .15 SOLUTION INTRAVENOUS at 22:22

## 2025-04-30 RX ADMIN — IOPAMIDOL 100 ML: 755 INJECTION, SOLUTION INTRAVENOUS at 19:49

## 2025-04-30 RX ADMIN — ONDANSETRON 4 MG: 2 INJECTION, SOLUTION INTRAMUSCULAR; INTRAVENOUS at 19:23

## 2025-04-30 RX ADMIN — MORPHINE SULFATE 2 MG: 2 INJECTION, SOLUTION INTRAMUSCULAR; INTRAVENOUS at 21:36

## 2025-04-30 ASSESSMENT — PAIN DESCRIPTION - DESCRIPTORS: DESCRIPTORS: ACHING

## 2025-04-30 ASSESSMENT — PAIN SCALES - GENERAL
PAINLEVEL_OUTOF10: 10
PAINLEVEL_OUTOF10: 10

## 2025-04-30 ASSESSMENT — PAIN DESCRIPTION - ONSET: ONSET: ON-GOING

## 2025-04-30 ASSESSMENT — PAIN DESCRIPTION - LOCATION: LOCATION: BUTTOCKS

## 2025-04-30 ASSESSMENT — PAIN DESCRIPTION - PAIN TYPE: TYPE: ACUTE PAIN

## 2025-04-30 ASSESSMENT — PAIN SCALES - WONG BAKER: WONGBAKER_NUMERICALRESPONSE: HURTS WHOLE LOT

## 2025-04-30 ASSESSMENT — PAIN - FUNCTIONAL ASSESSMENT
PAIN_FUNCTIONAL_ASSESSMENT: WONG-BAKER FACES
PAIN_FUNCTIONAL_ASSESSMENT: ACTIVITIES ARE NOT PREVENTED

## 2025-04-30 ASSESSMENT — PAIN DESCRIPTION - ORIENTATION: ORIENTATION: RIGHT;LEFT

## 2025-04-30 ASSESSMENT — PAIN DESCRIPTION - FREQUENCY: FREQUENCY: CONTINUOUS

## 2025-04-30 NOTE — ED NOTES
5:02 PM  I have evaluated the patient as the Provider in Rapid Medical Evaluation (RME). I have reviewed her vital signs and the triage nurse assessment. I have talked with the patient and any available family and advised that I am the provider in triage and have ordered the appropriate study to initiate their work up based on the clinical presentation during my assessment. I have advised that the patient will be accommodated in the Main ED as soon as possible. I have also requested to contact the triage nurse or myself immediately if the patient experiences any changes in their condition during this brief waiting period.    55 y.o. female presetns to ED with concern for possible malnourishment and paresthesias.  Has history of past medical history significant for hypertension, s/p gastric bypass secondary to obesity, fibromyalgia. Patient arrives with daughter who reports that patient had a gastric bypass last June. Subsequently she needed emergency colon surgery and since then has had absorption issues. Patient reports that in the past couple of months she has had severe trouble with keeping anything down PO. Patient has lost over 100 lbs in less than a year. She has tried various OTC and prescription medications/ diet changes with little relief. Patient follows Dr. Bolaños who follows her, recommended she come in, has considered putting feeding tube in. Patient is now extremely weak and lethargic. Denies any fevers, chills.     INGA ROBLEDO Ashley, PA  04/30/25 3286

## 2025-04-30 NOTE — ED NOTES
Pt's family was concerned about breathing bc hx of asthma. O2 at 84%. Placed on 2L nasal cannula, O2 up to 98%. Charge RN, triage RN, and PA informed.

## 2025-04-30 NOTE — ED TRIAGE NOTES
Patient is in a w/c with her daughter with lethargy and and weight loss of 110 lbs after  gastric bypass surgery a year ago. Last couple months pt can't keep anything down.

## 2025-05-01 ENCOUNTER — PREP FOR PROCEDURE (OUTPATIENT)
Age: 55
End: 2025-05-01

## 2025-05-01 ENCOUNTER — APPOINTMENT (OUTPATIENT)
Facility: HOSPITAL | Age: 55
DRG: 689 | End: 2025-05-01
Payer: MEDICARE

## 2025-05-01 DIAGNOSIS — Z98.84 HX OF GASTRIC BYPASS: ICD-10-CM

## 2025-05-01 PROBLEM — E16.2 HYPOGLYCEMIA: Status: ACTIVE | Noted: 2025-05-01

## 2025-05-01 LAB
ALBUMIN SERPL-MCNC: 1.6 G/DL (ref 3.5–5)
ALBUMIN/GLOB SERPL: 0.6 (ref 1.1–2.2)
ALP SERPL-CCNC: 60 U/L (ref 45–117)
ALT SERPL-CCNC: 18 U/L (ref 12–78)
ANION GAP SERPL CALC-SCNC: 9 MMOL/L (ref 2–12)
AST SERPL-CCNC: 46 U/L (ref 15–37)
BASOPHILS # BLD: 0.03 K/UL (ref 0–0.1)
BASOPHILS NFR BLD: 0.5 % (ref 0–1)
BILIRUB SERPL-MCNC: 0.4 MG/DL (ref 0.2–1)
BUN SERPL-MCNC: 10 MG/DL (ref 6–20)
BUN/CREAT SERPL: 22 (ref 12–20)
CALCIUM SERPL-MCNC: 7.4 MG/DL (ref 8.5–10.1)
CHLORIDE SERPL-SCNC: 110 MMOL/L (ref 97–108)
CO2 SERPL-SCNC: 21 MMOL/L (ref 21–32)
COMMENT:: NORMAL
CREAT SERPL-MCNC: 0.46 MG/DL (ref 0.55–1.02)
CRP SERPL-MCNC: <0.29 MG/DL (ref 0–0.3)
DIFFERENTIAL METHOD BLD: ABNORMAL
EOSINOPHIL # BLD: 0.06 K/UL (ref 0–0.4)
EOSINOPHIL NFR BLD: 1 % (ref 0–7)
ERYTHROCYTE [DISTWIDTH] IN BLOOD BY AUTOMATED COUNT: 14.6 % (ref 11.5–14.5)
FOLATE SERPL-MCNC: 1 NG/ML (ref 5–21)
GLOBULIN SER CALC-MCNC: 2.6 G/DL (ref 2–4)
GLUCOSE BLD STRIP.AUTO-MCNC: 78 MG/DL (ref 65–117)
GLUCOSE SERPL-MCNC: 52 MG/DL (ref 65–100)
HCT VFR BLD AUTO: 28.1 % (ref 35–47)
HGB BLD-MCNC: 9.4 G/DL (ref 11.5–16)
IMM GRANULOCYTES # BLD AUTO: 0.02 K/UL (ref 0–0.04)
IMM GRANULOCYTES NFR BLD AUTO: 0.3 % (ref 0–0.5)
LYMPHOCYTES # BLD: 2.51 K/UL (ref 0.8–3.5)
LYMPHOCYTES NFR BLD: 39.8 % (ref 12–49)
MAGNESIUM SERPL-MCNC: 1.6 MG/DL (ref 1.6–2.4)
MCH RBC QN AUTO: 33.2 PG (ref 26–34)
MCHC RBC AUTO-ENTMCNC: 33.5 G/DL (ref 30–36.5)
MCV RBC AUTO: 99.3 FL (ref 80–99)
MONOCYTES # BLD: 0.45 K/UL (ref 0–1)
MONOCYTES NFR BLD: 7.1 % (ref 5–13)
NEUTS SEG # BLD: 3.23 K/UL (ref 1.8–8)
NEUTS SEG NFR BLD: 51.3 % (ref 32–75)
NRBC # BLD: 0 K/UL (ref 0–0.01)
NRBC BLD-RTO: 0 PER 100 WBC
PHOSPHATE SERPL-MCNC: 2.5 MG/DL (ref 2.6–4.7)
PLATELET # BLD AUTO: 235 K/UL (ref 150–400)
PMV BLD AUTO: 11.3 FL (ref 8.9–12.9)
POTASSIUM SERPL-SCNC: 3.6 MMOL/L (ref 3.5–5.1)
PREALB SERPL-MCNC: 10.2 MG/DL (ref 20–40)
PROT SERPL-MCNC: 4.2 G/DL (ref 6.4–8.2)
RBC # BLD AUTO: 2.83 M/UL (ref 3.8–5.2)
SERVICE CMNT-IMP: NORMAL
SODIUM SERPL-SCNC: 140 MMOL/L (ref 136–145)
SPECIMEN HOLD: NORMAL
TROPONIN I SERPL HS-MCNC: 5 NG/L (ref 0–51)
TROPONIN I SERPL HS-MCNC: 6 NG/L (ref 0–51)
TSH SERPL DL<=0.05 MIU/L-ACNC: 2.2 UIU/ML (ref 0.36–3.74)
VIT B12 SERPL-MCNC: 309 PG/ML (ref 193–986)
WBC # BLD AUTO: 6.3 K/UL (ref 3.6–11)

## 2025-05-01 PROCEDURE — 2500000003 HC RX 250 WO HCPCS

## 2025-05-01 PROCEDURE — 82746 ASSAY OF FOLIC ACID SERUM: CPT

## 2025-05-01 PROCEDURE — 6360000002 HC RX W HCPCS: Performed by: INTERNAL MEDICINE

## 2025-05-01 PROCEDURE — 2580000003 HC RX 258

## 2025-05-01 PROCEDURE — 2500000003 HC RX 250 WO HCPCS: Performed by: INTERNAL MEDICINE

## 2025-05-01 PROCEDURE — 83735 ASSAY OF MAGNESIUM: CPT

## 2025-05-01 PROCEDURE — P9047 ALBUMIN (HUMAN), 25%, 50ML: HCPCS

## 2025-05-01 PROCEDURE — 86140 C-REACTIVE PROTEIN: CPT

## 2025-05-01 PROCEDURE — 87086 URINE CULTURE/COLONY COUNT: CPT

## 2025-05-01 PROCEDURE — 84425 ASSAY OF VITAMIN B-1: CPT

## 2025-05-01 PROCEDURE — 99221 1ST HOSP IP/OBS SF/LOW 40: CPT

## 2025-05-01 PROCEDURE — 6370000000 HC RX 637 (ALT 250 FOR IP): Performed by: INTERNAL MEDICINE

## 2025-05-01 PROCEDURE — 84100 ASSAY OF PHOSPHORUS: CPT

## 2025-05-01 PROCEDURE — 85025 COMPLETE CBC W/AUTO DIFF WBC: CPT

## 2025-05-01 PROCEDURE — 1100000000 HC RM PRIVATE

## 2025-05-01 PROCEDURE — 2580000003 HC RX 258: Performed by: INTERNAL MEDICINE

## 2025-05-01 PROCEDURE — 84443 ASSAY THYROID STIM HORMONE: CPT

## 2025-05-01 PROCEDURE — 82962 GLUCOSE BLOOD TEST: CPT

## 2025-05-01 PROCEDURE — 6360000002 HC RX W HCPCS

## 2025-05-01 PROCEDURE — 82607 VITAMIN B-12: CPT

## 2025-05-01 PROCEDURE — 71275 CT ANGIOGRAPHY CHEST: CPT

## 2025-05-01 PROCEDURE — 71045 X-RAY EXAM CHEST 1 VIEW: CPT

## 2025-05-01 PROCEDURE — 80053 COMPREHEN METABOLIC PANEL: CPT

## 2025-05-01 PROCEDURE — 84134 ASSAY OF PREALBUMIN: CPT

## 2025-05-01 PROCEDURE — 84484 ASSAY OF TROPONIN QUANT: CPT

## 2025-05-01 PROCEDURE — 6370000000 HC RX 637 (ALT 250 FOR IP): Performed by: NURSE PRACTITIONER

## 2025-05-01 PROCEDURE — 6360000004 HC RX CONTRAST MEDICATION: Performed by: INTERNAL MEDICINE

## 2025-05-01 RX ORDER — SODIUM CHLORIDE 9 MG/ML
INJECTION, SOLUTION INTRAVENOUS CONTINUOUS
Status: DISCONTINUED | OUTPATIENT
Start: 2025-05-01 | End: 2025-05-01

## 2025-05-01 RX ORDER — LEVOFLOXACIN 5 MG/ML
750 INJECTION, SOLUTION INTRAVENOUS EVERY 24 HOURS
Status: COMPLETED | OUTPATIENT
Start: 2025-05-01 | End: 2025-05-03

## 2025-05-01 RX ORDER — ONDANSETRON 4 MG/1
8 TABLET, FILM COATED ORAL EVERY 8 HOURS PRN
Status: DISCONTINUED | OUTPATIENT
Start: 2025-05-01 | End: 2025-05-01 | Stop reason: SDUPTHER

## 2025-05-01 RX ORDER — ONDANSETRON 4 MG/1
4 TABLET, ORALLY DISINTEGRATING ORAL EVERY 8 HOURS PRN
Status: DISCONTINUED | OUTPATIENT
Start: 2025-05-01 | End: 2025-05-01 | Stop reason: SDUPTHER

## 2025-05-01 RX ORDER — POLYETHYLENE GLYCOL 3350 17 G/17G
17 POWDER, FOR SOLUTION ORAL DAILY PRN
Status: DISCONTINUED | OUTPATIENT
Start: 2025-05-01 | End: 2025-05-12

## 2025-05-01 RX ORDER — SODIUM CHLORIDE 9 MG/ML
INJECTION, SOLUTION INTRAVENOUS PRN
Status: DISCONTINUED | OUTPATIENT
Start: 2025-05-01 | End: 2025-06-02 | Stop reason: HOSPADM

## 2025-05-01 RX ORDER — CYCLOBENZAPRINE HCL 10 MG
10 TABLET ORAL 3 TIMES DAILY PRN
Status: DISCONTINUED | OUTPATIENT
Start: 2025-05-01 | End: 2025-05-03

## 2025-05-01 RX ORDER — FOLIC ACID 1 MG/1
1 TABLET ORAL DAILY
Status: DISCONTINUED | OUTPATIENT
Start: 2025-05-01 | End: 2025-05-01

## 2025-05-01 RX ORDER — SODIUM CHLORIDE 0.9 % (FLUSH) 0.9 %
5-40 SYRINGE (ML) INJECTION EVERY 12 HOURS SCHEDULED
Status: DISCONTINUED | OUTPATIENT
Start: 2025-05-01 | End: 2025-06-02 | Stop reason: HOSPADM

## 2025-05-01 RX ORDER — DEXTROSE MONOHYDRATE 50 MG/ML
INJECTION, SOLUTION INTRAVENOUS CONTINUOUS
Status: DISCONTINUED | OUTPATIENT
Start: 2025-05-01 | End: 2025-05-09

## 2025-05-01 RX ORDER — THIAMINE HYDROCHLORIDE 100 MG/ML
100 INJECTION, SOLUTION INTRAMUSCULAR; INTRAVENOUS DAILY
Status: DISCONTINUED | OUTPATIENT
Start: 2025-05-01 | End: 2025-05-06 | Stop reason: ALTCHOICE

## 2025-05-01 RX ORDER — ONDANSETRON 2 MG/ML
4 INJECTION INTRAMUSCULAR; INTRAVENOUS EVERY 6 HOURS PRN
Status: DISCONTINUED | OUTPATIENT
Start: 2025-05-01 | End: 2025-06-02 | Stop reason: HOSPADM

## 2025-05-01 RX ORDER — CYANOCOBALAMIN 1000 UG/ML
1000 INJECTION, SOLUTION INTRAMUSCULAR; SUBCUTANEOUS ONCE
Status: COMPLETED | OUTPATIENT
Start: 2025-05-01 | End: 2025-05-01

## 2025-05-01 RX ORDER — MAGNESIUM HYDROXIDE/ALUMINUM HYDROXICE/SIMETHICONE 120; 1200; 1200 MG/30ML; MG/30ML; MG/30ML
30 SUSPENSION ORAL EVERY 6 HOURS PRN
Status: DISCONTINUED | OUTPATIENT
Start: 2025-05-01 | End: 2025-05-12

## 2025-05-01 RX ORDER — ONDANSETRON 4 MG/1
8 TABLET, FILM COATED ORAL EVERY 8 HOURS PRN
Status: DISCONTINUED | OUTPATIENT
Start: 2025-05-01 | End: 2025-05-12

## 2025-05-01 RX ORDER — FOLIC ACID 5 MG/ML
1 INJECTION, SOLUTION INTRAMUSCULAR; INTRAVENOUS; SUBCUTANEOUS DAILY
Status: DISCONTINUED | OUTPATIENT
Start: 2025-05-01 | End: 2025-05-01

## 2025-05-01 RX ORDER — SODIUM CHLORIDE 0.9 % (FLUSH) 0.9 %
5-40 SYRINGE (ML) INJECTION PRN
Status: DISCONTINUED | OUTPATIENT
Start: 2025-05-01 | End: 2025-06-02 | Stop reason: HOSPADM

## 2025-05-01 RX ORDER — CITALOPRAM HYDROBROMIDE 20 MG/1
10 TABLET ORAL DAILY
Status: DISCONTINUED | OUTPATIENT
Start: 2025-05-01 | End: 2025-05-12

## 2025-05-01 RX ORDER — ALBUMIN (HUMAN) 12.5 G/50ML
25 SOLUTION INTRAVENOUS EVERY 6 HOURS
Status: COMPLETED | OUTPATIENT
Start: 2025-05-01 | End: 2025-05-01

## 2025-05-01 RX ORDER — POTASSIUM CHLORIDE 7.45 MG/ML
10 INJECTION INTRAVENOUS PRN
Status: DISCONTINUED | OUTPATIENT
Start: 2025-05-01 | End: 2025-05-04 | Stop reason: SDUPTHER

## 2025-05-01 RX ORDER — MAGNESIUM SULFATE IN WATER 40 MG/ML
2000 INJECTION, SOLUTION INTRAVENOUS PRN
Status: DISCONTINUED | OUTPATIENT
Start: 2025-05-01 | End: 2025-06-02 | Stop reason: HOSPADM

## 2025-05-01 RX ORDER — IOPAMIDOL 755 MG/ML
100 INJECTION, SOLUTION INTRAVASCULAR
Status: COMPLETED | OUTPATIENT
Start: 2025-05-01 | End: 2025-05-01

## 2025-05-01 RX ORDER — POTASSIUM CHLORIDE 750 MG/1
40 TABLET, EXTENDED RELEASE ORAL PRN
Status: DISCONTINUED | OUTPATIENT
Start: 2025-05-01 | End: 2025-05-04 | Stop reason: SDUPTHER

## 2025-05-01 RX ORDER — FENTANYL CITRATE 50 UG/ML
25 INJECTION, SOLUTION INTRAMUSCULAR; INTRAVENOUS EVERY 4 HOURS PRN
Status: DISCONTINUED | OUTPATIENT
Start: 2025-05-01 | End: 2025-05-03

## 2025-05-01 RX ORDER — GABAPENTIN 600 MG/1
600 TABLET ORAL 2 TIMES DAILY
Status: DISCONTINUED | OUTPATIENT
Start: 2025-05-01 | End: 2025-05-12

## 2025-05-01 RX ORDER — ONDANSETRON 2 MG/ML
4 INJECTION INTRAMUSCULAR; INTRAVENOUS ONCE
Status: COMPLETED | OUTPATIENT
Start: 2025-05-01 | End: 2025-05-01

## 2025-05-01 RX ORDER — ALBUTEROL SULFATE 90 UG/1
2 INHALANT RESPIRATORY (INHALATION) EVERY 4 HOURS PRN
Status: DISCONTINUED | OUTPATIENT
Start: 2025-05-01 | End: 2025-06-02 | Stop reason: HOSPADM

## 2025-05-01 RX ORDER — NICOTINE 21 MG/24HR
1 PATCH, TRANSDERMAL 24 HOURS TRANSDERMAL DAILY
Status: DISCONTINUED | OUTPATIENT
Start: 2025-05-01 | End: 2025-05-03

## 2025-05-01 RX ADMIN — CYCLOBENZAPRINE 10 MG: 10 TABLET, FILM COATED ORAL at 23:27

## 2025-05-01 RX ADMIN — IOPAMIDOL 70 ML: 755 INJECTION, SOLUTION INTRAVENOUS at 05:55

## 2025-05-01 RX ADMIN — POTASSIUM CHLORIDE 10 MEQ: 10 INJECTION, SOLUTION INTRAVENOUS at 01:29

## 2025-05-01 RX ADMIN — FENTANYL CITRATE 25 MCG: 50 INJECTION INTRAMUSCULAR; INTRAVENOUS at 21:15

## 2025-05-01 RX ADMIN — FENTANYL CITRATE 25 MCG: 50 INJECTION INTRAMUSCULAR; INTRAVENOUS at 16:58

## 2025-05-01 RX ADMIN — FENTANYL CITRATE 25 MCG: 50 INJECTION INTRAMUSCULAR; INTRAVENOUS at 06:30

## 2025-05-01 RX ADMIN — FENTANYL CITRATE 25 MCG: 50 INJECTION INTRAMUSCULAR; INTRAVENOUS at 12:33

## 2025-05-01 RX ADMIN — ONDANSETRON 4 MG: 2 INJECTION, SOLUTION INTRAMUSCULAR; INTRAVENOUS at 03:01

## 2025-05-01 RX ADMIN — DEXTROSE MONOHYDRATE: 50 INJECTION, SOLUTION INTRAVENOUS at 09:58

## 2025-05-01 RX ADMIN — LEVOFLOXACIN 750 MG: 5 INJECTION, SOLUTION INTRAVENOUS at 06:35

## 2025-05-01 RX ADMIN — SODIUM CHLORIDE, PRESERVATIVE FREE 10 ML: 5 INJECTION INTRAVENOUS at 21:16

## 2025-05-01 RX ADMIN — ONDANSETRON 4 MG: 2 INJECTION, SOLUTION INTRAMUSCULAR; INTRAVENOUS at 23:22

## 2025-05-01 RX ADMIN — DEXTROSE MONOHYDRATE: 50 INJECTION, SOLUTION INTRAVENOUS at 19:04

## 2025-05-01 RX ADMIN — DEXTROSE MONOHYDRATE: 50 INJECTION, SOLUTION INTRAVENOUS at 14:18

## 2025-05-01 RX ADMIN — CYANOCOBALAMIN 1000 MCG: 1000 INJECTION, SOLUTION INTRAMUSCULAR; SUBCUTANEOUS at 14:15

## 2025-05-01 RX ADMIN — ALBUMIN (HUMAN) 25 G: 0.25 INJECTION, SOLUTION INTRAVENOUS at 16:59

## 2025-05-01 RX ADMIN — POTASSIUM CHLORIDE 10 MEQ: 10 INJECTION, SOLUTION INTRAVENOUS at 03:08

## 2025-05-01 RX ADMIN — SODIUM CHLORIDE: 9 INJECTION, SOLUTION INTRAVENOUS at 06:30

## 2025-05-01 RX ADMIN — SODIUM CHLORIDE 40 MG: 9 INJECTION INTRAMUSCULAR; INTRAVENOUS; SUBCUTANEOUS at 10:03

## 2025-05-01 RX ADMIN — ONDANSETRON 4 MG: 2 INJECTION, SOLUTION INTRAMUSCULAR; INTRAVENOUS at 14:21

## 2025-05-01 RX ADMIN — ALBUMIN (HUMAN) 25 G: 0.25 INJECTION, SOLUTION INTRAVENOUS at 09:47

## 2025-05-01 RX ADMIN — WATER 1000 MG: 1 INJECTION INTRAMUSCULAR; INTRAVENOUS; SUBCUTANEOUS at 09:48

## 2025-05-01 RX ADMIN — ASCORBIC ACID, VITAMIN A PALMITATE, CHOLECALCIFEROL, THIAMINE HYDROCHLORIDE, RIBOFLAVIN-5 PHOSPHATE SODIUM, PYRIDOXINE HYDROCHLORIDE, NIACINAMIDE, DEXPANTHENOL, ALPHA-TOCOPHEROL ACETATE, VITAMIN K1, FOLIC ACID, BIOTIN, CYANOCOBALAMIN: 200; 3300; 200; 6; 3.6; 6; 40; 15; 10; 150; 600; 60; 5 INJECTION, SOLUTION INTRAVENOUS at 14:23

## 2025-05-01 RX ADMIN — THIAMINE HYDROCHLORIDE 100 MG: 100 INJECTION, SOLUTION INTRAMUSCULAR; INTRAVENOUS at 15:20

## 2025-05-01 ASSESSMENT — PAIN SCALES - GENERAL
PAINLEVEL_OUTOF10: 10
PAINLEVEL_OUTOF10: 0
PAINLEVEL_OUTOF10: 10
PAINLEVEL_OUTOF10: 10
PAINLEVEL_OUTOF10: 0
PAINLEVEL_OUTOF10: 9

## 2025-05-01 ASSESSMENT — PAIN DESCRIPTION - LOCATION
LOCATION: GENERALIZED
LOCATION: GENERALIZED
LOCATION: ABDOMEN
LOCATION: ABDOMEN;GENERALIZED

## 2025-05-01 ASSESSMENT — PAIN DESCRIPTION - DESCRIPTORS
DESCRIPTORS: ACHING
DESCRIPTORS: ACHING
DESCRIPTORS: ACHING;SORE
DESCRIPTORS: ACHING

## 2025-05-01 ASSESSMENT — PAIN - FUNCTIONAL ASSESSMENT: PAIN_FUNCTIONAL_ASSESSMENT: ACTIVITIES ARE NOT PREVENTED

## 2025-05-01 ASSESSMENT — PAIN DESCRIPTION - ORIENTATION: ORIENTATION: OTHER (COMMENT)

## 2025-05-01 NOTE — ED NOTES
Patient states that she is having pain all over and states that this is her fibromyalgia acting up. Pt has not had any of her home meds

## 2025-05-01 NOTE — ED NOTES
While this nurse was medicating the patient, the visitors were requesting that an indwelling catheter be placed. This nurse educated the visitors as well as the patient as to the reason that we are not able to place an indwelling catheter. This nurse offered to place a purewick and it was refused as well as offered a bedpan, bedside commode and all of which was refused. This nurse spoke with the provider who also stated that there is no medical reason to place an indwelling catheter or in and out catheter in.

## 2025-05-01 NOTE — ED NOTES
.ED TO INPATIENT SBAR HANDOFF    Patient Name: Ce Roberts   :  1970  55 y.o.   MRN:  [id number]  ED Room #:  ER10/10     Situation  Code Status: Full Code   Allergies: Aspirin, Hydromorphone, Levofloxacin, Penicillins, Ketorolac tromethamine, Oxycodone-acetaminophen, Sulfa antibiotics, Sumatriptan, Tramadol, Bupropion, Cephalexin, Hydrocodone, Lisinopril, Milnacipran, Pork-derived products, Solifenacin, Vancomycin, and Prednisone  Weight: Patient Vitals for the past 96 hrs (Last 3 readings):   Weight   25 1706 47.6 kg (105 lb)       Arrived from: home    Chief Complaint:   Chief Complaint   Patient presents with    Failure To Thrive       Hospital Problem/Diagnosis:  Principal Problem:    Intractable nausea and vomiting  Active Problems:    Hypoglycemia  Resolved Problems:    * No resolved hospital problems. *      Mobility: limited bed mobility , limited transfer mobility , and new onset weakness   ED Fall Risk: Presents to emergency department  because of falls (Syncope, seizure, or loss of consciousness): Yes, Age > 70: Yes, Altered Mental Status, Intoxication with alcohol or substance confusion (Disorientation, impaired judgment, poor safety awaremess, or inability to follow instructions): Yes, Impaired Mobility: Ambulates or transfers with assistive devices or assistance; Unable to ambulate or transer.: Yes, Nursing Judgement: Yes   Fell in ED or prior to admission: yes (per friends, 2 weeks prior)  Restraints: no     Sitter: no   Family/Caregiver Present: yes. 2 friends at the bedside (identify themselves as best friends), pt has a fiance and a medical POA, both should be listed in the chart    Neet to know social/safety information: best friends at the bedside are adament that the patient is getting a feeding tube, they will also hook/unhook IV lines.     Background  History:   Past Medical History:   Diagnosis Date    Arthritis     Arthritis of low back     Asthma     Chronic pain

## 2025-05-01 NOTE — ED NOTES
This nurse assisted in transporting patient to the bathroom. Pt was successful in urinating. Pt is alert and oriented x 4 and was able to help get herself onto the toilet. This nurse attempted to use purewick with the patient but was unsuccessful

## 2025-05-01 NOTE — H&P
History and Physical    Date of Service:  5/1/2025  Primary Care Provider: Ulisses Joy MD  Source of information: The patient, patient's daughter at bedside and review of EMR    Chief Complaint: Failure To Thrive      History of Presenting Illness:   Ce Roberts is a 55 y.o. female with past medical history significant for fibromyalgia, gastric bypass, right hemicolectomy, resolved hypertension and diabetes following gastric bypass, mood disorder presented at the emergency room with nausea and vomiting.  Patient had gastric bypass in June which became complicated with bowel obstruction and subsequently underwent right hemicolectomy.  Patient has been having GI symptoms since then.  Vomiting is nonbilious and not coffee-ground.  Patient has not been able to drink or eat as result of the nausea and vomiting.  Patient also reported generalized abdominal pain, dull ache, no radiation, no known aggravating or relieving factors.  It was reported that patient has lost 110 pounds since June.  There was no history of  fever, rigors or chills reported.  Patient was last admitted to this hospital in December 2024, patient was admitted and treated for acute sigmoid diverticulitis/colitis due to C. difficile.  CT scan of the abdomen and pelvis done in the emergency room showed status post gastric bypass and right hemicolectomy, contracted small and large bowel without inflammatory changes and no evidence of obstruction.  She was referred to the hospitalist service for admission.         REVIEW OF SYSTEMS:  REVIEW OF SYSTEMS:  HEAD, EYES, EARS, NOSE AND THROAT:  No headache.  No dizziness.  No blurring of vision.  No photophobia.  RESPIRATORY SYSTEM:  No  shortness of breath.  No cough.  No hemoptysis.  CARDIOVASCULAR SYSTEM:  No chest pain.  No orthopnea.  No palpitations.  GASTROINTESTINAL SYSTEM: Positive for nausea and vomiting and abdominal pain No diarrhea.  No constipation.  GENITOURINARY SYSTEM:  No

## 2025-05-01 NOTE — ED NOTES
This nurse notified admitting provider that the blood sugar is 52 and this nurse attempted to take apple juice to the room and the family states that the patient is not able to take any sips of anything without wanting to vomit and experience nausea. The admitting provider stated that he is putting in hypoglycemic protocol orders in

## 2025-05-01 NOTE — ED NOTES
Individuals at the bedside state that they are collecting and reporting the information back the to the patients medical POA, so that she stays informed and up to date of the patients progress.

## 2025-05-01 NOTE — ED PROVIDER NOTES
cover with antibiotics, can take Rocephin despite multiple allergies listed.  I did discuss with pharmacist who felt Rocephin was appropriate.  CT of the abdomen showed no acute process at this time in the abdomen.  EKG with sinus tachycardia.  She appears clearly dehydrated and is actively retching here in the ED which I suspect accounts for her vital sign abnormalities.  She will require admission.    Problems Addressed:  Acute cystitis without hematuria: acute illness or injury  Dehydration: acute illness or injury  Intractable vomiting: acute illness or injury  Protein-calorie malnutrition, unspecified severity: acute illness or injury    Amount and/or Complexity of Data Reviewed  Radiology: ordered.    Risk  Prescription drug management.  Parenteral controlled substances.  Decision regarding hospitalization.          FINAL IMPRESSION      1. Dehydration    2. Intractable vomiting    3. Protein-calorie malnutrition, unspecified severity    4. Acute cystitis without hematuria          DISPOSITION/PLAN   DISPOSITION Decision To Admit 04/30/2025 10:21:14 PM    Perfect Serve Consult for Admission  10:52 PM    ED Room Number: ER10/10  Patient Name and age:  Ce Roberts 55 y.o.  female  Working Diagnosis:   1. Dehydration    2. Intractable vomiting    3. Protein-calorie malnutrition, unspecified severity    4. Acute cystitis without hematuria        COVID-19 Suspicion: No  Sepsis present:  No  Reassessment needed: No  Code Status:  Full Code  Readmission: No  Isolation Requirements: no  Recommended Level of Care: telemetry  Department: [facility] Adult ED - [phone]  55 y.o. female here with intractable N/V, abdominal pain, weight loss, fatigue (cannot get out of bed). Negative imaging.         (Please note that portions of this note were completed with a voice recognition program.  Efforts were made to edit the dictations but occasionally words are mis-transcribed.)    Cooper Lehman,  (electronically

## 2025-05-01 NOTE — ED NOTES
Friends at the bedside are very set that the patient will be getting a feeding tube placed on this visit to the hospital. Asked if that had been discussed by the physician, as ER treatment team was not aware of this procedure plan, to which she stated \"no, but her other doctor, Dr. Bolaños has talked about it before,and she is terrified of dying when they placed it because she just had a friend die when she had one placed\".

## 2025-05-01 NOTE — ED NOTES
This nurse spoke with provider and advised that the patient is wanting to get some pain medication and will look into it. This nurse made family aware that the provider is aware

## 2025-05-01 NOTE — H&P
[facility] Adult  Hospitalist Group  History and Physical    Date of Service:  5/1/2025  Primary Care Provider: Ulisses Joy MD  Source of information: The patient, Chart review, Spouse/family member, and Friend/caregiver    Chief Complaint: Failure To Thrive      History of Presenting Illness:   Ce Roberts is a 55 y.o. female who presents with generalized weakness, difficulty with walking, unsteady gait, nausea, status post gastric bypass surgery in June 2024, postop complications including bowel blockage, status post colon surgery, coming in through the emergency room on May 1 2, 2025 for above symptoms.  Most of the history is from her friend who a sister with outdoor activities including taking her to doctors appointment, bringing her to the emergency room, assisting her physically.  She lives with her fiancé at home but he works between 10 to 1 PM.  During those hours at her friend mostly takes care of her.  Patient has a daughter she is a single mom and unable to provide complete physical assistance at this time.  At this time the friend of the patient noticed that she is increasingly weak, she fell, ground-level fall 2 weeks ago, and was unable to keep any food.  As soon as she places the fluid in the mouth she gets nauseated and starts throwing up.  She does have appetite as per her friend's description.  On arrival here patient was found to have increased heart rate and low potassium.  Her oxygen saturation was also low at 84%.  She was told to have a UTI after initial blood work.  Her bariatric surgeon who is also her PCP,?  Dr. Bolaños has been following her for the last 1 year.  And she can eat only cream of wheat, orange juice, and ravioli such as soft food.  Additionally patient is on potassium supplements, potassium powder?  2 packets twice daily.  She has known history of high blood pressure and diabetes.  Which has resolved after the bariatric surgery.  She lost about 113 pounds

## 2025-05-01 NOTE — ED NOTES
Friends at the bedside, they report that she has been weak for several weeks, they report that their primary concern is that she gets some substanence in her and less concerned about her potassium or getting her up to walk. Female that identifies herself as the \"best friend\" does all the speaking for the patient. They report that that she has not been able to eat anything but she has the desire, but the smell or the taste are what prevent her from eating .     Hospitalist is at the bedside to complete his assessment.

## 2025-05-01 NOTE — ED NOTES
Procedure(s):  COLONOSCOPY. MAC    Anesthesia Post Evaluation      Multimodal analgesia: multimodal analgesia used between 6 hours prior to anesthesia start to PACU discharge  Patient location during evaluation: bedside  Patient participation: complete - patient participated  Level of consciousness: awake  Pain management: adequate  Airway patency: patent  Anesthetic complications: no  Cardiovascular status: acceptable  Respiratory status: acceptable  Hydration status: acceptable        INITIAL Post-op Vital signs:   Vitals Value Taken Time   /56 09/16/21 1500   Temp 37 °C (98.6 °F) 09/16/21 1440   Pulse 90 09/16/21 1501   Resp 18 09/16/21 1501   SpO2 100 % 09/16/21 1501   Vitals shown include unvalidated device data. This nurse notified provider that the patient is requesting pain meds

## 2025-05-02 ENCOUNTER — ANESTHESIA (OUTPATIENT)
Facility: HOSPITAL | Age: 55
End: 2025-05-02
Payer: MEDICARE

## 2025-05-02 ENCOUNTER — ANESTHESIA EVENT (OUTPATIENT)
Facility: HOSPITAL | Age: 55
End: 2025-05-02
Payer: MEDICARE

## 2025-05-02 PROBLEM — E43 SEVERE PROTEIN-ENERGY MALNUTRITION: Status: ACTIVE | Noted: 2025-05-02

## 2025-05-02 LAB
ALBUMIN SERPL-MCNC: 2.6 G/DL (ref 3.5–5)
ALBUMIN/GLOB SERPL: 1.3 (ref 1.1–2.2)
ALP SERPL-CCNC: 50 U/L (ref 45–117)
ALT SERPL-CCNC: 15 U/L (ref 12–78)
ANION GAP SERPL CALC-SCNC: 6 MMOL/L (ref 2–12)
AST SERPL-CCNC: 32 U/L (ref 15–37)
BACTERIA SPEC CULT: ABNORMAL
BASOPHILS # BLD: 0.03 K/UL (ref 0–0.1)
BASOPHILS NFR BLD: 0.5 % (ref 0–1)
BILIRUB SERPL-MCNC: 0.6 MG/DL (ref 0.2–1)
BUN SERPL-MCNC: 3 MG/DL (ref 6–20)
BUN/CREAT SERPL: 7 (ref 12–20)
CALCIUM SERPL-MCNC: 7.7 MG/DL (ref 8.5–10.1)
CC UR VC: ABNORMAL
CHLORIDE SERPL-SCNC: 108 MMOL/L (ref 97–108)
CO2 SERPL-SCNC: 26 MMOL/L (ref 21–32)
CREAT SERPL-MCNC: 0.44 MG/DL (ref 0.55–1.02)
DIFFERENTIAL METHOD BLD: ABNORMAL
EKG ATRIAL RATE: 132 BPM
EKG DIAGNOSIS: NORMAL
EKG P AXIS: 69 DEGREES
EKG P-R INTERVAL: 112 MS
EKG Q-T INTERVAL: 288 MS
EKG QRS DURATION: 68 MS
EKG QTC CALCULATION (BAZETT): 426 MS
EKG R AXIS: 75 DEGREES
EKG T AXIS: -72 DEGREES
EKG VENTRICULAR RATE: 132 BPM
EOSINOPHIL # BLD: 0.07 K/UL (ref 0–0.4)
EOSINOPHIL NFR BLD: 1.2 % (ref 0–7)
ERYTHROCYTE [DISTWIDTH] IN BLOOD BY AUTOMATED COUNT: 14.1 % (ref 11.5–14.5)
FERRITIN SERPL-MCNC: 399 NG/ML (ref 26–388)
FOLATE SERPL-MCNC: 13.2 NG/ML (ref 5–21)
GLOBULIN SER CALC-MCNC: 2 G/DL (ref 2–4)
GLUCOSE BLD STRIP.AUTO-MCNC: 110 MG/DL (ref 65–117)
GLUCOSE SERPL-MCNC: 80 MG/DL (ref 65–100)
HCT VFR BLD AUTO: 25.9 % (ref 35–47)
HGB BLD-MCNC: 8.8 G/DL (ref 11.5–16)
IMM GRANULOCYTES # BLD AUTO: 0.02 K/UL (ref 0–0.04)
IMM GRANULOCYTES NFR BLD AUTO: 0.3 % (ref 0–0.5)
IRON SATN MFR SERPL: 85 % (ref 20–50)
IRON SERPL-MCNC: 58 UG/DL (ref 35–150)
LYMPHOCYTES # BLD: 3.1 K/UL (ref 0.8–3.5)
LYMPHOCYTES NFR BLD: 53.2 % (ref 12–49)
MCH RBC QN AUTO: 32.8 PG (ref 26–34)
MCHC RBC AUTO-ENTMCNC: 34 G/DL (ref 30–36.5)
MCV RBC AUTO: 96.6 FL (ref 80–99)
MONOCYTES # BLD: 0.26 K/UL (ref 0–1)
MONOCYTES NFR BLD: 4.5 % (ref 5–13)
NEUTS SEG # BLD: 2.35 K/UL (ref 1.8–8)
NEUTS SEG NFR BLD: 40.3 % (ref 32–75)
NRBC # BLD: 0 K/UL (ref 0–0.01)
NRBC BLD-RTO: 0 PER 100 WBC
PLATELET # BLD AUTO: 199 K/UL (ref 150–400)
PMV BLD AUTO: 11.2 FL (ref 8.9–12.9)
POTASSIUM SERPL-SCNC: 2.5 MMOL/L (ref 3.5–5.1)
PROT SERPL-MCNC: 4.6 G/DL (ref 6.4–8.2)
RBC # BLD AUTO: 2.68 M/UL (ref 3.8–5.2)
SERVICE CMNT-IMP: ABNORMAL
SERVICE CMNT-IMP: NORMAL
SODIUM SERPL-SCNC: 140 MMOL/L (ref 136–145)
TIBC SERPL-MCNC: 68 UG/DL (ref 250–450)
WBC # BLD AUTO: 5.8 K/UL (ref 3.6–11)

## 2025-05-02 PROCEDURE — 3700000001 HC ADD 15 MINUTES (ANESTHESIA): Performed by: SURGERY

## 2025-05-02 PROCEDURE — 2720000010 HC SURG SUPPLY STERILE: Performed by: SURGERY

## 2025-05-02 PROCEDURE — 2500000003 HC RX 250 WO HCPCS: Performed by: INTERNAL MEDICINE

## 2025-05-02 PROCEDURE — 2580000003 HC RX 258: Performed by: NURSE ANESTHETIST, CERTIFIED REGISTERED

## 2025-05-02 PROCEDURE — 84100 ASSAY OF PHOSPHORUS: CPT

## 2025-05-02 PROCEDURE — 80053 COMPREHEN METABOLIC PANEL: CPT

## 2025-05-02 PROCEDURE — 2580000003 HC RX 258: Performed by: SURGERY

## 2025-05-02 PROCEDURE — 6370000000 HC RX 637 (ALT 250 FOR IP): Performed by: INTERNAL MEDICINE

## 2025-05-02 PROCEDURE — 3600000003 HC SURGERY LEVEL 3 BASE: Performed by: SURGERY

## 2025-05-02 PROCEDURE — 99232 SBSQ HOSP IP/OBS MODERATE 35: CPT | Performed by: SURGERY

## 2025-05-02 PROCEDURE — 1100000000 HC RM PRIVATE

## 2025-05-02 PROCEDURE — 3600000013 HC SURGERY LEVEL 3 ADDTL 15MIN: Performed by: SURGERY

## 2025-05-02 PROCEDURE — 6360000002 HC RX W HCPCS: Performed by: SURGERY

## 2025-05-02 PROCEDURE — 2580000003 HC RX 258

## 2025-05-02 PROCEDURE — 93010 ELECTROCARDIOGRAM REPORT: CPT | Performed by: SPECIALIST

## 2025-05-02 PROCEDURE — 6360000002 HC RX W HCPCS: Performed by: NURSE ANESTHETIST, CERTIFIED REGISTERED

## 2025-05-02 PROCEDURE — P9045 ALBUMIN (HUMAN), 5%, 250 ML: HCPCS

## 2025-05-02 PROCEDURE — 36415 COLL VENOUS BLD VENIPUNCTURE: CPT

## 2025-05-02 PROCEDURE — 97165 OT EVAL LOW COMPLEX 30 MIN: CPT

## 2025-05-02 PROCEDURE — 7100000001 HC PACU RECOVERY - ADDTL 15 MIN: Performed by: SURGERY

## 2025-05-02 PROCEDURE — 2709999900 HC NON-CHARGEABLE SUPPLY: Performed by: SURGERY

## 2025-05-02 PROCEDURE — 97530 THERAPEUTIC ACTIVITIES: CPT

## 2025-05-02 PROCEDURE — 3700000000 HC ANESTHESIA ATTENDED CARE: Performed by: SURGERY

## 2025-05-02 PROCEDURE — 2500000003 HC RX 250 WO HCPCS: Performed by: NURSE ANESTHETIST, CERTIFIED REGISTERED

## 2025-05-02 PROCEDURE — 82746 ASSAY OF FOLIC ACID SERUM: CPT

## 2025-05-02 PROCEDURE — 2580000003 HC RX 258: Performed by: INTERNAL MEDICINE

## 2025-05-02 PROCEDURE — 6360000002 HC RX W HCPCS: Performed by: INTERNAL MEDICINE

## 2025-05-02 PROCEDURE — 3E0G76Z INTRODUCTION OF NUTRITIONAL SUBSTANCE INTO UPPER GI, VIA NATURAL OR ARTIFICIAL OPENING: ICD-10-PCS | Performed by: SURGERY

## 2025-05-02 PROCEDURE — 6360000002 HC RX W HCPCS

## 2025-05-02 PROCEDURE — 2500000003 HC RX 250 WO HCPCS: Performed by: SURGERY

## 2025-05-02 PROCEDURE — 43830 GSTRST OPEN WO CONSTJ TUBE: CPT | Performed by: SURGERY

## 2025-05-02 PROCEDURE — P9045 ALBUMIN (HUMAN), 5%, 250 ML: HCPCS | Performed by: NURSE ANESTHETIST, CERTIFIED REGISTERED

## 2025-05-02 PROCEDURE — 83550 IRON BINDING TEST: CPT

## 2025-05-02 PROCEDURE — 7100000000 HC PACU RECOVERY - FIRST 15 MIN: Performed by: SURGERY

## 2025-05-02 PROCEDURE — 83540 ASSAY OF IRON: CPT

## 2025-05-02 PROCEDURE — 43235 EGD DIAGNOSTIC BRUSH WASH: CPT | Performed by: SURGERY

## 2025-05-02 PROCEDURE — 2500000003 HC RX 250 WO HCPCS

## 2025-05-02 PROCEDURE — 85025 COMPLETE CBC W/AUTO DIFF WBC: CPT

## 2025-05-02 PROCEDURE — 82728 ASSAY OF FERRITIN: CPT

## 2025-05-02 PROCEDURE — 97161 PT EVAL LOW COMPLEX 20 MIN: CPT

## 2025-05-02 PROCEDURE — 82962 GLUCOSE BLOOD TEST: CPT

## 2025-05-02 PROCEDURE — 85027 COMPLETE CBC AUTOMATED: CPT

## 2025-05-02 PROCEDURE — 0DH60UZ INSERTION OF FEEDING DEVICE INTO STOMACH, OPEN APPROACH: ICD-10-PCS | Performed by: SURGERY

## 2025-05-02 RX ORDER — ALBUMIN HUMAN 50 G/1000ML
12.5 SOLUTION INTRAVENOUS ONCE
Status: COMPLETED | OUTPATIENT
Start: 2025-05-02 | End: 2025-05-02

## 2025-05-02 RX ORDER — GLYCOPYRROLATE 0.2 MG/ML
INJECTION, SOLUTION INTRAMUSCULAR; INTRAVENOUS
Status: DISCONTINUED | OUTPATIENT
Start: 2025-05-02 | End: 2025-05-02 | Stop reason: SDUPTHER

## 2025-05-02 RX ORDER — ALBUMIN HUMAN 50 G/1000ML
SOLUTION INTRAVENOUS
Status: COMPLETED
Start: 2025-05-02 | End: 2025-05-02

## 2025-05-02 RX ORDER — POTASSIUM CHLORIDE 7.45 MG/ML
10 INJECTION INTRAVENOUS
Status: DISPENSED | OUTPATIENT
Start: 2025-05-02 | End: 2025-05-02

## 2025-05-02 RX ORDER — MIDAZOLAM HYDROCHLORIDE 1 MG/ML
INJECTION, SOLUTION INTRAMUSCULAR; INTRAVENOUS
Status: DISCONTINUED | OUTPATIENT
Start: 2025-05-02 | End: 2025-05-02 | Stop reason: SDUPTHER

## 2025-05-02 RX ORDER — EPHEDRINE SULFATE 50 MG/ML
INJECTION INTRAVENOUS
Status: COMPLETED
Start: 2025-05-02 | End: 2025-05-02

## 2025-05-02 RX ORDER — ALBUMIN HUMAN 50 G/1000ML
SOLUTION INTRAVENOUS
Status: DISCONTINUED | OUTPATIENT
Start: 2025-05-02 | End: 2025-05-02 | Stop reason: SDUPTHER

## 2025-05-02 RX ORDER — ROCURONIUM BROMIDE 10 MG/ML
INJECTION, SOLUTION INTRAVENOUS
Status: DISCONTINUED | OUTPATIENT
Start: 2025-05-02 | End: 2025-05-02 | Stop reason: SDUPTHER

## 2025-05-02 RX ORDER — SODIUM CHLORIDE 0.9 % (FLUSH) 0.9 %
5-40 SYRINGE (ML) INJECTION PRN
Status: DISCONTINUED | OUTPATIENT
Start: 2025-05-02 | End: 2025-05-02 | Stop reason: HOSPADM

## 2025-05-02 RX ORDER — SODIUM CHLORIDE 0.9 % (FLUSH) 0.9 %
5-40 SYRINGE (ML) INJECTION EVERY 12 HOURS SCHEDULED
Status: DISCONTINUED | OUTPATIENT
Start: 2025-05-02 | End: 2025-05-02 | Stop reason: HOSPADM

## 2025-05-02 RX ORDER — SODIUM CHLORIDE, SODIUM LACTATE, POTASSIUM CHLORIDE, CALCIUM CHLORIDE 600; 310; 30; 20 MG/100ML; MG/100ML; MG/100ML; MG/100ML
INJECTION, SOLUTION INTRAVENOUS
Status: DISCONTINUED | OUTPATIENT
Start: 2025-05-02 | End: 2025-05-02 | Stop reason: SDUPTHER

## 2025-05-02 RX ORDER — VASOPRESSIN 20 [USP'U]/ML
INJECTION, SOLUTION INTRAVENOUS
Status: DISCONTINUED | OUTPATIENT
Start: 2025-05-02 | End: 2025-05-02 | Stop reason: SDUPTHER

## 2025-05-02 RX ORDER — ONDANSETRON 2 MG/ML
4 INJECTION INTRAMUSCULAR; INTRAVENOUS
Status: DISCONTINUED | OUTPATIENT
Start: 2025-05-02 | End: 2025-05-02 | Stop reason: HOSPADM

## 2025-05-02 RX ORDER — OXYCODONE HYDROCHLORIDE 5 MG/1
5 TABLET ORAL EVERY 4 HOURS PRN
Refills: 0 | Status: CANCELLED | OUTPATIENT
Start: 2025-05-02

## 2025-05-02 RX ORDER — CALCIUM GLUCONATE 94 MG/ML
INJECTION, SOLUTION INTRAVENOUS
Status: DISCONTINUED | OUTPATIENT
Start: 2025-05-02 | End: 2025-05-02 | Stop reason: SDUPTHER

## 2025-05-02 RX ORDER — HYDRALAZINE HYDROCHLORIDE 20 MG/ML
10 INJECTION INTRAMUSCULAR; INTRAVENOUS ONCE
Status: DISCONTINUED | OUTPATIENT
Start: 2025-05-02 | End: 2025-05-02 | Stop reason: HOSPADM

## 2025-05-02 RX ORDER — NEOSTIGMINE METHYLSULFATE 1 MG/ML
INJECTION, SOLUTION INTRAVENOUS
Status: DISCONTINUED | OUTPATIENT
Start: 2025-05-02 | End: 2025-05-02 | Stop reason: SDUPTHER

## 2025-05-02 RX ORDER — NALOXONE HYDROCHLORIDE 0.4 MG/ML
INJECTION, SOLUTION INTRAMUSCULAR; INTRAVENOUS; SUBCUTANEOUS PRN
Status: DISCONTINUED | OUTPATIENT
Start: 2025-05-02 | End: 2025-05-02 | Stop reason: HOSPADM

## 2025-05-02 RX ORDER — SODIUM CHLORIDE 9 MG/ML
INJECTION, SOLUTION INTRAVENOUS PRN
Status: DISCONTINUED | OUTPATIENT
Start: 2025-05-02 | End: 2025-05-02 | Stop reason: HOSPADM

## 2025-05-02 RX ORDER — PROCHLORPERAZINE EDISYLATE 5 MG/ML
5 INJECTION INTRAMUSCULAR; INTRAVENOUS
Status: DISCONTINUED | OUTPATIENT
Start: 2025-05-02 | End: 2025-05-02 | Stop reason: HOSPADM

## 2025-05-02 RX ORDER — BUPIVACAINE HYDROCHLORIDE AND EPINEPHRINE 5; 5 MG/ML; UG/ML
INJECTION, SOLUTION EPIDURAL; INTRACAUDAL; PERINEURAL PRN
Status: DISCONTINUED | OUTPATIENT
Start: 2025-05-02 | End: 2025-05-02 | Stop reason: HOSPADM

## 2025-05-02 RX ORDER — PHENYLEPHRINE HCL IN 0.9% NACL 0.4MG/10ML
SYRINGE (ML) INTRAVENOUS
Status: DISCONTINUED | OUTPATIENT
Start: 2025-05-02 | End: 2025-05-02 | Stop reason: SDUPTHER

## 2025-05-02 RX ORDER — OXYCODONE HYDROCHLORIDE 5 MG/1
10 TABLET ORAL EVERY 4 HOURS PRN
Refills: 0 | Status: CANCELLED | OUTPATIENT
Start: 2025-05-02

## 2025-05-02 RX ORDER — LIDOCAINE HYDROCHLORIDE 20 MG/ML
INJECTION, SOLUTION EPIDURAL; INFILTRATION; INTRACAUDAL; PERINEURAL
Status: DISCONTINUED | OUTPATIENT
Start: 2025-05-02 | End: 2025-05-02 | Stop reason: SDUPTHER

## 2025-05-02 RX ORDER — FENTANYL CITRATE 50 UG/ML
25 INJECTION, SOLUTION INTRAMUSCULAR; INTRAVENOUS EVERY 5 MIN PRN
Refills: 0 | Status: DISCONTINUED | OUTPATIENT
Start: 2025-05-02 | End: 2025-05-02 | Stop reason: HOSPADM

## 2025-05-02 RX ORDER — EPHEDRINE SULFATE 50 MG/ML
10 INJECTION INTRAVENOUS ONCE
Status: COMPLETED | OUTPATIENT
Start: 2025-05-02 | End: 2025-05-02

## 2025-05-02 RX ORDER — ONDANSETRON 2 MG/ML
INJECTION INTRAMUSCULAR; INTRAVENOUS
Status: DISCONTINUED | OUTPATIENT
Start: 2025-05-02 | End: 2025-05-02 | Stop reason: SDUPTHER

## 2025-05-02 RX ORDER — CLINDAMYCIN PHOSPHATE 600 MG/50ML
600 INJECTION, SOLUTION INTRAVENOUS
Status: COMPLETED | OUTPATIENT
Start: 2025-05-02 | End: 2025-05-02

## 2025-05-02 RX ORDER — FENTANYL CITRATE 50 UG/ML
INJECTION, SOLUTION INTRAMUSCULAR; INTRAVENOUS
Status: DISCONTINUED | OUTPATIENT
Start: 2025-05-02 | End: 2025-05-02 | Stop reason: SDUPTHER

## 2025-05-02 RX ADMIN — FOLIC ACID 1 MG: 5 INJECTION, SOLUTION INTRAMUSCULAR; INTRAVENOUS; SUBCUTANEOUS at 10:18

## 2025-05-02 RX ADMIN — Medication 120 MCG: at 17:53

## 2025-05-02 RX ADMIN — FENTANYL CITRATE 25 MCG: 50 INJECTION INTRAMUSCULAR; INTRAVENOUS at 01:17

## 2025-05-02 RX ADMIN — LIDOCAINE HYDROCHLORIDE 40 MG: 20 INJECTION, SOLUTION EPIDURAL; INFILTRATION; INTRACAUDAL; PERINEURAL at 17:10

## 2025-05-02 RX ADMIN — GABAPENTIN 600 MG: 600 TABLET, FILM COATED ORAL at 09:53

## 2025-05-02 RX ADMIN — FENTANYL CITRATE 50 MCG: 50 INJECTION INTRAMUSCULAR; INTRAVENOUS at 17:24

## 2025-05-02 RX ADMIN — SODIUM CHLORIDE, POTASSIUM CHLORIDE, SODIUM LACTATE AND CALCIUM CHLORIDE: 600; 310; 30; 20 INJECTION, SOLUTION INTRAVENOUS at 17:05

## 2025-05-02 RX ADMIN — Medication 120 MCG: at 17:22

## 2025-05-02 RX ADMIN — DEXTROSE MONOHYDRATE: 50 INJECTION, SOLUTION INTRAVENOUS at 20:54

## 2025-05-02 RX ADMIN — SODIUM CHLORIDE, PRESERVATIVE FREE 10 ML: 5 INJECTION INTRAVENOUS at 09:54

## 2025-05-02 RX ADMIN — Medication 80 MCG: at 17:37

## 2025-05-02 RX ADMIN — MIDAZOLAM 1 MG: 1 INJECTION INTRAMUSCULAR; INTRAVENOUS at 17:03

## 2025-05-02 RX ADMIN — Medication 80 MCG: at 17:40

## 2025-05-02 RX ADMIN — MIDAZOLAM 1 MG: 1 INJECTION INTRAMUSCULAR; INTRAVENOUS at 17:08

## 2025-05-02 RX ADMIN — Medication 80 MCG: at 17:33

## 2025-05-02 RX ADMIN — EPHEDRINE SULFATE 10 MG: 50 INJECTION INTRAVENOUS at 20:26

## 2025-05-02 RX ADMIN — POTASSIUM CHLORIDE 10 MEQ: 10 INJECTION, SOLUTION INTRAVENOUS at 10:35

## 2025-05-02 RX ADMIN — POTASSIUM CHLORIDE 10 MEQ: 10 INJECTION, SOLUTION INTRAVENOUS at 07:00

## 2025-05-02 RX ADMIN — FENTANYL CITRATE 25 MCG: 50 INJECTION INTRAMUSCULAR; INTRAVENOUS at 13:07

## 2025-05-02 RX ADMIN — POTASSIUM CHLORIDE 10 MEQ: 10 INJECTION, SOLUTION INTRAVENOUS at 21:08

## 2025-05-02 RX ADMIN — ALBUMIN HUMAN 12.5 G: 50 SOLUTION INTRAVENOUS at 19:20

## 2025-05-02 RX ADMIN — ROCURONIUM BROMIDE 25 MG: 10 SOLUTION INTRAVENOUS at 17:10

## 2025-05-02 RX ADMIN — VASOPRESSIN 1 UNITS: 20 INJECTION, SOLUTION INTRAVENOUS at 18:02

## 2025-05-02 RX ADMIN — ALBUMIN (HUMAN) 250 ML: 12.5 INJECTION, SOLUTION INTRAVENOUS at 17:46

## 2025-05-02 RX ADMIN — CLINDAMYCIN PHOSPHATE 600 MG: 600 INJECTION, SOLUTION INTRAVENOUS at 15:27

## 2025-05-02 RX ADMIN — CLINDAMYCIN PHOSPHATE 600 MG: 600 INJECTION, SOLUTION INTRAVENOUS at 17:17

## 2025-05-02 RX ADMIN — SODIUM CHLORIDE 40 MG: 9 INJECTION INTRAMUSCULAR; INTRAVENOUS; SUBCUTANEOUS at 10:04

## 2025-05-02 RX ADMIN — FENTANYL CITRATE 50 MCG: 50 INJECTION INTRAMUSCULAR; INTRAVENOUS at 17:10

## 2025-05-02 RX ADMIN — ROCURONIUM BROMIDE 15 MG: 10 SOLUTION INTRAVENOUS at 17:16

## 2025-05-02 RX ADMIN — NEOSTIGMINE METHYLSULFATE 2.5 MG: 1 INJECTION, SOLUTION INTRAVENOUS at 18:13

## 2025-05-02 RX ADMIN — LEVOFLOXACIN 750 MG: 5 INJECTION, SOLUTION INTRAVENOUS at 05:53

## 2025-05-02 RX ADMIN — GLYCOPYRROLATE 0.4 MG: 0.2 INJECTION, SOLUTION INTRAMUSCULAR; INTRAVENOUS at 18:13

## 2025-05-02 RX ADMIN — Medication 120 MCG: at 17:48

## 2025-05-02 RX ADMIN — CYCLOBENZAPRINE 10 MG: 10 TABLET, FILM COATED ORAL at 09:51

## 2025-05-02 RX ADMIN — Medication 80 MCG: at 17:10

## 2025-05-02 RX ADMIN — ALBUMIN (HUMAN) 12.5 G: 12.5 INJECTION, SOLUTION INTRAVENOUS at 19:20

## 2025-05-02 RX ADMIN — SODIUM CHLORIDE, PRESERVATIVE FREE 10 ML: 5 INJECTION INTRAVENOUS at 23:00

## 2025-05-02 RX ADMIN — POTASSIUM CHLORIDE 100 ML: 10 INJECTION, SOLUTION INTRAVENOUS at 17:31

## 2025-05-02 RX ADMIN — POTASSIUM CHLORIDE 10 MEQ: 10 INJECTION, SOLUTION INTRAVENOUS at 11:35

## 2025-05-02 RX ADMIN — DEXTROSE MONOHYDRATE: 50 INJECTION, SOLUTION INTRAVENOUS at 02:55

## 2025-05-02 RX ADMIN — THIAMINE HYDROCHLORIDE 100 MG: 100 INJECTION, SOLUTION INTRAMUSCULAR; INTRAVENOUS at 10:03

## 2025-05-02 RX ADMIN — PHENYLEPHRINE HYDROCHLORIDE 40 MCG/MIN: 10 INJECTION INTRAVENOUS at 17:35

## 2025-05-02 RX ADMIN — POTASSIUM CHLORIDE 10 MEQ: 10 INJECTION, SOLUTION INTRAVENOUS at 08:25

## 2025-05-02 RX ADMIN — PROPOFOL 80 MG: 10 INJECTION, EMULSION INTRAVENOUS at 17:10

## 2025-05-02 RX ADMIN — CITALOPRAM HYDROBROMIDE 10 MG: 20 TABLET ORAL at 09:52

## 2025-05-02 RX ADMIN — FENTANYL CITRATE 25 MCG: 50 INJECTION INTRAMUSCULAR; INTRAVENOUS at 05:49

## 2025-05-02 RX ADMIN — CYCLOBENZAPRINE 10 MG: 10 TABLET, FILM COATED ORAL at 09:52

## 2025-05-02 RX ADMIN — ONDANSETRON 4 MG: 2 INJECTION, SOLUTION INTRAMUSCULAR; INTRAVENOUS at 17:30

## 2025-05-02 RX ADMIN — CALCIUM GLUCONATE 1 G: 98 INJECTION, SOLUTION INTRAVENOUS at 18:05

## 2025-05-02 RX ADMIN — POTASSIUM CHLORIDE 10 MEQ: 10 INJECTION, SOLUTION INTRAVENOUS at 09:25

## 2025-05-02 RX ADMIN — ROCURONIUM BROMIDE 10 MG: 10 SOLUTION INTRAVENOUS at 17:24

## 2025-05-02 ASSESSMENT — PAIN DESCRIPTION - DESCRIPTORS
DESCRIPTORS: ACHING;SORE
DESCRIPTORS: CRAMPING;DISCOMFORT;THROBBING
DESCRIPTORS: DISCOMFORT
DESCRIPTORS: DISCOMFORT

## 2025-05-02 ASSESSMENT — PAIN DESCRIPTION - LOCATION
LOCATION: GENERALIZED
LOCATION: GENERALIZED
LOCATION: ABDOMEN

## 2025-05-02 ASSESSMENT — PAIN SCALES - GENERAL
PAINLEVEL_OUTOF10: 2
PAINLEVEL_OUTOF10: 2
PAINLEVEL_OUTOF10: 8
PAINLEVEL_OUTOF10: 9
PAINLEVEL_OUTOF10: 2
PAINLEVEL_OUTOF10: 5
PAINLEVEL_OUTOF10: 10
PAINLEVEL_OUTOF10: 5

## 2025-05-02 ASSESSMENT — PAIN - FUNCTIONAL ASSESSMENT
PAIN_FUNCTIONAL_ASSESSMENT: ADULT NONVERBAL PAIN SCALE (NPVS)
PAIN_FUNCTIONAL_ASSESSMENT: 0-10

## 2025-05-02 ASSESSMENT — PAIN DESCRIPTION - ORIENTATION: ORIENTATION: INNER

## 2025-05-02 NOTE — ANESTHESIA PRE PROCEDURE
Department of Anesthesiology  Preprocedure Note       Name:  Ce Roberts   Age:  55 y.o.  :  1970                                          MRN:  [id number]         Date:  2025      Surgeon: Surgeon(s):  Kevin Bolaños MD    Procedure: Procedure(s):  LAPAROSCOPIC GASTROSTOMY TUBE PLACEMENT, EGD    Medications prior to admission:   Prior to Admission medications    Medication Sig Start Date End Date Taking? Authorizing Provider   albuterol sulfate HFA (VENTOLIN HFA) 108 (90 Base) MCG/ACT inhaler Inhale 2 puffs into the lungs 4 times daily as needed for Wheezing 25   Ulisses Joy MD   citalopram (CELEXA) 10 MG tablet TAKE 1 TABLET BY MOUTH EVERY DAY 3/31/25   Ulisses Joy MD   ondansetron (ZOFRAN) 8 MG tablet TAKE 1 TABLET BY MOUTH EVERY 8 HOURS AS NEEDED FOR NAUSEA AND VOMITING 3/31/25   Ulisses Joy MD   cyclobenzaprine (FLEXERIL) 10 MG tablet Take 1 tablet by mouth 3 times daily as needed for Muscle spasms    Provider, MD Samson   gabapentin (NEURONTIN) 600 MG tablet TAKE 1 TABLET BY MOUTH TWICE  DAILY MAX DAILY AMOUNT: 1,200 MG 25  Ulisses Joy MD   potassium chloride (KLOR-CON) 20 MEQ packet Take 20 mEq by mouth daily 1/10/25 5/10/25  Kevin Bolaños MD   amitriptyline (ELAVIL) 50 MG tablet Take 1 tablet by mouth nightly 1/10/25   Kevin Bolaños MD   omeprazole (PRILOSEC) 40 MG delayed release capsule Take 1 capsule by mouth in the morning and at bedtime Open capsule over food and consume food.  Patient not taking: Reported on 3/5/2025 1/10/25 4/10/25  Kevin Bolaños MD   folic acid (FOLVITE) 1 MG tablet Take 1 tablet by mouth daily  Patient not taking: Reported on 3/5/2025 12/13/24   Cahnce Tsang MD   ondansetron (ZOFRAN) 8 MG tablet Take 1 tablet by mouth every 8 hours as needed for Nausea or Vomiting 24   Ulisses Joy MD       Current medications:    Current Facility-Administered Medications   Medication Dose Route Frequency Provider Last Rate Last

## 2025-05-02 NOTE — BRIEF OP NOTE
Brief Postoperative Note      Patient: Ce Roberts  YOB: 1970  MRN: [id number]    Date of Procedure: 5/2/2025    Pre-Op Diagnosis Codes:      * Hx of gastric bypass [Z98.84]    Post-Op Diagnosis: Malnutrition, intra-abdominal adhesions       Procedure(s):  LAPAROSCOPIC CONVERT TO OPEN GASTROSTOMY TUBE PLACEMENT, LYSIS OF ADHESION EGD    Surgeon(s):  Kevin Bolaños MD    Assistant:  Surgical Assistant: Bobby James    Anesthesia: General    Estimated Blood Loss (mL): less than 50     Complications: None    Specimens:   * No specimens in log *    Implants:  * No implants in log *      Drains:   Gastrostomy/Enterostomy/Jejunostomy Tube Gastrostomy LUQ 1 20 fr (Active)       [REMOVED] External Urinary Catheter (Removed)   Site Assessment Clean,dry & intact 05/02/25 0814   Placement Repositioned 05/02/25 0814   Securement Method Securing device (Describe) 05/02/25 0814   Catheter Care Catheter/Wick replaced 05/02/25 0814   Perineal Care Yes 05/02/25 0814   Suction 40 mmgHg continuous 05/02/25 0814   Urine Color Pat 05/02/25 0814   Urine Appearance Cloudy 05/02/25 0814   Urine Odor Malodorous 05/01/25 1800       [REMOVED] External Urinary Catheter (Removed)   Urine Color Colorless 05/01/25 2312   Urine Appearance Clear 05/01/25 2312   Output (mL) 550 mL 05/01/25 2312       [REMOVED] External Urinary Catheter (Removed)       Findings:  Infection Present At Time Of Surgery (PATOS) (choose all levels that have infection present):  No infection present  Other Findings: Severe scar tissue between the bowel loops and also from the bowel to the abdominal wall.  Open conversion, lysis of adhesions, open G-tube placement into the excluded stomach.  Endoscopy confirms normal gastric bypass pouch at 5 cm, GE junction at 40 cm, no esophagitis, GJ widely open without ulcers.    Electronically signed by Kevin Bolaños MD on 5/2/2025 at 6:18 PM

## 2025-05-02 NOTE — CARE COORDINATION
Care Management Initial Assessment       RUR: 20%  Readmission? No  1st IM letter given? No  1st  letter given: No     05/02/25 1539   Service Assessment   Patient Orientation Alert and Oriented   Cognition Alert   History Provided By Patient   Primary Caregiver Self   Patient's Healthcare Decision Maker is: Legal Next of Kin   PCP Verified by CM Yes   Last Visit to PCP Within last 3 months   Prior Functional Level Independent in ADLs/IADLs   Current Functional Level Independent in ADLs/IADLs   Can patient return to prior living arrangement Yes   Ability to make needs known: Good   Family able to assist with home care needs: Yes   Would you like for me to discuss the discharge plan with any other family members/significant others, and if so, who? Yes  (Friend at bedside)   Social/Functional History   Lives With Significant other   Type of Home House   Home Layout One level   Home Access Stairs to enter with rails   Entrance Stairs - Number of Steps 7   Entrance Stairs - Rails Both   Bathroom Shower/Tub Tub/Shower unit   Bathroom Toilet Standard   Bathroom Equipment Shower chair   Home Equipment Rollator;Cane   Prior Level of Assist for ADLs Independent   Prior Level of Assist for Homemaking Independent   Homemaking Responsibilities Yes   Prior Level of Assist for Transfers Independent       Cm met with patient and her friend at bedside. She confirmed her demographics, and plans to return home on discharge. Patient used home health services in the past, and plans to use services as needed when ready for discharge. She is going to the OR today for a peg tube.       [facility]  Care Management Department  Ph:[phone]

## 2025-05-02 NOTE — ANESTHESIA POSTPROCEDURE EVALUATION
Post-Anesthesia Evaluation and Assessment    Patient: Ce Roberts MRN: [id number]  SSN: [id number]    YOB: 1970  Age: 55 y.o.  Sex: female      I have evaluated the patient and they are stable and ready for discharge from the PACU.     Cardiovascular Function/Vital Signs  Visit Vitals  BP 92/69   Pulse (!) 105   Temp 98.2 °F (36.8 °C) (Oral)   Resp 14   Ht 1.6 m (5' 3\")   Wt 47.6 kg (105 lb)   SpO2 99%   BMI 18.60 kg/m²       Patient is status post General anesthesia for Procedure(s):  LAPAROSCOPIC CONVERT TO OPEN GASTROSTOMY TUBE PLACEMENT, LYSIS OF ADHESION, EGD.    Nausea/Vomiting: None    Postoperative hydration reviewed and adequate.    Pain:  Managed    Neurological Status:   At baseline    Mental Status, Level of Consciousness: Alert and  oriented to person, place, and time    Pulmonary Status:   Adequate oxygenation and airway patent    Complications related to anesthesia: None    Post-anesthesia assessment completed. No concerns    Signed By: David Piña MD     May 2, 2025

## 2025-05-03 LAB
ALBUMIN SERPL-MCNC: 3 G/DL (ref 3.5–5)
ALBUMIN/GLOB SERPL: 1.8 (ref 1.1–2.2)
ALP SERPL-CCNC: 40 U/L (ref 45–117)
ALT SERPL-CCNC: 27 U/L (ref 12–78)
ANION GAP SERPL CALC-SCNC: 8 MMOL/L (ref 2–12)
AST SERPL-CCNC: 71 U/L (ref 15–37)
BACTERIA SPEC CULT: ABNORMAL
BASOPHILS # BLD: 0 K/UL (ref 0–0.1)
BASOPHILS NFR BLD: 0 % (ref 0–1)
BILIRUB SERPL-MCNC: 1 MG/DL (ref 0.2–1)
BUN SERPL-MCNC: 2 MG/DL (ref 6–20)
BUN/CREAT SERPL: 4 (ref 12–20)
CALCIUM SERPL-MCNC: 7.8 MG/DL (ref 8.5–10.1)
CC UR VC: ABNORMAL
CHLORIDE SERPL-SCNC: 102 MMOL/L (ref 97–108)
CO2 SERPL-SCNC: 25 MMOL/L (ref 21–32)
CREAT SERPL-MCNC: 0.47 MG/DL (ref 0.55–1.02)
DIFFERENTIAL METHOD BLD: ABNORMAL
EOSINOPHIL # BLD: 0 K/UL (ref 0–0.4)
EOSINOPHIL NFR BLD: 0 % (ref 0–7)
ERYTHROCYTE [DISTWIDTH] IN BLOOD BY AUTOMATED COUNT: 13.6 % (ref 11.5–14.5)
GLOBULIN SER CALC-MCNC: 1.7 G/DL (ref 2–4)
GLUCOSE BLD STRIP.AUTO-MCNC: 205 MG/DL (ref 65–117)
GLUCOSE SERPL-MCNC: 170 MG/DL (ref 65–100)
HCT VFR BLD AUTO: 27.3 % (ref 35–47)
HGB BLD-MCNC: 9.5 G/DL (ref 11.5–16)
IMM GRANULOCYTES # BLD AUTO: 0 K/UL
IMM GRANULOCYTES NFR BLD AUTO: 0 %
LYMPHOCYTES # BLD: 0.57 K/UL (ref 0.8–3.5)
LYMPHOCYTES NFR BLD: 13 % (ref 12–49)
MAGNESIUM SERPL-MCNC: 1 MG/DL (ref 1.6–2.4)
MCH RBC QN AUTO: 33.7 PG (ref 26–34)
MCHC RBC AUTO-ENTMCNC: 34.8 G/DL (ref 30–36.5)
MCV RBC AUTO: 96.8 FL (ref 80–99)
MONOCYTES # BLD: 0.13 K/UL (ref 0–1)
MONOCYTES NFR BLD: 3 % (ref 5–13)
NEUTS BAND NFR BLD MANUAL: 2 % (ref 0–6)
NEUTS SEG # BLD: 3.7 K/UL (ref 1.8–8)
NEUTS SEG NFR BLD: 82 % (ref 32–75)
NRBC # BLD: 0 K/UL (ref 0–0.01)
NRBC BLD-RTO: 0 PER 100 WBC
PHOSPHATE SERPL-MCNC: 1.8 MG/DL (ref 2.6–4.7)
PLATELET # BLD AUTO: 156 K/UL (ref 150–400)
PMV BLD AUTO: 11.3 FL (ref 8.9–12.9)
POTASSIUM SERPL-SCNC: 3.3 MMOL/L (ref 3.5–5.1)
PROT SERPL-MCNC: 4.7 G/DL (ref 6.4–8.2)
RBC # BLD AUTO: 2.82 M/UL (ref 3.8–5.2)
RBC MORPH BLD: ABNORMAL
SERVICE CMNT-IMP: ABNORMAL
SERVICE CMNT-IMP: ABNORMAL
SODIUM SERPL-SCNC: 135 MMOL/L (ref 136–145)
WBC # BLD AUTO: 4.4 K/UL (ref 3.6–11)

## 2025-05-03 PROCEDURE — 83735 ASSAY OF MAGNESIUM: CPT

## 2025-05-03 PROCEDURE — 6360000002 HC RX W HCPCS: Performed by: NURSE PRACTITIONER

## 2025-05-03 PROCEDURE — 6360000002 HC RX W HCPCS

## 2025-05-03 PROCEDURE — 80053 COMPREHEN METABOLIC PANEL: CPT

## 2025-05-03 PROCEDURE — 6360000002 HC RX W HCPCS: Performed by: INTERNAL MEDICINE

## 2025-05-03 PROCEDURE — 82962 GLUCOSE BLOOD TEST: CPT

## 2025-05-03 PROCEDURE — 6370000000 HC RX 637 (ALT 250 FOR IP): Performed by: SURGERY

## 2025-05-03 PROCEDURE — P9045 ALBUMIN (HUMAN), 5%, 250 ML: HCPCS | Performed by: NURSE PRACTITIONER

## 2025-05-03 PROCEDURE — 1100000000 HC RM PRIVATE

## 2025-05-03 PROCEDURE — 85025 COMPLETE CBC W/AUTO DIFF WBC: CPT

## 2025-05-03 PROCEDURE — 6360000002 HC RX W HCPCS: Performed by: SURGERY

## 2025-05-03 PROCEDURE — 2580000003 HC RX 258: Performed by: INTERNAL MEDICINE

## 2025-05-03 PROCEDURE — 99024 POSTOP FOLLOW-UP VISIT: CPT | Performed by: SURGERY

## 2025-05-03 PROCEDURE — 2500000003 HC RX 250 WO HCPCS: Performed by: INTERNAL MEDICINE

## 2025-05-03 PROCEDURE — 2580000003 HC RX 258

## 2025-05-03 PROCEDURE — 84100 ASSAY OF PHOSPHORUS: CPT

## 2025-05-03 RX ORDER — MAGNESIUM SULFATE IN WATER 40 MG/ML
2000 INJECTION, SOLUTION INTRAVENOUS EVERY 4 HOURS
Status: COMPLETED | OUTPATIENT
Start: 2025-05-03 | End: 2025-05-03

## 2025-05-03 RX ORDER — MORPHINE SULFATE 15 MG/1
7.5 TABLET ORAL EVERY 4 HOURS PRN
Refills: 0 | Status: DISCONTINUED | OUTPATIENT
Start: 2025-05-03 | End: 2025-05-07

## 2025-05-03 RX ORDER — POTASSIUM CHLORIDE 7.45 MG/ML
10 INJECTION INTRAVENOUS
Status: DISCONTINUED | OUTPATIENT
Start: 2025-05-03 | End: 2025-05-03

## 2025-05-03 RX ORDER — DIPHENHYDRAMINE HYDROCHLORIDE 50 MG/ML
25 INJECTION, SOLUTION INTRAMUSCULAR; INTRAVENOUS EVERY 6 HOURS PRN
Status: DISCONTINUED | OUTPATIENT
Start: 2025-05-03 | End: 2025-05-14

## 2025-05-03 RX ORDER — POTASSIUM CHLORIDE 750 MG/1
40 TABLET, EXTENDED RELEASE ORAL ONCE
Status: DISCONTINUED | OUTPATIENT
Start: 2025-05-03 | End: 2025-05-03

## 2025-05-03 RX ORDER — FENTANYL CITRATE 50 UG/ML
50 INJECTION, SOLUTION INTRAMUSCULAR; INTRAVENOUS
Status: DISCONTINUED | OUTPATIENT
Start: 2025-05-03 | End: 2025-05-07

## 2025-05-03 RX ORDER — ALBUMIN HUMAN 50 G/1000ML
12.5 SOLUTION INTRAVENOUS ONCE
Status: COMPLETED | OUTPATIENT
Start: 2025-05-03 | End: 2025-05-03

## 2025-05-03 RX ORDER — CYCLOBENZAPRINE HCL 10 MG
10 TABLET ORAL 3 TIMES DAILY PRN
Status: DISCONTINUED | OUTPATIENT
Start: 2025-05-03 | End: 2025-05-03

## 2025-05-03 RX ORDER — POTASSIUM CHLORIDE 7.45 MG/ML
10 INJECTION INTRAVENOUS
Status: COMPLETED | OUTPATIENT
Start: 2025-05-03 | End: 2025-05-03

## 2025-05-03 RX ORDER — CYCLOBENZAPRINE HCL 10 MG
10 TABLET ORAL 3 TIMES DAILY
Status: DISCONTINUED | OUTPATIENT
Start: 2025-05-03 | End: 2025-05-09

## 2025-05-03 RX ADMIN — THIAMINE HYDROCHLORIDE 100 MG: 100 INJECTION, SOLUTION INTRAMUSCULAR; INTRAVENOUS at 08:36

## 2025-05-03 RX ADMIN — MAGNESIUM SULFATE HEPTAHYDRATE 2000 MG: 40 INJECTION, SOLUTION INTRAVENOUS at 11:48

## 2025-05-03 RX ADMIN — POTASSIUM CHLORIDE 10 MEQ: 10 INJECTION, SOLUTION INTRAVENOUS at 16:30

## 2025-05-03 RX ADMIN — AMITRIPTYLINE HYDROCHLORIDE 50 MG: 50 TABLET, FILM COATED ORAL at 23:34

## 2025-05-03 RX ADMIN — FOLIC ACID 1 MG: 5 INJECTION, SOLUTION INTRAMUSCULAR; INTRAVENOUS; SUBCUTANEOUS at 08:48

## 2025-05-03 RX ADMIN — GABAPENTIN 600 MG: 600 TABLET, FILM COATED ORAL at 10:14

## 2025-05-03 RX ADMIN — POTASSIUM CHLORIDE 10 MEQ: 10 INJECTION, SOLUTION INTRAVENOUS at 15:30

## 2025-05-03 RX ADMIN — CITALOPRAM HYDROBROMIDE 10 MG: 20 TABLET ORAL at 10:14

## 2025-05-03 RX ADMIN — GABAPENTIN 600 MG: 600 TABLET, FILM COATED ORAL at 23:36

## 2025-05-03 RX ADMIN — POTASSIUM CHLORIDE 10 MEQ: 10 INJECTION, SOLUTION INTRAVENOUS at 14:30

## 2025-05-03 RX ADMIN — SODIUM CHLORIDE, PRESERVATIVE FREE 10 ML: 5 INJECTION INTRAVENOUS at 08:33

## 2025-05-03 RX ADMIN — CYCLOBENZAPRINE 10 MG: 10 TABLET, FILM COATED ORAL at 10:11

## 2025-05-03 RX ADMIN — CYCLOBENZAPRINE 10 MG: 10 TABLET, FILM COATED ORAL at 15:11

## 2025-05-03 RX ADMIN — MAGNESIUM SULFATE HEPTAHYDRATE 2000 MG: 40 INJECTION, SOLUTION INTRAVENOUS at 15:04

## 2025-05-03 RX ADMIN — ALBUMIN (HUMAN) 12.5 G: 12.5 INJECTION, SOLUTION INTRAVENOUS at 01:45

## 2025-05-03 RX ADMIN — ONDANSETRON 4 MG: 2 INJECTION, SOLUTION INTRAMUSCULAR; INTRAVENOUS at 10:16

## 2025-05-03 RX ADMIN — LEVOFLOXACIN 750 MG: 5 INJECTION, SOLUTION INTRAVENOUS at 05:25

## 2025-05-03 RX ADMIN — MORPHINE SULFATE 7.5 MG: 15 TABLET ORAL at 11:59

## 2025-05-03 RX ADMIN — ONDANSETRON 4 MG: 2 INJECTION, SOLUTION INTRAMUSCULAR; INTRAVENOUS at 05:16

## 2025-05-03 RX ADMIN — POTASSIUM CHLORIDE 10 MEQ: 10 INJECTION, SOLUTION INTRAVENOUS at 17:30

## 2025-05-03 RX ADMIN — SODIUM CHLORIDE 40 MG: 9 INJECTION INTRAMUSCULAR; INTRAVENOUS; SUBCUTANEOUS at 08:29

## 2025-05-03 RX ADMIN — CYCLOBENZAPRINE 10 MG: 10 TABLET, FILM COATED ORAL at 23:35

## 2025-05-03 RX ADMIN — FENTANYL CITRATE 25 MCG: 50 INJECTION INTRAMUSCULAR; INTRAVENOUS at 05:13

## 2025-05-03 ASSESSMENT — PAIN DESCRIPTION - DESCRIPTORS
DESCRIPTORS: ACHING;THROBBING;SORE
DESCRIPTORS: ACHING;CRAMPING;THROBBING;DISCOMFORT
DESCRIPTORS: DISCOMFORT;ACHING;THROBBING
DESCRIPTORS: CRAMPING;DISCOMFORT

## 2025-05-03 ASSESSMENT — PAIN DESCRIPTION - ONSET: ONSET: PROGRESSIVE

## 2025-05-03 ASSESSMENT — PAIN SCALES - GENERAL
PAINLEVEL_OUTOF10: 8
PAINLEVEL_OUTOF10: 2
PAINLEVEL_OUTOF10: 5
PAINLEVEL_OUTOF10: 9
PAINLEVEL_OUTOF10: 5

## 2025-05-03 ASSESSMENT — PAIN DESCRIPTION - LOCATION
LOCATION: ABDOMEN

## 2025-05-03 ASSESSMENT — PAIN DESCRIPTION - ORIENTATION
ORIENTATION: INNER
ORIENTATION: INNER
ORIENTATION: LEFT;MID

## 2025-05-03 ASSESSMENT — PAIN DESCRIPTION - PAIN TYPE: TYPE: SURGICAL PAIN

## 2025-05-03 ASSESSMENT — PAIN DESCRIPTION - FREQUENCY: FREQUENCY: CONTINUOUS

## 2025-05-03 NOTE — PERIOP NOTE
Vitals:    05/02/25 2050   BP: 91/63   Pulse: (!) 106   Resp: 20   Temp:    SpO2:      Ok per Dr. Real for patient to transfer to inpatient unit.

## 2025-05-03 NOTE — PERIOP NOTE
TRANSFER - OUT REPORT:    Verbal report given to BLAKE Palma on Ce Roberts  being transferred to [facility] for routine post-op       Report consisted of patient's Situation, Background, Assessment and   Recommendations(SBAR).     Information from the following report(s) Nurse Handoff Report, Adult Overview, Surgery Report, Intake/Output, MAR, and Recent Results was reviewed with the receiving nurse.           Lines:   Peripheral IV 05/02/25 Right Forearm (Active)       Peripheral IV 05/02/25 Left;Upper Arm (Active)   Site Assessment Clean, dry & intact 05/02/25 1714   Line Status Infusing 05/02/25 1714   Phlebitis Assessment No symptoms 05/02/25 1714   Infiltration Assessment 0 05/02/25 1714   Alcohol Cap Used No 05/02/25 1714   Dressing Status New dressing applied;Clean, dry & intact 05/02/25 1714   Dressing Type Transparent 05/02/25 1714   Dressing Intervention New 05/02/25 1714        Opportunity for questions and clarification was provided.      Patient transported with:  Monitor, Registered Nurse, and Tech

## 2025-05-04 LAB
ANION GAP SERPL CALC-SCNC: 5 MMOL/L (ref 2–12)
ANION GAP SERPL CALC-SCNC: 6 MMOL/L (ref 2–12)
BUN SERPL-MCNC: 6 MG/DL (ref 6–20)
BUN SERPL-MCNC: 8 MG/DL (ref 6–20)
BUN/CREAT SERPL: 7 (ref 12–20)
BUN/CREAT SERPL: 9 (ref 12–20)
CALCIUM SERPL-MCNC: 8 MG/DL (ref 8.5–10.1)
CALCIUM SERPL-MCNC: 8 MG/DL (ref 8.5–10.1)
CHLORIDE SERPL-SCNC: 98 MMOL/L (ref 97–108)
CHLORIDE SERPL-SCNC: 99 MMOL/L (ref 97–108)
CO2 SERPL-SCNC: 25 MMOL/L (ref 21–32)
CO2 SERPL-SCNC: 26 MMOL/L (ref 21–32)
CREAT SERPL-MCNC: 0.82 MG/DL (ref 0.55–1.02)
CREAT SERPL-MCNC: 0.92 MG/DL (ref 0.55–1.02)
GLUCOSE SERPL-MCNC: 107 MG/DL (ref 65–100)
GLUCOSE SERPL-MCNC: 90 MG/DL (ref 65–100)
MAGNESIUM SERPL-MCNC: 2.2 MG/DL (ref 1.6–2.4)
MAGNESIUM SERPL-MCNC: NORMAL MG/DL (ref 1.6–2.4)
PHOSPHATE SERPL-MCNC: 1 MG/DL (ref 2.6–4.7)
POTASSIUM SERPL-SCNC: 4.2 MMOL/L (ref 3.5–5.1)
POTASSIUM SERPL-SCNC: ABNORMAL MMOL/L (ref 3.5–5.1)
SODIUM SERPL-SCNC: 129 MMOL/L (ref 136–145)
SODIUM SERPL-SCNC: 130 MMOL/L (ref 136–145)

## 2025-05-04 PROCEDURE — 6370000000 HC RX 637 (ALT 250 FOR IP): Performed by: SURGERY

## 2025-05-04 PROCEDURE — 84100 ASSAY OF PHOSPHORUS: CPT

## 2025-05-04 PROCEDURE — 6360000002 HC RX W HCPCS: Performed by: SURGERY

## 2025-05-04 PROCEDURE — 2500000003 HC RX 250 WO HCPCS: Performed by: SURGERY

## 2025-05-04 PROCEDURE — 80048 BASIC METABOLIC PNL TOTAL CA: CPT

## 2025-05-04 PROCEDURE — 2580000003 HC RX 258: Performed by: SURGERY

## 2025-05-04 PROCEDURE — 99024 POSTOP FOLLOW-UP VISIT: CPT | Performed by: SURGERY

## 2025-05-04 PROCEDURE — 83735 ASSAY OF MAGNESIUM: CPT

## 2025-05-04 PROCEDURE — 1100000000 HC RM PRIVATE

## 2025-05-04 RX ORDER — MAGNESIUM SULFATE IN WATER 40 MG/ML
2000 INJECTION, SOLUTION INTRAVENOUS PRN
Status: DISCONTINUED | OUTPATIENT
Start: 2025-05-04 | End: 2025-05-04 | Stop reason: SDUPTHER

## 2025-05-04 RX ORDER — POTASSIUM CHLORIDE 750 MG/1
40 TABLET, EXTENDED RELEASE ORAL PRN
Status: DISCONTINUED | OUTPATIENT
Start: 2025-05-04 | End: 2025-05-12

## 2025-05-04 RX ORDER — POTASSIUM CHLORIDE 7.45 MG/ML
10 INJECTION INTRAVENOUS PRN
Status: DISCONTINUED | OUTPATIENT
Start: 2025-05-04 | End: 2025-05-12

## 2025-05-04 RX ADMIN — SODIUM CHLORIDE 40 MG: 9 INJECTION INTRAMUSCULAR; INTRAVENOUS; SUBCUTANEOUS at 10:39

## 2025-05-04 RX ADMIN — FOLIC ACID 1 MG: 5 INJECTION, SOLUTION INTRAMUSCULAR; INTRAVENOUS; SUBCUTANEOUS at 10:44

## 2025-05-04 RX ADMIN — THIAMINE HYDROCHLORIDE 100 MG: 100 INJECTION, SOLUTION INTRAMUSCULAR; INTRAVENOUS at 10:37

## 2025-05-04 RX ADMIN — CYCLOBENZAPRINE 10 MG: 10 TABLET, FILM COATED ORAL at 10:35

## 2025-05-04 RX ADMIN — GABAPENTIN 600 MG: 600 TABLET, FILM COATED ORAL at 20:18

## 2025-05-04 RX ADMIN — CYCLOBENZAPRINE 10 MG: 10 TABLET, FILM COATED ORAL at 14:32

## 2025-05-04 RX ADMIN — CITALOPRAM HYDROBROMIDE 10 MG: 20 TABLET ORAL at 10:33

## 2025-05-04 RX ADMIN — CYCLOBENZAPRINE 10 MG: 10 TABLET, FILM COATED ORAL at 20:18

## 2025-05-04 RX ADMIN — SODIUM CHLORIDE, PRESERVATIVE FREE 10 ML: 5 INJECTION INTRAVENOUS at 20:19

## 2025-05-04 RX ADMIN — SODIUM PHOSPHATE, MONOBASIC, MONOHYDRATE AND SODIUM PHOSPHATE, DIBASIC, ANHYDROUS 30 MMOL: 276; 142 INJECTION, SOLUTION INTRAVENOUS at 18:07

## 2025-05-04 RX ADMIN — GABAPENTIN 600 MG: 600 TABLET, FILM COATED ORAL at 10:35

## 2025-05-04 RX ADMIN — MORPHINE SULFATE 7.5 MG: 15 TABLET ORAL at 00:10

## 2025-05-04 RX ADMIN — MORPHINE SULFATE 7.5 MG: 15 TABLET ORAL at 14:30

## 2025-05-04 ASSESSMENT — PAIN DESCRIPTION - ORIENTATION: ORIENTATION: RIGHT;LEFT

## 2025-05-04 ASSESSMENT — PAIN SCALES - GENERAL
PAINLEVEL_OUTOF10: 3
PAINLEVEL_OUTOF10: 7

## 2025-05-04 ASSESSMENT — PAIN DESCRIPTION - DESCRIPTORS: DESCRIPTORS: ACHING

## 2025-05-04 ASSESSMENT — PAIN DESCRIPTION - LOCATION: LOCATION: THROAT;GENERALIZED

## 2025-05-05 ENCOUNTER — APPOINTMENT (OUTPATIENT)
Facility: HOSPITAL | Age: 55
DRG: 689 | End: 2025-05-05
Payer: MEDICARE

## 2025-05-05 LAB
ANION GAP SERPL CALC-SCNC: 4 MMOL/L (ref 2–12)
APPEARANCE UR: CLEAR
BACTERIA URNS QL MICRO: NEGATIVE /HPF
BASOPHILS # BLD: 0 K/UL (ref 0–0.1)
BASOPHILS # BLD: 0 K/UL (ref 0–0.1)
BASOPHILS NFR BLD: 0 % (ref 0–1)
BASOPHILS NFR BLD: 0 % (ref 0–1)
BILIRUB UR QL: NEGATIVE
BUN SERPL-MCNC: 7 MG/DL (ref 6–20)
BUN/CREAT SERPL: 12 (ref 12–20)
CALCIUM SERPL-MCNC: 7.6 MG/DL (ref 8.5–10.1)
CHLORIDE SERPL-SCNC: 106 MMOL/L (ref 97–108)
CO2 SERPL-SCNC: 26 MMOL/L (ref 21–32)
COLOR UR: NORMAL
COMMENT:: NORMAL
CREAT SERPL-MCNC: 0.6 MG/DL (ref 0.55–1.02)
DIFFERENTIAL METHOD BLD: ABNORMAL
DIFFERENTIAL METHOD BLD: ABNORMAL
EOSINOPHIL # BLD: 0 K/UL (ref 0–0.4)
EOSINOPHIL # BLD: 0 K/UL (ref 0–0.4)
EOSINOPHIL NFR BLD: 0 % (ref 0–7)
EOSINOPHIL NFR BLD: 0 % (ref 0–7)
EPITH CASTS URNS QL MICRO: NORMAL /LPF
ERYTHROCYTE [DISTWIDTH] IN BLOOD BY AUTOMATED COUNT: 14.6 % (ref 11.5–14.5)
ERYTHROCYTE [DISTWIDTH] IN BLOOD BY AUTOMATED COUNT: 14.7 % (ref 11.5–14.5)
GLUCOSE SERPL-MCNC: 103 MG/DL (ref 65–100)
GLUCOSE UR STRIP.AUTO-MCNC: NEGATIVE MG/DL
HCT VFR BLD AUTO: 21.7 % (ref 35–47)
HCT VFR BLD AUTO: 22.5 % (ref 35–47)
HGB BLD-MCNC: 7.5 G/DL (ref 11.5–16)
HGB BLD-MCNC: 7.8 G/DL (ref 11.5–16)
HGB UR QL STRIP: NEGATIVE
HYALINE CASTS URNS QL MICRO: NORMAL /LPF (ref 0–5)
IMM GRANULOCYTES # BLD AUTO: 0 K/UL
IMM GRANULOCYTES # BLD AUTO: 0 K/UL
IMM GRANULOCYTES NFR BLD AUTO: 0 %
IMM GRANULOCYTES NFR BLD AUTO: 0 %
KETONES UR QL STRIP.AUTO: NEGATIVE MG/DL
LACTATE SERPL-SCNC: 2.3 MMOL/L (ref 0.4–2)
LACTATE SERPL-SCNC: 2.4 MMOL/L (ref 0.4–2)
LACTATE SERPL-SCNC: 2.4 MMOL/L (ref 0.4–2)
LEUKOCYTE ESTERASE UR QL STRIP.AUTO: NEGATIVE
LYMPHOCYTES # BLD: 0.73 K/UL (ref 0.8–3.5)
LYMPHOCYTES # BLD: 0.87 K/UL (ref 0.8–3.5)
LYMPHOCYTES NFR BLD: 52 % (ref 12–49)
LYMPHOCYTES NFR BLD: 54 % (ref 12–49)
MAGNESIUM SERPL-MCNC: 2 MG/DL (ref 1.6–2.4)
MCH RBC QN AUTO: 33.3 PG (ref 26–34)
MCH RBC QN AUTO: 33.6 PG (ref 26–34)
MCHC RBC AUTO-ENTMCNC: 34.6 G/DL (ref 30–36.5)
MCHC RBC AUTO-ENTMCNC: 34.7 G/DL (ref 30–36.5)
MCV RBC AUTO: 96.4 FL (ref 80–99)
MCV RBC AUTO: 97 FL (ref 80–99)
MONOCYTES # BLD: 0.06 K/UL (ref 0–1)
MONOCYTES # BLD: 0.21 K/UL (ref 0–1)
MONOCYTES NFR BLD: 15 % (ref 5–13)
MONOCYTES NFR BLD: 4 % (ref 5–13)
NEUTS SEG # BLD: 0.46 K/UL (ref 1.8–8)
NEUTS SEG # BLD: 0.67 K/UL (ref 1.8–8)
NEUTS SEG NFR BLD: 33 % (ref 32–75)
NEUTS SEG NFR BLD: 42 % (ref 32–75)
NITRITE UR QL STRIP.AUTO: NEGATIVE
NRBC # BLD: 0 K/UL (ref 0–0.01)
NRBC # BLD: 0 K/UL (ref 0–0.01)
NRBC BLD-RTO: 0 PER 100 WBC
NRBC BLD-RTO: 0 PER 100 WBC
PATH REV BLD -IMP: ABNORMAL
PH UR STRIP: 7 (ref 5–8)
PHOSPHATE SERPL-MCNC: 2.3 MG/DL (ref 2.6–4.7)
PLATELET # BLD AUTO: 113 K/UL (ref 150–400)
PLATELET # BLD AUTO: 115 K/UL (ref 150–400)
PMV BLD AUTO: 12.1 FL (ref 8.9–12.9)
PMV BLD AUTO: 12.3 FL (ref 8.9–12.9)
POTASSIUM SERPL-SCNC: 3.5 MMOL/L (ref 3.5–5.1)
PROT UR STRIP-MCNC: NEGATIVE MG/DL
RBC # BLD AUTO: 2.25 M/UL (ref 3.8–5.2)
RBC # BLD AUTO: 2.32 M/UL (ref 3.8–5.2)
RBC #/AREA URNS HPF: NORMAL /HPF (ref 0–5)
RBC MORPH BLD: ABNORMAL
RBC MORPH BLD: ABNORMAL
SODIUM SERPL-SCNC: 136 MMOL/L (ref 136–145)
SP GR UR REFRACTOMETRY: <1.005 (ref 1–1.03)
SPECIMEN HOLD: NORMAL
URINE CULTURE IF INDICATED: NORMAL
UROBILINOGEN UR QL STRIP.AUTO: 0.2 EU/DL (ref 0.2–1)
WBC # BLD AUTO: 1.4 K/UL (ref 3.6–11)
WBC # BLD AUTO: 1.6 K/UL (ref 3.6–11)
WBC MORPH BLD: ABNORMAL
WBC MORPH BLD: ABNORMAL
WBC URNS QL MICRO: NORMAL /HPF (ref 0–4)

## 2025-05-05 PROCEDURE — 6360000004 HC RX CONTRAST MEDICATION: Performed by: STUDENT IN AN ORGANIZED HEALTH CARE EDUCATION/TRAINING PROGRAM

## 2025-05-05 PROCEDURE — 81001 URINALYSIS AUTO W/SCOPE: CPT

## 2025-05-05 PROCEDURE — 6370000000 HC RX 637 (ALT 250 FOR IP): Performed by: SURGERY

## 2025-05-05 PROCEDURE — 6360000002 HC RX W HCPCS: Performed by: SURGERY

## 2025-05-05 PROCEDURE — 43235 EGD DIAGNOSTIC BRUSH WASH: CPT | Performed by: SURGERY

## 2025-05-05 PROCEDURE — 83605 ASSAY OF LACTIC ACID: CPT

## 2025-05-05 PROCEDURE — 2060000000 HC ICU INTERMEDIATE R&B

## 2025-05-05 PROCEDURE — P9045 ALBUMIN (HUMAN), 5%, 250 ML: HCPCS | Performed by: NURSE PRACTITIONER

## 2025-05-05 PROCEDURE — 2580000003 HC RX 258: Performed by: SURGERY

## 2025-05-05 PROCEDURE — 2580000003 HC RX 258: Performed by: NURSE PRACTITIONER

## 2025-05-05 PROCEDURE — 74177 CT ABD & PELVIS W/CONTRAST: CPT

## 2025-05-05 PROCEDURE — 6360000002 HC RX W HCPCS: Performed by: NURSE PRACTITIONER

## 2025-05-05 PROCEDURE — 2500000003 HC RX 250 WO HCPCS: Performed by: SURGERY

## 2025-05-05 PROCEDURE — 6370000000 HC RX 637 (ALT 250 FOR IP): Performed by: INTERNAL MEDICINE

## 2025-05-05 PROCEDURE — 85025 COMPLETE CBC W/AUTO DIFF WBC: CPT

## 2025-05-05 PROCEDURE — 99024 POSTOP FOLLOW-UP VISIT: CPT | Performed by: SURGERY

## 2025-05-05 PROCEDURE — 87040 BLOOD CULTURE FOR BACTERIA: CPT

## 2025-05-05 PROCEDURE — 84100 ASSAY OF PHOSPHORUS: CPT

## 2025-05-05 PROCEDURE — 2500000003 HC RX 250 WO HCPCS: Performed by: NURSE PRACTITIONER

## 2025-05-05 PROCEDURE — 83735 ASSAY OF MAGNESIUM: CPT

## 2025-05-05 PROCEDURE — 80048 BASIC METABOLIC PNL TOTAL CA: CPT

## 2025-05-05 RX ORDER — ACETAMINOPHEN 325 MG/1
650 TABLET ORAL EVERY 6 HOURS PRN
Status: DISCONTINUED | OUTPATIENT
Start: 2025-05-05 | End: 2025-05-12

## 2025-05-05 RX ORDER — ALBUMIN HUMAN 50 G/1000ML
25 SOLUTION INTRAVENOUS ONCE
Status: COMPLETED | OUTPATIENT
Start: 2025-05-06 | End: 2025-05-06

## 2025-05-05 RX ORDER — METRONIDAZOLE 500 MG/100ML
500 INJECTION, SOLUTION INTRAVENOUS EVERY 8 HOURS
Status: COMPLETED | OUTPATIENT
Start: 2025-05-05 | End: 2025-05-19

## 2025-05-05 RX ORDER — TAMSULOSIN HYDROCHLORIDE 0.4 MG/1
0.4 CAPSULE ORAL DAILY
Status: DISCONTINUED | OUTPATIENT
Start: 2025-05-05 | End: 2025-05-11

## 2025-05-05 RX ORDER — KETOROLAC TROMETHAMINE 15 MG/ML
15 INJECTION, SOLUTION INTRAMUSCULAR; INTRAVENOUS EVERY 6 HOURS
Status: DISCONTINUED | OUTPATIENT
Start: 2025-05-05 | End: 2025-05-05

## 2025-05-05 RX ORDER — SODIUM CHLORIDE, SODIUM LACTATE, POTASSIUM CHLORIDE, AND CALCIUM CHLORIDE .6; .31; .03; .02 G/100ML; G/100ML; G/100ML; G/100ML
1000 INJECTION, SOLUTION INTRAVENOUS ONCE
Status: COMPLETED | OUTPATIENT
Start: 2025-05-05 | End: 2025-05-05

## 2025-05-05 RX ORDER — IOPAMIDOL 755 MG/ML
100 INJECTION, SOLUTION INTRAVASCULAR
Status: COMPLETED | OUTPATIENT
Start: 2025-05-05 | End: 2025-05-05

## 2025-05-05 RX ORDER — 0.9 % SODIUM CHLORIDE 0.9 %
500 INTRAVENOUS SOLUTION INTRAVENOUS ONCE
Status: COMPLETED | OUTPATIENT
Start: 2025-05-05 | End: 2025-05-05

## 2025-05-05 RX ORDER — ALBUMIN HUMAN 50 G/1000ML
12.5 SOLUTION INTRAVENOUS ONCE
Status: COMPLETED | OUTPATIENT
Start: 2025-05-05 | End: 2025-05-05

## 2025-05-05 RX ADMIN — ONDANSETRON 4 MG: 2 INJECTION, SOLUTION INTRAMUSCULAR; INTRAVENOUS at 09:28

## 2025-05-05 RX ADMIN — SODIUM CHLORIDE, SODIUM LACTATE, POTASSIUM CHLORIDE, AND CALCIUM CHLORIDE 1000 ML: .6; .31; .03; .02 INJECTION, SOLUTION INTRAVENOUS at 15:03

## 2025-05-05 RX ADMIN — ALBUMIN (HUMAN) 12.5 G: 12.5 INJECTION, SOLUTION INTRAVENOUS at 00:42

## 2025-05-05 RX ADMIN — GABAPENTIN 600 MG: 600 TABLET, FILM COATED ORAL at 09:21

## 2025-05-05 RX ADMIN — SODIUM CHLORIDE 40 MG: 9 INJECTION INTRAMUSCULAR; INTRAVENOUS; SUBCUTANEOUS at 09:21

## 2025-05-05 RX ADMIN — GABAPENTIN 600 MG: 600 TABLET, FILM COATED ORAL at 22:10

## 2025-05-05 RX ADMIN — CEFEPIME 2000 MG: 2 INJECTION, POWDER, FOR SOLUTION INTRAVENOUS at 23:36

## 2025-05-05 RX ADMIN — THIAMINE HYDROCHLORIDE 100 MG: 100 INJECTION, SOLUTION INTRAMUSCULAR; INTRAVENOUS at 09:21

## 2025-05-05 RX ADMIN — CYCLOBENZAPRINE 10 MG: 10 TABLET, FILM COATED ORAL at 09:21

## 2025-05-05 RX ADMIN — SODIUM CHLORIDE, PRESERVATIVE FREE 10 ML: 5 INJECTION INTRAVENOUS at 09:24

## 2025-05-05 RX ADMIN — WATER 2000 MG: 1 INJECTION INTRAMUSCULAR; INTRAVENOUS; SUBCUTANEOUS at 16:47

## 2025-05-05 RX ADMIN — TAMSULOSIN HYDROCHLORIDE 0.4 MG: 0.4 CAPSULE ORAL at 09:28

## 2025-05-05 RX ADMIN — METRONIDAZOLE 500 MG: 5 INJECTION, SOLUTION INTRAVENOUS at 22:54

## 2025-05-05 RX ADMIN — ALBUMIN (HUMAN) 25 G: 12.5 INJECTION, SOLUTION INTRAVENOUS at 23:50

## 2025-05-05 RX ADMIN — ACETAMINOPHEN 650 MG: 325 TABLET ORAL at 19:16

## 2025-05-05 RX ADMIN — POTASSIUM & SODIUM PHOSPHATES POWDER PACK 280-160-250 MG 250 MG: 280-160-250 PACK at 11:12

## 2025-05-05 RX ADMIN — CYCLOBENZAPRINE 10 MG: 10 TABLET, FILM COATED ORAL at 22:10

## 2025-05-05 RX ADMIN — POTASSIUM & SODIUM PHOSPHATES POWDER PACK 280-160-250 MG 250 MG: 280-160-250 PACK at 15:04

## 2025-05-05 RX ADMIN — DEXTROSE MONOHYDRATE: 50 INJECTION, SOLUTION INTRAVENOUS at 01:52

## 2025-05-05 RX ADMIN — POTASSIUM & SODIUM PHOSPHATES POWDER PACK 280-160-250 MG 250 MG: 280-160-250 PACK at 22:10

## 2025-05-05 RX ADMIN — AMITRIPTYLINE HYDROCHLORIDE 50 MG: 50 TABLET, FILM COATED ORAL at 22:10

## 2025-05-05 RX ADMIN — METRONIDAZOLE 500 MG: 5 INJECTION, SOLUTION INTRAVENOUS at 15:29

## 2025-05-05 RX ADMIN — FOLIC ACID 1 MG: 5 INJECTION, SOLUTION INTRAMUSCULAR; INTRAVENOUS; SUBCUTANEOUS at 09:34

## 2025-05-05 RX ADMIN — CITALOPRAM HYDROBROMIDE 10 MG: 20 TABLET ORAL at 09:22

## 2025-05-05 RX ADMIN — SODIUM CHLORIDE 500 ML: 0.9 INJECTION, SOLUTION INTRAVENOUS at 06:54

## 2025-05-05 RX ADMIN — IOPAMIDOL 100 ML: 755 INJECTION, SOLUTION INTRAVENOUS at 17:22

## 2025-05-05 RX ADMIN — DEXTROSE MONOHYDRATE: 50 INJECTION, SOLUTION INTRAVENOUS at 22:13

## 2025-05-05 ASSESSMENT — PAIN SCALES - GENERAL: PAINLEVEL_OUTOF10: 8

## 2025-05-05 ASSESSMENT — PAIN DESCRIPTION - ORIENTATION: ORIENTATION: MID

## 2025-05-05 ASSESSMENT — PAIN DESCRIPTION - DESCRIPTORS: DESCRIPTORS: ACHING

## 2025-05-05 ASSESSMENT — PAIN DESCRIPTION - LOCATION: LOCATION: ABDOMEN

## 2025-05-05 NOTE — OP NOTE
OPERATIVE NOTE    Date of Procedure: 5/2/2025    Preoperative Diagnosis: Hx of gastric bypass [Z98.84], malnutrition  Postoperative Diagnosis: Same      Procedure: Procedure(s):  LAPAROSCOPIC CONVERT TO OPEN GASTROSTOMY TUBE PLACEMENT, LYSIS OF ADHESION, EGD    Surgeon: Kevin Bolaños MD    Surgical Staff: Circulator: Feliz Vera RN  Surgical Assistant: Bobby James Scrub: Lovely Olson  Scrub Person First: Hunter Pate RN Relief Surgical Assistant: Blake Shi    Anesthesia: General   Indications: 55-year-old female presents with a history of gastric bypass last year with subsequent right hemicolectomy for cecal ischemia in the early postoperative period.  She has had issues with p.o. intolerance secondary to nausea.  Presents severely malnourished requiring enteral access  Findings: Moderate to severe adhesive disease requiring conversion to open. Over half the case was spent performing lysis of adhesions.    EGD done to assess gastric bypass pouch, normal-appearing pouch and GJ anastomosis.  EGD not done to evaluate gastrostomy tube secondary to excluded stomach placement of tube.    Infection Present At Time Of Surgery (PATOS) (choose all levels that have infection present):  No infection present    Description of Operation: Ce Roberts was identified in the pre-operative holding area. Informed consent was obtained after a complete discussion of risks, benefits and alternatives to surgery were had with the patient.    The patient was brought back to the operating room and placed under general endotracheal anesthesia on the operating room table in supine position. The patient was then prepped and draped in the usual sterile fashion. A timeout was performed.      I started by placing a 5 mm trocar under the right costal margin.  I entered the abdomen and was greeted with dense adhesive disease.  There was no free space identified.  In light of this, I elected to go open.    I used a 15 blade and

## 2025-05-06 ENCOUNTER — APPOINTMENT (OUTPATIENT)
Facility: HOSPITAL | Age: 55
DRG: 689 | End: 2025-05-06
Payer: MEDICARE

## 2025-05-06 LAB
ANION GAP SERPL CALC-SCNC: 4 MMOL/L (ref 2–12)
BUN SERPL-MCNC: 6 MG/DL (ref 6–20)
BUN/CREAT SERPL: 13 (ref 12–20)
CALCIUM SERPL-MCNC: 7.9 MG/DL (ref 8.5–10.1)
CHLORIDE SERPL-SCNC: 110 MMOL/L (ref 97–108)
CO2 SERPL-SCNC: 27 MMOL/L (ref 21–32)
CREAT SERPL-MCNC: 0.45 MG/DL (ref 0.55–1.02)
ERYTHROCYTE [DISTWIDTH] IN BLOOD BY AUTOMATED COUNT: 15.2 % (ref 11.5–14.5)
GLUCOSE BLD STRIP.AUTO-MCNC: 103 MG/DL (ref 65–117)
GLUCOSE BLD STRIP.AUTO-MCNC: 75 MG/DL (ref 65–117)
GLUCOSE BLD STRIP.AUTO-MCNC: 89 MG/DL (ref 65–117)
GLUCOSE SERPL-MCNC: 82 MG/DL (ref 65–100)
HCT VFR BLD AUTO: 19.6 % (ref 35–47)
HCT VFR BLD AUTO: 33.7 % (ref 35–47)
HGB BLD-MCNC: 11.5 G/DL (ref 11.5–16)
HGB BLD-MCNC: 6.7 G/DL (ref 11.5–16)
HISTORY CHECK: NORMAL
LACTATE SERPL-SCNC: 1.6 MMOL/L (ref 0.4–2)
LACTATE SERPL-SCNC: 2 MMOL/L (ref 0.4–2)
MAGNESIUM SERPL-MCNC: 2 MG/DL (ref 1.6–2.4)
MCH RBC QN AUTO: 33.5 PG (ref 26–34)
MCHC RBC AUTO-ENTMCNC: 34.2 G/DL (ref 30–36.5)
MCV RBC AUTO: 98 FL (ref 80–99)
NRBC # BLD: 0 K/UL (ref 0–0.01)
NRBC BLD-RTO: 0 PER 100 WBC
PHOSPHATE SERPL-MCNC: 2.4 MG/DL (ref 2.6–4.7)
PLATELET # BLD AUTO: 144 K/UL (ref 150–400)
PMV BLD AUTO: 12.5 FL (ref 8.9–12.9)
POTASSIUM SERPL-SCNC: 3 MMOL/L (ref 3.5–5.1)
RBC # BLD AUTO: 2 M/UL (ref 3.8–5.2)
SERVICE CMNT-IMP: NORMAL
SODIUM SERPL-SCNC: 141 MMOL/L (ref 136–145)
WBC # BLD AUTO: 1.7 K/UL (ref 3.6–11)

## 2025-05-06 PROCEDURE — 6360000002 HC RX W HCPCS: Performed by: NURSE PRACTITIONER

## 2025-05-06 PROCEDURE — 6370000000 HC RX 637 (ALT 250 FOR IP): Performed by: SURGERY

## 2025-05-06 PROCEDURE — 6360000002 HC RX W HCPCS: Performed by: SURGERY

## 2025-05-06 PROCEDURE — 6370000000 HC RX 637 (ALT 250 FOR IP): Performed by: NURSE PRACTITIONER

## 2025-05-06 PROCEDURE — 2580000003 HC RX 258: Performed by: INTERNAL MEDICINE

## 2025-05-06 PROCEDURE — 2060000000 HC ICU INTERMEDIATE R&B

## 2025-05-06 PROCEDURE — 2580000003 HC RX 258: Performed by: NURSE PRACTITIONER

## 2025-05-06 PROCEDURE — 2580000003 HC RX 258: Performed by: SURGERY

## 2025-05-06 PROCEDURE — 6360000002 HC RX W HCPCS: Performed by: INTERNAL MEDICINE

## 2025-05-06 PROCEDURE — 86900 BLOOD TYPING SEROLOGIC ABO: CPT

## 2025-05-06 PROCEDURE — 2500000003 HC RX 250 WO HCPCS: Performed by: SURGERY

## 2025-05-06 PROCEDURE — 83605 ASSAY OF LACTIC ACID: CPT

## 2025-05-06 PROCEDURE — 85014 HEMATOCRIT: CPT

## 2025-05-06 PROCEDURE — 6370000000 HC RX 637 (ALT 250 FOR IP): Performed by: INTERNAL MEDICINE

## 2025-05-06 PROCEDURE — 85027 COMPLETE CBC AUTOMATED: CPT

## 2025-05-06 PROCEDURE — 82962 GLUCOSE BLOOD TEST: CPT

## 2025-05-06 PROCEDURE — P9016 RBC LEUKOCYTES REDUCED: HCPCS

## 2025-05-06 PROCEDURE — 84100 ASSAY OF PHOSPHORUS: CPT

## 2025-05-06 PROCEDURE — 86923 COMPATIBILITY TEST ELECTRIC: CPT

## 2025-05-06 PROCEDURE — 83735 ASSAY OF MAGNESIUM: CPT

## 2025-05-06 PROCEDURE — 80048 BASIC METABOLIC PNL TOTAL CA: CPT

## 2025-05-06 PROCEDURE — 85018 HEMOGLOBIN: CPT

## 2025-05-06 PROCEDURE — 36430 TRANSFUSION BLD/BLD COMPNT: CPT

## 2025-05-06 PROCEDURE — 30233N1 TRANSFUSION OF NONAUTOLOGOUS RED BLOOD CELLS INTO PERIPHERAL VEIN, PERCUTANEOUS APPROACH: ICD-10-PCS | Performed by: NURSE PRACTITIONER

## 2025-05-06 PROCEDURE — 86850 RBC ANTIBODY SCREEN: CPT

## 2025-05-06 RX ORDER — POTASSIUM CHLORIDE 7.45 MG/ML
10 INJECTION INTRAVENOUS
Status: COMPLETED | OUTPATIENT
Start: 2025-05-06 | End: 2025-05-06

## 2025-05-06 RX ORDER — INSULIN LISPRO 100 [IU]/ML
0-8 INJECTION, SOLUTION INTRAVENOUS; SUBCUTANEOUS EVERY 6 HOURS
Status: DISCONTINUED | OUTPATIENT
Start: 2025-05-06 | End: 2025-05-17

## 2025-05-06 RX ORDER — MIDODRINE HYDROCHLORIDE 5 MG/1
10 TABLET ORAL ONCE
Status: COMPLETED | OUTPATIENT
Start: 2025-05-06 | End: 2025-05-06

## 2025-05-06 RX ORDER — MIDODRINE HYDROCHLORIDE 5 MG/1
5 TABLET ORAL EVERY 8 HOURS
Status: DISCONTINUED | OUTPATIENT
Start: 2025-05-06 | End: 2025-05-08

## 2025-05-06 RX ORDER — DEXTROSE MONOHYDRATE 100 MG/ML
INJECTION, SOLUTION INTRAVENOUS CONTINUOUS PRN
Status: DISCONTINUED | OUTPATIENT
Start: 2025-05-06 | End: 2025-06-02 | Stop reason: HOSPADM

## 2025-05-06 RX ORDER — FOLIC ACID 1 MG/1
1 TABLET ORAL DAILY
Status: DISCONTINUED | OUTPATIENT
Start: 2025-05-07 | End: 2025-05-12

## 2025-05-06 RX ORDER — FOLIC ACID 1 MG/1
1 TABLET ORAL DAILY
Status: DISCONTINUED | OUTPATIENT
Start: 2025-05-07 | End: 2025-05-06

## 2025-05-06 RX ORDER — PANTOPRAZOLE SODIUM 40 MG/1
40 TABLET, DELAYED RELEASE ORAL
Status: DISCONTINUED | OUTPATIENT
Start: 2025-05-07 | End: 2025-05-10

## 2025-05-06 RX ORDER — LANOLIN ALCOHOL/MO/W.PET/CERES
100 CREAM (GRAM) TOPICAL DAILY
Status: DISCONTINUED | OUTPATIENT
Start: 2025-05-07 | End: 2025-05-09

## 2025-05-06 RX ORDER — SODIUM CHLORIDE 9 MG/ML
INJECTION, SOLUTION INTRAVENOUS PRN
Status: DISCONTINUED | OUTPATIENT
Start: 2025-05-06 | End: 2025-05-16

## 2025-05-06 RX ORDER — LANOLIN ALCOHOL/MO/W.PET/CERES
100 CREAM (GRAM) TOPICAL DAILY
Status: DISCONTINUED | OUTPATIENT
Start: 2025-05-07 | End: 2025-05-06

## 2025-05-06 RX ORDER — CALCIUM GLUCONATE 20 MG/ML
1000 INJECTION, SOLUTION INTRAVENOUS ONCE
Status: COMPLETED | OUTPATIENT
Start: 2025-05-06 | End: 2025-05-06

## 2025-05-06 RX ORDER — MIDODRINE HYDROCHLORIDE 5 MG/1
5 TABLET ORAL
Status: DISCONTINUED | OUTPATIENT
Start: 2025-05-06 | End: 2025-05-06

## 2025-05-06 RX ADMIN — POTASSIUM CHLORIDE 10 MEQ: 7.46 INJECTION, SOLUTION INTRAVENOUS at 13:02

## 2025-05-06 RX ADMIN — MIDODRINE HYDROCHLORIDE 10 MG: 5 TABLET ORAL at 02:10

## 2025-05-06 RX ADMIN — ONDANSETRON 4 MG: 2 INJECTION, SOLUTION INTRAMUSCULAR; INTRAVENOUS at 21:09

## 2025-05-06 RX ADMIN — SODIUM CHLORIDE, PRESERVATIVE FREE 10 ML: 5 INJECTION INTRAVENOUS at 08:51

## 2025-05-06 RX ADMIN — POTASSIUM & SODIUM PHOSPHATES POWDER PACK 280-160-250 MG 500 MG: 280-160-250 PACK at 14:03

## 2025-05-06 RX ADMIN — METRONIDAZOLE 500 MG: 5 INJECTION, SOLUTION INTRAVENOUS at 23:03

## 2025-05-06 RX ADMIN — POTASSIUM & SODIUM PHOSPHATES POWDER PACK 280-160-250 MG 500 MG: 280-160-250 PACK at 20:44

## 2025-05-06 RX ADMIN — DEXTROSE MONOHYDRATE: 50 INJECTION, SOLUTION INTRAVENOUS at 09:24

## 2025-05-06 RX ADMIN — SODIUM CHLORIDE: 0.9 INJECTION, SOLUTION INTRAVENOUS at 09:17

## 2025-05-06 RX ADMIN — TAMSULOSIN HYDROCHLORIDE 0.4 MG: 0.4 CAPSULE ORAL at 08:50

## 2025-05-06 RX ADMIN — POTASSIUM CHLORIDE 10 MEQ: 7.46 INJECTION, SOLUTION INTRAVENOUS at 11:38

## 2025-05-06 RX ADMIN — DEXTROSE MONOHYDRATE: 50 INJECTION, SOLUTION INTRAVENOUS at 22:55

## 2025-05-06 RX ADMIN — CEFEPIME 2000 MG: 2 INJECTION, POWDER, FOR SOLUTION INTRAVENOUS at 09:21

## 2025-05-06 RX ADMIN — FOLIC ACID 1 MG: 5 INJECTION, SOLUTION INTRAMUSCULAR; INTRAVENOUS; SUBCUTANEOUS at 12:35

## 2025-05-06 RX ADMIN — CYCLOBENZAPRINE 10 MG: 10 TABLET, FILM COATED ORAL at 08:50

## 2025-05-06 RX ADMIN — ACETAMINOPHEN 650 MG: 325 TABLET ORAL at 14:31

## 2025-05-06 RX ADMIN — CITALOPRAM HYDROBROMIDE 10 MG: 20 TABLET ORAL at 08:50

## 2025-05-06 RX ADMIN — CALCIUM GLUCONATE 1000 MG: 20 INJECTION, SOLUTION INTRAVENOUS at 04:25

## 2025-05-06 RX ADMIN — CYCLOBENZAPRINE 10 MG: 10 TABLET, FILM COATED ORAL at 14:02

## 2025-05-06 RX ADMIN — METRONIDAZOLE 500 MG: 5 INJECTION, SOLUTION INTRAVENOUS at 15:09

## 2025-05-06 RX ADMIN — MIDODRINE HYDROCHLORIDE 5 MG: 5 TABLET ORAL at 23:03

## 2025-05-06 RX ADMIN — GABAPENTIN 600 MG: 600 TABLET, FILM COATED ORAL at 20:43

## 2025-05-06 RX ADMIN — GABAPENTIN 600 MG: 600 TABLET, FILM COATED ORAL at 08:50

## 2025-05-06 RX ADMIN — CYCLOBENZAPRINE 10 MG: 10 TABLET, FILM COATED ORAL at 20:43

## 2025-05-06 RX ADMIN — POTASSIUM CHLORIDE 10 MEQ: 7.46 INJECTION, SOLUTION INTRAVENOUS at 14:58

## 2025-05-06 RX ADMIN — SODIUM CHLORIDE 40 MG: 9 INJECTION INTRAMUSCULAR; INTRAVENOUS; SUBCUTANEOUS at 08:51

## 2025-05-06 RX ADMIN — MIDODRINE HYDROCHLORIDE 5 MG: 5 TABLET ORAL at 08:50

## 2025-05-06 RX ADMIN — DEXTROSE MONOHYDRATE: 50 INJECTION, SOLUTION INTRAVENOUS at 12:36

## 2025-05-06 RX ADMIN — POTASSIUM CHLORIDE 10 MEQ: 7.46 INJECTION, SOLUTION INTRAVENOUS at 14:02

## 2025-05-06 RX ADMIN — DEXTROSE MONOHYDRATE: 50 INJECTION, SOLUTION INTRAVENOUS at 04:29

## 2025-05-06 RX ADMIN — DEXTROSE MONOHYDRATE: 50 INJECTION, SOLUTION INTRAVENOUS at 14:33

## 2025-05-06 RX ADMIN — THIAMINE HYDROCHLORIDE 100 MG: 100 INJECTION, SOLUTION INTRAMUSCULAR; INTRAVENOUS at 08:50

## 2025-05-06 RX ADMIN — POTASSIUM & SODIUM PHOSPHATES POWDER PACK 280-160-250 MG 250 MG: 280-160-250 PACK at 08:50

## 2025-05-06 RX ADMIN — MIDODRINE HYDROCHLORIDE 5 MG: 5 TABLET ORAL at 18:00

## 2025-05-06 RX ADMIN — METRONIDAZOLE 500 MG: 5 INJECTION, SOLUTION INTRAVENOUS at 07:27

## 2025-05-06 RX ADMIN — DEXTROSE MONOHYDRATE: 50 INJECTION, SOLUTION INTRAVENOUS at 15:53

## 2025-05-06 RX ADMIN — CEFEPIME 2000 MG: 2 INJECTION, POWDER, FOR SOLUTION INTRAVENOUS at 20:45

## 2025-05-06 ASSESSMENT — PAIN SCALES - GENERAL
PAINLEVEL_OUTOF10: 5
PAINLEVEL_OUTOF10: 0
PAINLEVEL_OUTOF10: 0
PAINLEVEL_OUTOF10: 3

## 2025-05-06 ASSESSMENT — PAIN DESCRIPTION - LOCATION
LOCATION: GENERALIZED
LOCATION: LEG
LOCATION: GENERALIZED

## 2025-05-06 ASSESSMENT — PAIN DESCRIPTION - DESCRIPTORS: DESCRIPTORS: ACHING

## 2025-05-06 NOTE — SIGNIFICANT EVENT
0300: Rapid Response called by RN for AMS, extreme lethargy, and hypotension. BP 70s/60s. Lactate 1.6, hgb 6.7. NP and intensivist at bedside.  0330: Pt consented to blood transfusion. Type and screen obtained. 750cc NS bolus administered per verbal order.   0443: 1u PRBCs started. Pt much more awake at this time--now AxO4  0654: PRBCs completed. BP 99/78.

## 2025-05-06 NOTE — SIGNIFICANT EVENT
Rapid Response  Rapid response room 421 called overhead at 0248. RRT responding.    Rapid response called for hypotension and AMS    Upon arrival patient blood pressure on monitor was 79/61. A rapid was called for hypotension and AMS. Prior to rapid, patient was observed by primary RN as extremely lethargic and easy to arouse. During the rapid the patient was maintaining lethargic mentation, but oriented X3. Lab results revealed a Hgb of 6.7. Patient was agreeable to blood transfusion. At this time we are awaiting type and screen verification and blood transfusion. NP is agreeable to awaiting transfusion to determine efficacy on blood pressure    Checked in with primary RN prior to leaving. Opportunity for questions and concerns provided.      Rapid Response Team Sepsis Screening  Is the patient's history suggestive of a new infection? No    Are two or more SIRS criteria present? No    Is there evidence of Organ Dysfunction? [facility] Sepsis OD: None    Communication with provider: No    Was a Code Sepsis called at this encounter? No. Why? NA

## 2025-05-07 LAB
ABO + RH BLD: NORMAL
ANION GAP SERPL CALC-SCNC: 5 MMOL/L (ref 2–12)
BLD PROD TYP BPU: NORMAL
BLOOD BANK BLOOD PRODUCT EXPIRATION DATE: NORMAL
BLOOD BANK DISPENSE STATUS: NORMAL
BLOOD BANK ISBT PRODUCT BLOOD TYPE: 9500
BLOOD BANK PRODUCT CODE: NORMAL
BLOOD BANK UNIT TYPE AND RH: NORMAL
BLOOD GROUP ANTIBODIES SERPL: NORMAL
BPU ID: NORMAL
BUN SERPL-MCNC: 5 MG/DL (ref 6–20)
BUN/CREAT SERPL: 11 (ref 12–20)
CALCIUM SERPL-MCNC: 8 MG/DL (ref 8.5–10.1)
CHLORIDE SERPL-SCNC: 107 MMOL/L (ref 97–108)
CO2 SERPL-SCNC: 25 MMOL/L (ref 21–32)
CREAT SERPL-MCNC: 0.46 MG/DL (ref 0.55–1.02)
CROSSMATCH RESULT: NORMAL
ERYTHROCYTE [DISTWIDTH] IN BLOOD BY AUTOMATED COUNT: 16.3 % (ref 11.5–14.5)
GLUCOSE BLD STRIP.AUTO-MCNC: 78 MG/DL (ref 65–117)
GLUCOSE BLD STRIP.AUTO-MCNC: 90 MG/DL (ref 65–117)
GLUCOSE BLD STRIP.AUTO-MCNC: 91 MG/DL (ref 65–117)
GLUCOSE BLD STRIP.AUTO-MCNC: 98 MG/DL (ref 65–117)
GLUCOSE BLD STRIP.AUTO-MCNC: 99 MG/DL (ref 65–117)
GLUCOSE SERPL-MCNC: 85 MG/DL (ref 65–100)
HCT VFR BLD AUTO: 26.4 % (ref 35–47)
HGB BLD-MCNC: 9.2 G/DL (ref 11.5–16)
MAGNESIUM SERPL-MCNC: 1.6 MG/DL (ref 1.6–2.4)
MCH RBC QN AUTO: 32.9 PG (ref 26–34)
MCHC RBC AUTO-ENTMCNC: 34.8 G/DL (ref 30–36.5)
MCV RBC AUTO: 94.3 FL (ref 80–99)
NRBC # BLD: 0 K/UL (ref 0–0.01)
NRBC BLD-RTO: 0 PER 100 WBC
PHOSPHATE SERPL-MCNC: 1 MG/DL (ref 2.6–4.7)
PLATELET # BLD AUTO: 164 K/UL (ref 150–400)
PMV BLD AUTO: 12.9 FL (ref 8.9–12.9)
POTASSIUM SERPL-SCNC: 3.4 MMOL/L (ref 3.5–5.1)
RBC # BLD AUTO: 2.8 M/UL (ref 3.8–5.2)
SERVICE CMNT-IMP: NORMAL
SODIUM SERPL-SCNC: 137 MMOL/L (ref 136–145)
SPECIMEN EXP DATE BLD: NORMAL
UNIT DIVISION: 0
UNIT ISSUE DATE/TIME: NORMAL
VIT B1 BLD-SCNC: 51.1 NMOL/L (ref 66.5–200)
WBC # BLD AUTO: 2.2 K/UL (ref 3.6–11)

## 2025-05-07 PROCEDURE — 6360000002 HC RX W HCPCS: Performed by: INTERNAL MEDICINE

## 2025-05-07 PROCEDURE — 97530 THERAPEUTIC ACTIVITIES: CPT

## 2025-05-07 PROCEDURE — 6360000002 HC RX W HCPCS: Performed by: NURSE PRACTITIONER

## 2025-05-07 PROCEDURE — 80048 BASIC METABOLIC PNL TOTAL CA: CPT

## 2025-05-07 PROCEDURE — 6370000000 HC RX 637 (ALT 250 FOR IP): Performed by: SURGERY

## 2025-05-07 PROCEDURE — 97535 SELF CARE MNGMENT TRAINING: CPT

## 2025-05-07 PROCEDURE — 2500000003 HC RX 250 WO HCPCS: Performed by: INTERNAL MEDICINE

## 2025-05-07 PROCEDURE — 82962 GLUCOSE BLOOD TEST: CPT

## 2025-05-07 PROCEDURE — 6370000000 HC RX 637 (ALT 250 FOR IP): Performed by: NURSE PRACTITIONER

## 2025-05-07 PROCEDURE — 2500000003 HC RX 250 WO HCPCS: Performed by: SURGERY

## 2025-05-07 PROCEDURE — 83735 ASSAY OF MAGNESIUM: CPT

## 2025-05-07 PROCEDURE — 84100 ASSAY OF PHOSPHORUS: CPT

## 2025-05-07 PROCEDURE — 2580000003 HC RX 258: Performed by: INTERNAL MEDICINE

## 2025-05-07 PROCEDURE — 85027 COMPLETE CBC AUTOMATED: CPT

## 2025-05-07 PROCEDURE — 6370000000 HC RX 637 (ALT 250 FOR IP): Performed by: INTERNAL MEDICINE

## 2025-05-07 PROCEDURE — 2580000003 HC RX 258: Performed by: SURGERY

## 2025-05-07 PROCEDURE — 99024 POSTOP FOLLOW-UP VISIT: CPT | Performed by: SURGERY

## 2025-05-07 PROCEDURE — 2060000000 HC ICU INTERMEDIATE R&B

## 2025-05-07 PROCEDURE — 6360000002 HC RX W HCPCS: Performed by: SURGERY

## 2025-05-07 RX ORDER — MORPHINE SULFATE 15 MG/1
7.5 TABLET ORAL EVERY 4 HOURS PRN
Refills: 0 | Status: DISCONTINUED | OUTPATIENT
Start: 2025-05-07 | End: 2025-05-09

## 2025-05-07 RX ORDER — FENTANYL CITRATE 50 UG/ML
25 INJECTION, SOLUTION INTRAMUSCULAR; INTRAVENOUS
Status: DISCONTINUED | OUTPATIENT
Start: 2025-05-07 | End: 2025-05-09

## 2025-05-07 RX ORDER — MORPHINE SULFATE 15 MG/1
15 TABLET ORAL EVERY 4 HOURS PRN
Refills: 0 | Status: DISCONTINUED | OUTPATIENT
Start: 2025-05-07 | End: 2025-05-09

## 2025-05-07 RX ORDER — MAGNESIUM SULFATE IN WATER 40 MG/ML
2000 INJECTION, SOLUTION INTRAVENOUS ONCE
Status: COMPLETED | OUTPATIENT
Start: 2025-05-07 | End: 2025-05-07

## 2025-05-07 RX ADMIN — CYCLOBENZAPRINE 10 MG: 10 TABLET, FILM COATED ORAL at 14:55

## 2025-05-07 RX ADMIN — AMITRIPTYLINE HYDROCHLORIDE 50 MG: 50 TABLET, FILM COATED ORAL at 20:55

## 2025-05-07 RX ADMIN — SODIUM CHLORIDE, PRESERVATIVE FREE 10 ML: 5 INJECTION INTRAVENOUS at 09:29

## 2025-05-07 RX ADMIN — MAGNESIUM SULFATE HEPTAHYDRATE 2000 MG: 40 INJECTION, SOLUTION INTRAVENOUS at 12:19

## 2025-05-07 RX ADMIN — PANTOPRAZOLE SODIUM 40 MG: 40 TABLET, DELAYED RELEASE ORAL at 05:48

## 2025-05-07 RX ADMIN — MIDODRINE HYDROCHLORIDE 5 MG: 5 TABLET ORAL at 23:28

## 2025-05-07 RX ADMIN — POTASSIUM PHOSPHATE, MONOBASIC AND POTASSIUM PHOSPHATE, DIBASIC 30 MMOL: 224; 236 INJECTION, SOLUTION, CONCENTRATE INTRAVENOUS at 12:29

## 2025-05-07 RX ADMIN — DEXTROSE MONOHYDRATE: 50 INJECTION, SOLUTION INTRAVENOUS at 18:12

## 2025-05-07 RX ADMIN — GABAPENTIN 600 MG: 600 TABLET, FILM COATED ORAL at 20:55

## 2025-05-07 RX ADMIN — ACETAMINOPHEN 650 MG: 325 TABLET ORAL at 05:48

## 2025-05-07 RX ADMIN — POTASSIUM & SODIUM PHOSPHATES POWDER PACK 280-160-250 MG 500 MG: 280-160-250 PACK at 09:30

## 2025-05-07 RX ADMIN — MIDODRINE HYDROCHLORIDE 5 MG: 5 TABLET ORAL at 16:37

## 2025-05-07 RX ADMIN — CYCLOBENZAPRINE 10 MG: 10 TABLET, FILM COATED ORAL at 20:55

## 2025-05-07 RX ADMIN — CITALOPRAM HYDROBROMIDE 10 MG: 20 TABLET ORAL at 09:30

## 2025-05-07 RX ADMIN — METRONIDAZOLE 500 MG: 5 INJECTION, SOLUTION INTRAVENOUS at 23:30

## 2025-05-07 RX ADMIN — METRONIDAZOLE 500 MG: 5 INJECTION, SOLUTION INTRAVENOUS at 07:38

## 2025-05-07 RX ADMIN — PHENOL 1 SPRAY: 1.4 SPRAY ORAL at 06:04

## 2025-05-07 RX ADMIN — METRONIDAZOLE 500 MG: 5 INJECTION, SOLUTION INTRAVENOUS at 15:21

## 2025-05-07 RX ADMIN — Medication 100 MG: at 09:30

## 2025-05-07 RX ADMIN — CEFEPIME 2000 MG: 2 INJECTION, POWDER, FOR SOLUTION INTRAVENOUS at 21:03

## 2025-05-07 RX ADMIN — ONDANSETRON 4 MG: 2 INJECTION, SOLUTION INTRAMUSCULAR; INTRAVENOUS at 16:42

## 2025-05-07 RX ADMIN — TAMSULOSIN HYDROCHLORIDE 0.4 MG: 0.4 CAPSULE ORAL at 09:30

## 2025-05-07 RX ADMIN — Medication 1 MG: at 09:30

## 2025-05-07 RX ADMIN — SODIUM CHLORIDE, PRESERVATIVE FREE 10 ML: 5 INJECTION INTRAVENOUS at 20:56

## 2025-05-07 RX ADMIN — GABAPENTIN 600 MG: 600 TABLET, FILM COATED ORAL at 09:30

## 2025-05-07 RX ADMIN — POTASSIUM & SODIUM PHOSPHATES POWDER PACK 280-160-250 MG 500 MG: 280-160-250 PACK at 20:55

## 2025-05-07 RX ADMIN — CEFEPIME 2000 MG: 2 INJECTION, POWDER, FOR SOLUTION INTRAVENOUS at 09:37

## 2025-05-07 RX ADMIN — MIDODRINE HYDROCHLORIDE 5 MG: 5 TABLET ORAL at 07:04

## 2025-05-07 RX ADMIN — POTASSIUM & SODIUM PHOSPHATES POWDER PACK 280-160-250 MG 500 MG: 280-160-250 PACK at 16:37

## 2025-05-07 RX ADMIN — FENTANYL CITRATE 25 MCG: 50 INJECTION INTRAMUSCULAR; INTRAVENOUS at 07:44

## 2025-05-07 RX ADMIN — CYCLOBENZAPRINE 10 MG: 10 TABLET, FILM COATED ORAL at 09:30

## 2025-05-07 ASSESSMENT — PAIN DESCRIPTION - ORIENTATION
ORIENTATION: LEFT
ORIENTATION: LEFT

## 2025-05-07 ASSESSMENT — PAIN DESCRIPTION - LOCATION
LOCATION: ABDOMEN
LOCATION: ABDOMEN

## 2025-05-07 ASSESSMENT — PAIN SCALES - GENERAL
PAINLEVEL_OUTOF10: 6
PAINLEVEL_OUTOF10: 6
PAINLEVEL_OUTOF10: 10
PAINLEVEL_OUTOF10: 8

## 2025-05-07 NOTE — CARE COORDINATION
Transition of Care Plan:    RUR: 17%  Prior Level of Functioning: Independent  Disposition: TBD, therapy is recommending IPR  SRIRAM: 5/12/25  If SNF or IPR: Date FOC offered: 5/7/25  Date FOC received: 5/7/25  Accepting facility: TBD  Date authorization started with reference number: Accepting facility will initiate auth  Date authorization received and expires:   Follow up appointments: Per attending's recommendations  DME needed: Defer to IPR  Transportation at discharge: Medicaid BLS  IM/IMM Medicare/ letter given: 5/1/25  Is patient a  and connected with [facility]? No   If yes, was [facility] transfer form completed and [facility] notified? N/A  Caregiver Contact: Mare Tyler, [phone]  Discharge Caregiver contacted prior to discharge? Yes, CM will contact per patient preference  Care Conference needed? No  Barriers to discharge:  Medical stability, discharge disposition    CM obtained permission from attending to send IPR referrals. CM spoke with patient and she asked that CM call and speak with her daughter. Daughter was fine with CM sending referrals she asked for list of facilities referrals were sent to be emailed to [email or web address]. Patient will need insurance authorization. Referrals were sent to [facility], [facility], and [facility].     CM to continue to follow for KEN needs.    [place] Peace, JENNAN, RN, ONC, CMSRN  Nurse Care Manager, [phone]

## 2025-05-08 ENCOUNTER — APPOINTMENT (OUTPATIENT)
Facility: HOSPITAL | Age: 55
DRG: 689 | End: 2025-05-08
Payer: MEDICARE

## 2025-05-08 LAB
ANION GAP BLD CALC-SCNC: 12 (ref 10–20)
ANION GAP SERPL CALC-SCNC: 7 MMOL/L (ref 2–12)
APPEARANCE UR: CLEAR
BACTERIA URNS QL MICRO: NEGATIVE /HPF
BASE EXCESS BLD CALC-SCNC: 2.3 MMOL/L
BILIRUB UR QL: NEGATIVE
BUN SERPL-MCNC: 4 MG/DL (ref 6–20)
BUN/CREAT SERPL: 11 (ref 12–20)
CA-I BLD-MCNC: 1.12 MMOL/L (ref 1.15–1.33)
CALCIUM SERPL-MCNC: 7.7 MG/DL (ref 8.5–10.1)
CHLORIDE BLD-SCNC: 101 MMOL/L (ref 100–111)
CHLORIDE SERPL-SCNC: 107 MMOL/L (ref 97–108)
CO2 BLD-SCNC: 25 MMOL/L (ref 22–29)
CO2 SERPL-SCNC: 23 MMOL/L (ref 21–32)
COLOR UR: ABNORMAL
COMMENT:: NORMAL
COMMENT:: NORMAL
CREAT SERPL-MCNC: 0.35 MG/DL (ref 0.55–1.02)
CREAT UR-MCNC: 0.4 MG/DL (ref 0.6–1.3)
EPITH CASTS URNS QL MICRO: ABNORMAL /LPF
ERYTHROCYTE [DISTWIDTH] IN BLOOD BY AUTOMATED COUNT: 15.6 % (ref 11.5–14.5)
GLUCOSE BLD STRIP.AUTO-MCNC: 102 MG/DL (ref 74–99)
GLUCOSE BLD STRIP.AUTO-MCNC: 110 MG/DL (ref 65–117)
GLUCOSE BLD STRIP.AUTO-MCNC: 93 MG/DL (ref 65–117)
GLUCOSE BLD STRIP.AUTO-MCNC: 94 MG/DL (ref 65–117)
GLUCOSE BLD STRIP.AUTO-MCNC: 95 MG/DL (ref 65–117)
GLUCOSE SERPL-MCNC: 102 MG/DL (ref 65–100)
GLUCOSE UR STRIP.AUTO-MCNC: NEGATIVE MG/DL
HCO3 BLDA-SCNC: 26 MMOL/L
HCT VFR BLD AUTO: 24.2 % (ref 35–47)
HGB BLD-MCNC: 8.5 G/DL (ref 11.5–16)
HGB UR QL STRIP: ABNORMAL
KETONES UR QL STRIP.AUTO: NEGATIVE MG/DL
LACTATE BLD-SCNC: 2.14 MMOL/L (ref 0.4–2)
LACTATE SERPL-SCNC: 2.2 MMOL/L (ref 0.4–2)
LACTATE SERPL-SCNC: 2.4 MMOL/L (ref 0.4–2)
LACTATE SERPL-SCNC: 2.5 MMOL/L (ref 0.4–2)
LEUKOCYTE ESTERASE UR QL STRIP.AUTO: NEGATIVE
MAGNESIUM SERPL-MCNC: 1.7 MG/DL (ref 1.6–2.4)
MCH RBC QN AUTO: 32.7 PG (ref 26–34)
MCHC RBC AUTO-ENTMCNC: 35.1 G/DL (ref 30–36.5)
MCV RBC AUTO: 93.1 FL (ref 80–99)
NITRITE UR QL STRIP.AUTO: NEGATIVE
NRBC # BLD: 0 K/UL (ref 0–0.01)
NRBC BLD-RTO: 0 PER 100 WBC
NT PRO BNP: 845 PG/ML
PCO2 BLDV: 34.1 MMHG (ref 41–51)
PH BLDV: 7.48 (ref 7.32–7.42)
PH UR STRIP: 7.5 (ref 5–8)
PHOSPHATE SERPL-MCNC: 3 MG/DL (ref 2.6–4.7)
PLATELET # BLD AUTO: 176 K/UL (ref 150–400)
PMV BLD AUTO: 12.2 FL (ref 8.9–12.9)
PO2 BLDV: 43 MMHG (ref 25–40)
POTASSIUM BLD-SCNC: 3.4 MMOL/L (ref 3.5–5.5)
POTASSIUM SERPL-SCNC: 3.6 MMOL/L (ref 3.5–5.1)
PROCALCITONIN SERPL-MCNC: 0.46 NG/ML
PROT UR STRIP-MCNC: NEGATIVE MG/DL
RBC # BLD AUTO: 2.6 M/UL (ref 3.8–5.2)
RBC #/AREA URNS HPF: ABNORMAL /HPF (ref 0–5)
SAO2 % BLD: 82 % (ref 94–98)
SERVICE CMNT-IMP: ABNORMAL
SERVICE CMNT-IMP: NORMAL
SODIUM BLD-SCNC: 138 MMOL/L (ref 136–145)
SODIUM SERPL-SCNC: 137 MMOL/L (ref 136–145)
SP GR UR REFRACTOMETRY: <1.005 (ref 1–1.03)
SPECIMEN HOLD: NORMAL
SPECIMEN HOLD: NORMAL
SPECIMEN SITE: ABNORMAL
UROBILINOGEN UR QL STRIP.AUTO: 0.2 EU/DL (ref 0.2–1)
WBC # BLD AUTO: 2.4 K/UL (ref 3.6–11)
WBC URNS QL MICRO: ABNORMAL /HPF (ref 0–4)

## 2025-05-08 PROCEDURE — 71045 X-RAY EXAM CHEST 1 VIEW: CPT

## 2025-05-08 PROCEDURE — 2060000000 HC ICU INTERMEDIATE R&B

## 2025-05-08 PROCEDURE — P9047 ALBUMIN (HUMAN), 25%, 50ML: HCPCS | Performed by: FAMILY MEDICINE

## 2025-05-08 PROCEDURE — 6370000000 HC RX 637 (ALT 250 FOR IP): Performed by: INTERNAL MEDICINE

## 2025-05-08 PROCEDURE — 2500000003 HC RX 250 WO HCPCS: Performed by: SURGERY

## 2025-05-08 PROCEDURE — 6370000000 HC RX 637 (ALT 250 FOR IP): Performed by: SURGERY

## 2025-05-08 PROCEDURE — 2580000003 HC RX 258: Performed by: NURSE PRACTITIONER

## 2025-05-08 PROCEDURE — 84132 ASSAY OF SERUM POTASSIUM: CPT

## 2025-05-08 PROCEDURE — 2580000003 HC RX 258: Performed by: SURGERY

## 2025-05-08 PROCEDURE — 99024 POSTOP FOLLOW-UP VISIT: CPT | Performed by: SURGERY

## 2025-05-08 PROCEDURE — 6360000002 HC RX W HCPCS: Performed by: NURSE PRACTITIONER

## 2025-05-08 PROCEDURE — 6370000000 HC RX 637 (ALT 250 FOR IP): Performed by: FAMILY MEDICINE

## 2025-05-08 PROCEDURE — 6360000002 HC RX W HCPCS: Performed by: FAMILY MEDICINE

## 2025-05-08 PROCEDURE — 97530 THERAPEUTIC ACTIVITIES: CPT

## 2025-05-08 PROCEDURE — 6360000002 HC RX W HCPCS: Performed by: INTERNAL MEDICINE

## 2025-05-08 PROCEDURE — 84145 PROCALCITONIN (PCT): CPT

## 2025-05-08 PROCEDURE — 84295 ASSAY OF SERUM SODIUM: CPT

## 2025-05-08 PROCEDURE — 84100 ASSAY OF PHOSPHORUS: CPT

## 2025-05-08 PROCEDURE — 82330 ASSAY OF CALCIUM: CPT

## 2025-05-08 PROCEDURE — 83735 ASSAY OF MAGNESIUM: CPT

## 2025-05-08 PROCEDURE — 82962 GLUCOSE BLOOD TEST: CPT

## 2025-05-08 PROCEDURE — 82947 ASSAY GLUCOSE BLOOD QUANT: CPT

## 2025-05-08 PROCEDURE — 87040 BLOOD CULTURE FOR BACTERIA: CPT

## 2025-05-08 PROCEDURE — 80048 BASIC METABOLIC PNL TOTAL CA: CPT

## 2025-05-08 PROCEDURE — 83605 ASSAY OF LACTIC ACID: CPT

## 2025-05-08 PROCEDURE — 82803 BLOOD GASES ANY COMBINATION: CPT

## 2025-05-08 PROCEDURE — 2580000003 HC RX 258: Performed by: INTERNAL MEDICINE

## 2025-05-08 PROCEDURE — 81001 URINALYSIS AUTO W/SCOPE: CPT

## 2025-05-08 PROCEDURE — 83880 ASSAY OF NATRIURETIC PEPTIDE: CPT

## 2025-05-08 PROCEDURE — 85027 COMPLETE CBC AUTOMATED: CPT

## 2025-05-08 RX ORDER — MIDODRINE HYDROCHLORIDE 5 MG/1
10 TABLET ORAL EVERY 8 HOURS
Status: DISCONTINUED | OUTPATIENT
Start: 2025-05-08 | End: 2025-05-11

## 2025-05-08 RX ORDER — ALBUMIN (HUMAN) 12.5 G/50ML
25 SOLUTION INTRAVENOUS EVERY 6 HOURS
Status: DISCONTINUED | OUTPATIENT
Start: 2025-05-08 | End: 2025-05-09

## 2025-05-08 RX ORDER — SODIUM CHLORIDE, SODIUM LACTATE, POTASSIUM CHLORIDE, AND CALCIUM CHLORIDE .6; .31; .03; .02 G/100ML; G/100ML; G/100ML; G/100ML
500 INJECTION, SOLUTION INTRAVENOUS ONCE
Status: COMPLETED | OUTPATIENT
Start: 2025-05-08 | End: 2025-05-08

## 2025-05-08 RX ORDER — MIDODRINE HYDROCHLORIDE 5 MG/1
10 TABLET ORAL EVERY 8 HOURS
Status: DISCONTINUED | OUTPATIENT
Start: 2025-05-08 | End: 2025-05-08

## 2025-05-08 RX ADMIN — PANTOPRAZOLE SODIUM 40 MG: 40 TABLET, DELAYED RELEASE ORAL at 07:00

## 2025-05-08 RX ADMIN — TAMSULOSIN HYDROCHLORIDE 0.4 MG: 0.4 CAPSULE ORAL at 09:51

## 2025-05-08 RX ADMIN — SODIUM CHLORIDE, SODIUM LACTATE, POTASSIUM CHLORIDE, AND CALCIUM CHLORIDE 500 ML: .6; .31; .03; .02 INJECTION, SOLUTION INTRAVENOUS at 06:00

## 2025-05-08 RX ADMIN — GABAPENTIN 600 MG: 600 TABLET, FILM COATED ORAL at 09:51

## 2025-05-08 RX ADMIN — CITALOPRAM HYDROBROMIDE 10 MG: 20 TABLET ORAL at 09:51

## 2025-05-08 RX ADMIN — METRONIDAZOLE 500 MG: 5 INJECTION, SOLUTION INTRAVENOUS at 23:58

## 2025-05-08 RX ADMIN — METRONIDAZOLE 500 MG: 5 INJECTION, SOLUTION INTRAVENOUS at 16:04

## 2025-05-08 RX ADMIN — GABAPENTIN 600 MG: 600 TABLET, FILM COATED ORAL at 21:17

## 2025-05-08 RX ADMIN — POTASSIUM & SODIUM PHOSPHATES POWDER PACK 280-160-250 MG 500 MG: 280-160-250 PACK at 14:43

## 2025-05-08 RX ADMIN — CYCLOBENZAPRINE 10 MG: 10 TABLET, FILM COATED ORAL at 14:43

## 2025-05-08 RX ADMIN — Medication 1 MG: at 09:51

## 2025-05-08 RX ADMIN — POTASSIUM & SODIUM PHOSPHATES POWDER PACK 280-160-250 MG 500 MG: 280-160-250 PACK at 21:18

## 2025-05-08 RX ADMIN — ALBUMIN (HUMAN) 25 G: 0.25 INJECTION, SOLUTION INTRAVENOUS at 19:05

## 2025-05-08 RX ADMIN — CEFEPIME 2000 MG: 2 INJECTION, POWDER, FOR SOLUTION INTRAVENOUS at 09:56

## 2025-05-08 RX ADMIN — DEXTROSE MONOHYDRATE: 50 INJECTION, SOLUTION INTRAVENOUS at 21:23

## 2025-05-08 RX ADMIN — METRONIDAZOLE 500 MG: 5 INJECTION, SOLUTION INTRAVENOUS at 07:00

## 2025-05-08 RX ADMIN — SODIUM CHLORIDE, PRESERVATIVE FREE 10 ML: 5 INJECTION INTRAVENOUS at 10:45

## 2025-05-08 RX ADMIN — POTASSIUM & SODIUM PHOSPHATES POWDER PACK 280-160-250 MG 500 MG: 280-160-250 PACK at 18:57

## 2025-05-08 RX ADMIN — CEFEPIME 2000 MG: 2 INJECTION, POWDER, FOR SOLUTION INTRAVENOUS at 21:22

## 2025-05-08 RX ADMIN — AMITRIPTYLINE HYDROCHLORIDE 50 MG: 50 TABLET, FILM COATED ORAL at 21:18

## 2025-05-08 RX ADMIN — ALBUMIN (HUMAN) 25 G: 0.25 INJECTION, SOLUTION INTRAVENOUS at 12:37

## 2025-05-08 RX ADMIN — POTASSIUM & SODIUM PHOSPHATES POWDER PACK 280-160-250 MG 500 MG: 280-160-250 PACK at 09:52

## 2025-05-08 RX ADMIN — SODIUM CHLORIDE, PRESERVATIVE FREE 10 ML: 5 INJECTION INTRAVENOUS at 21:24

## 2025-05-08 RX ADMIN — Medication 100 MG: at 09:52

## 2025-05-08 RX ADMIN — CYCLOBENZAPRINE 10 MG: 10 TABLET, FILM COATED ORAL at 09:52

## 2025-05-08 RX ADMIN — ACETAMINOPHEN 650 MG: 325 TABLET ORAL at 05:03

## 2025-05-08 RX ADMIN — MIDODRINE HYDROCHLORIDE 10 MG: 5 TABLET ORAL at 18:57

## 2025-05-08 RX ADMIN — CYCLOBENZAPRINE 10 MG: 10 TABLET, FILM COATED ORAL at 21:17

## 2025-05-08 RX ADMIN — MIDODRINE HYDROCHLORIDE 10 MG: 5 TABLET ORAL at 10:12

## 2025-05-08 ASSESSMENT — PAIN SCALES - GENERAL
PAINLEVEL_OUTOF10: 0

## 2025-05-08 NOTE — SIGNIFICANT EVENT
Rapid Response Team Sepsis Screening  Is the patient's history suggestive of a new infection? No  Are two or more SIRS criteria present? Yes SIRS Criteria: Heart rate (pulse) > 90 bpm and WBC < 4 k/mcL  Is there evidence of Organ Dysfunction? [facility] Sepsis OD: Hypotension (SBP < 90 or MAP < 65) and Lactic Acid > 2  Huddled with provider: Yes, DEBORAH Vanessa(name)  Was a Code Sepsis called at this encounter? Yes. Code Sepsis called at 0555. Sepsis score at the time of the call n/a. Actions taken Blood Cultures drawn, Initial Lactic Acid drawn, IV/IM Antibiotic ordered & given, IV crystalloid ordered & started (if needed), and Repeat Lactic Acid ordered (if needed)    Sepsis Huddle Documentation    Initial lactic acid drawn within last 5 hours: no  Lab Results   Component Value Date/Time    LACACIDPL 2.0 05/06/2025 08:11 AM      Lab Results   Component Value Date/Time    POCLACTIC 2.14 (HH) 05/08/2025 06:07 AM     If NO: Lactic Acid ordered and drawn now: yes  Result: 2.14    Blood Cultures drawn within last 23 hours:  NO  If NO: Blood Cultures ordered and drawn now: yes  Before antibiotics were given? no    Antibiotic given within last 23 hours: YES  If NO: Antibiotic ordered and given now: N/A    Hypotension (SBP<90, MAP<65) or initial Lactic Acid >= 4 present (Lactate>=4): NO    If YES:  Intravenous fluids were administered (Must be IV crystalloid, cannot give albumin alone):  SEPFluids: Lactated Ringers  Total volume given (cannot be 0):  500 mL    [facility] Sepsis  Fluid Exclusions: Hypotension is due to another underlying cause NOT related to sepsis  If NO:  Patient has VAD  Patient/proxy refused fluids  The patient is not fluid responsive as evidenced by NICOM fluid challenge. (To prove this, document with dot phrase [id number] if not fluid responsive.)    Repeat Lactic Acid:  Ordered for 1000 time    Vasopressors started:  (Must be started if hypotension unresponsive to bolus)   Persistent Hypotension despite IVF

## 2025-05-09 ENCOUNTER — APPOINTMENT (OUTPATIENT)
Facility: HOSPITAL | Age: 55
DRG: 689 | End: 2025-05-09
Payer: MEDICARE

## 2025-05-09 LAB
ALBUMIN SERPL-MCNC: 2.9 G/DL (ref 3.5–5)
ALBUMIN/GLOB SERPL: 1.5 (ref 1.1–2.2)
ALP SERPL-CCNC: 84 U/L (ref 45–117)
ALT SERPL-CCNC: 14 U/L (ref 12–78)
AMMONIA PLAS-SCNC: <10 UMOL/L
ANION GAP SERPL CALC-SCNC: 5 MMOL/L (ref 2–12)
ANION GAP SERPL CALC-SCNC: 5 MMOL/L (ref 2–12)
APPEARANCE UR: CLEAR
ARTERIAL PATENCY WRIST A: NEGATIVE
AST SERPL-CCNC: 15 U/L (ref 15–37)
BACTERIA URNS QL MICRO: NEGATIVE /HPF
BASE DEFICIT BLD-SCNC: 3 MMOL/L
BASOPHILS # BLD: 0.05 K/UL (ref 0–0.1)
BASOPHILS NFR BLD: 1 % (ref 0–1)
BDY SITE: ABNORMAL
BILIRUB DIRECT SERPL-MCNC: 0.4 MG/DL (ref 0–0.2)
BILIRUB SERPL-MCNC: 0.8 MG/DL (ref 0.2–1)
BILIRUB UR QL: NEGATIVE
BUN SERPL-MCNC: 4 MG/DL (ref 6–20)
BUN SERPL-MCNC: 4 MG/DL (ref 6–20)
BUN/CREAT SERPL: 10 (ref 12–20)
BUN/CREAT SERPL: 11 (ref 12–20)
CALCIUM SERPL-MCNC: 7.8 MG/DL (ref 8.5–10.1)
CALCIUM SERPL-MCNC: 8.2 MG/DL (ref 8.5–10.1)
CHLORIDE SERPL-SCNC: 104 MMOL/L (ref 97–108)
CHLORIDE SERPL-SCNC: 108 MMOL/L (ref 97–108)
CO2 SERPL-SCNC: 21 MMOL/L (ref 21–32)
CO2 SERPL-SCNC: 26 MMOL/L (ref 21–32)
COLOR UR: ABNORMAL
CREAT SERPL-MCNC: 0.38 MG/DL (ref 0.55–1.02)
CREAT SERPL-MCNC: 0.39 MG/DL (ref 0.55–1.02)
DIFFERENTIAL METHOD BLD: ABNORMAL
EOSINOPHIL # BLD: 0.05 K/UL (ref 0–0.4)
EOSINOPHIL NFR BLD: 1 % (ref 0–7)
EPITH CASTS URNS QL MICRO: ABNORMAL /LPF
ERYTHROCYTE [DISTWIDTH] IN BLOOD BY AUTOMATED COUNT: 15.7 % (ref 11.5–14.5)
GAS FLOW.O2 O2 DELIVERY SYS: ABNORMAL
GAS FLOW.O2 SETTING OXYMISER: 16 BPM
GLOBULIN SER CALC-MCNC: 2 G/DL (ref 2–4)
GLUCOSE BLD STRIP.AUTO-MCNC: 100 MG/DL (ref 65–117)
GLUCOSE BLD STRIP.AUTO-MCNC: 120 MG/DL (ref 65–117)
GLUCOSE BLD STRIP.AUTO-MCNC: 120 MG/DL (ref 65–117)
GLUCOSE BLD STRIP.AUTO-MCNC: 141 MG/DL (ref 65–117)
GLUCOSE BLD STRIP.AUTO-MCNC: 143 MG/DL (ref 65–117)
GLUCOSE BLD STRIP.AUTO-MCNC: 145 MG/DL (ref 65–117)
GLUCOSE SERPL-MCNC: 106 MG/DL (ref 65–100)
GLUCOSE SERPL-MCNC: 132 MG/DL (ref 65–100)
GLUCOSE UR STRIP.AUTO-MCNC: NEGATIVE MG/DL
HCO3 BLD-SCNC: 23.5 MMOL/L (ref 21–28)
HCT VFR BLD AUTO: 23 % (ref 35–47)
HGB BLD-MCNC: 8 G/DL (ref 11.5–16)
HGB UR QL STRIP: NEGATIVE
HYALINE CASTS URNS QL MICRO: ABNORMAL /LPF (ref 0–5)
IMM GRANULOCYTES # BLD AUTO: 0 K/UL
IMM GRANULOCYTES NFR BLD AUTO: 0 %
KETONES UR QL STRIP.AUTO: NEGATIVE MG/DL
LACTATE SERPL-SCNC: 2 MMOL/L (ref 0.4–2)
LEUKOCYTE ESTERASE UR QL STRIP.AUTO: NEGATIVE
LYMPHOCYTES # BLD: 1.65 K/UL (ref 0.8–3.5)
LYMPHOCYTES NFR BLD: 35 % (ref 12–49)
MAGNESIUM SERPL-MCNC: 1.5 MG/DL (ref 1.6–2.4)
MAGNESIUM SERPL-MCNC: 2.1 MG/DL (ref 1.6–2.4)
MCH RBC QN AUTO: 32.8 PG (ref 26–34)
MCHC RBC AUTO-ENTMCNC: 34.8 G/DL (ref 30–36.5)
MCV RBC AUTO: 94.3 FL (ref 80–99)
MONOCYTES # BLD: 0.05 K/UL (ref 0–1)
MONOCYTES NFR BLD: 1 % (ref 5–13)
NEUTS BAND NFR BLD MANUAL: 2 % (ref 0–6)
NEUTS SEG # BLD: 2.9 K/UL (ref 1.8–8)
NEUTS SEG NFR BLD: 60 % (ref 32–75)
NITRITE UR QL STRIP.AUTO: NEGATIVE
NRBC # BLD: 0 K/UL (ref 0–0.01)
NRBC BLD-RTO: 0 PER 100 WBC
NT PRO BNP: 2696 PG/ML
O2/TOTAL GAS SETTING VFR VENT: 80 %
PCO2 BLD: 48.2 MMHG (ref 35–48)
PEEP RESPIRATORY: 5 CMH2O
PH BLD: 7.3 (ref 7.35–7.45)
PH UR STRIP: 6.5 (ref 5–8)
PHOSPHATE SERPL-MCNC: 1.8 MG/DL (ref 2.6–4.7)
PHOSPHATE SERPL-MCNC: 4.3 MG/DL (ref 2.6–4.7)
PLATELET # BLD AUTO: 164 K/UL (ref 150–400)
PMV BLD AUTO: 12.4 FL (ref 8.9–12.9)
PO2 BLD: 71 MMHG (ref 83–108)
POTASSIUM SERPL-SCNC: 2.9 MMOL/L (ref 3.5–5.1)
POTASSIUM SERPL-SCNC: 4.5 MMOL/L (ref 3.5–5.1)
PROT SERPL-MCNC: 4.9 G/DL (ref 6.4–8.2)
PROT UR STRIP-MCNC: ABNORMAL MG/DL
RBC # BLD AUTO: 2.44 M/UL (ref 3.8–5.2)
RBC #/AREA URNS HPF: ABNORMAL /HPF (ref 0–5)
RBC MORPH BLD: ABNORMAL
SAO2 % BLD: 91.8 % (ref 92–97)
SERVICE CMNT-IMP: ABNORMAL
SERVICE CMNT-IMP: NORMAL
SODIUM SERPL-SCNC: 134 MMOL/L (ref 136–145)
SODIUM SERPL-SCNC: 135 MMOL/L (ref 136–145)
SP GR UR REFRACTOMETRY: ABNORMAL (ref 1–1.03)
SPECIMEN HOLD: NORMAL
SPECIMEN TYPE: ABNORMAL
URINE CULTURE IF INDICATED: ABNORMAL
UROBILINOGEN UR QL STRIP.AUTO: NEGATIVE EU/DL (ref 0.2–1)
VENTILATION MODE VENT: ABNORMAL
VIT B12 SERPL-MCNC: 679 PG/ML (ref 193–986)
VT SETTING VENT: 380 ML
WBC # BLD AUTO: 4.7 K/UL (ref 3.6–11)
WBC MORPH BLD: ABNORMAL
WBC URNS QL MICRO: ABNORMAL /HPF (ref 0–4)

## 2025-05-09 PROCEDURE — 99024 POSTOP FOLLOW-UP VISIT: CPT | Performed by: SURGERY

## 2025-05-09 PROCEDURE — 6370000000 HC RX 637 (ALT 250 FOR IP): Performed by: PHYSICIAN ASSISTANT

## 2025-05-09 PROCEDURE — 6370000000 HC RX 637 (ALT 250 FOR IP): Performed by: INTERNAL MEDICINE

## 2025-05-09 PROCEDURE — 2580000003 HC RX 258: Performed by: SURGERY

## 2025-05-09 PROCEDURE — 5A1935Z RESPIRATORY VENTILATION, LESS THAN 24 CONSECUTIVE HOURS: ICD-10-PCS | Performed by: PHYSICIAN ASSISTANT

## 2025-05-09 PROCEDURE — 31500 INSERT EMERGENCY AIRWAY: CPT

## 2025-05-09 PROCEDURE — 0BH17EZ INSERTION OF ENDOTRACHEAL AIRWAY INTO TRACHEA, VIA NATURAL OR ARTIFICIAL OPENING: ICD-10-PCS | Performed by: PHYSICIAN ASSISTANT

## 2025-05-09 PROCEDURE — 2500000003 HC RX 250 WO HCPCS: Performed by: INTERNAL MEDICINE

## 2025-05-09 PROCEDURE — 82803 BLOOD GASES ANY COMBINATION: CPT

## 2025-05-09 PROCEDURE — 2500000003 HC RX 250 WO HCPCS: Performed by: SURGERY

## 2025-05-09 PROCEDURE — 6370000000 HC RX 637 (ALT 250 FOR IP): Performed by: FAMILY MEDICINE

## 2025-05-09 PROCEDURE — 6360000002 HC RX W HCPCS: Performed by: INTERNAL MEDICINE

## 2025-05-09 PROCEDURE — 36556 INSERT NON-TUNNEL CV CATH: CPT

## 2025-05-09 PROCEDURE — 80048 BASIC METABOLIC PNL TOTAL CA: CPT

## 2025-05-09 PROCEDURE — 6370000000 HC RX 637 (ALT 250 FOR IP): Performed by: SURGERY

## 2025-05-09 PROCEDURE — 82140 ASSAY OF AMMONIA: CPT

## 2025-05-09 PROCEDURE — 84100 ASSAY OF PHOSPHORUS: CPT

## 2025-05-09 PROCEDURE — 2500000003 HC RX 250 WO HCPCS

## 2025-05-09 PROCEDURE — 2580000003 HC RX 258: Performed by: INTERNAL MEDICINE

## 2025-05-09 PROCEDURE — 6360000002 HC RX W HCPCS: Performed by: NURSE PRACTITIONER

## 2025-05-09 PROCEDURE — 94640 AIRWAY INHALATION TREATMENT: CPT

## 2025-05-09 PROCEDURE — 87205 SMEAR GRAM STAIN: CPT

## 2025-05-09 PROCEDURE — 87070 CULTURE OTHR SPECIMN AEROBIC: CPT

## 2025-05-09 PROCEDURE — 6360000002 HC RX W HCPCS: Performed by: PHYSICIAN ASSISTANT

## 2025-05-09 PROCEDURE — 2580000003 HC RX 258: Performed by: PHYSICIAN ASSISTANT

## 2025-05-09 PROCEDURE — 6360000002 HC RX W HCPCS: Performed by: FAMILY MEDICINE

## 2025-05-09 PROCEDURE — P9047 ALBUMIN (HUMAN), 25%, 50ML: HCPCS | Performed by: FAMILY MEDICINE

## 2025-05-09 PROCEDURE — 80076 HEPATIC FUNCTION PANEL: CPT

## 2025-05-09 PROCEDURE — 94002 VENT MGMT INPAT INIT DAY: CPT

## 2025-05-09 PROCEDURE — 6360000002 HC RX W HCPCS

## 2025-05-09 PROCEDURE — 2000000000 HC ICU R&B

## 2025-05-09 PROCEDURE — 2580000003 HC RX 258

## 2025-05-09 PROCEDURE — 83735 ASSAY OF MAGNESIUM: CPT

## 2025-05-09 PROCEDURE — 02HV33Z INSERTION OF INFUSION DEVICE INTO SUPERIOR VENA CAVA, PERCUTANEOUS APPROACH: ICD-10-PCS | Performed by: INTERNAL MEDICINE

## 2025-05-09 PROCEDURE — 83880 ASSAY OF NATRIURETIC PEPTIDE: CPT

## 2025-05-09 PROCEDURE — 83605 ASSAY OF LACTIC ACID: CPT

## 2025-05-09 PROCEDURE — 71045 X-RAY EXAM CHEST 1 VIEW: CPT

## 2025-05-09 PROCEDURE — 85025 COMPLETE CBC W/AUTO DIFF WBC: CPT

## 2025-05-09 PROCEDURE — 81001 URINALYSIS AUTO W/SCOPE: CPT

## 2025-05-09 PROCEDURE — 82962 GLUCOSE BLOOD TEST: CPT

## 2025-05-09 PROCEDURE — 36600 WITHDRAWAL OF ARTERIAL BLOOD: CPT

## 2025-05-09 PROCEDURE — 82607 VITAMIN B-12: CPT

## 2025-05-09 PROCEDURE — 87040 BLOOD CULTURE FOR BACTERIA: CPT

## 2025-05-09 RX ORDER — MAGNESIUM SULFATE IN WATER 40 MG/ML
2000 INJECTION, SOLUTION INTRAVENOUS ONCE
Status: COMPLETED | OUTPATIENT
Start: 2025-05-09 | End: 2025-05-09

## 2025-05-09 RX ORDER — FENTANYL CITRATE 50 UG/ML
50 INJECTION, SOLUTION INTRAMUSCULAR; INTRAVENOUS
Status: DISCONTINUED | OUTPATIENT
Start: 2025-05-09 | End: 2025-05-14

## 2025-05-09 RX ORDER — FENTANYL CITRATE-0.9 % NACL/PF 10 MCG/ML
25-200 PLASTIC BAG, INJECTION (ML) INTRAVENOUS CONTINUOUS
Refills: 0 | Status: DISCONTINUED | OUTPATIENT
Start: 2025-05-09 | End: 2025-05-10

## 2025-05-09 RX ORDER — NOREPINEPHRINE BITARTRATE 0.06 MG/ML
1-100 INJECTION, SOLUTION INTRAVENOUS CONTINUOUS
Status: DISCONTINUED | OUTPATIENT
Start: 2025-05-09 | End: 2025-05-15

## 2025-05-09 RX ORDER — ROCURONIUM BROMIDE 10 MG/ML
INJECTION, SOLUTION INTRAVENOUS
Status: COMPLETED
Start: 2025-05-09 | End: 2025-05-09

## 2025-05-09 RX ORDER — LANOLIN ALCOHOL/MO/W.PET/CERES
100 CREAM (GRAM) TOPICAL DAILY
Status: DISCONTINUED | OUTPATIENT
Start: 2025-05-10 | End: 2025-05-12

## 2025-05-09 RX ORDER — ROCURONIUM BROMIDE 10 MG/ML
60 INJECTION, SOLUTION INTRAVENOUS ONCE
Status: COMPLETED | OUTPATIENT
Start: 2025-05-09 | End: 2025-05-09

## 2025-05-09 RX ORDER — 0.9 % SODIUM CHLORIDE 0.9 %
1000 INTRAVENOUS SOLUTION INTRAVENOUS ONCE
Status: COMPLETED | OUTPATIENT
Start: 2025-05-09 | End: 2025-05-09

## 2025-05-09 RX ORDER — CALCIUM CARBONATE 500 MG/1
500 TABLET, CHEWABLE ORAL 3 TIMES DAILY PRN
Status: DISCONTINUED | OUTPATIENT
Start: 2025-05-09 | End: 2025-05-12

## 2025-05-09 RX ORDER — ETOMIDATE 2 MG/ML
INJECTION INTRAVENOUS
Status: COMPLETED
Start: 2025-05-09 | End: 2025-05-09

## 2025-05-09 RX ORDER — NOREPINEPHRINE BITARTRATE 0.06 MG/ML
INJECTION, SOLUTION INTRAVENOUS
Status: COMPLETED
Start: 2025-05-09 | End: 2025-05-09

## 2025-05-09 RX ORDER — BUMETANIDE 0.25 MG/ML
0.5 INJECTION, SOLUTION INTRAMUSCULAR; INTRAVENOUS ONCE
Status: COMPLETED | OUTPATIENT
Start: 2025-05-09 | End: 2025-05-09

## 2025-05-09 RX ORDER — ETOMIDATE 2 MG/ML
20 INJECTION INTRAVENOUS ONCE
Status: COMPLETED | OUTPATIENT
Start: 2025-05-09 | End: 2025-05-09

## 2025-05-09 RX ORDER — PHENYLEPHRINE HCL IN 0.9% NACL 0.4MG/10ML
SYRINGE (ML) INTRAVENOUS
Status: COMPLETED
Start: 2025-05-09 | End: 2025-05-09

## 2025-05-09 RX ORDER — IPRATROPIUM BROMIDE AND ALBUTEROL SULFATE 2.5; .5 MG/3ML; MG/3ML
1 SOLUTION RESPIRATORY (INHALATION)
Status: DISCONTINUED | OUTPATIENT
Start: 2025-05-09 | End: 2025-05-19

## 2025-05-09 RX ORDER — PROPOFOL 10 MG/ML
INJECTION, EMULSION INTRAVENOUS
Status: COMPLETED
Start: 2025-05-09 | End: 2025-05-09

## 2025-05-09 RX ORDER — PROPOFOL 10 MG/ML
5-50 INJECTION, EMULSION INTRAVENOUS CONTINUOUS
Status: DISCONTINUED | OUTPATIENT
Start: 2025-05-09 | End: 2025-05-10

## 2025-05-09 RX ORDER — PHENYLEPHRINE HCL IN 0.9% NACL 0.4MG/10ML
160 SYRINGE (ML) INTRAVENOUS ONCE
Status: COMPLETED | OUTPATIENT
Start: 2025-05-09 | End: 2025-05-09

## 2025-05-09 RX ADMIN — POTASSIUM & SODIUM PHOSPHATES POWDER PACK 280-160-250 MG 500 MG: 280-160-250 PACK at 20:33

## 2025-05-09 RX ADMIN — FENTANYL CITRATE 50 MCG/HR: 50 INJECTION, SOLUTION INTRAMUSCULAR; INTRAVENOUS at 13:59

## 2025-05-09 RX ADMIN — PANTOPRAZOLE SODIUM 40 MG: 40 TABLET, DELAYED RELEASE ORAL at 06:58

## 2025-05-09 RX ADMIN — Medication 100 MG: at 09:49

## 2025-05-09 RX ADMIN — Medication 1 MG: at 09:49

## 2025-05-09 RX ADMIN — METRONIDAZOLE 500 MG: 5 INJECTION, SOLUTION INTRAVENOUS at 17:09

## 2025-05-09 RX ADMIN — SODIUM CHLORIDE 1000 ML: 0.9 INJECTION, SOLUTION INTRAVENOUS at 13:20

## 2025-05-09 RX ADMIN — Medication 160 MCG: at 12:59

## 2025-05-09 RX ADMIN — ALBUMIN (HUMAN) 25 G: 0.25 INJECTION, SOLUTION INTRAVENOUS at 01:27

## 2025-05-09 RX ADMIN — POTASSIUM PHOSPHATE, MONOBASIC AND POTASSIUM PHOSPHATE, DIBASIC 30 MMOL: 224; 236 INJECTION, SOLUTION, CONCENTRATE INTRAVENOUS at 13:44

## 2025-05-09 RX ADMIN — GABAPENTIN 600 MG: 600 TABLET, FILM COATED ORAL at 20:31

## 2025-05-09 RX ADMIN — MAGNESIUM SULFATE HEPTAHYDRATE 2000 MG: 40 INJECTION, SOLUTION INTRAVENOUS at 13:52

## 2025-05-09 RX ADMIN — IPRATROPIUM BROMIDE AND ALBUTEROL SULFATE 1 DOSE: 2.5; .5 SOLUTION RESPIRATORY (INHALATION) at 20:35

## 2025-05-09 RX ADMIN — SODIUM CHLORIDE: 0.9 INJECTION, SOLUTION INTRAVENOUS at 10:10

## 2025-05-09 RX ADMIN — PROPOFOL 20 MCG/KG/MIN: 10 INJECTION, EMULSION INTRAVENOUS at 14:30

## 2025-05-09 RX ADMIN — ETOMIDATE 20 MG: 2 INJECTION, SOLUTION INTRAVENOUS at 12:59

## 2025-05-09 RX ADMIN — CITALOPRAM HYDROBROMIDE 10 MG: 20 TABLET ORAL at 09:49

## 2025-05-09 RX ADMIN — NOREPINEPHRINE BITARTRATE 2 MCG/MIN: 1 INJECTION, SOLUTION, CONCENTRATE INTRAVENOUS at 13:11

## 2025-05-09 RX ADMIN — SODIUM CHLORIDE, PRESERVATIVE FREE 10 ML: 5 INJECTION INTRAVENOUS at 20:34

## 2025-05-09 RX ADMIN — POTASSIUM & SODIUM PHOSPHATES POWDER PACK 280-160-250 MG 500 MG: 280-160-250 PACK at 16:46

## 2025-05-09 RX ADMIN — METRONIDAZOLE 500 MG: 5 INJECTION, SOLUTION INTRAVENOUS at 23:23

## 2025-05-09 RX ADMIN — AMITRIPTYLINE HYDROCHLORIDE 50 MG: 50 TABLET, FILM COATED ORAL at 20:31

## 2025-05-09 RX ADMIN — ROCURONIUM BROMIDE 60 MG: 50 INJECTION INTRAVENOUS at 12:59

## 2025-05-09 RX ADMIN — METRONIDAZOLE 500 MG: 5 INJECTION, SOLUTION INTRAVENOUS at 07:06

## 2025-05-09 RX ADMIN — POTASSIUM BICARBONATE 40 MEQ: 782 TABLET, EFFERVESCENT ORAL at 19:09

## 2025-05-09 RX ADMIN — PROPOFOL 10 MCG/KG/MIN: 10 INJECTION, EMULSION INTRAVENOUS at 13:03

## 2025-05-09 RX ADMIN — SODIUM CHLORIDE 1500 MG: 0.9 INJECTION, SOLUTION INTRAVENOUS at 19:09

## 2025-05-09 RX ADMIN — MIDODRINE HYDROCHLORIDE 10 MG: 5 TABLET ORAL at 09:49

## 2025-05-09 RX ADMIN — SODIUM CHLORIDE, PRESERVATIVE FREE 10 ML: 5 INJECTION INTRAVENOUS at 09:50

## 2025-05-09 RX ADMIN — MIDODRINE HYDROCHLORIDE 10 MG: 5 TABLET ORAL at 02:29

## 2025-05-09 RX ADMIN — SODIUM CHLORIDE 1000 ML: 0.9 INJECTION, SOLUTION INTRAVENOUS at 13:42

## 2025-05-09 RX ADMIN — BUMETANIDE 0.5 MG: 0.25 INJECTION INTRAMUSCULAR; INTRAVENOUS at 21:23

## 2025-05-09 RX ADMIN — DEXTROSE MONOHYDRATE: 50 INJECTION, SOLUTION INTRAVENOUS at 02:24

## 2025-05-09 RX ADMIN — SODIUM CHLORIDE: 0.9 INJECTION, SOLUTION INTRAVENOUS at 19:08

## 2025-05-09 RX ADMIN — POTASSIUM & SODIUM PHOSPHATES POWDER PACK 280-160-250 MG 500 MG: 280-160-250 PACK at 09:50

## 2025-05-09 RX ADMIN — TAMSULOSIN HYDROCHLORIDE 0.4 MG: 0.4 CAPSULE ORAL at 09:49

## 2025-05-09 RX ADMIN — GABAPENTIN 600 MG: 600 TABLET, FILM COATED ORAL at 09:49

## 2025-05-09 RX ADMIN — NOREPINEPHRINE BITARTRATE 5 MCG/MIN: 1 INJECTION, SOLUTION, CONCENTRATE INTRAVENOUS at 14:10

## 2025-05-09 RX ADMIN — MAGNESIUM SULFATE HEPTAHYDRATE 2000 MG: 40 INJECTION, SOLUTION INTRAVENOUS at 12:11

## 2025-05-09 RX ADMIN — ETOMIDATE 20 MG: 2 INJECTION INTRAVENOUS at 12:59

## 2025-05-09 RX ADMIN — CEFEPIME 2000 MG: 2 INJECTION, POWDER, FOR SOLUTION INTRAVENOUS at 10:14

## 2025-05-09 RX ADMIN — ALBUMIN (HUMAN) 25 G: 0.25 INJECTION, SOLUTION INTRAVENOUS at 07:01

## 2025-05-09 RX ADMIN — CYCLOBENZAPRINE 10 MG: 10 TABLET, FILM COATED ORAL at 09:49

## 2025-05-09 RX ADMIN — SODIUM CHLORIDE: 0.9 INJECTION, SOLUTION INTRAVENOUS at 15:23

## 2025-05-09 RX ADMIN — ROCURONIUM BROMIDE 60 MG: 10 INJECTION, SOLUTION INTRAVENOUS at 12:59

## 2025-05-09 RX ADMIN — CALCIUM CARBONATE (ANTACID) CHEW TAB 500 MG 500 MG: 500 CHEW TAB at 09:50

## 2025-05-09 RX ADMIN — CEFEPIME 2000 MG: 2 INJECTION, POWDER, FOR SOLUTION INTRAVENOUS at 20:39

## 2025-05-09 ASSESSMENT — PULMONARY FUNCTION TESTS
PIF_VALUE: 26
PIF_VALUE: 35
PIF_VALUE: 36
PIF_VALUE: 27

## 2025-05-09 ASSESSMENT — PAIN DESCRIPTION - LOCATION
LOCATION: ABDOMEN
LOCATION: ABDOMEN

## 2025-05-09 ASSESSMENT — PAIN SCALES - GENERAL
PAINLEVEL_OUTOF10: 4
PAINLEVEL_OUTOF10: 8
PAINLEVEL_OUTOF10: 0

## 2025-05-09 NOTE — PROCEDURES
PROCEDURE NOTE  Date: 5/9/2025   Name: Ce Roberts  YOB: 1970    Intubation    Date/Time: 5/9/2025 1:00 PM    Performed by: Shiloh Barnett PA  Authorized by: Shiloh Barnett PA  Consent: Verbal consent obtained.  Risks and benefits: risks, benefits and alternatives were discussed  Consent given by: Daughter/mPOA.  Patient understanding: patient states understanding of the procedure being performed  Required items: required blood products, implants, devices, and special equipment available  Patient identity confirmed: arm band and hospital-assigned identification number  Time out: Immediately prior to procedure a \"time out\" was called to verify the correct patient, procedure, equipment, support staff and site/side marked as required.  Indications: respiratory failure and airway protection  Intubation method: video-assisted  Patient status: paralyzed (RSI)  Preoxygenation: BVM  Pretreatment medications: none  Sedatives: etomidate  Paralytic: rocuronium  Laryngoscope size: Mac 3  Tube size: 7.5 mm  Number of attempts: 1  Cords visualized: yes  Post-procedure assessment: chest rise and CO2 detector  Breath sounds: equal and absent over the epigastrium  Cuff inflated: yes  Tube secured with: ETT ashby  Patient tolerance: patient tolerated the procedure well with no immediate complications  Comments:     CXR pending.  Supervised by Lovely Shepard MD.

## 2025-05-09 NOTE — PROCEDURES
PROCEDURE NOTE  Date: 5/9/2025   Name: Ce Roberts  YOB: 1970    CENTRAL LINE    Date/Time: 5/9/2025 4:57 PM    Performed by: Lovely Shepard MD  Authorized by: Lovely Shepard MD  Consent: The procedure was performed in an emergent situation. Verbal consent obtained.  Risks and benefits: risks, benefits and alternatives were discussed  Consent given by: power of   Required items: required blood products, implants, devices, and special equipment available  Patient identity confirmed: arm band, provided demographic data and hospital-assigned identification number  Time out: Immediately prior to procedure a \"time out\" was called to verify the correct patient, procedure, equipment, support staff and site/side marked as required.  Indications: vascular access    Anesthesia:  Local Anesthetic: lidocaine 1% without epinephrine    Sedation:  Patient sedated: no    Preparation: skin prepped with 2% chlorhexidine  Skin prep agent dried: skin prep agent completely dried prior to procedure  Sterile barriers: all five maximum sterile barriers used - cap, mask, sterile gown, sterile gloves, and large sterile sheet  Hand hygiene: hand hygiene performed prior to central venous catheter insertion  Location details: right internal jugular  Site selection rationale: attempted SC but not able to access the vessel  Patient position: reverse Trendelenburg  Catheter size: 7.5 Fr  Pre-procedure: landmarks identified  Ultrasound guidance: yes  Sterile ultrasound techniques: sterile gel and sterile probe covers were used  Number of attempts: 2  Successful placement: yes  Post-procedure: line sutured and dressing applied  Assessment: blood return through all ports and no pneumothorax on x-ray  Patient tolerance: patient tolerated the procedure well with no immediate complications

## 2025-05-10 ENCOUNTER — APPOINTMENT (OUTPATIENT)
Facility: HOSPITAL | Age: 55
DRG: 689 | End: 2025-05-10
Payer: MEDICARE

## 2025-05-10 LAB
ANION GAP SERPL CALC-SCNC: 1 MMOL/L (ref 2–12)
ANION GAP SERPL CALC-SCNC: 5 MMOL/L (ref 2–12)
ARTERIAL PATENCY WRIST A: POSITIVE
BACTERIA SPEC CULT: NORMAL
BACTERIA SPEC CULT: NORMAL
BASE EXCESS BLD CALC-SCNC: 2.4 MMOL/L
BDY SITE: ABNORMAL
BUN SERPL-MCNC: 5 MG/DL (ref 6–20)
BUN SERPL-MCNC: 5 MG/DL (ref 6–20)
BUN/CREAT SERPL: 14 (ref 12–20)
BUN/CREAT SERPL: 14 (ref 12–20)
CALCIUM SERPL-MCNC: 8.1 MG/DL (ref 8.5–10.1)
CALCIUM SERPL-MCNC: 8.4 MG/DL (ref 8.5–10.1)
CHLORIDE SERPL-SCNC: 108 MMOL/L (ref 97–108)
CHLORIDE SERPL-SCNC: 108 MMOL/L (ref 97–108)
CO2 SERPL-SCNC: 27 MMOL/L (ref 21–32)
CO2 SERPL-SCNC: 29 MMOL/L (ref 21–32)
CREAT SERPL-MCNC: 0.36 MG/DL (ref 0.55–1.02)
CREAT SERPL-MCNC: 0.37 MG/DL (ref 0.55–1.02)
ERYTHROCYTE [DISTWIDTH] IN BLOOD BY AUTOMATED COUNT: 16 % (ref 11.5–14.5)
GAS FLOW.O2 O2 DELIVERY SYS: ABNORMAL
GAS FLOW.O2 SETTING OXYMISER: 16 BPM
GLUCOSE BLD STRIP.AUTO-MCNC: 101 MG/DL (ref 65–117)
GLUCOSE BLD STRIP.AUTO-MCNC: 71 MG/DL (ref 65–117)
GLUCOSE BLD STRIP.AUTO-MCNC: 77 MG/DL (ref 65–117)
GLUCOSE SERPL-MCNC: 100 MG/DL (ref 65–100)
GLUCOSE SERPL-MCNC: 97 MG/DL (ref 65–100)
HCO3 BLD-SCNC: 27.1 MMOL/L (ref 21–28)
HCT VFR BLD AUTO: 22.8 % (ref 35–47)
HGB BLD-MCNC: 7.7 G/DL (ref 11.5–16)
MAGNESIUM SERPL-MCNC: 1.2 MG/DL (ref 1.6–2.4)
MAGNESIUM SERPL-MCNC: 3.1 MG/DL (ref 1.6–2.4)
MCH RBC QN AUTO: 32.4 PG (ref 26–34)
MCHC RBC AUTO-ENTMCNC: 33.8 G/DL (ref 30–36.5)
MCV RBC AUTO: 95.8 FL (ref 80–99)
NRBC # BLD: 0 K/UL (ref 0–0.01)
NRBC BLD-RTO: 0 PER 100 WBC
O2/TOTAL GAS SETTING VFR VENT: 50 %
PCO2 BLD: 41.7 MMHG (ref 35–48)
PEEP RESPIRATORY: 12 CMH2O
PH BLD: 7.42 (ref 7.35–7.45)
PHOSPHATE SERPL-MCNC: 3.1 MG/DL (ref 2.6–4.7)
PHOSPHATE SERPL-MCNC: 3.2 MG/DL (ref 2.6–4.7)
PLATELET # BLD AUTO: 257 K/UL (ref 150–400)
PMV BLD AUTO: 12.1 FL (ref 8.9–12.9)
PO2 BLD: 259 MMHG (ref 83–108)
POTASSIUM SERPL-SCNC: 4 MMOL/L (ref 3.5–5.1)
POTASSIUM SERPL-SCNC: 4.1 MMOL/L (ref 3.5–5.1)
RBC # BLD AUTO: 2.38 M/UL (ref 3.8–5.2)
SAO2 % BLD: 99.9 % (ref 92–97)
SERVICE CMNT-IMP: NORMAL
SODIUM SERPL-SCNC: 138 MMOL/L (ref 136–145)
SODIUM SERPL-SCNC: 140 MMOL/L (ref 136–145)
SPECIMEN TYPE: ABNORMAL
VENTILATION MODE VENT: ABNORMAL
VT SETTING VENT: 350 ML
WBC # BLD AUTO: 12.6 K/UL (ref 3.6–11)

## 2025-05-10 PROCEDURE — 5A09457 ASSISTANCE WITH RESPIRATORY VENTILATION, 24-96 CONSECUTIVE HOURS, CONTINUOUS POSITIVE AIRWAY PRESSURE: ICD-10-PCS | Performed by: NURSE PRACTITIONER

## 2025-05-10 PROCEDURE — 94003 VENT MGMT INPAT SUBQ DAY: CPT

## 2025-05-10 PROCEDURE — 6370000000 HC RX 637 (ALT 250 FOR IP): Performed by: PHYSICIAN ASSISTANT

## 2025-05-10 PROCEDURE — 36600 WITHDRAWAL OF ARTERIAL BLOOD: CPT

## 2025-05-10 PROCEDURE — 6370000000 HC RX 637 (ALT 250 FOR IP): Performed by: SURGERY

## 2025-05-10 PROCEDURE — 84100 ASSAY OF PHOSPHORUS: CPT

## 2025-05-10 PROCEDURE — 71045 X-RAY EXAM CHEST 1 VIEW: CPT

## 2025-05-10 PROCEDURE — 2500000003 HC RX 250 WO HCPCS: Performed by: SURGERY

## 2025-05-10 PROCEDURE — 6360000002 HC RX W HCPCS: Performed by: NURSE PRACTITIONER

## 2025-05-10 PROCEDURE — 6370000000 HC RX 637 (ALT 250 FOR IP): Performed by: INTERNAL MEDICINE

## 2025-05-10 PROCEDURE — 2500000003 HC RX 250 WO HCPCS: Performed by: INTERNAL MEDICINE

## 2025-05-10 PROCEDURE — 99024 POSTOP FOLLOW-UP VISIT: CPT | Performed by: SURGERY

## 2025-05-10 PROCEDURE — 2580000003 HC RX 258: Performed by: INTERNAL MEDICINE

## 2025-05-10 PROCEDURE — 6360000002 HC RX W HCPCS: Performed by: SURGERY

## 2025-05-10 PROCEDURE — 37799 UNLISTED PX VASCULAR SURGERY: CPT

## 2025-05-10 PROCEDURE — 2000000000 HC ICU R&B

## 2025-05-10 PROCEDURE — 6360000002 HC RX W HCPCS: Performed by: PHYSICIAN ASSISTANT

## 2025-05-10 PROCEDURE — 6370000000 HC RX 637 (ALT 250 FOR IP): Performed by: FAMILY MEDICINE

## 2025-05-10 PROCEDURE — 85027 COMPLETE CBC AUTOMATED: CPT

## 2025-05-10 PROCEDURE — 82962 GLUCOSE BLOOD TEST: CPT

## 2025-05-10 PROCEDURE — 83735 ASSAY OF MAGNESIUM: CPT

## 2025-05-10 PROCEDURE — 6360000002 HC RX W HCPCS: Performed by: INTERNAL MEDICINE

## 2025-05-10 PROCEDURE — 94640 AIRWAY INHALATION TREATMENT: CPT

## 2025-05-10 PROCEDURE — 6370000000 HC RX 637 (ALT 250 FOR IP): Performed by: NURSE PRACTITIONER

## 2025-05-10 PROCEDURE — 80048 BASIC METABOLIC PNL TOTAL CA: CPT

## 2025-05-10 PROCEDURE — 82803 BLOOD GASES ANY COMBINATION: CPT

## 2025-05-10 PROCEDURE — 2700000000 HC OXYGEN THERAPY PER DAY

## 2025-05-10 PROCEDURE — 2580000003 HC RX 258: Performed by: SURGERY

## 2025-05-10 PROCEDURE — 2580000003 HC RX 258: Performed by: PHYSICIAN ASSISTANT

## 2025-05-10 RX ORDER — LANSOPRAZOLE
30 KIT
Status: DISCONTINUED | OUTPATIENT
Start: 2025-05-10 | End: 2025-05-10 | Stop reason: CLARIF

## 2025-05-10 RX ORDER — BUMETANIDE 0.25 MG/ML
0.5 INJECTION, SOLUTION INTRAMUSCULAR; INTRAVENOUS ONCE
Status: COMPLETED | OUTPATIENT
Start: 2025-05-10 | End: 2025-05-10

## 2025-05-10 RX ORDER — LANSOPRAZOLE 30 MG/1
30 TABLET, ORALLY DISINTEGRATING, DELAYED RELEASE ORAL
Status: DISCONTINUED | OUTPATIENT
Start: 2025-05-10 | End: 2025-05-12

## 2025-05-10 RX ADMIN — MAGNESIUM SULFATE HEPTAHYDRATE 2000 MG: 40 INJECTION, SOLUTION INTRAVENOUS at 03:09

## 2025-05-10 RX ADMIN — SODIUM CHLORIDE: 0.9 INJECTION, SOLUTION INTRAVENOUS at 06:58

## 2025-05-10 RX ADMIN — SODIUM CHLORIDE, PRESERVATIVE FREE 30 ML: 5 INJECTION INTRAVENOUS at 20:43

## 2025-05-10 RX ADMIN — Medication 1 MG: at 08:42

## 2025-05-10 RX ADMIN — CEFEPIME 2000 MG: 2 INJECTION, POWDER, FOR SOLUTION INTRAVENOUS at 08:41

## 2025-05-10 RX ADMIN — CITALOPRAM HYDROBROMIDE 10 MG: 20 TABLET ORAL at 08:43

## 2025-05-10 RX ADMIN — IPRATROPIUM BROMIDE AND ALBUTEROL SULFATE 1 DOSE: 2.5; .5 SOLUTION RESPIRATORY (INHALATION) at 07:32

## 2025-05-10 RX ADMIN — BUMETANIDE 0.5 MG: 0.25 INJECTION INTRAMUSCULAR; INTRAVENOUS at 20:40

## 2025-05-10 RX ADMIN — MAGNESIUM SULFATE HEPTAHYDRATE 2000 MG: 40 INJECTION, SOLUTION INTRAVENOUS at 01:09

## 2025-05-10 RX ADMIN — ACETAMINOPHEN 650 MG: 325 TABLET ORAL at 20:44

## 2025-05-10 RX ADMIN — CALCIUM CARBONATE (ANTACID) CHEW TAB 500 MG 500 MG: 500 CHEW TAB at 20:43

## 2025-05-10 RX ADMIN — AMITRIPTYLINE HYDROCHLORIDE 50 MG: 50 TABLET, FILM COATED ORAL at 20:41

## 2025-05-10 RX ADMIN — METRONIDAZOLE 500 MG: 5 INJECTION, SOLUTION INTRAVENOUS at 15:54

## 2025-05-10 RX ADMIN — POTASSIUM & SODIUM PHOSPHATES POWDER PACK 280-160-250 MG 500 MG: 280-160-250 PACK at 13:07

## 2025-05-10 RX ADMIN — ACETAMINOPHEN 650 MG: 325 TABLET ORAL at 13:57

## 2025-05-10 RX ADMIN — ALUMINUM HYDROXIDE, MAGNESIUM HYDROXIDE, AND SIMETHICONE 30 ML: 1200; 120; 1200 SUSPENSION ORAL at 20:43

## 2025-05-10 RX ADMIN — GABAPENTIN 600 MG: 600 TABLET, FILM COATED ORAL at 20:42

## 2025-05-10 RX ADMIN — GABAPENTIN 600 MG: 600 TABLET, FILM COATED ORAL at 08:43

## 2025-05-10 RX ADMIN — IPRATROPIUM BROMIDE AND ALBUTEROL SULFATE 1 DOSE: 2.5; .5 SOLUTION RESPIRATORY (INHALATION) at 20:20

## 2025-05-10 RX ADMIN — METRONIDAZOLE 500 MG: 5 INJECTION, SOLUTION INTRAVENOUS at 07:01

## 2025-05-10 RX ADMIN — METRONIDAZOLE 500 MG: 5 INJECTION, SOLUTION INTRAVENOUS at 23:24

## 2025-05-10 RX ADMIN — NOREPINEPHRINE BITARTRATE 2 MCG/MIN: 1 INJECTION, SOLUTION, CONCENTRATE INTRAVENOUS at 23:23

## 2025-05-10 RX ADMIN — POTASSIUM & SODIUM PHOSPHATES POWDER PACK 280-160-250 MG 500 MG: 280-160-250 PACK at 20:42

## 2025-05-10 RX ADMIN — MIDODRINE HYDROCHLORIDE 10 MG: 5 TABLET ORAL at 23:25

## 2025-05-10 RX ADMIN — FENTANYL CITRATE 50 MCG/HR: 50 INJECTION, SOLUTION INTRAMUSCULAR; INTRAVENOUS at 06:51

## 2025-05-10 RX ADMIN — POTASSIUM & SODIUM PHOSPHATES POWDER PACK 280-160-250 MG 500 MG: 280-160-250 PACK at 08:43

## 2025-05-10 RX ADMIN — IPRATROPIUM BROMIDE AND ALBUTEROL SULFATE 1 DOSE: 2.5; .5 SOLUTION RESPIRATORY (INHALATION) at 00:29

## 2025-05-10 RX ADMIN — CEFEPIME 2000 MG: 2 INJECTION, POWDER, FOR SOLUTION INTRAVENOUS at 20:49

## 2025-05-10 RX ADMIN — Medication 100 MG: at 08:43

## 2025-05-10 RX ADMIN — VANCOMYCIN HYDROCHLORIDE 1250 MG: 1.25 INJECTION, POWDER, LYOPHILIZED, FOR SOLUTION INTRAVENOUS at 06:56

## 2025-05-10 RX ADMIN — LANSOPRAZOLE 30 MG: 30 TABLET, ORALLY DISINTEGRATING, DELAYED RELEASE ORAL at 07:17

## 2025-05-10 RX ADMIN — SODIUM CHLORIDE, PRESERVATIVE FREE 10 ML: 5 INJECTION INTRAVENOUS at 08:42

## 2025-05-10 RX ADMIN — POTASSIUM & SODIUM PHOSPHATES POWDER PACK 280-160-250 MG 500 MG: 280-160-250 PACK at 17:11

## 2025-05-10 RX ADMIN — VANCOMYCIN HYDROCHLORIDE 1250 MG: 1.25 INJECTION, POWDER, LYOPHILIZED, FOR SOLUTION INTRAVENOUS at 19:03

## 2025-05-10 ASSESSMENT — PULMONARY FUNCTION TESTS
PIF_VALUE: 22
PIF_VALUE: 26
PIF_VALUE: 29

## 2025-05-10 ASSESSMENT — PAIN SCALES - GENERAL
PAINLEVEL_OUTOF10: 0
PAINLEVEL_OUTOF10: 1
PAINLEVEL_OUTOF10: 0

## 2025-05-10 NOTE — ED NOTES
FNE and Advocate responded to see patient. History obtained, difficult to follow at times. Patient denied needs or safety concerns. FNE provided patient with FNE Office number if she has further questions/concerns after discharge.  SBAR given to BLAKE Archer

## 2025-05-10 NOTE — ED NOTES
FNE spoke with BLAKE Archer and advised that FNE will speak with patient once extubated. FNE to follow plan of care and speak with patient at appropriate time.

## 2025-05-11 ENCOUNTER — APPOINTMENT (OUTPATIENT)
Facility: HOSPITAL | Age: 55
DRG: 689 | End: 2025-05-11
Payer: MEDICARE

## 2025-05-11 LAB
ANION GAP SERPL CALC-SCNC: 3 MMOL/L (ref 2–12)
ARTERIAL PATENCY WRIST A: POSITIVE
BACTERIA SPEC CULT: NORMAL
BASE EXCESS BLD CALC-SCNC: 6.5 MMOL/L
BDY SITE: ABNORMAL
BUN SERPL-MCNC: 6 MG/DL (ref 6–20)
BUN/CREAT SERPL: 20 (ref 12–20)
CALCIUM SERPL-MCNC: 8.4 MG/DL (ref 8.5–10.1)
CHLORIDE SERPL-SCNC: 109 MMOL/L (ref 97–108)
CO2 SERPL-SCNC: 29 MMOL/L (ref 21–32)
CREAT SERPL-MCNC: 0.3 MG/DL (ref 0.55–1.02)
ECHO AO ROOT DIAM: 3.3 CM
ECHO AO ROOT INDEX: 2.02 CM/M2
ECHO AV AREA PEAK VELOCITY: 2.1 CM2
ECHO AV AREA VTI: 2.6 CM2
ECHO AV AREA/BSA PEAK VELOCITY: 1.3 CM2/M2
ECHO AV AREA/BSA VTI: 1.6 CM2/M2
ECHO AV MEAN GRADIENT: 6 MMHG
ECHO AV MEAN VELOCITY: 1.1 M/S
ECHO AV PEAK GRADIENT: 11 MMHG
ECHO AV PEAK VELOCITY: 1.7 M/S
ECHO AV VELOCITY RATIO: 0.65
ECHO AV VTI: 28 CM
ECHO EST RA PRESSURE: 3 MMHG
ECHO LA DIAMETER INDEX: 1.66 CM/M2
ECHO LA DIAMETER: 2.7 CM
ECHO LA TO AORTIC ROOT RATIO: 0.82
ECHO LA VOL A-L A2C: 53 ML (ref 22–52)
ECHO LA VOL A-L A4C: 43 ML (ref 22–52)
ECHO LA VOL MOD A2C: 49 ML (ref 22–52)
ECHO LA VOL MOD A4C: 39 ML (ref 22–52)
ECHO LA VOLUME AREA LENGTH: 48 ML
ECHO LA VOLUME INDEX A-L A2C: 33 ML/M2 (ref 16–34)
ECHO LA VOLUME INDEX A-L A4C: 26 ML/M2 (ref 16–34)
ECHO LA VOLUME INDEX AREA LENGTH: 29 ML/M2 (ref 16–34)
ECHO LA VOLUME INDEX MOD A2C: 30 ML/M2 (ref 16–34)
ECHO LA VOLUME INDEX MOD A4C: 24 ML/M2 (ref 16–34)
ECHO LV E' LATERAL VELOCITY: 12.76 CM/S
ECHO LV E' SEPTAL VELOCITY: 10.26 CM/S
ECHO LV EDV A2C: 58 ML
ECHO LV EDV A4C: 79 ML
ECHO LV EDV BP: 70 ML (ref 56–104)
ECHO LV EDV INDEX A4C: 48 ML/M2
ECHO LV EDV INDEX BP: 43 ML/M2
ECHO LV EDV NDEX A2C: 36 ML/M2
ECHO LV EF PHYSICIAN: 60 %
ECHO LV EJECTION FRACTION A2C: 65 %
ECHO LV EJECTION FRACTION A4C: 62 %
ECHO LV EJECTION FRACTION BIPLANE: 64 % (ref 55–100)
ECHO LV ESV A2C: 21 ML
ECHO LV ESV A4C: 30 ML
ECHO LV ESV BP: 25 ML (ref 19–49)
ECHO LV ESV INDEX A2C: 13 ML/M2
ECHO LV ESV INDEX A4C: 18 ML/M2
ECHO LV ESV INDEX BP: 15 ML/M2
ECHO LV FRACTIONAL SHORTENING: 28 % (ref 28–44)
ECHO LV INTERNAL DIMENSION DIASTOLE INDEX: 2.39 CM/M2
ECHO LV INTERNAL DIMENSION DIASTOLIC: 3.9 CM (ref 3.9–5.3)
ECHO LV INTERNAL DIMENSION SYSTOLIC INDEX: 1.72 CM/M2
ECHO LV INTERNAL DIMENSION SYSTOLIC: 2.8 CM
ECHO LV IVSD: 0.8 CM (ref 0.6–0.9)
ECHO LV MASS 2D: 82.3 G (ref 67–162)
ECHO LV MASS INDEX 2D: 50.5 G/M2 (ref 43–95)
ECHO LV POSTERIOR WALL DIASTOLIC: 0.7 CM (ref 0.6–0.9)
ECHO LV RELATIVE WALL THICKNESS RATIO: 0.36
ECHO LVOT AREA: 3.1 CM2
ECHO LVOT AV VTI INDEX: 0.85
ECHO LVOT DIAM: 2 CM
ECHO LVOT MEAN GRADIENT: 3 MMHG
ECHO LVOT PEAK GRADIENT: 5 MMHG
ECHO LVOT PEAK VELOCITY: 1.1 M/S
ECHO LVOT STROKE VOLUME INDEX: 45.7 ML/M2
ECHO LVOT SV: 74.4 ML
ECHO LVOT VTI: 23.7 CM
ECHO PV MAX VELOCITY: 1 M/S
ECHO PV PEAK GRADIENT: 4 MMHG
ECHO RIGHT VENTRICULAR SYSTOLIC PRESSURE (RVSP): 41 MMHG
ECHO RV TAPSE: 1.7 CM (ref 1.7–?)
ECHO TV REGURGITANT MAX VELOCITY: 3.1 M/S
ECHO TV REGURGITANT PEAK GRADIENT: 39 MMHG
ERYTHROCYTE [DISTWIDTH] IN BLOOD BY AUTOMATED COUNT: 16.1 % (ref 11.5–14.5)
GAS FLOW.O2 O2 DELIVERY SYS: ABNORMAL
GLUCOSE BLD STRIP.AUTO-MCNC: 52 MG/DL (ref 65–117)
GLUCOSE BLD STRIP.AUTO-MCNC: 75 MG/DL (ref 65–117)
GLUCOSE BLD STRIP.AUTO-MCNC: 77 MG/DL (ref 65–117)
GLUCOSE BLD STRIP.AUTO-MCNC: 78 MG/DL (ref 65–117)
GLUCOSE BLD STRIP.AUTO-MCNC: 81 MG/DL (ref 65–117)
GLUCOSE SERPL-MCNC: 83 MG/DL (ref 65–100)
GRAM STN SPEC: NORMAL
HCO3 BLD-SCNC: 31.3 MMOL/L (ref 21–28)
HCT VFR BLD AUTO: 23.5 % (ref 35–47)
HGB BLD-MCNC: 8 G/DL (ref 11.5–16)
INSPIRATION.DURATION SETTING TIME VENT: 0.9 SEC
MAGNESIUM SERPL-MCNC: 2.1 MG/DL (ref 1.6–2.4)
MCH RBC QN AUTO: 32.8 PG (ref 26–34)
MCHC RBC AUTO-ENTMCNC: 34 G/DL (ref 30–36.5)
MCV RBC AUTO: 96.3 FL (ref 80–99)
NRBC # BLD: 0 K/UL (ref 0–0.01)
NRBC BLD-RTO: 0 PER 100 WBC
O2/TOTAL GAS SETTING VFR VENT: 60 %
PCO2 BLD: 45.6 MMHG (ref 35–48)
PEEP RESPIRATORY: 8 CMH2O
PH BLD: 7.45 (ref 7.35–7.45)
PHOSPHATE SERPL-MCNC: 2.9 MG/DL (ref 2.6–4.7)
PLATELET # BLD AUTO: 262 K/UL (ref 150–400)
PMV BLD AUTO: 11.5 FL (ref 8.9–12.9)
PO2 BLD: 88 MMHG (ref 83–108)
POTASSIUM SERPL-SCNC: 3.3 MMOL/L (ref 3.5–5.1)
PRESSURE SUPPORT SETTING VENT: 7 CMH2O
PROCALCITONIN SERPL-MCNC: 0.79 NG/ML
RBC # BLD AUTO: 2.44 M/UL (ref 3.8–5.2)
SAO2 % BLD: 97 % (ref 92–97)
SERVICE CMNT-IMP: ABNORMAL
SERVICE CMNT-IMP: NORMAL
SODIUM SERPL-SCNC: 141 MMOL/L (ref 136–145)
SPECIMEN TYPE: ABNORMAL
VANCOMYCIN SERPL-MCNC: 17.1 UG/ML
VENTILATION MODE VENT: ABNORMAL
WBC # BLD AUTO: 16.4 K/UL (ref 3.6–11)

## 2025-05-11 PROCEDURE — 36592 COLLECT BLOOD FROM PICC: CPT

## 2025-05-11 PROCEDURE — 2580000003 HC RX 258: Performed by: PHYSICIAN ASSISTANT

## 2025-05-11 PROCEDURE — 6370000000 HC RX 637 (ALT 250 FOR IP): Performed by: INTERNAL MEDICINE

## 2025-05-11 PROCEDURE — 6370000000 HC RX 637 (ALT 250 FOR IP): Performed by: SURGERY

## 2025-05-11 PROCEDURE — 6360000002 HC RX W HCPCS: Performed by: INTERNAL MEDICINE

## 2025-05-11 PROCEDURE — 6370000000 HC RX 637 (ALT 250 FOR IP): Performed by: NURSE PRACTITIONER

## 2025-05-11 PROCEDURE — 82962 GLUCOSE BLOOD TEST: CPT

## 2025-05-11 PROCEDURE — 6360000002 HC RX W HCPCS: Performed by: PHYSICIAN ASSISTANT

## 2025-05-11 PROCEDURE — 83735 ASSAY OF MAGNESIUM: CPT

## 2025-05-11 PROCEDURE — 2500000003 HC RX 250 WO HCPCS: Performed by: INTERNAL MEDICINE

## 2025-05-11 PROCEDURE — 6360000002 HC RX W HCPCS: Performed by: NURSE PRACTITIONER

## 2025-05-11 PROCEDURE — 80202 ASSAY OF VANCOMYCIN: CPT

## 2025-05-11 PROCEDURE — 2580000003 HC RX 258: Performed by: INTERNAL MEDICINE

## 2025-05-11 PROCEDURE — 71045 X-RAY EXAM CHEST 1 VIEW: CPT

## 2025-05-11 PROCEDURE — 94640 AIRWAY INHALATION TREATMENT: CPT

## 2025-05-11 PROCEDURE — 84100 ASSAY OF PHOSPHORUS: CPT

## 2025-05-11 PROCEDURE — 84145 PROCALCITONIN (PCT): CPT

## 2025-05-11 PROCEDURE — 93306 TTE W/DOPPLER COMPLETE: CPT

## 2025-05-11 PROCEDURE — 85027 COMPLETE CBC AUTOMATED: CPT

## 2025-05-11 PROCEDURE — 94660 CPAP INITIATION&MGMT: CPT

## 2025-05-11 PROCEDURE — 99024 POSTOP FOLLOW-UP VISIT: CPT | Performed by: SURGERY

## 2025-05-11 PROCEDURE — 6370000000 HC RX 637 (ALT 250 FOR IP): Performed by: PHYSICIAN ASSISTANT

## 2025-05-11 PROCEDURE — 2500000003 HC RX 250 WO HCPCS: Performed by: SURGERY

## 2025-05-11 PROCEDURE — 82803 BLOOD GASES ANY COMBINATION: CPT

## 2025-05-11 PROCEDURE — 36600 WITHDRAWAL OF ARTERIAL BLOOD: CPT

## 2025-05-11 PROCEDURE — 80048 BASIC METABOLIC PNL TOTAL CA: CPT

## 2025-05-11 PROCEDURE — 94760 N-INVAS EAR/PLS OXIMETRY 1: CPT

## 2025-05-11 PROCEDURE — 2000000000 HC ICU R&B

## 2025-05-11 RX ORDER — FLUDROCORTISONE ACETATE 0.1 MG/1
0.05 TABLET ORAL DAILY
Status: DISCONTINUED | OUTPATIENT
Start: 2025-05-11 | End: 2025-05-12

## 2025-05-11 RX ORDER — MIDODRINE HYDROCHLORIDE 5 MG/1
15 TABLET ORAL EVERY 6 HOURS
Status: DISCONTINUED | OUTPATIENT
Start: 2025-05-11 | End: 2025-05-12

## 2025-05-11 RX ORDER — FUROSEMIDE 10 MG/ML
20 INJECTION INTRAMUSCULAR; INTRAVENOUS ONCE
Status: COMPLETED | OUTPATIENT
Start: 2025-05-11 | End: 2025-05-11

## 2025-05-11 RX ORDER — CYCLOBENZAPRINE HCL 10 MG
10 TABLET ORAL 3 TIMES DAILY PRN
Status: DISCONTINUED | OUTPATIENT
Start: 2025-05-11 | End: 2025-05-12

## 2025-05-11 RX ORDER — FUROSEMIDE 10 MG/ML
20 INJECTION INTRAMUSCULAR; INTRAVENOUS ONCE
Status: DISCONTINUED | OUTPATIENT
Start: 2025-05-11 | End: 2025-05-11

## 2025-05-11 RX ADMIN — GABAPENTIN 600 MG: 600 TABLET, FILM COATED ORAL at 08:25

## 2025-05-11 RX ADMIN — POTASSIUM & SODIUM PHOSPHATES POWDER PACK 280-160-250 MG 500 MG: 280-160-250 PACK at 13:58

## 2025-05-11 RX ADMIN — MIDODRINE HYDROCHLORIDE 15 MG: 5 TABLET ORAL at 13:58

## 2025-05-11 RX ADMIN — ACETAMINOPHEN 650 MG: 325 TABLET ORAL at 09:28

## 2025-05-11 RX ADMIN — IPRATROPIUM BROMIDE AND ALBUTEROL SULFATE 1 DOSE: 2.5; .5 SOLUTION RESPIRATORY (INHALATION) at 01:45

## 2025-05-11 RX ADMIN — POTASSIUM & SODIUM PHOSPHATES POWDER PACK 280-160-250 MG 500 MG: 280-160-250 PACK at 08:25

## 2025-05-11 RX ADMIN — VANCOMYCIN HYDROCHLORIDE 1000 MG: 1 INJECTION, POWDER, LYOPHILIZED, FOR SOLUTION INTRAVENOUS at 18:55

## 2025-05-11 RX ADMIN — POTASSIUM & SODIUM PHOSPHATES POWDER PACK 280-160-250 MG 500 MG: 280-160-250 PACK at 16:45

## 2025-05-11 RX ADMIN — ACETAMINOPHEN 650 MG: 325 TABLET ORAL at 19:20

## 2025-05-11 RX ADMIN — Medication 100 MG: at 08:25

## 2025-05-11 RX ADMIN — CITALOPRAM HYDROBROMIDE 10 MG: 20 TABLET ORAL at 08:25

## 2025-05-11 RX ADMIN — WATER 2000 MG: 1 INJECTION INTRAMUSCULAR; INTRAVENOUS; SUBCUTANEOUS at 11:41

## 2025-05-11 RX ADMIN — MIDODRINE HYDROCHLORIDE 15 MG: 5 TABLET ORAL at 22:03

## 2025-05-11 RX ADMIN — AMITRIPTYLINE HYDROCHLORIDE 50 MG: 50 TABLET, FILM COATED ORAL at 22:03

## 2025-05-11 RX ADMIN — METRONIDAZOLE 500 MG: 5 INJECTION, SOLUTION INTRAVENOUS at 22:06

## 2025-05-11 RX ADMIN — LANSOPRAZOLE 30 MG: 30 TABLET, ORALLY DISINTEGRATING, DELAYED RELEASE ORAL at 06:43

## 2025-05-11 RX ADMIN — VANCOMYCIN HYDROCHLORIDE 1250 MG: 1.25 INJECTION, POWDER, LYOPHILIZED, FOR SOLUTION INTRAVENOUS at 06:45

## 2025-05-11 RX ADMIN — CEFEPIME 2000 MG: 2 INJECTION, POWDER, FOR SOLUTION INTRAVENOUS at 09:14

## 2025-05-11 RX ADMIN — IPRATROPIUM BROMIDE AND ALBUTEROL SULFATE 1 DOSE: 2.5; .5 SOLUTION RESPIRATORY (INHALATION) at 14:09

## 2025-05-11 RX ADMIN — METRONIDAZOLE 500 MG: 5 INJECTION, SOLUTION INTRAVENOUS at 06:51

## 2025-05-11 RX ADMIN — MIDODRINE HYDROCHLORIDE 10 MG: 5 TABLET ORAL at 08:25

## 2025-05-11 RX ADMIN — SODIUM CHLORIDE, PRESERVATIVE FREE 10 ML: 5 INJECTION INTRAVENOUS at 08:25

## 2025-05-11 RX ADMIN — METRONIDAZOLE 500 MG: 5 INJECTION, SOLUTION INTRAVENOUS at 15:56

## 2025-05-11 RX ADMIN — Medication 1 MG: at 08:25

## 2025-05-11 RX ADMIN — SODIUM CHLORIDE, PRESERVATIVE FREE 10 ML: 5 INJECTION INTRAVENOUS at 22:04

## 2025-05-11 RX ADMIN — GABAPENTIN 600 MG: 600 TABLET, FILM COATED ORAL at 22:03

## 2025-05-11 RX ADMIN — IPRATROPIUM BROMIDE AND ALBUTEROL SULFATE 1 DOSE: 2.5; .5 SOLUTION RESPIRATORY (INHALATION) at 08:47

## 2025-05-11 RX ADMIN — FUROSEMIDE 20 MG: 10 INJECTION, SOLUTION INTRAMUSCULAR; INTRAVENOUS at 11:41

## 2025-05-11 RX ADMIN — FLUDROCORTISONE ACETATE 0.05 MG: 0.1 TABLET ORAL at 19:20

## 2025-05-11 RX ADMIN — POTASSIUM & SODIUM PHOSPHATES POWDER PACK 280-160-250 MG 500 MG: 280-160-250 PACK at 22:03

## 2025-05-11 ASSESSMENT — PAIN SCALES - GENERAL
PAINLEVEL_OUTOF10: 5
PAINLEVEL_OUTOF10: 0

## 2025-05-12 ENCOUNTER — APPOINTMENT (OUTPATIENT)
Facility: HOSPITAL | Age: 55
DRG: 689 | End: 2025-05-12
Payer: MEDICARE

## 2025-05-12 LAB
25(OH)D3 SERPL-MCNC: 22.2 NG/ML (ref 30–100)
ALBUMIN SERPL-MCNC: 2.1 G/DL (ref 3.5–5)
ALBUMIN/GLOB SERPL: 0.6 (ref 1.1–2.2)
ALP SERPL-CCNC: 120 U/L (ref 45–117)
ALT SERPL-CCNC: 12 U/L (ref 12–78)
ANION GAP SERPL CALC-SCNC: 5 MMOL/L (ref 2–12)
ANION GAP SERPL CALC-SCNC: 5 MMOL/L (ref 2–12)
AST SERPL-CCNC: 33 U/L (ref 15–37)
BILIRUB DIRECT SERPL-MCNC: 0.8 MG/DL (ref 0–0.2)
BILIRUB SERPL-MCNC: 1.4 MG/DL (ref 0.2–1)
BUN SERPL-MCNC: 9 MG/DL (ref 6–20)
BUN SERPL-MCNC: 9 MG/DL (ref 6–20)
BUN/CREAT SERPL: 21 (ref 12–20)
BUN/CREAT SERPL: 29 (ref 12–20)
CALCIUM SERPL-MCNC: 8.6 MG/DL (ref 8.5–10.1)
CALCIUM SERPL-MCNC: 8.8 MG/DL (ref 8.5–10.1)
CHLORIDE SERPL-SCNC: 109 MMOL/L (ref 97–108)
CHLORIDE SERPL-SCNC: 109 MMOL/L (ref 97–108)
CO2 SERPL-SCNC: 28 MMOL/L (ref 21–32)
CO2 SERPL-SCNC: 30 MMOL/L (ref 21–32)
CREAT SERPL-MCNC: 0.31 MG/DL (ref 0.55–1.02)
CREAT SERPL-MCNC: 0.42 MG/DL (ref 0.55–1.02)
ERYTHROCYTE [DISTWIDTH] IN BLOOD BY AUTOMATED COUNT: 16.3 % (ref 11.5–14.5)
GLOBULIN SER CALC-MCNC: 3.4 G/DL (ref 2–4)
GLUCOSE BLD STRIP.AUTO-MCNC: 105 MG/DL (ref 65–117)
GLUCOSE BLD STRIP.AUTO-MCNC: 118 MG/DL (ref 65–117)
GLUCOSE SERPL-MCNC: 104 MG/DL (ref 65–100)
GLUCOSE SERPL-MCNC: 85 MG/DL (ref 65–100)
HCT VFR BLD AUTO: 23.4 % (ref 35–47)
HGB BLD-MCNC: 7.9 G/DL (ref 11.5–16)
MAGNESIUM SERPL-MCNC: 1.9 MG/DL (ref 1.6–2.4)
MCH RBC QN AUTO: 32.4 PG (ref 26–34)
MCHC RBC AUTO-ENTMCNC: 33.8 G/DL (ref 30–36.5)
MCV RBC AUTO: 95.9 FL (ref 80–99)
NRBC # BLD: 0 K/UL (ref 0–0.01)
NRBC BLD-RTO: 0 PER 100 WBC
PHOSPHATE SERPL-MCNC: 2.9 MG/DL (ref 2.6–4.7)
PLATELET # BLD AUTO: 266 K/UL (ref 150–400)
PMV BLD AUTO: 11.5 FL (ref 8.9–12.9)
POTASSIUM SERPL-SCNC: 2.9 MMOL/L (ref 3.5–5.1)
POTASSIUM SERPL-SCNC: 3.3 MMOL/L (ref 3.5–5.1)
PROT SERPL-MCNC: 5.5 G/DL (ref 6.4–8.2)
RBC # BLD AUTO: 2.44 M/UL (ref 3.8–5.2)
SERVICE CMNT-IMP: ABNORMAL
SERVICE CMNT-IMP: NORMAL
SODIUM SERPL-SCNC: 142 MMOL/L (ref 136–145)
SODIUM SERPL-SCNC: 144 MMOL/L (ref 136–145)
TRIGL SERPL-MCNC: 81 MG/DL
WBC # BLD AUTO: 13.6 K/UL (ref 3.6–11)

## 2025-05-12 PROCEDURE — 2000000000 HC ICU R&B

## 2025-05-12 PROCEDURE — 82306 VITAMIN D 25 HYDROXY: CPT

## 2025-05-12 PROCEDURE — 6360000002 HC RX W HCPCS: Performed by: NURSE PRACTITIONER

## 2025-05-12 PROCEDURE — 99024 POSTOP FOLLOW-UP VISIT: CPT | Performed by: SURGERY

## 2025-05-12 PROCEDURE — 94760 N-INVAS EAR/PLS OXIMETRY 1: CPT

## 2025-05-12 PROCEDURE — 2500000003 HC RX 250 WO HCPCS: Performed by: SURGERY

## 2025-05-12 PROCEDURE — 94667 MNPJ CHEST WALL 1ST: CPT

## 2025-05-12 PROCEDURE — 94660 CPAP INITIATION&MGMT: CPT

## 2025-05-12 PROCEDURE — 84100 ASSAY OF PHOSPHORUS: CPT

## 2025-05-12 PROCEDURE — 6370000000 HC RX 637 (ALT 250 FOR IP): Performed by: SURGERY

## 2025-05-12 PROCEDURE — 6370000000 HC RX 637 (ALT 250 FOR IP): Performed by: NURSE PRACTITIONER

## 2025-05-12 PROCEDURE — 82962 GLUCOSE BLOOD TEST: CPT

## 2025-05-12 PROCEDURE — 6370000000 HC RX 637 (ALT 250 FOR IP): Performed by: INTERNAL MEDICINE

## 2025-05-12 PROCEDURE — 6370000000 HC RX 637 (ALT 250 FOR IP): Performed by: PHYSICIAN ASSISTANT

## 2025-05-12 PROCEDURE — 94640 AIRWAY INHALATION TREATMENT: CPT

## 2025-05-12 PROCEDURE — 83735 ASSAY OF MAGNESIUM: CPT

## 2025-05-12 PROCEDURE — 2700000000 HC OXYGEN THERAPY PER DAY

## 2025-05-12 PROCEDURE — 85027 COMPLETE CBC AUTOMATED: CPT

## 2025-05-12 PROCEDURE — 6360000002 HC RX W HCPCS: Performed by: INTERNAL MEDICINE

## 2025-05-12 PROCEDURE — 80048 BASIC METABOLIC PNL TOTAL CA: CPT

## 2025-05-12 PROCEDURE — 6360000002 HC RX W HCPCS: Performed by: SURGERY

## 2025-05-12 PROCEDURE — 84478 ASSAY OF TRIGLYCERIDES: CPT

## 2025-05-12 PROCEDURE — P9045 ALBUMIN (HUMAN), 5%, 250 ML: HCPCS | Performed by: INTERNAL MEDICINE

## 2025-05-12 PROCEDURE — 71045 X-RAY EXAM CHEST 1 VIEW: CPT

## 2025-05-12 PROCEDURE — 80076 HEPATIC FUNCTION PANEL: CPT

## 2025-05-12 PROCEDURE — 2500000003 HC RX 250 WO HCPCS: Performed by: INTERNAL MEDICINE

## 2025-05-12 RX ORDER — HYDROCODONE BITARTRATE AND ACETAMINOPHEN 5; 325 MG/1; MG/1
1 TABLET ORAL EVERY 6 HOURS PRN
Refills: 0 | Status: DISCONTINUED | OUTPATIENT
Start: 2025-05-12 | End: 2025-06-01

## 2025-05-12 RX ORDER — POTASSIUM CHLORIDE 7.45 MG/ML
10 INJECTION INTRAVENOUS PRN
Status: DISCONTINUED | OUTPATIENT
Start: 2025-05-12 | End: 2025-05-15

## 2025-05-12 RX ORDER — LANSOPRAZOLE 30 MG/1
30 TABLET, ORALLY DISINTEGRATING, DELAYED RELEASE ORAL
Status: DISCONTINUED | OUTPATIENT
Start: 2025-05-13 | End: 2025-06-02 | Stop reason: HOSPADM

## 2025-05-12 RX ORDER — MIDODRINE HYDROCHLORIDE 5 MG/1
15 TABLET ORAL EVERY 6 HOURS
Status: DISCONTINUED | OUTPATIENT
Start: 2025-05-12 | End: 2025-06-02 | Stop reason: HOSPADM

## 2025-05-12 RX ORDER — POLYETHYLENE GLYCOL 3350 17 G/17G
17 POWDER, FOR SOLUTION ORAL DAILY PRN
Status: DISCONTINUED | OUTPATIENT
Start: 2025-05-12 | End: 2025-06-02 | Stop reason: HOSPADM

## 2025-05-12 RX ORDER — MAGNESIUM HYDROXIDE/ALUMINUM HYDROXICE/SIMETHICONE 120; 1200; 1200 MG/30ML; MG/30ML; MG/30ML
30 SUSPENSION ORAL EVERY 6 HOURS PRN
Status: DISCONTINUED | OUTPATIENT
Start: 2025-05-12 | End: 2025-06-02 | Stop reason: HOSPADM

## 2025-05-12 RX ORDER — CALCIUM CARBONATE 500 MG/1
500 TABLET, CHEWABLE ORAL 3 TIMES DAILY PRN
Status: DISCONTINUED | OUTPATIENT
Start: 2025-05-12 | End: 2025-06-02 | Stop reason: HOSPADM

## 2025-05-12 RX ORDER — ACETAMINOPHEN 325 MG/1
650 TABLET ORAL EVERY 6 HOURS PRN
Status: DISCONTINUED | OUTPATIENT
Start: 2025-05-12 | End: 2025-06-02 | Stop reason: HOSPADM

## 2025-05-12 RX ORDER — LANOLIN ALCOHOL/MO/W.PET/CERES
100 CREAM (GRAM) TOPICAL DAILY
Status: DISCONTINUED | OUTPATIENT
Start: 2025-05-13 | End: 2025-06-02 | Stop reason: HOSPADM

## 2025-05-12 RX ORDER — ALBUMIN HUMAN 50 G/1000ML
25 SOLUTION INTRAVENOUS ONCE
Status: COMPLETED | OUTPATIENT
Start: 2025-05-12 | End: 2025-05-12

## 2025-05-12 RX ORDER — MULTIVITAMIN WITH IRON
500 TABLET ORAL DAILY
Status: DISCONTINUED | OUTPATIENT
Start: 2025-05-12 | End: 2025-05-20

## 2025-05-12 RX ORDER — CITALOPRAM HYDROBROMIDE 20 MG/1
10 TABLET ORAL DAILY
Status: DISCONTINUED | OUTPATIENT
Start: 2025-05-13 | End: 2025-06-02 | Stop reason: HOSPADM

## 2025-05-12 RX ORDER — FUROSEMIDE 10 MG/ML
20 INJECTION INTRAMUSCULAR; INTRAVENOUS ONCE
Status: COMPLETED | OUTPATIENT
Start: 2025-05-12 | End: 2025-05-12

## 2025-05-12 RX ORDER — CYCLOBENZAPRINE HCL 10 MG
10 TABLET ORAL 3 TIMES DAILY PRN
Status: DISCONTINUED | OUTPATIENT
Start: 2025-05-12 | End: 2025-06-02 | Stop reason: HOSPADM

## 2025-05-12 RX ORDER — MAGNESIUM SULFATE IN WATER 40 MG/ML
2000 INJECTION, SOLUTION INTRAVENOUS ONCE
Status: COMPLETED | OUTPATIENT
Start: 2025-05-12 | End: 2025-05-12

## 2025-05-12 RX ORDER — ONDANSETRON 4 MG/1
8 TABLET, FILM COATED ORAL EVERY 8 HOURS PRN
Status: DISCONTINUED | OUTPATIENT
Start: 2025-05-12 | End: 2025-06-02 | Stop reason: HOSPADM

## 2025-05-12 RX ORDER — CASTOR OIL AND BALSAM, PERU 788; 87 MG/G; MG/G
OINTMENT TOPICAL 2 TIMES DAILY
Status: DISCONTINUED | OUTPATIENT
Start: 2025-05-12 | End: 2025-05-28

## 2025-05-12 RX ORDER — POTASSIUM CHLORIDE 750 MG/1
40 TABLET, EXTENDED RELEASE ORAL PRN
Status: DISCONTINUED | OUTPATIENT
Start: 2025-05-12 | End: 2025-05-15

## 2025-05-12 RX ORDER — GABAPENTIN 600 MG/1
600 TABLET ORAL 2 TIMES DAILY
Status: DISCONTINUED | OUTPATIENT
Start: 2025-05-12 | End: 2025-05-15

## 2025-05-12 RX ORDER — FOLIC ACID 1 MG/1
1 TABLET ORAL DAILY
Status: DISCONTINUED | OUTPATIENT
Start: 2025-05-13 | End: 2025-06-02 | Stop reason: HOSPADM

## 2025-05-12 RX ORDER — FLUDROCORTISONE ACETATE 0.1 MG/1
0.05 TABLET ORAL DAILY
Status: DISCONTINUED | OUTPATIENT
Start: 2025-05-13 | End: 2025-05-18

## 2025-05-12 RX ADMIN — POTASSIUM & SODIUM PHOSPHATES POWDER PACK 280-160-250 MG 500 MG: 280-160-250 PACK at 09:14

## 2025-05-12 RX ADMIN — WATER 2000 MG: 1 INJECTION INTRAMUSCULAR; INTRAVENOUS; SUBCUTANEOUS at 11:19

## 2025-05-12 RX ADMIN — HYDROCODONE BITARTRATE AND ACETAMINOPHEN 1 TABLET: 5; 325 TABLET ORAL at 21:21

## 2025-05-12 RX ADMIN — GABAPENTIN 600 MG: 600 TABLET, FILM COATED ORAL at 09:14

## 2025-05-12 RX ADMIN — IPRATROPIUM BROMIDE AND ALBUTEROL SULFATE 1 DOSE: 2.5; .5 SOLUTION RESPIRATORY (INHALATION) at 10:20

## 2025-05-12 RX ADMIN — ALBUMIN (HUMAN) 25 G: 12.5 INJECTION, SOLUTION INTRAVENOUS at 11:16

## 2025-05-12 RX ADMIN — AMITRIPTYLINE HYDROCHLORIDE 50 MG: 25 TABLET, FILM COATED ORAL at 20:34

## 2025-05-12 RX ADMIN — LANSOPRAZOLE 30 MG: 30 TABLET, ORALLY DISINTEGRATING, DELAYED RELEASE ORAL at 09:14

## 2025-05-12 RX ADMIN — FLUDROCORTISONE ACETATE 0.05 MG: 0.1 TABLET ORAL at 09:14

## 2025-05-12 RX ADMIN — MIDODRINE HYDROCHLORIDE 15 MG: 5 TABLET ORAL at 03:50

## 2025-05-12 RX ADMIN — IPRATROPIUM BROMIDE AND ALBUTEROL SULFATE 1 DOSE: 2.5; .5 SOLUTION RESPIRATORY (INHALATION) at 16:15

## 2025-05-12 RX ADMIN — POTASSIUM BICARBONATE 40 MEQ: 782 TABLET, EFFERVESCENT ORAL at 11:16

## 2025-05-12 RX ADMIN — METRONIDAZOLE 500 MG: 5 INJECTION, SOLUTION INTRAVENOUS at 23:06

## 2025-05-12 RX ADMIN — Medication: at 13:50

## 2025-05-12 RX ADMIN — POTASSIUM & SODIUM PHOSPHATES POWDER PACK 280-160-250 MG 500 MG: 280-160-250 PACK at 13:49

## 2025-05-12 RX ADMIN — IPRATROPIUM BROMIDE AND ALBUTEROL SULFATE 1 DOSE: 2.5; .5 SOLUTION RESPIRATORY (INHALATION) at 20:41

## 2025-05-12 RX ADMIN — POTASSIUM & SODIUM PHOSPHATES POWDER PACK 280-160-250 MG 500 MG: 280-160-250 PACK at 17:26

## 2025-05-12 RX ADMIN — IPRATROPIUM BROMIDE AND ALBUTEROL SULFATE 1 DOSE: 2.5; .5 SOLUTION RESPIRATORY (INHALATION) at 04:34

## 2025-05-12 RX ADMIN — METRONIDAZOLE 500 MG: 5 INJECTION, SOLUTION INTRAVENOUS at 07:14

## 2025-05-12 RX ADMIN — POTASSIUM CHLORIDE 10 MEQ: 7.46 INJECTION, SOLUTION INTRAVENOUS at 06:32

## 2025-05-12 RX ADMIN — ACETAMINOPHEN 650 MG: 325 TABLET ORAL at 21:33

## 2025-05-12 RX ADMIN — POTASSIUM & SODIUM PHOSPHATES POWDER PACK 280-160-250 MG 500 MG: 280-160-250 PACK at 20:42

## 2025-05-12 RX ADMIN — Medication 100 MG: at 09:13

## 2025-05-12 RX ADMIN — SODIUM CHLORIDE, PRESERVATIVE FREE 10 ML: 5 INJECTION INTRAVENOUS at 09:17

## 2025-05-12 RX ADMIN — FUROSEMIDE 20 MG: 10 INJECTION, SOLUTION INTRAMUSCULAR; INTRAVENOUS at 20:30

## 2025-05-12 RX ADMIN — CITALOPRAM HYDROBROMIDE 10 MG: 20 TABLET ORAL at 09:13

## 2025-05-12 RX ADMIN — SODIUM CHLORIDE, PRESERVATIVE FREE 10 ML: 5 INJECTION INTRAVENOUS at 20:43

## 2025-05-12 RX ADMIN — POTASSIUM CHLORIDE 10 MEQ: 7.46 INJECTION, SOLUTION INTRAVENOUS at 07:27

## 2025-05-12 RX ADMIN — METRONIDAZOLE 500 MG: 5 INJECTION, SOLUTION INTRAVENOUS at 15:33

## 2025-05-12 RX ADMIN — MIDODRINE HYDROCHLORIDE 15 MG: 5 TABLET ORAL at 13:49

## 2025-05-12 RX ADMIN — MAGNESIUM SULFATE HEPTAHYDRATE 2000 MG: 40 INJECTION, SOLUTION INTRAVENOUS at 11:19

## 2025-05-12 RX ADMIN — CYANOCOBALAMIN TAB 500 MCG 500 MCG: 500 TAB at 13:50

## 2025-05-12 RX ADMIN — MIDODRINE HYDROCHLORIDE 15 MG: 5 TABLET ORAL at 09:13

## 2025-05-12 RX ADMIN — Medication 1 MG: at 09:14

## 2025-05-12 RX ADMIN — MIDODRINE HYDROCHLORIDE 15 MG: 5 TABLET ORAL at 20:34

## 2025-05-12 RX ADMIN — GABAPENTIN 600 MG: 600 TABLET, FILM COATED ORAL at 20:34

## 2025-05-13 ENCOUNTER — APPOINTMENT (OUTPATIENT)
Facility: HOSPITAL | Age: 55
DRG: 689 | End: 2025-05-13
Payer: MEDICARE

## 2025-05-13 LAB
ALBUMIN SERPL-MCNC: 2.3 G/DL (ref 3.5–5)
ALBUMIN/GLOB SERPL: 0.8 (ref 1.1–2.2)
ALP SERPL-CCNC: 128 U/L (ref 45–117)
ALT SERPL-CCNC: 11 U/L (ref 12–78)
ANION GAP SERPL CALC-SCNC: 7 MMOL/L (ref 2–12)
AST SERPL-CCNC: 37 U/L (ref 15–37)
BACTERIA SPEC CULT: NORMAL
BACTERIA SPEC CULT: NORMAL
BASOPHILS # BLD: 0 K/UL (ref 0–0.1)
BASOPHILS NFR BLD: 0 % (ref 0–1)
BILIRUB DIRECT SERPL-MCNC: 0.8 MG/DL (ref 0–0.2)
BILIRUB SERPL-MCNC: 1.2 MG/DL (ref 0.2–1)
BUN SERPL-MCNC: 9 MG/DL (ref 6–20)
BUN/CREAT SERPL: 25 (ref 12–20)
C DIFF GDH STL QL: NEGATIVE
C DIFF TOX A+B STL QL IA: NEGATIVE
C DIFF TOXIN INTERPRETATION: NORMAL
CALCIUM SERPL-MCNC: 8.6 MG/DL (ref 8.5–10.1)
CHLORIDE SERPL-SCNC: 107 MMOL/L (ref 97–108)
CO2 SERPL-SCNC: 30 MMOL/L (ref 21–32)
CREAT SERPL-MCNC: 0.36 MG/DL (ref 0.55–1.02)
DIFFERENTIAL METHOD BLD: ABNORMAL
EOSINOPHIL # BLD: 0.19 K/UL (ref 0–0.4)
EOSINOPHIL NFR BLD: 2 % (ref 0–7)
ERYTHROCYTE [DISTWIDTH] IN BLOOD BY AUTOMATED COUNT: 16.8 % (ref 11.5–14.5)
GLOBULIN SER CALC-MCNC: 3 G/DL (ref 2–4)
GLUCOSE BLD STRIP.AUTO-MCNC: 100 MG/DL (ref 65–117)
GLUCOSE BLD STRIP.AUTO-MCNC: 112 MG/DL (ref 65–117)
GLUCOSE BLD STRIP.AUTO-MCNC: 119 MG/DL (ref 65–117)
GLUCOSE BLD STRIP.AUTO-MCNC: 124 MG/DL (ref 65–117)
GLUCOSE BLD STRIP.AUTO-MCNC: 74 MG/DL (ref 65–117)
GLUCOSE BLD STRIP.AUTO-MCNC: 97 MG/DL (ref 65–117)
GLUCOSE SERPL-MCNC: 123 MG/DL (ref 65–100)
HCT VFR BLD AUTO: 19.9 % (ref 35–47)
HGB BLD-MCNC: 6.8 G/DL (ref 11.5–16)
HISTORY CHECK: NORMAL
IMM GRANULOCYTES # BLD AUTO: 0 K/UL
IMM GRANULOCYTES NFR BLD AUTO: 0 %
LYMPHOCYTES # BLD: 2.43 K/UL (ref 0.8–3.5)
LYMPHOCYTES NFR BLD: 25 % (ref 12–49)
MAGNESIUM SERPL-MCNC: 2.1 MG/DL (ref 1.6–2.4)
MCH RBC QN AUTO: 33.2 PG (ref 26–34)
MCHC RBC AUTO-ENTMCNC: 34.2 G/DL (ref 30–36.5)
MCV RBC AUTO: 97.1 FL (ref 80–99)
MONOCYTES # BLD: 0.1 K/UL (ref 0–1)
MONOCYTES NFR BLD: 1 % (ref 5–13)
NEUTS SEG # BLD: 6.98 K/UL (ref 1.8–8)
NEUTS SEG NFR BLD: 72 % (ref 32–75)
NRBC # BLD: 0 K/UL (ref 0–0.01)
NRBC BLD-RTO: 0 PER 100 WBC
PHOSPHATE SERPL-MCNC: 2 MG/DL (ref 2.6–4.7)
PLATELET # BLD AUTO: 218 K/UL (ref 150–400)
PMV BLD AUTO: 11.5 FL (ref 8.9–12.9)
POTASSIUM SERPL-SCNC: 3.2 MMOL/L (ref 3.5–5.1)
PROCALCITONIN SERPL-MCNC: 2.22 NG/ML
PROT SERPL-MCNC: 5.3 G/DL (ref 6.4–8.2)
RBC # BLD AUTO: 2.05 M/UL (ref 3.8–5.2)
RBC MORPH BLD: ABNORMAL
RBC MORPH BLD: ABNORMAL
SERVICE CMNT-IMP: ABNORMAL
SERVICE CMNT-IMP: ABNORMAL
SERVICE CMNT-IMP: NORMAL
SODIUM SERPL-SCNC: 144 MMOL/L (ref 136–145)
WBC # BLD AUTO: 9.7 K/UL (ref 3.6–11)

## 2025-05-13 PROCEDURE — 82962 GLUCOSE BLOOD TEST: CPT

## 2025-05-13 PROCEDURE — 2580000003 HC RX 258: Performed by: SURGERY

## 2025-05-13 PROCEDURE — 6370000000 HC RX 637 (ALT 250 FOR IP): Performed by: NURSE PRACTITIONER

## 2025-05-13 PROCEDURE — 6360000002 HC RX W HCPCS: Performed by: INTERNAL MEDICINE

## 2025-05-13 PROCEDURE — 86900 BLOOD TYPING SEROLOGIC ABO: CPT

## 2025-05-13 PROCEDURE — P9016 RBC LEUKOCYTES REDUCED: HCPCS

## 2025-05-13 PROCEDURE — 2500000003 HC RX 250 WO HCPCS: Performed by: INTERNAL MEDICINE

## 2025-05-13 PROCEDURE — 6360000002 HC RX W HCPCS: Performed by: NURSE PRACTITIONER

## 2025-05-13 PROCEDURE — 71275 CT ANGIOGRAPHY CHEST: CPT

## 2025-05-13 PROCEDURE — 83735 ASSAY OF MAGNESIUM: CPT

## 2025-05-13 PROCEDURE — 87449 NOS EACH ORGANISM AG IA: CPT

## 2025-05-13 PROCEDURE — 84145 PROCALCITONIN (PCT): CPT

## 2025-05-13 PROCEDURE — 86901 BLOOD TYPING SEROLOGIC RH(D): CPT

## 2025-05-13 PROCEDURE — 6370000000 HC RX 637 (ALT 250 FOR IP): Performed by: INTERNAL MEDICINE

## 2025-05-13 PROCEDURE — 80048 BASIC METABOLIC PNL TOTAL CA: CPT

## 2025-05-13 PROCEDURE — 86923 COMPATIBILITY TEST ELECTRIC: CPT

## 2025-05-13 PROCEDURE — 2580000003 HC RX 258: Performed by: INTERNAL MEDICINE

## 2025-05-13 PROCEDURE — 80076 HEPATIC FUNCTION PANEL: CPT

## 2025-05-13 PROCEDURE — 2500000003 HC RX 250 WO HCPCS: Performed by: SURGERY

## 2025-05-13 PROCEDURE — 86850 RBC ANTIBODY SCREEN: CPT

## 2025-05-13 PROCEDURE — 36430 TRANSFUSION BLD/BLD COMPNT: CPT

## 2025-05-13 PROCEDURE — 94660 CPAP INITIATION&MGMT: CPT

## 2025-05-13 PROCEDURE — 85025 COMPLETE CBC W/AUTO DIFF WBC: CPT

## 2025-05-13 PROCEDURE — 87324 CLOSTRIDIUM AG IA: CPT

## 2025-05-13 PROCEDURE — 74177 CT ABD & PELVIS W/CONTRAST: CPT

## 2025-05-13 PROCEDURE — 6370000000 HC RX 637 (ALT 250 FOR IP): Performed by: SURGERY

## 2025-05-13 PROCEDURE — 94640 AIRWAY INHALATION TREATMENT: CPT

## 2025-05-13 PROCEDURE — 6360000004 HC RX CONTRAST MEDICATION: Performed by: INTERNAL MEDICINE

## 2025-05-13 PROCEDURE — 6370000000 HC RX 637 (ALT 250 FOR IP): Performed by: PHYSICIAN ASSISTANT

## 2025-05-13 PROCEDURE — 99024 POSTOP FOLLOW-UP VISIT: CPT | Performed by: SURGERY

## 2025-05-13 PROCEDURE — 84100 ASSAY OF PHOSPHORUS: CPT

## 2025-05-13 PROCEDURE — 2000000000 HC ICU R&B

## 2025-05-13 RX ORDER — AMITRIPTYLINE HYDROCHLORIDE 10 MG/1
10 TABLET ORAL NIGHTLY
Status: DISCONTINUED | OUTPATIENT
Start: 2025-05-13 | End: 2025-05-22

## 2025-05-13 RX ORDER — IOPAMIDOL 755 MG/ML
100 INJECTION, SOLUTION INTRAVASCULAR
Status: COMPLETED | OUTPATIENT
Start: 2025-05-13 | End: 2025-05-13

## 2025-05-13 RX ORDER — FUROSEMIDE 10 MG/ML
20 INJECTION INTRAMUSCULAR; INTRAVENOUS 2 TIMES DAILY
Status: DISCONTINUED | OUTPATIENT
Start: 2025-05-13 | End: 2025-05-30

## 2025-05-13 RX ORDER — SODIUM CHLORIDE 9 MG/ML
INJECTION, SOLUTION INTRAVENOUS PRN
Status: DISCONTINUED | OUTPATIENT
Start: 2025-05-13 | End: 2025-05-16

## 2025-05-13 RX ADMIN — METRONIDAZOLE 500 MG: 5 INJECTION, SOLUTION INTRAVENOUS at 07:01

## 2025-05-13 RX ADMIN — Medication: at 00:00

## 2025-05-13 RX ADMIN — AMITRIPTYLINE HYDROCHLORIDE 10 MG: 10 TABLET, FILM COATED ORAL at 21:20

## 2025-05-13 RX ADMIN — MIDODRINE HYDROCHLORIDE 15 MG: 5 TABLET ORAL at 01:27

## 2025-05-13 RX ADMIN — METRONIDAZOLE 500 MG: 5 INJECTION, SOLUTION INTRAVENOUS at 14:53

## 2025-05-13 RX ADMIN — MIDODRINE HYDROCHLORIDE 15 MG: 5 TABLET ORAL at 13:53

## 2025-05-13 RX ADMIN — IOPAMIDOL 100 ML: 755 INJECTION, SOLUTION INTRAVENOUS at 14:16

## 2025-05-13 RX ADMIN — MIDODRINE HYDROCHLORIDE 15 MG: 5 TABLET ORAL at 09:01

## 2025-05-13 RX ADMIN — HYDROCODONE BITARTRATE AND ACETAMINOPHEN 1 TABLET: 5; 325 TABLET ORAL at 04:40

## 2025-05-13 RX ADMIN — SODIUM CHLORIDE: 0.9 INJECTION, SOLUTION INTRAVENOUS at 01:20

## 2025-05-13 RX ADMIN — HYDROCODONE BITARTRATE AND ACETAMINOPHEN 1 TABLET: 5; 325 TABLET ORAL at 17:07

## 2025-05-13 RX ADMIN — NOREPINEPHRINE BITARTRATE 3 MCG/MIN: 1 INJECTION, SOLUTION, CONCENTRATE INTRAVENOUS at 01:21

## 2025-05-13 RX ADMIN — LANSOPRAZOLE 30 MG: 30 TABLET, ORALLY DISINTEGRATING ORAL at 05:39

## 2025-05-13 RX ADMIN — POTASSIUM BICARBONATE 40 MEQ: 782 TABLET, EFFERVESCENT ORAL at 06:52

## 2025-05-13 RX ADMIN — IPRATROPIUM BROMIDE AND ALBUTEROL SULFATE 1 DOSE: 2.5; .5 SOLUTION RESPIRATORY (INHALATION) at 04:44

## 2025-05-13 RX ADMIN — POTASSIUM PHOSPHATE, MONOBASIC AND POTASSIUM PHOSPHATE, DIBASIC 30 MMOL: 224; 236 INJECTION, SOLUTION, CONCENTRATE INTRAVENOUS at 12:09

## 2025-05-13 RX ADMIN — CYCLOBENZAPRINE 10 MG: 10 TABLET, FILM COATED ORAL at 05:39

## 2025-05-13 RX ADMIN — Medication: at 21:21

## 2025-05-13 RX ADMIN — Medication: at 09:14

## 2025-05-13 RX ADMIN — IPRATROPIUM BROMIDE AND ALBUTEROL SULFATE 1 DOSE: 2.5; .5 SOLUTION RESPIRATORY (INHALATION) at 13:47

## 2025-05-13 RX ADMIN — SODIUM PHOSPHATE, MONOBASIC, MONOHYDRATE AND SODIUM PHOSPHATE, DIBASIC, ANHYDROUS 15 MMOL: 142; 276 INJECTION, SOLUTION INTRAVENOUS at 08:15

## 2025-05-13 RX ADMIN — GABAPENTIN 600 MG: 600 TABLET, FILM COATED ORAL at 21:21

## 2025-05-13 RX ADMIN — WATER 2000 MG: 1 INJECTION INTRAMUSCULAR; INTRAVENOUS; SUBCUTANEOUS at 09:25

## 2025-05-13 RX ADMIN — CITALOPRAM HYDROBROMIDE 10 MG: 20 TABLET ORAL at 09:00

## 2025-05-13 RX ADMIN — SODIUM CHLORIDE, PRESERVATIVE FREE 10 ML: 5 INJECTION INTRAVENOUS at 21:00

## 2025-05-13 RX ADMIN — CYANOCOBALAMIN TAB 500 MCG 500 MCG: 500 TAB at 09:00

## 2025-05-13 RX ADMIN — GABAPENTIN 600 MG: 600 TABLET, FILM COATED ORAL at 09:01

## 2025-05-13 RX ADMIN — MIDODRINE HYDROCHLORIDE 15 MG: 5 TABLET ORAL at 21:20

## 2025-05-13 RX ADMIN — DIBASIC SODIUM PHOSPHATE, MONOBASIC POTASSIUM PHOSPHATE AND MONOBASIC SODIUM PHOSPHATE 2 TABLET: 852; 155; 130 TABLET ORAL at 13:53

## 2025-05-13 RX ADMIN — Medication 100 MG: at 09:00

## 2025-05-13 RX ADMIN — IPRATROPIUM BROMIDE AND ALBUTEROL SULFATE 1 DOSE: 2.5; .5 SOLUTION RESPIRATORY (INHALATION) at 20:38

## 2025-05-13 RX ADMIN — Medication 1 MG: at 09:01

## 2025-05-13 RX ADMIN — POTASSIUM & SODIUM PHOSPHATES POWDER PACK 280-160-250 MG 500 MG: 280-160-250 PACK at 09:00

## 2025-05-13 RX ADMIN — FUROSEMIDE 20 MG: 10 INJECTION, SOLUTION INTRAMUSCULAR; INTRAVENOUS at 17:32

## 2025-05-13 RX ADMIN — SODIUM CHLORIDE, PRESERVATIVE FREE 10 ML: 5 INJECTION INTRAVENOUS at 09:26

## 2025-05-13 RX ADMIN — DIBASIC SODIUM PHOSPHATE, MONOBASIC POTASSIUM PHOSPHATE AND MONOBASIC SODIUM PHOSPHATE 2 TABLET: 852; 155; 130 TABLET ORAL at 21:21

## 2025-05-13 RX ADMIN — FLUDROCORTISONE ACETATE 0.05 MG: 0.1 TABLET ORAL at 09:01

## 2025-05-13 RX ADMIN — METRONIDAZOLE 500 MG: 5 INJECTION, SOLUTION INTRAVENOUS at 23:52

## 2025-05-13 ASSESSMENT — PAIN SCALES - GENERAL
PAINLEVEL_OUTOF10: 0
PAINLEVEL_OUTOF10: 8
PAINLEVEL_OUTOF10: 3
PAINLEVEL_OUTOF10: 0
PAINLEVEL_OUTOF10: 0

## 2025-05-13 ASSESSMENT — PAIN DESCRIPTION - LOCATION
LOCATION: GENERALIZED
LOCATION: RECTUM

## 2025-05-13 ASSESSMENT — PAIN DESCRIPTION - FREQUENCY: FREQUENCY: CONTINUOUS

## 2025-05-13 ASSESSMENT — PAIN DESCRIPTION - ONSET: ONSET: PROGRESSIVE

## 2025-05-13 NOTE — CARE COORDINATION
Transition of Care Plan:    RUR: 21%  Prior Level of Functioning: Independent   Disposition: IPR  SIRRAM:   If SNF or IPR: Date FOC offered: 5/7  Date FOC received: 5/7  [facility]   Date authorization started with reference number:   Date authorization received and expires:   Follow up appointments:   DME needed: TBD  Transportation at discharge: Medicaid transport   IM/IMM Medicare/ letter given: 5/1  Is patient a [place] and connected with VA? No  Caregiver Contact:  Mare Tylre, [phone]   Discharge Caregiver contacted prior to discharge? Yes  Care Conference needed? No  Barriers to discharge:    Medical stability   S/P LAPAROSCOPIC CONVERT TO OPEN GASTROSTOMY TUBE PLACEMENT, LYSIS OF ADHESION, EGD   Transferred to ICU on  5/9 as she  became  less responsive, tachycardic  was  seen by ICU team and was intubated , sedated ib pressor support. Patient now on Bipap  Tube feeds through G tube   Levophed stopped today.  [facility] once medically stable. Will need insurance auth through [facility] Medicare.   Latesha Pérez RN,Care Management

## 2025-05-14 LAB
ABO + RH BLD: NORMAL
ALBUMIN SERPL-MCNC: 2.1 G/DL (ref 3.5–5)
ALBUMIN/GLOB SERPL: 0.6 (ref 1.1–2.2)
ALP SERPL-CCNC: 122 U/L (ref 45–117)
ALT SERPL-CCNC: 15 U/L (ref 12–78)
ANION GAP SERPL CALC-SCNC: 3 MMOL/L (ref 2–12)
ANION GAP SERPL CALC-SCNC: 6 MMOL/L (ref 2–12)
ARTERIAL PATENCY WRIST A: YES
AST SERPL-CCNC: 46 U/L (ref 15–37)
B PERT DNA SPEC QL NAA+PROBE: NOT DETECTED
BACTERIA SPEC CULT: NORMAL
BACTERIA SPEC CULT: NORMAL
BASE EXCESS BLDA CALC-SCNC: 8.4 MMOL/L
BASOPHILS # BLD: 0 K/UL (ref 0–0.1)
BASOPHILS NFR BLD: 0 % (ref 0–1)
BDY SITE: ABNORMAL
BILIRUB DIRECT SERPL-MCNC: 0.7 MG/DL (ref 0–0.2)
BILIRUB SERPL-MCNC: 1.2 MG/DL (ref 0.2–1)
BLD PROD TYP BPU: NORMAL
BLOOD BANK BLOOD PRODUCT EXPIRATION DATE: NORMAL
BLOOD BANK DISPENSE STATUS: NORMAL
BLOOD BANK ISBT PRODUCT BLOOD TYPE: 5100
BLOOD BANK PRODUCT CODE: NORMAL
BLOOD BANK UNIT TYPE AND RH: NORMAL
BLOOD GROUP ANTIBODIES SERPL: NORMAL
BORDETELLA PARAPERTUSSIS BY PCR: NOT DETECTED
BPU ID: NORMAL
BUN SERPL-MCNC: 6 MG/DL (ref 6–20)
BUN SERPL-MCNC: 7 MG/DL (ref 6–20)
BUN/CREAT SERPL: 18 (ref 12–20)
BUN/CREAT SERPL: 20 (ref 12–20)
C PNEUM DNA SPEC QL NAA+PROBE: NOT DETECTED
CALCIUM SERPL-MCNC: 8.5 MG/DL (ref 8.5–10.1)
CALCIUM SERPL-MCNC: 8.6 MG/DL (ref 8.5–10.1)
CHLORIDE SERPL-SCNC: 107 MMOL/L (ref 97–108)
CHLORIDE SERPL-SCNC: 108 MMOL/L (ref 97–108)
CO2 SERPL-SCNC: 32 MMOL/L (ref 21–32)
CO2 SERPL-SCNC: 37 MMOL/L (ref 21–32)
CREAT SERPL-MCNC: 0.33 MG/DL (ref 0.55–1.02)
CREAT SERPL-MCNC: 0.35 MG/DL (ref 0.55–1.02)
CROSSMATCH RESULT: NORMAL
CRP SERPL-MCNC: 15.4 MG/DL (ref 0–0.3)
DIFFERENTIAL METHOD BLD: ABNORMAL
EOSINOPHIL # BLD: 0 K/UL (ref 0–0.4)
EOSINOPHIL NFR BLD: 0 % (ref 0–7)
ERYTHROCYTE [DISTWIDTH] IN BLOOD BY AUTOMATED COUNT: 19.1 % (ref 11.5–14.5)
ERYTHROCYTE [SEDIMENTATION RATE] IN BLOOD: 58 MM/HR (ref 0–30)
FIO2 ON VENT: 50 %
FLUAV SUBTYP SPEC NAA+PROBE: NOT DETECTED
FLUBV RNA SPEC QL NAA+PROBE: NOT DETECTED
GLOBULIN SER CALC-MCNC: 3.6 G/DL (ref 2–4)
GLUCOSE BLD STRIP.AUTO-MCNC: 101 MG/DL (ref 65–117)
GLUCOSE BLD STRIP.AUTO-MCNC: 110 MG/DL (ref 65–117)
GLUCOSE BLD STRIP.AUTO-MCNC: 129 MG/DL (ref 65–117)
GLUCOSE SERPL-MCNC: 111 MG/DL (ref 65–100)
GLUCOSE SERPL-MCNC: 115 MG/DL (ref 65–100)
HADV DNA SPEC QL NAA+PROBE: NOT DETECTED
HCO3 BLDA-SCNC: 32 MMOL/L (ref 22–26)
HCOV 229E RNA SPEC QL NAA+PROBE: NOT DETECTED
HCOV HKU1 RNA SPEC QL NAA+PROBE: NOT DETECTED
HCOV NL63 RNA SPEC QL NAA+PROBE: NOT DETECTED
HCOV OC43 RNA SPEC QL NAA+PROBE: NOT DETECTED
HCT VFR BLD AUTO: 24.8 % (ref 35–47)
HGB BLD-MCNC: 8.5 G/DL (ref 11.5–16)
HMPV RNA SPEC QL NAA+PROBE: NOT DETECTED
HPIV1 RNA SPEC QL NAA+PROBE: NOT DETECTED
HPIV2 RNA SPEC QL NAA+PROBE: NOT DETECTED
HPIV3 RNA SPEC QL NAA+PROBE: NOT DETECTED
HPIV4 RNA SPEC QL NAA+PROBE: NOT DETECTED
IMM GRANULOCYTES # BLD AUTO: 0 K/UL
IMM GRANULOCYTES NFR BLD AUTO: 0 %
IPAP/PIP: 14
LYMPHOCYTES # BLD: 2.78 K/UL (ref 0.8–3.5)
LYMPHOCYTES NFR BLD: 26 % (ref 12–49)
M PNEUMO DNA SPEC QL NAA+PROBE: NOT DETECTED
MAGNESIUM SERPL-MCNC: 1.9 MG/DL (ref 1.6–2.4)
MAGNESIUM SERPL-MCNC: 2.2 MG/DL (ref 1.6–2.4)
MCH RBC QN AUTO: 31.7 PG (ref 26–34)
MCHC RBC AUTO-ENTMCNC: 34.3 G/DL (ref 30–36.5)
MCV RBC AUTO: 92.5 FL (ref 80–99)
MONOCYTES # BLD: 0.21 K/UL (ref 0–1)
MONOCYTES NFR BLD: 2 % (ref 5–13)
NEUTS SEG # BLD: 7.71 K/UL (ref 1.8–8)
NEUTS SEG NFR BLD: 72 % (ref 32–75)
NRBC # BLD: 0.02 K/UL (ref 0–0.01)
NRBC BLD-RTO: 0.2 PER 100 WBC
NT PRO BNP: ABNORMAL PG/ML
PCO2 BLDA: 40 MMHG (ref 35–45)
PEEP RESPIRATORY: 10
PH BLDA: 7.52 (ref 7.35–7.45)
PHOSPHATE SERPL-MCNC: 1.8 MG/DL (ref 2.6–4.7)
PHOSPHATE SERPL-MCNC: 2.6 MG/DL (ref 2.6–4.7)
PLATELET # BLD AUTO: 227 K/UL (ref 150–400)
PMV BLD AUTO: 11.4 FL (ref 8.9–12.9)
PO2 BLDA: 84 MMHG (ref 80–100)
POTASSIUM SERPL-SCNC: 3 MMOL/L (ref 3.5–5.1)
POTASSIUM SERPL-SCNC: 3.5 MMOL/L (ref 3.5–5.1)
PROCALCITONIN SERPL-MCNC: 1.91 NG/ML
PROT SERPL-MCNC: 5.7 G/DL (ref 6.4–8.2)
RBC # BLD AUTO: 2.68 M/UL (ref 3.8–5.2)
RBC MORPH BLD: ABNORMAL
RSV RNA SPEC QL NAA+PROBE: NOT DETECTED
RV+EV RNA SPEC QL NAA+PROBE: DETECTED
SAO2 % BLD: 97 % (ref 92–97)
SAO2% DEVICE SAO2% SENSOR NAME: ABNORMAL
SARS-COV-2 RNA RESP QL NAA+PROBE: NOT DETECTED
SERVICE CMNT-IMP: ABNORMAL
SERVICE CMNT-IMP: NORMAL
SODIUM SERPL-SCNC: 146 MMOL/L (ref 136–145)
SODIUM SERPL-SCNC: 147 MMOL/L (ref 136–145)
SPECIMEN EXP DATE BLD: NORMAL
SPECIMEN SITE: ABNORMAL
UNIT DIVISION: 0
UNIT ISSUE DATE/TIME: NORMAL
WBC # BLD AUTO: 10.7 K/UL (ref 3.6–11)

## 2025-05-14 PROCEDURE — 6370000000 HC RX 637 (ALT 250 FOR IP): Performed by: PHYSICIAN ASSISTANT

## 2025-05-14 PROCEDURE — 82962 GLUCOSE BLOOD TEST: CPT

## 2025-05-14 PROCEDURE — 80076 HEPATIC FUNCTION PANEL: CPT

## 2025-05-14 PROCEDURE — 84100 ASSAY OF PHOSPHORUS: CPT

## 2025-05-14 PROCEDURE — 6360000002 HC RX W HCPCS: Performed by: NURSE PRACTITIONER

## 2025-05-14 PROCEDURE — 83735 ASSAY OF MAGNESIUM: CPT

## 2025-05-14 PROCEDURE — 6370000000 HC RX 637 (ALT 250 FOR IP): Performed by: NURSE PRACTITIONER

## 2025-05-14 PROCEDURE — 6370000000 HC RX 637 (ALT 250 FOR IP): Performed by: INTERNAL MEDICINE

## 2025-05-14 PROCEDURE — 2500000003 HC RX 250 WO HCPCS: Performed by: SURGERY

## 2025-05-14 PROCEDURE — 82803 BLOOD GASES ANY COMBINATION: CPT

## 2025-05-14 PROCEDURE — 85652 RBC SED RATE AUTOMATED: CPT

## 2025-05-14 PROCEDURE — 80048 BASIC METABOLIC PNL TOTAL CA: CPT

## 2025-05-14 PROCEDURE — 2000000000 HC ICU R&B

## 2025-05-14 PROCEDURE — 85025 COMPLETE CBC W/AUTO DIFF WBC: CPT

## 2025-05-14 PROCEDURE — 94660 CPAP INITIATION&MGMT: CPT

## 2025-05-14 PROCEDURE — 84145 PROCALCITONIN (PCT): CPT

## 2025-05-14 PROCEDURE — 86140 C-REACTIVE PROTEIN: CPT

## 2025-05-14 PROCEDURE — 2500000003 HC RX 250 WO HCPCS: Performed by: INTERNAL MEDICINE

## 2025-05-14 PROCEDURE — 6360000002 HC RX W HCPCS: Performed by: INTERNAL MEDICINE

## 2025-05-14 PROCEDURE — 36600 WITHDRAWAL OF ARTERIAL BLOOD: CPT

## 2025-05-14 PROCEDURE — 94668 MNPJ CHEST WALL SBSQ: CPT

## 2025-05-14 PROCEDURE — 2700000000 HC OXYGEN THERAPY PER DAY

## 2025-05-14 PROCEDURE — 99222 1ST HOSP IP/OBS MODERATE 55: CPT | Performed by: NURSE PRACTITIONER

## 2025-05-14 PROCEDURE — 99024 POSTOP FOLLOW-UP VISIT: CPT | Performed by: SURGERY

## 2025-05-14 PROCEDURE — 94760 N-INVAS EAR/PLS OXIMETRY 1: CPT

## 2025-05-14 PROCEDURE — 0202U NFCT DS 22 TRGT SARS-COV-2: CPT

## 2025-05-14 PROCEDURE — 83880 ASSAY OF NATRIURETIC PEPTIDE: CPT

## 2025-05-14 PROCEDURE — 94640 AIRWAY INHALATION TREATMENT: CPT

## 2025-05-14 RX ORDER — MAGNESIUM SULFATE 1 G/100ML
1000 INJECTION INTRAVENOUS ONCE
Status: COMPLETED | OUTPATIENT
Start: 2025-05-14 | End: 2025-05-14

## 2025-05-14 RX ORDER — ENOXAPARIN SODIUM 100 MG/ML
40 INJECTION SUBCUTANEOUS DAILY
Status: DISCONTINUED | OUTPATIENT
Start: 2025-05-14 | End: 2025-05-25

## 2025-05-14 RX ADMIN — FUROSEMIDE 20 MG: 10 INJECTION, SOLUTION INTRAMUSCULAR; INTRAVENOUS at 17:26

## 2025-05-14 RX ADMIN — Medication: at 08:32

## 2025-05-14 RX ADMIN — POTASSIUM BICARBONATE 30 MEQ: 782 TABLET, EFFERVESCENT ORAL at 08:33

## 2025-05-14 RX ADMIN — SODIUM CHLORIDE, PRESERVATIVE FREE 10 ML: 5 INJECTION INTRAVENOUS at 08:34

## 2025-05-14 RX ADMIN — IPRATROPIUM BROMIDE AND ALBUTEROL SULFATE 1 DOSE: 2.5; .5 SOLUTION RESPIRATORY (INHALATION) at 08:11

## 2025-05-14 RX ADMIN — POTASSIUM BICARBONATE 30 MEQ: 782 TABLET, EFFERVESCENT ORAL at 05:08

## 2025-05-14 RX ADMIN — MINERAL OIL, PETROLATUM, PHENYLEPHRINE HCL: 2.5; 140; 749 OINTMENT TOPICAL at 08:34

## 2025-05-14 RX ADMIN — METRONIDAZOLE 500 MG: 5 INJECTION, SOLUTION INTRAVENOUS at 15:42

## 2025-05-14 RX ADMIN — LANSOPRAZOLE 30 MG: 30 TABLET, ORALLY DISINTEGRATING ORAL at 06:35

## 2025-05-14 RX ADMIN — HYDROCODONE BITARTRATE AND ACETAMINOPHEN 1 TABLET: 5; 325 TABLET ORAL at 20:32

## 2025-05-14 RX ADMIN — Medication 1 MG: at 08:33

## 2025-05-14 RX ADMIN — POTASSIUM BICARBONATE 40 MEQ: 782 TABLET, EFFERVESCENT ORAL at 13:54

## 2025-05-14 RX ADMIN — DIBASIC SODIUM PHOSPHATE, MONOBASIC POTASSIUM PHOSPHATE AND MONOBASIC SODIUM PHOSPHATE 2 TABLET: 852; 155; 130 TABLET ORAL at 08:32

## 2025-05-14 RX ADMIN — IPRATROPIUM BROMIDE AND ALBUTEROL SULFATE 1 DOSE: 2.5; .5 SOLUTION RESPIRATORY (INHALATION) at 20:03

## 2025-05-14 RX ADMIN — CYANOCOBALAMIN TAB 500 MCG 500 MCG: 500 TAB at 08:33

## 2025-05-14 RX ADMIN — GABAPENTIN 600 MG: 600 TABLET, FILM COATED ORAL at 08:34

## 2025-05-14 RX ADMIN — ENOXAPARIN SODIUM 40 MG: 100 INJECTION SUBCUTANEOUS at 13:54

## 2025-05-14 RX ADMIN — WATER 2000 MG: 1 INJECTION INTRAMUSCULAR; INTRAVENOUS; SUBCUTANEOUS at 10:15

## 2025-05-14 RX ADMIN — SODIUM CHLORIDE, PRESERVATIVE FREE 10 ML: 5 INJECTION INTRAVENOUS at 20:33

## 2025-05-14 RX ADMIN — GABAPENTIN 600 MG: 600 TABLET, FILM COATED ORAL at 20:32

## 2025-05-14 RX ADMIN — MIDODRINE HYDROCHLORIDE 15 MG: 5 TABLET ORAL at 20:32

## 2025-05-14 RX ADMIN — MIDODRINE HYDROCHLORIDE 15 MG: 5 TABLET ORAL at 13:52

## 2025-05-14 RX ADMIN — MIDODRINE HYDROCHLORIDE 15 MG: 5 TABLET ORAL at 03:16

## 2025-05-14 RX ADMIN — DIBASIC SODIUM PHOSPHATE, MONOBASIC POTASSIUM PHOSPHATE AND MONOBASIC SODIUM PHOSPHATE 2 TABLET: 852; 155; 130 TABLET ORAL at 13:54

## 2025-05-14 RX ADMIN — CITALOPRAM HYDROBROMIDE 10 MG: 20 TABLET ORAL at 08:34

## 2025-05-14 RX ADMIN — METRONIDAZOLE 500 MG: 5 INJECTION, SOLUTION INTRAVENOUS at 06:34

## 2025-05-14 RX ADMIN — DIBASIC SODIUM PHOSPHATE, MONOBASIC POTASSIUM PHOSPHATE AND MONOBASIC SODIUM PHOSPHATE 2 TABLET: 852; 155; 130 TABLET ORAL at 20:33

## 2025-05-14 RX ADMIN — Medication: at 20:34

## 2025-05-14 RX ADMIN — IPRATROPIUM BROMIDE AND ALBUTEROL SULFATE 1 DOSE: 2.5; .5 SOLUTION RESPIRATORY (INHALATION) at 05:22

## 2025-05-14 RX ADMIN — MAGNESIUM SULFATE HEPTAHYDRATE 1000 MG: 1 INJECTION, SOLUTION INTRAVENOUS at 10:16

## 2025-05-14 RX ADMIN — FLUDROCORTISONE ACETATE 0.05 MG: 0.1 TABLET ORAL at 08:33

## 2025-05-14 RX ADMIN — FUROSEMIDE 20 MG: 10 INJECTION, SOLUTION INTRAMUSCULAR; INTRAVENOUS at 08:34

## 2025-05-14 RX ADMIN — MIDODRINE HYDROCHLORIDE 15 MG: 5 TABLET ORAL at 08:34

## 2025-05-14 RX ADMIN — Medication 100 MG: at 08:34

## 2025-05-14 RX ADMIN — AMITRIPTYLINE HYDROCHLORIDE 10 MG: 10 TABLET, FILM COATED ORAL at 20:32

## 2025-05-14 RX ADMIN — IPRATROPIUM BROMIDE AND ALBUTEROL SULFATE 1 DOSE: 2.5; .5 SOLUTION RESPIRATORY (INHALATION) at 15:48

## 2025-05-14 ASSESSMENT — PAIN DESCRIPTION - DESCRIPTORS: DESCRIPTORS: ACHING

## 2025-05-14 ASSESSMENT — PAIN DESCRIPTION - LOCATION: LOCATION: GENERALIZED

## 2025-05-14 ASSESSMENT — PAIN SCALES - GENERAL
PAINLEVEL_OUTOF10: 0
PAINLEVEL_OUTOF10: 10
PAINLEVEL_OUTOF10: 2

## 2025-05-15 PROBLEM — Z71.89 GOALS OF CARE, COUNSELING/DISCUSSION: Status: ACTIVE | Noted: 2025-05-15

## 2025-05-15 PROBLEM — Z51.5 PALLIATIVE CARE BY SPECIALIST: Status: ACTIVE | Noted: 2025-05-15

## 2025-05-15 PROBLEM — E46 PROTEIN-CALORIE MALNUTRITION: Status: ACTIVE | Noted: 2025-05-15

## 2025-05-15 LAB
ANION GAP SERPL CALC-SCNC: 1 MMOL/L (ref 2–12)
ANION GAP SERPL CALC-SCNC: 2 MMOL/L (ref 2–12)
ARTERIAL PATENCY WRIST A: YES
BASE EXCESS BLDA CALC-SCNC: 9.4 MMOL/L
BASOPHILS # BLD: 0 K/UL (ref 0–0.1)
BASOPHILS NFR BLD: 0 % (ref 0–1)
BDY SITE: ABNORMAL
BUN SERPL-MCNC: 8 MG/DL (ref 6–20)
BUN SERPL-MCNC: 8 MG/DL (ref 6–20)
BUN/CREAT SERPL: 26 (ref 12–20)
BUN/CREAT SERPL: 27 (ref 12–20)
CALCIUM SERPL-MCNC: 8.5 MG/DL (ref 8.5–10.1)
CALCIUM SERPL-MCNC: 8.6 MG/DL (ref 8.5–10.1)
CHLORIDE SERPL-SCNC: 106 MMOL/L (ref 97–108)
CHLORIDE SERPL-SCNC: 107 MMOL/L (ref 97–108)
CO2 SERPL-SCNC: 39 MMOL/L (ref 21–32)
CO2 SERPL-SCNC: 39 MMOL/L (ref 21–32)
CREAT SERPL-MCNC: 0.3 MG/DL (ref 0.55–1.02)
CREAT SERPL-MCNC: 0.31 MG/DL (ref 0.55–1.02)
DIFFERENTIAL METHOD BLD: ABNORMAL
EOSINOPHIL # BLD: 0.1 K/UL (ref 0–0.4)
EOSINOPHIL NFR BLD: 1 % (ref 0–7)
ERYTHROCYTE [DISTWIDTH] IN BLOOD BY AUTOMATED COUNT: 18.9 % (ref 11.5–14.5)
FIO2 ON VENT: 90 %
GAS FLOW.O2 O2 DELIVERY SYS: 50 L/MIN
GLUCOSE BLD STRIP.AUTO-MCNC: 131 MG/DL (ref 65–117)
GLUCOSE BLD STRIP.AUTO-MCNC: 91 MG/DL (ref 65–117)
GLUCOSE BLD STRIP.AUTO-MCNC: 91 MG/DL (ref 65–117)
GLUCOSE BLD STRIP.AUTO-MCNC: 99 MG/DL (ref 65–117)
GLUCOSE SERPL-MCNC: 100 MG/DL (ref 65–100)
GLUCOSE SERPL-MCNC: 109 MG/DL (ref 65–100)
HCO3 BLDA-SCNC: 34 MMOL/L (ref 22–26)
HCT VFR BLD AUTO: 24 % (ref 35–47)
HGB BLD-MCNC: 7.9 G/DL (ref 11.5–16)
IMM GRANULOCYTES # BLD AUTO: 0 K/UL
IMM GRANULOCYTES NFR BLD AUTO: 0 %
LYMPHOCYTES # BLD: 2.84 K/UL (ref 0.8–3.5)
LYMPHOCYTES NFR BLD: 29 % (ref 12–49)
MAGNESIUM SERPL-MCNC: 2 MG/DL (ref 1.6–2.4)
MCH RBC QN AUTO: 31.5 PG (ref 26–34)
MCHC RBC AUTO-ENTMCNC: 32.9 G/DL (ref 30–36.5)
MCV RBC AUTO: 95.6 FL (ref 80–99)
MONOCYTES # BLD: 0.1 K/UL (ref 0–1)
MONOCYTES NFR BLD: 1 % (ref 5–13)
NEUTS BAND NFR BLD MANUAL: 2 % (ref 0–6)
NEUTS SEG # BLD: 6.76 K/UL (ref 1.8–8)
NEUTS SEG NFR BLD: 67 % (ref 32–75)
NRBC # BLD: 0 K/UL (ref 0–0.01)
NRBC BLD-RTO: 0 PER 100 WBC
PCO2 BLDA: 46 MMHG (ref 35–45)
PH BLDA: 7.49 (ref 7.35–7.45)
PHOSPHATE SERPL-MCNC: 1.9 MG/DL (ref 2.6–4.7)
PHOSPHATE SERPL-MCNC: 2.1 MG/DL (ref 2.6–4.7)
PLATELET # BLD AUTO: 208 K/UL (ref 150–400)
PMV BLD AUTO: 11.5 FL (ref 8.9–12.9)
PO2 BLDA: 72 MMHG (ref 80–100)
POTASSIUM SERPL-SCNC: 2.8 MMOL/L (ref 3.5–5.1)
POTASSIUM SERPL-SCNC: 3.9 MMOL/L (ref 3.5–5.1)
RBC # BLD AUTO: 2.51 M/UL (ref 3.8–5.2)
RBC MORPH BLD: ABNORMAL
RBC MORPH BLD: ABNORMAL
SAO2 % BLD: 95 % (ref 92–97)
SAO2% DEVICE SAO2% SENSOR NAME: ABNORMAL
SERVICE CMNT-IMP: ABNORMAL
SERVICE CMNT-IMP: NORMAL
SODIUM SERPL-SCNC: 146 MMOL/L (ref 136–145)
SODIUM SERPL-SCNC: 148 MMOL/L (ref 136–145)
SPECIMEN SITE: ABNORMAL
WBC # BLD AUTO: 9.8 K/UL (ref 3.6–11)

## 2025-05-15 PROCEDURE — 2000000000 HC ICU R&B

## 2025-05-15 PROCEDURE — 82962 GLUCOSE BLOOD TEST: CPT

## 2025-05-15 PROCEDURE — 6370000000 HC RX 637 (ALT 250 FOR IP): Performed by: INTERNAL MEDICINE

## 2025-05-15 PROCEDURE — 6370000000 HC RX 637 (ALT 250 FOR IP): Performed by: NURSE PRACTITIONER

## 2025-05-15 PROCEDURE — 83735 ASSAY OF MAGNESIUM: CPT

## 2025-05-15 PROCEDURE — 99497 ADVNCD CARE PLAN 30 MIN: CPT | Performed by: INTERNAL MEDICINE

## 2025-05-15 PROCEDURE — 85025 COMPLETE CBC W/AUTO DIFF WBC: CPT

## 2025-05-15 PROCEDURE — 6360000002 HC RX W HCPCS: Performed by: NURSE PRACTITIONER

## 2025-05-15 PROCEDURE — 6360000002 HC RX W HCPCS: Performed by: INTERNAL MEDICINE

## 2025-05-15 PROCEDURE — 99233 SBSQ HOSP IP/OBS HIGH 50: CPT | Performed by: INTERNAL MEDICINE

## 2025-05-15 PROCEDURE — 2580000003 HC RX 258: Performed by: NURSE PRACTITIONER

## 2025-05-15 PROCEDURE — 2500000003 HC RX 250 WO HCPCS: Performed by: INTERNAL MEDICINE

## 2025-05-15 PROCEDURE — 84100 ASSAY OF PHOSPHORUS: CPT

## 2025-05-15 PROCEDURE — 36600 WITHDRAWAL OF ARTERIAL BLOOD: CPT

## 2025-05-15 PROCEDURE — 2700000000 HC OXYGEN THERAPY PER DAY

## 2025-05-15 PROCEDURE — 2500000003 HC RX 250 WO HCPCS: Performed by: NURSE PRACTITIONER

## 2025-05-15 PROCEDURE — 2500000003 HC RX 250 WO HCPCS: Performed by: SURGERY

## 2025-05-15 PROCEDURE — 92610 EVALUATE SWALLOWING FUNCTION: CPT

## 2025-05-15 PROCEDURE — 80048 BASIC METABOLIC PNL TOTAL CA: CPT

## 2025-05-15 PROCEDURE — 6370000000 HC RX 637 (ALT 250 FOR IP): Performed by: PHYSICIAN ASSISTANT

## 2025-05-15 PROCEDURE — 94640 AIRWAY INHALATION TREATMENT: CPT

## 2025-05-15 PROCEDURE — 82803 BLOOD GASES ANY COMBINATION: CPT

## 2025-05-15 PROCEDURE — 99024 POSTOP FOLLOW-UP VISIT: CPT | Performed by: SURGERY

## 2025-05-15 RX ORDER — POTASSIUM CHLORIDE 7.45 MG/ML
10 INJECTION INTRAVENOUS
Status: DISCONTINUED | OUTPATIENT
Start: 2025-05-15 | End: 2025-05-15

## 2025-05-15 RX ORDER — LOPERAMIDE HCL 1 MG/7.5ML
2 SOLUTION ORAL 3 TIMES DAILY PRN
Status: DISCONTINUED | OUTPATIENT
Start: 2025-05-15 | End: 2025-06-02 | Stop reason: HOSPADM

## 2025-05-15 RX ORDER — FLUCONAZOLE 40 MG/ML
150 POWDER, FOR SUSPENSION ORAL ONCE
Status: COMPLETED | OUTPATIENT
Start: 2025-05-16 | End: 2025-05-16

## 2025-05-15 RX ORDER — POTASSIUM CHLORIDE 29.8 MG/ML
20 INJECTION INTRAVENOUS
Status: COMPLETED | OUTPATIENT
Start: 2025-05-15 | End: 2025-05-15

## 2025-05-15 RX ORDER — GABAPENTIN 600 MG/1
300 TABLET ORAL 2 TIMES DAILY
Status: DISCONTINUED | OUTPATIENT
Start: 2025-05-15 | End: 2025-05-25

## 2025-05-15 RX ORDER — VITAMIN B COMPLEX
1000 TABLET ORAL DAILY
Status: DISCONTINUED | OUTPATIENT
Start: 2025-05-15 | End: 2025-06-02 | Stop reason: HOSPADM

## 2025-05-15 RX ADMIN — IPRATROPIUM BROMIDE AND ALBUTEROL SULFATE 1 DOSE: 2.5; .5 SOLUTION RESPIRATORY (INHALATION) at 08:31

## 2025-05-15 RX ADMIN — AMITRIPTYLINE HYDROCHLORIDE 10 MG: 10 TABLET, FILM COATED ORAL at 22:15

## 2025-05-15 RX ADMIN — IPRATROPIUM BROMIDE AND ALBUTEROL SULFATE 1 DOSE: 2.5; .5 SOLUTION RESPIRATORY (INHALATION) at 20:15

## 2025-05-15 RX ADMIN — Medication 100 MG: at 08:42

## 2025-05-15 RX ADMIN — Medication 1 MG: at 08:42

## 2025-05-15 RX ADMIN — HYDROCODONE BITARTRATE AND ACETAMINOPHEN 1 TABLET: 5; 325 TABLET ORAL at 09:54

## 2025-05-15 RX ADMIN — LANSOPRAZOLE 30 MG: 30 TABLET, ORALLY DISINTEGRATING ORAL at 06:56

## 2025-05-15 RX ADMIN — MIDODRINE HYDROCHLORIDE 15 MG: 5 TABLET ORAL at 02:44

## 2025-05-15 RX ADMIN — FLUDROCORTISONE ACETATE 0.05 MG: 0.1 TABLET ORAL at 08:43

## 2025-05-15 RX ADMIN — LOPERAMIDE HCL 2 MG: 1 SOLUTION ORAL at 22:16

## 2025-05-15 RX ADMIN — Medication 1000 UNITS: at 12:08

## 2025-05-15 RX ADMIN — FUROSEMIDE 20 MG: 10 INJECTION, SOLUTION INTRAMUSCULAR; INTRAVENOUS at 08:41

## 2025-05-15 RX ADMIN — MIDODRINE HYDROCHLORIDE 15 MG: 5 TABLET ORAL at 22:15

## 2025-05-15 RX ADMIN — Medication: at 22:16

## 2025-05-15 RX ADMIN — CITALOPRAM HYDROBROMIDE 10 MG: 20 TABLET ORAL at 08:43

## 2025-05-15 RX ADMIN — DIBASIC SODIUM PHOSPHATE, MONOBASIC POTASSIUM PHOSPHATE AND MONOBASIC SODIUM PHOSPHATE 2 TABLET: 852; 155; 130 TABLET ORAL at 12:08

## 2025-05-15 RX ADMIN — METRONIDAZOLE 500 MG: 5 INJECTION, SOLUTION INTRAVENOUS at 06:54

## 2025-05-15 RX ADMIN — GABAPENTIN 600 MG: 600 TABLET, FILM COATED ORAL at 08:42

## 2025-05-15 RX ADMIN — Medication: at 09:07

## 2025-05-15 RX ADMIN — METRONIDAZOLE 500 MG: 5 INJECTION, SOLUTION INTRAVENOUS at 00:15

## 2025-05-15 RX ADMIN — POTASSIUM CHLORIDE 20 MEQ: 29.8 INJECTION INTRAVENOUS at 09:20

## 2025-05-15 RX ADMIN — DIBASIC SODIUM PHOSPHATE, MONOBASIC POTASSIUM PHOSPHATE AND MONOBASIC SODIUM PHOSPHATE 2 TABLET: 852; 155; 130 TABLET ORAL at 08:41

## 2025-05-15 RX ADMIN — SODIUM CHLORIDE, PRESERVATIVE FREE 10 ML: 5 INJECTION INTRAVENOUS at 08:44

## 2025-05-15 RX ADMIN — IPRATROPIUM BROMIDE AND ALBUTEROL SULFATE 1 DOSE: 2.5; .5 SOLUTION RESPIRATORY (INHALATION) at 03:18

## 2025-05-15 RX ADMIN — METRONIDAZOLE 500 MG: 5 INJECTION, SOLUTION INTRAVENOUS at 15:15

## 2025-05-15 RX ADMIN — ACETAMINOPHEN 650 MG: 325 TABLET ORAL at 17:29

## 2025-05-15 RX ADMIN — WATER 2000 MG: 1 INJECTION INTRAMUSCULAR; INTRAVENOUS; SUBCUTANEOUS at 09:35

## 2025-05-15 RX ADMIN — SODIUM CHLORIDE, PRESERVATIVE FREE 10 ML: 5 INJECTION INTRAVENOUS at 22:16

## 2025-05-15 RX ADMIN — HYDROCODONE BITARTRATE AND ACETAMINOPHEN 1 TABLET: 5; 325 TABLET ORAL at 17:26

## 2025-05-15 RX ADMIN — DIBASIC SODIUM PHOSPHATE, MONOBASIC POTASSIUM PHOSPHATE AND MONOBASIC SODIUM PHOSPHATE 2 TABLET: 852; 155; 130 TABLET ORAL at 22:16

## 2025-05-15 RX ADMIN — POTASSIUM BICARBONATE 40 MEQ: 782 TABLET, EFFERVESCENT ORAL at 09:14

## 2025-05-15 RX ADMIN — GABAPENTIN 300 MG: 600 TABLET, FILM COATED ORAL at 22:16

## 2025-05-15 RX ADMIN — POTASSIUM CHLORIDE 20 MEQ: 29.8 INJECTION INTRAVENOUS at 10:45

## 2025-05-15 RX ADMIN — MIDODRINE HYDROCHLORIDE 15 MG: 5 TABLET ORAL at 14:03

## 2025-05-15 RX ADMIN — IPRATROPIUM BROMIDE AND ALBUTEROL SULFATE 1 DOSE: 2.5; .5 SOLUTION RESPIRATORY (INHALATION) at 16:10

## 2025-05-15 RX ADMIN — DIBASIC SODIUM PHOSPHATE, MONOBASIC POTASSIUM PHOSPHATE AND MONOBASIC SODIUM PHOSPHATE 2 TABLET: 852; 155; 130 TABLET ORAL at 17:13

## 2025-05-15 RX ADMIN — FUROSEMIDE 20 MG: 10 INJECTION, SOLUTION INTRAMUSCULAR; INTRAVENOUS at 17:39

## 2025-05-15 RX ADMIN — CYANOCOBALAMIN TAB 500 MCG 500 MCG: 500 TAB at 08:42

## 2025-05-15 RX ADMIN — MIDODRINE HYDROCHLORIDE 15 MG: 5 TABLET ORAL at 08:38

## 2025-05-15 RX ADMIN — POTASSIUM PHOSPHATE, MONOBASIC AND POTASSIUM PHOSPHATE, DIBASIC 30 MMOL: 224; 236 INJECTION, SOLUTION, CONCENTRATE INTRAVENOUS at 07:56

## 2025-05-15 RX ADMIN — ENOXAPARIN SODIUM 40 MG: 100 INJECTION SUBCUTANEOUS at 08:44

## 2025-05-15 ASSESSMENT — PAIN SCALES - WONG BAKER
WONGBAKER_NUMERICALRESPONSE: NO HURT
WONGBAKER_NUMERICALRESPONSE: NO HURT

## 2025-05-15 ASSESSMENT — PAIN DESCRIPTION - LOCATION: LOCATION: BACK;COCCYX

## 2025-05-15 ASSESSMENT — PAIN SCALES - GENERAL
PAINLEVEL_OUTOF10: 0
PAINLEVEL_OUTOF10: 0
PAINLEVEL_OUTOF10: 10
PAINLEVEL_OUTOF10: 0

## 2025-05-15 NOTE — ACP (ADVANCE CARE PLANNING)
Advance Care Planning      Palliative Medicine Provider (MD/NP)  Advance Care Planning (ACP) Conversation      Date of Conversation: 05/15/25  The patient and/or authorized decision maker consented to a voluntary Advance Care Planning conversation.   Individuals present for the conversation:   Patient with decision making capacity    Legal Healthcare Agent(s):    Primary Decision Maker: YESENIA JAIMES - Child - [phone]    Supplemental (Other) Decision Maker: Latesha Payan - Other - [phone]  Secondary Decision Maker: Moe palomares     ACP documents available in EMR prior to discussion:  -None  patient only has an incomplete document on record.      Primary Palliative Diagnosis(es):  Acute respiratory failure  Protein calorie malnutrition  Feeding difficulties    Conversation Summary:  Goals clear to continue all measures to support her recovery.   Patient desires to remain FULL CODE  She would be accepting of intubation again as long as it was short term.  She would not want long term support on machines or to be \"vegetative\"     Made plan to complete MPOA document tomorrow or next week     Resuscitation Status:    Code Status: Full Code    Outcomes / Completed Documentation:  An explanation of advance directives and their importance was provided and the following forms completed:    -No new documents completed.    If new document completed, original was provided to patient and/or family member.    Copy was placed for scanning into the [facility] EMR.      I spent 25 minutes providing separately identifiable ACP services with the patient and/or surrogate decision maker in a voluntary, in-person conversation discussing the patient's wishes and goals as detailed in the above note.       Niurka Combs MD

## 2025-05-16 LAB
ALBUMIN SERPL-MCNC: 1.8 G/DL (ref 3.5–5)
ALBUMIN/GLOB SERPL: 0.5 (ref 1.1–2.2)
ALP SERPL-CCNC: 118 U/L (ref 45–117)
ALT SERPL-CCNC: 23 U/L (ref 12–78)
ANION GAP SERPL CALC-SCNC: 3 MMOL/L (ref 2–12)
AST SERPL-CCNC: 50 U/L (ref 15–37)
BASOPHILS # BLD: 0 K/UL (ref 0–0.1)
BASOPHILS NFR BLD: 0 % (ref 0–1)
BILIRUB DIRECT SERPL-MCNC: 0.2 MG/DL (ref 0–0.2)
BILIRUB SERPL-MCNC: 0.5 MG/DL (ref 0.2–1)
BUN SERPL-MCNC: 11 MG/DL (ref 6–20)
BUN/CREAT SERPL: 38 (ref 12–20)
CALCIUM SERPL-MCNC: 8.3 MG/DL (ref 8.5–10.1)
CHLORIDE SERPL-SCNC: 101 MMOL/L (ref 97–108)
CO2 SERPL-SCNC: 40 MMOL/L (ref 21–32)
CREAT SERPL-MCNC: 0.29 MG/DL (ref 0.55–1.02)
DIFFERENTIAL METHOD BLD: ABNORMAL
EOSINOPHIL # BLD: 0.11 K/UL (ref 0–0.4)
EOSINOPHIL NFR BLD: 1 % (ref 0–7)
ERYTHROCYTE [DISTWIDTH] IN BLOOD BY AUTOMATED COUNT: 18.1 % (ref 11.5–14.5)
GLOBULIN SER CALC-MCNC: 3.8 G/DL (ref 2–4)
GLUCOSE BLD STRIP.AUTO-MCNC: 114 MG/DL (ref 65–117)
GLUCOSE BLD STRIP.AUTO-MCNC: 74 MG/DL (ref 65–117)
GLUCOSE BLD STRIP.AUTO-MCNC: 87 MG/DL (ref 65–117)
GLUCOSE BLD STRIP.AUTO-MCNC: 92 MG/DL (ref 65–117)
GLUCOSE BLD STRIP.AUTO-MCNC: 93 MG/DL (ref 65–117)
GLUCOSE SERPL-MCNC: 96 MG/DL (ref 65–100)
HCT VFR BLD AUTO: 24.2 % (ref 35–47)
HGB BLD-MCNC: 7.8 G/DL (ref 11.5–16)
IMM GRANULOCYTES # BLD AUTO: 0 K/UL
IMM GRANULOCYTES NFR BLD AUTO: 0 %
LYMPHOCYTES # BLD: 3.02 K/UL (ref 0.8–3.5)
LYMPHOCYTES NFR BLD: 27 % (ref 12–49)
MAGNESIUM SERPL-MCNC: 1.7 MG/DL (ref 1.6–2.4)
MCH RBC QN AUTO: 31.8 PG (ref 26–34)
MCHC RBC AUTO-ENTMCNC: 32.2 G/DL (ref 30–36.5)
MCV RBC AUTO: 98.8 FL (ref 80–99)
MONOCYTES # BLD: 0.34 K/UL (ref 0–1)
MONOCYTES NFR BLD: 3 % (ref 5–13)
NEUTS SEG # BLD: 7.73 K/UL (ref 1.8–8)
NEUTS SEG NFR BLD: 69 % (ref 32–75)
NRBC # BLD: 0.02 K/UL (ref 0–0.01)
NRBC BLD-RTO: 0.2 PER 100 WBC
PHOSPHATE SERPL-MCNC: 2.8 MG/DL (ref 2.6–4.7)
PLATELET # BLD AUTO: 220 K/UL (ref 150–400)
PMV BLD AUTO: 11.5 FL (ref 8.9–12.9)
POTASSIUM SERPL-SCNC: 3.2 MMOL/L (ref 3.5–5.1)
PROT SERPL-MCNC: 5.6 G/DL (ref 6.4–8.2)
RBC # BLD AUTO: 2.45 M/UL (ref 3.8–5.2)
RBC MORPH BLD: ABNORMAL
RBC MORPH BLD: ABNORMAL
SERVICE CMNT-IMP: NORMAL
SODIUM SERPL-SCNC: 144 MMOL/L (ref 136–145)
WBC # BLD AUTO: 11.2 K/UL (ref 3.6–11)

## 2025-05-16 PROCEDURE — 6360000002 HC RX W HCPCS: Performed by: INTERNAL MEDICINE

## 2025-05-16 PROCEDURE — P9045 ALBUMIN (HUMAN), 5%, 250 ML: HCPCS | Performed by: ANESTHESIOLOGY

## 2025-05-16 PROCEDURE — 6370000000 HC RX 637 (ALT 250 FOR IP): Performed by: NURSE PRACTITIONER

## 2025-05-16 PROCEDURE — 6370000000 HC RX 637 (ALT 250 FOR IP): Performed by: INTERNAL MEDICINE

## 2025-05-16 PROCEDURE — 94660 CPAP INITIATION&MGMT: CPT

## 2025-05-16 PROCEDURE — 80048 BASIC METABOLIC PNL TOTAL CA: CPT

## 2025-05-16 PROCEDURE — 6370000000 HC RX 637 (ALT 250 FOR IP): Performed by: ANESTHESIOLOGY

## 2025-05-16 PROCEDURE — 2700000000 HC OXYGEN THERAPY PER DAY

## 2025-05-16 PROCEDURE — 97530 THERAPEUTIC ACTIVITIES: CPT

## 2025-05-16 PROCEDURE — 82962 GLUCOSE BLOOD TEST: CPT

## 2025-05-16 PROCEDURE — 94640 AIRWAY INHALATION TREATMENT: CPT

## 2025-05-16 PROCEDURE — 2500000003 HC RX 250 WO HCPCS: Performed by: INTERNAL MEDICINE

## 2025-05-16 PROCEDURE — 99024 POSTOP FOLLOW-UP VISIT: CPT | Performed by: SURGERY

## 2025-05-16 PROCEDURE — 6360000002 HC RX W HCPCS: Performed by: SURGERY

## 2025-05-16 PROCEDURE — 84100 ASSAY OF PHOSPHORUS: CPT

## 2025-05-16 PROCEDURE — 94760 N-INVAS EAR/PLS OXIMETRY 1: CPT

## 2025-05-16 PROCEDURE — 85025 COMPLETE CBC W/AUTO DIFF WBC: CPT

## 2025-05-16 PROCEDURE — 97164 PT RE-EVAL EST PLAN CARE: CPT

## 2025-05-16 PROCEDURE — 6370000000 HC RX 637 (ALT 250 FOR IP): Performed by: PHYSICIAN ASSISTANT

## 2025-05-16 PROCEDURE — 97168 OT RE-EVAL EST PLAN CARE: CPT

## 2025-05-16 PROCEDURE — 99231 SBSQ HOSP IP/OBS SF/LOW 25: CPT | Performed by: INTERNAL MEDICINE

## 2025-05-16 PROCEDURE — 92526 ORAL FUNCTION THERAPY: CPT

## 2025-05-16 PROCEDURE — 80076 HEPATIC FUNCTION PANEL: CPT

## 2025-05-16 PROCEDURE — 83735 ASSAY OF MAGNESIUM: CPT

## 2025-05-16 PROCEDURE — 2500000003 HC RX 250 WO HCPCS: Performed by: SURGERY

## 2025-05-16 PROCEDURE — 6360000002 HC RX W HCPCS: Performed by: ANESTHESIOLOGY

## 2025-05-16 PROCEDURE — 2000000000 HC ICU R&B

## 2025-05-16 PROCEDURE — 97110 THERAPEUTIC EXERCISES: CPT

## 2025-05-16 RX ORDER — ALBUMIN HUMAN 50 G/1000ML
12.5 SOLUTION INTRAVENOUS ONCE
Status: COMPLETED | OUTPATIENT
Start: 2025-05-16 | End: 2025-05-16

## 2025-05-16 RX ORDER — GINSENG 100 MG
CAPSULE ORAL ONCE
Status: COMPLETED | OUTPATIENT
Start: 2025-05-16 | End: 2025-05-16

## 2025-05-16 RX ORDER — MAGNESIUM SULFATE IN WATER 40 MG/ML
2000 INJECTION, SOLUTION INTRAVENOUS ONCE
Status: COMPLETED | OUTPATIENT
Start: 2025-05-16 | End: 2025-05-16

## 2025-05-16 RX ADMIN — Medication: at 23:51

## 2025-05-16 RX ADMIN — DIBASIC SODIUM PHOSPHATE, MONOBASIC POTASSIUM PHOSPHATE AND MONOBASIC SODIUM PHOSPHATE 2 TABLET: 852; 155; 130 TABLET ORAL at 21:18

## 2025-05-16 RX ADMIN — GABAPENTIN 300 MG: 600 TABLET, FILM COATED ORAL at 21:18

## 2025-05-16 RX ADMIN — DIBASIC SODIUM PHOSPHATE, MONOBASIC POTASSIUM PHOSPHATE AND MONOBASIC SODIUM PHOSPHATE 2 TABLET: 852; 155; 130 TABLET ORAL at 16:13

## 2025-05-16 RX ADMIN — ALBUMIN (HUMAN) 12.5 G: 12.5 INJECTION, SOLUTION INTRAVENOUS at 17:54

## 2025-05-16 RX ADMIN — MIDODRINE HYDROCHLORIDE 15 MG: 5 TABLET ORAL at 03:56

## 2025-05-16 RX ADMIN — ONDANSETRON 4 MG: 2 INJECTION, SOLUTION INTRAMUSCULAR; INTRAVENOUS at 18:24

## 2025-05-16 RX ADMIN — Medication 1000 UNITS: at 08:19

## 2025-05-16 RX ADMIN — FLUCONAZOLE 152 MG: 40 POWDER, FOR SUSPENSION ORAL at 00:53

## 2025-05-16 RX ADMIN — AMITRIPTYLINE HYDROCHLORIDE 10 MG: 10 TABLET, FILM COATED ORAL at 21:18

## 2025-05-16 RX ADMIN — WATER 2000 MG: 1 INJECTION INTRAMUSCULAR; INTRAVENOUS; SUBCUTANEOUS at 11:02

## 2025-05-16 RX ADMIN — IPRATROPIUM BROMIDE AND ALBUTEROL SULFATE 1 DOSE: 2.5; .5 SOLUTION RESPIRATORY (INHALATION) at 20:02

## 2025-05-16 RX ADMIN — HYDROCODONE BITARTRATE AND ACETAMINOPHEN 1 TABLET: 5; 325 TABLET ORAL at 13:56

## 2025-05-16 RX ADMIN — ONDANSETRON 4 MG: 2 INJECTION, SOLUTION INTRAMUSCULAR; INTRAVENOUS at 14:55

## 2025-05-16 RX ADMIN — DIBASIC SODIUM PHOSPHATE, MONOBASIC POTASSIUM PHOSPHATE AND MONOBASIC SODIUM PHOSPHATE 2 TABLET: 852; 155; 130 TABLET ORAL at 08:23

## 2025-05-16 RX ADMIN — METRONIDAZOLE 500 MG: 5 INJECTION, SOLUTION INTRAVENOUS at 08:14

## 2025-05-16 RX ADMIN — MAGNESIUM SULFATE HEPTAHYDRATE 2000 MG: 40 INJECTION, SOLUTION INTRAVENOUS at 11:42

## 2025-05-16 RX ADMIN — GABAPENTIN 300 MG: 600 TABLET, FILM COATED ORAL at 08:17

## 2025-05-16 RX ADMIN — Medication 100 MG: at 08:23

## 2025-05-16 RX ADMIN — ONDANSETRON 4 MG: 2 INJECTION, SOLUTION INTRAMUSCULAR; INTRAVENOUS at 09:19

## 2025-05-16 RX ADMIN — Medication 1 MG: at 08:21

## 2025-05-16 RX ADMIN — POTASSIUM BICARBONATE 40 MEQ: 782 TABLET, EFFERVESCENT ORAL at 11:39

## 2025-05-16 RX ADMIN — Medication: at 08:37

## 2025-05-16 RX ADMIN — CYCLOBENZAPRINE 10 MG: 10 TABLET, FILM COATED ORAL at 11:02

## 2025-05-16 RX ADMIN — CITALOPRAM HYDROBROMIDE 10 MG: 20 TABLET ORAL at 08:19

## 2025-05-16 RX ADMIN — ACETAMINOPHEN 650 MG: 325 TABLET ORAL at 01:05

## 2025-05-16 RX ADMIN — IPRATROPIUM BROMIDE AND ALBUTEROL SULFATE 1 DOSE: 2.5; .5 SOLUTION RESPIRATORY (INHALATION) at 08:31

## 2025-05-16 RX ADMIN — METRONIDAZOLE 500 MG: 5 INJECTION, SOLUTION INTRAVENOUS at 14:58

## 2025-05-16 RX ADMIN — HYDROCODONE BITARTRATE AND ACETAMINOPHEN 1 TABLET: 5; 325 TABLET ORAL at 08:20

## 2025-05-16 RX ADMIN — FUROSEMIDE 20 MG: 10 INJECTION, SOLUTION INTRAMUSCULAR; INTRAVENOUS at 08:57

## 2025-05-16 RX ADMIN — LANSOPRAZOLE 30 MG: 30 TABLET, ORALLY DISINTEGRATING ORAL at 08:22

## 2025-05-16 RX ADMIN — METRONIDAZOLE 500 MG: 5 INJECTION, SOLUTION INTRAVENOUS at 00:54

## 2025-05-16 RX ADMIN — MIDODRINE HYDROCHLORIDE 15 MG: 5 TABLET ORAL at 13:56

## 2025-05-16 RX ADMIN — SODIUM CHLORIDE, PRESERVATIVE FREE 10 ML: 5 INJECTION INTRAVENOUS at 08:24

## 2025-05-16 RX ADMIN — MIDODRINE HYDROCHLORIDE 15 MG: 5 TABLET ORAL at 08:17

## 2025-05-16 RX ADMIN — POTASSIUM BICARBONATE 40 MEQ: 782 TABLET, EFFERVESCENT ORAL at 16:13

## 2025-05-16 RX ADMIN — FLUDROCORTISONE ACETATE 0.05 MG: 0.1 TABLET ORAL at 08:22

## 2025-05-16 RX ADMIN — IPRATROPIUM BROMIDE AND ALBUTEROL SULFATE 1 DOSE: 2.5; .5 SOLUTION RESPIRATORY (INHALATION) at 01:36

## 2025-05-16 RX ADMIN — METRONIDAZOLE 500 MG: 5 INJECTION, SOLUTION INTRAVENOUS at 23:50

## 2025-05-16 RX ADMIN — SODIUM CHLORIDE, PRESERVATIVE FREE 10 ML: 5 INJECTION INTRAVENOUS at 21:19

## 2025-05-16 RX ADMIN — BACITRACIN 1 PACKET: 500 OINTMENT TOPICAL at 12:51

## 2025-05-16 RX ADMIN — CYANOCOBALAMIN TAB 500 MCG 500 MCG: 500 TAB at 08:23

## 2025-05-16 RX ADMIN — ENOXAPARIN SODIUM 40 MG: 100 INJECTION SUBCUTANEOUS at 08:23

## 2025-05-16 RX ADMIN — DIBASIC SODIUM PHOSPHATE, MONOBASIC POTASSIUM PHOSPHATE AND MONOBASIC SODIUM PHOSPHATE 2 TABLET: 852; 155; 130 TABLET ORAL at 12:50

## 2025-05-16 RX ADMIN — IPRATROPIUM BROMIDE AND ALBUTEROL SULFATE 1 DOSE: 2.5; .5 SOLUTION RESPIRATORY (INHALATION) at 16:16

## 2025-05-16 RX ADMIN — MIDODRINE HYDROCHLORIDE 15 MG: 5 TABLET ORAL at 21:18

## 2025-05-16 ASSESSMENT — PAIN SCALES - GENERAL
PAINLEVEL_OUTOF10: 8
PAINLEVEL_OUTOF10: 0

## 2025-05-16 ASSESSMENT — PAIN DESCRIPTION - LOCATION: LOCATION: BACK;COCCYX

## 2025-05-16 ASSESSMENT — PAIN DESCRIPTION - DESCRIPTORS: DESCRIPTORS: ACHING

## 2025-05-16 ASSESSMENT — PAIN DESCRIPTION - ORIENTATION: ORIENTATION: LEFT;RIGHT

## 2025-05-16 NOTE — ACP (ADVANCE CARE PLANNING)
Advance Care Planning   Healthcare Decision Maker:    Primary Decision Maker: YESENIA JAIMES - Child - [phone]    Secondary Decision Maker: Moe Triana - Child - [phone]    Supplemental (Other) Decision Maker: Schuyler,Liz - Other - [phone]      Palliative Medicine assisted in completing AMD - the patient is clear she trusts her daughter Yesenia as primary Medical POA - Secondary is son Moe, Tertiary / third option is close friend, Latesha Payan.     Original and copies left at bedside.     Only completed Healthcare Agent portion today as patient is tired after working with therapies - and we had extensive goals of care yesterday 5/15 w/ family where patient verbalized her wishes and preferences - did not burden her with this again today given her clinical status.     Goals are clear for full restorative measures - see family meeting note from 5/15 - would not want to be in a vegetative state and would not want long-term on ventilator support if not a bridge to recovery.     Thank you for including Palliative Medicine in the care of Ce Armando Hickman LCSW

## 2025-05-17 LAB
ANION GAP SERPL CALC-SCNC: 3 MMOL/L (ref 2–12)
BUN SERPL-MCNC: 10 MG/DL (ref 6–20)
BUN/CREAT SERPL: 30 (ref 12–20)
CALCIUM SERPL-MCNC: 8.2 MG/DL (ref 8.5–10.1)
CHLORIDE SERPL-SCNC: 100 MMOL/L (ref 97–108)
CO2 SERPL-SCNC: 39 MMOL/L (ref 21–32)
CREAT SERPL-MCNC: 0.33 MG/DL (ref 0.55–1.02)
ERYTHROCYTE [DISTWIDTH] IN BLOOD BY AUTOMATED COUNT: 17.9 % (ref 11.5–14.5)
GLUCOSE BLD STRIP.AUTO-MCNC: 117 MG/DL (ref 65–117)
GLUCOSE SERPL-MCNC: 101 MG/DL (ref 65–100)
HCT VFR BLD AUTO: 24.6 % (ref 35–47)
HGB BLD-MCNC: 7.9 G/DL (ref 11.5–16)
MAGNESIUM SERPL-MCNC: 2.2 MG/DL (ref 1.6–2.4)
MCH RBC QN AUTO: 31.5 PG (ref 26–34)
MCHC RBC AUTO-ENTMCNC: 32.1 G/DL (ref 30–36.5)
MCV RBC AUTO: 98 FL (ref 80–99)
NRBC # BLD: 0.03 K/UL (ref 0–0.01)
NRBC BLD-RTO: 0.2 PER 100 WBC
PHOSPHATE SERPL-MCNC: 2.1 MG/DL (ref 2.6–4.7)
PLATELET # BLD AUTO: 220 K/UL (ref 150–400)
PMV BLD AUTO: 11.5 FL (ref 8.9–12.9)
POTASSIUM SERPL-SCNC: 3.2 MMOL/L (ref 3.5–5.1)
RBC # BLD AUTO: 2.51 M/UL (ref 3.8–5.2)
SERVICE CMNT-IMP: NORMAL
SODIUM SERPL-SCNC: 142 MMOL/L (ref 136–145)
WBC # BLD AUTO: 12.6 K/UL (ref 3.6–11)

## 2025-05-17 PROCEDURE — 2580000003 HC RX 258: Performed by: SURGERY

## 2025-05-17 PROCEDURE — 6360000002 HC RX W HCPCS: Performed by: SURGERY

## 2025-05-17 PROCEDURE — 82962 GLUCOSE BLOOD TEST: CPT

## 2025-05-17 PROCEDURE — 6360000002 HC RX W HCPCS: Performed by: INTERNAL MEDICINE

## 2025-05-17 PROCEDURE — 80048 BASIC METABOLIC PNL TOTAL CA: CPT

## 2025-05-17 PROCEDURE — 2500000003 HC RX 250 WO HCPCS: Performed by: SURGERY

## 2025-05-17 PROCEDURE — 85027 COMPLETE CBC AUTOMATED: CPT

## 2025-05-17 PROCEDURE — 6370000000 HC RX 637 (ALT 250 FOR IP): Performed by: INTERNAL MEDICINE

## 2025-05-17 PROCEDURE — 94660 CPAP INITIATION&MGMT: CPT

## 2025-05-17 PROCEDURE — 94640 AIRWAY INHALATION TREATMENT: CPT

## 2025-05-17 PROCEDURE — 83735 ASSAY OF MAGNESIUM: CPT

## 2025-05-17 PROCEDURE — 6370000000 HC RX 637 (ALT 250 FOR IP): Performed by: NURSE PRACTITIONER

## 2025-05-17 PROCEDURE — 2000000000 HC ICU R&B

## 2025-05-17 PROCEDURE — 6370000000 HC RX 637 (ALT 250 FOR IP): Performed by: PHYSICIAN ASSISTANT

## 2025-05-17 PROCEDURE — 94761 N-INVAS EAR/PLS OXIMETRY MLT: CPT

## 2025-05-17 PROCEDURE — 84100 ASSAY OF PHOSPHORUS: CPT

## 2025-05-17 PROCEDURE — 2500000003 HC RX 250 WO HCPCS: Performed by: INTERNAL MEDICINE

## 2025-05-17 RX ADMIN — CITALOPRAM HYDROBROMIDE 10 MG: 20 TABLET ORAL at 08:31

## 2025-05-17 RX ADMIN — DIBASIC SODIUM PHOSPHATE, MONOBASIC POTASSIUM PHOSPHATE AND MONOBASIC SODIUM PHOSPHATE 2 TABLET: 852; 155; 130 TABLET ORAL at 22:16

## 2025-05-17 RX ADMIN — DIBASIC SODIUM PHOSPHATE, MONOBASIC POTASSIUM PHOSPHATE AND MONOBASIC SODIUM PHOSPHATE 2 TABLET: 852; 155; 130 TABLET ORAL at 08:31

## 2025-05-17 RX ADMIN — Medication 1 MG: at 08:30

## 2025-05-17 RX ADMIN — LANSOPRAZOLE 30 MG: 30 TABLET, ORALLY DISINTEGRATING ORAL at 05:22

## 2025-05-17 RX ADMIN — MIDODRINE HYDROCHLORIDE 15 MG: 5 TABLET ORAL at 05:22

## 2025-05-17 RX ADMIN — MIDODRINE HYDROCHLORIDE 15 MG: 5 TABLET ORAL at 08:32

## 2025-05-17 RX ADMIN — DIBASIC SODIUM PHOSPHATE, MONOBASIC POTASSIUM PHOSPHATE AND MONOBASIC SODIUM PHOSPHATE 2 TABLET: 852; 155; 130 TABLET ORAL at 12:25

## 2025-05-17 RX ADMIN — ONDANSETRON 4 MG: 2 INJECTION, SOLUTION INTRAMUSCULAR; INTRAVENOUS at 11:08

## 2025-05-17 RX ADMIN — ENOXAPARIN SODIUM 40 MG: 100 INJECTION SUBCUTANEOUS at 08:32

## 2025-05-17 RX ADMIN — METRONIDAZOLE 500 MG: 5 INJECTION, SOLUTION INTRAVENOUS at 15:17

## 2025-05-17 RX ADMIN — Medication 100 MG: at 08:31

## 2025-05-17 RX ADMIN — MIDODRINE HYDROCHLORIDE 15 MG: 5 TABLET ORAL at 12:25

## 2025-05-17 RX ADMIN — ACETAMINOPHEN 650 MG: 325 TABLET ORAL at 22:45

## 2025-05-17 RX ADMIN — METRONIDAZOLE 500 MG: 5 INJECTION, SOLUTION INTRAVENOUS at 08:33

## 2025-05-17 RX ADMIN — SODIUM CHLORIDE, PRESERVATIVE FREE 10 ML: 5 INJECTION INTRAVENOUS at 22:18

## 2025-05-17 RX ADMIN — LOPERAMIDE HCL 2 MG: 1 SOLUTION ORAL at 05:54

## 2025-05-17 RX ADMIN — IPRATROPIUM BROMIDE AND ALBUTEROL SULFATE 1 DOSE: 2.5; .5 SOLUTION RESPIRATORY (INHALATION) at 14:19

## 2025-05-17 RX ADMIN — CYANOCOBALAMIN TAB 500 MCG 500 MCG: 500 TAB at 08:32

## 2025-05-17 RX ADMIN — POTASSIUM BICARBONATE 40 MEQ: 782 TABLET, EFFERVESCENT ORAL at 08:30

## 2025-05-17 RX ADMIN — DIBASIC SODIUM PHOSPHATE, MONOBASIC POTASSIUM PHOSPHATE AND MONOBASIC SODIUM PHOSPHATE 2 TABLET: 852; 155; 130 TABLET ORAL at 16:36

## 2025-05-17 RX ADMIN — GABAPENTIN 300 MG: 600 TABLET, FILM COATED ORAL at 22:17

## 2025-05-17 RX ADMIN — Medication: at 22:17

## 2025-05-17 RX ADMIN — Medication 1000 UNITS: at 08:31

## 2025-05-17 RX ADMIN — MIDODRINE HYDROCHLORIDE 15 MG: 5 TABLET ORAL at 22:16

## 2025-05-17 RX ADMIN — IPRATROPIUM BROMIDE AND ALBUTEROL SULFATE 1 DOSE: 2.5; .5 SOLUTION RESPIRATORY (INHALATION) at 08:25

## 2025-05-17 RX ADMIN — WATER 2000 MG: 1 INJECTION INTRAMUSCULAR; INTRAVENOUS; SUBCUTANEOUS at 10:00

## 2025-05-17 RX ADMIN — AMITRIPTYLINE HYDROCHLORIDE 10 MG: 10 TABLET, FILM COATED ORAL at 22:17

## 2025-05-17 RX ADMIN — GABAPENTIN 300 MG: 600 TABLET, FILM COATED ORAL at 08:31

## 2025-05-17 RX ADMIN — IPRATROPIUM BROMIDE AND ALBUTEROL SULFATE 1 DOSE: 2.5; .5 SOLUTION RESPIRATORY (INHALATION) at 00:34

## 2025-05-17 RX ADMIN — IPRATROPIUM BROMIDE AND ALBUTEROL SULFATE 1 DOSE: 2.5; .5 SOLUTION RESPIRATORY (INHALATION) at 19:21

## 2025-05-17 RX ADMIN — METRONIDAZOLE 500 MG: 5 INJECTION, SOLUTION INTRAVENOUS at 22:47

## 2025-05-17 RX ADMIN — SODIUM CHLORIDE, PRESERVATIVE FREE 10 ML: 5 INJECTION INTRAVENOUS at 08:34

## 2025-05-17 RX ADMIN — SODIUM PHOSPHATE, MONOBASIC, MONOHYDRATE AND SODIUM PHOSPHATE, DIBASIC, ANHYDROUS 15 MMOL: 142; 276 INJECTION, SOLUTION INTRAVENOUS at 08:42

## 2025-05-17 RX ADMIN — FLUDROCORTISONE ACETATE 0.05 MG: 0.1 TABLET ORAL at 08:30

## 2025-05-17 RX ADMIN — ONDANSETRON 4 MG: 2 INJECTION, SOLUTION INTRAMUSCULAR; INTRAVENOUS at 21:00

## 2025-05-17 RX ADMIN — Medication: at 08:33

## 2025-05-17 ASSESSMENT — PAIN SCALES - GENERAL
PAINLEVEL_OUTOF10: 0
PAINLEVEL_OUTOF10: 7
PAINLEVEL_OUTOF10: 0
PAINLEVEL_OUTOF10: 0

## 2025-05-17 ASSESSMENT — PAIN DESCRIPTION - LOCATION: LOCATION: GENERALIZED

## 2025-05-18 LAB
ANION GAP SERPL CALC-SCNC: 3 MMOL/L (ref 2–12)
BUN SERPL-MCNC: 10 MG/DL (ref 6–20)
BUN/CREAT SERPL: 36 (ref 12–20)
CALCIUM SERPL-MCNC: 8.3 MG/DL (ref 8.5–10.1)
CHLORIDE SERPL-SCNC: 103 MMOL/L (ref 97–108)
CO2 SERPL-SCNC: 36 MMOL/L (ref 21–32)
CREAT SERPL-MCNC: 0.28 MG/DL (ref 0.55–1.02)
ERYTHROCYTE [DISTWIDTH] IN BLOOD BY AUTOMATED COUNT: 18.6 % (ref 11.5–14.5)
GLUCOSE SERPL-MCNC: 98 MG/DL (ref 65–100)
HCT VFR BLD AUTO: 25.6 % (ref 35–47)
HGB BLD-MCNC: 8 G/DL (ref 11.5–16)
MAGNESIUM SERPL-MCNC: 1.9 MG/DL (ref 1.6–2.4)
MCH RBC QN AUTO: 31.7 PG (ref 26–34)
MCHC RBC AUTO-ENTMCNC: 31.3 G/DL (ref 30–36.5)
MCV RBC AUTO: 101.6 FL (ref 80–99)
NRBC # BLD: 0.03 K/UL (ref 0–0.01)
NRBC BLD-RTO: 0.2 PER 100 WBC
PHOSPHATE SERPL-MCNC: 2.7 MG/DL (ref 2.6–4.7)
PHOSPHATE SERPL-MCNC: 2.7 MG/DL (ref 2.6–4.7)
PLATELET # BLD AUTO: 253 K/UL (ref 150–400)
PMV BLD AUTO: 12.2 FL (ref 8.9–12.9)
POTASSIUM SERPL-SCNC: 3.5 MMOL/L (ref 3.5–5.1)
RBC # BLD AUTO: 2.52 M/UL (ref 3.8–5.2)
SODIUM SERPL-SCNC: 142 MMOL/L (ref 136–145)
WBC # BLD AUTO: 12.6 K/UL (ref 3.6–11)

## 2025-05-18 PROCEDURE — 6370000000 HC RX 637 (ALT 250 FOR IP): Performed by: PHYSICIAN ASSISTANT

## 2025-05-18 PROCEDURE — 80048 BASIC METABOLIC PNL TOTAL CA: CPT

## 2025-05-18 PROCEDURE — 6360000002 HC RX W HCPCS: Performed by: INTERNAL MEDICINE

## 2025-05-18 PROCEDURE — 94660 CPAP INITIATION&MGMT: CPT

## 2025-05-18 PROCEDURE — 94640 AIRWAY INHALATION TREATMENT: CPT

## 2025-05-18 PROCEDURE — 6370000000 HC RX 637 (ALT 250 FOR IP): Performed by: NURSE PRACTITIONER

## 2025-05-18 PROCEDURE — 2500000003 HC RX 250 WO HCPCS: Performed by: INTERNAL MEDICINE

## 2025-05-18 PROCEDURE — 83735 ASSAY OF MAGNESIUM: CPT

## 2025-05-18 PROCEDURE — 94761 N-INVAS EAR/PLS OXIMETRY MLT: CPT

## 2025-05-18 PROCEDURE — 6360000002 HC RX W HCPCS: Performed by: SURGERY

## 2025-05-18 PROCEDURE — 2000000000 HC ICU R&B

## 2025-05-18 PROCEDURE — 2500000003 HC RX 250 WO HCPCS: Performed by: SURGERY

## 2025-05-18 PROCEDURE — 6370000000 HC RX 637 (ALT 250 FOR IP): Performed by: INTERNAL MEDICINE

## 2025-05-18 RX ADMIN — LOPERAMIDE HCL 2 MG: 1 SOLUTION ORAL at 04:23

## 2025-05-18 RX ADMIN — IPRATROPIUM BROMIDE AND ALBUTEROL SULFATE 1 DOSE: 2.5; .5 SOLUTION RESPIRATORY (INHALATION) at 19:48

## 2025-05-18 RX ADMIN — CYANOCOBALAMIN TAB 500 MCG 500 MCG: 500 TAB at 08:09

## 2025-05-18 RX ADMIN — IPRATROPIUM BROMIDE AND ALBUTEROL SULFATE 1 DOSE: 2.5; .5 SOLUTION RESPIRATORY (INHALATION) at 14:26

## 2025-05-18 RX ADMIN — METRONIDAZOLE 500 MG: 5 INJECTION, SOLUTION INTRAVENOUS at 14:43

## 2025-05-18 RX ADMIN — MIDODRINE HYDROCHLORIDE 15 MG: 5 TABLET ORAL at 08:09

## 2025-05-18 RX ADMIN — SODIUM CHLORIDE, PRESERVATIVE FREE 10 ML: 5 INJECTION INTRAVENOUS at 21:46

## 2025-05-18 RX ADMIN — GABAPENTIN 300 MG: 600 TABLET, FILM COATED ORAL at 21:45

## 2025-05-18 RX ADMIN — LANSOPRAZOLE 30 MG: 30 TABLET, ORALLY DISINTEGRATING ORAL at 08:09

## 2025-05-18 RX ADMIN — WATER 2000 MG: 1 INJECTION INTRAMUSCULAR; INTRAVENOUS; SUBCUTANEOUS at 10:18

## 2025-05-18 RX ADMIN — MIDODRINE HYDROCHLORIDE 15 MG: 5 TABLET ORAL at 21:45

## 2025-05-18 RX ADMIN — HYDROCODONE BITARTRATE AND ACETAMINOPHEN 1 TABLET: 5; 325 TABLET ORAL at 12:27

## 2025-05-18 RX ADMIN — ACETAMINOPHEN 650 MG: 325 TABLET ORAL at 18:45

## 2025-05-18 RX ADMIN — ONDANSETRON 4 MG: 2 INJECTION, SOLUTION INTRAMUSCULAR; INTRAVENOUS at 05:00

## 2025-05-18 RX ADMIN — SODIUM CHLORIDE, PRESERVATIVE FREE 10 ML: 5 INJECTION INTRAVENOUS at 08:14

## 2025-05-18 RX ADMIN — DIBASIC SODIUM PHOSPHATE, MONOBASIC POTASSIUM PHOSPHATE AND MONOBASIC SODIUM PHOSPHATE 2 TABLET: 852; 155; 130 TABLET ORAL at 08:08

## 2025-05-18 RX ADMIN — FUROSEMIDE 20 MG: 10 INJECTION, SOLUTION INTRAMUSCULAR; INTRAVENOUS at 17:19

## 2025-05-18 RX ADMIN — ENOXAPARIN SODIUM 40 MG: 100 INJECTION SUBCUTANEOUS at 08:43

## 2025-05-18 RX ADMIN — Medication 100 MG: at 08:09

## 2025-05-18 RX ADMIN — CYCLOBENZAPRINE 10 MG: 10 TABLET, FILM COATED ORAL at 14:46

## 2025-05-18 RX ADMIN — AMITRIPTYLINE HYDROCHLORIDE 10 MG: 10 TABLET, FILM COATED ORAL at 21:45

## 2025-05-18 RX ADMIN — METRONIDAZOLE 500 MG: 5 INJECTION, SOLUTION INTRAVENOUS at 08:14

## 2025-05-18 RX ADMIN — CITALOPRAM HYDROBROMIDE 10 MG: 20 TABLET ORAL at 08:09

## 2025-05-18 RX ADMIN — DIBASIC SODIUM PHOSPHATE, MONOBASIC POTASSIUM PHOSPHATE AND MONOBASIC SODIUM PHOSPHATE 2 TABLET: 852; 155; 130 TABLET ORAL at 21:45

## 2025-05-18 RX ADMIN — Medication: at 08:43

## 2025-05-18 RX ADMIN — Medication 1 MG: at 08:09

## 2025-05-18 RX ADMIN — Medication 1000 UNITS: at 08:09

## 2025-05-18 RX ADMIN — GABAPENTIN 300 MG: 600 TABLET, FILM COATED ORAL at 08:10

## 2025-05-18 RX ADMIN — DIBASIC SODIUM PHOSPHATE, MONOBASIC POTASSIUM PHOSPHATE AND MONOBASIC SODIUM PHOSPHATE 2 TABLET: 852; 155; 130 TABLET ORAL at 12:27

## 2025-05-18 RX ADMIN — IPRATROPIUM BROMIDE AND ALBUTEROL SULFATE 1 DOSE: 2.5; .5 SOLUTION RESPIRATORY (INHALATION) at 08:06

## 2025-05-18 RX ADMIN — HYDROCODONE BITARTRATE AND ACETAMINOPHEN 1 TABLET: 5; 325 TABLET ORAL at 18:51

## 2025-05-18 RX ADMIN — HYDROCODONE BITARTRATE AND ACETAMINOPHEN 1 TABLET: 5; 325 TABLET ORAL at 05:00

## 2025-05-18 RX ADMIN — DIBASIC SODIUM PHOSPHATE, MONOBASIC POTASSIUM PHOSPHATE AND MONOBASIC SODIUM PHOSPHATE 2 TABLET: 852; 155; 130 TABLET ORAL at 17:19

## 2025-05-18 RX ADMIN — IPRATROPIUM BROMIDE AND ALBUTEROL SULFATE 1 DOSE: 2.5; .5 SOLUTION RESPIRATORY (INHALATION) at 00:20

## 2025-05-18 RX ADMIN — MIDODRINE HYDROCHLORIDE 15 MG: 5 TABLET ORAL at 14:41

## 2025-05-18 RX ADMIN — MIDODRINE HYDROCHLORIDE 15 MG: 5 TABLET ORAL at 04:55

## 2025-05-18 RX ADMIN — FUROSEMIDE 20 MG: 10 INJECTION, SOLUTION INTRAMUSCULAR; INTRAVENOUS at 08:10

## 2025-05-18 RX ADMIN — Medication: at 21:46

## 2025-05-18 ASSESSMENT — PAIN DESCRIPTION - DESCRIPTORS
DESCRIPTORS: ACHING

## 2025-05-18 ASSESSMENT — PAIN SCALES - GENERAL
PAINLEVEL_OUTOF10: 7
PAINLEVEL_OUTOF10: 8
PAINLEVEL_OUTOF10: 10
PAINLEVEL_OUTOF10: 6
PAINLEVEL_OUTOF10: 7

## 2025-05-18 ASSESSMENT — PAIN DESCRIPTION - LOCATION
LOCATION: GENERALIZED
LOCATION: GENERALIZED
LOCATION: HEAD
LOCATION: GENERALIZED
LOCATION: HEAD

## 2025-05-18 ASSESSMENT — PAIN DESCRIPTION - PAIN TYPE
TYPE: CHRONIC PAIN
TYPE: CHRONIC PAIN
TYPE: ACUTE PAIN
TYPE: CHRONIC PAIN

## 2025-05-18 ASSESSMENT — PAIN DESCRIPTION - ORIENTATION: ORIENTATION: ANTERIOR

## 2025-05-18 ASSESSMENT — PAIN DESCRIPTION - FREQUENCY: FREQUENCY: CONTINUOUS

## 2025-05-18 ASSESSMENT — PAIN DESCRIPTION - ONSET
ONSET: ON-GOING
ONSET: ON-GOING

## 2025-05-19 LAB
ALBUMIN SERPL-MCNC: 1.8 G/DL (ref 3.5–5)
ALBUMIN/GLOB SERPL: 0.4 (ref 1.1–2.2)
ALP SERPL-CCNC: 126 U/L (ref 45–117)
ALT SERPL-CCNC: 19 U/L (ref 12–78)
ANION GAP SERPL CALC-SCNC: 3 MMOL/L (ref 2–12)
AST SERPL-CCNC: 42 U/L (ref 15–37)
BILIRUB DIRECT SERPL-MCNC: 0.2 MG/DL (ref 0–0.2)
BILIRUB SERPL-MCNC: 0.4 MG/DL (ref 0.2–1)
BUN SERPL-MCNC: 12 MG/DL (ref 6–20)
BUN/CREAT SERPL: 39 (ref 12–20)
CALCIUM SERPL-MCNC: 8.1 MG/DL (ref 8.5–10.1)
CHLORIDE SERPL-SCNC: 99 MMOL/L (ref 97–108)
CO2 SERPL-SCNC: 38 MMOL/L (ref 21–32)
CREAT SERPL-MCNC: 0.31 MG/DL (ref 0.55–1.02)
ERYTHROCYTE [DISTWIDTH] IN BLOOD BY AUTOMATED COUNT: 19.1 % (ref 11.5–14.5)
GLOBULIN SER CALC-MCNC: 4.2 G/DL (ref 2–4)
GLUCOSE BLD STRIP.AUTO-MCNC: 88 MG/DL (ref 65–117)
GLUCOSE SERPL-MCNC: 110 MG/DL (ref 65–100)
HCT VFR BLD AUTO: 24.2 % (ref 35–47)
HGB BLD-MCNC: 7.8 G/DL (ref 11.5–16)
MAGNESIUM SERPL-MCNC: 1.8 MG/DL (ref 1.6–2.4)
MCH RBC QN AUTO: 32.2 PG (ref 26–34)
MCHC RBC AUTO-ENTMCNC: 32.2 G/DL (ref 30–36.5)
MCV RBC AUTO: 100 FL (ref 80–99)
NRBC # BLD: 0.02 K/UL (ref 0–0.01)
NRBC BLD-RTO: 0.2 PER 100 WBC
PHOSPHATE SERPL-MCNC: 2.6 MG/DL (ref 2.6–4.7)
PLATELET # BLD AUTO: 262 K/UL (ref 150–400)
PMV BLD AUTO: 11.3 FL (ref 8.9–12.9)
POTASSIUM SERPL-SCNC: 3.1 MMOL/L (ref 3.5–5.1)
PROT SERPL-MCNC: 6 G/DL (ref 6.4–8.2)
RBC # BLD AUTO: 2.42 M/UL (ref 3.8–5.2)
SERVICE CMNT-IMP: NORMAL
SODIUM SERPL-SCNC: 140 MMOL/L (ref 136–145)
WBC # BLD AUTO: 11.5 K/UL (ref 3.6–11)

## 2025-05-19 PROCEDURE — 97535 SELF CARE MNGMENT TRAINING: CPT

## 2025-05-19 PROCEDURE — 6370000000 HC RX 637 (ALT 250 FOR IP): Performed by: PHYSICIAN ASSISTANT

## 2025-05-19 PROCEDURE — P9045 ALBUMIN (HUMAN), 5%, 250 ML: HCPCS | Performed by: ANESTHESIOLOGY

## 2025-05-19 PROCEDURE — 82962 GLUCOSE BLOOD TEST: CPT

## 2025-05-19 PROCEDURE — 80076 HEPATIC FUNCTION PANEL: CPT

## 2025-05-19 PROCEDURE — 85027 COMPLETE CBC AUTOMATED: CPT

## 2025-05-19 PROCEDURE — 6370000000 HC RX 637 (ALT 250 FOR IP): Performed by: ANESTHESIOLOGY

## 2025-05-19 PROCEDURE — 6360000002 HC RX W HCPCS: Performed by: ANESTHESIOLOGY

## 2025-05-19 PROCEDURE — 6370000000 HC RX 637 (ALT 250 FOR IP): Performed by: INTERNAL MEDICINE

## 2025-05-19 PROCEDURE — 83735 ASSAY OF MAGNESIUM: CPT

## 2025-05-19 PROCEDURE — 94660 CPAP INITIATION&MGMT: CPT

## 2025-05-19 PROCEDURE — 97530 THERAPEUTIC ACTIVITIES: CPT

## 2025-05-19 PROCEDURE — 2700000000 HC OXYGEN THERAPY PER DAY

## 2025-05-19 PROCEDURE — 94640 AIRWAY INHALATION TREATMENT: CPT

## 2025-05-19 PROCEDURE — 2000000000 HC ICU R&B

## 2025-05-19 PROCEDURE — 99024 POSTOP FOLLOW-UP VISIT: CPT | Performed by: SURGERY

## 2025-05-19 PROCEDURE — 92612 ENDOSCOPY SWALLOW (FEES) VID: CPT

## 2025-05-19 PROCEDURE — 80048 BASIC METABOLIC PNL TOTAL CA: CPT

## 2025-05-19 PROCEDURE — 2500000003 HC RX 250 WO HCPCS: Performed by: SURGERY

## 2025-05-19 PROCEDURE — 92526 ORAL FUNCTION THERAPY: CPT

## 2025-05-19 PROCEDURE — 6360000002 HC RX W HCPCS: Performed by: INTERNAL MEDICINE

## 2025-05-19 PROCEDURE — 6360000002 HC RX W HCPCS: Performed by: SURGERY

## 2025-05-19 PROCEDURE — 84100 ASSAY OF PHOSPHORUS: CPT

## 2025-05-19 PROCEDURE — 6370000000 HC RX 637 (ALT 250 FOR IP): Performed by: NURSE PRACTITIONER

## 2025-05-19 RX ORDER — LANOLIN ALCOHOL/MO/W.PET/CERES
400 CREAM (GRAM) TOPICAL 2 TIMES DAILY
Status: COMPLETED | OUTPATIENT
Start: 2025-05-19 | End: 2025-05-21

## 2025-05-19 RX ORDER — ALBUMIN HUMAN 50 G/1000ML
25 SOLUTION INTRAVENOUS ONCE
Status: COMPLETED | OUTPATIENT
Start: 2025-05-19 | End: 2025-05-19

## 2025-05-19 RX ORDER — IPRATROPIUM BROMIDE AND ALBUTEROL SULFATE 2.5; .5 MG/3ML; MG/3ML
1 SOLUTION RESPIRATORY (INHALATION)
Status: DISCONTINUED | OUTPATIENT
Start: 2025-05-19 | End: 2025-05-29

## 2025-05-19 RX ADMIN — LANSOPRAZOLE 30 MG: 30 TABLET, ORALLY DISINTEGRATING ORAL at 06:38

## 2025-05-19 RX ADMIN — DIBASIC SODIUM PHOSPHATE, MONOBASIC POTASSIUM PHOSPHATE AND MONOBASIC SODIUM PHOSPHATE 2 TABLET: 852; 155; 130 TABLET ORAL at 08:34

## 2025-05-19 RX ADMIN — POTASSIUM BICARBONATE 40 MEQ: 782 TABLET, EFFERVESCENT ORAL at 08:34

## 2025-05-19 RX ADMIN — MIDODRINE HYDROCHLORIDE 15 MG: 5 TABLET ORAL at 14:11

## 2025-05-19 RX ADMIN — GABAPENTIN 300 MG: 600 TABLET, FILM COATED ORAL at 21:03

## 2025-05-19 RX ADMIN — Medication 1 MG: at 08:34

## 2025-05-19 RX ADMIN — HYDROCODONE BITARTRATE AND ACETAMINOPHEN 1 TABLET: 5; 325 TABLET ORAL at 06:38

## 2025-05-19 RX ADMIN — DIBASIC SODIUM PHOSPHATE, MONOBASIC POTASSIUM PHOSPHATE AND MONOBASIC SODIUM PHOSPHATE 2 TABLET: 852; 155; 130 TABLET ORAL at 13:11

## 2025-05-19 RX ADMIN — IPRATROPIUM BROMIDE AND ALBUTEROL SULFATE 1 DOSE: 2.5; .5 SOLUTION RESPIRATORY (INHALATION) at 08:47

## 2025-05-19 RX ADMIN — MIDODRINE HYDROCHLORIDE 15 MG: 5 TABLET ORAL at 08:34

## 2025-05-19 RX ADMIN — ONDANSETRON 4 MG: 2 INJECTION, SOLUTION INTRAMUSCULAR; INTRAVENOUS at 14:10

## 2025-05-19 RX ADMIN — FUROSEMIDE 20 MG: 10 INJECTION, SOLUTION INTRAMUSCULAR; INTRAVENOUS at 18:13

## 2025-05-19 RX ADMIN — Medication: at 08:37

## 2025-05-19 RX ADMIN — CYANOCOBALAMIN TAB 500 MCG 500 MCG: 500 TAB at 08:34

## 2025-05-19 RX ADMIN — MAGNESIUM OXIDE TAB 400 MG (241.3 MG ELEMENTAL MG) 400 MG: 400 (241.3 MG) TAB at 21:03

## 2025-05-19 RX ADMIN — ACETAMINOPHEN 650 MG: 325 TABLET ORAL at 06:38

## 2025-05-19 RX ADMIN — MIDODRINE HYDROCHLORIDE 15 MG: 5 TABLET ORAL at 21:02

## 2025-05-19 RX ADMIN — Medication 1000 UNITS: at 08:33

## 2025-05-19 RX ADMIN — LOPERAMIDE HCL 2 MG: 1 SOLUTION ORAL at 02:44

## 2025-05-19 RX ADMIN — CITALOPRAM HYDROBROMIDE 10 MG: 20 TABLET ORAL at 08:33

## 2025-05-19 RX ADMIN — ENOXAPARIN SODIUM 40 MG: 100 INJECTION SUBCUTANEOUS at 08:33

## 2025-05-19 RX ADMIN — AMITRIPTYLINE HYDROCHLORIDE 10 MG: 10 TABLET, FILM COATED ORAL at 21:02

## 2025-05-19 RX ADMIN — DIBASIC SODIUM PHOSPHATE, MONOBASIC POTASSIUM PHOSPHATE AND MONOBASIC SODIUM PHOSPHATE 2 TABLET: 852; 155; 130 TABLET ORAL at 21:03

## 2025-05-19 RX ADMIN — GABAPENTIN 300 MG: 600 TABLET, FILM COATED ORAL at 08:34

## 2025-05-19 RX ADMIN — SODIUM CHLORIDE, PRESERVATIVE FREE 10 ML: 5 INJECTION INTRAVENOUS at 21:03

## 2025-05-19 RX ADMIN — ALBUMIN (HUMAN) 25 G: 12.5 INJECTION, SOLUTION INTRAVENOUS at 14:22

## 2025-05-19 RX ADMIN — IPRATROPIUM BROMIDE AND ALBUTEROL SULFATE 1 DOSE: 2.5; .5 SOLUTION RESPIRATORY (INHALATION) at 03:10

## 2025-05-19 RX ADMIN — MIDODRINE HYDROCHLORIDE 15 MG: 5 TABLET ORAL at 02:44

## 2025-05-19 RX ADMIN — POTASSIUM BICARBONATE 40 MEQ: 782 TABLET, EFFERVESCENT ORAL at 17:31

## 2025-05-19 RX ADMIN — METRONIDAZOLE 500 MG: 5 INJECTION, SOLUTION INTRAVENOUS at 00:34

## 2025-05-19 RX ADMIN — Medication 100 MG: at 08:34

## 2025-05-19 RX ADMIN — HYDROCODONE BITARTRATE AND ACETAMINOPHEN 1 TABLET: 5; 325 TABLET ORAL at 21:02

## 2025-05-19 RX ADMIN — FUROSEMIDE 20 MG: 10 INJECTION, SOLUTION INTRAMUSCULAR; INTRAVENOUS at 08:33

## 2025-05-19 RX ADMIN — DIBASIC SODIUM PHOSPHATE, MONOBASIC POTASSIUM PHOSPHATE AND MONOBASIC SODIUM PHOSPHATE 2 TABLET: 852; 155; 130 TABLET ORAL at 17:30

## 2025-05-19 RX ADMIN — ACETAMINOPHEN 650 MG: 325 TABLET ORAL at 21:02

## 2025-05-19 RX ADMIN — Medication: at 21:03

## 2025-05-19 RX ADMIN — SODIUM CHLORIDE, PRESERVATIVE FREE 10 ML: 5 INJECTION INTRAVENOUS at 08:37

## 2025-05-19 RX ADMIN — IPRATROPIUM BROMIDE AND ALBUTEROL SULFATE 1 DOSE: .5; 3 SOLUTION RESPIRATORY (INHALATION) at 19:13

## 2025-05-19 ASSESSMENT — PAIN SCALES - GENERAL
PAINLEVEL_OUTOF10: 7
PAINLEVEL_OUTOF10: 8
PAINLEVEL_OUTOF10: 0
PAINLEVEL_OUTOF10: 5

## 2025-05-19 ASSESSMENT — PAIN DESCRIPTION - DESCRIPTORS
DESCRIPTORS: ACHING

## 2025-05-19 ASSESSMENT — PAIN SCALES - WONG BAKER: WONGBAKER_NUMERICALRESPONSE: NO HURT

## 2025-05-19 ASSESSMENT — PAIN DESCRIPTION - ORIENTATION
ORIENTATION: ANTERIOR
ORIENTATION: POSTERIOR

## 2025-05-19 ASSESSMENT — PAIN DESCRIPTION - LOCATION
LOCATION: GENERALIZED

## 2025-05-19 ASSESSMENT — PAIN DESCRIPTION - FREQUENCY: FREQUENCY: CONTINUOUS

## 2025-05-20 LAB
ANION GAP SERPL CALC-SCNC: 1 MMOL/L (ref 2–12)
BUN SERPL-MCNC: 11 MG/DL (ref 6–20)
BUN/CREAT SERPL: 48 (ref 12–20)
CALCIUM SERPL-MCNC: 7.7 MG/DL (ref 8.5–10.1)
CHLORIDE SERPL-SCNC: 104 MMOL/L (ref 97–108)
CO2 SERPL-SCNC: 36 MMOL/L (ref 21–32)
CREAT SERPL-MCNC: 0.23 MG/DL (ref 0.55–1.02)
ERYTHROCYTE [DISTWIDTH] IN BLOOD BY AUTOMATED COUNT: 19.9 % (ref 11.5–14.5)
GLUCOSE BLD STRIP.AUTO-MCNC: 88 MG/DL (ref 65–117)
GLUCOSE SERPL-MCNC: 78 MG/DL (ref 65–100)
HCT VFR BLD AUTO: 22.7 % (ref 35–47)
HGB BLD-MCNC: 7.1 G/DL (ref 11.5–16)
MAGNESIUM SERPL-MCNC: 1.7 MG/DL (ref 1.6–2.4)
MCH RBC QN AUTO: 32.3 PG (ref 26–34)
MCHC RBC AUTO-ENTMCNC: 31.3 G/DL (ref 30–36.5)
MCV RBC AUTO: 103.2 FL (ref 80–99)
NRBC # BLD: 0.02 K/UL (ref 0–0.01)
NRBC BLD-RTO: 0.2 PER 100 WBC
PHOSPHATE SERPL-MCNC: 2.4 MG/DL (ref 2.6–4.7)
PLATELET # BLD AUTO: 297 K/UL (ref 150–400)
PMV BLD AUTO: 11.3 FL (ref 8.9–12.9)
POTASSIUM SERPL-SCNC: 3.2 MMOL/L (ref 3.5–5.1)
RBC # BLD AUTO: 2.2 M/UL (ref 3.8–5.2)
SERVICE CMNT-IMP: NORMAL
SODIUM SERPL-SCNC: 141 MMOL/L (ref 136–145)
WBC # BLD AUTO: 10 K/UL (ref 3.6–11)

## 2025-05-20 PROCEDURE — 6360000002 HC RX W HCPCS: Performed by: SURGERY

## 2025-05-20 PROCEDURE — 6370000000 HC RX 637 (ALT 250 FOR IP): Performed by: INTERNAL MEDICINE

## 2025-05-20 PROCEDURE — 2000000000 HC ICU R&B

## 2025-05-20 PROCEDURE — 2580000003 HC RX 258: Performed by: SURGERY

## 2025-05-20 PROCEDURE — 84100 ASSAY OF PHOSPHORUS: CPT

## 2025-05-20 PROCEDURE — 2500000003 HC RX 250 WO HCPCS: Performed by: SURGERY

## 2025-05-20 PROCEDURE — 6370000000 HC RX 637 (ALT 250 FOR IP): Performed by: ANESTHESIOLOGY

## 2025-05-20 PROCEDURE — 92526 ORAL FUNCTION THERAPY: CPT

## 2025-05-20 PROCEDURE — 6360000002 HC RX W HCPCS: Performed by: INTERNAL MEDICINE

## 2025-05-20 PROCEDURE — 97530 THERAPEUTIC ACTIVITIES: CPT

## 2025-05-20 PROCEDURE — 99024 POSTOP FOLLOW-UP VISIT: CPT | Performed by: SURGERY

## 2025-05-20 PROCEDURE — 94760 N-INVAS EAR/PLS OXIMETRY 1: CPT

## 2025-05-20 PROCEDURE — 85027 COMPLETE CBC AUTOMATED: CPT

## 2025-05-20 PROCEDURE — 83735 ASSAY OF MAGNESIUM: CPT

## 2025-05-20 PROCEDURE — 80048 BASIC METABOLIC PNL TOTAL CA: CPT

## 2025-05-20 PROCEDURE — 2700000000 HC OXYGEN THERAPY PER DAY

## 2025-05-20 PROCEDURE — 94640 AIRWAY INHALATION TREATMENT: CPT

## 2025-05-20 PROCEDURE — 82962 GLUCOSE BLOOD TEST: CPT

## 2025-05-20 PROCEDURE — 6370000000 HC RX 637 (ALT 250 FOR IP): Performed by: NURSE PRACTITIONER

## 2025-05-20 RX ADMIN — Medication: at 08:24

## 2025-05-20 RX ADMIN — MIDODRINE HYDROCHLORIDE 15 MG: 5 TABLET ORAL at 08:25

## 2025-05-20 RX ADMIN — IPRATROPIUM BROMIDE AND ALBUTEROL SULFATE 1 DOSE: .5; 3 SOLUTION RESPIRATORY (INHALATION) at 08:05

## 2025-05-20 RX ADMIN — Medication 1000 UNITS: at 08:24

## 2025-05-20 RX ADMIN — Medication 100 MG: at 08:25

## 2025-05-20 RX ADMIN — ONDANSETRON 4 MG: 2 INJECTION, SOLUTION INTRAMUSCULAR; INTRAVENOUS at 18:37

## 2025-05-20 RX ADMIN — LOPERAMIDE HCL 2 MG: 1 SOLUTION ORAL at 21:06

## 2025-05-20 RX ADMIN — MIDODRINE HYDROCHLORIDE 15 MG: 5 TABLET ORAL at 20:16

## 2025-05-20 RX ADMIN — DIBASIC SODIUM PHOSPHATE, MONOBASIC POTASSIUM PHOSPHATE AND MONOBASIC SODIUM PHOSPHATE 2 TABLET: 852; 155; 130 TABLET ORAL at 16:48

## 2025-05-20 RX ADMIN — ENOXAPARIN SODIUM 40 MG: 100 INJECTION SUBCUTANEOUS at 08:22

## 2025-05-20 RX ADMIN — MIDODRINE HYDROCHLORIDE 15 MG: 5 TABLET ORAL at 13:27

## 2025-05-20 RX ADMIN — LOPERAMIDE HCL 2 MG: 1 SOLUTION ORAL at 02:08

## 2025-05-20 RX ADMIN — ONDANSETRON 4 MG: 2 INJECTION, SOLUTION INTRAMUSCULAR; INTRAVENOUS at 14:59

## 2025-05-20 RX ADMIN — CITALOPRAM HYDROBROMIDE 10 MG: 20 TABLET ORAL at 08:24

## 2025-05-20 RX ADMIN — DIBASIC SODIUM PHOSPHATE, MONOBASIC POTASSIUM PHOSPHATE AND MONOBASIC SODIUM PHOSPHATE 2 TABLET: 852; 155; 130 TABLET ORAL at 20:17

## 2025-05-20 RX ADMIN — DIBASIC SODIUM PHOSPHATE, MONOBASIC POTASSIUM PHOSPHATE AND MONOBASIC SODIUM PHOSPHATE 2 TABLET: 852; 155; 130 TABLET ORAL at 12:23

## 2025-05-20 RX ADMIN — DIBASIC SODIUM PHOSPHATE, MONOBASIC POTASSIUM PHOSPHATE AND MONOBASIC SODIUM PHOSPHATE 2 TABLET: 852; 155; 130 TABLET ORAL at 08:25

## 2025-05-20 RX ADMIN — GABAPENTIN 300 MG: 600 TABLET, FILM COATED ORAL at 08:22

## 2025-05-20 RX ADMIN — AMITRIPTYLINE HYDROCHLORIDE 10 MG: 10 TABLET, FILM COATED ORAL at 20:15

## 2025-05-20 RX ADMIN — IPRATROPIUM BROMIDE AND ALBUTEROL SULFATE 1 DOSE: .5; 3 SOLUTION RESPIRATORY (INHALATION) at 21:07

## 2025-05-20 RX ADMIN — LANSOPRAZOLE 30 MG: 30 TABLET, ORALLY DISINTEGRATING ORAL at 08:23

## 2025-05-20 RX ADMIN — MAGNESIUM OXIDE TAB 400 MG (241.3 MG ELEMENTAL MG) 400 MG: 400 (241.3 MG) TAB at 08:25

## 2025-05-20 RX ADMIN — HYDROCODONE BITARTRATE AND ACETAMINOPHEN 1 TABLET: 5; 325 TABLET ORAL at 12:23

## 2025-05-20 RX ADMIN — POTASSIUM BICARBONATE 40 MEQ: 782 TABLET, EFFERVESCENT ORAL at 08:43

## 2025-05-20 RX ADMIN — FUROSEMIDE 20 MG: 10 INJECTION, SOLUTION INTRAMUSCULAR; INTRAVENOUS at 08:22

## 2025-05-20 RX ADMIN — GABAPENTIN 300 MG: 600 TABLET, FILM COATED ORAL at 20:16

## 2025-05-20 RX ADMIN — MIDODRINE HYDROCHLORIDE 15 MG: 5 TABLET ORAL at 02:08

## 2025-05-20 RX ADMIN — MAGNESIUM OXIDE TAB 400 MG (241.3 MG ELEMENTAL MG) 400 MG: 400 (241.3 MG) TAB at 20:16

## 2025-05-20 RX ADMIN — SODIUM CHLORIDE, PRESERVATIVE FREE 10 ML: 5 INJECTION INTRAVENOUS at 20:18

## 2025-05-20 RX ADMIN — POTASSIUM BICARBONATE 40 MEQ: 782 TABLET, EFFERVESCENT ORAL at 08:22

## 2025-05-20 RX ADMIN — SODIUM CHLORIDE, PRESERVATIVE FREE 10 ML: 5 INJECTION INTRAVENOUS at 08:25

## 2025-05-20 RX ADMIN — ACETAMINOPHEN 650 MG: 325 TABLET ORAL at 12:22

## 2025-05-20 RX ADMIN — CYANOCOBALAMIN TAB 500 MCG 500 MCG: 500 TAB at 08:25

## 2025-05-20 RX ADMIN — FUROSEMIDE 20 MG: 10 INJECTION, SOLUTION INTRAMUSCULAR; INTRAVENOUS at 18:04

## 2025-05-20 RX ADMIN — CYCLOBENZAPRINE 10 MG: 10 TABLET, FILM COATED ORAL at 21:05

## 2025-05-20 RX ADMIN — Medication: at 20:15

## 2025-05-20 RX ADMIN — SODIUM PHOSPHATE, MONOBASIC, MONOHYDRATE AND SODIUM PHOSPHATE, DIBASIC, ANHYDROUS 15 MMOL: 142; 276 INJECTION, SOLUTION INTRAVENOUS at 04:44

## 2025-05-20 RX ADMIN — Medication 1 MG: at 08:25

## 2025-05-20 ASSESSMENT — PAIN DESCRIPTION - DESCRIPTORS
DESCRIPTORS: SHARP
DESCRIPTORS: ACHING

## 2025-05-20 ASSESSMENT — PAIN DESCRIPTION - ORIENTATION: ORIENTATION: POSTERIOR

## 2025-05-20 ASSESSMENT — PAIN SCALES - GENERAL
PAINLEVEL_OUTOF10: 0
PAINLEVEL_OUTOF10: 8
PAINLEVEL_OUTOF10: 10
PAINLEVEL_OUTOF10: 0

## 2025-05-20 ASSESSMENT — PAIN DESCRIPTION - LOCATION
LOCATION: BUTTOCKS
LOCATION: HEAD

## 2025-05-20 ASSESSMENT — PAIN SCALES - WONG BAKER: WONGBAKER_NUMERICALRESPONSE: NO HURT

## 2025-05-21 ENCOUNTER — APPOINTMENT (OUTPATIENT)
Facility: HOSPITAL | Age: 55
DRG: 689 | End: 2025-05-21
Payer: MEDICARE

## 2025-05-21 LAB
ALBUMIN SERPL-MCNC: 2.1 G/DL (ref 3.5–5)
ALBUMIN/GLOB SERPL: 0.5 (ref 1.1–2.2)
ALP SERPL-CCNC: 103 U/L (ref 45–117)
ALT SERPL-CCNC: 15 U/L (ref 12–78)
ANION GAP SERPL CALC-SCNC: 3 MMOL/L (ref 2–12)
ARTERIAL PATENCY WRIST A: POSITIVE
AST SERPL-CCNC: 24 U/L (ref 15–37)
BASE EXCESS BLD CALC-SCNC: 11 MMOL/L
BDY SITE: ABNORMAL
BILIRUB DIRECT SERPL-MCNC: 0.2 MG/DL (ref 0–0.2)
BILIRUB SERPL-MCNC: 0.5 MG/DL (ref 0.2–1)
BUN SERPL-MCNC: 18 MG/DL (ref 6–20)
BUN/CREAT SERPL: 86 (ref 12–20)
CALCIUM SERPL-MCNC: 8.7 MG/DL (ref 8.5–10.1)
CHLORIDE SERPL-SCNC: 103 MMOL/L (ref 97–108)
CO2 SERPL-SCNC: 36 MMOL/L (ref 21–32)
CREAT SERPL-MCNC: 0.21 MG/DL (ref 0.55–1.02)
ERYTHROCYTE [DISTWIDTH] IN BLOOD BY AUTOMATED COUNT: 21.2 % (ref 11.5–14.5)
GAS FLOW.O2 O2 DELIVERY SYS: ABNORMAL
GLOBULIN SER CALC-MCNC: 4.5 G/DL (ref 2–4)
GLUCOSE BLD STRIP.AUTO-MCNC: 176 MG/DL (ref 65–117)
GLUCOSE BLD STRIP.AUTO-MCNC: 83 MG/DL (ref 65–117)
GLUCOSE BLD STRIP.AUTO-MCNC: 85 MG/DL (ref 65–117)
GLUCOSE BLD STRIP.AUTO-MCNC: 95 MG/DL (ref 65–117)
GLUCOSE SERPL-MCNC: 78 MG/DL (ref 65–100)
HCO3 BLD-SCNC: 37 MMOL/L (ref 21–28)
HCT VFR BLD AUTO: 24.3 % (ref 35–47)
HGB BLD-MCNC: 7.5 G/DL (ref 11.5–16)
MAGNESIUM SERPL-MCNC: 1.9 MG/DL (ref 1.6–2.4)
MCH RBC QN AUTO: 32.2 PG (ref 26–34)
MCHC RBC AUTO-ENTMCNC: 30.9 G/DL (ref 30–36.5)
MCV RBC AUTO: 104.3 FL (ref 80–99)
NRBC # BLD: 0 K/UL (ref 0–0.01)
NRBC BLD-RTO: 0 PER 100 WBC
O2/TOTAL GAS SETTING VFR VENT: 73 %
PCO2 BLD: 57.3 MMHG (ref 35–48)
PH BLD: 7.42 (ref 7.35–7.45)
PHOSPHATE SERPL-MCNC: 3.2 MG/DL (ref 2.6–4.7)
PLATELET # BLD AUTO: 402 K/UL (ref 150–400)
PMV BLD AUTO: 11.3 FL (ref 8.9–12.9)
PO2 BLD: 68 MMHG (ref 83–108)
POTASSIUM SERPL-SCNC: 3.5 MMOL/L (ref 3.5–5.1)
PROT SERPL-MCNC: 6.6 G/DL (ref 6.4–8.2)
RBC # BLD AUTO: 2.33 M/UL (ref 3.8–5.2)
SAO2 % BLD: 93.1 % (ref 92–97)
SERVICE CMNT-IMP: ABNORMAL
SERVICE CMNT-IMP: NORMAL
SODIUM SERPL-SCNC: 142 MMOL/L (ref 136–145)
SPECIMEN TYPE: ABNORMAL
WBC # BLD AUTO: 10.5 K/UL (ref 3.6–11)

## 2025-05-21 PROCEDURE — 2500000003 HC RX 250 WO HCPCS: Performed by: SURGERY

## 2025-05-21 PROCEDURE — 6370000000 HC RX 637 (ALT 250 FOR IP): Performed by: ANESTHESIOLOGY

## 2025-05-21 PROCEDURE — 6360000002 HC RX W HCPCS: Performed by: SURGERY

## 2025-05-21 PROCEDURE — 80048 BASIC METABOLIC PNL TOTAL CA: CPT

## 2025-05-21 PROCEDURE — 2060000000 HC ICU INTERMEDIATE R&B

## 2025-05-21 PROCEDURE — 85027 COMPLETE CBC AUTOMATED: CPT

## 2025-05-21 PROCEDURE — 84100 ASSAY OF PHOSPHORUS: CPT

## 2025-05-21 PROCEDURE — 6370000000 HC RX 637 (ALT 250 FOR IP): Performed by: INTERNAL MEDICINE

## 2025-05-21 PROCEDURE — 36600 WITHDRAWAL OF ARTERIAL BLOOD: CPT

## 2025-05-21 PROCEDURE — 83735 ASSAY OF MAGNESIUM: CPT

## 2025-05-21 PROCEDURE — 71045 X-RAY EXAM CHEST 1 VIEW: CPT

## 2025-05-21 PROCEDURE — 82803 BLOOD GASES ANY COMBINATION: CPT

## 2025-05-21 PROCEDURE — 94640 AIRWAY INHALATION TREATMENT: CPT

## 2025-05-21 PROCEDURE — 82962 GLUCOSE BLOOD TEST: CPT

## 2025-05-21 PROCEDURE — 6360000002 HC RX W HCPCS: Performed by: INTERNAL MEDICINE

## 2025-05-21 PROCEDURE — 80076 HEPATIC FUNCTION PANEL: CPT

## 2025-05-21 RX ORDER — MAGNESIUM SULFATE IN WATER 40 MG/ML
2000 INJECTION, SOLUTION INTRAVENOUS ONCE
Status: DISCONTINUED | OUTPATIENT
Start: 2025-05-21 | End: 2025-05-22

## 2025-05-21 RX ORDER — FLUDROCORTISONE ACETATE 0.1 MG/1
0.1 TABLET ORAL 2 TIMES DAILY
Status: DISCONTINUED | OUTPATIENT
Start: 2025-05-21 | End: 2025-06-02 | Stop reason: HOSPADM

## 2025-05-21 RX ADMIN — DIBASIC SODIUM PHOSPHATE, MONOBASIC POTASSIUM PHOSPHATE AND MONOBASIC SODIUM PHOSPHATE 2 TABLET: 852; 155; 130 TABLET ORAL at 17:40

## 2025-05-21 RX ADMIN — POTASSIUM BICARBONATE 40 MEQ: 782 TABLET, EFFERVESCENT ORAL at 17:52

## 2025-05-21 RX ADMIN — LANSOPRAZOLE 30 MG: 30 TABLET, ORALLY DISINTEGRATING ORAL at 06:27

## 2025-05-21 RX ADMIN — MAGNESIUM SULFATE HEPTAHYDRATE 2000 MG: 40 INJECTION, SOLUTION INTRAVENOUS at 17:57

## 2025-05-21 RX ADMIN — Medication 1000 UNITS: at 08:39

## 2025-05-21 RX ADMIN — Medication 1 MG: at 08:39

## 2025-05-21 RX ADMIN — MIDODRINE HYDROCHLORIDE 15 MG: 5 TABLET ORAL at 02:05

## 2025-05-21 RX ADMIN — ONDANSETRON 4 MG: 2 INJECTION, SOLUTION INTRAMUSCULAR; INTRAVENOUS at 20:29

## 2025-05-21 RX ADMIN — Medication: at 20:18

## 2025-05-21 RX ADMIN — AMITRIPTYLINE HYDROCHLORIDE 10 MG: 10 TABLET, FILM COATED ORAL at 20:17

## 2025-05-21 RX ADMIN — FLUDROCORTISONE ACETATE 0.1 MG: 0.1 TABLET ORAL at 20:18

## 2025-05-21 RX ADMIN — ENOXAPARIN SODIUM 40 MG: 100 INJECTION SUBCUTANEOUS at 08:38

## 2025-05-21 RX ADMIN — FLUDROCORTISONE ACETATE 0.1 MG: 0.1 TABLET ORAL at 12:30

## 2025-05-21 RX ADMIN — Medication 100 MG: at 08:39

## 2025-05-21 RX ADMIN — MIDODRINE HYDROCHLORIDE 15 MG: 5 TABLET ORAL at 20:17

## 2025-05-21 RX ADMIN — DIBASIC SODIUM PHOSPHATE, MONOBASIC POTASSIUM PHOSPHATE AND MONOBASIC SODIUM PHOSPHATE 2 TABLET: 852; 155; 130 TABLET ORAL at 08:38

## 2025-05-21 RX ADMIN — FUROSEMIDE 20 MG: 10 INJECTION, SOLUTION INTRAMUSCULAR; INTRAVENOUS at 08:39

## 2025-05-21 RX ADMIN — MIDODRINE HYDROCHLORIDE 15 MG: 5 TABLET ORAL at 13:05

## 2025-05-21 RX ADMIN — ONDANSETRON 4 MG: 2 INJECTION, SOLUTION INTRAMUSCULAR; INTRAVENOUS at 07:59

## 2025-05-21 RX ADMIN — GABAPENTIN 300 MG: 600 TABLET, FILM COATED ORAL at 20:18

## 2025-05-21 RX ADMIN — Medication: at 08:40

## 2025-05-21 RX ADMIN — SODIUM CHLORIDE, PRESERVATIVE FREE 10 ML: 5 INJECTION INTRAVENOUS at 08:40

## 2025-05-21 RX ADMIN — MAGNESIUM OXIDE TAB 400 MG (241.3 MG ELEMENTAL MG) 400 MG: 400 (241.3 MG) TAB at 08:39

## 2025-05-21 RX ADMIN — POTASSIUM BICARBONATE 40 MEQ: 782 TABLET, EFFERVESCENT ORAL at 08:38

## 2025-05-21 RX ADMIN — SODIUM CHLORIDE, PRESERVATIVE FREE 10 ML: 5 INJECTION INTRAVENOUS at 20:19

## 2025-05-21 RX ADMIN — IPRATROPIUM BROMIDE AND ALBUTEROL SULFATE 1 DOSE: .5; 3 SOLUTION RESPIRATORY (INHALATION) at 08:01

## 2025-05-21 RX ADMIN — GABAPENTIN 300 MG: 600 TABLET, FILM COATED ORAL at 08:39

## 2025-05-21 RX ADMIN — DIBASIC SODIUM PHOSPHATE, MONOBASIC POTASSIUM PHOSPHATE AND MONOBASIC SODIUM PHOSPHATE 2 TABLET: 852; 155; 130 TABLET ORAL at 13:05

## 2025-05-21 RX ADMIN — IPRATROPIUM BROMIDE AND ALBUTEROL SULFATE 1 DOSE: .5; 3 SOLUTION RESPIRATORY (INHALATION) at 19:39

## 2025-05-21 RX ADMIN — MIDODRINE HYDROCHLORIDE 15 MG: 5 TABLET ORAL at 08:38

## 2025-05-21 RX ADMIN — CITALOPRAM HYDROBROMIDE 10 MG: 20 TABLET ORAL at 08:38

## 2025-05-21 RX ADMIN — ONDANSETRON 4 MG: 2 INJECTION, SOLUTION INTRAMUSCULAR; INTRAVENOUS at 00:08

## 2025-05-21 RX ADMIN — FUROSEMIDE 20 MG: 10 INJECTION, SOLUTION INTRAMUSCULAR; INTRAVENOUS at 17:40

## 2025-05-21 ASSESSMENT — PAIN SCALES - WONG BAKER: WONGBAKER_NUMERICALRESPONSE: NO HURT

## 2025-05-21 ASSESSMENT — PAIN SCALES - GENERAL
PAINLEVEL_OUTOF10: 0

## 2025-05-22 LAB
ANION GAP SERPL CALC-SCNC: 4 MMOL/L (ref 2–12)
BUN SERPL-MCNC: 18 MG/DL (ref 6–20)
BUN/CREAT SERPL: 82 (ref 12–20)
CALCIUM SERPL-MCNC: 8.6 MG/DL (ref 8.5–10.1)
CHLORIDE SERPL-SCNC: 101 MMOL/L (ref 97–108)
CO2 SERPL-SCNC: 35 MMOL/L (ref 21–32)
CREAT SERPL-MCNC: 0.22 MG/DL (ref 0.55–1.02)
ERYTHROCYTE [DISTWIDTH] IN BLOOD BY AUTOMATED COUNT: 21.4 % (ref 11.5–14.5)
GLUCOSE BLD STRIP.AUTO-MCNC: 80 MG/DL (ref 65–117)
GLUCOSE BLD STRIP.AUTO-MCNC: 81 MG/DL (ref 65–117)
GLUCOSE SERPL-MCNC: 71 MG/DL (ref 65–100)
HCT VFR BLD AUTO: 23.7 % (ref 35–47)
HGB BLD-MCNC: 7.4 G/DL (ref 11.5–16)
MAGNESIUM SERPL-MCNC: 2.8 MG/DL (ref 1.6–2.4)
MCH RBC QN AUTO: 32.3 PG (ref 26–34)
MCHC RBC AUTO-ENTMCNC: 31.2 G/DL (ref 30–36.5)
MCV RBC AUTO: 103.5 FL (ref 80–99)
NRBC # BLD: 0 K/UL (ref 0–0.01)
NRBC BLD-RTO: 0 PER 100 WBC
PLATELET # BLD AUTO: 475 K/UL (ref 150–400)
PMV BLD AUTO: 11 FL (ref 8.9–12.9)
POTASSIUM SERPL-SCNC: 3.8 MMOL/L (ref 3.5–5.1)
RBC # BLD AUTO: 2.29 M/UL (ref 3.8–5.2)
SERVICE CMNT-IMP: NORMAL
SERVICE CMNT-IMP: NORMAL
SODIUM SERPL-SCNC: 140 MMOL/L (ref 136–145)
WBC # BLD AUTO: 9.8 K/UL (ref 3.6–11)

## 2025-05-22 PROCEDURE — 97530 THERAPEUTIC ACTIVITIES: CPT

## 2025-05-22 PROCEDURE — 6370000000 HC RX 637 (ALT 250 FOR IP): Performed by: NURSE PRACTITIONER

## 2025-05-22 PROCEDURE — 97530 THERAPEUTIC ACTIVITIES: CPT | Performed by: PHYSICAL THERAPIST

## 2025-05-22 PROCEDURE — 2060000000 HC ICU INTERMEDIATE R&B

## 2025-05-22 PROCEDURE — 85027 COMPLETE CBC AUTOMATED: CPT

## 2025-05-22 PROCEDURE — 82962 GLUCOSE BLOOD TEST: CPT

## 2025-05-22 PROCEDURE — 83735 ASSAY OF MAGNESIUM: CPT

## 2025-05-22 PROCEDURE — 2500000003 HC RX 250 WO HCPCS: Performed by: SURGERY

## 2025-05-22 PROCEDURE — 80048 BASIC METABOLIC PNL TOTAL CA: CPT

## 2025-05-22 PROCEDURE — 6360000002 HC RX W HCPCS: Performed by: SURGERY

## 2025-05-22 PROCEDURE — 6370000000 HC RX 637 (ALT 250 FOR IP): Performed by: INTERNAL MEDICINE

## 2025-05-22 PROCEDURE — 97116 GAIT TRAINING THERAPY: CPT | Performed by: PHYSICAL THERAPIST

## 2025-05-22 PROCEDURE — 94640 AIRWAY INHALATION TREATMENT: CPT

## 2025-05-22 PROCEDURE — 2700000000 HC OXYGEN THERAPY PER DAY

## 2025-05-22 PROCEDURE — 6370000000 HC RX 637 (ALT 250 FOR IP): Performed by: ANESTHESIOLOGY

## 2025-05-22 PROCEDURE — 99024 POSTOP FOLLOW-UP VISIT: CPT | Performed by: SURGERY

## 2025-05-22 PROCEDURE — 6360000002 HC RX W HCPCS: Performed by: INTERNAL MEDICINE

## 2025-05-22 RX ORDER — AMITRIPTYLINE HYDROCHLORIDE 50 MG/1
50 TABLET ORAL NIGHTLY
Status: DISCONTINUED | OUTPATIENT
Start: 2025-05-22 | End: 2025-06-02 | Stop reason: HOSPADM

## 2025-05-22 RX ADMIN — ONDANSETRON 4 MG: 2 INJECTION, SOLUTION INTRAMUSCULAR; INTRAVENOUS at 18:56

## 2025-05-22 RX ADMIN — MIDODRINE HYDROCHLORIDE 15 MG: 5 TABLET ORAL at 01:55

## 2025-05-22 RX ADMIN — ENOXAPARIN SODIUM 40 MG: 100 INJECTION SUBCUTANEOUS at 09:06

## 2025-05-22 RX ADMIN — Medication 100 MG: at 09:16

## 2025-05-22 RX ADMIN — SODIUM CHLORIDE, PRESERVATIVE FREE 10 ML: 5 INJECTION INTRAVENOUS at 20:14

## 2025-05-22 RX ADMIN — Medication: at 09:09

## 2025-05-22 RX ADMIN — CYCLOBENZAPRINE 10 MG: 10 TABLET, FILM COATED ORAL at 12:54

## 2025-05-22 RX ADMIN — FLUDROCORTISONE ACETATE 0.1 MG: 0.1 TABLET ORAL at 20:13

## 2025-05-22 RX ADMIN — Medication: at 20:14

## 2025-05-22 RX ADMIN — CITALOPRAM HYDROBROMIDE 10 MG: 20 TABLET ORAL at 09:07

## 2025-05-22 RX ADMIN — Medication 1000 UNITS: at 09:06

## 2025-05-22 RX ADMIN — GABAPENTIN 300 MG: 600 TABLET, FILM COATED ORAL at 09:15

## 2025-05-22 RX ADMIN — HYDROCODONE BITARTRATE AND ACETAMINOPHEN 1 TABLET: 5; 325 TABLET ORAL at 09:06

## 2025-05-22 RX ADMIN — HYDROCODONE BITARTRATE AND ACETAMINOPHEN 1 TABLET: 5; 325 TABLET ORAL at 09:08

## 2025-05-22 RX ADMIN — MIDODRINE HYDROCHLORIDE 15 MG: 5 TABLET ORAL at 20:13

## 2025-05-22 RX ADMIN — GABAPENTIN 300 MG: 600 TABLET, FILM COATED ORAL at 20:13

## 2025-05-22 RX ADMIN — AMITRIPTYLINE HYDROCHLORIDE 50 MG: 50 TABLET, FILM COATED ORAL at 20:13

## 2025-05-22 RX ADMIN — IPRATROPIUM BROMIDE AND ALBUTEROL SULFATE 1 DOSE: .5; 3 SOLUTION RESPIRATORY (INHALATION) at 20:42

## 2025-05-22 RX ADMIN — FUROSEMIDE 20 MG: 10 INJECTION, SOLUTION INTRAMUSCULAR; INTRAVENOUS at 18:56

## 2025-05-22 RX ADMIN — LANSOPRAZOLE 30 MG: 30 TABLET, ORALLY DISINTEGRATING ORAL at 07:19

## 2025-05-22 RX ADMIN — MIDODRINE HYDROCHLORIDE 15 MG: 5 TABLET ORAL at 14:31

## 2025-05-22 RX ADMIN — FUROSEMIDE 20 MG: 10 INJECTION, SOLUTION INTRAMUSCULAR; INTRAVENOUS at 09:15

## 2025-05-22 RX ADMIN — FLUDROCORTISONE ACETATE 0.1 MG: 0.1 TABLET ORAL at 09:06

## 2025-05-22 RX ADMIN — IPRATROPIUM BROMIDE AND ALBUTEROL SULFATE 1 DOSE: .5; 3 SOLUTION RESPIRATORY (INHALATION) at 07:12

## 2025-05-22 RX ADMIN — Medication 1 MG: at 09:07

## 2025-05-22 RX ADMIN — MIDODRINE HYDROCHLORIDE 15 MG: 5 TABLET ORAL at 09:06

## 2025-05-22 RX ADMIN — SODIUM CHLORIDE, PRESERVATIVE FREE 10 ML: 5 INJECTION INTRAVENOUS at 09:15

## 2025-05-22 ASSESSMENT — PAIN SCALES - WONG BAKER
WONGBAKER_NUMERICALRESPONSE: NO HURT
WONGBAKER_NUMERICALRESPONSE: HURTS LITTLE MORE

## 2025-05-22 ASSESSMENT — PAIN SCALES - GENERAL
PAINLEVEL_OUTOF10: 0

## 2025-05-22 ASSESSMENT — PAIN DESCRIPTION - LOCATION: LOCATION: BACK

## 2025-05-22 NOTE — CARE COORDINATION
Transition of Care Plan:    RUR: 21%  Prior Level of Functioning: Independent   Disposition: IPR  SRIRAM:   If SNF or IPR: Date FOC offered: 5/7  Date FOC received: 5/7  [facility]   Date authorization started with reference number:   Date authorization received and expires:   Follow up appointments: PCP  DME needed: TBD  Transportation at discharge: BLS  IM/IMM Medicare/ letter given: 5/1  Is patient a  and connected with VA? No  Caregiver Contact: Daughter Mare Tyler [phone]  Discharge Caregiver contacted prior to discharge? YEs  Care Conference needed? No   Barriers to discharge:    Medical stability   S/P LAPAROSCOPIC CONVERT TO OPEN GASTROSTOMY TUBE PLACEMENT, LYSIS OF ADHESION, EGD   Patient remains on Hi Flow oxygen @45L  Plan to continue diuresis and consult pulmonary.  [facility] following, will need insurance auth.  Latesha Pérez RN,Care Management

## 2025-05-23 LAB
ALBUMIN SERPL-MCNC: 2.2 G/DL (ref 3.5–5)
ALBUMIN/GLOB SERPL: 0.4 (ref 1.1–2.2)
ALP SERPL-CCNC: 112 U/L (ref 45–117)
ALT SERPL-CCNC: 18 U/L (ref 12–78)
ANION GAP SERPL CALC-SCNC: 4 MMOL/L (ref 2–12)
AST SERPL-CCNC: 48 U/L (ref 15–37)
BILIRUB DIRECT SERPL-MCNC: 0.2 MG/DL (ref 0–0.2)
BILIRUB SERPL-MCNC: 0.5 MG/DL (ref 0.2–1)
BUN SERPL-MCNC: 22 MG/DL (ref 6–20)
BUN/CREAT SERPL: 79 (ref 12–20)
CALCIUM SERPL-MCNC: 8.9 MG/DL (ref 8.5–10.1)
CHLORIDE SERPL-SCNC: 101 MMOL/L (ref 97–108)
CO2 SERPL-SCNC: 35 MMOL/L (ref 21–32)
CREAT SERPL-MCNC: 0.28 MG/DL (ref 0.55–1.02)
ERYTHROCYTE [DISTWIDTH] IN BLOOD BY AUTOMATED COUNT: 20.9 % (ref 11.5–14.5)
GLOBULIN SER CALC-MCNC: 5.1 G/DL (ref 2–4)
GLUCOSE BLD STRIP.AUTO-MCNC: 121 MG/DL (ref 65–117)
GLUCOSE BLD STRIP.AUTO-MCNC: 79 MG/DL (ref 65–117)
GLUCOSE SERPL-MCNC: 75 MG/DL (ref 65–100)
HCT VFR BLD AUTO: 23.9 % (ref 35–47)
HGB BLD-MCNC: 7.2 G/DL (ref 11.5–16)
MAGNESIUM SERPL-MCNC: 2.4 MG/DL (ref 1.6–2.4)
MCH RBC QN AUTO: 32.6 PG (ref 26–34)
MCHC RBC AUTO-ENTMCNC: 30.1 G/DL (ref 30–36.5)
MCV RBC AUTO: 108.1 FL (ref 80–99)
NRBC # BLD: 0.02 K/UL (ref 0–0.01)
NRBC BLD-RTO: 0.2 PER 100 WBC
PHOSPHATE SERPL-MCNC: 3.7 MG/DL (ref 2.6–4.7)
PLATELET # BLD AUTO: 373 K/UL (ref 150–400)
PMV BLD AUTO: 12.1 FL (ref 8.9–12.9)
POTASSIUM SERPL-SCNC: 3.4 MMOL/L (ref 3.5–5.1)
PROT SERPL-MCNC: 7.3 G/DL (ref 6.4–8.2)
RBC # BLD AUTO: 2.21 M/UL (ref 3.8–5.2)
SERVICE CMNT-IMP: ABNORMAL
SERVICE CMNT-IMP: NORMAL
SODIUM SERPL-SCNC: 140 MMOL/L (ref 136–145)
WBC # BLD AUTO: 8.9 K/UL (ref 3.6–11)

## 2025-05-23 PROCEDURE — 82962 GLUCOSE BLOOD TEST: CPT

## 2025-05-23 PROCEDURE — 6360000002 HC RX W HCPCS: Performed by: INTERNAL MEDICINE

## 2025-05-23 PROCEDURE — 85027 COMPLETE CBC AUTOMATED: CPT

## 2025-05-23 PROCEDURE — 92526 ORAL FUNCTION THERAPY: CPT

## 2025-05-23 PROCEDURE — 6370000000 HC RX 637 (ALT 250 FOR IP): Performed by: INTERNAL MEDICINE

## 2025-05-23 PROCEDURE — 80076 HEPATIC FUNCTION PANEL: CPT

## 2025-05-23 PROCEDURE — 6370000000 HC RX 637 (ALT 250 FOR IP): Performed by: NURSE PRACTITIONER

## 2025-05-23 PROCEDURE — 2500000003 HC RX 250 WO HCPCS: Performed by: SURGERY

## 2025-05-23 PROCEDURE — 2060000000 HC ICU INTERMEDIATE R&B

## 2025-05-23 PROCEDURE — 94640 AIRWAY INHALATION TREATMENT: CPT

## 2025-05-23 PROCEDURE — 6360000002 HC RX W HCPCS: Performed by: SURGERY

## 2025-05-23 PROCEDURE — 80048 BASIC METABOLIC PNL TOTAL CA: CPT

## 2025-05-23 PROCEDURE — 2700000000 HC OXYGEN THERAPY PER DAY

## 2025-05-23 PROCEDURE — 94760 N-INVAS EAR/PLS OXIMETRY 1: CPT

## 2025-05-23 PROCEDURE — 97112 NEUROMUSCULAR REEDUCATION: CPT

## 2025-05-23 PROCEDURE — 83735 ASSAY OF MAGNESIUM: CPT

## 2025-05-23 PROCEDURE — 97530 THERAPEUTIC ACTIVITIES: CPT

## 2025-05-23 PROCEDURE — 6370000000 HC RX 637 (ALT 250 FOR IP): Performed by: ANESTHESIOLOGY

## 2025-05-23 PROCEDURE — 97535 SELF CARE MNGMENT TRAINING: CPT

## 2025-05-23 PROCEDURE — 84100 ASSAY OF PHOSPHORUS: CPT

## 2025-05-23 RX ADMIN — SODIUM CHLORIDE, PRESERVATIVE FREE 10 ML: 5 INJECTION INTRAVENOUS at 08:32

## 2025-05-23 RX ADMIN — CITALOPRAM HYDROBROMIDE 10 MG: 20 TABLET ORAL at 08:32

## 2025-05-23 RX ADMIN — SODIUM CHLORIDE, PRESERVATIVE FREE 10 ML: 5 INJECTION INTRAVENOUS at 20:32

## 2025-05-23 RX ADMIN — GABAPENTIN 300 MG: 600 TABLET, FILM COATED ORAL at 20:31

## 2025-05-23 RX ADMIN — ONDANSETRON 4 MG: 2 INJECTION, SOLUTION INTRAMUSCULAR; INTRAVENOUS at 06:48

## 2025-05-23 RX ADMIN — FUROSEMIDE 20 MG: 10 INJECTION, SOLUTION INTRAMUSCULAR; INTRAVENOUS at 08:32

## 2025-05-23 RX ADMIN — ONDANSETRON 4 MG: 2 INJECTION, SOLUTION INTRAMUSCULAR; INTRAVENOUS at 23:00

## 2025-05-23 RX ADMIN — AMITRIPTYLINE HYDROCHLORIDE 50 MG: 50 TABLET, FILM COATED ORAL at 20:30

## 2025-05-23 RX ADMIN — IPRATROPIUM BROMIDE AND ALBUTEROL SULFATE 1 DOSE: .5; 3 SOLUTION RESPIRATORY (INHALATION) at 07:23

## 2025-05-23 RX ADMIN — Medication: at 08:29

## 2025-05-23 RX ADMIN — Medication 100 MG: at 08:31

## 2025-05-23 RX ADMIN — MIDODRINE HYDROCHLORIDE 15 MG: 5 TABLET ORAL at 01:16

## 2025-05-23 RX ADMIN — FLUDROCORTISONE ACETATE 0.1 MG: 0.1 TABLET ORAL at 08:32

## 2025-05-23 RX ADMIN — FUROSEMIDE 20 MG: 10 INJECTION, SOLUTION INTRAMUSCULAR; INTRAVENOUS at 18:00

## 2025-05-23 RX ADMIN — Medication: at 20:31

## 2025-05-23 RX ADMIN — MIDODRINE HYDROCHLORIDE 15 MG: 5 TABLET ORAL at 20:31

## 2025-05-23 RX ADMIN — LANSOPRAZOLE 30 MG: 30 TABLET, ORALLY DISINTEGRATING ORAL at 06:52

## 2025-05-23 RX ADMIN — HYDROCODONE BITARTRATE AND ACETAMINOPHEN 1 TABLET: 5; 325 TABLET ORAL at 14:12

## 2025-05-23 RX ADMIN — MIDODRINE HYDROCHLORIDE 15 MG: 5 TABLET ORAL at 08:31

## 2025-05-23 RX ADMIN — Medication 1 MG: at 08:31

## 2025-05-23 RX ADMIN — Medication 1000 UNITS: at 08:31

## 2025-05-23 RX ADMIN — IPRATROPIUM BROMIDE AND ALBUTEROL SULFATE 1 DOSE: .5; 3 SOLUTION RESPIRATORY (INHALATION) at 19:57

## 2025-05-23 RX ADMIN — ENOXAPARIN SODIUM 40 MG: 100 INJECTION SUBCUTANEOUS at 08:32

## 2025-05-23 RX ADMIN — FLUDROCORTISONE ACETATE 0.1 MG: 0.1 TABLET ORAL at 20:31

## 2025-05-23 RX ADMIN — MIDODRINE HYDROCHLORIDE 15 MG: 5 TABLET ORAL at 14:12

## 2025-05-23 RX ADMIN — GABAPENTIN 300 MG: 600 TABLET, FILM COATED ORAL at 08:31

## 2025-05-23 ASSESSMENT — PAIN SCALES - GENERAL
PAINLEVEL_OUTOF10: 0
PAINLEVEL_OUTOF10: 0
PAINLEVEL_OUTOF10: 10
PAINLEVEL_OUTOF10: 0

## 2025-05-23 ASSESSMENT — PAIN DESCRIPTION - LOCATION: LOCATION: BUTTOCKS

## 2025-05-24 LAB
ANION GAP SERPL CALC-SCNC: 4 MMOL/L (ref 2–12)
BUN SERPL-MCNC: 23 MG/DL (ref 6–20)
BUN/CREAT SERPL: 77 (ref 12–20)
CALCIUM SERPL-MCNC: 8.6 MG/DL (ref 8.5–10.1)
CHLORIDE SERPL-SCNC: 99 MMOL/L (ref 97–108)
CO2 SERPL-SCNC: 36 MMOL/L (ref 21–32)
CREAT SERPL-MCNC: 0.3 MG/DL (ref 0.55–1.02)
GLUCOSE BLD STRIP.AUTO-MCNC: 102 MG/DL (ref 65–117)
GLUCOSE BLD STRIP.AUTO-MCNC: 112 MG/DL (ref 65–117)
GLUCOSE BLD STRIP.AUTO-MCNC: 87 MG/DL (ref 65–117)
GLUCOSE SERPL-MCNC: 108 MG/DL (ref 65–100)
MAGNESIUM SERPL-MCNC: 2.2 MG/DL (ref 1.6–2.4)
POTASSIUM SERPL-SCNC: 3.6 MMOL/L (ref 3.5–5.1)
SERVICE CMNT-IMP: NORMAL
SODIUM SERPL-SCNC: 139 MMOL/L (ref 136–145)

## 2025-05-24 PROCEDURE — 2700000000 HC OXYGEN THERAPY PER DAY

## 2025-05-24 PROCEDURE — 51798 US URINE CAPACITY MEASURE: CPT

## 2025-05-24 PROCEDURE — 6360000002 HC RX W HCPCS: Performed by: SURGERY

## 2025-05-24 PROCEDURE — 82962 GLUCOSE BLOOD TEST: CPT

## 2025-05-24 PROCEDURE — 6360000002 HC RX W HCPCS: Performed by: INTERNAL MEDICINE

## 2025-05-24 PROCEDURE — 6370000000 HC RX 637 (ALT 250 FOR IP): Performed by: INTERNAL MEDICINE

## 2025-05-24 PROCEDURE — 2500000003 HC RX 250 WO HCPCS: Performed by: SURGERY

## 2025-05-24 PROCEDURE — 94640 AIRWAY INHALATION TREATMENT: CPT

## 2025-05-24 PROCEDURE — 94761 N-INVAS EAR/PLS OXIMETRY MLT: CPT

## 2025-05-24 PROCEDURE — 99024 POSTOP FOLLOW-UP VISIT: CPT | Performed by: SURGERY

## 2025-05-24 PROCEDURE — 83735 ASSAY OF MAGNESIUM: CPT

## 2025-05-24 PROCEDURE — 94760 N-INVAS EAR/PLS OXIMETRY 1: CPT

## 2025-05-24 PROCEDURE — 94660 CPAP INITIATION&MGMT: CPT

## 2025-05-24 PROCEDURE — 6370000000 HC RX 637 (ALT 250 FOR IP): Performed by: NURSE PRACTITIONER

## 2025-05-24 PROCEDURE — 2060000000 HC ICU INTERMEDIATE R&B

## 2025-05-24 PROCEDURE — 6370000000 HC RX 637 (ALT 250 FOR IP): Performed by: ANESTHESIOLOGY

## 2025-05-24 PROCEDURE — 80048 BASIC METABOLIC PNL TOTAL CA: CPT

## 2025-05-24 RX ADMIN — Medication 1000 UNITS: at 09:21

## 2025-05-24 RX ADMIN — MIDODRINE HYDROCHLORIDE 15 MG: 5 TABLET ORAL at 20:35

## 2025-05-24 RX ADMIN — IPRATROPIUM BROMIDE AND ALBUTEROL SULFATE 1 DOSE: .5; 3 SOLUTION RESPIRATORY (INHALATION) at 08:17

## 2025-05-24 RX ADMIN — FLUDROCORTISONE ACETATE 0.1 MG: 0.1 TABLET ORAL at 20:35

## 2025-05-24 RX ADMIN — Medication: at 20:36

## 2025-05-24 RX ADMIN — Medication: at 09:23

## 2025-05-24 RX ADMIN — Medication 100 MG: at 09:21

## 2025-05-24 RX ADMIN — HYDROCODONE BITARTRATE AND ACETAMINOPHEN 1 TABLET: 5; 325 TABLET ORAL at 20:35

## 2025-05-24 RX ADMIN — FLUDROCORTISONE ACETATE 0.1 MG: 0.1 TABLET ORAL at 09:22

## 2025-05-24 RX ADMIN — SODIUM CHLORIDE, PRESERVATIVE FREE 10 ML: 5 INJECTION INTRAVENOUS at 20:36

## 2025-05-24 RX ADMIN — IPRATROPIUM BROMIDE AND ALBUTEROL SULFATE 1 DOSE: .5; 3 SOLUTION RESPIRATORY (INHALATION) at 23:02

## 2025-05-24 RX ADMIN — AMITRIPTYLINE HYDROCHLORIDE 50 MG: 50 TABLET, FILM COATED ORAL at 20:35

## 2025-05-24 RX ADMIN — CITALOPRAM HYDROBROMIDE 10 MG: 20 TABLET ORAL at 09:21

## 2025-05-24 RX ADMIN — ENOXAPARIN SODIUM 40 MG: 100 INJECTION SUBCUTANEOUS at 09:20

## 2025-05-24 RX ADMIN — MIDODRINE HYDROCHLORIDE 15 MG: 5 TABLET ORAL at 16:14

## 2025-05-24 RX ADMIN — GABAPENTIN 300 MG: 600 TABLET, FILM COATED ORAL at 20:36

## 2025-05-24 RX ADMIN — Medication 1 MG: at 09:21

## 2025-05-24 RX ADMIN — MIDODRINE HYDROCHLORIDE 15 MG: 5 TABLET ORAL at 02:55

## 2025-05-24 RX ADMIN — LANSOPRAZOLE 30 MG: 30 TABLET, ORALLY DISINTEGRATING ORAL at 06:26

## 2025-05-24 RX ADMIN — MIDODRINE HYDROCHLORIDE 15 MG: 5 TABLET ORAL at 09:20

## 2025-05-24 RX ADMIN — GABAPENTIN 300 MG: 600 TABLET, FILM COATED ORAL at 09:20

## 2025-05-24 RX ADMIN — ONDANSETRON 4 MG: 2 INJECTION, SOLUTION INTRAMUSCULAR; INTRAVENOUS at 20:39

## 2025-05-24 RX ADMIN — SODIUM CHLORIDE, PRESERVATIVE FREE 10 ML: 5 INJECTION INTRAVENOUS at 09:23

## 2025-05-24 RX ADMIN — FUROSEMIDE 20 MG: 10 INJECTION, SOLUTION INTRAMUSCULAR; INTRAVENOUS at 18:44

## 2025-05-24 RX ADMIN — FUROSEMIDE 20 MG: 10 INJECTION, SOLUTION INTRAMUSCULAR; INTRAVENOUS at 09:23

## 2025-05-24 RX ADMIN — HYDROCODONE BITARTRATE AND ACETAMINOPHEN 1 TABLET: 5; 325 TABLET ORAL at 06:26

## 2025-05-24 ASSESSMENT — PAIN SCALES - GENERAL
PAINLEVEL_OUTOF10: 6
PAINLEVEL_OUTOF10: 8
PAINLEVEL_OUTOF10: 8

## 2025-05-24 ASSESSMENT — PAIN DESCRIPTION - LOCATION: LOCATION: BUTTOCKS

## 2025-05-25 LAB
ALBUMIN SERPL-MCNC: 2.4 G/DL (ref 3.5–5)
ALBUMIN/GLOB SERPL: 0.5 (ref 1.1–2.2)
ALP SERPL-CCNC: 111 U/L (ref 45–117)
ALT SERPL-CCNC: 37 U/L (ref 12–78)
ANION GAP SERPL CALC-SCNC: 2 MMOL/L (ref 2–12)
AST SERPL-CCNC: 106 U/L (ref 15–37)
BILIRUB SERPL-MCNC: 0.4 MG/DL (ref 0.2–1)
BUN SERPL-MCNC: 17 MG/DL (ref 6–20)
BUN/CREAT SERPL: 55 (ref 12–20)
CALCIUM SERPL-MCNC: 8.7 MG/DL (ref 8.5–10.1)
CHLORIDE SERPL-SCNC: 96 MMOL/L (ref 97–108)
CO2 SERPL-SCNC: 39 MMOL/L (ref 21–32)
CREAT SERPL-MCNC: 0.31 MG/DL (ref 0.55–1.02)
ERYTHROCYTE [DISTWIDTH] IN BLOOD BY AUTOMATED COUNT: 20 % (ref 11.5–14.5)
GLOBULIN SER CALC-MCNC: 4.4 G/DL (ref 2–4)
GLUCOSE BLD STRIP.AUTO-MCNC: 119 MG/DL (ref 65–117)
GLUCOSE BLD STRIP.AUTO-MCNC: 126 MG/DL (ref 65–117)
GLUCOSE BLD STRIP.AUTO-MCNC: 78 MG/DL (ref 65–117)
GLUCOSE BLD STRIP.AUTO-MCNC: 92 MG/DL (ref 65–117)
GLUCOSE SERPL-MCNC: 95 MG/DL (ref 65–100)
HCT VFR BLD AUTO: 25.6 % (ref 35–47)
HGB BLD-MCNC: 7.8 G/DL (ref 11.5–16)
MAGNESIUM SERPL-MCNC: 2.1 MG/DL (ref 1.6–2.4)
MCH RBC QN AUTO: 32.1 PG (ref 26–34)
MCHC RBC AUTO-ENTMCNC: 30.5 G/DL (ref 30–36.5)
MCV RBC AUTO: 105.3 FL (ref 80–99)
NRBC # BLD: 0 K/UL (ref 0–0.01)
NRBC BLD-RTO: 0 PER 100 WBC
PLATELET # BLD AUTO: 540 K/UL (ref 150–400)
PMV BLD AUTO: 10.7 FL (ref 8.9–12.9)
POTASSIUM SERPL-SCNC: 3.4 MMOL/L (ref 3.5–5.1)
PROT SERPL-MCNC: 6.8 G/DL (ref 6.4–8.2)
RBC # BLD AUTO: 2.43 M/UL (ref 3.8–5.2)
SERVICE CMNT-IMP: ABNORMAL
SERVICE CMNT-IMP: ABNORMAL
SERVICE CMNT-IMP: NORMAL
SERVICE CMNT-IMP: NORMAL
SODIUM SERPL-SCNC: 137 MMOL/L (ref 136–145)
WBC # BLD AUTO: 7.8 K/UL (ref 3.6–11)

## 2025-05-25 PROCEDURE — 6370000000 HC RX 637 (ALT 250 FOR IP): Performed by: INTERNAL MEDICINE

## 2025-05-25 PROCEDURE — 2700000000 HC OXYGEN THERAPY PER DAY

## 2025-05-25 PROCEDURE — 6370000000 HC RX 637 (ALT 250 FOR IP): Performed by: ANESTHESIOLOGY

## 2025-05-25 PROCEDURE — 94760 N-INVAS EAR/PLS OXIMETRY 1: CPT

## 2025-05-25 PROCEDURE — 6370000000 HC RX 637 (ALT 250 FOR IP): Performed by: NURSE PRACTITIONER

## 2025-05-25 PROCEDURE — 82962 GLUCOSE BLOOD TEST: CPT

## 2025-05-25 PROCEDURE — 83735 ASSAY OF MAGNESIUM: CPT

## 2025-05-25 PROCEDURE — 85027 COMPLETE CBC AUTOMATED: CPT

## 2025-05-25 PROCEDURE — 6370000000 HC RX 637 (ALT 250 FOR IP): Performed by: HOSPITALIST

## 2025-05-25 PROCEDURE — 6360000002 HC RX W HCPCS: Performed by: SURGERY

## 2025-05-25 PROCEDURE — 2060000000 HC ICU INTERMEDIATE R&B

## 2025-05-25 PROCEDURE — 2500000003 HC RX 250 WO HCPCS: Performed by: SURGERY

## 2025-05-25 PROCEDURE — 6360000002 HC RX W HCPCS: Performed by: INTERNAL MEDICINE

## 2025-05-25 PROCEDURE — 80053 COMPREHEN METABOLIC PANEL: CPT

## 2025-05-25 PROCEDURE — 94640 AIRWAY INHALATION TREATMENT: CPT

## 2025-05-25 RX ORDER — GABAPENTIN 600 MG/1
600 TABLET ORAL 2 TIMES DAILY
Status: DISCONTINUED | OUTPATIENT
Start: 2025-05-25 | End: 2025-05-30

## 2025-05-25 RX ORDER — ENOXAPARIN SODIUM 100 MG/ML
30 INJECTION SUBCUTANEOUS DAILY
Status: DISCONTINUED | OUTPATIENT
Start: 2025-05-26 | End: 2025-05-30

## 2025-05-25 RX ORDER — POTASSIUM CHLORIDE 750 MG/1
40 TABLET, EXTENDED RELEASE ORAL ONCE
Status: COMPLETED | OUTPATIENT
Start: 2025-05-25 | End: 2025-05-25

## 2025-05-25 RX ADMIN — MIDODRINE HYDROCHLORIDE 15 MG: 5 TABLET ORAL at 20:30

## 2025-05-25 RX ADMIN — GABAPENTIN 300 MG: 600 TABLET, FILM COATED ORAL at 10:01

## 2025-05-25 RX ADMIN — HYDROCODONE BITARTRATE AND ACETAMINOPHEN 1 TABLET: 5; 325 TABLET ORAL at 07:12

## 2025-05-25 RX ADMIN — FLUDROCORTISONE ACETATE 0.1 MG: 0.1 TABLET ORAL at 10:01

## 2025-05-25 RX ADMIN — FLUDROCORTISONE ACETATE 0.1 MG: 0.1 TABLET ORAL at 20:30

## 2025-05-25 RX ADMIN — MIDODRINE HYDROCHLORIDE 15 MG: 5 TABLET ORAL at 02:32

## 2025-05-25 RX ADMIN — LANSOPRAZOLE 30 MG: 30 TABLET, ORALLY DISINTEGRATING ORAL at 07:12

## 2025-05-25 RX ADMIN — IPRATROPIUM BROMIDE AND ALBUTEROL SULFATE 1 DOSE: .5; 3 SOLUTION RESPIRATORY (INHALATION) at 19:53

## 2025-05-25 RX ADMIN — Medication 100 MG: at 10:01

## 2025-05-25 RX ADMIN — CITALOPRAM HYDROBROMIDE 10 MG: 20 TABLET ORAL at 10:01

## 2025-05-25 RX ADMIN — GABAPENTIN 600 MG: 600 TABLET, FILM COATED ORAL at 20:30

## 2025-05-25 RX ADMIN — ENOXAPARIN SODIUM 40 MG: 100 INJECTION SUBCUTANEOUS at 10:01

## 2025-05-25 RX ADMIN — Medication 1 MG: at 10:01

## 2025-05-25 RX ADMIN — FUROSEMIDE 20 MG: 10 INJECTION, SOLUTION INTRAMUSCULAR; INTRAVENOUS at 10:02

## 2025-05-25 RX ADMIN — SODIUM CHLORIDE, PRESERVATIVE FREE 10 ML: 5 INJECTION INTRAVENOUS at 10:02

## 2025-05-25 RX ADMIN — ONDANSETRON 4 MG: 2 INJECTION, SOLUTION INTRAMUSCULAR; INTRAVENOUS at 17:23

## 2025-05-25 RX ADMIN — IPRATROPIUM BROMIDE AND ALBUTEROL SULFATE 1 DOSE: .5; 3 SOLUTION RESPIRATORY (INHALATION) at 08:11

## 2025-05-25 RX ADMIN — Medication: at 10:00

## 2025-05-25 RX ADMIN — Medication 1000 UNITS: at 10:01

## 2025-05-25 RX ADMIN — MIDODRINE HYDROCHLORIDE 15 MG: 5 TABLET ORAL at 10:01

## 2025-05-25 RX ADMIN — AMITRIPTYLINE HYDROCHLORIDE 50 MG: 50 TABLET, FILM COATED ORAL at 20:30

## 2025-05-25 RX ADMIN — Medication: at 21:00

## 2025-05-25 RX ADMIN — MIDODRINE HYDROCHLORIDE 15 MG: 5 TABLET ORAL at 13:27

## 2025-05-25 RX ADMIN — POTASSIUM CHLORIDE 40 MEQ: 750 TABLET, EXTENDED RELEASE ORAL at 10:01

## 2025-05-25 ASSESSMENT — PAIN DESCRIPTION - LOCATION: LOCATION: BUTTOCKS

## 2025-05-25 ASSESSMENT — PAIN DESCRIPTION - ONSET: ONSET: ON-GOING

## 2025-05-25 ASSESSMENT — PAIN DESCRIPTION - FREQUENCY: FREQUENCY: CONTINUOUS

## 2025-05-25 ASSESSMENT — PAIN SCALES - GENERAL: PAINLEVEL_OUTOF10: 8

## 2025-05-26 LAB
ALBUMIN SERPL-MCNC: 2.6 G/DL (ref 3.5–5)
ALBUMIN/GLOB SERPL: 0.5 (ref 1.1–2.2)
ALP SERPL-CCNC: 119 U/L (ref 45–117)
ALT SERPL-CCNC: 42 U/L (ref 12–78)
ANION GAP SERPL CALC-SCNC: 4 MMOL/L (ref 2–12)
AST SERPL-CCNC: 108 U/L (ref 15–37)
BILIRUB SERPL-MCNC: 0.3 MG/DL (ref 0.2–1)
BUN SERPL-MCNC: 18 MG/DL (ref 6–20)
BUN/CREAT SERPL: 58 (ref 12–20)
CALCIUM SERPL-MCNC: 9.2 MG/DL (ref 8.5–10.1)
CHLORIDE SERPL-SCNC: 96 MMOL/L (ref 97–108)
CO2 SERPL-SCNC: 36 MMOL/L (ref 21–32)
CREAT SERPL-MCNC: 0.31 MG/DL (ref 0.55–1.02)
ERYTHROCYTE [DISTWIDTH] IN BLOOD BY AUTOMATED COUNT: 19.9 % (ref 11.5–14.5)
GLOBULIN SER CALC-MCNC: 4.9 G/DL (ref 2–4)
GLUCOSE BLD STRIP.AUTO-MCNC: 110 MG/DL (ref 65–117)
GLUCOSE BLD STRIP.AUTO-MCNC: 137 MG/DL (ref 65–117)
GLUCOSE BLD STRIP.AUTO-MCNC: 75 MG/DL (ref 65–117)
GLUCOSE SERPL-MCNC: 107 MG/DL (ref 65–100)
HCT VFR BLD AUTO: 26.8 % (ref 35–47)
HGB BLD-MCNC: 8.4 G/DL (ref 11.5–16)
MCH RBC QN AUTO: 32.7 PG (ref 26–34)
MCHC RBC AUTO-ENTMCNC: 31.3 G/DL (ref 30–36.5)
MCV RBC AUTO: 104.3 FL (ref 80–99)
NRBC # BLD: 0 K/UL (ref 0–0.01)
NRBC BLD-RTO: 0 PER 100 WBC
PHOSPHATE SERPL-MCNC: 3.6 MG/DL (ref 2.6–4.7)
PLATELET # BLD AUTO: 592 K/UL (ref 150–400)
PMV BLD AUTO: 10.9 FL (ref 8.9–12.9)
POTASSIUM SERPL-SCNC: 4 MMOL/L (ref 3.5–5.1)
PROT SERPL-MCNC: 7.5 G/DL (ref 6.4–8.2)
RBC # BLD AUTO: 2.57 M/UL (ref 3.8–5.2)
SERVICE CMNT-IMP: ABNORMAL
SERVICE CMNT-IMP: NORMAL
SERVICE CMNT-IMP: NORMAL
SODIUM SERPL-SCNC: 136 MMOL/L (ref 136–145)
WBC # BLD AUTO: 8.7 K/UL (ref 3.6–11)

## 2025-05-26 PROCEDURE — 2700000000 HC OXYGEN THERAPY PER DAY

## 2025-05-26 PROCEDURE — 6370000000 HC RX 637 (ALT 250 FOR IP): Performed by: INTERNAL MEDICINE

## 2025-05-26 PROCEDURE — 80053 COMPREHEN METABOLIC PANEL: CPT

## 2025-05-26 PROCEDURE — 94640 AIRWAY INHALATION TREATMENT: CPT

## 2025-05-26 PROCEDURE — 84100 ASSAY OF PHOSPHORUS: CPT

## 2025-05-26 PROCEDURE — 82962 GLUCOSE BLOOD TEST: CPT

## 2025-05-26 PROCEDURE — 94660 CPAP INITIATION&MGMT: CPT

## 2025-05-26 PROCEDURE — 94668 MNPJ CHEST WALL SBSQ: CPT

## 2025-05-26 PROCEDURE — 2060000000 HC ICU INTERMEDIATE R&B

## 2025-05-26 PROCEDURE — 6360000002 HC RX W HCPCS: Performed by: INTERNAL MEDICINE

## 2025-05-26 PROCEDURE — 85027 COMPLETE CBC AUTOMATED: CPT

## 2025-05-26 PROCEDURE — 2500000003 HC RX 250 WO HCPCS: Performed by: SURGERY

## 2025-05-26 PROCEDURE — 6370000000 HC RX 637 (ALT 250 FOR IP): Performed by: HOSPITALIST

## 2025-05-26 PROCEDURE — 6360000002 HC RX W HCPCS: Performed by: SURGERY

## 2025-05-26 PROCEDURE — 6370000000 HC RX 637 (ALT 250 FOR IP): Performed by: NURSE PRACTITIONER

## 2025-05-26 PROCEDURE — 94760 N-INVAS EAR/PLS OXIMETRY 1: CPT

## 2025-05-26 PROCEDURE — 6370000000 HC RX 637 (ALT 250 FOR IP): Performed by: ANESTHESIOLOGY

## 2025-05-26 RX ADMIN — MIDODRINE HYDROCHLORIDE 15 MG: 5 TABLET ORAL at 14:14

## 2025-05-26 RX ADMIN — Medication: at 21:50

## 2025-05-26 RX ADMIN — MIDODRINE HYDROCHLORIDE 15 MG: 5 TABLET ORAL at 21:38

## 2025-05-26 RX ADMIN — LANSOPRAZOLE 30 MG: 30 TABLET, ORALLY DISINTEGRATING ORAL at 06:17

## 2025-05-26 RX ADMIN — Medication 100 MG: at 08:14

## 2025-05-26 RX ADMIN — MIDODRINE HYDROCHLORIDE 15 MG: 5 TABLET ORAL at 02:03

## 2025-05-26 RX ADMIN — HYDROCODONE BITARTRATE AND ACETAMINOPHEN 1 TABLET: 5; 325 TABLET ORAL at 21:40

## 2025-05-26 RX ADMIN — GABAPENTIN 600 MG: 600 TABLET, FILM COATED ORAL at 21:38

## 2025-05-26 RX ADMIN — GABAPENTIN 600 MG: 600 TABLET, FILM COATED ORAL at 08:14

## 2025-05-26 RX ADMIN — FUROSEMIDE 20 MG: 10 INJECTION, SOLUTION INTRAMUSCULAR; INTRAVENOUS at 08:15

## 2025-05-26 RX ADMIN — HYDROCODONE BITARTRATE AND ACETAMINOPHEN 1 TABLET: 5; 325 TABLET ORAL at 12:26

## 2025-05-26 RX ADMIN — FLUDROCORTISONE ACETATE 0.1 MG: 0.1 TABLET ORAL at 08:14

## 2025-05-26 RX ADMIN — Medication 1 MG: at 08:14

## 2025-05-26 RX ADMIN — IPRATROPIUM BROMIDE AND ALBUTEROL SULFATE 1 DOSE: .5; 3 SOLUTION RESPIRATORY (INHALATION) at 08:24

## 2025-05-26 RX ADMIN — ENOXAPARIN SODIUM 30 MG: 100 INJECTION SUBCUTANEOUS at 08:15

## 2025-05-26 RX ADMIN — FUROSEMIDE 20 MG: 10 INJECTION, SOLUTION INTRAMUSCULAR; INTRAVENOUS at 17:58

## 2025-05-26 RX ADMIN — MIDODRINE HYDROCHLORIDE 15 MG: 5 TABLET ORAL at 08:14

## 2025-05-26 RX ADMIN — FLUDROCORTISONE ACETATE 0.1 MG: 0.1 TABLET ORAL at 21:38

## 2025-05-26 RX ADMIN — Medication 1000 UNITS: at 08:14

## 2025-05-26 RX ADMIN — IPRATROPIUM BROMIDE AND ALBUTEROL SULFATE 1 DOSE: .5; 3 SOLUTION RESPIRATORY (INHALATION) at 20:21

## 2025-05-26 RX ADMIN — SODIUM CHLORIDE, PRESERVATIVE FREE 10 ML: 5 INJECTION INTRAVENOUS at 02:04

## 2025-05-26 RX ADMIN — CITALOPRAM HYDROBROMIDE 10 MG: 20 TABLET ORAL at 08:14

## 2025-05-26 RX ADMIN — AMITRIPTYLINE HYDROCHLORIDE 50 MG: 50 TABLET, FILM COATED ORAL at 21:38

## 2025-05-26 RX ADMIN — SODIUM CHLORIDE, PRESERVATIVE FREE 10 ML: 5 INJECTION INTRAVENOUS at 08:15

## 2025-05-26 RX ADMIN — ONDANSETRON 4 MG: 2 INJECTION, SOLUTION INTRAMUSCULAR; INTRAVENOUS at 18:17

## 2025-05-26 RX ADMIN — SODIUM CHLORIDE, PRESERVATIVE FREE 10 ML: 5 INJECTION INTRAVENOUS at 21:49

## 2025-05-26 RX ADMIN — Medication: at 08:15

## 2025-05-26 ASSESSMENT — PAIN DESCRIPTION - DESCRIPTORS: DESCRIPTORS: ACHING;DISCOMFORT

## 2025-05-26 ASSESSMENT — PAIN SCALES - GENERAL
PAINLEVEL_OUTOF10: 10
PAINLEVEL_OUTOF10: 9
PAINLEVEL_OUTOF10: 4
PAINLEVEL_OUTOF10: 4

## 2025-05-26 ASSESSMENT — PAIN DESCRIPTION - LOCATION
LOCATION: BUTTOCKS
LOCATION: OTHER (COMMENT)

## 2025-05-26 ASSESSMENT — PULMONARY FUNCTION TESTS: PEFR_L/MIN: 20

## 2025-05-26 ASSESSMENT — PAIN DESCRIPTION - ORIENTATION: ORIENTATION: RIGHT;LEFT;UPPER;LOWER

## 2025-05-26 NOTE — DISCHARGE INSTRUCTIONS
Please take lasix 20 mg daily to fluids in lung in check.   Please Take Midodrine and florinef to keep BP > 110 and DP > 70  Please check your BP twice a day   Please follow up PCP   Please Use Tube feeds for nutrition

## 2025-05-27 LAB
ALBUMIN SERPL-MCNC: 2.6 G/DL (ref 3.5–5)
ALBUMIN/GLOB SERPL: 0.5 (ref 1.1–2.2)
ALP SERPL-CCNC: 116 U/L (ref 45–117)
ALT SERPL-CCNC: 42 U/L (ref 12–78)
ANION GAP SERPL CALC-SCNC: 3 MMOL/L (ref 2–12)
AST SERPL-CCNC: 91 U/L (ref 15–37)
BILIRUB SERPL-MCNC: 0.4 MG/DL (ref 0.2–1)
BUN SERPL-MCNC: 17 MG/DL (ref 6–20)
BUN/CREAT SERPL: 50 (ref 12–20)
CALCIUM SERPL-MCNC: 9.2 MG/DL (ref 8.5–10.1)
CHLORIDE SERPL-SCNC: 95 MMOL/L (ref 97–108)
CO2 SERPL-SCNC: 36 MMOL/L (ref 21–32)
CREAT SERPL-MCNC: 0.34 MG/DL (ref 0.55–1.02)
ERYTHROCYTE [DISTWIDTH] IN BLOOD BY AUTOMATED COUNT: 20.3 % (ref 11.5–14.5)
GLOBULIN SER CALC-MCNC: 4.8 G/DL (ref 2–4)
GLUCOSE BLD STRIP.AUTO-MCNC: 132 MG/DL (ref 65–117)
GLUCOSE BLD STRIP.AUTO-MCNC: 133 MG/DL (ref 65–117)
GLUCOSE BLD STRIP.AUTO-MCNC: 139 MG/DL (ref 65–117)
GLUCOSE BLD STRIP.AUTO-MCNC: 82 MG/DL (ref 65–117)
GLUCOSE BLD STRIP.AUTO-MCNC: 98 MG/DL (ref 65–117)
GLUCOSE SERPL-MCNC: 121 MG/DL (ref 65–100)
HCT VFR BLD AUTO: 26.5 % (ref 35–47)
HGB BLD-MCNC: 8.3 G/DL (ref 11.5–16)
MCH RBC QN AUTO: 32.5 PG (ref 26–34)
MCHC RBC AUTO-ENTMCNC: 31.3 G/DL (ref 30–36.5)
MCV RBC AUTO: 103.9 FL (ref 80–99)
NRBC # BLD: 0 K/UL (ref 0–0.01)
NRBC BLD-RTO: 0 PER 100 WBC
PLATELET # BLD AUTO: 530 K/UL (ref 150–400)
PMV BLD AUTO: 11.2 FL (ref 8.9–12.9)
POTASSIUM SERPL-SCNC: 3.9 MMOL/L (ref 3.5–5.1)
PROT SERPL-MCNC: 7.4 G/DL (ref 6.4–8.2)
RBC # BLD AUTO: 2.55 M/UL (ref 3.8–5.2)
SERVICE CMNT-IMP: ABNORMAL
SERVICE CMNT-IMP: NORMAL
SERVICE CMNT-IMP: NORMAL
SODIUM SERPL-SCNC: 134 MMOL/L (ref 136–145)
WBC # BLD AUTO: 8 K/UL (ref 3.6–11)

## 2025-05-27 PROCEDURE — 97116 GAIT TRAINING THERAPY: CPT

## 2025-05-27 PROCEDURE — 6370000000 HC RX 637 (ALT 250 FOR IP): Performed by: NURSE PRACTITIONER

## 2025-05-27 PROCEDURE — 6370000000 HC RX 637 (ALT 250 FOR IP): Performed by: ANESTHESIOLOGY

## 2025-05-27 PROCEDURE — 97530 THERAPEUTIC ACTIVITIES: CPT

## 2025-05-27 PROCEDURE — 6370000000 HC RX 637 (ALT 250 FOR IP): Performed by: INTERNAL MEDICINE

## 2025-05-27 PROCEDURE — 80053 COMPREHEN METABOLIC PANEL: CPT

## 2025-05-27 PROCEDURE — 97110 THERAPEUTIC EXERCISES: CPT

## 2025-05-27 PROCEDURE — 93005 ELECTROCARDIOGRAM TRACING: CPT | Performed by: HOSPITALIST

## 2025-05-27 PROCEDURE — 93005 ELECTROCARDIOGRAM TRACING: CPT | Performed by: NURSE PRACTITIONER

## 2025-05-27 PROCEDURE — 2500000003 HC RX 250 WO HCPCS: Performed by: SURGERY

## 2025-05-27 PROCEDURE — 82962 GLUCOSE BLOOD TEST: CPT

## 2025-05-27 PROCEDURE — 6360000002 HC RX W HCPCS: Performed by: SURGERY

## 2025-05-27 PROCEDURE — 2700000000 HC OXYGEN THERAPY PER DAY

## 2025-05-27 PROCEDURE — 85027 COMPLETE CBC AUTOMATED: CPT

## 2025-05-27 PROCEDURE — 94668 MNPJ CHEST WALL SBSQ: CPT

## 2025-05-27 PROCEDURE — 94760 N-INVAS EAR/PLS OXIMETRY 1: CPT

## 2025-05-27 PROCEDURE — 2060000000 HC ICU INTERMEDIATE R&B

## 2025-05-27 PROCEDURE — 97535 SELF CARE MNGMENT TRAINING: CPT

## 2025-05-27 PROCEDURE — 6360000002 HC RX W HCPCS: Performed by: INTERNAL MEDICINE

## 2025-05-27 PROCEDURE — 94640 AIRWAY INHALATION TREATMENT: CPT

## 2025-05-27 PROCEDURE — 6370000000 HC RX 637 (ALT 250 FOR IP): Performed by: HOSPITALIST

## 2025-05-27 RX ADMIN — GABAPENTIN 600 MG: 600 TABLET, FILM COATED ORAL at 11:32

## 2025-05-27 RX ADMIN — GABAPENTIN 600 MG: 600 TABLET, FILM COATED ORAL at 20:50

## 2025-05-27 RX ADMIN — CITALOPRAM HYDROBROMIDE 10 MG: 20 TABLET ORAL at 11:31

## 2025-05-27 RX ADMIN — Medication: at 20:52

## 2025-05-27 RX ADMIN — SODIUM CHLORIDE, PRESERVATIVE FREE 10 ML: 5 INJECTION INTRAVENOUS at 11:35

## 2025-05-27 RX ADMIN — CYCLOBENZAPRINE 10 MG: 10 TABLET, FILM COATED ORAL at 20:50

## 2025-05-27 RX ADMIN — Medication: at 11:34

## 2025-05-27 RX ADMIN — IPRATROPIUM BROMIDE AND ALBUTEROL SULFATE 1 DOSE: .5; 3 SOLUTION RESPIRATORY (INHALATION) at 07:59

## 2025-05-27 RX ADMIN — ENOXAPARIN SODIUM 30 MG: 100 INJECTION SUBCUTANEOUS at 11:31

## 2025-05-27 RX ADMIN — HYDROCODONE BITARTRATE AND ACETAMINOPHEN 1 TABLET: 5; 325 TABLET ORAL at 18:48

## 2025-05-27 RX ADMIN — HYDROCODONE BITARTRATE AND ACETAMINOPHEN 1 TABLET: 5; 325 TABLET ORAL at 11:31

## 2025-05-27 RX ADMIN — MINERAL OIL, PETROLATUM, PHENYLEPHRINE HCL: 2.5; 140; 749 OINTMENT TOPICAL at 18:51

## 2025-05-27 RX ADMIN — Medication 1 MG: at 11:32

## 2025-05-27 RX ADMIN — ONDANSETRON 4 MG: 2 INJECTION, SOLUTION INTRAMUSCULAR; INTRAVENOUS at 18:48

## 2025-05-27 RX ADMIN — FUROSEMIDE 20 MG: 10 INJECTION, SOLUTION INTRAMUSCULAR; INTRAVENOUS at 18:49

## 2025-05-27 RX ADMIN — Medication 1000 UNITS: at 11:32

## 2025-05-27 RX ADMIN — AMITRIPTYLINE HYDROCHLORIDE 50 MG: 50 TABLET, FILM COATED ORAL at 20:50

## 2025-05-27 RX ADMIN — Medication 100 MG: at 11:32

## 2025-05-27 RX ADMIN — FLUDROCORTISONE ACETATE 0.1 MG: 0.1 TABLET ORAL at 11:32

## 2025-05-27 RX ADMIN — MIDODRINE HYDROCHLORIDE 15 MG: 5 TABLET ORAL at 11:32

## 2025-05-27 RX ADMIN — LANSOPRAZOLE 30 MG: 30 TABLET, ORALLY DISINTEGRATING ORAL at 06:34

## 2025-05-27 RX ADMIN — MIDODRINE HYDROCHLORIDE 15 MG: 5 TABLET ORAL at 03:38

## 2025-05-27 RX ADMIN — MIDODRINE HYDROCHLORIDE 15 MG: 5 TABLET ORAL at 15:55

## 2025-05-27 RX ADMIN — SODIUM CHLORIDE, PRESERVATIVE FREE 10 ML: 5 INJECTION INTRAVENOUS at 20:51

## 2025-05-27 RX ADMIN — IPRATROPIUM BROMIDE AND ALBUTEROL SULFATE 1 DOSE: .5; 3 SOLUTION RESPIRATORY (INHALATION) at 20:11

## 2025-05-27 RX ADMIN — FLUDROCORTISONE ACETATE 0.1 MG: 0.1 TABLET ORAL at 21:17

## 2025-05-27 RX ADMIN — MIDODRINE HYDROCHLORIDE 15 MG: 5 TABLET ORAL at 20:50

## 2025-05-27 ASSESSMENT — PAIN SCALES - GENERAL
PAINLEVEL_OUTOF10: 7
PAINLEVEL_OUTOF10: 0
PAINLEVEL_OUTOF10: 10
PAINLEVEL_OUTOF10: 8

## 2025-05-27 ASSESSMENT — PAIN DESCRIPTION - LOCATION
LOCATION: SACRUM
LOCATION: BUTTOCKS
LOCATION: BUTTOCKS

## 2025-05-27 ASSESSMENT — PAIN DESCRIPTION - FREQUENCY: FREQUENCY: CONTINUOUS

## 2025-05-27 ASSESSMENT — PAIN DESCRIPTION - DESCRIPTORS
DESCRIPTORS: ACHING

## 2025-05-27 ASSESSMENT — PAIN - FUNCTIONAL ASSESSMENT: PAIN_FUNCTIONAL_ASSESSMENT: ACTIVITIES ARE NOT PREVENTED

## 2025-05-27 ASSESSMENT — PAIN DESCRIPTION - ONSET: ONSET: ON-GOING

## 2025-05-27 ASSESSMENT — PAIN DESCRIPTION - PAIN TYPE: TYPE: ACUTE PAIN

## 2025-05-27 NOTE — CARE COORDINATION
Transition of Care Plan:    RUR: 20%  Prior Level of Functioning: Independent  Disposition: [facility], pending insurance authorization  SRIRAM: 5/28/25  If SNF or IPR: Date FOC offered: 5/7/25  Date FOC received: 5/7/25  Accepting facility: [facility]  Date authorization started with reference number: [facility] will initiate insurance auth as soon as updated therapy notes are available  Date authorization received and expires: TBD  Follow up appointments: Per attending's recommendations  DME needed: Defer to IPR  Transportation at discharge: BLS  IM/IMM Medicare/ letter given: 5/1/25  Is patient a  and connected with VA? No   If yes, was  transfer form completed and VA notified? N/A  Caregiver Contact: Mare Tyler, [phone]  Discharge Caregiver contacted prior to discharge? Yes, CM will contact  Care Conference needed? No  Barriers to discharge:  Insurance authorization    Patient has been accepted to [facility]. Patient is agreeable to go, as well as her daughter. CM asked attending for permission to consult PM&R since patient is managed Medicare. [employer] communication with patient information was sent via [employer] to Dr. Jackson.  Crystal with [facility] will start insurance authorization pending updated therapy notes.     CM to continue to follow for KEN needs.    Brook Peace, BSN, RN, ONC, CMSRN  Nurse Care Manager, [phone]

## 2025-05-28 LAB
ALBUMIN SERPL-MCNC: 2.6 G/DL (ref 3.5–5)
ALBUMIN/GLOB SERPL: 0.5 (ref 1.1–2.2)
ALP SERPL-CCNC: 106 U/L (ref 45–117)
ALT SERPL-CCNC: 40 U/L (ref 12–78)
ANION GAP SERPL CALC-SCNC: 6 MMOL/L (ref 2–12)
AST SERPL-CCNC: 74 U/L (ref 15–37)
BILIRUB SERPL-MCNC: 0.5 MG/DL (ref 0.2–1)
BUN SERPL-MCNC: 18 MG/DL (ref 6–20)
BUN/CREAT SERPL: 55 (ref 12–20)
CALCIUM SERPL-MCNC: 9.2 MG/DL (ref 8.5–10.1)
CHLORIDE SERPL-SCNC: 95 MMOL/L (ref 97–108)
CO2 SERPL-SCNC: 32 MMOL/L (ref 21–32)
CREAT SERPL-MCNC: 0.33 MG/DL (ref 0.55–1.02)
EKG ATRIAL RATE: 138 BPM
EKG ATRIAL RATE: 139 BPM
EKG ATRIAL RATE: 141 BPM
EKG DIAGNOSIS: NORMAL
EKG P AXIS: 23 DEGREES
EKG P AXIS: 38 DEGREES
EKG P AXIS: 43 DEGREES
EKG P-R INTERVAL: 112 MS
EKG P-R INTERVAL: 122 MS
EKG P-R INTERVAL: 132 MS
EKG Q-T INTERVAL: 266 MS
EKG Q-T INTERVAL: 268 MS
EKG Q-T INTERVAL: 360 MS
EKG QRS DURATION: 62 MS
EKG QRS DURATION: 62 MS
EKG QRS DURATION: 64 MS
EKG QTC CALCULATION (BAZETT): 406 MS
EKG QTC CALCULATION (BAZETT): 407 MS
EKG QTC CALCULATION (BAZETT): 547 MS
EKG R AXIS: 39 DEGREES
EKG R AXIS: 40 DEGREES
EKG R AXIS: 41 DEGREES
EKG T AXIS: 21 DEGREES
EKG T AXIS: 42 DEGREES
EKG T AXIS: 50 DEGREES
EKG VENTRICULAR RATE: 138 BPM
EKG VENTRICULAR RATE: 139 BPM
EKG VENTRICULAR RATE: 141 BPM
ERYTHROCYTE [DISTWIDTH] IN BLOOD BY AUTOMATED COUNT: 20.5 % (ref 11.5–14.5)
GLOBULIN SER CALC-MCNC: 4.8 G/DL (ref 2–4)
GLUCOSE BLD STRIP.AUTO-MCNC: 107 MG/DL (ref 65–117)
GLUCOSE BLD STRIP.AUTO-MCNC: 94 MG/DL (ref 65–117)
GLUCOSE BLD STRIP.AUTO-MCNC: 98 MG/DL (ref 65–117)
GLUCOSE SERPL-MCNC: 105 MG/DL (ref 65–100)
HCT VFR BLD AUTO: 26.3 % (ref 35–47)
HGB BLD-MCNC: 8.4 G/DL (ref 11.5–16)
MAGNESIUM SERPL-MCNC: 2 MG/DL (ref 1.6–2.4)
MCH RBC QN AUTO: 33.1 PG (ref 26–34)
MCHC RBC AUTO-ENTMCNC: 31.9 G/DL (ref 30–36.5)
MCV RBC AUTO: 103.5 FL (ref 80–99)
NRBC # BLD: 0 K/UL (ref 0–0.01)
NRBC BLD-RTO: 0 PER 100 WBC
PHOSPHATE SERPL-MCNC: 3.3 MG/DL (ref 2.6–4.7)
PLATELET # BLD AUTO: 414 K/UL (ref 150–400)
PMV BLD AUTO: 11.3 FL (ref 8.9–12.9)
POTASSIUM SERPL-SCNC: 3.8 MMOL/L (ref 3.5–5.1)
PROT SERPL-MCNC: 7.4 G/DL (ref 6.4–8.2)
RBC # BLD AUTO: 2.54 M/UL (ref 3.8–5.2)
SERVICE CMNT-IMP: NORMAL
SODIUM SERPL-SCNC: 133 MMOL/L (ref 136–145)
WBC # BLD AUTO: 9.5 K/UL (ref 3.6–11)

## 2025-05-28 PROCEDURE — 93010 ELECTROCARDIOGRAM REPORT: CPT | Performed by: SPECIALIST

## 2025-05-28 PROCEDURE — 83735 ASSAY OF MAGNESIUM: CPT

## 2025-05-28 PROCEDURE — 2700000000 HC OXYGEN THERAPY PER DAY

## 2025-05-28 PROCEDURE — 97116 GAIT TRAINING THERAPY: CPT

## 2025-05-28 PROCEDURE — 2060000000 HC ICU INTERMEDIATE R&B

## 2025-05-28 PROCEDURE — 6370000000 HC RX 637 (ALT 250 FOR IP): Performed by: ANESTHESIOLOGY

## 2025-05-28 PROCEDURE — P9047 ALBUMIN (HUMAN), 25%, 50ML: HCPCS | Performed by: HOSPITALIST

## 2025-05-28 PROCEDURE — 80053 COMPREHEN METABOLIC PANEL: CPT

## 2025-05-28 PROCEDURE — 94640 AIRWAY INHALATION TREATMENT: CPT

## 2025-05-28 PROCEDURE — 97530 THERAPEUTIC ACTIVITIES: CPT

## 2025-05-28 PROCEDURE — 99024 POSTOP FOLLOW-UP VISIT: CPT | Performed by: SURGERY

## 2025-05-28 PROCEDURE — 92526 ORAL FUNCTION THERAPY: CPT

## 2025-05-28 PROCEDURE — 82962 GLUCOSE BLOOD TEST: CPT

## 2025-05-28 PROCEDURE — 6370000000 HC RX 637 (ALT 250 FOR IP): Performed by: NURSE PRACTITIONER

## 2025-05-28 PROCEDURE — 6360000002 HC RX W HCPCS: Performed by: HOSPITALIST

## 2025-05-28 PROCEDURE — 6360000002 HC RX W HCPCS: Performed by: INTERNAL MEDICINE

## 2025-05-28 PROCEDURE — 2500000003 HC RX 250 WO HCPCS: Performed by: SURGERY

## 2025-05-28 PROCEDURE — 6370000000 HC RX 637 (ALT 250 FOR IP): Performed by: INTERNAL MEDICINE

## 2025-05-28 PROCEDURE — 85027 COMPLETE CBC AUTOMATED: CPT

## 2025-05-28 PROCEDURE — 6370000000 HC RX 637 (ALT 250 FOR IP): Performed by: HOSPITALIST

## 2025-05-28 PROCEDURE — 84100 ASSAY OF PHOSPHORUS: CPT

## 2025-05-28 RX ORDER — SODIUM CHLORIDE 9 MG/ML
INJECTION, SOLUTION INTRAVENOUS CONTINUOUS
Status: DISCONTINUED | OUTPATIENT
Start: 2025-05-28 | End: 2025-05-28

## 2025-05-28 RX ORDER — ALBUMIN (HUMAN) 12.5 G/50ML
25 SOLUTION INTRAVENOUS EVERY 6 HOURS
Status: COMPLETED | OUTPATIENT
Start: 2025-05-28 | End: 2025-05-29

## 2025-05-28 RX ADMIN — Medication 1 MG: at 09:02

## 2025-05-28 RX ADMIN — ALBUMIN (HUMAN) 25 G: 0.25 INJECTION, SOLUTION INTRAVENOUS at 10:58

## 2025-05-28 RX ADMIN — MIDODRINE HYDROCHLORIDE 15 MG: 5 TABLET ORAL at 21:27

## 2025-05-28 RX ADMIN — ALBUMIN (HUMAN) 25 G: 0.25 INJECTION, SOLUTION INTRAVENOUS at 16:49

## 2025-05-28 RX ADMIN — ALBUMIN (HUMAN) 25 G: 0.25 INJECTION, SOLUTION INTRAVENOUS at 21:28

## 2025-05-28 RX ADMIN — FLUDROCORTISONE ACETATE 0.1 MG: 0.1 TABLET ORAL at 09:01

## 2025-05-28 RX ADMIN — LANSOPRAZOLE 30 MG: 30 TABLET, ORALLY DISINTEGRATING ORAL at 06:44

## 2025-05-28 RX ADMIN — MIDODRINE HYDROCHLORIDE 15 MG: 5 TABLET ORAL at 13:35

## 2025-05-28 RX ADMIN — HYDROCODONE BITARTRATE AND ACETAMINOPHEN 1 TABLET: 5; 325 TABLET ORAL at 21:27

## 2025-05-28 RX ADMIN — ENOXAPARIN SODIUM 30 MG: 100 INJECTION SUBCUTANEOUS at 09:01

## 2025-05-28 RX ADMIN — Medication: at 09:08

## 2025-05-28 RX ADMIN — SODIUM CHLORIDE, PRESERVATIVE FREE 10 ML: 5 INJECTION INTRAVENOUS at 21:37

## 2025-05-28 RX ADMIN — Medication 1000 UNITS: at 09:02

## 2025-05-28 RX ADMIN — Medication 100 MG: at 09:02

## 2025-05-28 RX ADMIN — CITALOPRAM HYDROBROMIDE 10 MG: 20 TABLET ORAL at 09:02

## 2025-05-28 RX ADMIN — SODIUM CHLORIDE, PRESERVATIVE FREE 10 ML: 5 INJECTION INTRAVENOUS at 09:08

## 2025-05-28 RX ADMIN — GABAPENTIN 600 MG: 600 TABLET, FILM COATED ORAL at 09:02

## 2025-05-28 RX ADMIN — MIDODRINE HYDROCHLORIDE 15 MG: 5 TABLET ORAL at 02:26

## 2025-05-28 RX ADMIN — FLUDROCORTISONE ACETATE 0.1 MG: 0.1 TABLET ORAL at 21:28

## 2025-05-28 RX ADMIN — GABAPENTIN 600 MG: 600 TABLET, FILM COATED ORAL at 21:27

## 2025-05-28 RX ADMIN — MIDODRINE HYDROCHLORIDE 15 MG: 5 TABLET ORAL at 09:02

## 2025-05-28 RX ADMIN — AMITRIPTYLINE HYDROCHLORIDE 50 MG: 50 TABLET, FILM COATED ORAL at 21:28

## 2025-05-28 ASSESSMENT — PAIN DESCRIPTION - LOCATION: LOCATION: BUTTOCKS;LEG

## 2025-05-28 ASSESSMENT — PAIN SCALES - GENERAL: PAINLEVEL_OUTOF10: 9

## 2025-05-28 NOTE — WOUND CARE
Follow up visit for sacrum and buttocks.   Seen in 411 resting on MERLE bed with daughter at bedside.  She is conversational and reports no pain.     Heels are intact without redness and offloaded with pillows.    Sacrum and buttocks are fully resurfaced with blanching pink tissue.  One pinpoint partial thickness area remains on central left buttock.  Triad Wound Cream applied.        Recommend continuing Triad Wound Cream twice daily and as needed with incontinence.     Will sign off.     GAUDENCIO JuarezN  
[facility] Note:     New consult placed for \"sacrum\"    Assessed in  with Ky LOZANO.    Ce Roberts is a 55 y.o. y/o female who presented for Dehydration [E86.0]  Hypoglycemia [E16.2]  Intractable vomiting [R11.10]  Acute cystitis without hematuria [N30.00]  Intractable nausea and vomiting [R11.2]  Protein-calorie malnutrition, unspecified severity [E46]  Admitted on 2025; LOS: 12    Lab Results   Component Value Date/Time    WBC 13.6 (H) 2025 03:43 AM    LABA1C 4.0 2025 03:55 PM    POCGLU 105 2025 12:38 PM    POCGLU 78 2025 10:24 PM    HGB 7.9 (L) 2025 03:43 AM    HCT 23.4 (L) 2025 03:43 AM     2025 03:43 AM      Lab Results   Component Value Date/Time    WBC 13.6 (H) 2025 03:43 AM        Tobacco Use      Smoking status: Former        Packs/day: 0.00        Years: 1 pack/day for 32.1 years (32.1 ttl pk-yrs)        Types: Cigarettes        Start date:         Quit date: 2024        Years since quittin.2      Smokeless tobacco: Never     ADULT TUBE FEEDING; PEG; Other Tube Feeding (specify); JEvity; Continuous; 30; No; 100; Q 4 hours; Fiber; 1 Dose; BID  DIET ONE TIME MESSAGE;     Assessment:   Patient is drowsy, communicative and requires assist with repositioning.    Bed: low air loss  GI/: johnson  Patient reports no pain.  Patient repositioned on left side with pillow.  Heels offloaded with pillows.   Heels intact without erythema.       Wound Assessment   Sacrum, deep tissue pressure injury: skipped areas over field 10 x 12 x 0 cm; non blanching maroon; no induration or tissue sliding.  TX: foam    2.  Mireille anal skin hyperpigmented      Wound, Pressure Prevention & Skin Care Recommendations:    Minimize layers of linen/pads under patient to optimize support surface.    2.  Turn/reposition approximately every 2 hours and offload heels.   3.  Manage moisture/ Keep skin folds clean and dry/minimize brief usage.  4.  Specialty bed: low air 
WOCN Note    Patient has orders to transfer from ICU.  Ordering specialty bed for usage after transfer.    Specialty Bed:  immerse low air loss ordered.  Confirmation:  [id number]  Place patient promptly upon delivery.  Limit layers under patient.  Use only flat sheets.  Bed goes with patient when transferred to different floor.    JENNA PeaceN RN [facility] Inpatient Wound Care  Available on [employer]  Office [phone]      
WOCN Note:     Follow up assessment.    Assessed in  with therapists.    Ce Roberts is a 55 y.o. y/o female who presented for Dehydration [E86.0]  Hypoglycemia [E16.2]  Intractable vomiting [R11.10]  Acute cystitis without hematuria [N30.00]  Intractable nausea and vomiting [R11.2]  Protein-calorie malnutrition, unspecified severity [E46]  Admitted on 2025; LOS: 20    Lab Results   Component Value Date/Time    WBC 10.0 2025 02:23 AM    LABA1C 4.0 2025 03:55 PM    POCGLU 88 2025 11:52 AM    POCGLU 117 2025 05:35 AM    HGB 7.1 (L) 2025 02:23 AM    HCT 22.7 (L) 2025 02:23 AM     2025 02:23 AM      Lab Results   Component Value Date/Time    WBC 10.0 2025 02:23 AM        Tobacco Use      Smoking status: Former        Packs/day: 0.00        Years: 1 pack/day for 32.1 years (32.1 ttl pk-yrs)        Types: Cigarettes        Start date:         Quit date: 2024        Years since quittin.2      Smokeless tobacco: Never     ADULT DIET; Dysphagia - Soft and Bite Sized  ADULT TUBE FEEDING; PEG; Standard with Fiber; Bolus; 4 Times Daily; 237; Infusion; 1; 180; After bolus; Fiber, Protein; 1 Dose; Daily; 1 Dose; BID     Assessment:   Patient is alert, communicative and requires assist with repositioning.    Bed: low air loss  GI/: johnson  Patient reports no pain.  Heel intact without erythema.    Wound Assessment   Sacrum, deep tissue pressure injury: skipped areas over field 10 x 12 x 0 cm; non blanching maroon; 2 x 2 x 0.1 cm 100% red blanching unroofed area; no induration.  TX: venelex and foam      2.  Mireille anal and perineal skin hyperpigmented with raw/denuded area, MASD.  Tx:  cleansed and applied zinc cream.    3.  Mireille peg tube, denudation  Tx;  applied triad wound cream    Wound, Pressure Prevention & Skin Care Recommendations:    Minimize layers of linen/pads under patient to optimize support surface.    2.  Turn/reposition approximately 
optimize support surface.    2.  Turn/reposition approximately every 2 hours and offload heels.   3.  Manage moisture/ Keep skin folds clean and dry/minimize brief usage.  4.  Specialty bed: low air loss. Use only flat sheet and one incontinence pad.  5.  Sacrum:  BID apply Venelex and foam dressing.  6.  Mireille anal:  BID apply triad wound cream.  7.  Mireille peg tube:  apply triad wound cream and absorb drainage with split gauze.    Discussed above plan with patient & Latesha LOZANO.    Transition of Care:   Plan to follow as needed while admitted to hospital.    JENNA PeaceN RN [facility] Inpatient Wound Care  Available on [facility]  Office [phone]

## 2025-05-28 NOTE — CARE COORDINATION
Transition of Care Plan:    RUR: 20%  Prior Level of Functioning: Independent  Disposition: [facility], pending insurance authorization  SRIRAM: 5/29/25  If SNF or IPR: Date FOC offered: 5/7/25  Date FOC received: 5/7/25  Accepting facility: [facility], pending insurance authorization  Date authorization started with reference number: 5/27/25, [id number]   Date authorization received and expires: Pending  Follow up appointments: Per attending's recommendations   DME needed: Defer to facility   Transportation at discharge: BLS  IM/IMM Medicare/ letter given: 5/1/25  Is patient a [place] and connected with VA? No   If yes, was  transfer form completed and VA notified? N/A  Caregiver Contact: Mare Tyler, [phone]  Discharge Caregiver contacted prior to discharge? Yes, CM will contact per patient preference  Care Conference needed? No  Barriers to discharge:  Insurance authorization    Patient's insurance is offering a peer to peer. CM sent [employer] communication to PM&R physician, Dr. Jackson. Phone number for peer to peer is [phone], option 5 and must be completed by May 29th at 9am.     CM to continue to follow for KEN needs.    Brook Peace, BSN, RN, ONC, CMSRN  Nurse Care Manager, [phone]

## 2025-05-29 ENCOUNTER — APPOINTMENT (OUTPATIENT)
Facility: HOSPITAL | Age: 55
DRG: 689 | End: 2025-05-29
Payer: MEDICARE

## 2025-05-29 PROBLEM — R00.0 SINUS TACHYCARDIA: Status: ACTIVE | Noted: 2025-05-29

## 2025-05-29 PROBLEM — R29.898 SEVERE MUSCLE DECONDITIONING: Status: ACTIVE | Noted: 2025-05-29

## 2025-05-29 LAB
ALBUMIN SERPL-MCNC: 4.3 G/DL (ref 3.5–5)
ALBUMIN/GLOB SERPL: 0.9 (ref 1.1–2.2)
ALP SERPL-CCNC: 100 U/L (ref 45–117)
ALT SERPL-CCNC: 36 U/L (ref 12–78)
ANION GAP SERPL CALC-SCNC: 4 MMOL/L (ref 2–12)
AST SERPL-CCNC: 59 U/L (ref 15–37)
BILIRUB SERPL-MCNC: 0.7 MG/DL (ref 0.2–1)
BUN SERPL-MCNC: 12 MG/DL (ref 6–20)
BUN/CREAT SERPL: 24 (ref 12–20)
CALCIUM SERPL-MCNC: 10.1 MG/DL (ref 8.5–10.1)
CHLORIDE SERPL-SCNC: 94 MMOL/L (ref 97–108)
CO2 SERPL-SCNC: 35 MMOL/L (ref 21–32)
CREAT SERPL-MCNC: 0.49 MG/DL (ref 0.55–1.02)
ERYTHROCYTE [DISTWIDTH] IN BLOOD BY AUTOMATED COUNT: 19.8 % (ref 11.5–14.5)
GLOBULIN SER CALC-MCNC: 4.6 G/DL (ref 2–4)
GLUCOSE BLD STRIP.AUTO-MCNC: 109 MG/DL (ref 65–117)
GLUCOSE BLD STRIP.AUTO-MCNC: 120 MG/DL (ref 65–117)
GLUCOSE BLD STRIP.AUTO-MCNC: 123 MG/DL (ref 65–117)
GLUCOSE BLD STRIP.AUTO-MCNC: 92 MG/DL (ref 65–117)
GLUCOSE SERPL-MCNC: 100 MG/DL (ref 65–100)
HCT VFR BLD AUTO: 26.7 % (ref 35–47)
HGB BLD-MCNC: 7.9 G/DL (ref 11.5–16)
MCH RBC QN AUTO: 32.1 PG (ref 26–34)
MCHC RBC AUTO-ENTMCNC: 29.6 G/DL (ref 30–36.5)
MCV RBC AUTO: 108.5 FL (ref 80–99)
NRBC # BLD: 0 K/UL (ref 0–0.01)
NRBC BLD-RTO: 0 PER 100 WBC
PLATELET # BLD AUTO: 309 K/UL (ref 150–400)
PMV BLD AUTO: 11.4 FL (ref 8.9–12.9)
POTASSIUM SERPL-SCNC: 4 MMOL/L (ref 3.5–5.1)
PROT SERPL-MCNC: 8.9 G/DL (ref 6.4–8.2)
RBC # BLD AUTO: 2.46 M/UL (ref 3.8–5.2)
SERVICE CMNT-IMP: ABNORMAL
SERVICE CMNT-IMP: ABNORMAL
SERVICE CMNT-IMP: NORMAL
SERVICE CMNT-IMP: NORMAL
SODIUM SERPL-SCNC: 133 MMOL/L (ref 136–145)
WBC # BLD AUTO: 6 K/UL (ref 3.6–11)

## 2025-05-29 PROCEDURE — 94640 AIRWAY INHALATION TREATMENT: CPT

## 2025-05-29 PROCEDURE — 2700000000 HC OXYGEN THERAPY PER DAY

## 2025-05-29 PROCEDURE — 97116 GAIT TRAINING THERAPY: CPT

## 2025-05-29 PROCEDURE — 99222 1ST HOSP IP/OBS MODERATE 55: CPT | Performed by: PHYSICAL MEDICINE & REHABILITATION

## 2025-05-29 PROCEDURE — 85027 COMPLETE CBC AUTOMATED: CPT

## 2025-05-29 PROCEDURE — 82962 GLUCOSE BLOOD TEST: CPT

## 2025-05-29 PROCEDURE — 97535 SELF CARE MNGMENT TRAINING: CPT

## 2025-05-29 PROCEDURE — 6370000000 HC RX 637 (ALT 250 FOR IP): Performed by: ANESTHESIOLOGY

## 2025-05-29 PROCEDURE — 71045 X-RAY EXAM CHEST 1 VIEW: CPT

## 2025-05-29 PROCEDURE — 6370000000 HC RX 637 (ALT 250 FOR IP): Performed by: HOSPITALIST

## 2025-05-29 PROCEDURE — 6370000000 HC RX 637 (ALT 250 FOR IP): Performed by: INTERNAL MEDICINE

## 2025-05-29 PROCEDURE — 6360000002 HC RX W HCPCS: Performed by: INTERNAL MEDICINE

## 2025-05-29 PROCEDURE — 2060000000 HC ICU INTERMEDIATE R&B

## 2025-05-29 PROCEDURE — 6360000002 HC RX W HCPCS: Performed by: HOSPITALIST

## 2025-05-29 PROCEDURE — 97530 THERAPEUTIC ACTIVITIES: CPT

## 2025-05-29 PROCEDURE — 2500000003 HC RX 250 WO HCPCS: Performed by: SURGERY

## 2025-05-29 PROCEDURE — P9047 ALBUMIN (HUMAN), 25%, 50ML: HCPCS | Performed by: HOSPITALIST

## 2025-05-29 PROCEDURE — 94760 N-INVAS EAR/PLS OXIMETRY 1: CPT

## 2025-05-29 PROCEDURE — 80053 COMPREHEN METABOLIC PANEL: CPT

## 2025-05-29 PROCEDURE — 99223 1ST HOSP IP/OBS HIGH 75: CPT | Performed by: INTERNAL MEDICINE

## 2025-05-29 PROCEDURE — 6370000000 HC RX 637 (ALT 250 FOR IP): Performed by: NURSE PRACTITIONER

## 2025-05-29 RX ADMIN — FLUDROCORTISONE ACETATE 0.1 MG: 0.1 TABLET ORAL at 08:42

## 2025-05-29 RX ADMIN — ACETAMINOPHEN 650 MG: 325 TABLET ORAL at 06:43

## 2025-05-29 RX ADMIN — Medication 1000 UNITS: at 08:42

## 2025-05-29 RX ADMIN — HYDROCODONE BITARTRATE AND ACETAMINOPHEN 1 TABLET: 5; 325 TABLET ORAL at 21:17

## 2025-05-29 RX ADMIN — CITALOPRAM HYDROBROMIDE 10 MG: 20 TABLET ORAL at 08:42

## 2025-05-29 RX ADMIN — LANSOPRAZOLE 30 MG: 30 TABLET, ORALLY DISINTEGRATING ORAL at 06:20

## 2025-05-29 RX ADMIN — IPRATROPIUM BROMIDE AND ALBUTEROL SULFATE 1 DOSE: .5; 3 SOLUTION RESPIRATORY (INHALATION) at 08:17

## 2025-05-29 RX ADMIN — AMITRIPTYLINE HYDROCHLORIDE 50 MG: 50 TABLET, FILM COATED ORAL at 21:17

## 2025-05-29 RX ADMIN — MIDODRINE HYDROCHLORIDE 15 MG: 5 TABLET ORAL at 08:41

## 2025-05-29 RX ADMIN — MIDODRINE HYDROCHLORIDE 15 MG: 5 TABLET ORAL at 14:27

## 2025-05-29 RX ADMIN — MIDODRINE HYDROCHLORIDE 15 MG: 5 TABLET ORAL at 02:16

## 2025-05-29 RX ADMIN — FLUDROCORTISONE ACETATE 0.1 MG: 0.1 TABLET ORAL at 21:17

## 2025-05-29 RX ADMIN — GABAPENTIN 600 MG: 600 TABLET, FILM COATED ORAL at 08:41

## 2025-05-29 RX ADMIN — ALBUMIN (HUMAN) 25 G: 0.25 INJECTION, SOLUTION INTRAVENOUS at 04:22

## 2025-05-29 RX ADMIN — ENOXAPARIN SODIUM 30 MG: 100 INJECTION SUBCUTANEOUS at 08:42

## 2025-05-29 RX ADMIN — Medication 1 MG: at 08:41

## 2025-05-29 RX ADMIN — GABAPENTIN 600 MG: 600 TABLET, FILM COATED ORAL at 21:17

## 2025-05-29 RX ADMIN — SODIUM CHLORIDE, PRESERVATIVE FREE 10 ML: 5 INJECTION INTRAVENOUS at 21:18

## 2025-05-29 RX ADMIN — SODIUM CHLORIDE, PRESERVATIVE FREE 10 ML: 5 INJECTION INTRAVENOUS at 08:42

## 2025-05-29 RX ADMIN — MIDODRINE HYDROCHLORIDE 15 MG: 5 TABLET ORAL at 21:17

## 2025-05-29 RX ADMIN — Medication 100 MG: at 08:42

## 2025-05-29 ASSESSMENT — PAIN DESCRIPTION - FREQUENCY: FREQUENCY: CONTINUOUS

## 2025-05-29 ASSESSMENT — PAIN - FUNCTIONAL ASSESSMENT: PAIN_FUNCTIONAL_ASSESSMENT: ACTIVITIES ARE NOT PREVENTED

## 2025-05-29 ASSESSMENT — PAIN SCALES - GENERAL
PAINLEVEL_OUTOF10: 8
PAINLEVEL_OUTOF10: 9

## 2025-05-29 ASSESSMENT — PAIN DESCRIPTION - DESCRIPTORS: DESCRIPTORS: ACHING

## 2025-05-29 ASSESSMENT — PAIN DESCRIPTION - LOCATION
LOCATION: BUTTOCKS;LEG
LOCATION: LEG;BUTTOCKS

## 2025-05-29 ASSESSMENT — PAIN DESCRIPTION - PAIN TYPE: TYPE: ACUTE PAIN
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
## 2025-05-30 ENCOUNTER — APPOINTMENT (OUTPATIENT)
Facility: HOSPITAL | Age: 55
DRG: 689 | End: 2025-05-30
Payer: MEDICARE

## 2025-05-30 PROBLEM — J96.01 ACUTE HYPOXIC RESPIRATORY FAILURE (HCC): Status: ACTIVE | Noted: 2025-05-30

## 2025-05-30 PROBLEM — R57.9 SHOCK (HCC): Status: ACTIVE | Noted: 2025-05-30

## 2025-05-30 PROBLEM — G72.81 CRITICAL ILLNESS MYOPATHY: Status: ACTIVE | Noted: 2025-05-30

## 2025-05-30 LAB
ALBUMIN SERPL-MCNC: 3.3 G/DL (ref 3.5–5)
ALBUMIN/GLOB SERPL: 0.9 (ref 1.1–2.2)
ALP SERPL-CCNC: 98 U/L (ref 45–117)
ALT SERPL-CCNC: 31 U/L (ref 12–78)
ANION GAP SERPL CALC-SCNC: 5 MMOL/L (ref 2–12)
AST SERPL-CCNC: 55 U/L (ref 15–37)
BILIRUB SERPL-MCNC: 0.4 MG/DL (ref 0.2–1)
BUN SERPL-MCNC: 17 MG/DL (ref 6–20)
BUN/CREAT SERPL: 61 (ref 12–20)
CALCIUM SERPL-MCNC: 9 MG/DL (ref 8.5–10.1)
CHLORIDE SERPL-SCNC: 96 MMOL/L (ref 97–108)
CO2 SERPL-SCNC: 34 MMOL/L (ref 21–32)
CREAT SERPL-MCNC: 0.28 MG/DL (ref 0.55–1.02)
ERYTHROCYTE [DISTWIDTH] IN BLOOD BY AUTOMATED COUNT: 19.4 % (ref 11.5–14.5)
GLOBULIN SER CALC-MCNC: 3.8 G/DL (ref 2–4)
GLUCOSE BLD STRIP.AUTO-MCNC: 106 MG/DL (ref 65–117)
GLUCOSE BLD STRIP.AUTO-MCNC: 135 MG/DL (ref 65–117)
GLUCOSE BLD STRIP.AUTO-MCNC: 99 MG/DL (ref 65–117)
GLUCOSE SERPL-MCNC: 113 MG/DL (ref 65–100)
HCT VFR BLD AUTO: 23.4 % (ref 35–47)
HGB BLD-MCNC: 7.5 G/DL (ref 11.5–16)
MCH RBC QN AUTO: 32.6 PG (ref 26–34)
MCHC RBC AUTO-ENTMCNC: 32.1 G/DL (ref 30–36.5)
MCV RBC AUTO: 101.7 FL (ref 80–99)
NRBC # BLD: 0 K/UL (ref 0–0.01)
NRBC BLD-RTO: 0 PER 100 WBC
PHOSPHATE SERPL-MCNC: 4 MG/DL (ref 2.6–4.7)
PLATELET # BLD AUTO: 290 K/UL (ref 150–400)
PMV BLD AUTO: 11.7 FL (ref 8.9–12.9)
POTASSIUM SERPL-SCNC: 3 MMOL/L (ref 3.5–5.1)
PROT SERPL-MCNC: 7.1 G/DL (ref 6.4–8.2)
RBC # BLD AUTO: 2.3 M/UL (ref 3.8–5.2)
SERVICE CMNT-IMP: ABNORMAL
SERVICE CMNT-IMP: NORMAL
SERVICE CMNT-IMP: NORMAL
SODIUM SERPL-SCNC: 135 MMOL/L (ref 136–145)
WBC # BLD AUTO: 6 K/UL (ref 3.6–11)

## 2025-05-30 PROCEDURE — 71045 X-RAY EXAM CHEST 1 VIEW: CPT

## 2025-05-30 PROCEDURE — 6360000002 HC RX W HCPCS: Performed by: NURSE PRACTITIONER

## 2025-05-30 PROCEDURE — 82962 GLUCOSE BLOOD TEST: CPT

## 2025-05-30 PROCEDURE — 6360000002 HC RX W HCPCS: Performed by: SURGERY

## 2025-05-30 PROCEDURE — 2060000000 HC ICU INTERMEDIATE R&B

## 2025-05-30 PROCEDURE — 84100 ASSAY OF PHOSPHORUS: CPT

## 2025-05-30 PROCEDURE — 2500000003 HC RX 250 WO HCPCS: Performed by: SURGERY

## 2025-05-30 PROCEDURE — 94760 N-INVAS EAR/PLS OXIMETRY 1: CPT

## 2025-05-30 PROCEDURE — 6370000000 HC RX 637 (ALT 250 FOR IP): Performed by: HOSPITALIST

## 2025-05-30 PROCEDURE — 6370000000 HC RX 637 (ALT 250 FOR IP): Performed by: NURSE PRACTITIONER

## 2025-05-30 PROCEDURE — 6360000002 HC RX W HCPCS: Performed by: INTERNAL MEDICINE

## 2025-05-30 PROCEDURE — 2700000000 HC OXYGEN THERAPY PER DAY

## 2025-05-30 PROCEDURE — 6370000000 HC RX 637 (ALT 250 FOR IP): Performed by: STUDENT IN AN ORGANIZED HEALTH CARE EDUCATION/TRAINING PROGRAM

## 2025-05-30 PROCEDURE — 6370000000 HC RX 637 (ALT 250 FOR IP): Performed by: INTERNAL MEDICINE

## 2025-05-30 PROCEDURE — 36415 COLL VENOUS BLD VENIPUNCTURE: CPT

## 2025-05-30 PROCEDURE — 80053 COMPREHEN METABOLIC PANEL: CPT

## 2025-05-30 PROCEDURE — 93005 ELECTROCARDIOGRAM TRACING: CPT | Performed by: NURSE PRACTITIONER

## 2025-05-30 PROCEDURE — 85027 COMPLETE CBC AUTOMATED: CPT

## 2025-05-30 RX ORDER — ENOXAPARIN SODIUM 100 MG/ML
40 INJECTION SUBCUTANEOUS DAILY
Status: DISCONTINUED | OUTPATIENT
Start: 2025-05-31 | End: 2025-06-02 | Stop reason: HOSPADM

## 2025-05-30 RX ORDER — GABAPENTIN 600 MG/1
1200 TABLET ORAL 2 TIMES DAILY
Status: DISCONTINUED | OUTPATIENT
Start: 2025-05-30 | End: 2025-06-01

## 2025-05-30 RX ORDER — MORPHINE SULFATE 2 MG/ML
1 INJECTION, SOLUTION INTRAMUSCULAR; INTRAVENOUS ONCE
Status: COMPLETED | OUTPATIENT
Start: 2025-05-30 | End: 2025-05-30

## 2025-05-30 RX ORDER — FUROSEMIDE 40 MG/1
20 TABLET ORAL DAILY
Status: DISCONTINUED | OUTPATIENT
Start: 2025-05-31 | End: 2025-06-02 | Stop reason: HOSPADM

## 2025-05-30 RX ADMIN — GABAPENTIN 600 MG: 600 TABLET, FILM COATED ORAL at 08:55

## 2025-05-30 RX ADMIN — ENOXAPARIN SODIUM 30 MG: 100 INJECTION SUBCUTANEOUS at 09:00

## 2025-05-30 RX ADMIN — MORPHINE SULFATE 1 MG: 2 INJECTION, SOLUTION INTRAMUSCULAR; INTRAVENOUS at 22:18

## 2025-05-30 RX ADMIN — MIDODRINE HYDROCHLORIDE 15 MG: 5 TABLET ORAL at 20:48

## 2025-05-30 RX ADMIN — Medication 1 MG: at 08:55

## 2025-05-30 RX ADMIN — LANSOPRAZOLE 30 MG: 30 TABLET, ORALLY DISINTEGRATING ORAL at 05:34

## 2025-05-30 RX ADMIN — FLUDROCORTISONE ACETATE 0.1 MG: 0.1 TABLET ORAL at 08:54

## 2025-05-30 RX ADMIN — ONDANSETRON 4 MG: 2 INJECTION, SOLUTION INTRAMUSCULAR; INTRAVENOUS at 19:18

## 2025-05-30 RX ADMIN — SODIUM CHLORIDE, PRESERVATIVE FREE 10 ML: 5 INJECTION INTRAVENOUS at 08:55

## 2025-05-30 RX ADMIN — Medication 1000 UNITS: at 08:54

## 2025-05-30 RX ADMIN — GABAPENTIN 1200 MG: 600 TABLET, FILM COATED ORAL at 20:47

## 2025-05-30 RX ADMIN — FLUDROCORTISONE ACETATE 0.1 MG: 0.1 TABLET ORAL at 23:28

## 2025-05-30 RX ADMIN — HYDROCODONE BITARTRATE AND ACETAMINOPHEN 1 TABLET: 5; 325 TABLET ORAL at 08:54

## 2025-05-30 RX ADMIN — Medication 100 MG: at 08:55

## 2025-05-30 RX ADMIN — MIDODRINE HYDROCHLORIDE 15 MG: 5 TABLET ORAL at 14:17

## 2025-05-30 RX ADMIN — CITALOPRAM HYDROBROMIDE 10 MG: 20 TABLET ORAL at 08:55

## 2025-05-30 RX ADMIN — MIDODRINE HYDROCHLORIDE 15 MG: 5 TABLET ORAL at 02:38

## 2025-05-30 RX ADMIN — MIDODRINE HYDROCHLORIDE 15 MG: 5 TABLET ORAL at 08:54

## 2025-05-30 RX ADMIN — HYDROCODONE BITARTRATE AND ACETAMINOPHEN 1 TABLET: 5; 325 TABLET ORAL at 14:17

## 2025-05-30 ASSESSMENT — PAIN DESCRIPTION - LOCATION
LOCATION: BUTTOCKS;LEG
LOCATION: BUTTOCKS;LEG
LOCATION: BUTTOCKS

## 2025-05-30 ASSESSMENT — PAIN DESCRIPTION - ORIENTATION
ORIENTATION: LEFT;RIGHT
ORIENTATION: POSTERIOR
ORIENTATION: LEFT;RIGHT;POSTERIOR

## 2025-05-30 ASSESSMENT — PAIN SCALES - GENERAL
PAINLEVEL_OUTOF10: 6
PAINLEVEL_OUTOF10: 10
PAINLEVEL_OUTOF10: 10
PAINLEVEL_OUTOF10: 8

## 2025-05-30 ASSESSMENT — PAIN DESCRIPTION - DESCRIPTORS
DESCRIPTORS: ACHING
DESCRIPTORS: ACHING

## 2025-05-30 NOTE — CARE COORDINATION
Transition of Care Plan:    RUR: 20%  Prior Level of Functioning: Independent  Disposition: SNF vs Home Health (most likely HH as patient and family are declining SNF at this time)  SRIRAM: 6/2/25  Follow up appointments: Per attending's recommendations  DME needed: CM has ordered bedside commode, shower chair, rolling walker, ronny lift and hospital bed per therapy's recommendations via [employer]   Transportation at discharge: Likely BLS  IM/IMM Medicare/ letter given: 5/1/25  Is patient a [place] and connected with [facility]? No   If yes, was  transfer form completed and VA notified? N/A  Caregiver Contact: Mare Tyler, [phone]  Discharge Caregiver contacted prior to discharge? Yes, CM will contact per patient preference  Care Conference needed? No  Barriers to discharge:  Medical equipment and accepting home health company and home tube feed supplier    [facility] is now declining the patient as they do not believe that she will be able to tolerate 3 hours of therapy at inpatient rehab. CM notified attending physician. CM met with patient and provided a SNF list and also emailed a list to her daughter, Mare. At this time they do not want the patient to go to SNF and would like her to go home. Weekend CM will follow up to determine if this is still what they would like to proceed with going home with home health as a discharge plan. CM sent referrals to several home health agencies as well as [facility] for tube feeds. CM ordered bedside commode, shower chair, rolling walker, ronny lift and hospital bed per therapy's recommendations via [employer].     CM to continue to follow for KEN needs.    Brook Peace, JENNAN, RN, ONC, CMSRN  Nurse Care Manager, [phone]

## 2025-05-31 ENCOUNTER — APPOINTMENT (OUTPATIENT)
Facility: HOSPITAL | Age: 55
DRG: 689 | End: 2025-05-31
Payer: MEDICARE

## 2025-05-31 LAB
ALBUMIN SERPL-MCNC: 3.1 G/DL (ref 3.5–5)
ALBUMIN/GLOB SERPL: 0.7 (ref 1.1–2.2)
ALP SERPL-CCNC: 187 U/L (ref 45–117)
ALT SERPL-CCNC: 44 U/L (ref 12–78)
ANION GAP SERPL CALC-SCNC: 8 MMOL/L (ref 2–12)
AST SERPL-CCNC: 111 U/L (ref 15–37)
BILIRUB SERPL-MCNC: 0.6 MG/DL (ref 0.2–1)
BUN SERPL-MCNC: 25 MG/DL (ref 6–20)
BUN/CREAT SERPL: 43 (ref 12–20)
CALCIUM SERPL-MCNC: 8.9 MG/DL (ref 8.5–10.1)
CHLORIDE SERPL-SCNC: 100 MMOL/L (ref 97–108)
CO2 SERPL-SCNC: 28 MMOL/L (ref 21–32)
COMMENT:: NORMAL
CREAT SERPL-MCNC: 0.58 MG/DL (ref 0.55–1.02)
D DIMER PPP FEU-MCNC: 2.01 MG/L FEU (ref 0–0.65)
ERYTHROCYTE [DISTWIDTH] IN BLOOD BY AUTOMATED COUNT: 18.8 % (ref 11.5–14.5)
GLOBULIN SER CALC-MCNC: 4.6 G/DL (ref 2–4)
GLUCOSE SERPL-MCNC: 143 MG/DL (ref 65–100)
HCT VFR BLD AUTO: 24.7 % (ref 35–47)
HGB BLD-MCNC: 8 G/DL (ref 11.5–16)
LACTATE SERPL-SCNC: 1.7 MMOL/L (ref 0.4–2)
LACTATE SERPL-SCNC: 2.5 MMOL/L (ref 0.4–2)
MCH RBC QN AUTO: 32.4 PG (ref 26–34)
MCHC RBC AUTO-ENTMCNC: 32.4 G/DL (ref 30–36.5)
MCV RBC AUTO: 100 FL (ref 80–99)
NRBC # BLD: 0 K/UL (ref 0–0.01)
NRBC BLD-RTO: 0 PER 100 WBC
PLATELET # BLD AUTO: 277 K/UL (ref 150–400)
PMV BLD AUTO: 11.5 FL (ref 8.9–12.9)
POTASSIUM SERPL-SCNC: 3.1 MMOL/L (ref 3.5–5.1)
PROCALCITONIN SERPL-MCNC: 9.32 NG/ML
PROT SERPL-MCNC: 7.7 G/DL (ref 6.4–8.2)
RBC # BLD AUTO: 2.47 M/UL (ref 3.8–5.2)
SODIUM SERPL-SCNC: 136 MMOL/L (ref 136–145)
SPECIMEN HOLD: NORMAL
TROPONIN I SERPL HS-MCNC: <4 NG/L (ref 0–51)
WBC # BLD AUTO: 6.2 K/UL (ref 3.6–11)

## 2025-05-31 PROCEDURE — 84145 PROCALCITONIN (PCT): CPT

## 2025-05-31 PROCEDURE — 6370000000 HC RX 637 (ALT 250 FOR IP): Performed by: INTERNAL MEDICINE

## 2025-05-31 PROCEDURE — 2060000000 HC ICU INTERMEDIATE R&B

## 2025-05-31 PROCEDURE — 94761 N-INVAS EAR/PLS OXIMETRY MLT: CPT

## 2025-05-31 PROCEDURE — 85027 COMPLETE CBC AUTOMATED: CPT

## 2025-05-31 PROCEDURE — 6370000000 HC RX 637 (ALT 250 FOR IP): Performed by: NURSE PRACTITIONER

## 2025-05-31 PROCEDURE — 80053 COMPREHEN METABOLIC PANEL: CPT

## 2025-05-31 PROCEDURE — 6360000002 HC RX W HCPCS: Performed by: STUDENT IN AN ORGANIZED HEALTH CARE EDUCATION/TRAINING PROGRAM

## 2025-05-31 PROCEDURE — 6360000002 HC RX W HCPCS: Performed by: SURGERY

## 2025-05-31 PROCEDURE — 6360000004 HC RX CONTRAST MEDICATION: Performed by: STUDENT IN AN ORGANIZED HEALTH CARE EDUCATION/TRAINING PROGRAM

## 2025-05-31 PROCEDURE — 84484 ASSAY OF TROPONIN QUANT: CPT

## 2025-05-31 PROCEDURE — 87040 BLOOD CULTURE FOR BACTERIA: CPT

## 2025-05-31 PROCEDURE — 85379 FIBRIN DEGRADATION QUANT: CPT

## 2025-05-31 PROCEDURE — 71275 CT ANGIOGRAPHY CHEST: CPT

## 2025-05-31 PROCEDURE — 83605 ASSAY OF LACTIC ACID: CPT

## 2025-05-31 PROCEDURE — 6370000000 HC RX 637 (ALT 250 FOR IP): Performed by: STUDENT IN AN ORGANIZED HEALTH CARE EDUCATION/TRAINING PROGRAM

## 2025-05-31 PROCEDURE — 2500000003 HC RX 250 WO HCPCS: Performed by: SURGERY

## 2025-05-31 RX ORDER — FENTANYL CITRATE 50 UG/ML
12.5 INJECTION, SOLUTION INTRAMUSCULAR; INTRAVENOUS ONCE
Status: DISCONTINUED | OUTPATIENT
Start: 2025-05-31 | End: 2025-05-31

## 2025-05-31 RX ORDER — FENTANYL CITRATE 50 UG/ML
3.5 INJECTION, SOLUTION INTRAMUSCULAR; INTRAVENOUS ONCE
Status: COMPLETED | OUTPATIENT
Start: 2025-05-31 | End: 2025-05-31

## 2025-05-31 RX ORDER — GABAPENTIN 300 MG/1
600 CAPSULE ORAL EVERY 24 HOURS
Status: DISCONTINUED | OUTPATIENT
Start: 2025-05-31 | End: 2025-06-01

## 2025-05-31 RX ORDER — ALBUMIN (HUMAN) 12.5 G/50ML
25 SOLUTION INTRAVENOUS ONCE
Status: DISCONTINUED | OUTPATIENT
Start: 2025-05-31 | End: 2025-06-02 | Stop reason: HOSPADM

## 2025-05-31 RX ORDER — IOPAMIDOL 755 MG/ML
100 INJECTION, SOLUTION INTRAVASCULAR
Status: COMPLETED | OUTPATIENT
Start: 2025-05-31 | End: 2025-05-31

## 2025-05-31 RX ADMIN — ENOXAPARIN SODIUM 40 MG: 100 INJECTION SUBCUTANEOUS at 08:47

## 2025-05-31 RX ADMIN — GABAPENTIN 1200 MG: 600 TABLET, FILM COATED ORAL at 23:44

## 2025-05-31 RX ADMIN — MIDODRINE HYDROCHLORIDE 15 MG: 5 TABLET ORAL at 02:03

## 2025-05-31 RX ADMIN — MIDODRINE HYDROCHLORIDE 15 MG: 5 TABLET ORAL at 16:02

## 2025-05-31 RX ADMIN — ONDANSETRON 4 MG: 2 INJECTION, SOLUTION INTRAMUSCULAR; INTRAVENOUS at 17:52

## 2025-05-31 RX ADMIN — GABAPENTIN 600 MG: 300 CAPSULE ORAL at 13:05

## 2025-05-31 RX ADMIN — POTASSIUM BICARBONATE 40 MEQ: 782 TABLET, EFFERVESCENT ORAL at 03:33

## 2025-05-31 RX ADMIN — FLUDROCORTISONE ACETATE 0.1 MG: 0.1 TABLET ORAL at 21:23

## 2025-05-31 RX ADMIN — FUROSEMIDE 20 MG: 40 TABLET ORAL at 08:47

## 2025-05-31 RX ADMIN — SODIUM CHLORIDE, PRESERVATIVE FREE 10 ML: 5 INJECTION INTRAVENOUS at 09:17

## 2025-05-31 RX ADMIN — IOPAMIDOL 100 ML: 755 INJECTION, SOLUTION INTRAVENOUS at 04:23

## 2025-05-31 RX ADMIN — Medication 100 MG: at 08:46

## 2025-05-31 RX ADMIN — Medication 1 MG: at 08:46

## 2025-05-31 RX ADMIN — GABAPENTIN 1200 MG: 600 TABLET, FILM COATED ORAL at 08:46

## 2025-05-31 RX ADMIN — Medication 1000 UNITS: at 08:46

## 2025-05-31 RX ADMIN — FLUDROCORTISONE ACETATE 0.1 MG: 0.1 TABLET ORAL at 08:48

## 2025-05-31 RX ADMIN — LANSOPRAZOLE 30 MG: 30 TABLET, ORALLY DISINTEGRATING ORAL at 08:46

## 2025-05-31 RX ADMIN — CYCLOBENZAPRINE 10 MG: 10 TABLET, FILM COATED ORAL at 13:06

## 2025-05-31 RX ADMIN — CITALOPRAM HYDROBROMIDE 10 MG: 20 TABLET ORAL at 08:47

## 2025-05-31 RX ADMIN — AMITRIPTYLINE HYDROCHLORIDE 50 MG: 50 TABLET, FILM COATED ORAL at 21:23

## 2025-05-31 RX ADMIN — FENTANYL CITRATE 3.5 MCG: 50 INJECTION INTRAMUSCULAR; INTRAVENOUS at 17:39

## 2025-05-31 RX ADMIN — MIDODRINE HYDROCHLORIDE 15 MG: 5 TABLET ORAL at 21:23

## 2025-05-31 RX ADMIN — MIDODRINE HYDROCHLORIDE 15 MG: 5 TABLET ORAL at 08:46

## 2025-05-31 RX ADMIN — SODIUM CHLORIDE, PRESERVATIVE FREE 10 ML: 5 INJECTION INTRAVENOUS at 21:34

## 2025-05-31 ASSESSMENT — PAIN SCALES - GENERAL
PAINLEVEL_OUTOF10: 10

## 2025-05-31 ASSESSMENT — PAIN DESCRIPTION - LOCATION
LOCATION: OTHER (COMMENT)
LOCATION: OTHER (COMMENT)
LOCATION: FOOT

## 2025-05-31 ASSESSMENT — PAIN DESCRIPTION - FREQUENCY: FREQUENCY: CONTINUOUS

## 2025-05-31 ASSESSMENT — PAIN SCALES - WONG BAKER: WONGBAKER_NUMERICALRESPONSE: HURTS LITTLE MORE

## 2025-05-31 ASSESSMENT — PAIN DESCRIPTION - ORIENTATION: ORIENTATION: RIGHT;LEFT

## 2025-05-31 ASSESSMENT — PAIN - FUNCTIONAL ASSESSMENT: PAIN_FUNCTIONAL_ASSESSMENT: ACTIVITIES ARE NOT PREVENTED

## 2025-06-01 LAB
ALBUMIN SERPL-MCNC: 2.9 G/DL (ref 3.5–5)
ALBUMIN/GLOB SERPL: 0.7 (ref 1.1–2.2)
ALP SERPL-CCNC: 138 U/L (ref 45–117)
ALT SERPL-CCNC: 39 U/L (ref 12–78)
ANION GAP SERPL CALC-SCNC: 6 MMOL/L (ref 2–12)
AST SERPL-CCNC: 71 U/L (ref 15–37)
BILIRUB SERPL-MCNC: 0.3 MG/DL (ref 0.2–1)
BUN SERPL-MCNC: 13 MG/DL (ref 6–20)
BUN/CREAT SERPL: 45 (ref 12–20)
CALCIUM SERPL-MCNC: 9.3 MG/DL (ref 8.5–10.1)
CHLORIDE SERPL-SCNC: 99 MMOL/L (ref 97–108)
CO2 SERPL-SCNC: 33 MMOL/L (ref 21–32)
CREAT SERPL-MCNC: 0.29 MG/DL (ref 0.55–1.02)
ERYTHROCYTE [DISTWIDTH] IN BLOOD BY AUTOMATED COUNT: 19.2 % (ref 11.5–14.5)
GLOBULIN SER CALC-MCNC: 4.3 G/DL (ref 2–4)
GLUCOSE SERPL-MCNC: 104 MG/DL (ref 65–100)
HCT VFR BLD AUTO: 23.9 % (ref 35–47)
HGB BLD-MCNC: 7.5 G/DL (ref 11.5–16)
MCH RBC QN AUTO: 32.1 PG (ref 26–34)
MCHC RBC AUTO-ENTMCNC: 31.4 G/DL (ref 30–36.5)
MCV RBC AUTO: 102.1 FL (ref 80–99)
NRBC # BLD: 0 K/UL (ref 0–0.01)
NRBC BLD-RTO: 0 PER 100 WBC
PLATELET # BLD AUTO: 252 K/UL (ref 150–400)
PMV BLD AUTO: 12 FL (ref 8.9–12.9)
POTASSIUM SERPL-SCNC: 3.4 MMOL/L (ref 3.5–5.1)
PROT SERPL-MCNC: 7.2 G/DL (ref 6.4–8.2)
RBC # BLD AUTO: 2.34 M/UL (ref 3.8–5.2)
SODIUM SERPL-SCNC: 138 MMOL/L (ref 136–145)
WBC # BLD AUTO: 7.3 K/UL (ref 3.6–11)

## 2025-06-01 PROCEDURE — 6370000000 HC RX 637 (ALT 250 FOR IP): Performed by: STUDENT IN AN ORGANIZED HEALTH CARE EDUCATION/TRAINING PROGRAM

## 2025-06-01 PROCEDURE — 6360000002 HC RX W HCPCS: Performed by: STUDENT IN AN ORGANIZED HEALTH CARE EDUCATION/TRAINING PROGRAM

## 2025-06-01 PROCEDURE — 6360000002 HC RX W HCPCS: Performed by: SURGERY

## 2025-06-01 PROCEDURE — 2500000003 HC RX 250 WO HCPCS: Performed by: SURGERY

## 2025-06-01 PROCEDURE — 2060000000 HC ICU INTERMEDIATE R&B

## 2025-06-01 PROCEDURE — 6370000000 HC RX 637 (ALT 250 FOR IP): Performed by: INTERNAL MEDICINE

## 2025-06-01 PROCEDURE — 80053 COMPREHEN METABOLIC PANEL: CPT

## 2025-06-01 PROCEDURE — 85027 COMPLETE CBC AUTOMATED: CPT

## 2025-06-01 RX ORDER — MORPHINE SULFATE 2 MG/ML
2 INJECTION, SOLUTION INTRAMUSCULAR; INTRAVENOUS EVERY 4 HOURS PRN
Refills: 0 | Status: DISCONTINUED | OUTPATIENT
Start: 2025-06-01 | End: 2025-06-02 | Stop reason: HOSPADM

## 2025-06-01 RX ORDER — GABAPENTIN 600 MG/1
1200 TABLET ORAL 2 TIMES DAILY
Status: DISCONTINUED | OUTPATIENT
Start: 2025-06-01 | End: 2025-06-01

## 2025-06-01 RX ORDER — GABAPENTIN 600 MG/1
1200 TABLET ORAL DAILY
Status: DISCONTINUED | OUTPATIENT
Start: 2025-06-02 | End: 2025-06-02 | Stop reason: HOSPADM

## 2025-06-01 RX ORDER — GABAPENTIN 600 MG/1
600 TABLET ORAL EVERY 24 HOURS
Status: DISCONTINUED | OUTPATIENT
Start: 2025-06-01 | End: 2025-06-02 | Stop reason: HOSPADM

## 2025-06-01 RX ADMIN — GABAPENTIN 1200 MG: 600 TABLET, FILM COATED ORAL at 08:48

## 2025-06-01 RX ADMIN — CITALOPRAM HYDROBROMIDE 10 MG: 20 TABLET ORAL at 08:47

## 2025-06-01 RX ADMIN — Medication 1000 UNITS: at 08:48

## 2025-06-01 RX ADMIN — FLUDROCORTISONE ACETATE 0.1 MG: 0.1 TABLET ORAL at 08:47

## 2025-06-01 RX ADMIN — MIDODRINE HYDROCHLORIDE 15 MG: 5 TABLET ORAL at 08:47

## 2025-06-01 RX ADMIN — MORPHINE SULFATE 2 MG: 2 INJECTION, SOLUTION INTRAMUSCULAR; INTRAVENOUS at 11:40

## 2025-06-01 RX ADMIN — LANSOPRAZOLE 30 MG: 30 TABLET, ORALLY DISINTEGRATING ORAL at 06:21

## 2025-06-01 RX ADMIN — Medication 1 MG: at 08:47

## 2025-06-01 RX ADMIN — GABAPENTIN 600 MG: 600 TABLET, FILM COATED ORAL at 15:50

## 2025-06-01 RX ADMIN — GABAPENTIN 1800 MG: 400 CAPSULE ORAL at 21:18

## 2025-06-01 RX ADMIN — ONDANSETRON 4 MG: 2 INJECTION, SOLUTION INTRAMUSCULAR; INTRAVENOUS at 15:08

## 2025-06-01 RX ADMIN — SODIUM CHLORIDE, PRESERVATIVE FREE 10 ML: 5 INJECTION INTRAVENOUS at 10:29

## 2025-06-01 RX ADMIN — ENOXAPARIN SODIUM 40 MG: 100 INJECTION SUBCUTANEOUS at 08:48

## 2025-06-01 RX ADMIN — MIDODRINE HYDROCHLORIDE 15 MG: 5 TABLET ORAL at 20:28

## 2025-06-01 RX ADMIN — MIDODRINE HYDROCHLORIDE 15 MG: 5 TABLET ORAL at 15:50

## 2025-06-01 RX ADMIN — AMITRIPTYLINE HYDROCHLORIDE 50 MG: 50 TABLET, FILM COATED ORAL at 20:28

## 2025-06-01 RX ADMIN — MIDODRINE HYDROCHLORIDE 15 MG: 5 TABLET ORAL at 02:08

## 2025-06-01 RX ADMIN — Medication 100 MG: at 08:47

## 2025-06-01 RX ADMIN — CYCLOBENZAPRINE 10 MG: 10 TABLET, FILM COATED ORAL at 08:48

## 2025-06-01 RX ADMIN — FLUDROCORTISONE ACETATE 0.1 MG: 0.1 TABLET ORAL at 20:28

## 2025-06-01 RX ADMIN — SODIUM CHLORIDE, PRESERVATIVE FREE 10 ML: 5 INJECTION INTRAVENOUS at 20:24

## 2025-06-01 ASSESSMENT — PAIN SCALES - WONG BAKER: WONGBAKER_NUMERICALRESPONSE: HURTS A LITTLE BIT

## 2025-06-01 ASSESSMENT — PAIN DESCRIPTION - ORIENTATION
ORIENTATION: RIGHT;LEFT
ORIENTATION: RIGHT;LEFT

## 2025-06-01 ASSESSMENT — PAIN DESCRIPTION - LOCATION
LOCATION: LEG
LOCATION: FOOT

## 2025-06-01 ASSESSMENT — PAIN DESCRIPTION - DESCRIPTORS
DESCRIPTORS: ACHING;SHARP;SHOOTING
DESCRIPTORS: ACHING;STABBING;THROBBING

## 2025-06-01 ASSESSMENT — PAIN SCALES - GENERAL
PAINLEVEL_OUTOF10: 0
PAINLEVEL_OUTOF10: 9
PAINLEVEL_OUTOF10: 9
PAINLEVEL_OUTOF10: 2

## 2025-06-01 NOTE — CARE COORDINATION
Transition of Care Plan:     RUR: 20%  Prior Level of Functioning: Independent  Disposition: Home with HH. [facility]    CM spoke to patient's daughter, Mare and family would still prefer HH over SNF.    Family is still awaiting delivery of DME.    SRIRAM: 6/2/25  Follow up appointments: Per attending's recommendations  DME needed: CM has ordered bedside commode, shower chair, rolling walker, ronny lift and hospital bed per therapy's recommendations via [employer]   Transportation at discharge: Likely BLS  IM/IMM Medicare/ letter given: 5/1/25  Is patient a [place] and connected with [facility]? No              If yes, was [place] transfer form completed and VA notified? N/A  Caregiver Contact: Mare Tyler, [phone]  Discharge Caregiver contacted prior to discharge? Yes, CM will contact per patient preference  Care Conference needed? No  Barriers to discharge:  Medical equipment and final tube feed orders.    Dilia Fischer RN/CRM  [phone]

## 2025-06-01 NOTE — PROGRESS NOTES
Hospitalist Progress Note  Anibal Ribera MD  Answering service: [phone]        Date of Service:  2025  NAME:  Ce Roberts  :  1970  MRN:  [id number]      Admission Summary:     Patient presented with intractable nausea and vomiting and malnutrition and failure to thrive.  Prior history of gastric bypass and right hemicolectomy.    Interval history / Subjective:     She has no acute complaint.  Blood pressure has been low.     Assessment & Plan:     Failure to thrive  -Patient is s/p gastric bypass and right hemicolectomy in 2024  -Presented with intractable nausea and vomiting, malnutrition, failure to thrive  -Initial CT abdominal pelvis on admission shows s/p gastric bypass and right hemicolectomy, negative for obstruction  -S/p laparoscopic, converted to open gastrostomy tube placement and lysis of adhesion on   -Patient on tube feeding as well as regular diet    Sepsis  -Patient hypotensive with elevated lactic acid level and transferred to IMCU on   -Another code sepsis called overnight due to tachycardia, hypotension and leukopenia  -Chest x-ray  shows diffuse interstitial and alveolar opacities, which may represent pulmonary edema/or pneumonia  -CT abdominal pelvis on  shows inflammatory changes at small bowel loops and left colon suggestive of enterocolitis  -UA negative, most likely source of sepsis is enterocolitis  -Continue current antibiotics with cefepime and Flagyl    Hypotension  -Increase midodrine to 10 mg p.o. twice daily  -Albumin as needed, will avoid IV fluid due to pulmonary edema seen on x-ray    Mild intermittent asthma  - Stable without exacerbation  - Continue breathing treatment as needed    Anemia  -Multifactorial including B12 and folate deficiency and also anemia of chronic disease  -H&H stable, continue 
                                                                                                Hospitalist Progress Note  Anibal Ribera MD  Answering service: [phone]        Date of Service:  2025  NAME:  Ce Roberts  :  1970  MRN:  [id number]      Admission Summary:     Patient admitted with multiple issues including failure to thrive, developed septic shock during this hospitalization required ICU admission from  to .    Interval history / Subjective:     No acute complaint.  Overall feeling better.     Assessment & Plan:     Failure to thrive  -Patient is s/p gastric bypass and right hemicolectomy in 2024  -Presented with intractable nausea and vomiting, malnutrition, failure to thrive  -Initial CT abdominal pelvis on admission shows s/p gastric bypass and right hemicolectomy, negative for obstruction  -S/p laparoscopic, converted to open gastrostomy tube placement and lysis of adhesion on   -Patient on tube feeding as well as regular diet     Sepsis with septic shock  -Patient hypotensive with elevated lactic acid level  -Patient previously required vasopressors, continue midodrine, on Florinef  -Source of sepsis pneumonia enterocolitis  -Patient required ICU admission from      Acute respiratory failure  - Acute hypoxic and hypercapnic respiratory failure due to combination of pneumonia and volume overload  - Requiring intubation and mechanical ventilation from  to   - Currently on high flow oxygen at 45 L  - Pulmonology consult, continue diuresis     Suspect aspiration pneumonia  -Chest x-ray  shows diffuse interstitial and alveolar opacities, which may represent pulmonary edema/or pneumonia  -Completed antibiotic course with cefepime and Flagyl     Enterocolitis  -CT abdominal pelvis on  shows inflammatory changes at small bowel loops and left colon suggestive of enterocolitis  -UA negative, most likely source of sepsis is enterocolitis/pneumonia  - 
                                                                                                Hospitalist Progress Note  Anibal Ribera MD  Answering service: [phone]        Date of Service:  2025  NAME:  Ce Roberts  :  1970  MRN:  [id number]      Admission Summary:     Patient presented with intractable nausea and vomiting and malnutrition and failure to thrive.  Prior history of gastric bypass and right hemicolectomy.    Interval history / Subjective:     Patient continues to have low blood pressure with systolic in the 80s, despite IV albumin and midodrine.  This morning she appears more lethargic, tachycardic as well as tachypneic.     Assessment & Plan:     Failure to thrive  -Patient is s/p gastric bypass and right hemicolectomy in 2024  -Presented with intractable nausea and vomiting, malnutrition, failure to thrive  -Initial CT abdominal pelvis on admission shows s/p gastric bypass and right hemicolectomy, negative for obstruction  -S/p laparoscopic, converted to open gastrostomy tube placement and lysis of adhesion on   -Patient on tube feeding as well as regular diet    Sepsis  -Patient hypotensive with elevated lactic acid level and transferred to IMCU on   -Another code sepsis called overnight due to tachycardia, hypotension and leukopenia  -Chest x-ray  shows diffuse interstitial and alveolar opacities, which may represent pulmonary edema/or pneumonia  -CT abdominal pelvis on  shows inflammatory changes at small bowel loops and left colon suggestive of enterocolitis  -UA negative, most likely source of sepsis is enterocolitis  -Continue current antibiotics with cefepime and Flagyl    Impending septic shock  - Patient continues to be hypotensive with systolic in the 80s despite albumin and midodrine  - Appears more lethargic today and more tachycardic and tachypneic  - ICU team has been consulted and appreciate their help, patient will need to move to ICU for further 
                                                                                                Hospitalist Progress Note  Laith Templeton MD  Answering service: [phone]        Date of Service:  2025  NAME:  Ce Roberts  :  1970  MRN:  [id number]      Admission Summary:     \"Ce Roberts is a 55 y.o. female with past medical history significant for fibromyalgia, gastric bypass, right hemicolectomy, hypertension and diabetes following gastric bypass, mood disorder presented at the emergency room with nausea and vomiting.  Patient had gastric bypass in  which became complicated with bowel obstruction and subsequently underwent right hemicolectomy.  Patient has been having GI symptoms since then.  Vomiting is nonbilious and not coffee-ground.  Patient has not been able to drink or eat as result of the nausea and vomiting.  Patient also reported generalized abdominal pain, dull ache, no radiation, no known aggravating or relieving factors.  It was reported that patient has lost 110 pounds since .  There was no history of  fever, rigors or chills reported.  Patient was last admitted to this hospital in 2024, patient was admitted and treated for acute sigmoid diverticulitis/colitis due to C. difficile.  CT scan of the abdomen and pelvis done in the emergency room showed status post gastric bypass and right hemicolectomy, contracted small and large bowel without inflammatory changes and no evidence of obstruction.  She was referred to the hospitalist service for admission.\"       Interval history / Subjective:     Patient examined at bedsidedaughter at bedside requesting to readdress at gabapentin dosage per her rheumatologist     Assessment & Plan:       Intractable nausea vomiting resolved  Failure to thrive  Poor Po intake  History of gastric bypass right hemicolectomy  -Initial CT abdominal pelvis on admission shows s/p gastric bypass and right hemicolectomy, negative for 
                                                                                                Hospitalist Progress Note  Laith Templeton MD  Answering service: [phone]        Date of Service:  2025  NAME:  Ce Roberts  :  1970  MRN:  [id number]      Admission Summary:     \"Ce Roberts is a 55 y.o. female with past medical history significant for fibromyalgia, gastric bypass, right hemicolectomy, hypertension and diabetes following gastric bypass, mood disorder presented at the emergency room with nausea and vomiting.  Patient had gastric bypass in  which became complicated with bowel obstruction and subsequently underwent right hemicolectomy.  Patient has been having GI symptoms since then.  Vomiting is nonbilious and not coffee-ground.  Patient has not been able to drink or eat as result of the nausea and vomiting.  Patient also reported generalized abdominal pain, dull ache, no radiation, no known aggravating or relieving factors.  It was reported that patient has lost 110 pounds since .  There was no history of  fever, rigors or chills reported.  Patient was last admitted to this hospital in 2024, patient was admitted and treated for acute sigmoid diverticulitis/colitis due to C. difficile.  CT scan of the abdomen and pelvis done in the emergency room showed status post gastric bypass and right hemicolectomy, contracted small and large bowel without inflammatory changes and no evidence of obstruction.  She was referred to the hospitalist service for admission.\"       Interval history / Subjective:   Patient and his daughter requesting IV morphine plus fentanyl for severe fibromyalgia related pain discussed safety concerns regarding IV fentanyl.   Increase gabapentin dose per patient/family request     Assessment & Plan:       Intractable nausea vomiting resolved  Failure to thrive  Poor Po intake  History of gastric bypass right hemicolectomy  -Initial CT abdominal pelvis 
                                                                                                Hospitalist Progress Note  Laith Templeton MD  Answering service: [phone]        Date of Service:  2025  NAME:  Ce Roberts  :  1970  MRN:  [id number]      Admission Summary:     \"Ce Roberts is a 55 y.o. female with past medical history significant for fibromyalgia, gastric bypass, right hemicolectomy, hypertension and diabetes following gastric bypass, mood disorder presented at the emergency room with nausea and vomiting.  Patient had gastric bypass in  which became complicated with bowel obstruction and subsequently underwent right hemicolectomy.  Patient has been having GI symptoms since then.  Vomiting is nonbilious and not coffee-ground.  Patient has not been able to drink or eat as result of the nausea and vomiting.  Patient also reported generalized abdominal pain, dull ache, no radiation, no known aggravating or relieving factors.  It was reported that patient has lost 110 pounds since .  There was no history of  fever, rigors or chills reported.  Patient was last admitted to this hospital in 2024, patient was admitted and treated for acute sigmoid diverticulitis/colitis due to C. difficile.  CT scan of the abdomen and pelvis done in the emergency room showed status post gastric bypass and right hemicolectomy, contracted small and large bowel without inflammatory changes and no evidence of obstruction.  She was referred to the hospitalist service for admission.\"       Interval history / Subjective:   Patient having significant night due to tachycardia and severe fibromyalgia pain  Patient states that she wants to go home as soon as her home health is set up  Lactic acid down trended.  Does not suspect infection at this time    Gabapentin increased to home dose     Assessment & Plan:       Intractable nausea vomiting resolved  Failure to thrive  Poor Po intake  History of 
                                                                                                Hospitalist Progress Note  Laith Templeton MD  Answering service: [phone]        Date of Service:  2025  NAME:  Ce Roberts  :  1970  MRN:  [id number]      Admission Summary:     \"Ce Roberts is a 55 y.o. female with past medical history significant for fibromyalgia, gastric bypass, right hemicolectomy, hypertension and diabetes following gastric bypass, mood disorder presented at the emergency room with nausea and vomiting.  Patient had gastric bypass in  which became complicated with bowel obstruction and subsequently underwent right hemicolectomy.  Patient has been having GI symptoms since then.  Vomiting is nonbilious and not coffee-ground.  Patient has not been able to drink or eat as result of the nausea and vomiting.  Patient also reported generalized abdominal pain, dull ache, no radiation, no known aggravating or relieving factors.  It was reported that patient has lost 110 pounds since .  There was no history of  fever, rigors or chills reported.  Patient was last admitted to this hospital in 2024, patient was admitted and treated for acute sigmoid diverticulitis/colitis due to C. difficile.  CT scan of the abdomen and pelvis done in the emergency room showed status post gastric bypass and right hemicolectomy, contracted small and large bowel without inflammatory changes and no evidence of obstruction.  She was referred to the hospitalist service for admission.\"       Interval history / Subjective:     Patient examined at bedside sitting in the chair.       Assessment & Plan:       Intractable nausea vomiting resolved  Failure to thrive  Poor Po intake  History of gastric bypass right hemicolectomy  -Initial CT abdominal pelvis on admission shows s/p gastric bypass and right hemicolectomy, negative for obstruction. s/p laparoscopic, converted to open gastrostomy tube 
                                                                                                Hospitalist Progress Note  Rashid Hall MD  Answering service: [phone]        Date of Service:  2025  NAME:  Ce Roberts  :  1970  MRN:  [id number]      Admission Summary:     \"Ce Roberts is a 55 y.o. female with past medical history significant for fibromyalgia, gastric bypass, right hemicolectomy, hypertension and diabetes following gastric bypass, mood disorder presented at the emergency room with nausea and vomiting.  Patient had gastric bypass in  which became complicated with bowel obstruction and subsequently underwent right hemicolectomy.  Patient has been having GI symptoms since then.  Vomiting is nonbilious and not coffee-ground.  Patient has not been able to drink or eat as result of the nausea and vomiting.  Patient also reported generalized abdominal pain, dull ache, no radiation, no known aggravating or relieving factors.  It was reported that patient has lost 110 pounds since .  There was no history of  fever, rigors or chills reported.  Patient was last admitted to this hospital in 2024, patient was admitted and treated for acute sigmoid diverticulitis/colitis due to C. difficile.  CT scan of the abdomen and pelvis done in the emergency room showed status post gastric bypass and right hemicolectomy, contracted small and large bowel without inflammatory changes and no evidence of obstruction.  She was referred to the hospitalist service for admission.\"       Interval history / Subjective:     Patient seen and examined at the bedside. She stated that she feels the same. No abdominal pain or difficulty of breathing. Not eating that much, but on TF    Sinus tachycardic HR in 120s, BP soft       Assessment & Plan:     Intractable nausea, vomiting, abdominal pain and failure to thrive  -Patient is s/p gastric bypass and right hemicolectomy in 2024  -Presented 
                                                                                                Hospitalist Progress Note  Rashid Hall MD  Answering service: [phone]        Date of Service:  2025  NAME:  Ce Roberts  :  1970  MRN:  [id number]      Admission Summary:     \"Ce Roberts is a 55 y.o. female with past medical history significant for fibromyalgia, gastric bypass, right hemicolectomy, hypertension and diabetes following gastric bypass, mood disorder presented at the emergency room with nausea and vomiting.  Patient had gastric bypass in  which became complicated with bowel obstruction and subsequently underwent right hemicolectomy.  Patient has been having GI symptoms since then.  Vomiting is nonbilious and not coffee-ground.  Patient has not been able to drink or eat as result of the nausea and vomiting.  Patient also reported generalized abdominal pain, dull ache, no radiation, no known aggravating or relieving factors.  It was reported that patient has lost 110 pounds since .  There was no history of  fever, rigors or chills reported.  Patient was last admitted to this hospital in 2024, patient was admitted and treated for acute sigmoid diverticulitis/colitis due to C. difficile.  CT scan of the abdomen and pelvis done in the emergency room showed status post gastric bypass and right hemicolectomy, contracted small and large bowel without inflammatory changes and no evidence of obstruction.  She was referred to the hospitalist service for admission.\"       Interval history / Subjective:     Patient seen and examined at the bedside. She was sitting on the chair next to bed. She said she feels better. No vomiting today. Had bowel movement this morning. No left side chest pain .     Assessment & Plan:     Intractable nausea, vomiting, abdominal pain and failure to thrive  -Patient is s/p gastric bypass and right hemicolectomy in 2024  -Presented with 
                                                                                                Hospitalist Progress Note  Rashid Hall MD  Answering service: [phone]        Date of Service:  2025  NAME:  Ce Roberts  :  1970  MRN:  [id number]      Admission Summary:     \"Ce Roberts is a 55 y.o. female with past medical history significant for fibromyalgia, gastric bypass, right hemicolectomy, hypertension and diabetes following gastric bypass, mood disorder presented at the emergency room with nausea and vomiting.  Patient had gastric bypass in  which became complicated with bowel obstruction and subsequently underwent right hemicolectomy.  Patient has been having GI symptoms since then.  Vomiting is nonbilious and not coffee-ground.  Patient has not been able to drink or eat as result of the nausea and vomiting.  Patient also reported generalized abdominal pain, dull ache, no radiation, no known aggravating or relieving factors.  It was reported that patient has lost 110 pounds since .  There was no history of  fever, rigors or chills reported.  Patient was last admitted to this hospital in 2024, patient was admitted and treated for acute sigmoid diverticulitis/colitis due to C. difficile.  CT scan of the abdomen and pelvis done in the emergency room showed status post gastric bypass and right hemicolectomy, contracted small and large bowel without inflammatory changes and no evidence of obstruction.  She was referred to the hospitalist service for admission.\"       Interval history / Subjective:     Patient seen and examined at the bedside. She said she feels better. No abdominal pain, nausea or vomiting.   She said she doesn't want to go to Rehab on discharge.   Her daughter at the bedside and she wants to go home on discharge        Assessment & Plan:     Intractable nausea, vomiting, abdominal pain and failure to thrive  -Patient is s/p gastric bypass and right 
                                                                                                Hospitalist Progress Note  Rashid Hall MD  Answering service: [phone]        Date of Service:  2025  NAME:  Ce Roberts  :  1970  MRN:  [id number]      Admission Summary:     \"Ce Roberts is a 55 y.o. female with past medical history significant for fibromyalgia, gastric bypass, right hemicolectomy, hypertension and diabetes following gastric bypass, mood disorder presented at the emergency room with nausea and vomiting.  Patient had gastric bypass in  which became complicated with bowel obstruction and subsequently underwent right hemicolectomy.  Patient has been having GI symptoms since then.  Vomiting is nonbilious and not coffee-ground.  Patient has not been able to drink or eat as result of the nausea and vomiting.  Patient also reported generalized abdominal pain, dull ache, no radiation, no known aggravating or relieving factors.  It was reported that patient has lost 110 pounds since .  There was no history of  fever, rigors or chills reported.  Patient was last admitted to this hospital in 2024, patient was admitted and treated for acute sigmoid diverticulitis/colitis due to C. difficile.  CT scan of the abdomen and pelvis done in the emergency room showed status post gastric bypass and right hemicolectomy, contracted small and large bowel without inflammatory changes and no evidence of obstruction.  She was referred to the hospitalist service for admission.\"       Interval history / Subjective:     Patient seen and examined at the bedside. She said she feels better. No abdominal pain, nausea or vomiting.  Now she agreed to go to SNF  Her daughter was in the room.        Assessment & Plan:     Intractable nausea, vomiting, abdominal pain and failure to thrive  -Patient is s/p gastric bypass and right hemicolectomy in 2024  -Presented with intractable nausea and 
                                                 Hospitalist Night Cover     Name: Ce Roberts  YOB: 1970      Overnight update:        Ce Roberts is a 54yo with a 54yo with a pmhx of fibromyalgia, gastric bypass, right hemicolectomy and mood disorder who is admitted with intractable n/v, failure to thrive and severe malnutrition.     Rapid response called for AMS.   Seen and examined patient at bedside. She is awake, slow to respond. States she doesn't know what is wrong with her. Per RN: She had been AAOx4 prior.   She has remained hypotensive over the last 2 days. Given multiple bolus' of IV fluids and IV albumin.     AMS   Likely secondary to hypotension   - Given Albumin 500ML   - Given Midodrine 10mg X1 dose. Will start 5mg q8 hours   - Hgb 6.7 - Transfuse one unit now   - ICU consulted     ICU recommends patient to stay in IMCU and will re-evealute following blood transfusion.  Patient at high risk for decompensation.      Brook Pappas [facility]-NP   
                                                 Hospitalist Night Cover     Name: Ce Roberts  YOB: 1970      Overnight update:        Ce Roberts is a 56yo with a pmhx of fibromyalgia, gastric bypass, right hemicolectomy, HTN and DM who is admitted s/p laparoscopic, converted to open gastrostomy tube placement and lysis of adhesions on .     Notified by RN that patients heart rate 150's.   EKG completed showing ST. Per friend at bedside- Patient having tachycardia because she is in pain d/t fibromyalgia and she needs fentanyl and morphine.   Respirations 30's and O2 sats lower 90's.- Patient refusing bipap.     Tachycardia   - EKG showing ST   - Labs pending cbc, bmp, d-dimer and procal   - Chest xray pending     Addendum   0245 Reviewed labs. D-dimer 2.01, procal 9.32  Spoke with patient at bedside. Upon entering room, she appears to be resting comfortably. No signs of distress noted. Easily arousable.  Discussed obtaining CTA chest to evaluate for PE. She is in agreement to CTA.  She states that her heart rate was elevated because she was thinking about her family member who  after getting colon surgery and her fibromyalgia is \"acting up\" making her feet hurt. She is asking for morphine and fentanyl. Explained to patient that her BP has remained low and narcotics may make it lower. HR 's on monitor.     Addendum   CTA chest negative for PE.     Patient at high risk for decompensation.        Critical Care Attestation:  This patient is unstable and critically ill. Due to a high probability of clinically significant, life threatening deterioration, the patient required my highest level of preparedness to intervene emergently and I personally spent this critical care time directly and personally managing the patient. This critical care time included obtaining a history; examining the patient; pulse oximetry; ordering and review of studies; arranging urgent treatment with 
                       [facility]     ICU TEAM Progress Note           Name: Ce Roberts   : 1970   MRN: [id number]   Date: 5/15/2025          CU PROBLEM LIST   -Acute Metabolic encephalopathy   -Acute Hypoxic resp failure   -Shock  -Sepsis     HISTORY OF PRESENT ILLNESS:     Ce Roberts is a 55yoF with fibromyalgia, prior c diff colitis and ESBL UTI, gastric bypass (2024) c/b bowel obstruction requiring R hemicolectomy, admitted with N/V, malnutrition s/p complex G tube further c/b septic shock, PNA, enterocolitis.     SUBJECTIVE:     -no acute events.  Remains on BiPAP.  -failed trial of BiPAP on high flow, placed back on BiPAP.  -continue to require BiPAP.  Sent for CT scan chest abdomen pelvis today  -overnight was transition from BiPAP to high flow and patient was noted to have pulled off high flow and became hypoxic and bradycardic.  This a.m. patient did the same.  5/15-slightly more awake this a.m., less tachypneic, placed on high flow and better tolerated.     NEUROLOGICAL:      Acute metabolic encephalopathy,-improved  Multifactorial including hypoxia, infection,  --Continue close neuro monitoring    Anxiety/depression  - Resume/continue Lexapro, Neurontin, amitriptyline     PULMONOLOGY:     Acute hypoxic respiratory failure, suspect secondary to pulmonary edema-remains on BiPAP  DDx; aspiration, pulmonary edema in settings of refeeding  Repeat CT chest abdomen pelvis with pulmonary edema, slightly worsening pneumonia with question of aspiration,  --Oxygen per protocol including BiPAP  -- Continue as needed diuretics     CARDIOVASCULAR:      Shock-markedly improved  DDx; sepsis,  Echocardiogram with normal EF, grade 1 diastolic heart failure.  Off vasopressors  --Continued on midodrine, Florinef      GASTROINTESTINAL:     Gastric Bypass  Malnourished  Status post complex G-tube (- with adhesions)  Follow-up CT scan on  with findings of increased inflammatory 
                       [facility]     ICU TEAM Progress Note           Name: Ce Roberts   : 1970   MRN: [id number]   Date: 2025          CU PROBLEM LIST   -Acute Metabolic encephalopathy   -Acute Hypoxic resp failure   -Shock  -Sepsis     HISTORY OF PRESENT ILLNESS:     Ce Roberts is a 55yoF with fibromyalgia, prior c diff colitis and ESBL UTI, gastric bypass (2024) c/b bowel obstruction requiring R hemicolectomy, admitted with N/V, malnutrition s/p complex G tube further c/b septic shock, PNA, enterocolitis.     SUBJECTIVE:     -no acute events.  Remains on BiPAP.  -failed trial of BiPAP on high flow, placed back on BiPAP.     NEUROLOGICAL:      Acute metabolic encephalopathy,-improved  Multifactorial including hypoxia, infection,  --Continue close neuro monitoring    Anxiety/depression  - Resume/continue Lexapro, Neurontin, amitriptyline     PULMONOLOGY:     Acute hypoxic respiratory failure, suspect secondary to pulmonary edema-remains on BiPAP  DDx; aspiration, pulmonary edema in settings of refeeding  --Oxygen per protocol including BiPAP  --cautious diuresis     CARDIOVASCULAR:      Shock  DDx; sepsis,  Echocardiogram with normal EF, grade 1 diastolic heart failure.  --Wean off Levophed  --Begin midodrine, Florinef   --Trend lactic if increased pressor requirement.  --[id number]     GASTROINTESTINAL:     Gastric Bypass  Malnourished  Status post complex G-tube (- with adhesions)  Follow-up CT scan on  with findings of increased inflammatory changes and enterocolitis, and new small bilateral pleural effusions with compressive atelectasis.  --Appreciate general surgery recommendations.  --Antibiotics, low threshold to repeat abdominal imaging  --Resume PPI    Loose bowel movement,  Some concern for some component of tube feed  Begin Banatrol  Hold off stool softeners  If progressive/worsening or increasing leukocytosis or fever low threshold to test for 
                       [facility]     ICU TEAM Progress Note           Name: Ce Roberts   : 1970   MRN: [id number]   Date: 2025          CU PROBLEM LIST   -Acute Metabolic encephalopathy   -Acute Hypoxic resp failure   -Shock  -Sepsis     HISTORY OF PRESENT ILLNESS:     Ce Roberts is a 55yoF with fibromyalgia, prior c diff colitis and ESBL UTI, gastric bypass (2024) c/b bowel obstruction requiring R hemicolectomy, admitted with N/V, malnutrition s/p complex G tube further c/b septic shock, PNA, enterocolitis.     SUBJECTIVE:     -no acute events.  Remains on BiPAP.     NEUROLOGICAL:      Acute metabolic encephalopathy,-improved  Multifactorial including hypoxia, infection,  --Continue close neuro monitoring    Anxiety/depression  - Resume/continue Lexapro, Neurontin, amitriptyline     PULMONOLOGY:     Acute hypoxic respiratory failure, suspect secondary to pulmonary edema  DDx; aspiration, pulmonary edema in settings of refeeding  --Oxygen per protocol including BiPAP  --cautious diuresis     CARDIOVASCULAR:      Shock  DDx; sepsis,  --Wean off Levophed  --Begin midodrine, Florinef (?  Prednisone allergy)  --Trend lactic if increased pressor requirement.  --[id number]     GASTROINTESTINAL:     Gastric Bypass  Malnourished  Status post complex G-tube (- with adhesions)  Follow-up CT scan on  with findings of increased inflammatory changes and enterocolitis, and new small bilateral pleural effusions with compressive atelectasis.  --Appreciate general surgery recommendations.  --Antibiotics, low threshold to repeat abdominal imaging  --Resume PPI       RENAL/ELECTROLYTE/FLUIDS:     Monitor ins and outs  Diuresis as tolerates     ENDOCRINE:     Gluco monitoring       ID/MICRO:      Sepsis,  Urinary tract infection, pneumonia  --Continue vancomycin, ceftriaxone, Flagyl  -- Follow-up MRSA nares, respiratory and blood culture,    SURGICAL/MSK/DERM/ENT:      Gastric 
                       [facility]     ICU TEAM Progress Note           Name: Ce Roberts   : 1970   MRN: [id number]   Date: 2025          CU PROBLEM LIST   -Acute Metabolic encephalopathy   -Acute Hypoxic resp failure   -Shock  -Sepsis     HISTORY OF PRESENT ILLNESS:     Ce Roberts is a 55yoF with fibromyalgia, prior c diff colitis and ESBL UTI, gastric bypass (2024) c/b bowel obstruction requiring R hemicolectomy, admitted with N/V, malnutrition s/p complex G tube further c/b septic shock, PNA, enterocolitis.     SUBJECTIVE:     -no acute events.  Remains on BiPAP.  -failed trial of BiPAP on high flow, placed back on BiPAP.  -continue to require BiPAP.  Sent for CT scan chest abdomen pelvis today  -overnight was transition from BiPAP to high flow and patient was noted to have pulled off high flow and became hypoxic and bradycardic.  This a.m. patient did the same.     NEUROLOGICAL:      Acute metabolic encephalopathy,-improved  Multifactorial including hypoxia, infection,  --Continue close neuro monitoring    Anxiety/depression  - Resume/continue Lexapro, Neurontin, amitriptyline     PULMONOLOGY:     Acute hypoxic respiratory failure, suspect secondary to pulmonary edema-remains on BiPAP  DDx; aspiration, pulmonary edema in settings of refeeding  Repeat CT chest abdomen pelvis with pulmonary edema, slightly worsening pneumonia with question of aspiration,  --Oxygen per protocol including BiPAP  -- 20 mg of Lasix twice daily, goal net -1 L.     CARDIOVASCULAR:      Shock-markedly improved  DDx; sepsis,  Echocardiogram with normal EF, grade 1 diastolic heart failure.  --Wean off Levophed  --Continue midodrine, Florinef      GASTROINTESTINAL:     Gastric Bypass  Malnourished  Status post complex G-tube (- with adhesions)  Follow-up CT scan on  with findings of increased inflammatory changes and enterocolitis, and new small bilateral pleural effusions with 
         Name: Ce Roberts   : 1970   MRN: [id number]   Date: 2025        Chief Complaint   Patient presents with    Failure To Thrive         HPI:     55yoF with fibromyalgia, prior c diff colitis and ESBL UTI, gastric bypass (2024) c/b bowel obstruction requiring R hemicolectomy, admitted with N/V, malnutrition s/p complex G tube further c/b septic shock, PNA, enterocolitis.     -no acute events.  Remains on BiPAP.  -failed trial of BiPAP on high flow, placed back on BiPAP.  -continue to require BiPAP.  Sent for CT scan chest abdomen pelvis today  -overnight was transition from BiPAP to high flow and patient was noted to have pulled off high flow and became hypoxic and bradycardic.  This a.m. patient did the same.  5/15-slightly more awake this a.m., less tachypneic, placed on high flow and better tolerated.   - responsive, clinically improving   - OOB to chair today    Active Problems Being Managed:   Sepsis  Hypoxia  Malnutrition  Viral pneumonia, rhinovirus      Assessment/Plan:       NEUROLOGICAL:       Acute metabolic encephalopathy,-improved  Multifactorial including hypoxia, infection,  - Continue close neuro monitoring  - OOB to chair  - PT/OT     Anxiety/depression  - continue Lexapro, Neurontin, amitriptyline     PULMONOLOGY:      Acute hypoxic respiratory failure, suspect secondary to pulmonary edema-remains on BiPAP  DDx; aspiration, pulmonary edema in settings of refeeding  Repeat CT chest abdomen pelvis with pulmonary edema, slightly worsening pneumonia with question of aspiration,  - wean Fio2  - holding diuretic today due to elevated bicarb and borderline low SBP; may need to consider diamoxx  - discussed with patient importance of getting OOB     CARDIOVASCULAR:      Shock-markedly improved  DDx; sepsis,  Echocardiogram with normal EF, grade 1 diastolic heart failure.  Off vasopressors  --Continued on midodrine, Florinef      GASTROINTESTINAL:      Gastric 
         Name: Ce Roberts   : 1970   MRN: [id number]   Date: 2025        Chief Complaint   Patient presents with    Failure To Thrive         HPI:     55yoF with fibromyalgia, prior c diff colitis and ESBL UTI, gastric bypass (2024) c/b bowel obstruction requiring R hemicolectomy, admitted with N/V, malnutrition s/p complex G tube further c/b septic shock, PNA, enterocolitis.     -no acute events.  Remains on BiPAP.  -failed trial of BiPAP on high flow, placed back on BiPAP.  -continue to require BiPAP.  Sent for CT scan chest abdomen pelvis today  -overnight was transition from BiPAP to high flow and patient was noted to have pulled off high flow and became hypoxic and bradycardic.  This a.m. patient did the same.  5/15-slightly more awake this a.m., less tachypneic, placed on high flow and better tolerated.   - responsive, clinically improving   - OOB to chair today   - N acute events; worked with patient on pulmonary toilet    Active Problems Being Managed:   Sepsis  Hypoxia  Malnutrition  Viral pneumonia, rhinovirus      Assessment/Plan:       NEUROLOGICAL:       Acute metabolic encephalopathy,-improved  Multifactorial including hypoxia, infection,  - Continue close neuro monitoring  - OOB to chair  - PT/OT     Anxiety/depression  - continue Lexapro, Neurontin, amitriptyline     PULMONOLOGY:      Acute hypoxic respiratory failure, suspect secondary to pulmonary edema-remains on BiPAP  DDx; aspiration, pulmonary edema in settings of refeeding  Repeat CT chest abdomen pelvis with pulmonary edema, slightly worsening pneumonia with question of aspiration,  - Rhinovirus positive  - diuresed this morning  - discussed with patient importance of getting OOB and increasing activity; reviewed use of IS  - pt desaturates when transferring from bed to chair. When asked to do deep inspiration her saturation increased from 88 to 95% suggesting atelectesis as significant 
         Name: Ce Roberts   : 1970   MRN: [id number]   Date: 2025        Chief Complaint   Patient presents with    Failure To Thrive         HPI:     55yoF with fibromyalgia, prior c diff colitis and ESBL UTI, gastric bypass (2024) c/b bowel obstruction requiring R hemicolectomy, admitted with N/V, malnutrition s/p complex G tube further c/b septic shock, PNA, enterocolitis.     -no acute events.  Remains on BiPAP.  -failed trial of BiPAP on high flow, placed back on BiPAP.  -continue to require BiPAP.  Sent for CT scan chest abdomen pelvis today  -overnight was transition from BiPAP to high flow and patient was noted to have pulled off high flow and became hypoxic and bradycardic.  This a.m. patient did the same.  5/15-slightly more awake this a.m., less tachypneic, placed on high flow and better tolerated.   - responsive, clinically improving   - OOB to chair today   - N acute events; worked with patient on pulmonary toilet  : continues to clinically improve. Reported to have taken a few steps today.   FIO2 reduced overnight    Active Problems Being Managed:   Sepsis  Hypoxia  Malnutrition  Viral pneumonia, rhinovirus      Assessment/Plan:       NEUROLOGICAL:       Acute metabolic encephalopathy,-improved  Multifactorial including hypoxia, infection,  - Continue close neuro monitoring  - OOB to chair  - PT/OT     Anxiety/depression  - continue Lexapro, Neurontin, amitriptyline     PULMONOLOGY:      Acute hypoxic respiratory failure, suspect secondary to pulmonary edema-remains on BiPAP  DDx; aspiration, pulmonary edema in settings of refeeding    - Rhinovirus positive  - diuresed this morning  - discussed with patient importance of getting OOB and increasing activity; reviewed use of IS  - pt Fio2 requirement slowly decreasing. Continue OOB to chair and increasing activity.    CARDIOVASCULAR:      Septic Shock- resolved  DDx; sepsis,  Echocardiogram with 
         Name: Ce Roberts   : 1970   MRN: [id number]   Date: 2025        Chief Complaint   Patient presents with    Failure To Thrive         HPI:     55yoF with fibromyalgia, prior c diff colitis and ESBL UTI, gastric bypass (2024) c/b bowel obstruction requiring R hemicolectomy, admitted with N/V, malnutrition s/p complex G tube further c/b septic shock, PNA, enterocolitis.     -no acute events.  Remains on BiPAP.  -failed trial of BiPAP on high flow, placed back on BiPAP.  -continue to require BiPAP.  Sent for CT scan chest abdomen pelvis today  -overnight was transition from BiPAP to high flow and patient was noted to have pulled off high flow and became hypoxic and bradycardic.  This a.m. patient did the same.  5/15-slightly more awake this a.m., less tachypneic, placed on high flow and better tolerated.   - responsive, clinically improving   - OOB to chair today   - N acute events; worked with patient on pulmonary toilet  : continues to clinically improve. Reported to have taken a few steps today.    Active Problems Being Managed:   Sepsis  Hypoxia  Malnutrition  Viral pneumonia, rhinovirus      Assessment/Plan:       NEUROLOGICAL:       Acute metabolic encephalopathy,-improved  Multifactorial including hypoxia, infection,  - Continue close neuro monitoring  - OOB to chair  - PT/OT     Anxiety/depression  - continue Lexapro, Neurontin, amitriptyline     PULMONOLOGY:      Acute hypoxic respiratory failure, suspect secondary to pulmonary edema-remains on BiPAP  DDx; aspiration, pulmonary edema in settings of refeeding    - Rhinovirus positive  - diuresed this morning  - discussed with patient importance of getting OOB and increasing activity; reviewed use of IS  - pt desaturates when transferring from bed to chair. When asked to do deep inspiration her saturation increased from 88 to 95% suggesting atelectesis as significant problem    CARDIOVASCULAR:    
         Name: Ce Roberts   : 1970   MRN: [id number]   Date: 2025        Chief Complaint   Patient presents with    Failure To Thrive         HPI:     55yoF with fibromyalgia, prior c diff colitis and ESBL UTI, gastric bypass (2024) c/b bowel obstruction requiring R hemicolectomy, admitted with N/V, malnutrition s/p complex G tube further c/b septic shock, PNA, enterocolitis.     -no acute events.  Remains on BiPAP.  -failed trial of BiPAP on high flow, placed back on BiPAP.  -continue to require BiPAP.  Sent for CT scan chest abdomen pelvis today  -overnight was transition from BiPAP to high flow and patient was noted to have pulled off high flow and became hypoxic and bradycardic.  This a.m. patient did the same.  5/15-slightly more awake this a.m., less tachypneic, placed on high flow and better tolerated.    Active Problems Being Managed:         Assessment/Plan:       NEUROLOGICAL:       Acute metabolic encephalopathy,-improved  Multifactorial including hypoxia, infection,  - Continue close neuro monitoring  - OOB to chair  - PT/OT     Anxiety/depression  - Resume/continue Lexapro, Neurontin, amitriptyline     PULMONOLOGY:      Acute hypoxic respiratory failure, suspect secondary to pulmonary edema-remains on BiPAP  DDx; aspiration, pulmonary edema in settings of refeeding  Repeat CT chest abdomen pelvis with pulmonary edema, slightly worsening pneumonia with question of aspiration,  - Oxygen per protocol including BiPAP  - Continue as needed diuretics  - discussed with patient importance of getting OOB ; agreed for chair sitting position today and will start to increase activity daily     CARDIOVASCULAR:      Shock-markedly improved  DDx; sepsis,  Echocardiogram with normal EF, grade 1 diastolic heart failure.  Off vasopressors  --Continued on midodrine, Florinef      GASTROINTESTINAL:      Gastric Bypass  Malnourished  Status post complex G-tube (- with 
        [facility]                                                                                          Hospitalist Progress Note  LindaSAFIA Calle NP  Answering service: [phone] OR [phone] from in house phone        Date of Service:  2025  NAME:  Ce Roberts  :  1970  MRN:  [id number]       Admission Summary:   Ce Roberts is a 55 y.o. female with past medical history significant for fibromyalgia, gastric bypass, right hemicolectomy, resolved hypertension and diabetes following gastric bypass, mood disorder presented at the emergency room on 2025 with nausea and vomiting.  Patient had gastric bypass in  which became complicated with bowel obstruction and subsequently underwent right hemicolectomy.  Patient has been having GI symptoms since then.  Vomiting was nonbilious and not coffee-ground.  Patient has not been able to drink or eat as result of the nausea and vomiting.  Patient also reported generalized abdominal pain, dull ache, no radiation, no known aggravating or relieving factors.  It was reported that patient has lost 110 pounds since .  There was no history of  fever, rigors or chills reported.  Patient was last admitted to this hospital in 2024, patient was admitted and treated for acute sigmoid diverticulitis/colitis due to C. difficile.  CT scan of the abdomen and pelvis done in the emergency room showed status post gastric bypass and right hemicolectomy, contracted small and large bowel without inflammatory changes and no evidence of obstruction.  She was referred to the hospitalist service for admission.  Interval history / Subjective:     Patient seen and examined prior to going downstairs today for G tube placement. Dr Bolaños to do procedure/surgery. Patient discusses her severe LE neuropathy with me however does not wish to increase pain medication (gabapentin) at this time.      Assessment & Plan: 
        [facility] [place] [facility]                                                                                          Hospitalist Progress Note  LindaSAFIA Calle NP  Answering service: [phone] OR [phone] from in house phone        Date of Service:  5/3/2025  NAME:  Ce Roberts  :  1970  MRN:  [id number]       Admission Summary:   Ce Roberts is a 55 y.o. female with past medical history significant for fibromyalgia, gastric bypass, right hemicolectomy, resolved hypertension and diabetes following gastric bypass, mood disorder presented at the emergency room on 2025 with nausea and vomiting.  Patient had gastric bypass in  which became complicated with bowel obstruction and subsequently underwent right hemicolectomy.  Patient has been having GI symptoms since then.  Vomiting was nonbilious and not coffee-ground.  Patient has not been able to drink or eat as result of the nausea and vomiting.  Patient also reported generalized abdominal pain, dull ache, no radiation, no known aggravating or relieving factors.  It was reported that patient has lost 110 pounds since .  There was no history of  fever, rigors or chills reported.  Patient was last admitted to this hospital in 2024, patient was admitted and treated for acute sigmoid diverticulitis/colitis due to C. difficile.  CT scan of the abdomen and pelvis done in the emergency room showed status post gastric bypass and right hemicolectomy, contracted small and large bowel without inflammatory changes and no evidence of obstruction.  She was referred to the hospitalist service for admission.  Interval history / Subjective:     Patient s/p G tube placement yesterday with ashley with lysis of adhesions. Pain overnight, feeling better today. Tube feed orders placed. Monitoring for refeeding syndrome. Aggressive electrolyte repletions.      Assessment & Plan:     Intractable nausea and vomiting 
        [facility] [place] [facility]                                                                                          Hospitalist Progress Note  Ayanna Ortega PA-C  Answering service: [phone] OR [phone] from in house phone        Date of Service:  2025  NAME:  Ce Roberts  :  1970  MRN:  [id number]       Admission Summary:   Ce Roberts is a 55 y.o. female with past medical history significant for fibromyalgia, gastric bypass, right hemicolectomy, resolved hypertension and diabetes following gastric bypass, mood disorder presented at the emergency room on 2025 with nausea and vomiting.  Patient had gastric bypass in  which became complicated with bowel obstruction and subsequently underwent right hemicolectomy.  Patient has been having GI symptoms since then.  Vomiting was nonbilious and not coffee-ground.  Patient has not been able to drink or eat as result of the nausea and vomiting.  Patient also reported generalized abdominal pain, dull ache, no radiation, no known aggravating or relieving factors.  It was reported that patient has lost 110 pounds since .  There was no history of  fever, rigors or chills reported.  Patient was last admitted to this hospital in 2024, patient was admitted and treated for acute sigmoid diverticulitis/colitis due to C. difficile.  CT scan of the abdomen and pelvis done in the emergency room showed status post gastric bypass and right hemicolectomy, contracted small and large bowel without inflammatory changes and no evidence of obstruction.  She was referred to the hospitalist service for admission.  Interval history / Subjective:   Patient seen and examined on rounds with friend at bedside.  Patient states she has not eaten or drink anything in a \"very long time.\"  When asked to be more specific patient said \"probably weeks.\"  Patient states she is constantly nauseous and will vomit intermittently.  Denies any 
        [facility] [place] [facility]                                                                                          Hospitalist Progress Note  LindaSAFIA Calle NP  Answering service: [phone] OR [phone] from in house phone        Date of Service:  2025  NAME:  Ce Roberts  :  1970  MRN:  [id number]       Admission Summary:   Ce Roberts is a 55 y.o. female with past medical history significant for fibromyalgia, gastric bypass, right hemicolectomy, resolved hypertension and diabetes following gastric bypass, mood disorder presented at the emergency room on 2025 with nausea and vomiting.  Patient had gastric bypass in  which became complicated with bowel obstruction and subsequently underwent right hemicolectomy.  Patient has been having GI symptoms since then.  Vomiting was nonbilious and not coffee-ground.  Patient has not been able to drink or eat as result of the nausea and vomiting.  Patient also reported generalized abdominal pain, dull ache, no radiation, no known aggravating or relieving factors.  It was reported that patient has lost 110 pounds since .  There was no history of  fever, rigors or chills reported.  Patient was last admitted to this hospital in 2024, patient was admitted and treated for acute sigmoid diverticulitis/colitis due to C. difficile.  CT scan of the abdomen and pelvis done in the emergency room showed status post gastric bypass and right hemicolectomy, contracted small and large bowel without inflammatory changes and no evidence of obstruction.  She was referred to the hospitalist service for admission.  Interval history / Subjective:     Patient s/p G tube placement Friday with ashley with lysis of adhesions. Pain overnight, feeling better today. Tube feed orders placed. Monitoring for refeeding syndrome.     Electrolytes final WNL today after aggressive repletions Friday and Saturday.      Assessment & Plan: 
        [facility] Adult  Hospitalist Group                                                                                          Hospitalist Progress Note  Lauryn Rodgers MD  Answering service: [phone] OR [phone] from in house phone        Date of Service:  2025  NAME:  Ce Roberts  :  1970  MRN:  [id number]       Admission Summary:   Ce Roberts is a 55 y.o. female with past medical history significant for fibromyalgia, gastric bypass, right hemicolectomy, resolved hypertension and diabetes following gastric bypass, mood disorder presented at the emergency room on 2025 with nausea and vomiting.  Patient had gastric bypass in  which became complicated with bowel obstruction and subsequently underwent right hemicolectomy.  Patient has been having GI symptoms since then.  Vomiting was nonbilious and not coffee-ground.  Patient has not been able to drink or eat as result of the nausea and vomiting.  Patient also reported generalized abdominal pain, dull ache, no radiation, no known aggravating or relieving factors.  It was reported that patient has lost 110 pounds since .  There was no history of  fever, rigors or chills reported.  Patient was last admitted to this hospital in 2024, patient was admitted and treated for acute sigmoid diverticulitis/colitis due to C. difficile.  CT scan of the abdomen and pelvis done in the emergency room showed status post gastric bypass and right hemicolectomy, contracted small and large bowel without inflammatory changes and no evidence of obstruction.  She was referred to the hospitalist service for admission.      Interval history / Subjective:     Patient seen and examined.   She is sitting up in a recliner.  Continues to complain of abdominal pain, which is chronic.  Abdomen soft, not distended.  She has some tenderness around her PEG tube insertion site, overall improved.  BP low, started midodrine on .        Assessment 
        [facility] Adult  Hospitalist Group                                                                                          Hospitalist Progress Note  Lauryn Rodgers MD  Answering service: [phone] OR [phone] from in house phone        Date of Service:  2025  NAME:  Ce Roberts  :  1970  MRN:  [id number]       Admission Summary:   Ce Roberts is a 55 y.o. female with past medical history significant for fibromyalgia, gastric bypass, right hemicolectomy, resolved hypertension and diabetes following gastric bypass, mood disorder presented at the emergency room on 2025 with nausea and vomiting.  Patient had gastric bypass in  which became complicated with bowel obstruction and subsequently underwent right hemicolectomy.  Patient has been having GI symptoms since then.  Vomiting was nonbilious and not coffee-ground.  Patient has not been able to drink or eat as result of the nausea and vomiting.  Patient also reported generalized abdominal pain, dull ache, no radiation, no known aggravating or relieving factors.  It was reported that patient has lost 110 pounds since .  There was no history of  fever, rigors or chills reported.  Patient was last admitted to this hospital in 2024, patient was admitted and treated for acute sigmoid diverticulitis/colitis due to C. difficile.  CT scan of the abdomen and pelvis done in the emergency room showed status post gastric bypass and right hemicolectomy, contracted small and large bowel without inflammatory changes and no evidence of obstruction.  She was referred to the hospitalist service for admission.  Interval history / Subjective:     Patient seen and examined this morning,  at bedside.    She is complaining of left upper quadrant abdominal pain.  Area around the PEG tube site appears distended and is slightly tender to touch.   Discussed with nursing staff: Patient is tolerating tube feeds, at goal.  She has 
        [facility] Progress Note        Ce Roberts  1970  [id number]  5/22/2025      ICU followup note:       Summary:  55yoF with fibromyalgia, prior c diff colitis and ESBL UTI, gastric bypass (06/2024) c/b bowel obstruction requiring R hemicolectomy, admitted with N/V, malnutrition s/p complex G tube further c/b septic shock, PNA, enterocolitis. Severe malnutrition.       Assessment and plan:  Chronic hypercapnic respiratory failure:  -cont bipap at hs    -ABG 7.42/57/68 yesterday.  No NIV overnight.  Hco3 higher so pco2 higher.      Chronic hypotension:  -cont midodrine  -cont florinef  -Echocardiogram with normal EF, grade 1 diastolic heart failure. I reviewed   -Hco3  35 --->  pco2 likely 60-70 this am.  -increasing amitryp back to home to help with NIV tolerance and achieve good sleep   -cr 0.22. BUN  18. Mag 2.8     Anxiety/depression  - continue Lexapro, Neurontin, amitriptyline     Moderate malnutrition:    -albumin 2.5  Gastric Bypass  Protein calorie malnurition  Status post complex G-tube (-5/2 with adhesions)  Follow-up CT scan on 5/5 with findings of increased inflammatory changes and enterocolitis, and new small bilateral pleural effusions with compressive atelectasis.  Follow-up CT scan on 5/13-presence of diarrhea with walking of the sigmoid colon colitis.  - Appreciate general surgery recommendations.Plan for bolus feeds and speech to re-assess swallow this week.  - On PPI    Deconditioning:    -needs to cont PT OT   -mobilize daily       Discussed plan to cont NIV with hospital medicine.  Cont NIV until DC to rehab.      HPI:   Stable.  Didn't wear NIV last night.  We discussed at length this am.        OBJECTIVE  Vitals:    05/22/25 0713 05/22/25 0800 05/22/25 0900 05/22/25 1000   BP: 106/72 109/71 118/81 122/68   Pulse: (!) 101 96 96 83   Resp: 18 23 20 22   Temp:       TempSrc:       SpO2: 97%      Weight:       Height:         EXAM:   GEN: awake alert and oriented 
        [facility] Progress Note        Ce Roberts  1970  [id number]  5/21/2025    Summary:  55yoF with fibromyalgia, prior c diff colitis and ESBL UTI, gastric bypass (06/2024) c/b bowel obstruction requiring R hemicolectomy, admitted with N/V, malnutrition s/p complex G tube further c/b septic shock, PNA, enterocolitis.     Assessment and plan:  Sepsis:  secondary to pna and pulm edema   -may be aspirating   -rhinovirus +    -cont diuresis as BP allows   -WBC 10, plts 402    Chronic hypercapnic respiratory failure:  -cont bipap at hs    -ABG this am 7.42/57/68    Chronic hypotension:  -cont midodrine  -added florinef  -Echocardiogram with normal EF, grade 1 diastolic heart failure. I reviewed   -Hco3  36    Anxiety/depression  - continue Lexapro, Neurontin, amitriptyline    Moderate malnutrition:    -albumin 2.5  Gastric Bypass  Protein calorie malnurition  Status post complex G-tube (-5/2 with adhesions)  Follow-up CT scan on 5/5 with findings of increased inflammatory changes and enterocolitis, and new small bilateral pleural effusions with compressive atelectasis.  Follow-up CT scan on 5/13-presence of diarrhea with walking of the sigmoid colon colitis.  - Appreciate general surgery recommendations.Plan for bolus feeds and speech to re-assess swallow this week.  - On PPI    Discussed with hospital medicine.  Ok for downgrade     HPI:  Slowly better.       ICU DAILY CHECKLIST     Code Status:full   DVT Prophylaxis:lovenox  T/L/D: PIVs  SUP: na   Diet: dysphagia    Activity Level:mobilize daily    ABCDEF Bundle/Checklist Completed:Yes  Disposition: ok for downgrade  Multidisciplinary Rounds Completed: yes  Goals of Care Discussion/Palliative: yes  Patient/Family Updated: yes      OBJECTIVE  Vitals:    05/21/25 1300 05/21/25 1400 05/21/25 1500 05/21/25 1600   BP: 101/69 102/74 119/85 118/74   Pulse: (!) 109 (!) 106 (!) 105 (!) 104   Resp: 28 30 23 21   Temp:       TempSrc:       SpO2: 95% 92% 
     CRITICAL CARE  PROGRESS NOTE      Name: Ce Roberts   : 1970   MRN: [id number]   Date: 5/10/2025      REASON FOR ICU ADMISSION:  AMS, Shock     BRIEF PATIENT SUMMARY AND HOSPITAL COURSE   Ce Roberts is a 55yoF with fibromyalgia, prior c diff colitis and ESBL UTI, gastric bypass (2024) c/b bowel obstruction requiring R hemicolectomy, admitted with N/V, malnutrition s/p complex G tube further c/b septic shock, PNA, enterocolitis.    COMPREHENSIVE ASSESSMENT & PLAN     Shock, Improving   Multifactorial, large component of sepsis in setting of likely hospital acquired PNA + enterocolitis, also with hypovolemia, appears to be 10L negative since admit, also with element of sedation   - CT Abd/pelvis (): Likely enterocolitis  - CXR (): Diffuse bilateral airspace opacities with air bronchograms, likely multifocal PNA + pulmonary edema  - Bedside echo w/ hyperdynamic LV though non-collapsible IVC   - Formal TTE pending   - Micro w/u negative thus far  - Check stool path panel   - Continue vanc, cefepime, flagyl   - Levo for MAP >65     Acute Hypoxemic Respiratory Failure, Improving  Likely hospital acquired PNA + pulmonary edema   - Intubated  due to AMS, hypoxia, respiratory distress   - Severe hypoxia initially concerning for ARDS though with rapid resolution with diuresis so likely related to pulmonary edema   - pro-BNP 2,696  - Abx as above   - Duonebs  - PRN bumex for FG -500/-1L  - Extubated to NC      Gastric Bypass   Malnourishment   Electrolyte Derangements, Refeeding Syndrome   - Surgery following, s/p G tube    - Continue TF   - Check lytes q6h   - Replete PRN     Fibromyalgia  - Home gabapentin  - PRN percocet     Anemia   In setting of chronic vitamin deficiencies; no obvious bleeding   - Required 1u RBC on   - Hgb stable  - Daily CBC      Asthma  Not wheezing on exam, does not appear to be in exacerbation   - Duonebs    Acute Encephalopathy, Resolved  Likely metabolic in 
   05/08/25 1039 05/08/25 1042 05/08/25 1053   Vital Signs   BP (!) 83/59 (!) 77/63 92/70   MAP (Calculated) 67 68 77   MAP (mmHg)  --   --  78   BP Location Right upper arm Right upper arm Right upper arm   BP Method Automatic Automatic Automatic   Patient Position Sitting  (EOB) Up in chair Up in chair;Sitting  (BLE's elevated)       
  Physician Progress Note      PATIENT:               DARRYL BANUELOS  CSN #:                  [id number]  :                       1970  ADMIT DATE:       2025 6:24 PM  DISCH DATE:  RESPONDING  PROVIDER #:        Jarrod Orellana MD          QUERY TEXT:    Shock is documented in the medical record  CC note.  Please specify the   type:    The clinical indicators include:  cc pn   Shock  DDx; sepsis,  --Wean off Levophed  --Begin midodrine, Florinef (?  Prednisone allergy)  --Trend lactic if increased pressor requirement.  --[id number]    -129  BP 88//56  RR 21-39  Options provided:  -- Septic Shock  -- Cardiogenic Shock  -- hypovolemic shock  -- Other - I will add my own diagnosis  -- Disagree - Not applicable / Not valid  -- Disagree - Clinically unable to determine / Unknown  -- Refer to Clinical Documentation Reviewer    PROVIDER RESPONSE TEXT:    The patient has septic shock.    Query created by: Lovely Franklin on 2025 10:35 AM      Electronically signed by:  Jarrod Orellana MD 2025 4:46 PM          
  Speech LAnguage Pathology EVALUATION    Patient: Ce Roberts (55 y.o. female)  Date: 5/15/2025  Primary Diagnosis: Dehydration [E86.0]  Hypoglycemia [E16.2]  Intractable vomiting [R11.10]  Acute cystitis without hematuria [N30.00]  Intractable nausea and vomiting [R11.2]  Protein-calorie malnutrition, unspecified severity [E46]  Procedure(s) (LRB):  LAPAROSCOPIC CONVERT TO OPEN GASTROSTOMY TUBE PLACEMENT, LYSIS OF ADHESION, EGD (N/A) 13 Days Post-Op   Precautions:                     ASSESSMENT :  SLP evaluation complete. Extensive oral care completed with copious dried secretions removed from the oral cavity and the back of the throat. Once this occurred, patient appeared to breathe much more easily and participated well in PO trials of ice chips/sips of water. However, patient is at elevated risk for prandial aspiration (particularly silent) given recent, prolonged intubation with subsequent dysphonia as well as prolonged NPO status with likely swallow muscle deconditioning. Furthermore, patient is at low threshold to tolerate any amount of aspiration given respiratory status. Recommend pt initiate ice chips/tsps of water after vigilant oral care and consideration of saliva substitute. If respiratory status continues to stabilize, will plan to complete a Flexible Endoscopic Evaluation of Swallow (FEES) at bedside tomorrow to objectively assess safety of swallow physiology. Will continue to follow.     Patient will benefit from skilled intervention to address the above impairments.     PLAN :  Recommendations and Planned Interventions:  Diet:  Continue PEG tube as primary source of nutrition/hydration/medication  Ice chips/teaspoons of water after vigilant oral care when not on BiPAP  Consideration of saliva substitute for xerostomia.    Respiratory Fatigue Precautions  -Do not feed on BIPAP.  -Hold PO if patient short of breath  -Vigilant oral care  -Hold if respiratory rate increases above 30 consistently or 
.       ICU Transfer/Accept Summary     This patient is being transferred OUT OF THE ICU  DATE OF TRANSFER: 5/21/2025       PATIENT ID: Ce Roberts  MRN: [id number]   YOB: 1970    PRIMARY CARE PROVIDER: Ulisses Joy MD   DATE OF ADMISSION: 4/30/2025  6:24 PM    ATTENDING PHYSICIAN: Aniabl Ribera MD  CONSULTATIONS:   IP CONSULT TO HOSPITALIST  IP CONSULT TO GENERAL SURGERY  IP CONSULT TO DIETITIAN  IP CONSULT TO INTERVENTIONAL RADIOLOGY  IP CONSULT TO DIETITIAN  IP CONSULT TO INTENSIVIST  IP CONSULT TO PHARMACY  IP CONSULT TO FORENSIC NURSE EXAMINER  IP CONSULT TO DIETITIAN  IP CONSULT TO PALLIATIVE CARE    PROCEDURES/SURGERIES:   Procedure(s):  LAPAROSCOPIC CONVERT TO OPEN GASTROSTOMY TUBE PLACEMENT, LYSIS OF ADHESION, EGD    REASON FOR ADMISSION: Intractable nausea and vomiting     HOSPITAL PROBLEM LIST:  Patient Active Problem List   Diagnosis    Left axillary pain    Obesity, morbid (HCC)    DDD (degenerative disc disease), lumbar    Acute pyelonephritis    Migraine    CAP (community acquired pneumonia)    Sepsis ([facility])    Hyperlipidemia    Fibromyalgia    Nipple discharge    Depression    Tobacco use    Controlled type 2 diabetes mellitus without complication, without long-term current use of insulin ([facility])    IBS (irritable bowel syndrome)    Urinary frequency    Essential hypertension, benign    Chronic obstructive pulmonary disease, unspecified COPD type (HCC)    Type 2 diabetes mellitus with hyperglycemia    Esophageal reflux    Obesity, unspecified    Morbid obesity ([facility])    H/O gastric bypass    Marginal ulcer    Acute colitis    C. difficile colitis    Asymptomatic bacteriuria    Intractable nausea and vomiting    Hypoglycemia    Hx of gastric bypass    Severe protein-energy malnutrition    Palliative care by specialist    Goals of care, counseling/discussion    Protein-calorie malnutrition         Brief HPI and Hospital Course:      Patient is a 55-year-old female who presented to the 
0000: pt leaking stool around flexiseal. Skin around rectum, perineal, and vaginal area excoriated and covered in white cottage cheese appearing film.   
0130: BiPAP parameters clarified with NP d/t conflicting plans per RT and ICU MD. Orders written per pt's chart state PRN BiPAP for hypoxia or respiratory distress, however AM ICU MD note and RT states BiPAP q HS. Pt continuing to have SpO2 >88%, no apparent respiratory distress, and mentating well again this evening. Pt also tolerated trial of high flow NC overnight 5/20, appropriate mentation and interaction, and had a compensated morning ABG on 5/21. Pt was not placed on further BiPAP during the daytime. DEBORAH Vanessa made aware of the above and patient assessment; per NP, pt may stay off BiPAP and apply PRN for respiratory distress. RT called to update on plan and BiPAP parameters.    0720: Pt wishes to hold off on 0700 TF until after breakfast at this time  
0330: This RN attempted to call pt's daughter per pt's request regarding change in status. Phone call went to voicemail, voicemail left by primary RN.  0400: This RN attempted to call pt's partner per pt's request--call went to voicemail. Voicemail left by primary RN.  0500: Pt's daughter updated.  
0600: Forensics consulted after written conversation with patient. See forensics note.   
0800-pt. Tired this morning.  Says she wants to sleep.  Turned patient on right side and patient slept for two hours.  When she woke up she was much more alert and said she felt better.      1130-pt. Notified nurse she had to have a BM.  Patient assisted to BSC and back to chair with two assist.    
0845: SBP 88/62 Reyna moreno MD made aware.     1300: patient complaints of indigestion and abdominal pressure which contributes to her SOB, tube feedings stopped.  made aware.   
1104: Pt extubated by Bhavin RT per order to 6L NC.     1345: Dr. Shepard notified of pt's temperature of 100.4, pt given prn tylenol.   
1255: ICU intensivists at bedside; decision made to intubate as patient has new onset decreased responsiveness requiring BVM ventilation.     1259: 20mg Etomidate given.    60mg Rocuronium given.     1300: Pt intubated by DIDIER Barnett 7.5 ETT 24 @ teeth.     1315: MD Drea at bedside placing CVC.   
1410-BP 83/56 (63), , O2 96%, RR 22.  Notified Dr. Orellana, who ordered albumin 5%, 25 g.  
1450  Lab called, reported lactic of 2.3.  Dr. Rodgers and Dr. Bolaños notified.  Orders received for CBC, paired blood cultures, repeat lactic, urinalysis and Abd CT.  Barry Obrien RN    
1721- Shift handoff with primary RN Casi. Vitals being preformed by tech, BP noted to be low 86/61 (69) MAP still supportive of proper organ perfusion.    1735- Recheck BP, result 84/68 (73). Primary nurse stated BP runs low baseline in pt.    1737- Perfect served Mai URIOSTEGUI, redirected to Lydia CABRERA. Stated pt pressures run low and that MAP is sufficient. Assessed pt, denies SOB, chest pain, dizziness, answers questions appropriately, A+Ox4.    1814- Reassessed pt, no changes from previous assessment    1920- Reviewed BP, MAP still in range for proper perfusion    1929- Reviewed BP, MAP rising, BP noted to be 87/75 (79), no changes from previous reassessment.    1940- Handoff to night nurse Kellie LOZANO        
1739-BP 83/62 (67), , O2 96%.  Patient due to get lasix, nurse spoke to Dr. Orellana, who ordered 12.5 g of albumin once and hold 1800 dose of lasix.     1800-pt. Has put out 100 in FMS today.  Stool is getting thicker and leaking around FMS.  Patient is saying she's having increased pain with the thicker stools.  FMS removed    1820-pt. Says her pain is improved  
1835 MAP <65  SBP <70  Levo gtt restarted  
2000- Bedside report received. Assessment performed    2100- Patient placed on BiPAP. Sat goals >89 per Alejandro NP.    0430- Discussed lytes with NP. Verbal order for Effer K- 60meq via PEG.    0600- CHG bath performed as well as de/ johnson care. Venelex applied to appropriate sites as well as zinc cream to labia/ area around flexiseal. EKG leads changed. Thick/ creamy output noted from site around PEG with cleaning. Will notify intensivist. Placed on Hi-flow- 60L/ 70%. Tolerated well. Oral care performed.     0720- Dr. Bolaños at bedside to assess site. Suture removed. MD stating can leave open unless significant drainage then place split. MD also stating patient can have PO. Will consult speech.     0800- Bedside and Verbal shift change report given to Katelin LOZANO (oncoming nurse) by KLARISSA Mcneal RN (offgoing nurse).  Report given with SBAR, Kardex, Intake/Output and MAR.      
2000: Bedside and Verbal shift change report given to BLAKE Suarez (oncoming nurse) by BLAKE Mcpherson (offgoing nurse). Report included the following information Nurse Handoff Report, Index, Surgery Report, Intake/Output, MAR, Recent Results, and Cardiac Rhythm sinus tachycardia w/ PACs .     
4 Eyes Skin Assessment     NAME:  Ce Roberts  YOB: 1970  MEDICAL RECORD NUMBER:  [id number]    The patient is being assessed for  Admission    I agree that at least one RN has performed a thorough Head to Toe Skin Assessment on the patient. ALL assessment sites listed below have been assessed.      Areas assessed by both nurses:    Head, Face, Ears, Shoulders, Back, Chest, Arms, Elbows, Hands, Sacrum. Buttock, Coccyx, Ischium, Legs. Feet and Heels, and Under Medical Devices         Does the Patient have a Wound? Yes wound(s) were present on assessment. LDA wound assessment was Initiated and completed by RN       Lewis Prevention initiated by RN: No  Wound Care Orders initiated by RN: No    Pressure Injury (Stage 3,4, Unstageable, DTI, NWPT, and Complex wounds) if present, place Wound referral order by RN under : Yes    New Ostomies, if present place, Ostomy referral order under : No     Nurse 1 eSignature: Electronically signed by Ananth James RN on 5/9/25 at 5:12 PM EDT    **SHARE this note so that the co-signing nurse can place an eSignature**    Nurse 2 eSignature: Electronically signed by Meghan Max RN on 5/9/25 at 7:10 PM EDT   
5/30/2025 21:18: The patient complained of pain to her feet.  The young lady at her bedside who stated she is her daughter, stated the patient the patient is having a fibromyalgia attack.  The patient stated she has pain in her feet.  Her daughter stated only morphine and fentanyl works for her mom's pain.  The patient was previously give gabapentin.  Her daughter stated gabapentin does not work.  I told her I will notify the nurse practitioner and reevaluate her pain.  NP, Brookkaryna Pappas, is notified of this via perfect serve.    5/30/2025 21:24:  Brook stated she will give one time dose of morphine, but she will need to talk to the attending tomorrow.      5/30/2025 22:56:  NP, Brook is notified the patient's heart rate is in the 150's.  Brook requested to have an EKG done.  The patient denied chest pain.    5/30/2025 23:18:  The EKG result is given to Brook via Perfect serve:  heart rate 148 bpm, sinus tachycardia, nonspecific st and t wave abnormality.  The she is notified the patient's blood pressure is 90/63 with a MAP of 72.      5/30/2025 23:20:  The patient was offered to use the bipap.  She is notified it can help with her breathing and heart rate as well.  The patient's daughter stated, she is pain it is not her heart.  The patient declined to use the bipap.  She stated she is not short of breath.       5/30/2025 22:23:  Brook is on the unit.  She is notified the patient has florinef and elavil to be given.  She is asked if any of these meds can affect cause her to have a higher heart rate.  Brook stated to hold off on the the meds for now.    5/30/2025 23:28:  Florinef is given at Brook's request.    5/31/2025 00:14:  The patient's daughter stated we are not addressing the patient's pain.  The patient did state her pain level went down to 7/10 after the morphine, but it is presently a 10/10.  The patient's blood pressure is presently 91/60.  The patient and her are notified due to her low blood pressure, 
5/31/25: 2200: DEBORAH Pappas on unit, notified her in person regarding patients BP 84/50 at the time of 2121, and that I gave scheduled meds, including midodrine, but held gabapentin. Patient BP recycled at 2200, resulted in BP coming up to 95/66. DEBORAH Pappas okay for me to give scheduled Gabapentin.      2344: /73. Gabapentin tablet given in PEG tube per MAR.     
Attempted to call report, room is dirty.  Barry Obrien RN    Updated daughter Mare Nichole of patient transfer to room 421.  Barry Obrien RN    
Attended Interdisciplinary Rounds on 7 ICU where patient's care was discussed.    Johnny Bolaños  Chaplain Resident  [phone]   
CT images viewed. Post op gas and some fluid. No clear signs of post op complication at this time on my view. Will review radiologist read.. G tube in proper location. Some bowel and colonic wall thickening. Trend lactate at 8 PM. Cont IVF. Cont abx. Tachycardia and hypotension secondary to refeeding vs enterocolitis?    Consider escalation of care per medicine team    Kevin Bolaños MD, FACS, [facility]  Bariatric and General Surgeon  [facility]    
Clinical Pharmacy Note: Ceftriaxone Dosing    Please note that the ceftriaxone dose for Ce Roberts has been changed to 1000 mg IV q24h per [facility]-approved protocol.  Please contact the pharmacy with any questions.    CAMERON HOWARD RPH    
Clinical Pharmacy Note: IV to PO Automatic Conversion  Please note: Ce Roberts’s medications- folic acid, thiamine and pantoprazole have been changed from IV to PO based on the following critiera:    Patient is tolerating oral medications  Patient is tolerating a diet more advanced than clear liquids  Patient is not requiring vasopressors    This IV to PO conversion is based on the P&T approved automatic conversion policy for eligible patients.  Please call with questions.    
Code sepsis was called on Ce Roberts    Sepsis present due to SIRS at least 2/4 HR >90, WBC 2.2  Sepsis Source  Other GI  Lactic Acid Greater > 2, Repeat Lactic Acid ordered within 4 hrs YES  Severe? No  Shock present? No  Sepsis OrderSet Used? Yes    Additional orders:  CXR, UA, pro BNP, VBG   ml IV bolus x1  Change johnson  Patient already on antibiotic (Cefepime, Metronidazole)  Sepsis Re-Assessment Documentation:     Date: 5/8/2025   Time: 6:34  POC lactic 2.14  BP improved to 92/67 (76) after bolus, initially dropped to 80's systolic      The sepsis reassessment was performed at 0634 time        
Comprehensive Nutrition Assessment    Type and Reason for Visit: Reassess    Nutrition Recommendations/Plan:     Continue EN @ 30 ml/hr until lytes stable WNL    Tube feeding goal: Jevity 1.5 @ 45 ml/hr with 1 packet Prosource daily and 100 ml water flush q 3 hr    Start Banatrol BID for loose stools            Continue to replete Phos, Mg, K+ PRN    Continue daily thiamine (?need to increase dose or frequency given actual deficiency)     Recommend starting daily MVI-Administer vitamins via G-tube to ensure optimal absorption (continue at discharge)    Check Vit D    Supplement with 500 mcg Vit B12 daily (low-normal lab)    Advance diet when medically appropriate         Malnutrition Assessment:  Malnutrition Status:  Severe malnutrition (05/01/25 1523)    Context:  Acute Illness     Findings of the 6 clinical characteristics of malnutrition:  Energy Intake:  50% or less of estimated energy requirements for 5 or more days  Weight Loss:  Greater than 7.5% over 3 months     Body Fat Loss:  Moderate body fat loss Fat Overlying Ribs, Triceps   Muscle Mass Loss:  Moderate muscle mass loss Clavicles (pectoralis & deltoids), Scapula (trapezius)  Fluid Accumulation:  No fluid accumulation     Strength:  Not Performed     Nutrition Assessment:    54 yo female admitted for intractable nausea and vomiting. PMH includes fibromyalgia, HTN and DM (resolved), mood disorder and gastric bypass in June 2024, which became complicated by bowel obstruction, subsequent right hemicolectomy. She reports inability to tolerate any food or drink since then. She was last admitted to [facility] in December for diverticulits and Cdiff.   S/p open surgical G-tube placement into remnant stomach + TONY on 5/2.    5/12: Follow-up. Intubated briefly (5/9 to 5/10) for  Septic shock and acute respiratory failure d/t pulmonary edema. Currently on HFNC with intermittent BiPAP support as needed.    Tube feeding was advanced to goal last week however pt showed 
Comprehensive Nutrition Assessment    Type and Reason for Visit: Reassess    Nutrition Recommendations/Plan:     Continue easy to chew diet as tolerated    Continue full nutrition support/ TF via G-tube in remnant stomach of Jevity 1.5 @ 70 mL/hr x 13 hours (~6 PM to 7 AM) with 100 mL h2o flushes q 4 hours while TF running.  Give 1 packet Prosource daily.  Continue Banatrol BID for loose stools.  -this is her primary nutrition source    Recommend daily MVI         Malnutrition Assessment:  Malnutrition Status:  Severe malnutrition (05/01/25 1523)    Context:  Acute Illness     Findings of the 6 clinical characteristics of malnutrition:  Energy Intake:  50% or less of estimated energy requirements for 5 or more days  Weight Loss:  Greater than 7.5% over 3 months     Body Fat Loss:  Moderate body fat loss Fat Overlying Ribs, Triceps   Muscle Mass Loss:  Moderate muscle mass loss Clavicles (pectoralis & deltoids), Scapula (trapezius)  Fluid Accumulation:  No fluid accumulation     Strength:  Not Performed     Nutrition Assessment:    54 yo female admitted for intractable nausea and vomiting. PMH includes fibromyalgia, HTN and DM (resolved), mood disorder and gastric bypass in June 2024, which became complicated by bowel obstruction, subsequent right hemicolectomy. She reports inability to tolerate any food or drink since then. She was last admitted to [facility] in December for diverticulits and Cdiff.   S/p open surgical G-tube placement into remnant stomach + TONY on 5/2.    5/27: follow-up.  Pt tolerating nocturnal feeds at goal.  She is taking some PO during the day, but tube feeds are her primary nutrition and will continue indefinitely per surgery.  Labs OK, Na+ trending down so would monitor, but she is only getting ~300 mL H2O flushes via G-tube, so wouldn't consider this excessive.    5/23: Will change tube feeding to run continuously ON d/t difficulty in getting all four bolus feeds per day despite change to 
Comprehensive Nutrition Assessment    Type and Reason for Visit: Reassess    Nutrition Recommendations/Plan:     Continue regular diet as tolerated    Continue full nutrition support/ TF via G-tube in remnant stomach of Jevity 1.5 @ 70 mL/hr x 13 hours (~6 PM to 7 AM) with 100 mL h2o flushes q 4 hours while TF running.  Give 1 packet Prosource daily.  Continue Banatrol BID for loose stools.  -this is her primary nutrition source    Recommend daily MVI    Monitor weight closely         Malnutrition Assessment:  Malnutrition Status:  Severe malnutrition (05/01/25 1523)    Context:  Acute Illness     Findings of the 6 clinical characteristics of malnutrition:  Energy Intake:  50% or less of estimated energy requirements for 5 or more days  Weight Loss:  Greater than 7.5% over 3 months     Body Fat Loss:  Moderate body fat loss Fat Overlying Ribs, Triceps   Muscle Mass Loss:  Moderate muscle mass loss Clavicles (pectoralis & deltoids), Scapula (trapezius)  Fluid Accumulation:  No fluid accumulation     Strength:  Not Performed     Nutrition Assessment:    56 yo female admitted for intractable nausea and vomiting. PMH includes fibromyalgia, HTN and DM (resolved), mood disorder and gastric bypass in June 2024, which became complicated by bowel obstruction, subsequent right hemicolectomy. She reports inability to tolerate any food or drink since then. She was last admitted to [facility] in December for diverticulits and Cdiff.   S/p open surgical G-tube placement into remnant stomach + TONY on 5/2.      5/30: pt awaiting rehab placement pending insurance auth.  Tolerating tube feeds at goal and taking small amounts of of regular diet.  Na+ hanging around 133.  No N/V or diarrhea.  Weight significantly up, but ?Accuracy.  Her lasix has been on hold.  Cardiology consulted today.      5/27: follow-up.  Pt tolerating nocturnal feeds at goal.  She is taking some PO during the day, but tube feeds are her primary nutrition and will 
Comprehensive Nutrition Assessment    Type and Reason for Visit: Reassess    Nutrition Recommendations/Plan:     Recommend stopping D5!!  This should help her electrolytes as well. She is refeeding and being OVERFED with concurrent D5 and tube feeds.    Continue to replete Phos, Mg, K+ PRN    Continue regular diet as tolerated  Continue daily folic acid    Continue daily thiamine (?need to increase dose or frequency given actual deficiency)     Recommend starting daily MVI (form / dose will depend on route)    Recommend supplemental B12, Vit D (form/ dose TBD.  Hx of deficiencies with both. Low normal B12 currently, no recent Vit D - may wish to check.  Regardless, should be on daily supplementation of both given gastric bypass, just may need a more therapeutic dose if deficient)    Continue TF via G-tube in remnant stomach  Currently continuous of Jevity 1.5 @ 45 mL/hr with 1 packet Prosource daily and flushes of 100 mL h2o q 6 hours    When medically appropriate, consider transitioning to bolus vs cyclic feeds for discharge (pending pt preference and insurance coverage for pump if this method preferred)  If bolus (goal): 1 carton Jevity 1.5 followed by 180 mL H2O flush QID + 1 packet Prosource daily  If cyclic (goal): 4 cartons Jevity 1.5 @ 79 mL/hr x 12 hours + 1 packet Prosource daily.  Flush with 240 mL H2O TID (suggest @ start/stop of feeds + once during day)           Malnutrition Assessment:  Malnutrition Status:  Severe malnutrition (05/01/25 1523)    Context:  Acute Illness     Findings of the 6 clinical characteristics of malnutrition:  Energy Intake:  50% or less of estimated energy requirements for 5 or more days  Weight Loss:  Greater than 7.5% over 3 months     Body Fat Loss:  Moderate body fat loss Fat Overlying Ribs, Triceps   Muscle Mass Loss:  Moderate muscle mass loss Clavicles (pectoralis & deltoids), Scapula (trapezius)  Fluid Accumulation:  No fluid accumulation     Strength:  Not 
Comprehensive Nutrition Assessment    Type and Reason for Visit: Reassess    Nutrition Recommendations/Plan:   Enteral nutrition:   Jevity 1.5 @ 45 mL/hr with 100 mL free water flush Q3H and one Prosource per day.  This will provide 1565 calories, 83.8 gm protein and 1,552 mL fluid per day, meeting 100% of estimated nutrient needs.  2.   Monitor Potassium, Phosphorous and Magnesium labs every 24 hours for refeeding, replete as needed.   3.   Please obtain new weight.     Malnutrition Assessment:  Malnutrition Status:  Severe malnutrition (05/01/25 1523)    Context:  Acute Illness     Findings of the 6 clinical characteristics of malnutrition:  Energy Intake:  50% or less of estimated energy requirements for 5 or more days  Weight Loss:  Greater than 7.5% over 3 months     Body Fat Loss:  Moderate body fat loss Fat Overlying Ribs, Triceps   Muscle Mass Loss:  Moderate muscle mass loss Clavicles (pectoralis & deltoids), Scapula (trapezius)  Fluid Accumulation:  No fluid accumulation     Strength:  Not Performed     Nutrition Assessment:    56 yo female admitted for intractable nausea and vomiting. PMH includes fibromyalgia, HTN and DM (resolved), mood disorder and gastric bypass in June 2024, which became complicated by bowel obstruction, subsequent right hemicolectomy. She reports inability to tolerate any food or drink since then. She was last admitted to [facility] in December for diverticulits and Cdiff.     5/5- Follow up. Patient has advanced to goal rate, Jevity 1.5 running at 45 mL/hr. Patient in bed at time of visit. States she feels much better. No abdominal pain today, she is complaining of back pain related to bed pan, which was removed at her request. Explained to her that she is at her goal rate for tube feeds. Electrolytes dropped over the weekend and were aggressively repleted. We will monitor her labs and weight and adjust as needed. She was appreciative of visit. Daughter called with update.    5/1- 
Discussed with patient and nursing  Tolerating nocturnal tube feeds without difficulty.  Okay that the timing is not exact as long as patient gets the full cycle of tube feeds overnight.  
General Surgery Progress Note    16 Days Post-Op   LAPAROSCOPIC CONVERT TO OPEN GASTROSTOMY TUBE PLACEMENT, LYSIS OF ADHESION, EGD (Abdomen)   Date:2025       Room:[address] [place] [address]  Patient Name:Ce Roberts     YOB: 1970     Age:55 y.o.    Subjective     No acute surgical issues.  Pt is resting in bed with family at bedside.  Tolerating tubefeeding at goal at 45 ml/hr.  No nausea or bloating.      Medications   Scheduled Meds:    gabapentin  300 mg Per G Tube BID    phosphorus  500 mg Per G Tube 4x Daily    Vitamin D  1,000 Units Per G Tube Daily    Benzocaine-Menthol  1 lozenge Oral Once    enoxaparin  40 mg SubCUTAneous Daily    amitriptyline  10 mg Per G Tube Nightly    furosemide  20 mg IntraVENous BID    vitamin B-12  500 mcg Per G Tube Daily    citalopram  10 mg Per G Tube Daily    folic acid  1 mg Per G Tube Daily    lansoprazole  30 mg Per G Tube QAM AC    midodrine  15 mg Per G Tube Q6H    thiamine  100 mg Per G Tube Daily    balsum peru-castor oil   Topical BID    ipratropium 0.5 mg-albuterol 2.5 mg  1 Dose Inhalation Q6H RT    metroNIDAZOLE  500 mg IntraVENous Q8H    sodium chloride flush  5-40 mL IntraVENous 2 times per day     Continuous Infusions:    dextrose      sodium chloride Stopped (25 0634)     PRN Meds: Loperamide HCl, hemorrhoidal, acetaminophen, aluminum & magnesium hydroxide-simethicone, calcium carbonate, cyclobenzaprine, glucose, ondansetron, polyethylene glycol, HYDROcodone-acetaminophen, phenol, dextrose bolus **OR** dextrose bolus, glucagon (rDNA), dextrose, sodium phosphate 15 mmol in sodium chloride 0.9 % 250 mL IVPB, albuterol sulfate HFA, sodium chloride flush, sodium chloride, magnesium sulfate, [DISCONTINUED] ondansetron **OR** ondansetron        Physical Examination      Vitals:  BP 95/60   Pulse (!) 107   Temp 99.6 °F (37.6 °C)   Resp 22   Ht 1.6 m (5' 3\")   Wt 59.5 kg (131 lb 2.8 oz)   SpO2 94%   BMI 23.24 kg/m²   Temp (24hrs), Av.3 °F (37.4 °C), 
Lovenox Monitoring  Indication: DVT Prophylaxis  Recent Labs     05/28/25  0611 05/29/25  0548 05/30/25  0553   HGB 8.4* 7.9* 7.5*   * 309 290     Current Weight: 58.4 kg  Est. CrCl = >100 ml/min  Current Dose: 30 mg subcutaneously every 24 hours.  Plan: Change to 40 mg subcutaneously every 24 hours per [facility] P&T Committee Protocol with respect to weight and renal function.  Pharmacy will continue to monitor patient daily and will make dosage adjustments based upon changing renal function.      
Music Therapy Assessment  [facility]    Ce Roberts [id number]     1970  55 y.o.  female    Patient Telephone Number: [phone] (home)   Restoration Affiliation: Other   Language: English   Patient Active Problem List    Diagnosis Date Noted    Palliative care by specialist 05/15/2025    Goals of care, counseling/discussion 05/15/2025    Protein-calorie malnutrition 05/15/2025    Severe protein-energy malnutrition 05/02/2025    Hypoglycemia 05/01/2025    Hx of gastric bypass 05/01/2025    Intractable nausea and vomiting 04/30/2025    C. difficile colitis 12/12/2024    Asymptomatic bacteriuria 12/12/2024    Acute colitis 12/09/2024    Marginal ulcer 07/31/2024    H/O gastric bypass 07/29/2024    Morbid obesity (HCC) 06/17/2024    Obesity, unspecified 04/30/2024    Esophageal reflux 11/21/2023    Type 2 diabetes mellitus with hyperglycemia 08/15/2023    Chronic obstructive pulmonary disease, unspecified COPD type ([facility]) 04/24/2023    Controlled type 2 diabetes mellitus without complication, without long-term current use of insulin ([facility]) 02/16/2022    Tobacco use 09/14/2021    Acute pyelonephritis 09/13/2021    CAP (community acquired pneumonia) 12/31/2018    Sepsis ([facility]) 12/31/2018    Obesity, morbid ([facility]) 02/19/2018    Urinary frequency 10/21/2015    Essential hypertension, benign 08/25/2014    DDD (degenerative disc disease), lumbar 05/06/2013    Left axillary pain 02/22/2013    Migraine 05/17/2012    IBS (irritable bowel syndrome) 05/17/2012    Hyperlipidemia 11/10/2011    Fibromyalgia 10/13/2011    Nipple discharge 08/18/2011    Depression 08/18/2011        Date: 5/29/2025            Total Time Calculated: 50 min          [facility] 4 IMCU    Mental Status:   [x] Alert        [  ] Forgetful     [  ]  Confused   [  ] Minimally responsive  [  ] Sleeping     Communication Status: [  ] Impaired Speech         [  ] Nonverbal -N/A     Physical Status:   [x] Oxygen in use             [  ] Hard of Hearing     [  
Music Therapy Assessment  [facility]    Ce Roberts [id number]     1970  55 y.o.  female    Patient Telephone Number: [phone] (home)   Anglican Affiliation: Other   Language: English   Patient Active Problem List    Diagnosis Date Noted    Palliative care by specialist 05/15/2025    Goals of care, counseling/discussion 05/15/2025    Protein-calorie malnutrition 05/15/2025    Severe protein-energy malnutrition 05/02/2025    Hypoglycemia 05/01/2025    Hx of gastric bypass 05/01/2025    Intractable nausea and vomiting 04/30/2025    C. difficile colitis 12/12/2024    Asymptomatic bacteriuria 12/12/2024    Acute colitis 12/09/2024    Marginal ulcer 07/31/2024    H/O gastric bypass 07/29/2024    Morbid obesity (HCC) 06/17/2024    Obesity, unspecified 04/30/2024    Esophageal reflux 11/21/2023    Type 2 diabetes mellitus with hyperglycemia 08/15/2023    Chronic obstructive pulmonary disease, unspecified COPD type ([facility]) 04/24/2023    Controlled type 2 diabetes mellitus without complication, without long-term current use of insulin ([facility]) 02/16/2022    Tobacco use 09/14/2021    Acute pyelonephritis 09/13/2021    CAP (community acquired pneumonia) 12/31/2018    Sepsis ([facility]) 12/31/2018    Obesity, morbid ([facility]) 02/19/2018    Urinary frequency 10/21/2015    Essential hypertension, benign 08/25/2014    DDD (degenerative disc disease), lumbar 05/06/2013    Left axillary pain 02/22/2013    Migraine 05/17/2012    IBS (irritable bowel syndrome) 05/17/2012    Hyperlipidemia 11/10/2011    Fibromyalgia 10/13/2011    Nipple discharge 08/18/2011    Depression 08/18/2011        Date: 5/21/2025            Total Time Calculated: 80 min          [facility] 7S2 INTENSIVE CARE    Mental Status:   [x] Alert [  ] Forgetful [  ]  Confused  [  ] Minimally responsive  [  ] Sleeping    Communication Status: [  ] Impaired Speech [  ] Nonverbal -N/A    Physical Status:   [x] Oxygen in use  [  ] Hard of Hearing [  ] Vision Impaired  [  ] 
NUTRITION brief    Recommendations:     -Modify EN to nocturnal schedule: Jevity 1.5 @ 70 ml/hr x 13 hr with 1 packet Prosource and 100 q 4 hr during feeding    -Continue Banatrol BID    -Add MVI       Diet: Dysphagia Soft and Bite Sized  Meal Intake:   Patient Vitals for the past 168 hrs:   PO Meals Eaten (%)   05/22/25 0900 26 - 50%   05/21/25 1800 1 - 25%   05/21/25 1300 26 - 50%   05/20/25 1400 26 - 50%   05/20/25 0900 0%   05/19/25 1900 0%     Supplement Intake: none ordered-pt refuses ONS  No data found.    Supplements/Nutrition Support: 237 ml Jevity 1.5 4 x/day (7 AM, 4 PM, 10 PM, 2 AM) with 180 ml water flush and 1 packet Prosource daily  Nutrition-related meds: Folic acid, Lasix, B1, Vit D    New events impacting nutrition plan of care:   Will change tube feeding to run continuously ON d/t difficulty in getting all four bolus feeds per day despite change to above schedule yesterday. Nocturnal feeding will provide 910 ml, 1445 calories, 78 gm protein and 1050 ml free water (tube feeding/flush/prosource) per day to meet estimated needs (lower end of energy needs).      See full RD assessment from 5/22 for additional details, goals, and monitoring/evaluation.   Estimated Nutrition Needs:   Energy Requirements Based On: Kcal/kg  Weight Used for Energy Requirements: Admission (47.6 kg)  Energy (kcal/day): 9212-2084 (30-35 kcal/kg)  Weight Used for Protein Requirements: Admission  Protein (g/day): 70-95 (1.5-2 gm/kg)  Method Used for Fluid Requirements: 1 ml/kcal  Fluid (ml/day): 5744-4572    Morena David RD [facility]    
Occupational Therapy  05/06/25    Chart reviewed up to this date. Noted most recent Hgb: 6.7. Will defer and continue to follow up as able/medically appropriate.     Maria Elena Scott, OTD, OTR/L      
Occupational Therapy  05/09/25    Chart reviewed and OT spoke to RN who is requesting therapy defer at this time. Pt's HR in 40's, BP 83/57, and pt is c/o pain. Per RN pending possible consult with ICU. OT will defer at this time and see as able and appropriate.    Thank you,  Anusha Mercer, OTR/L  
Occupational Therapy  05/12/25    Chart reviewed, events noted, discussed with RN. Patient received tachypnic with HR in the 40s, hypotensive, and desaturating on HFNC to the mid-80% post turning with nursing. Not appropriate for OT re-evaluation at this time, RN in agreement. Will follow up as able/appropriate.    Thank you,  María Oliveros, OTD, OTR/L    
Occupational Therapy Note:     Pt remains unstable at this time and continued on BiPap for respiratory support. Resting HR in the 130's.  Pt  not appropriate for therapy intervention at this time.      Nancy Nieto, OTR/L      
Occupational Therapy Note:     Spoke with PT this morning.  Pt remains inappropriate for therapy at this time.  She is now on BiPap and plans for CT and further medical work up today.  MD requesting to hold therapy at this time.  Will defer and follow up for re-evaluation as appropriate.     Nancy Nieto, OTR/L      
Occupational Therapy Note:     Spoke with RN this date.  Pt is currently on high flow with FiO2 at 90%.  Pt desatting with basic care including change of position in bed to low 80's.  Pt resting RR 41 at this time.  's.  With collaborative discussion, pt remains inappropriate for therapy due to medical instability.  She is following commands and strongly encourage patient to engage with any self care as tolerated (washing her face and hands).  Per review, pt to have a goals of care discussion today.  Will hold one more today, and if patient remain inappropriate tomorrow, will complete OT orders.      Nancy Nieto, OTR/L  Minutes: 30    
Occupational Therapy: defer    Chart reviewed up to date. Attempted OT treatment pt asking to rest, will defer for now and attempt back later as able.     Thank you,  Prudence Bazan OTTRAVIS, OTR/L  
PT Contact Note      Chart reviewed and spoke to RN who is requesting therapy defer at this time. Pt's HR in 40's, BP 83/57, and pt is c/o pain. Per RN pending possible consult with ICU. PT will defer at this time and see as able and appropriate.     Thank you,  Trini Chung, PT, DPT  
Patient examined once again today.  She reports abdominal pain similar to this morning.  She reports reactions to Toradol so that was DC'd.  Good urine output.  She reports a history of tachycardia with a baseline rate in the 120s.  She was over 100 last summer for her surgery between 110 and 120.    Afebrile.  Hypotensive.  Not much improvement with fluid bolus this morning.  Good, clear urine output though.    Some abdominal distention but no nausea.  At goal tube feeds.    Will check a repeat CBC and lactate.  If elevated, will consider a CT scan, however, given surgery on Friday this may not be of much use.    Kevin Bolaños MD, FACS, [facility]  Bariatric and General Surgeon  [facility]    
Patient is requesting fentanyl for her fibromyalgia pain. Patient and multiple family members are passionately insisted fentanyl only works for her acute flare of fibromyalgia pain. Discussed with RN to monitor BP and any side other side effect of pain medication. 
Patient requested to be reevaluated by PT/OT. MD at bedside and aware. Patient friend at bedside states that she does not need gordo steady and that the patient needs to leave her room. Patient agreed with this statement. This nurse will continue to use PT recs at this time until further eval performed.  
Pharmacist Note - Vancomycin Dosing    Consult provided for this 55 y.o. female for indication of sepsis.  S/p right hemicolectomy for cecal perforation status post lysis of adhesions   Antibiotic regimen(s): vancomycin + cefepime + metronidazole  Patient on vancomycin PTA? NO   Pregnancy status: N/A    Recent Labs     25  0654 25  0558 25  0607 25  0805   WBC 2.2* 2.4*  --  4.7   CREATININE 0.46* 0.35* 0.4* 0.39*   BUN 5* 4*  --  4*     Frequency of BMP: daily through   Height: 160 cm  Weight: 56.6 kg  Est CrCl: 138 ml/min; UO: 0.7 ml/kg/hr  Temp (24hrs), Av.3 °F (36.8 °C), Min:97.2 °F (36.2 °C), Max:99.7 °F (37.6 °C)    Cultures:   urine > 100K e.coli  / blood (paired): NG prelim   blood (paired) NG pending    MRSA Swab ordered (if applicable)? N/A    The plan below is expected to result in a target range of AUC/VADIM 400-600    Therapy will be initiated with a loading dose of 1500 mg IV x 1 to be followed by a maintenance dose of 1250 mg IV every 12 hours.  Pharmacy to follow patient daily and order levels / make dose adjustments as appropriate.      *Vancomycin has been dosed used Bayesian kinetics software to target an AUC/VADIM of 400-600, which provides adequate exposure for an assumed infection due to MRSA with an VADIM of 1 or less while reducing the risk of nephrotoxicity as seen with traditional trough based dosing goals.     
Pharmacist Note - Vancomycin Dosing  Therapy day 3  Indication: sepsis, hospital acquired PNA + enterocolitis   Current regimen: 1250 mg IV Q12H    Recent Labs     05/09/25  0805 05/09/25  1842 05/10/25  0011 05/10/25  0551 05/11/25  0535   WBC 4.7  --   --  12.6* 16.4*   CREATININE 0.39*   < > 0.37* 0.36* 0.30*   BUN 4*   < > 5* 5* 6    < > = values in this interval not displayed.       A random vancomycin level of 17.1 mcg/mL was obtained and from this level, the patient's AUC24 is calculated to be 643 with the current regimen.     Goal target range AUC/VADIM 400-600      Plan: Change to vancomycin 1000 mg IV Q12H . Pharmacy will continue to monitor this patient daily for changes in clinical status and renal function.    *Random vancomycin levels are used to calculate AUC/VADIM, this level should not be interpreted as a trough. Vancomycin has been dosed using Bayesian kinetics software to target an AUC24:VADIM of 400-600, which provides adequate exposure for as assumed infection due to MRSA with an VADIM of 1 or less while reducing the risk of nephrotoxicity as seen with traditional trough based dosing goals.    
Physical Therapy    Chart reviewed in preparation for PT re-evaluation. Per intensivist, patient remains medically unstable at this time (tachycardic at rest) and requests PT defer today and continue to monitor her status tomorrow.     Thank you,  Marciano Malhotra, PT, DPT  
Physical Therapy    Reviewed chart and attempted to treat pt. Pt declining mobility today due ti stabbing abdominal pain. Pt requesting to follow up tomorrow. Will defer and continue to follow.   
Physical Therapy 5/15/2025     Chart reviewed up to date & conferred with RN. Patient is currently on high flow O2 with RR in high 30s-40s at rest and HR in 120s. Per RN, patient desaturates to low 80s with turning in bed despite high flow O2 with FiO2 of 90%. Noted plans for family meeting today. Will follow for one additional day and if patient remains inappropriate tomorrow will complete PT orders.     Thank you,  Shiloh Bradford PT, DPT, NCS    
Physical Therapy Note  05/12/2025    Chart reviewed, events noted, discussed with RN. Patient received tachypnic with RR in the 40s, hypotensive, and desaturating on HFNC to the mid-80% post turning with nursing. Not appropriate for PT re-evaluation at this time, RN in agreement. Will follow up as able/appropriate.     Gypsy Lawrence, PT, DPT  
Physical Therapy Note  05/13/2025    Chart reviewed in prep for PT re-eval. Spoke with MD - pt remains inappropriate for skilled PT at this time. Now on bipap with plans for CT and further medical work up today. Will defer per request and continue to follow.    Gypsy Lawrence, PT, DPT  
Physical therapy services attempted 12:20PM. Pt currently unavailable receiving RN Team de care and hygiene assist s/p BM. PT Team will try to provide skilled services at a later date as time permits and as medically appropriate to patient tolerance.    Vicki Willis, PT, DPT    
[place] [phone] (COPE)  [facility] [phone] (COPE)      GOALS OF CARE: Full Code, restorative care        TREATMENT PREFERENCES:   Code Status: Full Code    Advance Care Planning:  [x] The [facility] Interdisciplinary Team has updated the ACP Navigator with Health Care Agent and Patient Capacity    Primary Decision Maker (Health Care Agent):   Relationship to patient:  [] Named in a scanned document   [] Legal Next of Kin  [] Guardian    ** Patient verbally shares that Sinai is primary for Medical Decision making - if she is not available, Secondary would be son Moe. If both Sinai and Moe are not available - third would be close friend Latesha.     Plan made to return tomorrow, 5/16, to complete AMD formally (the one we have on chart is incomplete/not witnessed/invalid).     Family Meeting Documentation    Participants: Dr. Combs, daughter Sinai, brother Sid (via phone - lives in [place]), close friend Latesha     Psychosocial, Spiritual History: The patient lives at home with her fiance Asher, they have been together for 7 years, engaged for 4. The patient has two children, Sinai and Moe - Sinai and Moe live together, approximately 45 minutes away. The patient has a beloved granddaughter, Leti, 8-years-old, she is very close w/ the patient - also has a \"grand dog\" [place]ah a Gilmore doodle. Her very close friend Latesha is at bedside, Latesha calls the patient \"mom\" but not biologically related (patient is like a mother to her). The patient worked as a private duty nurse and supervisor. She is originally from [place] but lived in the [place], [place]. She enjoys cooking, parties, cooking shoes. She is of [employer]ologist nancy.     Discussion: The Palliative Medicine SW met with the patient at bedside along with Dr. Combs, daughter Sinai, brother Sid (via phone), and close friend Latesha to further discuss care goals.     Patient is making slow and steady gains - we discuss her clinical picture, she is 
Primary nurse ready to start tube feedings. Patient wants to hold off on starting tube feedings at this time. Patient is currently waiting for dessert, and would like to start tube feedings after she eats.     1900: TF started  
Progress Note  Date:2025       Room:[facility]  Patient Name:Ce Roberts     YOB: 1970     Age:55 y.o.        Subjective    Subjective   Review of Systems  Patient states she has a little bit of pain but not all that much.  Has been eating some food from outside which she is okay.  Currently on tube feeds at 25 which she is tolerating  Objective         Vitals Last 24 Hours:  TEMPERATURE:  Temp  Av.4 °F (36.9 °C)  Min: 98.4 °F (36.9 °C)  Max: 98.4 °F (36.9 °C)  RESPIRATIONS RANGE: Resp  Avg: 15.3  Min: 14  Max: 16  PULSE OXIMETRY RANGE: SpO2  Av %  Min: 100 %  Max: 100 %  PULSE RANGE: Pulse  Av  Min: 122  Max: 122  BLOOD PRESSURE RANGE: Systolic (24hrs), Av , Min:101 , Max:101   ; Diastolic (24hrs), Av, Min:78, Max:78    I/O (24Hr):  No intake or output data in the 24 hours ending 25  Objective:  Vital signs: (most recent): Blood pressure 101/78, pulse (!) 122, temperature 98.4 °F (36.9 °C), temperature source Oral, resp. rate 16, height 1.6 m (5' 3\"), weight 47.6 kg (105 lb), SpO2 100%.      Labs/Imaging/Diagnostics    Labs:  CBC:  Recent Labs     25  0502 25  0236   WBC 5.8 4.4   RBC 2.68* 2.82*   HGB 8.8* 9.5*   HCT 25.9* 27.3*   MCV 96.6 96.8   RDW 14.1 13.6    156     CHEMISTRIES:  Recent Labs     25  0502 25  0236 25  1349    135* 130*   K 2.5* 3.3* HEMOLYZED,RECOLLECT REQUESTED    102 99   CO2  25 25   BUN 3* 2* 6   CREATININE 0.44* 0.47* 0.82   GLUCOSE 80 170* 90   PHOS  --  1.8* 1.0*   MG  --  1.0* HEMOLYZED,RECOLLECT REQUESTED     PT/INR:No results for input(s): \"PROTIME\", \"INR\" in the last 72 hours.  APTT:No results for input(s): \"APTT\" in the last 72 hours.  LIVER PROFILE:  Recent Labs     25  0502 25  0236   AST 32 71*   ALT 15 27   BILITOT 0.6 1.0   ALKPHOS 50 40*       Imaging Last 24 Hours:  No results found.  Assessment//Plan           Hospital Problems           Last Modified POA    
Pt with severe protein malnutrition 1 year s/p lap gastric bypass   BMI 18 and pt unable to maintain adequate PO intake   Unfortunately IR schedule will not allow time for perc G tube placement today  Pt is on the OR schedule for laparoscopic G tube placement today  Pt understands and agrees to proceed with surgery  DW Dr. Miky Tucker, PA     
Renal Dosing/Monitoring  Medication: Cefepime   Current regimen:  2 grams IV every 8 hr  Recent Labs     05/04/25  1814 05/05/25  0953 05/06/25  0137   CREATININE 0.92 0.60 0.45*   BUN 8 7 6     Estimated CrCl:  >100 ml/min  Plan: Change to 2 grams IV every 12 hours per [facility] P&T Committee Protocol with respect to indication and renal function.  Pharmacy will continue to monitor patient daily and will make dosage adjustments based upon changing renal function.      
SLP Contact Note    SLP treatment complete. Recommend regular diet/thin liquids. No further SLP needs anticipated. Will sign off. Full note to follow.    Shahla Giron, Ph.D., CCC-SLP (pronouns: she/her/hers)  Speech-Language Pathologist    
Shift Summary    0750: Pt missed 7a scheduled bolus feed secondary to nausea. Informed patient I will give feed with 8a morning meds. Latesha, family friend, declined plan of intervention. States \"the bolus feed will prevent her from eating breakfast because she will be too full to eat.\" Discussed concerns with DL Berg, who was able to contact MD Miky (Gen Sx) and have tube feed schedule switched to nocturnal feeding  6p-7a.     0845: Transitioned to chair using x2 max assist.     1130: ANETA [place] advanced patient's diet to easy to chew.    1300: PT/OT ambulated patient with walking walker in room and utilized gordo stedy with sit to stand transitions. Recommend usage with x 2 assist.   
Speech Therapy Contact Note    Chart reviewed. Case discussed with RN (Katelin) and Surgery  MD (Dr. Bolaños). Patient currently on BIPAP and not appropriate for PO intake as a result. Will follow-up as able/appropriate.     Aleksandra Joy, CCC-SLP    
Speech pathology   Chart reviewed in anticipation of seeing; however, patient currently on BIPAP.  Rn reporting patient ate ~50% of breakfast. Recommend continuing on current diet of soft and bite sized/thin liquids when respiratory status allows.    Thanks,   Temi Reardon M.S. [facility]-SLP    
Spiritual Health History and Assessment/Progress Note  [facility]    Attempted Encounter,  ,  ,      Name: Ce Roberts MRN: [id number]    Age: 55 y.o.     Sex: female   Language: English   Advent: Other   Intractable nausea and vomiting     Date: 5/14/2025            Total Time Calculated: 10 min              Spiritual Assessment began in [facility] 7S2 INTENSIVE CARE        Referral/Consult From: Rounding   Encounter Overview/Reason: Attempted Encounter  Service Provided For: Patient not available    Traci, Belief, Meaning:   Patient unable to assess at this time  Family/Friends No family/friends present      Importance and Influence:  Patient unable to assess at this time  Family/Friends No family/friends present    Community:  Patient   Family/Friends No family/friends present    Assessment and Plan of Care:     Patient Interventions include:   Family/Friends Interventions include: No family/friends present    Patient Plan of Care: Spiritual Care available upon further referral  Family/Friends Plan of Care: No family/friends present    I attempted to visit Ce Roberts for an initial spiritual health assessment.     The patient was on BiPAP and did not respond. No family was present.     Electronically signed by MORIAH SHAHID on 5/14/2025 at 8:36 AM   
Stopped by to see patient.  She was resting quietly.  On nasal cannula.  On nocturnal feeds.  Continue this indefinitely.  Oral diet as tolerated.  Can go home on this regimen.    Kevin Bolaños MD, FACS, [facility]  Bariatric and General Surgeon  [facility]    
Surgery Progress Note    5/10/2025    Admit Date: 4/30/2025  6:24 PM    CC: Unable to obtain    5/2: Open G tube, TONY    Subjective:     Patient is intubated and writing on the ventilator.    ROS-unable to obtain secondary to intubation    Objective:     Vitals:    05/10/25 0937   BP:    Pulse: (!) 114   Resp: 16   Temp:    SpO2: 93%       General: No acute distress  Eyes: PERRLA, no scleral icterus  HENT: Normocephalic  Neck: Trachea midline without LAD  Cardiac: Normal pulse rate and rhythm  Pulmonary: Intubated on the ventilator  GI: Soft, ATTP, wounds cdi, G tube clamped  Skin: Warm without rash  Extremities: No edema or joint stiffness  Psych: Appropriate mood and affect    Puga in place    Labs, vital signs, and I/O reviewed.    Assessment:     55-year-old female with malnutrition after gastric bypass and subsequent right hemicolectomy for cecal perforation status post lysis of adhesions and G-tube placement working on refeeding status post respiratory distress and intubation yesterday with great improvement overnight    Plan:     As needed pain control  Appreciate help from ICU team.  Electrolytes are greatly improved.  Lactate cleared.  Pulmonary toilet  Possible aspiration pneumonia.  Possible enterocolitis.  On broad-spectrum antibiotics.  Cultures no growth to date  Can resume p.o. diet and resume low rate G-tube feeds and monitor closely for refeeding syndrome  Chemical DVT prophylaxis-allergies  Out of bed to chair        Kevin Bolaños MD, [facility], [facility]  Bariatric and General Surgeon  [facility]    
Surgery Progress Note    5/11/2025    Admit Date: 4/30/2025  6:24 PM    CC: Difficulty breathing    5/2: Open G tube, TONY    Subjective:     Extubated yesterday but labored breathing today on high flow currently.  Abdominal pain is minimal    ROS    Constitutional: No fever or chills  Neurologic: No headache  Eyes: No scleral icterus or irritated eyes  Nose: No nasal pain or drainage  Mouth: No oral lesions or sore throat  Cardiac: No palpations or chest pain  Pulmonary: Shortness of breath  Gastrointestinal: Minimal abdominal pain, no nausea, emesis, diarrhea, or constipation  Genitourinary: No dysuria  Musculoskeletal: No muscle or joint tenderness  Skin: No rashes or lesions  Psychiatric: No anxiety or depressed mood        Objective:     Vitals:    05/11/25 1200   BP: 93/64   Pulse: (!) 105   Resp: 21   Temp: 99 °F (37.2 °C)   SpO2: 98%       General: No acute distress  Eyes: PERRLA, no scleral icterus  HENT: Normocephalic  Neck: Trachea midline without LAD  Cardiac: Normal pulse rate and rhythm  Pulmonary: Moderately labored, on high flow  GI: Soft, ATTP, wounds cdi, G tube low rate feeds  Skin: Warm without rash  Extremities: No edema or joint stiffness  Psych: Appropriate mood and affect    Puga in place    Labs, vital signs, and I/O reviewed.    Assessment:     55-year-old female with malnutrition after gastric bypass and subsequent right hemicolectomy for cecal perforation status post lysis of adhesions and G-tube placement working on refeeding status post respiratory distress and intubation with subsequent extubation with still some moderate respiratory distress    Plan:     As needed pain control  Appreciate help from ICU team.  Replace electrolytes as needed  Pulmonary toilet  Pneumonia.  Possible enterocolitis.  On broad-spectrum antibiotics.  White count up.  Tmax of 100.5  Low rate G-tube feeds.  6 BMs yesterday.  Consider C. difficile if numbers continue to worsen  Chemical DVT 
Surgery Progress Note    5/12/2025    Admit Date: 4/30/2025  6:24 PM    CC: Difficulty breathing    5/2: Open G tube, TONY    Subjective:     Back-and-forth on high flow and BiPAP.  Pain controlled.  2 BMs yesterday.      ROS    Constitutional: No fever or chills  Neurologic: No headache  Eyes: No scleral icterus or irritated eyes  Nose: No nasal pain or drainage  Mouth: No oral lesions or sore throat  Cardiac: No palpations or chest pain  Pulmonary: Shortness of breath  Gastrointestinal: Minimal abdominal pain, no nausea, emesis, diarrhea, or constipation  Genitourinary: No dysuria  Musculoskeletal: No muscle or joint tenderness  Skin: No rashes or lesions  Psychiatric: No anxiety or depressed mood      Objective:     Vitals:    05/12/25 0645   BP: 99/60   Pulse: (!) 125   Resp: 28   Temp: 99.6 °F (37.6 °C)   SpO2: 93%       General: No acute distress  Eyes: PERRLA, no scleral icterus  HENT: Normocephalic  Neck: Trachea midline without LAD  Cardiac: Normal pulse rate and rhythm  Pulmonary: Moderately labored, on BiPAP  GI: Soft, ATTP, wounds cdi, G tube low rate feeds now at 30  Skin: Warm without rash  Extremities: No edema or joint stiffness  Psych: Appropriate mood and affect    Puga in place    Labs, vital signs, and I/O reviewed.    Assessment:     55-year-old female with malnutrition after gastric bypass and subsequent right hemicolectomy for cecal perforation status post lysis of adhesions and G-tube placement working on refeeding now with pneumonia and difficulty breathing    Plan:     As needed pain control  Appreciate help from ICU team.  Replace electrolytes as needed  Pulmonary toilet  Pneumonia.  Possible enterocolitis.  On broad-spectrum antibiotics.  WBC down-trending  On steroids  Increase G-tube feed rate to 30.  Chemical DVT prophylaxis-allergies  Continue ICU        Kevin Bolaños MD, FACS, [facility]  Bariatric and General Surgeon  [facility]    
Surgery Progress Note    5/13/2025    Admit Date: 4/30/2025  6:24 PM    CC: Difficulty breathing    5/2: Open G tube, TONY    Subjective:     Remains on BiPAP.  Checking stool for C. difficile.  Multiple BMs yesterday.    ROS    UTO on bipap      Objective:     Vitals:    05/13/25 0600   BP: 106/73   Pulse: (!) 111   Resp: 23   Temp: 98.3 °F (36.8 °C)   SpO2: 94%       General: No acute distress  Eyes: PERRLA, no scleral icterus  HENT: Normocephalic  Neck: Trachea midline without LAD  Cardiac: Normal pulse rate and rhythm  Pulmonary: Moderately labored, on BiPAP  GI: Soft, ATTP, wounds cdi, G tube low rate feeds at 30  Skin: Warm without rash  Extremities: No edema or joint stiffness  Psych: Appropriate mood and affect    Puga in place    Labs, vital signs, and I/O reviewed.    Assessment:     55-year-old female with malnutrition after gastric bypass and subsequent right hemicolectomy for cecal perforation status post lysis of adhesions and G-tube placement working on refeeding now with pneumonia and difficulty breathing    Plan:     As needed pain control  Appreciate help from ICU team.  Replace electrolytes-Phos and potassium  Pulmonary toilet  Pneumonia.  Possible enterocolitis.  On broad-spectrum antibiotics.  Improved x-ray yesterday  On steroids  Continue G-tube feed rate to 30.  Chemical DVT prophylaxis-allergies  Continue ICU        Kevin Bolaños MD, FACS, [facility]  Bariatric and General Surgeon  [facility]    
Surgery Progress Note    5/15/2025    Admit Date: 4/30/2025  6:24 PM    CC: Difficulty breathing    5/2: Open G tube, TONY    Subjective:     Remains on bipap. Getting tube feeds.    ROS    Constitutional: Weakness  Neurologic: No headache  Eyes: No scleral icterus or irritated eyes  Nose: No nasal pain or drainage  Mouth: No oral lesions or sore throat  Cardiac: No palpations or chest pain  Pulmonary: No cough or shortness of breath  Gastrointestinal: Abdominal soreness, some diarrhea, no nausea, emesis,or constipation  Genitourinary: No dysuria  Musculoskeletal: No muscle or joint tenderness  Skin: No rashes or lesions  Psychiatric: No anxiety or depressed mood          Objective:     Vitals:    05/15/25 0600   BP: 100/70   Pulse: (!) 104   Resp: 18   Temp: 98.7 °F (37.1 °C)   SpO2: 93%       General: No acute distress  Eyes: PERRLA, no scleral icterus  HENT: Normocephalic  Neck: Trachea midline without LAD  Cardiac: Normal pulse rate and rhythm  Pulmonary: Moderately labored, on BiPAP  GI: Soft, ATTP, wounds cdi, G tube low rate feeds at 30  Skin: Warm without rash  Extremities: No edema or joint stiffness  Psych: Appropriate mood and affect    Puga in place    Labs, vital signs, and I/O reviewed.    Assessment:     55-year-old female with malnutrition after gastric bypass and subsequent right hemicolectomy for cecal perforation status post lysis of adhesions and G-tube placement working on refeeding now with pneumonia and difficulty breathing    Plan:     As needed pain control  Appreciate help from ICU team.  Replace electrolytes-potassium and phos  Pulmonary toilet  Bipap as needed  Pneumonia. On antibiotics.  Possible enterocolitis.  C. difficile negative  White count normalized  Continue G-tube feed rate to 30 until electrolytes normalized.    On Lovenox        Kevin Bolaños MD, FACS, [facility]  Bariatric and General Surgeon  [facility]    
Surgery Progress Note    5/16/2025    Admit Date: 4/30/2025  6:24 PM    CC: Hungry    5/2: Open G tube, TONY    Subjective:     On high flow but doing much better.  Reports being hungry.  Irritated by the rectal tube.          Review of systems    Constitutional: Weakness  Neurologic: No headache  Eyes: No scleral icterus or irritated eyes  Nose: No nasal pain or drainage  Mouth: No oral lesions or sore throat  Cardiac: No palpations or chest pain  Pulmonary: No cough or shortness of breath  Gastrointestinal: Abdominal soreness, some diarrhea, no nausea, emesis,or constipation  Genitourinary: No dysuria  Musculoskeletal: No muscle or joint tenderness  Skin: No rashes or lesions  Psychiatric: No anxiety or depressed mood    Objective:     Vitals:    05/16/25 0700   BP: (!) 87/71   Pulse: (!) 111   Resp: 27   Temp: 99.2 °F (37.3 °C)   SpO2: 96%       General: No acute distress  Eyes: PERRLA, no scleral icterus  HENT: Normocephalic  Neck: Trachea midline without LAD  Cardiac: Normal pulse rate and rhythm  Pulmonary: Moderately labored, on BiPAP  GI: Soft, ATTP, wounds cdi, G tube low rate feeds at 30  Skin: Warm without rash  Extremities: No edema or joint stiffness  Psych: Appropriate mood and affect    Puga in place    Labs, vital signs, and I/O reviewed.    Assessment:     55-year-old female with malnutrition after gastric bypass and subsequent right hemicolectomy for cecal perforation status post lysis of adhesions and G-tube placement working on refeeding now with pneumonia and improved breathing    Plan:     As needed pain control  Appreciate help from ICU team.  Replace electrolytes-potassium   Pulmonary toilet  Weaning high flow  Pneumonia. On antibiotics.  Possible enterocolitis.  C. difficile negative  Increase G-tube feed rate to goal at 45.  Flushes per dietary recommendations.  On Lovenox        Kevin Bolaños MD, FACS, [facility]  Bariatric and General Surgeon  [facility]    
Surgery Progress Note    5/19/2025    Admit Date: 4/30/2025  6:24 PM    CC: Hungry    5/2: Open G tube, TONY    Subjective:     Seen by family over the weekend.  Weaning down on high flow.  Tolerating G-tube feeds.  Diarrhea improved.    Review of systems    Constitutional: Weakness  Neurologic: No headache  Eyes: No scleral icterus or irritated eyes  Nose: No nasal pain or drainage  Mouth: No oral lesions or sore throat  Cardiac: No palpations or chest pain  Pulmonary: shortness of breath  Gastrointestinal: Abdominal soreness, some diarrhea, no nausea, emesis,or constipation  Genitourinary: No dysuria  Musculoskeletal: No muscle or joint tenderness  Skin: No rashes or lesions  Psychiatric: No anxiety or depressed mood    Objective:     Vitals:    05/19/25 0700   BP: 98/68   Pulse: (!) 105   Resp: 29   Temp:    SpO2: 98%       General: No acute distress  Eyes: PERRLA, no scleral icterus  HENT: Normocephalic  Neck: Trachea midline without LAD  Cardiac: Normal pulse rate and rhythm  Pulmonary: Moderately labored, on high flow  GI: Soft, ATTP, wounds cdi, G tube low rate feeds at 45  Skin: Warm without rash  Extremities: No edema or joint stiffness  Psych: Appropriate mood and affect    Puga in place    Labs, vital signs, and I/O reviewed.    Assessment:     55-year-old female with malnutrition after gastric bypass and subsequent right hemicolectomy for cecal perforation status post lysis of adhesions and G-tube placement working on refeeding now with pneumonia and improved breathing    Plan:     As needed pain control  Appreciate help from ICU team.  Replace electrolytes-potassium   Pulmonary toilet  Weaning high flow  Pneumonia.  Antibiotics complete.  Mild leukocytosis. No fever  Change to bolus feeds  Speech eval today.  Okay for oral diet per speech  On Lovenox        Kevin Bolaños MD, FACS, [facility]  Bariatric and General Surgeon  [facility]    
Surgery Progress Note    5/2/2025    Admit Date: 4/30/2025  6:24 PM    CC: Nausea    Subjective:     No events overnight.  Feels weak.  No pain    Constitutional: No fever or chills  Neurologic: No headache  Eyes: No scleral icterus or irritated eyes  Nose: No nasal pain or drainage  Mouth: No oral lesions or sore throat  Cardiac: No palpations or chest pain  Pulmonary: No cough or shortness of breath  Gastrointestinal: Baseline nausea, no emesis, diarrhea, or constipation  Genitourinary: No dysuria  Musculoskeletal: No muscle or joint tenderness  Skin: No rashes or lesions  Psychiatric: No anxiety or depressed mood    Objective:     Vitals:    05/02/25 0713   BP: 92/72   Pulse: (!) 112   Resp: 16   Temp: 98.6 °F (37 °C)   SpO2: 96%       General: No acute distress, conversant  Eyes: PERRLA, no scleral icterus  HENT: Normocephalic without oral lesions  Neck: Trachea midline without LAD  Cardiac: Normal pulse rate and rhythm  Pulmonary: Symmetric chest rise with normal effort  GI: Soft, NT, ND, no hernia, no splenomegaly  Skin: Warm without rash  Extremities: No edema or joint stiffness  Psych: Appropriate mood and affect    Labs, vital signs, and I/O reviewed.    Assessment:     55-year-old female with malnutrition after gastric bypass and subsequent right hemicolectomy for cecal perforation    Plan:     Hypokalemia.  Replace.  Recheck BMP around 2:00.  Appreciate help from interventional radiology.  They will attempt G-tube placement today.  Keep NPO.  Appreciate input from nutrition.  We will start low rate continuous feeds after G-tube was placed.  Watch out for refeeding syndrome.  Follow electrolytes daily.    Kevin Bolaños MD, FACS, [facility]  Bariatric and General Surgeon  [facility]    
Surgery Progress Note    5/20/2025    Admit Date: 4/30/2025  6:24 PM    CC: Bloated    5/2: Open G tube, TONY    Subjective:     Working on timing of oral intake and bolus feeds but taking 2 or 3 bolus feeds a day.  Awaiting stepdown bed    Review of systems    Constitutional: Weakness  Neurologic: No headache  Eyes: No scleral icterus or irritated eyes  Nose: No nasal pain or drainage  Mouth: No oral lesions or sore throat  Cardiac: No palpations or chest pain  Pulmonary: shortness of breath  Gastrointestinal: Abdominal soreness, bloating, some diarrhea, no nausea, emesis,or constipation  Genitourinary: No dysuria  Musculoskeletal: No muscle or joint tenderness  Skin: No rashes or lesions  Psychiatric: No anxiety or depressed mood    Objective:     Vitals:    05/20/25 0700   BP: 104/74   Pulse: (!) 101   Resp: (!) 34   Temp:    SpO2: 95%       General: No acute distress  Eyes: PERRLA, no scleral icterus  HENT: Normocephalic  Neck: Trachea midline without LAD  Cardiac: Normal pulse rate and rhythm  Pulmonary: Moderately labored, on high flow  GI: Soft, ATTP, wounds cdi, G tube with bolus feeds  Skin: Warm without rash  Extremities: No edema or joint stiffness  Psych: Appropriate mood and affect    Labs, vital signs, and I/O reviewed.    Assessment:     55-year-old female with malnutrition after gastric bypass and subsequent right hemicolectomy for cecal perforation status post lysis of adhesions and G-tube placement working on refeeding now with pneumonia and improved breathing    Plan:     As needed pain control  Electrolytes stable  On midodrine and Florinef for hypotension  Pulmonary toilet  Weaning high flow  Continue bolus feeds.  Work on timing feeds around oral feeds.  Feeds go into the remnant stomach versus oral feeds going into the gastric pouch.  Try to balance potential nausea with bolus feeds but try to push for the four bolus feeds today.  On Lovenox  Stepdown per primary  Rehab planning      Kevin SORENSEN 
Surgery Progress Note    5/23/2025    Admit Date: 4/30/2025  6:24 PM    CC: Bloated    5/2: Open G tube, TONY    Subjective:     Working on timing of oral intake and bolus feeds but taking 2 or 3 bolus feeds a day.  Awaiting stepdown bed    Review of systems    Constitutional: Weakness  Neurologic: No headache  Eyes: No scleral icterus or irritated eyes  Nose: No nasal pain or drainage  Mouth: No oral lesions or sore throat  Cardiac: No palpations or chest pain  Pulmonary: shortness of breath  Gastrointestinal: Abdominal soreness, bloating, some diarrhea, no nausea, emesis,or constipation  Genitourinary: No dysuria  Musculoskeletal: No muscle or joint tenderness  Skin: No rashes or lesions  Psychiatric: No anxiety or depressed mood    Objective:     Vitals:    05/23/25 0724   BP:    Pulse:    Resp:    Temp:    SpO2: 100%       General: No acute distress  Eyes: PERRLA, no scleral icterus  HENT: Normocephalic  Neck: Trachea midline without LAD  Cardiac: Normal pulse rate and rhythm  Pulmonary: Moderately labored, on high flow  GI: Soft, ATTP, wounds cdi, G tube with bolus feeds  Skin: Warm without rash  Extremities: No edema or joint stiffness  Psych: Appropriate mood and affect    Labs, vital signs, and I/O reviewed.    Assessment:     55-year-old female with malnutrition after gastric bypass and subsequent right hemicolectomy for cecal perforation status post lysis of adhesions and G-tube placement working on refeeding now with pneumonia and improved breathing    Plan:     As needed pain control  Electrolytes stable  On midodrine and Florinef for hypotension  Pulmonary toilet  Weaning high flow  Continue bolus feeds.  Work on timing feeds around oral feeds.  Feeds go into the remnant stomach versus oral feeds going into the gastric pouch.  Try to balance potential nausea with bolus feeds but try to push for the four bolus feeds today.  On Lovenox  Stepdown per primary  Rehab planning      Kevin Bolaños MD, FACS, 
Surgery Progress Note    5/28/2025    Admit Date: 4/30/2025  6:24 PM    CC: Ready for rehab    Subjective:     Patient doing well.  Tolerating nocturnal cyclic feeds.  Eating.  Now off oxygen this morning.    Constitutional: No fever or chills  Neurologic: No headache  Eyes: No scleral icterus or irritated eyes  Nose: No nasal pain or drainage  Mouth: No oral lesions or sore throat  Cardiac: No palpations or chest pain  Pulmonary: No cough or shortness of breath  Gastrointestinal: No nausea, emesis, diarrhea, or constipation  Genitourinary: No dysuria  Musculoskeletal: No muscle or joint tenderness  Skin: No rashes or lesions  Psychiatric: No anxiety or depressed mood    Objective:     Vitals:    05/28/25 0812   BP:    Pulse: (!) 126   Resp: 22   Temp:    SpO2: 99%       General: No acute distress, conversant  Eyes: PERRLA, no scleral icterus  HENT: Normocephalic without oral lesions  Neck: Trachea midline without LAD  Cardiac: Normal pulse rate and rhythm  Pulmonary: Symmetric chest rise with normal effort  GI: Soft, G-tube site intact, midline wound clean dry and intact  Skin: Warm without rash  Extremities: No edema or joint stiffness  Psych: Appropriate mood and affect    Labs, vital signs, and I/O reviewed.    Assessment:     55-year-old female doing well after malnutrition and G-tube placement with pneumonia and enteritis    Plan:     Patient doing great currently.  Plan for rehab once available.  Continue oral diet and nocturnal cyclic feeds.  Follow-up with me in a few weeks.    Kevin Bolaños MD, [facility], [facility]  Bariatric and General Surgeon  [facility]    
Surgery Progress Note    5/3/2025    Admit Date: 4/30/2025  6:24 PM    CC: Abdominal pain    5/2: Open G tube, TONY    Subjective:     Moderate pain.  No nausea    Constitutional: No fever or chills  Neurologic: No headache  Eyes: No scleral icterus or irritated eyes  Nose: No nasal pain or drainage  Mouth: No oral lesions or sore throat  Cardiac: No palpations or chest pain  Pulmonary: No cough or shortness of breath  Gastrointestinal: Abdominal pain, baseline nausea, no emesis, diarrhea, or constipation  Genitourinary: No dysuria  Musculoskeletal: No muscle or joint tenderness  Skin: No rashes or lesions  Psychiatric: No anxiety or depressed mood    Objective:     Vitals:    05/03/25 0515   BP: 98/70   Pulse: (!) 104   Resp: 18   Temp: 99 °F (37.2 °C)   SpO2: 99%       General: No acute distress, conversant  Eyes: PERRLA, no scleral icterus  HENT: Normocephalic without oral lesions  Neck: Trachea midline without LAD  Cardiac: Normal pulse rate and rhythm  Pulmonary: Symmetric chest rise with normal effort  GI: Soft, ATTP, wounds cdi, G tube clamped  Skin: Warm without rash  Extremities: No edema or joint stiffness  Psych: Appropriate mood and affect    Labs, vital signs, and I/O reviewed.    Assessment:     55-year-old female with malnutrition after gastric bypass and subsequent right hemicolectomy for cecal perforation status post lysis of adhesions and G-tube placement    Plan:     P.o. morphine given patient's many allergies.  Flexeril, fentanyl  Pulmonary toilet  Hypokalemia-replace per primary team  Start tube feeds, okay for p.o. diet  Chemical DVT prophylaxis if able  Stop nicotine patch -high risk of ulcer perforation and bypass patient  Continue floor      Kevin Bolaños MD, FACS, [facility]  Bariatric and General Surgeon  [facility]    
Surgery Progress Note    5/5/2025    Admit Date: 4/30/2025  6:24 PM    CC: Abdominal pain    5/2: Open G tube, TONY    Subjective:     Moderate pain under ribs on left. No fever. Hypotension  last night. Good UOP    Constitutional: No fever or chills  Neurologic: No headache  Eyes: No scleral icterus or irritated eyes  Nose: No nasal pain or drainage  Mouth: No oral lesions or sore throat  Cardiac: No palpations or chest pain  Pulmonary: No cough or shortness of breath  Gastrointestinal: Abdominal pain, baseline nausea, no emesis, diarrhea, or constipation  Genitourinary: No dysuria  Musculoskeletal: No muscle or joint tenderness  Skin: No rashes or lesions  Psychiatric: No anxiety or depressed mood    Objective:     Vitals:    05/05/25 0604   BP: 90/63   Pulse: (!) 124   Resp: 16   Temp: 99.9 °F (37.7 °C)   SpO2: 97%       General: No acute distress, conversant  Eyes: PERRLA, no scleral icterus  HENT: Normocephalic without oral lesions  Neck: Trachea midline without LAD  Cardiac: Normal pulse rate and rhythm  Pulmonary: Symmetric chest rise with normal effort  GI: Soft, ATTP, wounds cdi, G tube with infusion  Skin: Warm without rash  Extremities: No edema or joint stiffness  Psych: Appropriate mood and affect    Puga in place    Labs, vital signs, and I/O reviewed.    Assessment:     55-year-old female with malnutrition after gastric bypass and subsequent right hemicolectomy for cecal perforation status post lysis of adhesions and G-tube placement    Plan:     P.o. morphine given patient's many allergies.  Flexeril, fentanyl Toradol added  Hypotension-bolus this AM. Do not suspect post op complication at this time.  Pulmonary toilet  Hyponatremia-replace per primary team. Follow up phos  Advance to tube feed goal  Leukopenia. Reactive? AF. Trend. UTI treatment completed.    Chemical DVT prophylaxis if able-allergies  Continue floor      Kevin Bolaños MD, [facility], [facility]  Bariatric and General Surgeon  [facility] 
Surgery Progress Note    5/7/2025    Admit Date: 4/30/2025  6:24 PM    CC: Abdominal pain    5/2: Open G tube, TONY    Subjective:     Still with moderate pain.  Tolerated a quarter of a [employer] bowl yesterday.  Labs pending.    Constitutional: No fever or chills  Neurologic: No headache  Eyes: No scleral icterus or irritated eyes  Nose: No nasal pain or drainage  Mouth: No oral lesions or sore throat  Cardiac: No palpations or chest pain  Pulmonary: No cough or shortness of breath  Gastrointestinal: Abdominal pain, baseline nausea, no emesis, diarrhea, or constipation  Genitourinary: No dysuria  Musculoskeletal: No muscle or joint tenderness  Skin: No rashes or lesions  Psychiatric: No anxiety or depressed mood    Objective:     Vitals:    05/07/25 0822   BP:    Pulse: (!) 107   Resp:    Temp:    SpO2:        General: No acute distress, conversant  Eyes: PERRLA, no scleral icterus  HENT: Normocephalic without oral lesions  Neck: Trachea midline without LAD  Cardiac: Normal pulse rate and rhythm  Pulmonary: Symmetric chest rise with normal effort  GI: Soft, ATTP, wounds cdi, G tube with infusion of feeds at 30 an hour  Skin: Warm without rash  Extremities: No edema or joint stiffness  Psych: Appropriate mood and affect    Puga in place    Labs, vital signs, and I/O reviewed.    Assessment:     55-year-old female with malnutrition after gastric bypass and subsequent right hemicolectomy for cecal perforation status post lysis of adhesions and G-tube placement working on refeeding    Plan:     P.o. morphine given patient's many allergies.  Flexeril, fentanyl.  Extra dose of morphine ordered  DC Puga tomorrow.  Continue Flomax  HDS  Pulmonary toilet  Electrolyte derangements.  Follow-up labs.  Continue replacement via G-tube  If electrolytes stable will consider advancing tube feed rate today.  Follow-up CBC.  Continue antibiotics for enterocolitis    Chemical DVT prophylaxis if able-allergies  Out of bed to 
Surgery Progress Note    5/8/2025    Admit Date: 4/30/2025  6:24 PM    CC: Abdominal pain    5/2: Open G tube, TONY    Subjective:     Pain better controlled.  She reports no changes to how she felt last night.  Concern for sepsis.  Cultures drawn last night.  Puga catheter remained.    Constitutional: No fever or chills  Neurologic: No headache  Eyes: No scleral icterus or irritated eyes  Nose: No nasal pain or drainage  Mouth: No oral lesions or sore throat  Cardiac: No palpations or chest pain  Pulmonary: No cough or shortness of breath  Gastrointestinal: Abdominal pain, no emesis, diarrhea, or constipation  Genitourinary: No dysuria  Musculoskeletal: No muscle or joint tenderness  Skin: No rashes or lesions  Psychiatric: No anxiety or depressed mood    Objective:     Vitals:    05/08/25 0553   BP:    Pulse: (!) 121   Resp:    Temp:    SpO2:        General: No acute distress, conversant  Eyes: PERRLA, no scleral icterus  HENT: Normocephalic without oral lesions  Neck: Trachea midline without LAD  Cardiac: Normal pulse rate and rhythm  Pulmonary: Symmetric chest rise with normal effort  GI: Soft, ATTP, wounds cdi, G tube with infusion of feeds at 30 an hour  Skin: Warm without rash  Extremities: No edema or joint stiffness  Psych: Appropriate mood and affect    Puga in place    Labs, vital signs, and I/O reviewed.    Assessment:     55-year-old female with malnutrition after gastric bypass and subsequent right hemicolectomy for cecal perforation status post lysis of adhesions and G-tube placement working on refeeding    Plan:     P.o. morphine given patient's many allergies.  Flexeril, fentanyl.    Okay to DC Puga.  Appears to have baseline tachycardia.  On midodrine.  HDS  Pulmonary toilet  Electrolyte derangements improving.  Increase tube feed rate  Continue antibiotics for enterocolitis.  More culture sent last night.  No growth to date.    Chemical DVT prophylaxis if able-allergies  Out of bed to 
Surgery Progress Note    5/9/2025    Admit Date: 4/30/2025  6:24 PM    CC: Abdominal pain    5/2: Open G tube, TONY    Subjective:     Baseline mental status.  Tachycardia has resumed.  Baseline abdominal pain.    Constitutional: No fever or chills  Neurologic: No headache  Eyes: No scleral icterus or irritated eyes  Nose: No nasal pain or drainage  Mouth: No oral lesions or sore throat  Cardiac: No palpations or chest pain  Pulmonary: No cough or shortness of breath  Gastrointestinal: Abdominal pain, no emesis, diarrhea, or constipation  Genitourinary: No dysuria  Musculoskeletal: No muscle or joint tenderness  Skin: No rashes or lesions  Psychiatric: No anxiety or depressed mood    Objective:     Vitals:    05/09/25 0656   BP: 93/65   Pulse: (!) 125   Resp: (!) 34   Temp: 99.7 °F (37.6 °C)   SpO2: 92%       General: No acute distress, conversant  Eyes: PERRLA, no scleral icterus  HENT: Normocephalic without oral lesions  Neck: Trachea midline without LAD  Cardiac: Normal pulse rate and rhythm  Pulmonary: Symmetric chest rise with normal effort  GI: Soft, ATTP, wounds cdi, G tube with infusion of feeds at 45 an hour  Skin: Warm without rash  Extremities: No edema or joint stiffness  Psych: Appropriate mood and affect    Puga in place    Labs, vital signs, and I/O reviewed.    Assessment:     55-year-old female with malnutrition after gastric bypass and subsequent right hemicolectomy for cecal perforation status post lysis of adhesions and G-tube placement working on refeeding    Plan:     P.o. morphine given patient's many allergies.  Flexeril, fentanyl.    Follow-up labs  Appears to have baseline tachycardia.  On midodrine.  Appreciate input from medicine  Pulmonary toilet  Electrolyte [id number]-km a.m. labs  On prophylactic antibiotics.  No source of infection.  May have enterocolitis  Chemical DVT prophylaxis-allergies  Out of bed to chair  Continue stepdown      Kevin Bolaños MD, FACS, 
[facility]   Surgery Progress Note    Admit Date: 4/30/2025      Subjective:      At around 1 PM this afternoon Pt became more lethargic and less responsive, tachycardic,  seen by ICU team and was intubated and transferred to ICU  She is sedated on pressor support         Objective:     Patient Vitals for the past 8 hrs:   BP Temp Temp src Pulse Resp SpO2 Height   05/09/25 1356 (!) 84/51 98.4 °F (36.9 °C) -- (!) 117 16 95 % --   05/09/25 1341 108/70 97.5 °F (36.4 °C) -- (!) 123 16 97 % --   05/09/25 1326 133/87 -- -- (!) 114 14 97 % --   05/09/25 1314 -- -- -- (!) 114 14 96 % --   05/09/25 1311 90/67 -- -- (!) 115 16 -- --   05/09/25 1200 -- -- -- (!) 125 -- -- --   05/09/25 1100 (!) 84/58 -- -- (!) 125 (!) 41 -- --   05/09/25 1051 (!) 83/57 98.8 °F (37.1 °C) Oral (!) 125 (!) 35 -- --   05/09/25 1050 -- -- -- -- -- -- 1.6 m (5' 3\")   05/09/25 1000 -- -- -- (!) 125 -- -- --   05/09/25 0656 93/65 99.7 °F (37.6 °C) Oral (!) 125 (!) 34 92 % --     No intake/output data recorded.  05/07 1901 - 05/09 0700  In: 200   Out: 2000 [Urine:2000]ip  Physical Exam:    General: intubated, sedated   Cardiac: tachy to 115   Lungs: coarse BS bilateral   Abdomen: soft and G tube site is clean   Wounds:clean, dry, no drainage  Neuro:  sedated           Labs:       CBC:   Lab Results   Component Value Date/Time    WBC 4.7 05/09/2025 08:05 AM    RBC 2.44 05/09/2025 08:05 AM    HGB 8.0 05/09/2025 08:05 AM    HCT 23.0 05/09/2025 08:05 AM     05/09/2025 08:05 AM     BMP:   Lab Results   Component Value Date/Time     05/09/2025 08:05 AM    K 2.9 05/09/2025 08:05 AM     05/09/2025 08:05 AM    CO2 26 05/09/2025 08:05 AM    BUN 4 05/09/2025 08:05 AM     CMP:  Lab Results   Component Value Date/Time     05/09/2025 08:05 AM    K 2.9 05/09/2025 08:05 AM     05/09/2025 08:05 AM    CO2 26 05/09/2025 08:05 AM    BUN 4 05/09/2025 08:05 AM    GLOB 2.0 05/09/2025 08:04 AM 
TRANSFER - IN REPORT:    Verbal report received from Tova LOZANO on Ce Roberts  being received from PACU for routine post-op      Report consisted of patient's Situation, Background, Assessment and   Recommendations(SBAR).     Information from the following report(s) Surgery Report, Intake/Output, and MAR was reviewed with the receiving nurse.    Opportunity for questions and clarification was provided.      Assessment completed upon patient's arrival to unit and care assumed.     
This RN noted Patient vitals  to be abnormal BP88/71 MAP 76, Hr 122 temp. 100.3, resp 19 patient very lethargic, code sepsis called.  
Try cyclic feeds over the weekend. PO diet during day.    Kevin Bolaños MD, FACS, [facility]  Bariatric and General Surgeon  [facility]    
Verbal report given to Wilmar LOZANO(name) on Ce Roberts      Report consisted of patient's Situation, Background, Assessment and Recommendations (SBAR)    Information from the following report(s) SBAR, Intake/Output, MAR, Recent Results, and Cardiac Rhythm Sinus tachy  was reviewed with the receiving nurse.    Opportunity for questions and clarification was provide    Last Filed Values:  Temp: 100 °F (37.8 °C)  Pulse: (!) 112  Respirations: 14  SpO2: 97 %  BP: (!) 85/66      Recent Labs     05/06/25  0137 05/07/25  0654 05/08/25  0558   WBC 1.7* 2.2* 2.4*       Repeat LA: Time Due 0854    Blood Cultures Drawn: Yes  0612 am    Fluid Resuscitation:  Total given 500.  Reassessment after fluids 95/67  Remains Hypotensive  No    All Antibiotics Started: No    VS x 2 post-fluid resuscitation:  No    Vasopressor Infusion: No      Additional Interventions/Comments:     Chris Naqvi RN    
    Intractable nausea and vomiting   Hypoglycemia  Sinus tachycardia (Resolved)  S/p gastric bypass and right hemicolectomy June 2024 - Dr. Kevin Bolaños  Failure to Thrive  Severe Malnutrition  -CT Abd/Pelvis (4/30): Status post gastric bypass and right hemicolectomy. Contracted small and large bowel without inflammatory changes. No evidence of obstruction. Hepatic steatosis.  -CTA Abd/Pelvis 1/4/2025): No acute bowel abnormality. Severe hepatic steatosis.   -Continue clear liquid diet, patient states she is still unable to intake anything PO  -Continue Protonix  -General surgery evaluated: s/p PEG placement    -Thiamine supplementation started, high risk of refeeding syndrome  - Continue tube feeds and monitor for tolerance;  - Monitor electrolytes and replete as needed;  - 5/6: Patient hypotensive yesterday, elevated lactic acid; started IV fluids and IV antibiotics (cefepime and metronidazole); patient transferred to [facility] for higher level of care;  - Started on midodrine for hypotension;  - Continue to aggressively replete all electrolytes;    Anemia  B12 Deficiency  Folate Deficiency  - Multifactorial: Folate deficiency, chronic disease;   - Continue folic acid supplementation;  - Iron stores adequate;    Thrombocytosis (Resolved)  -Most likely reactive    Acute cystitis without hematuria  -UA (4/30): Moderate leukocyte esterase, 2+ bacteria  -Urine culture pending, gram negative rods thus far > 100K  - Complete the course of levofloxacin -discontinued, after patient was started on cefepime and Flagyl IV for possible sepsis (5/6)    Hypokalemia   - Replete as needed     Mild intermittent asthma without complication  -Continue albuterol as needed.     Fibromyalgia  -Continue amitriptyline, Celexa and gabapentin     Tobacco use disorder  Patient was counseled extensively on the need to abstain from tobacco, its addictive tendencies, its deleterious effects on the lungs, cardiovascular  as well as its financial & 
tolerated  Chemical DVT prophylaxis-allergies to IV options.  I am okay with oral options and low dose Eliquis or Xarelto if primary team agrees.  Continue ICU        Kevin Bolaños MD, FACS, [facility]  Bariatric and General Surgeon  [facility]    
Urological Surgery; Appendectomy; Breast biopsy (Left); Upper gastrointestinal endoscopy (N/A, 02/05/2024); Hysterectomy, total abdominal; John-en-Y Gastric Bypass (N/A, 6/17/2024); laparoscopy (N/A, 6/22/2024); US ABSCESS DRAINAGE PERITONEAL/RETROPERITONEAL PERC (6/28/2024); Upper gastrointestinal endoscopy (N/A, 7/31/2024); and Gastrostomy tube placement (N/A, 5/2/2025).    Home Medications:     Prior to Admission medications    Medication Sig Start Date End Date Taking? Authorizing Provider   albuterol sulfate HFA (VENTOLIN HFA) 108 (90 Base) MCG/ACT inhaler Inhale 2 puffs into the lungs 4 times daily as needed for Wheezing 4/2/25   Ulisses Joy MD   citalopram (CELEXA) 10 MG tablet TAKE 1 TABLET BY MOUTH EVERY DAY 3/31/25   Ulisses Joy MD   ondansetron (ZOFRAN) 8 MG tablet TAKE 1 TABLET BY MOUTH EVERY 8 HOURS AS NEEDED FOR NAUSEA AND VOMITING 3/31/25   Ulisses Joy MD   cyclobenzaprine (FLEXERIL) 10 MG tablet Take 1 tablet by mouth 3 times daily as needed for Muscle spasms    ProviderSamson MD   gabapentin (NEURONTIN) 600 MG tablet TAKE 1 TABLET BY MOUTH TWICE  DAILY MAX DAILY AMOUNT: 1,200 MG 2/13/25 2/13/26  Ulisses Joy MD   potassium chloride (KLOR-CON) 20 MEQ packet Take 20 mEq by mouth daily 1/10/25 5/10/25  Kevin Bolaños MD   amitriptyline (ELAVIL) 50 MG tablet Take 1 tablet by mouth nightly 1/10/25   Kevin Bolaños MD   omeprazole (PRILOSEC) 40 MG delayed release capsule Take 1 capsule by mouth in the morning and at bedtime Open capsule over food and consume food.  Patient not taking: Reported on 3/5/2025 1/10/25 4/10/25  Kevin Bolaños MD   folic acid (FOLVITE) 1 MG tablet Take 1 tablet by mouth daily  Patient not taking: Reported on 3/5/2025 12/13/24   Chance Tsang MD   ondansetron (ZOFRAN) 8 MG tablet Take 1 tablet by mouth every 8 hours as needed for Nausea or Vomiting 8/19/24   Ulisses Joy MD       Allergies/Social/Family History:     Allergies   Allergen Reactions 
loops and left colon suggestive of enterocolitis, UA negative, most likely source of sepsis is enterocolitis per MD.  K+/Mg/Phos WNL yesterday.  Only Mg and Phos returned today and both are low.  On daily Phos NAK too.  Remains on D5 with TF at goal!!?  Further noted for B1 and folate deficiencies, lower normal B12 with hx of deficiency, hx of Vit D deficiency (no recent Vit D lab).  Could check MMA, however would empirically treat given risk and her hx of gastric bypass.  Typically she should be receiving gastric bypass vitamin/ mineral regimen.  However, given full nutrition support into excluded stomach, unclear need to continue same regimen.  She will be receiving just under 1 L of Jevity 1.5 daily, which is the volume needed to meet RDIs.  Therefore, MVI may still be indicated. However, form/ dose will be different depending on if given PO (into GJ pouch) vs via G-tube (into remnant stomach).    TF @ goal rate (Jevity 1.5 @ 45 mL/hr with 100 mL free water flush Q3H + 1 packet Prosource per day = 990 mL, 1565 kcal, 83 gm protein, and 752+60+488=3068 mL free H2O).  PO intakes variable and receiving outside food, so difficult to assess, but overall still inadequate.  Per MD, abdominal pain @ baseline.  ?dispo.  May need to look into transitioning to nocturnal  vs bolus feeds for discharge.  If bolus, recommend 1 carton Jevity 1.5 followed by 180 mL H2O QID + 1 packet Prosource daily.  This provides ~948 mL, 1500 kcal, 80 gm pro, and 720+60+775=3791 mL free H2O.  If nocturnal feeds, recommend goal of running 4 cartons over 12 hours @ 79 mL/hr with 1 packet Prosource and flushing tube with 240 mL h2O at start/ stop of feeds + another 240 mL h2O bolus daily.    Addendum @ ~1215: visited with pt.  She was poorly responsive.  Noted low BP.  RN reports she is being tx to ICU shortly.  Unable to discuss preference for home tube feeding delivery options.      5/5- Follow up. Patient has advanced to goal rate, Jevity 1.5 
stable, continue monitor     Thrombocytopenia  -improved     Fibromyalgia  -Continue amitriptyline, Celexa and gabapentin     Nicotine dependence  -Counseling done  -Continue nicotine patch     Code status: Full  Prophylaxis: Lovenox  Care Plan discussed with: Patient and nurse  Anticipated Disposition: IPR vs SNF 24-48 hrs  Central Line: [REMOVED] CVC  05/09/25 Right Internal jugular-Central Line Being Utilized: No          Vital Signs:    Last 24hrs VS reviewed since prior progress note. Most recent are:  Vitals:    05/25/25 0724   BP: 111/83   Pulse: 97   Resp: 18   Temp: 97.9 °F (36.6 °C)   SpO2: 100%         Intake/Output Summary (Last 24 hours) at 5/25/2025 0754  Last data filed at 5/25/2025 0500  Gross per 24 hour   Intake 671 ml   Output 800 ml   Net -129 ml        Physical Examination:     I had a face to face encounter with this patient and independently examined them on 5/25/2025 as outlined below:          General : alert x 3, awake, no acute distress,   HEENT: PEERL, EOMI, moist mucus membrane, TM clear  Neck: supple, no JVD, no meningeal signs  Chest: Clear to auscultation bilaterally   CVS: S1 S2 heard, Capillary refill less than 2 seconds  Abd: PEG tube in place, abdomen soft/ non tender, non distended, BS physiological,   Ext: no clubbing, no cyanosis, no edema, brisk 2+ DP pulses  Neuro/Psych: pleasant mood and affect, CN 2-12 grossly intact, sensory grossly within normal limit, Strength 5/5 in all extremities, DTR 1+ x 4  Skin: warm            Data Review:    Review and/or order of clinical lab test    I have independently reviewed and interpreted patient's lab and all other diagnostic data    Notes reviewed from all clinical/nonclinical/nursing services involved in patient's clinical care. Care coordination discussions were held with appropriate clinical/nonclinical/ nursing providers based on care coordination needs.     Labs:     Recent Labs     05/23/25  0359 05/25/25  0241   WBC 8.9 7.8   HGB 
\"PSAT\", \"FERR\"   No results found for: \"RBCF\"   No results for input(s): \"PH\", \"PCO2\", \"PO2\" in the last 72 hours.  No results for input(s): \"CPK\" in the last 72 hours.    Invalid input(s): \"CPKMB\", \"CKNDX\", \"TROIQ\"  Lab Results   Component Value Date/Time    CHOL 141 03/05/2025 03:55 PM    HDL 34 03/05/2025 03:55 PM    LDL 79 03/05/2025 03:55 PM    LDL 96.6 02/06/2024 02:17 PM     Lab Results   Component Value Date/Time    GLUCPOC 89 09/16/2019 03:35 PM     [unfilled]      Medications Reviewed:     Current Facility-Administered Medications   Medication Dose Route Frequency    amitriptyline (ELAVIL) tablet 50 mg  50 mg Per G Tube Nightly    fludrocortisone (FLORINEF) tablet 0.1 mg  0.1 mg Oral BID    ipratropium 0.5 mg-albuterol 2.5 mg (DUONEB) nebulizer solution 1 Dose  1 Dose Inhalation BID RT    gabapentin (NEURONTIN) tablet 300 mg  300 mg Per G Tube BID    Loperamide HCl (IMODIUM) 1 MG/7.5ML solution 2 mg  2 mg Per G Tube TID PRN    Vitamin D (CHOLECALCIFEROL) tablet 1,000 Units  1,000 Units Per G Tube Daily    Benzocaine-Menthol (CEPACOL) 1 lozenge  1 lozenge Oral Once    enoxaparin (LOVENOX) injection 40 mg  40 mg SubCUTAneous Daily    furosemide (LASIX) injection 20 mg  20 mg IntraVENous BID    phenylephrine-mineral oil-petrolatum (PREPARATION H) rectal ointment   Rectal BID PRN    citalopram (CELEXA) tablet 10 mg  10 mg Per G Tube Daily    folic acid (FOLVITE) tablet 1 mg  1 mg Per G Tube Daily    lansoprazole (PREVACID SOLUTAB) disintegrating tablet 30 mg  30 mg Per G Tube QAM AC    midodrine (PROAMATINE) tablet 15 mg  15 mg Per G Tube Q6H    thiamine tablet 100 mg  100 mg Per G Tube Daily    acetaminophen (TYLENOL) tablet 650 mg  650 mg Per G Tube Q6H PRN    aluminum & magnesium hydroxide-simethicone (MAALOX PLUS) 200-200-20 MG/5ML suspension 30 mL  30 mL Per G Tube Q6H PRN    calcium carbonate (TUMS) chewable tablet 500 mg  500 mg Per G Tube TID PRN    cyclobenzaprine (FLEXERIL) tablet 10 mg  10 mg Per G 
Devaughn MF, Gilberto SMALL, Kaitlynn MORELAND, et al.     Evaluation, treatment, and prevention of vitamin D     deficiency: an Endocrine Society clinical practice     guideline. JCEM. 2011 Jul; 96(7):1911-30.         Morena David RD [facility]  Available via [employer]

## 2025-06-02 VITALS
DIASTOLIC BLOOD PRESSURE: 81 MMHG | TEMPERATURE: 97.7 F | HEART RATE: 87 BPM | BODY MASS INDEX: 20.21 KG/M2 | SYSTOLIC BLOOD PRESSURE: 115 MMHG | HEIGHT: 63 IN | OXYGEN SATURATION: 100 % | WEIGHT: 114.1 LBS | RESPIRATION RATE: 21 BRPM

## 2025-06-02 LAB
ALBUMIN SERPL-MCNC: 2.4 G/DL (ref 3.5–5)
ALBUMIN/GLOB SERPL: 0.6 (ref 1.1–2.2)
ALP SERPL-CCNC: 89 U/L (ref 45–117)
ALT SERPL-CCNC: 34 U/L (ref 12–78)
ANION GAP SERPL CALC-SCNC: 4 MMOL/L (ref 2–12)
AST SERPL-CCNC: 55 U/L (ref 15–37)
BILIRUB SERPL-MCNC: 0.2 MG/DL (ref 0.2–1)
BUN SERPL-MCNC: 14 MG/DL (ref 6–20)
BUN/CREAT SERPL: 64 (ref 12–20)
CALCIUM SERPL-MCNC: 7.7 MG/DL (ref 8.5–10.1)
CHLORIDE SERPL-SCNC: 107 MMOL/L (ref 97–108)
CO2 SERPL-SCNC: 29 MMOL/L (ref 21–32)
CREAT SERPL-MCNC: 0.22 MG/DL (ref 0.55–1.02)
EKG ATRIAL RATE: 149 BPM
EKG DIAGNOSIS: NORMAL
EKG P AXIS: 44 DEGREES
EKG P-R INTERVAL: 162 MS
EKG Q-T INTERVAL: 244 MS
EKG QRS DURATION: 58 MS
EKG QTC CALCULATION (BAZETT): 384 MS
EKG R AXIS: 58 DEGREES
EKG T AXIS: -3 DEGREES
EKG VENTRICULAR RATE: 149 BPM
ERYTHROCYTE [DISTWIDTH] IN BLOOD BY AUTOMATED COUNT: 18.8 % (ref 11.5–14.5)
GLOBULIN SER CALC-MCNC: 3.7 G/DL (ref 2–4)
GLUCOSE SERPL-MCNC: 88 MG/DL (ref 65–100)
HCT VFR BLD AUTO: 24.7 % (ref 35–47)
HGB BLD-MCNC: 7.6 G/DL (ref 11.5–16)
MCH RBC QN AUTO: 31.7 PG (ref 26–34)
MCHC RBC AUTO-ENTMCNC: 30.8 G/DL (ref 30–36.5)
MCV RBC AUTO: 102.9 FL (ref 80–99)
NRBC # BLD: 0 K/UL (ref 0–0.01)
NRBC BLD-RTO: 0 PER 100 WBC
PHOSPHATE SERPL-MCNC: 4 MG/DL (ref 2.6–4.7)
PLATELET # BLD AUTO: 288 K/UL (ref 150–400)
PMV BLD AUTO: 12.1 FL (ref 8.9–12.9)
POTASSIUM SERPL-SCNC: 2.8 MMOL/L (ref 3.5–5.1)
PROCALCITONIN SERPL-MCNC: 6.76 NG/ML
PROT SERPL-MCNC: 6.1 G/DL (ref 6.4–8.2)
RBC # BLD AUTO: 2.4 M/UL (ref 3.8–5.2)
SODIUM SERPL-SCNC: 140 MMOL/L (ref 136–145)
WBC # BLD AUTO: 8.1 K/UL (ref 3.6–11)

## 2025-06-02 PROCEDURE — 94760 N-INVAS EAR/PLS OXIMETRY 1: CPT

## 2025-06-02 PROCEDURE — 6370000000 HC RX 637 (ALT 250 FOR IP): Performed by: INTERNAL MEDICINE

## 2025-06-02 PROCEDURE — 6360000002 HC RX W HCPCS: Performed by: NURSE PRACTITIONER

## 2025-06-02 PROCEDURE — 84100 ASSAY OF PHOSPHORUS: CPT

## 2025-06-02 PROCEDURE — 6370000000 HC RX 637 (ALT 250 FOR IP): Performed by: STUDENT IN AN ORGANIZED HEALTH CARE EDUCATION/TRAINING PROGRAM

## 2025-06-02 PROCEDURE — 80053 COMPREHEN METABOLIC PANEL: CPT

## 2025-06-02 PROCEDURE — 6370000000 HC RX 637 (ALT 250 FOR IP): Performed by: NURSE PRACTITIONER

## 2025-06-02 PROCEDURE — 85027 COMPLETE CBC AUTOMATED: CPT

## 2025-06-02 PROCEDURE — 84145 PROCALCITONIN (PCT): CPT

## 2025-06-02 PROCEDURE — 2500000003 HC RX 250 WO HCPCS: Performed by: SURGERY

## 2025-06-02 PROCEDURE — 2700000000 HC OXYGEN THERAPY PER DAY

## 2025-06-02 PROCEDURE — 6360000002 HC RX W HCPCS: Performed by: STUDENT IN AN ORGANIZED HEALTH CARE EDUCATION/TRAINING PROGRAM

## 2025-06-02 RX ORDER — POTASSIUM CHLORIDE 750 MG/1
40 TABLET, EXTENDED RELEASE ORAL ONCE
Status: DISCONTINUED | OUTPATIENT
Start: 2025-06-02 | End: 2025-06-02

## 2025-06-02 RX ORDER — MORPHINE SULFATE 10 MG/5ML
2.5 SOLUTION ORAL EVERY 4 HOURS PRN
Qty: 15 ML | Refills: 0 | Status: SHIPPED | OUTPATIENT
Start: 2025-06-02 | End: 2025-06-05

## 2025-06-02 RX ORDER — LANOLIN ALCOHOL/MO/W.PET/CERES
100 CREAM (GRAM) TOPICAL DAILY
Qty: 30 TABLET | Refills: 3 | Status: SHIPPED | OUTPATIENT
Start: 2025-06-03

## 2025-06-02 RX ORDER — VITAMIN B COMPLEX
1000 TABLET ORAL DAILY
Qty: 60 TABLET | Refills: 0 | Status: SHIPPED | OUTPATIENT
Start: 2025-06-03

## 2025-06-02 RX ORDER — FLUDROCORTISONE ACETATE 0.1 MG/1
0.1 TABLET ORAL 2 TIMES DAILY
Qty: 60 TABLET | Refills: 0 | Status: SHIPPED | OUTPATIENT
Start: 2025-06-02 | End: 2025-07-02

## 2025-06-02 RX ORDER — POTASSIUM CHLORIDE 7.45 MG/ML
10 INJECTION INTRAVENOUS
Status: DISCONTINUED | OUTPATIENT
Start: 2025-06-02 | End: 2025-06-02

## 2025-06-02 RX ORDER — OMEPRAZOLE 40 MG/1
40 CAPSULE, DELAYED RELEASE ORAL 2 TIMES DAILY
Qty: 180 CAPSULE | Refills: 0 | Status: SHIPPED | OUTPATIENT
Start: 2025-06-02 | End: 2025-08-31

## 2025-06-02 RX ORDER — FUROSEMIDE 20 MG/1
20 TABLET ORAL DAILY
Qty: 60 TABLET | Refills: 3 | Status: SHIPPED | OUTPATIENT
Start: 2025-06-03

## 2025-06-02 RX ORDER — MIDODRINE HYDROCHLORIDE 5 MG/1
15 TABLET ORAL EVERY 6 HOURS
Qty: 90 TABLET | Refills: 3 | Status: SHIPPED | OUTPATIENT
Start: 2025-06-02

## 2025-06-02 RX ADMIN — GABAPENTIN 1200 MG: 600 TABLET, FILM COATED ORAL at 10:04

## 2025-06-02 RX ADMIN — LANSOPRAZOLE 30 MG: 30 TABLET, ORALLY DISINTEGRATING ORAL at 06:44

## 2025-06-02 RX ADMIN — Medication 1000 UNITS: at 10:04

## 2025-06-02 RX ADMIN — FLUDROCORTISONE ACETATE 0.1 MG: 0.1 TABLET ORAL at 10:05

## 2025-06-02 RX ADMIN — SODIUM CHLORIDE, PRESERVATIVE FREE 10 ML: 5 INJECTION INTRAVENOUS at 10:08

## 2025-06-02 RX ADMIN — Medication 1 MG: at 10:03

## 2025-06-02 RX ADMIN — MIDODRINE HYDROCHLORIDE 15 MG: 5 TABLET ORAL at 02:27

## 2025-06-02 RX ADMIN — Medication 100 MG: at 10:04

## 2025-06-02 RX ADMIN — FUROSEMIDE 20 MG: 40 TABLET ORAL at 10:04

## 2025-06-02 RX ADMIN — POTASSIUM BICARBONATE 40 MEQ: 782 TABLET, EFFERVESCENT ORAL at 10:03

## 2025-06-02 RX ADMIN — CITALOPRAM HYDROBROMIDE 10 MG: 20 TABLET ORAL at 10:03

## 2025-06-02 RX ADMIN — POTASSIUM CHLORIDE 10 MEQ: 7.46 INJECTION, SOLUTION INTRAVENOUS at 02:35

## 2025-06-02 RX ADMIN — ENOXAPARIN SODIUM 40 MG: 100 INJECTION SUBCUTANEOUS at 10:04

## 2025-06-02 RX ADMIN — POTASSIUM BICARBONATE 40 MEQ: 782 TABLET, EFFERVESCENT ORAL at 04:50

## 2025-06-02 RX ADMIN — MORPHINE SULFATE 2 MG: 2 INJECTION, SOLUTION INTRAMUSCULAR; INTRAVENOUS at 04:14

## 2025-06-02 RX ADMIN — MIDODRINE HYDROCHLORIDE 15 MG: 5 TABLET ORAL at 10:03

## 2025-06-02 ASSESSMENT — PAIN SCALES - GENERAL
PAINLEVEL_OUTOF10: 8
PAINLEVEL_OUTOF10: 3
PAINLEVEL_OUTOF10: 0

## 2025-06-02 ASSESSMENT — PAIN DESCRIPTION - LOCATION
LOCATION: GENERALIZED
LOCATION: LEG

## 2025-06-02 ASSESSMENT — PAIN DESCRIPTION - ORIENTATION
ORIENTATION: LEFT;RIGHT
ORIENTATION: MID

## 2025-06-02 ASSESSMENT — PAIN DESCRIPTION - DESCRIPTORS
DESCRIPTORS: ACHING;SHARP
DESCRIPTORS: ACHING

## 2025-06-02 NOTE — PLAN OF CARE
Patient tube feeds progressed to 25ml    Patient family provided meal as well and patient seemed to enjoy and intake more of that meal    Encouraged patient to reach out to dietary services and discuss alternate meal options stated understanding   
  Problem: Chronic Conditions and Co-morbidities  Goal: Patient's chronic conditions and co-morbidity symptoms are monitored and maintained or improved  Outcome: Progressing     Problem: Pain  Goal: Verbalizes/displays adequate comfort level or baseline comfort level  Outcome: Progressing     Problem: Nutrition Deficit:  Goal: Optimize nutritional status  Outcome: Progressing     Problem: Skin/Tissue Integrity  Goal: Skin integrity remains intact  Description: 1.  Monitor for areas of redness and/or skin breakdown2.  Assess vascular access sites hourly3.  Every 4-6 hours minimum:  Change oxygen saturation probe site4.  Every 4-6 hours:  If on nasal continuous positive airway pressure, respiratory therapy assess nares and determine need for appliance change or resting period  Outcome: Progressing     
  Problem: Occupational Therapy - Adult  Goal: By Discharge: Performs self-care activities at highest level of function for planned discharge setting.  See evaluation for individualized goals.  Description: FUNCTIONAL STATUS PRIOR TO ADMISSION:  Patient was ambulatory using no DME    HOME SUPPORT: Patient lived with spouse but didn't require assistance.    Occupational Therapy Goals:  Weekly RA 5/27/2025 - see modified goals below:  1.  Patient will perform grooming seated with Contact Guard Assist within 7 day(s).  2.  Patient will perform bathing seated with Contact Guard Assist within 7 day(s).  3.  Patient will perform lower body dressing seated with Contact Guard Assist and AE PRN within 7 day(s).  4.  Patient will perform toilet transfers with DME PRN and Contact Guard Assist within 7 day(s).  5.  Patient will perform all aspects of toileting with Contact Guard Assist within 7 day(s).  6.  Patient will participate in upper extremity therapeutic exercise/activities with Supervision for 15 minutes within 7 day(s).    7.  Patient will utilize energy conservation techniques during functional activities with verbal cues within 7 day(s).     Occupational Therapy Goals:  Initiated 5/2/2025  1.  Patient will perform grooming with Contact Guard Assist within 7 day(s).  2.  Patient will perform bathing with Contact Guard Assist within 7 day(s).  3.  Patient will perform lower body dressing with Contact Guard Assist within 7 day(s).  4.  Patient will perform toilet transfers with Contact Guard Assist  within 7 day(s).  5.  Patient will perform all aspects of toileting with Supervision within 7 day(s).  6.  Patient will participate in upper extremity therapeutic exercise/activities with Supervision for 15 minutes within 7 day(s).    7.  Patient will utilize energy conservation techniques during functional activities with verbal cues within 7 day(s).     Occupational Therapy Goals:  Initiated 5/16/2025  1.  Patient will 
  Problem: Occupational Therapy - Adult  Goal: By Discharge: Performs self-care activities at highest level of function for planned discharge setting.  See evaluation for individualized goals.  Description: FUNCTIONAL STATUS PRIOR TO ADMISSION:  Patient was ambulatory using no DME   , Prior Level of Assist for ADLs: Independent,  ,  ,  ,  ,  , Prior Level of Assist for Homemaking: Independent,  , Prior Level of Assist for Transfers: Independent,       HOME SUPPORT: Patient lived with spouse but didn't require assistance.    Occupational Therapy Goals:  Initiated 5/2/2025  1.  Patient will perform grooming with Contact Guard Assist within 7 day(s).  2.  Patient will perform bathing with Contact Guard Assist within 7 day(s).  3.  Patient will perform lower body dressing with Contact Guard Assist within 7 day(s).  4.  Patient will perform toilet transfers with Contact Guard Assist  within 7 day(s).  5.  Patient will perform all aspects of toileting with Supervision within 7 day(s).  6.  Patient will participate in upper extremity therapeutic exercise/activities with Supervision for 15 minutes within 7 day(s).    7.  Patient will utilize energy conservation techniques during functional activities with verbal cues within 7 day(s).         Occupational Therapy Goals:  Initiated 5/16/2025  1.  Patient will perform grooming with Moderate Assist sitting EOB within 7 day(s).  2.  Patient will perform bathing with Moderate Assist within 7 day(s).  3.  Patient will perform lower body dressing with Moderate Assist within 7 day(s).  4.  Patient will perform toilet transfers with Moderate Assist  within 7 day(s).  5.  Patient will perform all aspects of toileting with Moderate Assist within 7 day(s).  6.  Patient will participate in upper extremity therapeutic exercise/activities with Moderate Assist for 15 minutes within 7 day(s).    7.  Patient will utilize energy conservation techniques during functional activities with verbal 
  Problem: Occupational Therapy - Adult  Goal: By Discharge: Performs self-care activities at highest level of function for planned discharge setting.  See evaluation for individualized goals.  Description: FUNCTIONAL STATUS PRIOR TO ADMISSION:  Patient was ambulatory using no DME   , Prior Level of Assist for ADLs: Independent,  ,  ,  ,  ,  , Prior Level of Assist for Homemaking: Independent,  , Prior Level of Assist for Transfers: Independent,       HOME SUPPORT: Patient lived with spouse but didn't require assistance.    Occupational Therapy Goals:  Initiated 5/2/2025  1.  Patient will perform grooming with Contact Guard Assist within 7 day(s).  2.  Patient will perform bathing with Contact Guard Assist within 7 day(s).  3.  Patient will perform lower body dressing with Contact Guard Assist within 7 day(s).  4.  Patient will perform toilet transfers with Contact Guard Assist  within 7 day(s).  5.  Patient will perform all aspects of toileting with Supervision within 7 day(s).  6.  Patient will participate in upper extremity therapeutic exercise/activities with Supervision for 15 minutes within 7 day(s).    7.  Patient will utilize energy conservation techniques during functional activities with verbal cues within 7 day(s).   Outcome: Progressing     OCCUPATIONAL THERAPY TREATMENT  Patient: Ce Roberts (55 y.o. female)  Date: 5/8/2025  Primary Diagnosis: Dehydration [E86.0]  Hypoglycemia [E16.2]  Intractable vomiting [R11.10]  Acute cystitis without hematuria [N30.00]  Intractable nausea and vomiting [R11.2]  Protein-calorie malnutrition, unspecified severity [E46]  Procedure(s) (LRB):  LAPAROSCOPIC CONVERT TO OPEN GASTROSTOMY TUBE PLACEMENT, LYSIS OF ADHESION, EGD (N/A) 6 Days Post-Op   Precautions:                  Chart, occupational therapy assessment, plan of care, and goals were reviewed.    ASSESSMENT  Patient continues to benefit from skilled OT services and is slowly progressing towards goals. Pt 
  Problem: Occupational Therapy - Adult  Goal: By Discharge: Performs self-care activities at highest level of function for planned discharge setting.  See evaluation for individualized goals.  Description: FUNCTIONAL STATUS PRIOR TO ADMISSION:  Patient was ambulatory using no DME   , Prior Level of Assist for ADLs: Independent,  ,  ,  ,  ,  , Prior Level of Assist for Homemaking: Independent,  , Prior Level of Assist for Transfers: Independent,       HOME SUPPORT: Patient lived with spouse but didn't require assistance.    Occupational Therapy Goals:  Initiated 5/2/2025  1.  Patient will perform grooming with Contact Guard Assist within 7 day(s).  2.  Patient will perform bathing with Contact Guard Assist within 7 day(s).  3.  Patient will perform lower body dressing with Contact Guard Assist within 7 day(s).  4.  Patient will perform toilet transfers with Contact Guard Assist  within 7 day(s).  5.  Patient will perform all aspects of toileting with Supervision within 7 day(s).  6.  Patient will participate in upper extremity therapeutic exercise/activities with Supervision for 15 minutes within 7 day(s).    7.  Patient will utilize energy conservation techniques during functional activities with verbal cues within 7 day(s).   Outcome: Progressing   OCCUPATIONAL THERAPY EVALUATION    Patient: Ce Roberts (55 y.o. female)  Date: 5/2/2025  Primary Diagnosis: Dehydration [E86.0]  Hypoglycemia [E16.2]  Intractable vomiting [R11.10]  Acute cystitis without hematuria [N30.00]  Intractable nausea and vomiting [R11.2]  Protein-calorie malnutrition, unspecified severity [E46]  Procedure(s) (LRB):  LAPAROSCOPIC GASTROSTOMY TUBE PLACEMENT, EGD (N/A) * Day of Surgery *     Precautions:                    ASSESSMENT :  The patient is limited by decreased functional mobility, independence in ADLs, high-level IADLs, ROM, strength, body mechanics, activity tolerance, endurance, safety awareness, coordination, balance, posture, 
  Problem: Physical Therapy - Adult  Goal: By Discharge: Performs mobility at highest level of function for planned discharge setting.  See evaluation for individualized goals.  Description: FUNCTIONAL STATUS PRIOR TO ADMISSION: Patient was modified independent using a rolling walker vs no device for functional mobility. History of many falls over the last 6 months.    HOME SUPPORT PRIOR TO ADMISSION: The patient lived with her fiance. Daughter in the area.     Physical Therapy Goals    Revised 5/23/2025  1.  Patient will move from supine to sit and sit to supine, scoot up and down, and roll side to side in bed with min assist within 7 day(s).    2.  Patient will perform sit to stand with minimal assist within 7 day(s).  3.  Patient will transfer from bed to chair and chair to bed with min assist using the least restrictive device within 7 day(s).  4.  Patient will ambulate with mod assist for 25 feet with the least restrictive device within 7 day(s).     Revised 5/16/2025  1.  Patient will move from supine to sit and sit to supine, scoot up and down, and roll side to side in bed with mod assist within 7 day(s).    2.  Patient will perform sit to stand with moderate assist within 7 day(s).  3.  Patient will transfer from bed to chair and chair to bed with mod assist using the least restrictive device within 7 day(s).  4.  Patient will ambulate with max assist for 25 feet with the least restrictive device within 7 day(s).     1.  Patient will move from supine to sit and sit to supine, scoot up and down, and roll side to side in bed with modified independence within 7 day(s).    2.  Patient will perform sit to stand with supervision/set-up within 7 day(s).  3.  Patient will transfer from bed to chair and chair to bed with supervision/set-up using the least restrictive device within 7 day(s).  4.  Patient will ambulate with contact guard assist for 60 feet with the least restrictive device within 7 day(s).   5.  
  Problem: Physical Therapy - Adult  Goal: By Discharge: Performs mobility at highest level of function for planned discharge setting.  See evaluation for individualized goals.  Description: FUNCTIONAL STATUS PRIOR TO ADMISSION: Patient was modified independent using a rolling walker vs no device for functional mobility. History of many falls over the last 6 months.    HOME SUPPORT PRIOR TO ADMISSION: The patient lived with her fiance. Daughter in the area.     Physical Therapy Goals    Revised 5/23/2025  1.  Patient will move from supine to sit and sit to supine, scoot up and down, and roll side to side in bed with min assist within 7 day(s).    2.  Patient will perform sit to stand with minimal assist within 7 day(s).  3.  Patient will transfer from bed to chair and chair to bed with min assist using the least restrictive device within 7 day(s).  4.  Patient will ambulate with mod assist for 25 feet with the least restrictive device within 7 day(s).     Revised 5/16/2025  1.  Patient will move from supine to sit and sit to supine, scoot up and down, and roll side to side in bed with mod assist within 7 day(s).    2.  Patient will perform sit to stand with moderate assist within 7 day(s).  3.  Patient will transfer from bed to chair and chair to bed with mod assist using the least restrictive device within 7 day(s).  4.  Patient will ambulate with max assist for 25 feet with the least restrictive device within 7 day(s).     1.  Patient will move from supine to sit and sit to supine, scoot up and down, and roll side to side in bed with modified independence within 7 day(s).    2.  Patient will perform sit to stand with supervision/set-up within 7 day(s).  3.  Patient will transfer from bed to chair and chair to bed with supervision/set-up using the least restrictive device within 7 day(s).  4.  Patient will ambulate with contact guard assist for 60 feet with the least restrictive device within 7 day(s).   5.  
  Problem: Physical Therapy - Adult  Goal: By Discharge: Performs mobility at highest level of function for planned discharge setting.  See evaluation for individualized goals.  Description: FUNCTIONAL STATUS PRIOR TO ADMISSION: Patient was modified independent using a rolling walker vs no device for functional mobility. History of many falls over the last 6 months.    HOME SUPPORT PRIOR TO ADMISSION: The patient lived with her fiance. Daughter in the area.     Physical Therapy Goals    Revised 5/6/2025  1.  Patient will move from supine to sit and sit to supine, scoot up and down, and roll side to side in bed with mod assist within 7 day(s).    2.  Patient will perform sit to stand with moderate assist within 7 day(s).  3.  Patient will transfer from bed to chair and chair to bed with mod assist using the least restrictive device within 7 day(s).  4.  Patient will ambulate with max assist for25 feet with the least restrictive device within 7 day(s).     1.  Patient will move from supine to sit and sit to supine, scoot up and down, and roll side to side in bed with modified independence within 7 day(s).    2.  Patient will perform sit to stand with supervision/set-up within 7 day(s).  3.  Patient will transfer from bed to chair and chair to bed with supervision/set-up using the least restrictive device within 7 day(s).  4.  Patient will ambulate with contact guard assist for 60 feet with the least restrictive device within 7 day(s).   5.  Patient will ascend/descend 7 stairs with right handrail(s) with contact guard assist within 7 day(s).    Outcome: Progressing       PHYSICAL THERAPY RE-EVALUATION    Patient: Ce Roberts (55 y.o. female)  Date: 5/16/2025  Primary Diagnosis: Dehydration [E86.0]  Hypoglycemia [E16.2]  Intractable vomiting [R11.10]  Acute cystitis without hematuria [N30.00]  Intractable nausea and vomiting [R11.2]  Protein-calorie malnutrition, unspecified severity [E46]  Procedure(s) 
  Problem: Physical Therapy - Adult  Goal: By Discharge: Performs mobility at highest level of function for planned discharge setting.  See evaluation for individualized goals.  Description: FUNCTIONAL STATUS PRIOR TO ADMISSION: Patient was modified independent using a rolling walker vs no device for functional mobility. History of many falls over the last 6 months.    HOME SUPPORT PRIOR TO ADMISSION: The patient lived with her fiance. Daughter in the area.     Physical Therapy Goals    Revised 5/6/2025  1.  Patient will move from supine to sit and sit to supine, scoot up and down, and roll side to side in bed with mod assist within 7 day(s).    2.  Patient will perform sit to stand with moderate assist within 7 day(s).  3.  Patient will transfer from bed to chair and chair to bed with mod assist using the least restrictive device within 7 day(s).  4.  Patient will ambulate with max assist for25 feet with the least restrictive device within 7 day(s).     1.  Patient will move from supine to sit and sit to supine, scoot up and down, and roll side to side in bed with modified independence within 7 day(s).    2.  Patient will perform sit to stand with supervision/set-up within 7 day(s).  3.  Patient will transfer from bed to chair and chair to bed with supervision/set-up using the least restrictive device within 7 day(s).  4.  Patient will ambulate with contact guard assist for 60 feet with the least restrictive device within 7 day(s).   5.  Patient will ascend/descend 7 stairs with right handrail(s) with contact guard assist within 7 day(s).    Outcome: Progressing   PHYSICAL THERAPY TREATMENT    Patient: Ce Roberts (55 y.o. female)  Date: 5/20/2025  Diagnosis: Dehydration [E86.0]  Hypoglycemia [E16.2]  Intractable vomiting [R11.10]  Acute cystitis without hematuria [N30.00]  Intractable nausea and vomiting [R11.2]  Protein-calorie malnutrition, unspecified severity [E46] Intractable nausea and 
  Problem: Physical Therapy - Adult  Goal: By Discharge: Performs mobility at highest level of function for planned discharge setting.  See evaluation for individualized goals.  Description: FUNCTIONAL STATUS PRIOR TO ADMISSION: Patient was modified independent using a rolling walker vs no device for functional mobility. History of many falls over the last 6 months.    HOME SUPPORT PRIOR TO ADMISSION: The patient lived with her fiance. Daughter in the area.     Physical Therapy Goals    Revised 5/6/2025  1.  Patient will move from supine to sit and sit to supine, scoot up and down, and roll side to side in bed with mod assist within 7 day(s).    2.  Patient will perform sit to stand with moderate assist within 7 day(s).  3.  Patient will transfer from bed to chair and chair to bed with mod assist using the least restrictive device within 7 day(s).  4.  Patient will ambulate with max assist for25 feet with the least restrictive device within 7 day(s).     1.  Patient will move from supine to sit and sit to supine, scoot up and down, and roll side to side in bed with modified independence within 7 day(s).    2.  Patient will perform sit to stand with supervision/set-up within 7 day(s).  3.  Patient will transfer from bed to chair and chair to bed with supervision/set-up using the least restrictive device within 7 day(s).  4.  Patient will ambulate with contact guard assist for 60 feet with the least restrictive device within 7 day(s).   5.  Patient will ascend/descend 7 stairs with right handrail(s) with contact guard assist within 7 day(s).    Outcome: Progressing   PHYSICAL THERAPY TREATMENT    Patient: Ce Roberts (55 y.o. female)  Date: 5/22/2025  Diagnosis: Dehydration [E86.0]  Hypoglycemia [E16.2]  Intractable vomiting [R11.10]  Acute cystitis without hematuria [N30.00]  Intractable nausea and vomiting [R11.2]  Protein-calorie malnutrition, unspecified severity [E46] Intractable nausea and 
  Problem: Physical Therapy - Adult  Goal: By Discharge: Performs mobility at highest level of function for planned discharge setting.  See evaluation for individualized goals.  Description: FUNCTIONAL STATUS PRIOR TO ADMISSION: Patient was modified independent using a rolling walker vs no device for functional mobility. History of many falls over the last 6 months.    HOME SUPPORT PRIOR TO ADMISSION: The patient lived with her fiance. Daughter in the area.     Physical Therapy Goals  Initiated 5/2/2025  1.  Patient will move from supine to sit and sit to supine, scoot up and down, and roll side to side in bed with modified independence within 7 day(s).    2.  Patient will perform sit to stand with supervision/set-up within 7 day(s).  3.  Patient will transfer from bed to chair and chair to bed with supervision/set-up using the least restrictive device within 7 day(s).  4.  Patient will ambulate with contact guard assist for 60 feet with the least restrictive device within 7 day(s).   5.  Patient will ascend/descend 7 stairs with right handrail(s) with contact guard assist within 7 day(s).    Outcome: Progressing     PHYSICAL THERAPY TREATMENT    Patient: Ce Roberts (55 y.o. female)  Date: 5/7/2025  Diagnosis: Dehydration [E86.0]  Hypoglycemia [E16.2]  Intractable vomiting [R11.10]  Acute cystitis without hematuria [N30.00]  Intractable nausea and vomiting [R11.2]  Protein-calorie malnutrition, unspecified severity [E46] Intractable nausea and vomiting  Procedure(s) (LRB):  LAPAROSCOPIC CONVERT TO OPEN GASTROSTOMY TUBE PLACEMENT, LYSIS OF ADHESION, EGD (N/A) 5 Days Post-Op  Precautions:              ASSESSMENT:  Patient continues to benefit from skilled PT services and is slowly progressing towards goals. Pt received in bed, agreeable to participate, with reports of some abd and RLE pain, and general fatigue. Pt requires increased time to complete functional tasks, and noted to have soft, but stable BP 
  Problem: Physical Therapy - Adult  Goal: By Discharge: Performs mobility at highest level of function for planned discharge setting.  See evaluation for individualized goals.  Description: FUNCTIONAL STATUS PRIOR TO ADMISSION: Patient was modified independent using a rolling walker vs no device for functional mobility. History of many falls over the last 6 months.    HOME SUPPORT PRIOR TO ADMISSION: The patient lived with her fiance. Daughter in the area.     Physical Therapy Goals  Initiated 5/2/2025  1.  Patient will move from supine to sit and sit to supine, scoot up and down, and roll side to side in bed with modified independence within 7 day(s).    2.  Patient will perform sit to stand with supervision/set-up within 7 day(s).  3.  Patient will transfer from bed to chair and chair to bed with supervision/set-up using the least restrictive device within 7 day(s).  4.  Patient will ambulate with contact guard assist for 60 feet with the least restrictive device within 7 day(s).   5.  Patient will ascend/descend 7 stairs with right handrail(s) with contact guard assist within 7 day(s).    Outcome: Progressing     PHYSICAL THERAPY TREATMENT    Patient: Ce Roberts (55 y.o. female)  Date: 5/8/2025  Diagnosis: Dehydration [E86.0]  Hypoglycemia [E16.2]  Intractable vomiting [R11.10]  Acute cystitis without hematuria [N30.00]  Intractable nausea and vomiting [R11.2]  Protein-calorie malnutrition, unspecified severity [E46] Intractable nausea and vomiting  Procedure(s) (LRB):  LAPAROSCOPIC CONVERT TO OPEN GASTROSTOMY TUBE PLACEMENT, LYSIS OF ADHESION, EGD (N/A) 6 Days Post-Op  Precautions:              ASSESSMENT:  Patient continues to benefit from skilled PT services and is slowly progressing towards goals. Pt received in bed, drowsy, but agreeable to participate with PT. Pt require assistance for hygiene at start of session, rolling with Rosa for assistance. Pt continues to require Rosa x 2 for supine > sit, sit 
  Problem: Physical Therapy - Adult  Goal: By Discharge: Performs mobility at highest level of function for planned discharge setting.  See evaluation for individualized goals.  Description: FUNCTIONAL STATUS PRIOR TO ADMISSION: Patient was modified independent using a rolling walker vs no device for functional mobility. History of many falls over the last 6 months.    HOME SUPPORT PRIOR TO ADMISSION: The patient lived with her fiance. Daughter in the area.     Physical Therapy Goals  Initiated 5/2/2025  1.  Patient will move from supine to sit and sit to supine, scoot up and down, and roll side to side in bed with modified independence within 7 day(s).    2.  Patient will perform sit to stand with supervision/set-up within 7 day(s).  3.  Patient will transfer from bed to chair and chair to bed with supervision/set-up using the least restrictive device within 7 day(s).  4.  Patient will ambulate with contact guard assist for 60 feet with the least restrictive device within 7 day(s).   5.  Patient will ascend/descend 7 stairs with right handrail(s) with contact guard assist within 7 day(s).    Outcome: Progressing   PHYSICAL THERAPY EVALUATION    Patient: Ce Roberts (55 y.o. female)  Date: 5/2/2025  Primary Diagnosis: Dehydration [E86.0]  Hypoglycemia [E16.2]  Intractable vomiting [R11.10]  Acute cystitis without hematuria [N30.00]  Intractable nausea and vomiting [R11.2]  Protein-calorie malnutrition, unspecified severity [E46]  Procedure(s) (LRB):  LAPAROSCOPIC GASTROSTOMY TUBE PLACEMENT, EGD (N/A) * Day of Surgery *   Precautions: chair alarm      ASSESSMENT :   DEFICITS/IMPAIRMENTS:   The patient is limited by decreased functional mobility, ROM, strength, activity tolerance, endurance, balance, orthostatic hypotension following admission for dehydration and nausea/vomiting. Received in bed, pleasant, and agreeable to therapy. Overall CGA-min Ax2 for OOB to chair via stand pivot with HHAx2. Became dizzy and 
  Problem: Physical Therapy - Adult  Goal: By Discharge: Performs mobility at highest level of function for planned discharge setting.  See evaluation for individualized goals.  Description: FUNCTIONAL STATUS PRIOR TO ADMISSION: Patient was modified independent using a rolling walker vs no device for functional mobility. History of many falls over the last 6 months.    HOME SUPPORT PRIOR TO ADMISSION: The patient lived with her fiance. Daughter in the area.     Physical Therapy Goals  Revised 5/23/2025 - goals remain appropriate 5/29/2025  1.  Patient will move from supine to sit and sit to supine, scoot up and down, and roll side to side in bed with min assist within 7 day(s).    2.  Patient will perform sit to stand with minimal assist within 7 day(s).  3.  Patient will transfer from bed to chair and chair to bed with min assist using the least restrictive device within 7 day(s).  4.  Patient will ambulate with mod assist for 25 feet with the least restrictive device within 7 day(s).     Revised 5/16/2025  1.  Patient will move from supine to sit and sit to supine, scoot up and down, and roll side to side in bed with mod assist within 7 day(s).    2.  Patient will perform sit to stand with moderate assist within 7 day(s).  3.  Patient will transfer from bed to chair and chair to bed with mod assist using the least restrictive device within 7 day(s).  4.  Patient will ambulate with max assist for 25 feet with the least restrictive device within 7 day(s).     1.  Patient will move from supine to sit and sit to supine, scoot up and down, and roll side to side in bed with modified independence within 7 day(s).    2.  Patient will perform sit to stand with supervision/set-up within 7 day(s).  3.  Patient will transfer from bed to chair and chair to bed with supervision/set-up using the least restrictive device within 7 day(s).  4.  Patient will ambulate with contact guard assist for 60 feet with the least restrictive 
  Problem: Safety - Adult  Goal: Free from fall injury  5/12/2025 0257 by Afua Rascon RN  Outcome: Progressing  5/12/2025 0256 by Afua Rascon RN  Outcome: Progressing     Problem: Chronic Conditions and Co-morbidities  Goal: Patient's chronic conditions and co-morbidity symptoms are monitored and maintained or improved  5/12/2025 0257 by Afua Rascon RN  Outcome: Progressing  5/12/2025 0256 by Afua Rascon RN  Outcome: Progressing  Flowsheets (Taken 5/11/2025 2000)  Care Plan - Patient's Chronic Conditions and Co-Morbidity Symptoms are Monitored and Maintained or Improved:   Monitor and assess patient's chronic conditions and comorbid symptoms for stability, deterioration, or improvement   Collaborate with multidisciplinary team to address chronic and comorbid conditions and prevent exacerbation or deterioration   Update acute care plan with appropriate goals if chronic or comorbid symptoms are exacerbated and prevent overall improvement and discharge     Problem: Pain  Goal: Verbalizes/displays adequate comfort level or baseline comfort level  5/12/2025 0257 by Afua Rascon RN  Outcome: Progressing  5/12/2025 0256 by Afua Rascon RN  Outcome: Progressing     Problem: Nutrition Deficit:  Goal: Optimize nutritional status  5/12/2025 0257 by Afua Rascon RN  Outcome: Progressing  5/12/2025 0256 by Afua Rascon RN  Outcome: Progressing     Problem: Skin/Tissue Integrity  Goal: Skin integrity remains intact  Description: 1.  Monitor for areas of redness and/or skin breakdown2.  Assess vascular access sites hourly3.  Every 4-6 hours minimum:  Change oxygen saturation probe site4.  Every 4-6 hours:  If on nasal continuous positive airway pressure, respiratory therapy assess nares and determine need for appliance change or resting period  5/12/2025 0257 by Afua Rascon RN  Outcome: Progressing  5/12/2025 0256 by Afua Rascon RN  Outcome: 
  Problem: Safety - Adult  Goal: Free from fall injury  5/24/2025 1209 by Sanam Jimenez RN  Outcome: Progressing     Problem: Chronic Conditions and Co-morbidities  Goal: Patient's chronic conditions and co-morbidity symptoms are monitored and maintained or improved  5/24/2025 1209 by Sanam Jimenez RN  Outcome: Progressing     Problem: Pain  Goal: Verbalizes/displays adequate comfort level or baseline comfort level  5/24/2025 1209 by Sanam Jimenez RN  Outcome: Progressing     Problem: Skin/Tissue Integrity  Goal: Skin integrity remains intact  Description: 1.  Monitor for areas of redness and/or skin breakdown2.  Assess vascular access sites hourly3.  Every 4-6 hours minimum:  Change oxygen saturation probe site4.  Every 4-6 hours:  If on nasal continuous positive airway pressure, respiratory therapy assess nares and determine need for appliance change or resting period  5/24/2025 1209 by Sanam Jimenez RN  Outcome: Progressing     
  Problem: Safety - Adult  Goal: Free from fall injury  5/25/2025 1509 by Kwaku Hopson RN  Outcome: Progressing  5/25/2025 0251 by Cristal Lees RN  Outcome: Progressing     Problem: Chronic Conditions and Co-morbidities  Goal: Patient's chronic conditions and co-morbidity symptoms are monitored and maintained or improved  5/25/2025 1509 by Kwaku Hopson RN  Outcome: Progressing  5/25/2025 0251 by Cristal Lees RN  Outcome: Progressing     Problem: Pain  Goal: Verbalizes/displays adequate comfort level or baseline comfort level  5/25/2025 1509 by Kwaku Hopson RN  Outcome: Progressing  5/25/2025 0251 by Cristal Lees RN  Outcome: Progressing     Problem: Nutrition Deficit:  Goal: Optimize nutritional status  5/25/2025 1509 by Kwaku Hopson RN  Outcome: Progressing  5/25/2025 0251 by Cristal Lees RN  Outcome: Progressing     Problem: Skin/Tissue Integrity  Goal: Skin integrity remains intact  Description: 1.  Monitor for areas of redness and/or skin breakdown2.  Assess vascular access sites hourly3.  Every 4-6 hours minimum:  Change oxygen saturation probe site4.  Every 4-6 hours:  If on nasal continuous positive airway pressure, respiratory therapy assess nares and determine need for appliance change or resting period  5/25/2025 1509 by Kwaku Hopson RN  Outcome: Progressing  5/25/2025 0251 by Cristal Lees RN  Outcome: Progressing     Problem: Discharge Planning  Goal: Discharge to home or other facility with appropriate resources  5/25/2025 1509 by Kwaku Hopson RN  Outcome: Progressing  5/25/2025 0251 by Cristal Lees RN  Outcome: Progressing     Problem: Safety - Medical Restraint  Goal: Remains free of injury from restraints (Restraint for Interference with Medical Device)  Description: INTERVENTIONS:1. Determine that other, less restrictive measures have been tried or would not be effective before applying the restraint2. Evaluate the 
  Problem: Safety - Adult  Goal: Free from fall injury  5/26/2025 0041 by Carlitos Flowers RN  Outcome: Progressing  Flowsheets (Taken 5/26/2025 0041)  Free From Fall Injury: Instruct family/caregiver on patient safety     Problem: Chronic Conditions and Co-morbidities  Goal: Patient's chronic conditions and co-morbidity symptoms are monitored and maintained or improved  5/26/2025 0041 by Carlitos Flowers RN  Outcome: Progressing  Flowsheets (Taken 5/26/2025 0041)  Care Plan - Patient's Chronic Conditions and Co-Morbidity Symptoms are Monitored and Maintained or Improved:   Monitor and assess patient's chronic conditions and comorbid symptoms for stability, deterioration, or improvement   Collaborate with multidisciplinary team to address chronic and comorbid conditions and prevent exacerbation or deterioration     Problem: Pain  Goal: Verbalizes/displays adequate comfort level or baseline comfort level  5/26/2025 0041 by Carlitos Flowers RN  Outcome: Progressing  Flowsheets (Taken 5/26/2025 0041)  Verbalizes/displays adequate comfort level or baseline comfort level:   Encourage patient to monitor pain and request assistance   Administer analgesics based on type and severity of pain and evaluate response   Consider cultural and social influences on pain and pain management   Implement non-pharmacological measures as appropriate and evaluate response   Assess pain using appropriate pain scale     Problem: Nutrition Deficit:  Goal: Optimize nutritional status  5/26/2025 0041 by Carlitos Flowers RN  Outcome: Progressing  Flowsheets (Taken 5/26/2025 0041)  Nutrient intake appropriate for improving, restoring, or maintaining nutritional needs:   Assess nutritional status and recommend course of action   Monitor oral intake, labs, and treatment plans     Problem: Skin/Tissue Integrity  Goal: Skin integrity remains intact  Description: 1.  Monitor for areas of redness and/or skin breakdown2.  Assess vascular access sites 
  Problem: Safety - Adult  Goal: Free from fall injury  5/26/2025 0949 by iVrginia Dietz RN  Outcome: Progressing  Flowsheets (Taken 5/26/2025 0800)  Free From Fall Injury: Instruct family/caregiver on patient safety  5/26/2025 0041 by Carlitos Flowers RN  Outcome: Progressing  Flowsheets (Taken 5/26/2025 0041)  Free From Fall Injury: Instruct family/caregiver on patient safety     Problem: Chronic Conditions and Co-morbidities  Goal: Patient's chronic conditions and co-morbidity symptoms are monitored and maintained or improved  5/26/2025 0949 by Virginia Dietz RN  Outcome: Progressing  Flowsheets (Taken 5/26/2025 0800)  Care Plan - Patient's Chronic Conditions and Co-Morbidity Symptoms are Monitored and Maintained or Improved: Monitor and assess patient's chronic conditions and comorbid symptoms for stability, deterioration, or improvement  5/26/2025 0041 by Carlitos Flowers RN  Outcome: Progressing  Flowsheets (Taken 5/26/2025 0041)  Care Plan - Patient's Chronic Conditions and Co-Morbidity Symptoms are Monitored and Maintained or Improved:   Monitor and assess patient's chronic conditions and comorbid symptoms for stability, deterioration, or improvement   Collaborate with multidisciplinary team to address chronic and comorbid conditions and prevent exacerbation or deterioration     Problem: Pain  Goal: Verbalizes/displays adequate comfort level or baseline comfort level  5/26/2025 0949 by Virginia Dietz RN  Outcome: Progressing  Flowsheets (Taken 5/26/2025 0800)  Verbalizes/displays adequate comfort level or baseline comfort level: Encourage patient to monitor pain and request assistance  5/26/2025 0041 by Carlitos Flowers RN  Outcome: Progressing  Flowsheets (Taken 5/26/2025 0041)  Verbalizes/displays adequate comfort level or baseline comfort level:   Encourage patient to monitor pain and request assistance   Administer analgesics based on type and severity of pain and evaluate response   Consider cultural 
  Problem: Safety - Adult  Goal: Free from fall injury  5/4/2025 2034 by Juan Harvey RN  Outcome: Progressing  5/4/2025 1637 by Casi Bazan RN  Outcome: Progressing     Problem: Chronic Conditions and Co-morbidities  Goal: Patient's chronic conditions and co-morbidity symptoms are monitored and maintained or improved  5/4/2025 2034 by Juan Harvey RN  Outcome: Progressing  Flowsheets (Taken 5/4/2025 1800)  Care Plan - Patient's Chronic Conditions and Co-Morbidity Symptoms are Monitored and Maintained or Improved: Monitor and assess patient's chronic conditions and comorbid symptoms for stability, deterioration, or improvement  5/4/2025 1637 by Casi Bazan RN  Outcome: Progressing     Problem: Pain  Goal: Verbalizes/displays adequate comfort level or baseline comfort level  5/4/2025 2034 by Juan Harvey RN  Outcome: Progressing  5/4/2025 1637 by Casi Bazan RN  Outcome: Progressing     Problem: Nutrition Deficit:  Goal: Optimize nutritional status  Outcome: Progressing     Problem: Skin/Tissue Integrity  Goal: Skin integrity remains intact  Description: 1.  Monitor for areas of redness and/or skin breakdown2.  Assess vascular access sites hourly3.  Every 4-6 hours minimum:  Change oxygen saturation probe site4.  Every 4-6 hours:  If on nasal continuous positive airway pressure, respiratory therapy assess nares and determine need for appliance change or resting period  Outcome: Progressing  Flowsheets (Taken 5/4/2025 1800)  Skin Integrity Remains Intact: Monitor for areas of redness and/or skin breakdown     
  Problem: Safety - Adult  Goal: Free from fall injury  5/6/2025 1117 by Billy Zheng RN  Outcome: Progressing  5/6/2025 0537 by Leti Villalobos RN  Outcome: Progressing     Problem: Chronic Conditions and Co-morbidities  Goal: Patient's chronic conditions and co-morbidity symptoms are monitored and maintained or improved  5/6/2025 1117 by Billy Zheng RN  Outcome: Progressing  5/6/2025 0537 by Leti Villalobos RN  Outcome: Progressing  Flowsheets (Taken 5/5/2025 2218)  Care Plan - Patient's Chronic Conditions and Co-Morbidity Symptoms are Monitored and Maintained or Improved: Monitor and assess patient's chronic conditions and comorbid symptoms for stability, deterioration, or improvement     Problem: Pain  Goal: Verbalizes/displays adequate comfort level or baseline comfort level  5/6/2025 1117 by Billy Zheng RN  Outcome: Progressing  5/6/2025 0537 by Leti Villalobos RN  Outcome: Progressing     Problem: Nutrition Deficit:  Goal: Optimize nutritional status  5/6/2025 1117 by Billy Zheng RN  Outcome: Progressing  5/6/2025 0537 by Leti Villalobos RN  Outcome: Progressing     Problem: Skin/Tissue Integrity  Goal: Skin integrity remains intact  Description: 1.  Monitor for areas of redness and/or skin breakdown2.  Assess vascular access sites hourly3.  Every 4-6 hours minimum:  Change oxygen saturation probe site4.  Every 4-6 hours:  If on nasal continuous positive airway pressure, respiratory therapy assess nares and determine need for appliance change or resting period  5/6/2025 1117 by Billy Zheng RN  Outcome: Progressing  Flowsheets (Taken 5/6/2025 0844)  Skin Integrity Remains Intact: Monitor for areas of redness and/or skin breakdown  5/6/2025 0537 by Leti Villalobos RN  Outcome: Progressing  Flowsheets (Taken 5/5/2025 2218)  Skin Integrity Remains Intact: Monitor for areas of redness and/or skin breakdown     Problem: Discharge Planning  Goal: Discharge to home or other facility 
  Problem: Safety - Adult  Goal: Free from fall injury  5/7/2025 1140 by Gabi Montes RN  Outcome: Progressing  5/7/2025 0240 by Leti Villalobos RN  Outcome: Progressing     Problem: Chronic Conditions and Co-morbidities  Goal: Patient's chronic conditions and co-morbidity symptoms are monitored and maintained or improved  5/7/2025 1140 by Gabi Montes RN  Outcome: Progressing  Flowsheets (Taken 5/7/2025 0800)  Care Plan - Patient's Chronic Conditions and Co-Morbidity Symptoms are Monitored and Maintained or Improved: Monitor and assess patient's chronic conditions and comorbid symptoms for stability, deterioration, or improvement  5/7/2025 0240 by Leti Villalobos RN  Outcome: Progressing  Flowsheets (Taken 5/6/2025 2000)  Care Plan - Patient's Chronic Conditions and Co-Morbidity Symptoms are Monitored and Maintained or Improved: Monitor and assess patient's chronic conditions and comorbid symptoms for stability, deterioration, or improvement     Problem: Pain  Goal: Verbalizes/displays adequate comfort level or baseline comfort level  5/7/2025 1140 by Gabi Montes RN  Outcome: Progressing  Flowsheets (Taken 5/7/2025 0600 by Leti Villalobos RN)  Verbalizes/displays adequate comfort level or baseline comfort level: Encourage patient to monitor pain and request assistance  5/7/2025 0240 by Leti Villalobos RN  Outcome: Progressing  Flowsheets (Taken 5/7/2025 0000)  Verbalizes/displays adequate comfort level or baseline comfort level: Encourage patient to monitor pain and request assistance     Problem: Nutrition Deficit:  Goal: Optimize nutritional status  5/7/2025 1140 by Gabi Montes RN  Outcome: Progressing  5/7/2025 0240 by Leti Villalobos RN  Outcome: Progressing     Problem: Skin/Tissue Integrity  Goal: Skin integrity remains intact  Description: 1.  Monitor for areas of redness and/or skin breakdown2.  Assess vascular access sites hourly3.  Every 4-6 hours minimum:  Change 
  Problem: Safety - Adult  Goal: Free from fall injury  6/1/2025 0123 by Tana Coley RN  Outcome: Progressing  Flowsheets (Taken 6/1/2025 0123)  Free From Fall Injury: Instruct family/caregiver on patient safety     Problem: Chronic Conditions and Co-morbidities  Goal: Patient's chronic conditions and co-morbidity symptoms are monitored and maintained or improved  6/1/2025 0123 by Tana Coley RN  Outcome: Progressing  Flowsheets (Taken 6/1/2025 0123)  Care Plan - Patient's Chronic Conditions and Co-Morbidity Symptoms are Monitored and Maintained or Improved:   Monitor and assess patient's chronic conditions and comorbid symptoms for stability, deterioration, or improvement   Collaborate with multidisciplinary team to address chronic and comorbid conditions and prevent exacerbation or deterioration   Update acute care plan with appropriate goals if chronic or comorbid symptoms are exacerbated and prevent overall improvement and discharge     Problem: Pain  Goal: Verbalizes/displays adequate comfort level or baseline comfort level  6/1/2025 0123 by Tana Coley RN  Outcome: Progressing  Flowsheets (Taken 6/1/2025 0123)  Verbalizes/displays adequate comfort level or baseline comfort level:   Encourage patient to monitor pain and request assistance   Administer analgesics based on type and severity of pain and evaluate response   Implement non-pharmacological measures as appropriate and evaluate response   Consider cultural and social influences on pain and pain management   Assess pain using appropriate pain scale   Notify Licensed Independent Practitioner if interventions unsuccessful or patient reports new pain     Problem: Nutrition Deficit:  Goal: Optimize nutritional status  6/1/2025 0123 by Tana Coley RN  Outcome: Progressing  Flowsheets (Taken 6/1/2025 0123)  Nutrient intake appropriate for improving, restoring, or maintaining nutritional needs:   Assess nutritional status and recommend 
  Problem: Safety - Adult  Goal: Free from fall injury  6/1/2025 1517 by Hannah Koch RN  Outcome: Progressing  6/1/2025 0123 by Tana Coley RN  Outcome: Progressing  Flowsheets (Taken 6/1/2025 0123)  Free From Fall Injury: Instruct family/caregiver on patient safety     Problem: Chronic Conditions and Co-morbidities  Goal: Patient's chronic conditions and co-morbidity symptoms are monitored and maintained or improved  6/1/2025 1517 by Hannah Koch RN  Outcome: Progressing  6/1/2025 0123 by Tana Coley RN  Outcome: Progressing  Flowsheets (Taken 6/1/2025 0123)  Care Plan - Patient's Chronic Conditions and Co-Morbidity Symptoms are Monitored and Maintained or Improved:   Monitor and assess patient's chronic conditions and comorbid symptoms for stability, deterioration, or improvement   Collaborate with multidisciplinary team to address chronic and comorbid conditions and prevent exacerbation or deterioration   Update acute care plan with appropriate goals if chronic or comorbid symptoms are exacerbated and prevent overall improvement and discharge     Problem: Pain  Goal: Verbalizes/displays adequate comfort level or baseline comfort level  6/1/2025 1517 by Hannah Koch RN  Outcome: Progressing  6/1/2025 0123 by Tana Coley RN  Outcome: Progressing  Flowsheets (Taken 6/1/2025 0123)  Verbalizes/displays adequate comfort level or baseline comfort level:   Encourage patient to monitor pain and request assistance   Administer analgesics based on type and severity of pain and evaluate response   Implement non-pharmacological measures as appropriate and evaluate response   Consider cultural and social influences on pain and pain management   Assess pain using appropriate pain scale   Notify Licensed Independent Practitioner if interventions unsuccessful or patient reports new pain     Problem: Nutrition Deficit:  Goal: Optimize nutritional status  6/1/2025 1517 by Hannah Koch 
  Problem: Safety - Adult  Goal: Free from fall injury  6/1/2025 2245 by Tana Coley RN  Outcome: Progressing  Flowsheets (Taken 6/1/2025 2245)  Free From Fall Injury: Instruct family/caregiver on patient safety     Problem: Chronic Conditions and Co-morbidities  Goal: Patient's chronic conditions and co-morbidity symptoms are monitored and maintained or improved  6/1/2025 2245 by Tana Coley RN  Outcome: Progressing  Flowsheets (Taken 6/1/2025 2245)  Care Plan - Patient's Chronic Conditions and Co-Morbidity Symptoms are Monitored and Maintained or Improved:   Collaborate with multidisciplinary team to address chronic and comorbid conditions and prevent exacerbation or deterioration   Monitor and assess patient's chronic conditions and comorbid symptoms for stability, deterioration, or improvement   Update acute care plan with appropriate goals if chronic or comorbid symptoms are exacerbated and prevent overall improvement and discharge     Problem: Pain  Goal: Verbalizes/displays adequate comfort level or baseline comfort level  6/1/2025 2245 by Tana Coley RN  Outcome: Progressing  Flowsheets (Taken 6/1/2025 2245)  Verbalizes/displays adequate comfort level or baseline comfort level:   Encourage patient to monitor pain and request assistance   Assess pain using appropriate pain scale   Administer analgesics based on type and severity of pain and evaluate response   Implement non-pharmacological measures as appropriate and evaluate response   Consider cultural and social influences on pain and pain management   Notify Licensed Independent Practitioner if interventions unsuccessful or patient reports new pain     Problem: Nutrition Deficit:  Goal: Optimize nutritional status  6/1/2025 2245 by Tana Coley RN  Outcome: Progressing  Flowsheets (Taken 6/1/2025 2245)  Nutrient intake appropriate for improving, restoring, or maintaining nutritional needs:   Assess nutritional status and recommend 
  Problem: Safety - Adult  Goal: Free from fall injury  Outcome: Progressing     Problem: Chronic Conditions and Co-morbidities  Goal: Patient's chronic conditions and co-morbidity symptoms are monitored and maintained or improved  Outcome: Progressing     Problem: Nutrition Deficit:  Goal: Optimize nutritional status  Outcome: Progressing     
  Problem: Safety - Adult  Goal: Free from fall injury  Outcome: Progressing     Problem: Chronic Conditions and Co-morbidities  Goal: Patient's chronic conditions and co-morbidity symptoms are monitored and maintained or improved  Outcome: Progressing     Problem: Pain  Goal: Verbalizes/displays adequate comfort level or baseline comfort level  Outcome: Progressing     Problem: Nutrition Deficit:  Goal: Optimize nutritional status  5/13/2025 0309 by Corina Hooper RN  Outcome: Progressing  5/12/2025 1500 by Morena David RD  Outcome: Progressing     Problem: Skin/Tissue Integrity  Goal: Skin integrity remains intact  Description: 1.  Monitor for areas of redness and/or skin breakdown2.  Assess vascular access sites hourly3.  Every 4-6 hours minimum:  Change oxygen saturation probe site4.  Every 4-6 hours:  If on nasal continuous positive airway pressure, respiratory therapy assess nares and determine need for appliance change or resting period  Outcome: Progressing     Problem: Discharge Planning  Goal: Discharge to home or other facility with appropriate resources  Outcome: Progressing     Problem: Safety - Medical Restraint  Goal: Remains free of injury from restraints (Restraint for Interference with Medical Device)  Description: INTERVENTIONS:1. Determine that other, less restrictive measures have been tried or would not be effective before applying the restraint2. Evaluate the patient's condition at the time of restraint application3. Inform patient/family regarding the reason for restraint4. Q2H: Monitor safety, psychosocial status, comfort, nutrition and hydration  Outcome: Progressing     
  Problem: Safety - Adult  Goal: Free from fall injury  Outcome: Progressing     Problem: Chronic Conditions and Co-morbidities  Goal: Patient's chronic conditions and co-morbidity symptoms are monitored and maintained or improved  Outcome: Progressing     Problem: Pain  Goal: Verbalizes/displays adequate comfort level or baseline comfort level  Outcome: Progressing     Problem: Nutrition Deficit:  Goal: Optimize nutritional status  Outcome: Progressing     Problem: Occupational Therapy - Adult  Goal: By Discharge: Performs self-care activities at highest level of function for planned discharge setting.  See evaluation for individualized goals.  Description: FUNCTIONAL STATUS PRIOR TO ADMISSION:  Patient was ambulatory using no DME    HOME SUPPORT: Patient lived with spouse but didn't require assistance.    Occupational Therapy Goals:  Weekly RA 5/27/2025 - see modified goals below:  1.  Patient will perform grooming seated with Contact Guard Assist within 7 day(s).  2.  Patient will perform bathing seated with Contact Guard Assist within 7 day(s).  3.  Patient will perform lower body dressing seated with Contact Guard Assist and AE PRN within 7 day(s).  4.  Patient will perform toilet transfers with DME PRN and Contact Guard Assist within 7 day(s).  5.  Patient will perform all aspects of toileting with Contact Guard Assist within 7 day(s).  6.  Patient will participate in upper extremity therapeutic exercise/activities with Supervision for 15 minutes within 7 day(s).    7.  Patient will utilize energy conservation techniques during functional activities with verbal cues within 7 day(s).     Occupational Therapy Goals:  Initiated 5/2/2025  1.  Patient will perform grooming with Contact Guard Assist within 7 day(s).  2.  Patient will perform bathing with Contact Guard Assist within 7 day(s).  3.  Patient will perform lower body dressing with Contact Guard Assist within 7 day(s).  4.  Patient will perform toilet 
  Problem: Safety - Adult  Goal: Free from fall injury  Outcome: Progressing     Problem: Chronic Conditions and Co-morbidities  Goal: Patient's chronic conditions and co-morbidity symptoms are monitored and maintained or improved  Outcome: Progressing     Problem: Pain  Goal: Verbalizes/displays adequate comfort level or baseline comfort level  Outcome: Progressing     Problem: Nutrition Deficit:  Goal: Optimize nutritional status  Outcome: Progressing     Problem: Occupational Therapy - Adult  Goal: By Discharge: Performs self-care activities at highest level of function for planned discharge setting.  See evaluation for individualized goals.  Description: FUNCTIONAL STATUS PRIOR TO ADMISSION:  Patient was ambulatory using no DME   , Prior Level of Assist for ADLs: Independent,  ,  ,  ,  ,  , Prior Level of Assist for Homemaking: Independent,  , Prior Level of Assist for Transfers: Independent,       HOME SUPPORT: Patient lived with spouse but didn't require assistance.    Occupational Therapy Goals:  Initiated 5/2/2025  1.  Patient will perform grooming with Contact Guard Assist within 7 day(s).  2.  Patient will perform bathing with Contact Guard Assist within 7 day(s).  3.  Patient will perform lower body dressing with Contact Guard Assist within 7 day(s).  4.  Patient will perform toilet transfers with Contact Guard Assist  within 7 day(s).  5.  Patient will perform all aspects of toileting with Supervision within 7 day(s).  6.  Patient will participate in upper extremity therapeutic exercise/activities with Supervision for 15 minutes within 7 day(s).    7.  Patient will utilize energy conservation techniques during functional activities with verbal cues within 7 day(s).         Occupational Therapy Goals:  Initiated 5/16/2025  1.  Patient will perform grooming with Moderate Assist sitting EOB within 7 day(s).  2.  Patient will perform bathing with Moderate Assist within 7 day(s).  3.  Patient will perform 
  Problem: Safety - Adult  Goal: Free from fall injury  Outcome: Progressing     Problem: Chronic Conditions and Co-morbidities  Goal: Patient's chronic conditions and co-morbidity symptoms are monitored and maintained or improved  Outcome: Progressing     Problem: Pain  Goal: Verbalizes/displays adequate comfort level or baseline comfort level  Outcome: Progressing     Problem: Nutrition Deficit:  Goal: Optimize nutritional status  Outcome: Progressing     Problem: Skin/Tissue Integrity  Goal: Skin integrity remains intact  Description: 1.  Monitor for areas of redness and/or skin breakdown2.  Assess vascular access sites hourly3.  Every 4-6 hours minimum:  Change oxygen saturation probe site4.  Every 4-6 hours:  If on nasal continuous positive airway pressure, respiratory therapy assess nares and determine need for appliance change or resting period  Outcome: Progressing     Problem: Discharge Planning  Goal: Discharge to home or other facility with appropriate resources  Outcome: Progressing     Problem: Safety - Medical Restraint  Goal: Remains free of injury from restraints (Restraint for Interference with Medical Device)  Description: INTERVENTIONS:1. Determine that other, less restrictive measures have been tried or would not be effective before applying the restraint2. Evaluate the patient's condition at the time of restraint application3. Inform patient/family regarding the reason for restraint4. Q2H: Monitor safety, psychosocial status, comfort, nutrition and hydration  Outcome: Progressing     
  Problem: Safety - Adult  Goal: Free from fall injury  Outcome: Progressing     Problem: Chronic Conditions and Co-morbidities  Goal: Patient's chronic conditions and co-morbidity symptoms are monitored and maintained or improved  Outcome: Progressing     Problem: Pain  Goal: Verbalizes/displays adequate comfort level or baseline comfort level  Outcome: Progressing     Problem: Nutrition Deficit:  Goal: Optimize nutritional status  Outcome: Progressing     Problem: Skin/Tissue Integrity  Goal: Skin integrity remains intact  Description: 1.  Monitor for areas of redness and/or skin breakdown2.  Assess vascular access sites hourly3.  Every 4-6 hours minimum:  Change oxygen saturation probe site4.  Every 4-6 hours:  If on nasal continuous positive airway pressure, respiratory therapy assess nares and determine need for appliance change or resting period  Outcome: Progressing     Problem: Discharge Planning  Goal: Discharge to home or other facility with appropriate resources  Outcome: Progressing     Problem: Safety - Medical Restraint  Goal: Remains free of injury from restraints (Restraint for Interference with Medical Device)  Description: INTERVENTIONS:1. Determine that other, less restrictive measures have been tried or would not be effective before applying the restraint2. Evaluate the patient's condition at the time of restraint application3. Inform patient/family regarding the reason for restraint4. Q2H: Monitor safety, psychosocial status, comfort, nutrition and hydration  Outcome: Progressing     
  Problem: Safety - Adult  Goal: Free from fall injury  Outcome: Progressing     Problem: Chronic Conditions and Co-morbidities  Goal: Patient's chronic conditions and co-morbidity symptoms are monitored and maintained or improved  Outcome: Progressing     Problem: Pain  Goal: Verbalizes/displays adequate comfort level or baseline comfort level  Outcome: Progressing     Problem: Nutrition Deficit:  Goal: Optimize nutritional status  Outcome: Progressing     Problem: Skin/Tissue Integrity  Goal: Skin integrity remains intact  Description: 1.  Monitor for areas of redness and/or skin breakdown2.  Assess vascular access sites hourly3.  Every 4-6 hours minimum:  Change oxygen saturation probe site4.  Every 4-6 hours:  If on nasal continuous positive airway pressure, respiratory therapy assess nares and determine need for appliance change or resting period  Outcome: Progressing  Flowsheets (Taken 5/26/2025 2135)  Skin Integrity Remains Intact: Monitor for areas of redness and/or skin breakdown     Problem: Discharge Planning  Goal: Discharge to home or other facility with appropriate resources  Outcome: Progressing     Problem: Safety - Medical Restraint  Goal: Remains free of injury from restraints (Restraint for Interference with Medical Device)  Description: INTERVENTIONS:1. Determine that other, less restrictive measures have been tried or would not be effective before applying the restraint2. Evaluate the patient's condition at the time of restraint application3. Inform patient/family regarding the reason for restraint4. Q2H: Monitor safety, psychosocial status, comfort, nutrition and hydration  Outcome: Progressing     Problem: ABCDS Injury Assessment  Goal: Absence of physical injury  Outcome: Progressing     
  Problem: Safety - Adult  Goal: Free from fall injury  Outcome: Progressing     Problem: Chronic Conditions and Co-morbidities  Goal: Patient's chronic conditions and co-morbidity symptoms are monitored and maintained or improved  Outcome: Progressing     Problem: Pain  Goal: Verbalizes/displays adequate comfort level or baseline comfort level  Outcome: Progressing     Problem: Nutrition Deficit:  Goal: Optimize nutritional status  Outcome: Progressing     Problem: Skin/Tissue Integrity  Goal: Skin integrity remains intact  Description: 1.  Monitor for areas of redness and/or skin breakdown2.  Assess vascular access sites hourly3.  Every 4-6 hours minimum:  Change oxygen saturation probe site4.  Every 4-6 hours:  If on nasal continuous positive airway pressure, respiratory therapy assess nares and determine need for appliance change or resting period  Outcome: Progressing  Flowsheets (Taken 6/2/2025 1000)  Skin Integrity Remains Intact:   Every 4-6 hours minimum:  Change oxygen saturation probe site   Assess vascular access sites hourly   Monitor for areas of redness and/or skin breakdown     Problem: Discharge Planning  Goal: Discharge to home or other facility with appropriate resources  Outcome: Progressing     Problem: Safety - Medical Restraint  Goal: Remains free of injury from restraints (Restraint for Interference with Medical Device)  Description: INTERVENTIONS:1. Determine that other, less restrictive measures have been tried or would not be effective before applying the restraint2. Evaluate the patient's condition at the time of restraint application3. Inform patient/family regarding the reason for restraint4. Q2H: Monitor safety, psychosocial status, comfort, nutrition and hydration  Outcome: Progressing     Problem: ABCDS Injury Assessment  Goal: Absence of physical injury  Outcome: Progressing     
  Problem: Safety - Adult  Goal: Free from fall injury  Outcome: Progressing     Problem: Chronic Conditions and Co-morbidities  Goal: Patient's chronic conditions and co-morbidity symptoms are monitored and maintained or improved  Outcome: Progressing     Problem: Pain  Goal: Verbalizes/displays adequate comfort level or baseline comfort level  Outcome: Progressing  Flowsheets (Taken 5/8/2025 3701)  Verbalizes/displays adequate comfort level or baseline comfort level: Encourage patient to monitor pain and request assistance     Problem: Nutrition Deficit:  Goal: Optimize nutritional status  Outcome: Progressing     Problem: Occupational Therapy - Adult  Goal: By Discharge: Performs self-care activities at highest level of function for planned discharge setting.  See evaluation for individualized goals.  Description: FUNCTIONAL STATUS PRIOR TO ADMISSION:  Patient was ambulatory using no DME   , Prior Level of Assist for ADLs: Independent,  ,  ,  ,  ,  , Prior Level of Assist for Homemaking: Independent,  , Prior Level of Assist for Transfers: Independent,       HOME SUPPORT: Patient lived with spouse but didn't require assistance.    Occupational Therapy Goals:  Initiated 5/2/2025  1.  Patient will perform grooming with Contact Guard Assist within 7 day(s).  2.  Patient will perform bathing with Contact Guard Assist within 7 day(s).  3.  Patient will perform lower body dressing with Contact Guard Assist within 7 day(s).  4.  Patient will perform toilet transfers with Contact Guard Assist  within 7 day(s).  5.  Patient will perform all aspects of toileting with Supervision within 7 day(s).  6.  Patient will participate in upper extremity therapeutic exercise/activities with Supervision for 15 minutes within 7 day(s).    7.  Patient will utilize energy conservation techniques during functional activities with verbal cues within 7 day(s).   5/8/2025 1609 by Anusha Mercer, OT  Outcome: Progressing     Problem: 
  Problem: Safety - Adult  Goal: Free from fall injury  Outcome: Progressing     Problem: Chronic Conditions and Co-morbidities  Goal: Patient's chronic conditions and co-morbidity symptoms are monitored and maintained or improved  Outcome: Progressing  Flowsheets (Taken 5/5/2025 2218)  Care Plan - Patient's Chronic Conditions and Co-Morbidity Symptoms are Monitored and Maintained or Improved: Monitor and assess patient's chronic conditions and comorbid symptoms for stability, deterioration, or improvement     Problem: Pain  Goal: Verbalizes/displays adequate comfort level or baseline comfort level  Outcome: Progressing     Problem: Nutrition Deficit:  Goal: Optimize nutritional status  5/6/2025 0537 by Leti Villalobos, RN  Outcome: Progressing  5/5/2025 1548 by Brook Alvarez RD  Outcome: Progressing  Flowsheets (Taken 5/5/2025 1538)  Nutrient intake appropriate for improving, restoring, or maintaining nutritional needs:   Assess nutritional status and recommend course of action   Recommend, monitor, and adjust tube feedings and TPN/PPN based on assessed needs     Problem: Skin/Tissue Integrity  Goal: Skin integrity remains intact  Description: 1.  Monitor for areas of redness and/or skin breakdown2.  Assess vascular access sites hourly3.  Every 4-6 hours minimum:  Change oxygen saturation probe site4.  Every 4-6 hours:  If on nasal continuous positive airway pressure, respiratory therapy assess nares and determine need for appliance change or resting period  Outcome: Progressing  Flowsheets (Taken 5/5/2025 2218)  Skin Integrity Remains Intact: Monitor for areas of redness and/or skin breakdown     Problem: Discharge Planning  Goal: Discharge to home or other facility with appropriate resources  Outcome: Progressing  Flowsheets (Taken 5/5/2025 2218)  Discharge to home or other facility with appropriate resources: Identify barriers to discharge with patient and caregiver     
  Problem: Safety - Adult  Goal: Free from fall injury  Outcome: Progressing     Problem: Chronic Conditions and Co-morbidities  Goal: Patient's chronic conditions and co-morbidity symptoms are monitored and maintained or improved  Outcome: Progressing  Flowsheets (Taken 5/6/2025 2000)  Care Plan - Patient's Chronic Conditions and Co-Morbidity Symptoms are Monitored and Maintained or Improved: Monitor and assess patient's chronic conditions and comorbid symptoms for stability, deterioration, or improvement     Problem: Pain  Goal: Verbalizes/displays adequate comfort level or baseline comfort level  Outcome: Progressing  Flowsheets (Taken 5/7/2025 0000)  Verbalizes/displays adequate comfort level or baseline comfort level: Encourage patient to monitor pain and request assistance     Problem: Nutrition Deficit:  Goal: Optimize nutritional status  Outcome: Progressing     Problem: Skin/Tissue Integrity  Goal: Skin integrity remains intact  Description: 1.  Monitor for areas of redness and/or skin breakdown2.  Assess vascular access sites hourly3.  Every 4-6 hours minimum:  Change oxygen saturation probe site4.  Every 4-6 hours:  If on nasal continuous positive airway pressure, respiratory therapy assess nares and determine need for appliance change or resting period  Outcome: Progressing  Flowsheets (Taken 5/6/2025 2000)  Skin Integrity Remains Intact: Monitor for areas of redness and/or skin breakdown     Problem: Discharge Planning  Goal: Discharge to home or other facility with appropriate resources  Outcome: Progressing  Flowsheets (Taken 5/6/2025 2000)  Discharge to home or other facility with appropriate resources: Identify barriers to discharge with patient and caregiver     
  Problem: Safety - Adult  Goal: Free from fall injury  Outcome: Progressing  Flowsheets (Taken 5/20/2025 2055)  Free From Fall Injury: Instruct family/caregiver on patient safety     Problem: Chronic Conditions and Co-morbidities  Goal: Patient's chronic conditions and co-morbidity symptoms are monitored and maintained or improved  Outcome: Progressing  Flowsheets (Taken 5/19/2025 2000 by Marjorie Lopez, RN)  Care Plan - Patient's Chronic Conditions and Co-Morbidity Symptoms are Monitored and Maintained or Improved: Monitor and assess patient's chronic conditions and comorbid symptoms for stability, deterioration, or improvement     Problem: Pain  Goal: Verbalizes/displays adequate comfort level or baseline comfort level  Outcome: Progressing  Flowsheets (Taken 5/20/2025 0400 by Marjorie Lopez, RN)  Verbalizes/displays adequate comfort level or baseline comfort level: Implement non-pharmacological measures as appropriate and evaluate response     Problem: Nutrition Deficit:  Goal: Optimize nutritional status  Outcome: Progressing  Flowsheets (Taken 5/5/2025 1538 by Brook Alvarez, RD)  Nutrient intake appropriate for improving, restoring, or maintaining nutritional needs:   Assess nutritional status and recommend course of action   Recommend, monitor, and adjust tube feedings and TPN/PPN based on assessed needs     
  Problem: Safety - Adult  Goal: Free from fall injury  Outcome: Progressing  Flowsheets (Taken 5/31/2025 0150)  Free From Fall Injury: Instruct family/caregiver on patient safety     Problem: Chronic Conditions and Co-morbidities  Goal: Patient's chronic conditions and co-morbidity symptoms are monitored and maintained or improved  Outcome: Progressing  Flowsheets (Taken 5/31/2025 0150)  Care Plan - Patient's Chronic Conditions and Co-Morbidity Symptoms are Monitored and Maintained or Improved:   Monitor and assess patient's chronic conditions and comorbid symptoms for stability, deterioration, or improvement   Collaborate with multidisciplinary team to address chronic and comorbid conditions and prevent exacerbation or deterioration   Update acute care plan with appropriate goals if chronic or comorbid symptoms are exacerbated and prevent overall improvement and discharge     Problem: Pain  Goal: Verbalizes/displays adequate comfort level or baseline comfort level  Outcome: Progressing  Flowsheets (Taken 5/31/2025 0150)  Verbalizes/displays adequate comfort level or baseline comfort level:   Encourage patient to monitor pain and request assistance   Administer analgesics based on type and severity of pain and evaluate response   Consider cultural and social influences on pain and pain management   Assess pain using appropriate pain scale   Notify Licensed Independent Practitioner if interventions unsuccessful or patient reports new pain   Implement non-pharmacological measures as appropriate and evaluate response     Problem: Skin/Tissue Integrity  Goal: Skin integrity remains intact  Description: 1.  Monitor for areas of redness and/or skin breakdown2.  Assess vascular access sites hourly3.  Every 4-6 hours minimum:  Change oxygen saturation probe site4.  Every 4-6 hours:  If on nasal continuous positive airway pressure, respiratory therapy assess nares and determine need for appliance change or resting 
Problem: Nutrition Deficit:  Goal: Optimize nutritional status  Outcome: Progressing  Flowsheets (Taken 5/5/2025 1536)  Nutrient intake appropriate for improving, restoring, or maintaining nutritional needs:   Assess nutritional status and recommend course of action   Recommend, monitor, and adjust tube feedings and TPN/PPN based on assessed needs     
Speech LAnguage Pathology RE-EVALUATION    Patient: Ce Roberts (55 y.o. female)  Date: 5/23/2025  Primary Diagnosis: Dehydration [E86.0]  Hypoglycemia [E16.2]  Intractable vomiting [R11.10]  Acute cystitis without hematuria [N30.00]  Intractable nausea and vomiting [R11.2]  Protein-calorie malnutrition, unspecified severity [E46]  Procedure(s) (LRB):  LAPAROSCOPIC CONVERT TO OPEN GASTROSTOMY TUBE PLACEMENT, LYSIS OF ADHESION, EGD (N/A) 21 Days Post-Op   Precautions: Fall Risk, Skin                  ASSESSMENT:  Patient/friend report patient has been tolerating PO diet without swallowing difficulty. Swallow re-assessed with thin liquid via straw and solids at bedside today. Patient with efficient oral phase of swallow given additional time for mastication of solid and no clinical s/s of aspiration with either trialed consistency. Note increased dysphonia today but improving HFNC requirements. Suspect both appetite and endurance may limit volumes and efficiency of PO intake. Recommend PO diet advancement to easy to chew texture with thin liquids + swallow strategies as listed below.     Patient will benefit from skilled intervention to address the above impairments.     PLAN :  Recommendations and Planned Interventions:  Diet: Easy to chew and thin liquids  -Oral medications whole with purees  -Swallow strategies: Upright position, small bites and sips, slow rate of intake, alternate bites and sips  -Encourage self-feeding to promote independence and readiness to swallow  -Oral care at least BID with bristle toothbrush  -RD involvement to promote nutrition    Recommend next SLP session: Swallow re-assessment    Re-Evaluation: Progress toward goals: good. SLP Plan of Care: 4 times/week   Discharge Recommendations: Yes, recommend SLP treatment at next level of care     SUBJECTIVE:   Patient stated, “I'm skinny.”    OBJECTIVE:     Past Medical History:   Diagnosis Date    Arthritis     Arthritis of low back     
Speech LAnguage Pathology TREATMENT    Patient: Ce Roberts (55 y.o. female)  Date: 5/16/2025  Primary Diagnosis: Dehydration [E86.0]  Hypoglycemia [E16.2]  Intractable vomiting [R11.10]  Acute cystitis without hematuria [N30.00]  Intractable nausea and vomiting [R11.2]  Protein-calorie malnutrition, unspecified severity [E46]  Procedure(s) (LRB):  LAPAROSCOPIC CONVERT TO OPEN GASTROSTOMY TUBE PLACEMENT, LYSIS OF ADHESION, EGD (N/A) 14 Days Post-Op   Precautions: Aspiration, Fall, HFNC, Contact, Enteric, Droplet    ASSESSMENT:  Patient continues to present with high risk for aspiration and pharyngeal dysphagia. She participated in therapeutic PO trials of thin liquids (ice chips, small spoon sips of water) with occasional weak cough response/dystussia concerning for airway invasion. Patient also with persistent dysphonia. Patient currently on HFNC 55L at 100%. SLP will continue to closely follow for direct dysphagia therapy and to determine timing of FEES based on respiratory status. Continue NPO with ice chips/thin water via spoon + rigorous oral care as an aspiration precaution.     Patient will benefit from skilled intervention to address the above impairments.     PLAN :  Recommendations and Planned Interventions:  Diet: NPO except ice chips + thin/unthickened water via spoon sip after oral  -Continue alternative means of nutrition/hydration and non-oral medications  -Saliva substitute for complaints of xerostomia with intermittent use of BIPAP  -Rigorous oral care at least TID via suction toothbrush    Recommend next SLP session: Swallow re-assessment    Acute SLP Services: Yes, SLP will continue to follow per plan of care.  Discharge Recommendations: Yes, recommend SLP treatment at next level of care     SUBJECTIVE:   Patient stated, “No more” after ~5 ice chips and 2 sips of water.    OBJECTIVE:     Past Medical History:   Diagnosis Date    Arthritis     Arthritis of low back     Asthma     Chronic pain  
Speech LAnguage Pathology TREATMENT    Patient: Ce Roberts (55 y.o. female)  Date: 5/19/2025  Primary Diagnosis: Dehydration [E86.0]  Hypoglycemia [E16.2]  Intractable vomiting [R11.10]  Acute cystitis without hematuria [N30.00]  Intractable nausea and vomiting [R11.2]  Protein-calorie malnutrition, unspecified severity [E46]  Procedure(s) (LRB):  LAPAROSCOPIC CONVERT TO OPEN GASTROSTOMY TUBE PLACEMENT, LYSIS OF ADHESION, EGD (N/A) 17 Days Post-Op   Precautions: Fall Risk, Skin                  ASSESSMENT:  Patient presents with improved vocal quality and bedside tolerance to therapeutic PO trials. Patient remains at risk for silent aspiration and pharyngeal deficits given current respiratory status, deconditioning, mild-moderate dysphonia, and recent intubation. She is agreeable to FEES to formally assess swallow function with goal of initiating PO diet.     Patient will benefit from skilled intervention to address the above impairments.     PLAN :  Recommendations and Planned Interventions:  -FEES later today  -Pending FEES, continue NPO except ice chips + thin/unthickened water via spoon sip after oral care and primary nutrition/hydration via alternative means + non-oral medications  -Rigorous oral care at least TID via suction toothbrush    Acute SLP Services: Yes, SLP will continue to follow per plan of care.  Discharge Recommendations: Continue to assess pending progress     SUBJECTIVE:   Patient agreeable to therapy and current plan.    OBJECTIVE:     Past Medical History:   Diagnosis Date    Arthritis     Arthritis of low back     Asthma     Chronic pain     fibromyalgia    Colon spasm     Controlled type 2 diabetes mellitus without complication, without long-term current use of insulin ([facility]) 02/16/2022    Depression     Fibromyalgia     GERD (gastroesophageal reflux disease)     High cholesterol     Hypertension     IBS (irritable bowel syndrome)     Left axillary pain 02/22/2013    Migraine     LAST 
Speech LAnguage Pathology TREATMENT/DISCHARGE    Patient: Ce Roberts (55 y.o. female)  Date: 5/28/2025  Primary Diagnosis: Dehydration [E86.0]  Hypoglycemia [E16.2]  Intractable vomiting [R11.10]  Acute cystitis without hematuria [N30.00]  Intractable nausea and vomiting [R11.2]  Protein-calorie malnutrition, unspecified severity [E46]  Procedure(s) (LRB):  LAPAROSCOPIC CONVERT TO OPEN GASTROSTOMY TUBE PLACEMENT, LYSIS OF ADHESION, EGD (N/A) 26 Days Post-Op   Precautions:   (falls, h/o gastric bypass)                  ASSESSMENT :  Patient seen for swallow treatment. Patient tolerating easy to chew diet without any overt difficulty, however, she expresses dislike of the consistency. Patient able to masticate solid consistency and respiratory status has significantly improved. Therefore, will advance diet to regular. Pt's friend at bedside states that she will help to ensure that the regular diet is in small bites as needed. No further acute SLP needs anticipated. Will sign off. Please reconsult should SLP be of any assistance.     Patient will be discharged from skilled speech-language pathology services at this time.     PLAN :  Recommendations and Planned Interventions:  Diet: Regular and thin liquids  General aspiration precautions  Good oral care  Esophageal precautions       Acute SLP Services: No, patient will be discharged from acute skilled speech-language pathology at this time.  Discharge Recommendations: No, additional SLP treatment not indicated at discharge     SUBJECTIVE:   Patient stated, “Thank you.”    OBJECTIVE:     Past Medical History:   Diagnosis Date    Arthritis     Arthritis of low back     Asthma     Chronic pain     fibromyalgia    Colon spasm     Controlled type 2 diabetes mellitus without complication, without long-term current use of insulin ([facility]) 02/16/2022    Depression     Fibromyalgia     GERD (gastroesophageal reflux disease)     High cholesterol     Hypertension     IBS 
Speech Language Pathology  Flexible Endoscopic Evaluation of Swallowing-FEES  Patient: Ce Roberts (55 y.o. female)  Date: 5/19/2025  Primary Diagnosis: Dehydration [E86.0]  Hypoglycemia [E16.2]  Intractable vomiting [R11.10]  Acute cystitis without hematuria [N30.00]  Intractable nausea and vomiting [R11.2]  Protein-calorie malnutrition, unspecified severity [E46]  Procedure(s) (LRB):  LAPAROSCOPIC CONVERT TO OPEN GASTROSTOMY TUBE PLACEMENT, LYSIS OF ADHESION, EGD (N/A) 17 Days Post-Op   Precautions: Fall Risk, Skin                  ASSESSMENT:  Swallow formally assessed with thin liquids, mildly thick liquids, purees, and solids. Therapist provided feeding assistance. Oral phase of swallow revealed functional bolus manipulation and transit across consistencies given additional time. Pharyngeal phase of swallow revealed transient penetration with immediate cough response for larger or sequential boluses of thin and mildly thick liquids; did not visualize any aspiration of any liquids at any point during study. No penetration or aspiration of purees or solid occurred. Patient demonstrated functional pharyngeal constriction and no significant pharyngeal residue post-swallow. No proximal escape from below PES noted. Recommend PO diet of soft/bite-sized diet with thin liquids with swallow strategies as listed below. SLP will continue to follow.    Patient will benefit from skilled intervention to address the above impairments.     PLAN :  Recommendations and Planned Interventions:  Diet: Soft and bite sized and thin liquids  -Oral medications whole with purees  -Swallow strategies: Upright position, small bites and sips, slow rate of intake, alternate bites and sips  -Encourage self-feeding to promote independence and readiness to swallow  -Oral care at least BID with bristle toothbrush  -RD involvement to promote nutrition    Recommend next SLP session: Swallow re-assessment    Acute SLP Services: Yes, patient will 
continues to present with decreased endurance, activity tolerance, balance, generalized weakness, abdominal pain, N/V, and difficulty performing functional mobility and ADLs compared to independent baseline. Pt requires more than reasonable time to complete times and reports pain is limiting factor. Pt required overall min Ax2 for bed mobility and sit>stand transfer with RW. Pt c/o nausea in standing. BP soft during activity but overall stable throughout activity. Pt would benefit from continued OT services to maximize independence.             PLAN :  Patient continues to benefit from skilled intervention to address the above impairments.  Continue treatment per established plan of care to address goals.    Recommendation for discharge: (in order for the patient to meet his/her long term goals):   High intensity/comprehensive skilled occupational therapy in a multidisciplinary setting as patient is working towards tolerating up to 3 hours of therapy/day 5-7x/week    Other factors to consider for discharge: patient's current support system is unable to meet their requirements for physical assistance, high risk for falls, not safe to be alone, and concern for safely navigating or managing the home environment    IF patient discharges home will need the following DME: continuing to assess with progress       SUBJECTIVE:   Patient stated “I feel really weak.”    OBJECTIVE DATA SUMMARY:   Cognitive/Behavioral Status:  Orientation  Orientation Level: Oriented X4  Cognition  Overall Cognitive Status: Exceptions  Arousal/Alertness: Appears intact  Following Commands: Appears intact  Attention Span: Appears intact  Memory: Appears intact  Safety Judgement: Decreased awareness of need for assistance;Decreased awareness of need for safety  Problem Solving: Assistance required to implement solutions  Initiation: Requires cues for some  Sequencing: Requires cues for some    Functional Mobility and Transfers for ADLs:  Bed 
spasm     Controlled type 2 diabetes mellitus without complication, without long-term current use of insulin ([facility]) 02/16/2022    Depression     Fibromyalgia     GERD (gastroesophageal reflux disease)     High cholesterol     Hypertension     IBS (irritable bowel syndrome)     Left axillary pain 02/22/2013    Migraine     LAST HEADACHE 2-16-12    PRASHANT (obstructive sleep apnea)     DOES NOT WEAR CPAP     Past Surgical History:   Procedure Laterality Date    APPENDECTOMY      BLADDER REPAIR  04/2011     BREAST BIOPSY Left     benign    CHOLECYSTECTOMY      GASTROSTOMY TUBE PLACEMENT N/A 5/2/2025    LAPAROSCOPIC CONVERT TO OPEN GASTROSTOMY TUBE PLACEMENT, LYSIS OF ADHESION, EGD performed by Kevin Bolaños MD at [facility] OR    GYN      vaginal ablation.    GYN      For ectopic pregnancy, hx tubal ligation    HYSTERECTOMY, TOTAL ABDOMINAL (CERVIX REMOVED)      LAPAROSCOPY N/A 6/22/2024    EXPLORATORY LAPAROTOMY, RIGHT HEMICOLECTOMY performed by Kevin Bolaños MD at [facility] OR    LADI-EN-Y GASTRIC BYPASS N/A 6/17/2024    ROBOTIC LADI-EN-Y GASTRIC BYPASS, ESOPHAGOGASTRODUODENOSCOPY (ERAS) performed by Kevin Bolaños MD at [facility] OR    UPPER GASTROINTESTINAL ENDOSCOPY N/A 02/05/2024    EGD WITH BIOPSY performed by Kevin Bolaños MD at [facility] ENDOSCOPY    UPPER GASTROINTESTINAL ENDOSCOPY N/A 7/31/2024    ESOPHAGOGASTRODUODENOSCOPY performed by Kevin Bolaños MD at [facility] ENDOSCOPY    UROLOGICAL SURGERY      bladder tuck, no mesh    UROLOGICAL SURGERY      sling removed 1/10/11     ABSCESS DRAINAGE PERITONEAL/RETROPERITONEAL PERC  6/28/2024    US ABSCESS DRAINAGE PERITONEAL 6/28/2024 Dayan Chua PA [facility] RAD US     Prior Level of Function/Home Situation:   Social/Functional History  Lives With: Significant other  Type of Home: House  Home Layout: One level  Home Access: Stairs to enter with rails  Entrance Stairs - Number of Steps: 7  Entrance Stairs - Rails: Both  Bathroom Shower/Tub: Tub/Shower unit  Bathroom Toilet: 
goals.    Recommend with staff: Suzi ag or julee assist for SPT bed<>chair and BSC    Recommendation for discharge: (in order for the patient to meet his/her long term goals):   High intensity/comprehensive skilled occupational therapy in a multidisciplinary setting as patient is working towards tolerating up to 3 hours of therapy/day 5-7x/week    Other factors to consider for discharge: high risk for falls, not safe to be alone, and concern for safely navigating or managing the home environment    IF patient discharges home will need the following DME: continuing to assess with progress       SUBJECTIVE:   Patient stated “I'm scared of heights.”    OBJECTIVE DATA SUMMARY:   Cognitive/Behavioral Status:  Orientation  Overall Orientation Status: Within Functional Limits  Orientation Level: Oriented X4  Cognition  Arousal/Alertness: Appears intact  Following Commands: Appears intact  Attention Span: Appears intact  Memory: Appears intact  Safety Judgement: Decreased awareness of need for assistance;Decreased awareness of need for safety  Problem Solving: Assistance required to implement solutions  Insights: Decreased awareness of deficits  Initiation: Requires cues for some  Sequencing: Requires cues for some    Functional Mobility and Transfers for ADLs:  Bed Mobility:  Bed Mobility Training  Supine to Sit: Partial/Moderate assistance;2 Person assistance  Scooting: Substantial/Maximal assistance     Transfers:   Transfer Training  Transfer Training: Yes  Sit to Stand: 2 Person assistance;Minimal assistance;Partial/Moderate assistance  Stand to Sit: 2 Person assistance;Partial/Moderate assistance  Bed to Chair: 2 Person assistance;Partial/Moderate assistance           Balance:     Balance  Sitting: Impaired  Sitting - Static: Fair (occasional)  Sitting - Dynamic: Fair (occasional)  Standing: Impaired  Standing - Static: Constant support;Fair  Standing - Dynamic: Constant support;Fair;Poor      ADL Intervention:     
Education  Education Given To: Patient;Family  Education Provided: Role of Therapy;Plan of Care;Mobility Training  Education Method: Verbal  Barriers to Learning: None  Education Outcome: Continued education needed      Gypsy Lawrence, PT, DPT  Minutes: 27    
chair alarm activated      COMMUNICATION/EDUCATION:   The patient's plan of care was discussed with: occupational therapist and registered nurse    Patient Education  Education Given To: Patient  Education Provided: Fall Prevention Strategies;Orientation;Transfer Training;Plan of Care;Mobility Training;Equipment;Energy Conservation;Precautions  Education Method: Demonstration;Verbal  Barriers to Learning: None  Education Outcome: Verbalized understanding;Continued education needed;Demonstrated understanding      Corina Lan PT, DPT  Minutes: 24

## 2025-06-02 NOTE — CARE COORDINATION
Transition of Care Plan:    RUR: 21%  Prior Level of Functioning: Independent  Disposition: Home with [facility] (PT, OT, and SN) and [facility] for tube feeds  SRIRAM: 6/2/25  Follow up appointments: Per attending's recommendations  DME needed: Bedside commode, rolling walker, shower chair, hospital bed, and ronny lift ordered through [employer]   Transportation at discharge: Family   IM/IMM Medicare/ letter given: 5/1/25  Is patient a  and connected with [facility]? No   If yes, was [place] transfer form completed and [facility] notified? N/A  Caregiver Contact: Key Jayson, [phone]  Discharge Caregiver contacted prior to discharge? Patient declined CM calling  Care Conference needed? No  Barriers to discharge:  None    [facility] has provided home teaching for tube feeds. CM notified home health agency that patient would be discharging today. Walker and bedside commode were delivered to the hospital. Patient stated that she had received her hospital bed. Family will transport.    CM to continue to follow for [facility] needs.    JENNA FaganN, RN, ONC, CMSRN  Nurse Care Manager, [phone]

## 2025-06-02 NOTE — DISCHARGE SUMMARY
Discharge Summary   Please note that this dictation was completed with [employer], the computer voice recognition software.  Quite often unanticipated grammatical, syntax, homophones, and other interpretive errors are inadvertently transcribed by the computer software.  Please disregard these errors.  Please excuse any errors that have escaped final proofreading.    PATIENT ID: Ce Roberts  MRN: [id number]   YOB: 1970    DATE OF ADMISSION: 4/30/2025  6:24 PM    DATE OF DISCHARGE: 6/2/2025  PRIMARY CARE PROVIDER: Ulisses Joy MD         ATTENDING PHYSICIAN: Laith Templeton MD  DISCHARGING PROVIDER: Laith Templeton MD       CONSULTATIONS: IP CONSULT TO HOSPITALIST  IP CONSULT TO GENERAL SURGERY  IP CONSULT TO DIETITIAN  IP CONSULT TO INTERVENTIONAL RADIOLOGY  IP CONSULT TO DIETITIAN  IP CONSULT TO INTENSIVIST  IP CONSULT TO PHARMACY  IP CONSULT TO FORENSIC NURSE EXAMINER  IP CONSULT TO DIETITIAN  IP CONSULT TO PALLIATIVE CARE  IP CONSULT TO PHYSICAL MEDICINE REHAB  IP CONSULT TO CARDIOLOGY  IP CONSULT TO CASE MANAGEMENT  IP CONSULT TO CASE MANAGEMENT    PROCEDURES/SURGERIES: Procedure(s):  LAPAROSCOPIC CONVERT TO OPEN GASTROSTOMY TUBE PLACEMENT, LYSIS OF ADHESION, EGD    ADMITTING HPI from excerpted H&P   Ce Roberts is a 55 y.o. female with past medical history significant for fibromyalgia, gastric bypass, right hemicolectomy, hypertension and diabetes following gastric bypass, mood disorder presented at the emergency room with nausea and vomiting.  Patient had gastric bypass in June which became complicated with bowel obstruction and subsequently underwent right hemicolectomy.  Patient has been having GI symptoms since then.  Vomiting is nonbilious and not coffee-ground.  Patient has not been able to drink or eat as result of the nausea and vomiting.  Patient also reported generalized abdominal pain, dull ache, no radiation, no known aggravating or relieving factors.  It was reported that

## 2025-06-03 ENCOUNTER — APPOINTMENT (OUTPATIENT)
Facility: CLINIC | Age: 55
DRG: 689 | End: 2025-06-03
Payer: MEDICARE

## 2025-06-04 ENCOUNTER — TELEPHONE (OUTPATIENT)
Facility: CLINIC | Age: 55
End: 2025-06-04

## 2025-06-04 ENCOUNTER — TELEMEDICINE (OUTPATIENT)
Facility: CLINIC | Age: 55
End: 2025-06-04
Payer: MEDICARE

## 2025-06-04 DIAGNOSIS — G62.9 NEUROPATHY: ICD-10-CM

## 2025-06-04 DIAGNOSIS — R29.898 SEVERE MUSCLE DECONDITIONING: ICD-10-CM

## 2025-06-04 DIAGNOSIS — E46 MALNUTRITION, UNSPECIFIED TYPE: Primary | ICD-10-CM

## 2025-06-04 DIAGNOSIS — I95.89 HYPOTENSION, CHRONIC: ICD-10-CM

## 2025-06-04 DIAGNOSIS — E11.65 TYPE 2 DIABETES MELLITUS WITH HYPERGLYCEMIA, UNSPECIFIED WHETHER LONG TERM INSULIN USE (HCC): ICD-10-CM

## 2025-06-04 DIAGNOSIS — J96.01 ACUTE HYPOXIC RESPIRATORY FAILURE (HCC): ICD-10-CM

## 2025-06-04 PROCEDURE — 99214 OFFICE O/P EST MOD 30 MIN: CPT | Performed by: FAMILY MEDICINE

## 2025-06-04 SDOH — ECONOMIC STABILITY: FOOD INSECURITY: WITHIN THE PAST 12 MONTHS, THE FOOD YOU BOUGHT JUST DIDN'T LAST AND YOU DIDN'T HAVE MONEY TO GET MORE.: NEVER TRUE

## 2025-06-04 SDOH — ECONOMIC STABILITY: FOOD INSECURITY: WITHIN THE PAST 12 MONTHS, YOU WORRIED THAT YOUR FOOD WOULD RUN OUT BEFORE YOU GOT MONEY TO BUY MORE.: NEVER TRUE

## 2025-06-04 ASSESSMENT — PATIENT HEALTH QUESTIONNAIRE - PHQ9
SUM OF ALL RESPONSES TO PHQ QUESTIONS 1-9: 0
1. LITTLE INTEREST OR PLEASURE IN DOING THINGS: NOT AT ALL
2. FEELING DOWN, DEPRESSED OR HOPELESS: NOT AT ALL
SUM OF ALL RESPONSES TO PHQ QUESTIONS 1-9: 0

## 2025-06-04 NOTE — PROGRESS NOTES
Ce Roberts (:  1970) is a 55 y.o. female, Established patient, here for evaluation of the following chief complaint(s):  Depression (3 month f/u/)         Assessment & Plan  1. Malnourishment.  - Hospitalized on 2025 due to malnutrition and discharged the following Monday.  - Currently on a feeding tube managed by her bariatric physician; pump removed the previous day, requiring manual feeding every hour.  - Able to consume regular meals during the day and night; reports feeling well since discharge except for numbness and tightness in feet and ankles, attributed to walking.  - Advised to continue tube feeds to improve nutritional status and gradually increase physical activity as tolerated; physical therapy ordered and pending setup by home health services.    2. Shortness of breath.  - Experiences dyspnea upon ambulation, necessitating the use of an inhaler post-exertion.  - Previously on BiPAP and cannula oxygen therapy during hospital stay.  - Advised to use inhaler prophylactically before engaging in significant physical activity.  - Home health will measure oxygen saturation levels during ambulation and report back; further steps regarding oxygen therapy will be determined based on these measurements.    3. Low blood pressure.  - Currently on midodrine for low blood pressure.  - Off antihypertensive medications for approximately a year.    4. Blood glucose management.  - Blood glucose levels have been stable since hospital discharge.    Results    1. Malnutrition, unspecified type  2. Severe muscle deconditioning  3. Acute hypoxic respiratory failure (HCC)  4. Type 2 diabetes mellitus with hyperglycemia, unspecified whether long term insulin use (HCC)  5. Hypotension, chronic  6. Neuropathy    No follow-ups on file.       Subjective   History of Present Illness  The patient presents via virtual visit for evaluation of malnourishment, shortness of breath, and low blood pressure.    She was

## 2025-06-04 NOTE — TELEPHONE ENCOUNTER
Care Transitions Initial Follow Up Call    Outreach made within 2 business days of discharge: Yes    Patient: Ce Roberts Patient : 1970   MRN: [id number]  Reason for Admission: Malnutrition  Discharge Date: 25       Spoke with: N/A PATIENT IN OFFICE FOR A [facility] FOLLOW UP WITHIN 2 DAYS OF DISCHARGE. DISCHARGED  25 [facility] APPOINTMENT COMPLETED 25.    Follow Up  No future appointments.    Kayleen Hilton LPN

## 2025-06-09 RX ORDER — CYCLOBENZAPRINE HCL 10 MG
TABLET ORAL
Qty: 30 TABLET | Refills: 3 | Status: SHIPPED | OUTPATIENT
Start: 2025-06-09

## 2025-06-11 ENCOUNTER — TELEMEDICINE (OUTPATIENT)
Facility: CLINIC | Age: 55
End: 2025-06-11

## 2025-06-11 DIAGNOSIS — K21.9 GASTROESOPHAGEAL REFLUX DISEASE WITHOUT ESOPHAGITIS: ICD-10-CM

## 2025-06-11 DIAGNOSIS — E86.1 HYPOTENSION DUE TO HYPOVOLEMIA: ICD-10-CM

## 2025-06-11 DIAGNOSIS — I10 ESSENTIAL HYPERTENSION, BENIGN: ICD-10-CM

## 2025-06-11 DIAGNOSIS — J96.01 ACUTE HYPOXIC RESPIRATORY FAILURE (HCC): ICD-10-CM

## 2025-06-11 DIAGNOSIS — M79.7 FIBROMYALGIA: ICD-10-CM

## 2025-06-11 DIAGNOSIS — R29.898 SEVERE MUSCLE DECONDITIONING: ICD-10-CM

## 2025-06-11 DIAGNOSIS — D64.9 ANEMIA, UNSPECIFIED TYPE: ICD-10-CM

## 2025-06-11 DIAGNOSIS — E11.9 CONTROLLED TYPE 2 DIABETES MELLITUS WITHOUT COMPLICATION, WITHOUT LONG-TERM CURRENT USE OF INSULIN (HCC): ICD-10-CM

## 2025-06-11 DIAGNOSIS — G72.81 CRITICAL ILLNESS MYOPATHY: ICD-10-CM

## 2025-06-11 DIAGNOSIS — J44.9 CHRONIC OBSTRUCTIVE PULMONARY DISEASE, UNSPECIFIED COPD TYPE (HCC): ICD-10-CM

## 2025-06-11 DIAGNOSIS — E43 SEVERE PROTEIN-CALORIE MALNUTRITION: Primary | ICD-10-CM

## 2025-06-11 RX ORDER — PANTOPRAZOLE SODIUM 40 MG/1
40 TABLET, DELAYED RELEASE ORAL
Qty: 30 TABLET | Refills: 5 | Status: SHIPPED | OUTPATIENT
Start: 2025-06-11 | End: 2025-06-12 | Stop reason: SDUPTHER

## 2025-06-11 RX ORDER — MORPHINE SULFATE 10 MG/5ML
2.5 SOLUTION ORAL EVERY 4 HOURS PRN
Qty: 50 ML | Refills: 0 | Status: SHIPPED | OUTPATIENT
Start: 2025-06-11 | End: 2025-06-12 | Stop reason: SDUPTHER

## 2025-06-11 SDOH — ECONOMIC STABILITY: FOOD INSECURITY: WITHIN THE PAST 12 MONTHS, THE FOOD YOU BOUGHT JUST DIDN'T LAST AND YOU DIDN'T HAVE MONEY TO GET MORE.: NEVER TRUE

## 2025-06-11 SDOH — ECONOMIC STABILITY: FOOD INSECURITY: WITHIN THE PAST 12 MONTHS, YOU WORRIED THAT YOUR FOOD WOULD RUN OUT BEFORE YOU GOT MONEY TO BUY MORE.: NEVER TRUE

## 2025-06-11 ASSESSMENT — PATIENT HEALTH QUESTIONNAIRE - PHQ9
2. FEELING DOWN, DEPRESSED OR HOPELESS: NOT AT ALL
SUM OF ALL RESPONSES TO PHQ QUESTIONS 1-9: 0
SUM OF ALL RESPONSES TO PHQ QUESTIONS 1-9: 0
1. LITTLE INTEREST OR PLEASURE IN DOING THINGS: NOT AT ALL
SUM OF ALL RESPONSES TO PHQ QUESTIONS 1-9: 0
SUM OF ALL RESPONSES TO PHQ QUESTIONS 1-9: 0

## 2025-06-11 NOTE — PROGRESS NOTES
artificial intelligence software.  Quite often unanticipated grammatical, syntax, homophones, and other interpretive errors are inadvertently transcribed by the computer software.  Please disregard these errors.  Please excuse any errors that have escaped final proofreading.  Thank you.         The patient (or guardian, if applicable) and other individuals in attendance with the patient were advised that Artificial Intelligence will be utilized during this visit to record and process the conversation to generate a clinical note. The patient (or guardian, if applicable) and other individuals in attendance at the appointment consented to the use of AI, including the recording.      An electronic signature was used to authenticate this note.    --Ulisses Joy MD

## 2025-06-12 ENCOUNTER — TELEPHONE (OUTPATIENT)
Facility: CLINIC | Age: 55
End: 2025-06-12

## 2025-06-12 DIAGNOSIS — M79.7 FIBROMYALGIA: ICD-10-CM

## 2025-06-12 RX ORDER — MORPHINE SULFATE 10 MG/5ML
2.5 SOLUTION ORAL EVERY 4 HOURS PRN
Qty: 50 ML | Refills: 0 | Status: SHIPPED | OUTPATIENT
Start: 2025-06-12 | End: 2025-07-12

## 2025-06-12 RX ORDER — PANTOPRAZOLE SODIUM 40 MG/1
40 TABLET, DELAYED RELEASE ORAL
Qty: 30 TABLET | Refills: 5 | Status: SHIPPED | OUTPATIENT
Start: 2025-06-12

## 2025-06-12 NOTE — TELEPHONE ENCOUNTER
Ce Roberts needs a refill of pantoprazole (PROTONIX) 40 MG tablet .  They have ? pills/supply left and are requesting a ? day supply with refills.  Pharmacy has been updated in the chart. Patient was advised or scheduled an appointment for the future and to request refills thru the [facility] or by requesting a refill from their pharmacy in the future.  Patient was also advised to check with their pharmacy for status of when refills are available.  Ce Roberts needs a refill of morphine 10 MG/5ML solution .  They have ? pills/supply left and are requesting a ? day supply with refills.  Pharmacy has been updated in the chart. Patient was advised or scheduled an appointment for the future and to request refills thru the [employer] [facility] or by requesting a refill from their pharmacy in the future.  Patient was also advised to check with their pharmacy for status of when refills are available.  Needs to go to  [facility]

## 2025-06-12 NOTE — TELEPHONE ENCOUNTER
----- Message from Dr. Ulisses Joy MD sent at 6/11/2025  9:13 AM EDT -----  One mo face to face appt

## 2025-06-18 ENCOUNTER — CLINICAL DOCUMENTATION (OUTPATIENT)
Facility: CLINIC | Age: 55
End: 2025-06-18

## 2025-06-18 NOTE — PROGRESS NOTES
[facility] HH forms were signed & faxed to [phone],ok,Copy placed in scan folder to be scanned to chart.

## 2025-07-01 ENCOUNTER — CLINICAL DOCUMENTATION (OUTPATIENT)
Facility: CLINIC | Age: 55
End: 2025-07-01

## 2025-07-02 DIAGNOSIS — R11.0 NAUSEA: ICD-10-CM

## 2025-07-02 RX ORDER — ONDANSETRON 8 MG/1
8 TABLET, FILM COATED ORAL EVERY 8 HOURS PRN
Qty: 60 TABLET | Refills: 1 | Status: SHIPPED | OUTPATIENT
Start: 2025-07-02

## 2025-07-07 RX ORDER — AMITRIPTYLINE HYDROCHLORIDE 50 MG/1
50 TABLET ORAL NIGHTLY
Qty: 90 TABLET | Refills: 2 | OUTPATIENT
Start: 2025-07-07

## 2025-07-08 ENCOUNTER — CLINICAL DOCUMENTATION (OUTPATIENT)
Facility: CLINIC | Age: 55
End: 2025-07-08

## 2025-07-09 ENCOUNTER — OFFICE VISIT (OUTPATIENT)
Age: 55
End: 2025-07-09

## 2025-07-09 VITALS
TEMPERATURE: 98.2 F | RESPIRATION RATE: 15 BRPM | DIASTOLIC BLOOD PRESSURE: 85 MMHG | HEIGHT: 63 IN | HEART RATE: 115 BPM | WEIGHT: 117.6 LBS | SYSTOLIC BLOOD PRESSURE: 124 MMHG | OXYGEN SATURATION: 94 % | BODY MASS INDEX: 20.84 KG/M2

## 2025-07-09 DIAGNOSIS — E43 SEVERE PROTEIN-CALORIE MALNUTRITION: Primary | ICD-10-CM

## 2025-07-09 PROCEDURE — 99024 POSTOP FOLLOW-UP VISIT: CPT | Performed by: SURGERY

## 2025-07-09 ASSESSMENT — PATIENT HEALTH QUESTIONNAIRE - PHQ9
3. TROUBLE FALLING OR STAYING ASLEEP: NOT AT ALL
SUM OF ALL RESPONSES TO PHQ QUESTIONS 1-9: 0
2. FEELING DOWN, DEPRESSED OR HOPELESS: NOT AT ALL
8. MOVING OR SPEAKING SO SLOWLY THAT OTHER PEOPLE COULD HAVE NOTICED. OR THE OPPOSITE, BEING SO FIGETY OR RESTLESS THAT YOU HAVE BEEN MOVING AROUND A LOT MORE THAN USUAL: NOT AT ALL
10. IF YOU CHECKED OFF ANY PROBLEMS, HOW DIFFICULT HAVE THESE PROBLEMS MADE IT FOR YOU TO DO YOUR WORK, TAKE CARE OF THINGS AT HOME, OR GET ALONG WITH OTHER PEOPLE: NOT DIFFICULT AT ALL
7. TROUBLE CONCENTRATING ON THINGS, SUCH AS READING THE NEWSPAPER OR WATCHING TELEVISION: NOT AT ALL
4. FEELING TIRED OR HAVING LITTLE ENERGY: NOT AT ALL
6. FEELING BAD ABOUT YOURSELF - OR THAT YOU ARE A FAILURE OR HAVE LET YOURSELF OR YOUR FAMILY DOWN: NOT AT ALL
1. LITTLE INTEREST OR PLEASURE IN DOING THINGS: NOT AT ALL
SUM OF ALL RESPONSES TO PHQ QUESTIONS 1-9: 0
5. POOR APPETITE OR OVEREATING: NOT AT ALL
9. THOUGHTS THAT YOU WOULD BE BETTER OFF DEAD, OR OF HURTING YOURSELF: NOT AT ALL
SUM OF ALL RESPONSES TO PHQ QUESTIONS 1-9: 0
SUM OF ALL RESPONSES TO PHQ QUESTIONS 1-9: 0

## 2025-07-09 NOTE — PROGRESS NOTES
Identified patient with two patient identifiers (name and ). Reviewed chart in preparation for visit and have obtained necessary documentation.    Ce Roberts is a 55 y.o. female  Chief Complaint   Patient presents with    Follow-up     /85 (BP Site: Left Upper Arm, Patient Position: Sitting, BP Cuff Size: Small Adult)   Pulse (!) 115   Temp 98.2 °F (36.8 °C) (Oral)   Resp 15   Ht 1.6 m (5' 3\")   Wt 53.3 kg (117 lb 9.6 oz)   SpO2 94%   BMI 20.83 kg/m²     1. Have you been to the ER, urgent care clinic since your last visit?  Hospitalized since your last visit?no    2. Have you seen or consulted any other health care providers outside of the [facility] since your last visit?  Include any pap smears or colon screening. no

## 2025-07-09 NOTE — PROGRESS NOTES
Surgery Progress Note    7/9/2025    CC: Feeling great    Subjective:     Patient is doing fantastic after admission last month secondary to protein calorie malnutrition.  G-tube feeding started and is doing well with nocturnal feeds.  Now has an appetite and eating a variety of foods.  She is up at least 12 pounds from here admission weight.  Here with her friend, Beatrice.    Constitutional: No fever or chills  Neurologic: No headache  Eyes: No scleral icterus or irritated eyes  Nose: No nasal pain or drainage  Mouth: No oral lesions or sore throat  Cardiac: No palpations or chest pain  Pulmonary: No cough or shortness of breath  Gastrointestinal: No nausea, emesis, diarrhea, or constipation  Genitourinary: No dysuria  Musculoskeletal: No muscle or joint tenderness  Skin: No rashes or lesions  Psychiatric: No anxiety or depressed mood    Objective:     Vitals:    07/09/25 1058   BP: 124/85   Pulse: (!) 115   Resp: 15   Temp: 98.2 °F (36.8 °C)   SpO2: 94%     Body mass index is 20.83 kg/m².  Wt 117 lbs    General: No acute distress, conversant  Eyes: PERRLA, no scleral icterus  HENT: Normocephalic without oral lesions  Neck: Trachea midline without LAD  Cardiac: Normal pulse rate and rhythm  Pulmonary: Symmetric chest rise with normal effort  GI: Soft, NT, ND, no hernia, no splenomegaly, G-tube site clean dry and intact, 4 cm at skin  Skin: Warm without rash  Extremities: No edema or joint stiffness  Psych: Appropriate mood and affect    Assessment:     55-year-old female doing well after G-tube placement for severe malnutrition following gastric bypass and right hemicolectomy    Plan:     Okay to cut back G-tube feeds to 8 hours a night.  I gave her a new three-way stopcock.  I will have her talk with dietary to reinforce proper eating habits.  Continue to strive for 60 ounces of protein a day and 64 ounces of water.  I will see her back in a month.      Kevin Bolaños MD, FACS, [facility]  Bariatric and General

## 2025-07-11 ENCOUNTER — TELEPHONE (OUTPATIENT)
Age: 55
End: 2025-07-11

## 2025-07-11 NOTE — TELEPHONE ENCOUNTER
Attempted to contact pt to schedule RD appt per Miky:    IZZY Post Op x 1yr/RD Counseling  phone or in person, ASAP    No answer. Unable to LVM

## 2025-07-15 ENCOUNTER — OFFICE VISIT (OUTPATIENT)
Facility: CLINIC | Age: 55
End: 2025-07-15
Payer: MEDICARE

## 2025-07-15 VITALS
HEART RATE: 110 BPM | TEMPERATURE: 97.9 F | WEIGHT: 119 LBS | SYSTOLIC BLOOD PRESSURE: 118 MMHG | RESPIRATION RATE: 18 BRPM | OXYGEN SATURATION: 96 % | BODY MASS INDEX: 21.09 KG/M2 | DIASTOLIC BLOOD PRESSURE: 84 MMHG | HEIGHT: 63 IN

## 2025-07-15 DIAGNOSIS — D64.9 ANEMIA, UNSPECIFIED TYPE: ICD-10-CM

## 2025-07-15 DIAGNOSIS — E86.1 HYPOTENSION DUE TO HYPOVOLEMIA: ICD-10-CM

## 2025-07-15 DIAGNOSIS — D64.9 ANEMIA, UNSPECIFIED TYPE: Primary | ICD-10-CM

## 2025-07-15 DIAGNOSIS — I10 ESSENTIAL HYPERTENSION, BENIGN: ICD-10-CM

## 2025-07-15 DIAGNOSIS — E11.9 CONTROLLED TYPE 2 DIABETES MELLITUS WITHOUT COMPLICATION, WITHOUT LONG-TERM CURRENT USE OF INSULIN (HCC): ICD-10-CM

## 2025-07-15 DIAGNOSIS — E43 SEVERE PROTEIN-CALORIE MALNUTRITION: ICD-10-CM

## 2025-07-15 DIAGNOSIS — M79.7 FIBROMYALGIA: ICD-10-CM

## 2025-07-15 PROCEDURE — 99214 OFFICE O/P EST MOD 30 MIN: CPT | Performed by: FAMILY MEDICINE

## 2025-07-15 PROCEDURE — 2022F DILAT RTA XM EVC RTNOPTHY: CPT | Performed by: FAMILY MEDICINE

## 2025-07-15 PROCEDURE — 3044F HG A1C LEVEL LT 7.0%: CPT | Performed by: FAMILY MEDICINE

## 2025-07-15 PROCEDURE — 3079F DIAST BP 80-89 MM HG: CPT | Performed by: FAMILY MEDICINE

## 2025-07-15 PROCEDURE — G8420 CALC BMI NORM PARAMETERS: HCPCS | Performed by: FAMILY MEDICINE

## 2025-07-15 PROCEDURE — G8427 DOCREV CUR MEDS BY ELIG CLIN: HCPCS | Performed by: FAMILY MEDICINE

## 2025-07-15 PROCEDURE — 4004F PT TOBACCO SCREEN RCVD TLK: CPT | Performed by: FAMILY MEDICINE

## 2025-07-15 PROCEDURE — 3017F COLORECTAL CA SCREEN DOC REV: CPT | Performed by: FAMILY MEDICINE

## 2025-07-15 PROCEDURE — 3074F SYST BP LT 130 MM HG: CPT | Performed by: FAMILY MEDICINE

## 2025-07-15 RX ORDER — FLUDROCORTISONE ACETATE 0.1 MG/1
0.1 TABLET ORAL DAILY
COMMUNITY
End: 2025-07-15 | Stop reason: SDUPTHER

## 2025-07-15 RX ORDER — NITROFURANTOIN 25; 75 MG/1; MG/1
100 CAPSULE ORAL 2 TIMES DAILY
Qty: 10 CAPSULE | Refills: 0 | Status: SHIPPED | OUTPATIENT
Start: 2025-07-15 | End: 2025-07-20

## 2025-07-15 RX ORDER — MIDODRINE HYDROCHLORIDE 5 MG/1
15 TABLET ORAL EVERY 6 HOURS
Qty: 90 TABLET | Refills: 3 | Status: SHIPPED | OUTPATIENT
Start: 2025-07-15

## 2025-07-15 RX ORDER — MORPHINE SULFATE 10 MG/5ML
2.5 SOLUTION ORAL EVERY 4 HOURS PRN
Qty: 50 ML | Refills: 0 | Status: SHIPPED | OUTPATIENT
Start: 2025-07-15 | End: 2025-08-14

## 2025-07-15 RX ORDER — FLUDROCORTISONE ACETATE 0.1 MG/1
0.1 TABLET ORAL DAILY
Qty: 90 TABLET | Refills: 1 | Status: SHIPPED | OUTPATIENT
Start: 2025-07-15

## 2025-07-15 SDOH — ECONOMIC STABILITY: FOOD INSECURITY: WITHIN THE PAST 12 MONTHS, THE FOOD YOU BOUGHT JUST DIDN'T LAST AND YOU DIDN'T HAVE MONEY TO GET MORE.: NEVER TRUE

## 2025-07-15 SDOH — ECONOMIC STABILITY: FOOD INSECURITY: WITHIN THE PAST 12 MONTHS, YOU WORRIED THAT YOUR FOOD WOULD RUN OUT BEFORE YOU GOT MONEY TO BUY MORE.: NEVER TRUE

## 2025-07-15 ASSESSMENT — PATIENT HEALTH QUESTIONNAIRE - PHQ9
2. FEELING DOWN, DEPRESSED OR HOPELESS: NOT AT ALL
1. LITTLE INTEREST OR PLEASURE IN DOING THINGS: NOT AT ALL
SUM OF ALL RESPONSES TO PHQ QUESTIONS 1-9: 0

## 2025-07-15 NOTE — PROGRESS NOTES
Patient here for 1 month f/u for anemia. Med refill.     Have you been to the ER, urgent care clinic since your last visit?  Hospitalized since your last visit?   NO

## 2025-07-15 NOTE — PROGRESS NOTES
Patient here for 1 month f/u for anemia. Med refill.     Have you been to the ER, urgent care clinic since your last visit?  Hospitalized since your last visit?   NO    Have you seen or consulted any other health care providers outside our system since your last visit?   NO      “Have you had a colorectal cancer screening such as a colonoscopy/FIT/Cologuard?    NO    No colonoscopy on file  Date of last Cologuard: 3/22/2022  No FIT/FOBT on file   No flexible sigmoidoscopy on file     “Have you had a diabetic eye exam?”    YES - Where: [facility]. Nurse/CMA to request most recent records if not in the chart     No diabetic eye exam on file       {Click Here for Release of Records Request :[id number]}         Ce Roberts (:  1970) is a 55 y.o. female, here for evaluation of the following chief complaint(s):  Follow-up (1 month anemia)         Assessment & Plan  1. Pain.  - Reports significant pain in shoulders and feet, described as feeling like feet are being crunched and hands are hot.  - Currently taking gabapentin 600 mg.  - Morphine prescription will be provided, advised to use sparingly.  - Undergoing physical therapy and occupational therapy.    2. Possible urinary tract infection.  - Reports pain during urination and suspects a UTI.  - Prescription for an antibiotic will be provided during virtual appointment tomorrow.  - Antibiotic will be in liquid form to accommodate difficulty swallowing pills.    3. Anemia.  - Significantly anemic, which may be contributing to symptoms.  - Blood count will be checked today to assess current status.    4. Shoulder pain.  - Reports significant shoulder pain and numbness extending down arm.  - Will be evaluated further during next appointment.    Follow-up  The patient will follow up in 1 month.    Results    1. Anemia, unspecified type  -     CBC with Auto Differential; Future  -     Iron and TIBC; Future  -     Vitamin B12; Future  -

## 2025-07-15 NOTE — PROGRESS NOTES
Ce Roberts (:  1970) is a 55 y.o. female, here for evaluation of the following chief complaint(s):  Follow-up (1 month anemia)         Assessment & Plan  1. Pain.  - Reports significant pain in shoulders and feet, described as feeling like feet are being crunched and hands are hot.  - Currently taking gabapentin 600 mg.  - Morphine prescription will be provided, advised to use sparingly.  - Undergoing physical therapy and occupational therapy.    2. Possible urinary tract infection.  - Reports pain during urination and suspects a UTI.  - Prescription for an antibiotic will be provided during virtual appointment tomorrow.  - Antibiotic will be in liquid form to accommodate difficulty swallowing pills.    3. Anemia.  - Significantly anemic, which may be contributing to symptoms.  - Blood count will be checked today to assess current status.    4. Shoulder pain.  - Reports significant shoulder pain and numbness extending down arm.  - Will be evaluated further during next appointment.    Follow-up  The patient will follow up in 1 month.    Results    1. Anemia, unspecified type  -     CBC with Auto Differential; Future  -     Iron and TIBC; Future  -     Vitamin B12; Future  -     Ferritin; Future  2. Severe protein-calorie malnutrition  3. Hypotension due to hypovolemia  -     fludrocortisone (FLORINEF) 0.1 MG tablet; Take 1 tablet by mouth daily, Disp-90 tablet, R-1Normal  -     midodrine (PROAMATINE) 5 MG tablet; 3 tablets by Per G Tube route every 6 hours, Disp-90 tablet, R-3Normal  4. Essential hypertension, benign  5. Controlled type 2 diabetes mellitus without complication, without long-term current use of insulin ([facility])  6. Fibromyalgia  -     morphine 10 MG/5ML solution; Take 1.25 mLs by mouth every 4 hours as needed for Pain for up to 30 days. Max Daily Amount: 15 mg, Disp-50 mL, R-0Normal    Return in about 1 month (around 8/15/2025).       Subjective   History of Present Illness  The patient

## 2025-07-17 LAB
BASOPHILS # BLD: 0.03 K/UL (ref 0–0.1)
BASOPHILS NFR BLD: 0.4 % (ref 0–1)
DIFFERENTIAL METHOD BLD: ABNORMAL
EOSINOPHIL # BLD: 0.21 K/UL (ref 0–0.4)
EOSINOPHIL NFR BLD: 2.8 % (ref 0–7)
ERYTHROCYTE [DISTWIDTH] IN BLOOD BY AUTOMATED COUNT: 14.3 % (ref 11.5–14.5)
FERRITIN SERPL-MCNC: 92 NG/ML (ref 26–388)
HCT VFR BLD AUTO: 42.3 % (ref 35–47)
HGB BLD-MCNC: 12.6 G/DL (ref 11.5–16)
IMM GRANULOCYTES # BLD AUTO: 0.01 K/UL (ref 0–0.04)
IMM GRANULOCYTES NFR BLD AUTO: 0.1 % (ref 0–0.5)
IRON SATN MFR SERPL: 14 % (ref 20–50)
IRON SERPL-MCNC: 52 UG/DL (ref 35–150)
LYMPHOCYTES # BLD: 3.48 K/UL (ref 0.8–3.5)
LYMPHOCYTES NFR BLD: 46.2 % (ref 12–49)
MCH RBC QN AUTO: 29.6 PG (ref 26–34)
MCHC RBC AUTO-ENTMCNC: 29.8 G/DL (ref 30–36.5)
MCV RBC AUTO: 99.5 FL (ref 80–99)
MONOCYTES # BLD: 0.47 K/UL (ref 0–1)
MONOCYTES NFR BLD: 6.2 % (ref 5–13)
NEUTS SEG # BLD: 3.33 K/UL (ref 1.8–8)
NEUTS SEG NFR BLD: 44.3 % (ref 32–75)
NRBC # BLD: 0 K/UL (ref 0–0.01)
NRBC BLD-RTO: 0 PER 100 WBC
PLATELET # BLD AUTO: 275 K/UL (ref 150–400)
PMV BLD AUTO: 12.8 FL (ref 8.9–12.9)
RBC # BLD AUTO: 4.25 M/UL (ref 3.8–5.2)
TIBC SERPL-MCNC: 374 UG/DL (ref 250–450)
VIT B12 SERPL-MCNC: 206 PG/ML (ref 193–986)
WBC # BLD AUTO: 7.5 K/UL (ref 3.6–11)

## 2025-07-21 ENCOUNTER — CLINICAL DOCUMENTATION (OUTPATIENT)
Facility: CLINIC | Age: 55
End: 2025-07-21

## 2025-07-21 NOTE — PROGRESS NOTES
[facility] order was signed & faxed to [phone],ok,Copy placed in scan folder to be scanned to chart.

## 2025-07-22 ENCOUNTER — TELEPHONE (OUTPATIENT)
Age: 55
End: 2025-07-22

## 2025-07-22 ENCOUNTER — OFFICE VISIT (OUTPATIENT)
Age: 55
End: 2025-07-22

## 2025-07-22 DIAGNOSIS — E66.01 MORBID OBESITY (HCC): Primary | ICD-10-CM

## 2025-07-22 NOTE — TELEPHONE ENCOUNTER
Identified patient with two patient identifiers (name and ). Reviewed chart in preparation for encounter and have obtained necessary documentation.     Patient called in regards to her appt with Tara today stating she hadn't received a call...    I informed patient Per Tara \"fyi - if my 11 am calls in - i've been trying to reach her multiple times and im getting a busy signal (not rining/not able to leave a message) i emaild her as well to let her know i'm trying to reach her and gave her my direct # if she wants to try to call me  \"     Patient understood what was discussed and thankful for the help    Warm

## 2025-08-05 ENCOUNTER — CLINICAL DOCUMENTATION (OUTPATIENT)
Facility: CLINIC | Age: 55
End: 2025-08-05

## 2025-08-13 ENCOUNTER — OFFICE VISIT (OUTPATIENT)
Age: 55
End: 2025-08-13
Payer: MEDICARE

## 2025-08-13 VITALS
SYSTOLIC BLOOD PRESSURE: 120 MMHG | WEIGHT: 130.4 LBS | TEMPERATURE: 97.3 F | BODY MASS INDEX: 23.11 KG/M2 | RESPIRATION RATE: 13 BRPM | OXYGEN SATURATION: 83 % | DIASTOLIC BLOOD PRESSURE: 83 MMHG | HEIGHT: 63 IN | HEART RATE: 103 BPM

## 2025-08-13 DIAGNOSIS — Z93.1 GASTROSTOMY TUBE DEPENDENT (HCC): Primary | ICD-10-CM

## 2025-08-13 PROCEDURE — 99214 OFFICE O/P EST MOD 30 MIN: CPT | Performed by: SURGERY

## 2025-08-13 PROCEDURE — 3079F DIAST BP 80-89 MM HG: CPT | Performed by: SURGERY

## 2025-08-13 PROCEDURE — 4004F PT TOBACCO SCREEN RCVD TLK: CPT | Performed by: SURGERY

## 2025-08-13 PROCEDURE — G8420 CALC BMI NORM PARAMETERS: HCPCS | Performed by: SURGERY

## 2025-08-13 PROCEDURE — 3074F SYST BP LT 130 MM HG: CPT | Performed by: SURGERY

## 2025-08-13 PROCEDURE — 3017F COLORECTAL CA SCREEN DOC REV: CPT | Performed by: SURGERY

## 2025-08-13 PROCEDURE — G8427 DOCREV CUR MEDS BY ELIG CLIN: HCPCS | Performed by: SURGERY

## 2025-08-13 ASSESSMENT — PATIENT HEALTH QUESTIONNAIRE - PHQ9
10. IF YOU CHECKED OFF ANY PROBLEMS, HOW DIFFICULT HAVE THESE PROBLEMS MADE IT FOR YOU TO DO YOUR WORK, TAKE CARE OF THINGS AT HOME, OR GET ALONG WITH OTHER PEOPLE: NOT DIFFICULT AT ALL
1. LITTLE INTEREST OR PLEASURE IN DOING THINGS: NOT AT ALL
SUM OF ALL RESPONSES TO PHQ QUESTIONS 1-9: 1
5. POOR APPETITE OR OVEREATING: NOT AT ALL
SUM OF ALL RESPONSES TO PHQ QUESTIONS 1-9: 1
SUM OF ALL RESPONSES TO PHQ QUESTIONS 1-9: 1
9. THOUGHTS THAT YOU WOULD BE BETTER OFF DEAD, OR OF HURTING YOURSELF: NOT AT ALL
4. FEELING TIRED OR HAVING LITTLE ENERGY: SEVERAL DAYS
6. FEELING BAD ABOUT YOURSELF - OR THAT YOU ARE A FAILURE OR HAVE LET YOURSELF OR YOUR FAMILY DOWN: NOT AT ALL
2. FEELING DOWN, DEPRESSED OR HOPELESS: NOT AT ALL
7. TROUBLE CONCENTRATING ON THINGS, SUCH AS READING THE NEWSPAPER OR WATCHING TELEVISION: NOT AT ALL
3. TROUBLE FALLING OR STAYING ASLEEP: NOT AT ALL
SUM OF ALL RESPONSES TO PHQ QUESTIONS 1-9: 1
8. MOVING OR SPEAKING SO SLOWLY THAT OTHER PEOPLE COULD HAVE NOTICED. OR THE OPPOSITE, BEING SO FIGETY OR RESTLESS THAT YOU HAVE BEEN MOVING AROUND A LOT MORE THAN USUAL: NOT AT ALL

## 2025-08-18 ENCOUNTER — OFFICE VISIT (OUTPATIENT)
Facility: CLINIC | Age: 55
End: 2025-08-18
Payer: MEDICARE

## 2025-08-18 VITALS
HEART RATE: 54 BPM | WEIGHT: 126 LBS | BODY MASS INDEX: 22.32 KG/M2 | TEMPERATURE: 97.6 F | SYSTOLIC BLOOD PRESSURE: 110 MMHG | RESPIRATION RATE: 19 BRPM | DIASTOLIC BLOOD PRESSURE: 70 MMHG | HEIGHT: 63 IN

## 2025-08-18 DIAGNOSIS — M79.7 FIBROMYALGIA: ICD-10-CM

## 2025-08-18 DIAGNOSIS — G72.81 CRITICAL ILLNESS MYOPATHY: Primary | ICD-10-CM

## 2025-08-18 DIAGNOSIS — I10 ESSENTIAL HYPERTENSION, BENIGN: ICD-10-CM

## 2025-08-18 PROCEDURE — 99214 OFFICE O/P EST MOD 30 MIN: CPT | Performed by: FAMILY MEDICINE

## 2025-08-18 PROCEDURE — G8420 CALC BMI NORM PARAMETERS: HCPCS | Performed by: FAMILY MEDICINE

## 2025-08-18 PROCEDURE — 4004F PT TOBACCO SCREEN RCVD TLK: CPT | Performed by: FAMILY MEDICINE

## 2025-08-18 PROCEDURE — 3017F COLORECTAL CA SCREEN DOC REV: CPT | Performed by: FAMILY MEDICINE

## 2025-08-18 PROCEDURE — 3074F SYST BP LT 130 MM HG: CPT | Performed by: FAMILY MEDICINE

## 2025-08-18 PROCEDURE — G8427 DOCREV CUR MEDS BY ELIG CLIN: HCPCS | Performed by: FAMILY MEDICINE

## 2025-08-18 PROCEDURE — 3078F DIAST BP <80 MM HG: CPT | Performed by: FAMILY MEDICINE

## 2025-08-18 RX ORDER — CYCLOBENZAPRINE HCL 10 MG
10 TABLET ORAL 3 TIMES DAILY PRN
Qty: 90 TABLET | Refills: 3 | Status: SHIPPED | OUTPATIENT
Start: 2025-08-18

## 2025-08-18 RX ORDER — MORPHINE SULFATE 15 MG/1
15 TABLET ORAL 3 TIMES DAILY
Qty: 90 TABLET | Refills: 0 | Status: SHIPPED | OUTPATIENT
Start: 2025-08-18 | End: 2025-09-17

## 2025-08-18 RX ORDER — GABAPENTIN 600 MG/1
600 TABLET ORAL 4 TIMES DAILY
Qty: 180 TABLET | Refills: 3 | Status: SHIPPED | OUTPATIENT
Start: 2025-08-18 | End: 2026-08-18

## 2025-08-18 SDOH — ECONOMIC STABILITY: FOOD INSECURITY: WITHIN THE PAST 12 MONTHS, YOU WORRIED THAT YOUR FOOD WOULD RUN OUT BEFORE YOU GOT MONEY TO BUY MORE.: NEVER TRUE

## 2025-08-18 SDOH — ECONOMIC STABILITY: FOOD INSECURITY: WITHIN THE PAST 12 MONTHS, THE FOOD YOU BOUGHT JUST DIDN'T LAST AND YOU DIDN'T HAVE MONEY TO GET MORE.: NEVER TRUE

## 2025-08-19 ENCOUNTER — CLINICAL DOCUMENTATION (OUTPATIENT)
Facility: CLINIC | Age: 55
End: 2025-08-19

## (undated) DEVICE — 1200 GUARD II KIT W/5MM TUBE W/O VAC TUBE: Brand: GUARDIAN

## (undated) DEVICE — SUTURE NONABSORBABLE MONOFILAMENT 2-0 V-20 6 IN V-LOC PBT VLOCN0605

## (undated) DEVICE — TROCAR ENDOSCP L100MM DIA12MM BLDELSS OBT RADLUC STBL SL

## (undated) DEVICE — X-RAY DETECTABLE SPONGES,16 PLY: Brand: VISTEC

## (undated) DEVICE — SYRINGE MED 30ML STD CLR PLAS LUERLOCK TIP N CTRL DISP

## (undated) DEVICE — RELOAD STPL H2.6X0.75MM DIA60MM GRY MESENTERY/THIN TISS NAT

## (undated) DEVICE — ARM DRAPE

## (undated) DEVICE — ELECTRODE,RADIOTRANSLUCENT,FOAM,3PK: Brand: MEDLINE

## (undated) DEVICE — TROCAR ENDOSCP L100MM DIA5MM BLDELSS STBL SL THRD OPT VW

## (undated) DEVICE — GARMENT,MEDLINE,DVT,INT,CALF,MED, GEN2: Brand: MEDLINE

## (undated) DEVICE — SEAL

## (undated) DEVICE — VISIGI 3D®  CALIBRATION SYSTEM  SIZE 40FR STD W/ BULB: Brand: BOEHRINGER® VISIGI 3D™ SLEEVE GASTRECTOMY CALIBRATION SYSTEM, SIZE 40FR W/BULB

## (undated) DEVICE — SUTURE PDS II SZ 0 L27IN ABSRB VLT L26MM CT-2 1/2 CIR Z334H

## (undated) DEVICE — BITEBLOCK ENDOSCP 60FR MAXI WHT POLYETH STURDY W/ VELC WVN

## (undated) DEVICE — ORISE PROKNIFE 3.0 MM ELECTRODE: Brand: ORISE™ PROKNIFE

## (undated) DEVICE — SUTURE NONABSORBABLE MONOFILAMENT 2-0 FS 18 IN ETHILON 664H

## (undated) DEVICE — SUTURE MONOCRYL ABSORBABLE L 6 IN SZ 3-0 POLYGLCOLIC ACD MONOFILAMENT SH

## (undated) DEVICE — TROCAR ENDOSCP L100MM DIA5MM BLDELSS STBL SL OBT RADLUC

## (undated) DEVICE — SHEET TRNSF DISPOSABLE HOVERMATT 100 PER CA

## (undated) DEVICE — DRAIN SURG 19FR 100% SIL RADPQ RND CHN FULL FLUT

## (undated) DEVICE — SEALER ONE-STEP 37CM LIGASURE MARYLAND XP

## (undated) DEVICE — VESSEL SEALER EXTEND: Brand: ENDOWRIST

## (undated) DEVICE — GENERAL LAPAROSCOPY - SMH: Brand: MEDLINE INDUSTRIES, INC.

## (undated) DEVICE — SCISSORS ENDOSCP DIA5MM CRV MPLR CAUT W/ RATCH HNDL

## (undated) DEVICE — RESERVOIR,SUCTION,100CC,SILICONE: Brand: MEDLINE

## (undated) DEVICE — IV STRT KT 3282] LSL INDUSTRIES INC]

## (undated) DEVICE — ENDO CARRY-ON PROCEDURE KIT INCLUDES ENZYMATIC SPONGE, GAUZE, BIOHAZARD LABEL, TRAY, LUBRICANT, DIRTY SCOPE LABEL, WATER LABEL, TRAY, DRAWSTRING PAD, AND DEFENDO 4-PIECE KIT.: Brand: ENDO CARRY-ON PROCEDURE KIT

## (undated) DEVICE — SYSTEM EVAC SMOKE LAPARSCOPIC

## (undated) DEVICE — BLUNTFILL: Brand: MONOJECT

## (undated) DEVICE — CATHETER IV 22GA L1IN OD0.8382-0.9144MM ID0.6096-0.6858MM 382523

## (undated) DEVICE — CANNULA CUSH AD W/ 14FT TBG

## (undated) DEVICE — SOLIDIFIER FLD 2OZ 1500CC N DISINF IN BTL DISP SAFESORB

## (undated) DEVICE — SYRINGE MED 5ML STD CLR PLAS LUERLOCK TIP N CTRL DISP

## (undated) DEVICE — ORISE PROKNIFE 1.5 MM ELECTRODE: Brand: ORISE™ PROKNIFE

## (undated) DEVICE — Device

## (undated) DEVICE — SUTURE VICRYL SZ 3-0 L27IN ABSRB VLT L26MM SH 1/2 CIR J316H

## (undated) DEVICE — TIP COVER ACCESSORY

## (undated) DEVICE — LIQUIBAND RAPID ADHESIVE 36/CS 0.8ML: Brand: MEDLINE

## (undated) DEVICE — SOLUTION ANTIFOG VIS SYS CLEARIFY LAPSCP

## (undated) DEVICE — SUTURE PDS + SZ 0 L27IN ABSRB VLT L36MM CT 1 1 2 CIR PDP340H

## (undated) DEVICE — ELECTRO LUBE IS A SINGLE PATIENT USE DEVICE THAT IS INTENDED TO BE USED ON ELECTROSURGICAL ELECTRODES TO REDUCE STICKING.: Brand: KEY SURGICAL ELECTRO LUBE

## (undated) DEVICE — SET ADMIN 16ML TBNG L100IN 2 Y INJ SITE IV PIGGY BK DISP (ORDER IN MULIPLES OF 48)

## (undated) DEVICE — SUTURE MONOCRYL + SZ 4-0 L27IN ABSRB UD L19MM PS-2 3/8 CIR MCP426H

## (undated) DEVICE — RELOAD STPL L60MM H1-2.6MM MESENTERY THN TISS WHT 6 ROW

## (undated) DEVICE — NEEDLE SPNL 22GA L3.5IN BLK HUB S STL REG WALL FIT STYL

## (undated) DEVICE — SUTURE VICRYL + SZ 2-0 L27IN ABSRB WHT SH 1/2 CIR TAPERCUT VCP417H

## (undated) DEVICE — BLUNTFILL WITH FILTER: Brand: MONOJECT

## (undated) DEVICE — PACK,BASIC,SIRUS,V: Brand: MEDLINE

## (undated) DEVICE — GLOVE SURG SZ 65 L12IN FNGR THK94MIL STD WHT LTX FREE

## (undated) DEVICE — AIR SHEET,LAT,COMFORT GLIDE, BLEND 40X80: Brand: MEDLINE

## (undated) DEVICE — STAPLER ECHELON 3000 60MM COMPACT

## (undated) DEVICE — SYRINGE MED 10ML LUERLOCK TIP W/O SFTY DISP

## (undated) DEVICE — STAPLER SKIN LN REINF 60 MM ECHELON ENDOPATH

## (undated) DEVICE — SYRINGE MEDICAL 3ML CLEAR PLASTIC STANDARD NON CONTROL LUERLOCK TIP DISPOSABLE

## (undated) DEVICE — SEALER ENDOSCP NANO COAT OPN DIV CRV L JAW LIGASURE IMPACT

## (undated) DEVICE — SOLUTION IV 1000ML 0.9% SOD CHL PH 5 INJ USP VIAFLX PLAS

## (undated) DEVICE — SYRINGE INFL 60ML DISP ALLIANCE II

## (undated) DEVICE — BLADE ES L6IN ELASTOMERIC COAT EXT DURABLE BEND UPTO 90DEG

## (undated) DEVICE — STAPLER 60 RELOAD BLUE: Brand: SUREFORM

## (undated) DEVICE — SPONGE LAP W18XL18IN WHT COT 4 PLY FLD STRUNG RADPQ DISP ST 2 PER PACK

## (undated) DEVICE — STAPLER 60 RELOAD WHITE: Brand: SUREFORM

## (undated) DEVICE — SUTURE PDS + SZ 0 L36IN ABSRB VLT CT 1 L36MM 1 2 CIR PDP346H

## (undated) DEVICE — KIT COLON W/ 1.1OZ LUB AND 2 END

## (undated) DEVICE — SUTURE MONOCRYL ABSORBABLE L 9 IN SZ 3-0 POLYGLCOLIC ACD MONOFILAMENT SH

## (undated) DEVICE — TUBE GASTROSTMY 20FR MED GRD SIL FEED GAM RECESS DST TIP AVANOS

## (undated) DEVICE — 4-PORT MANIFOLD: Brand: NEPTUNE 2

## (undated) DEVICE — BLADELESS OBTURATOR: Brand: WECK VISTA

## (undated) DEVICE — SUTURE N ABSRB BRAIDED 2-0 GS22 9 IN BLU V-LOC PBT VLOCN2145

## (undated) DEVICE — GLOVE SURG SZ 7 L12IN FNGR THK79MIL GRN LTX FREE

## (undated) DEVICE — ESOPHAGEAL BALLOON DILATATION CATHETER: Brand: CRE FIXED WIRE